# Patient Record
Sex: MALE | Race: WHITE | NOT HISPANIC OR LATINO | Employment: OTHER | URBAN - METROPOLITAN AREA
[De-identification: names, ages, dates, MRNs, and addresses within clinical notes are randomized per-mention and may not be internally consistent; named-entity substitution may affect disease eponyms.]

---

## 2017-01-03 DIAGNOSIS — R51.9 NONINTRACTABLE EPISODIC HEADACHE, UNSPECIFIED HEADACHE TYPE: ICD-10-CM

## 2017-01-03 DIAGNOSIS — E11.9 DIABETES MELLITUS WITHOUT COMPLICATION: ICD-10-CM

## 2017-01-03 RX ORDER — METFORMIN HYDROCHLORIDE 1000 MG/1
TABLET ORAL
Qty: 180 TABLET | Refills: 0 | Status: SHIPPED | OUTPATIENT
Start: 2017-01-03 | End: 2017-03-30 | Stop reason: SDUPTHER

## 2017-01-03 RX ORDER — BUTALBITAL, ACETAMINOPHEN AND CAFFEINE 50; 325; 40 MG/1; MG/1; MG/1
TABLET ORAL
Qty: 30 TABLET | Refills: 0 | Status: SHIPPED | OUTPATIENT
Start: 2017-01-03 | End: 2017-02-02 | Stop reason: SDUPTHER

## 2017-01-23 DIAGNOSIS — J20.9 ACUTE BRONCHITIS, UNSPECIFIED ORGANISM: ICD-10-CM

## 2017-01-23 DIAGNOSIS — J01.90 ACUTE RHINOSINUSITIS: ICD-10-CM

## 2017-01-23 RX ORDER — FLUTICASONE PROPIONATE 50 MCG
SPRAY, SUSPENSION (ML) NASAL
Qty: 16 G | Refills: 5 | Status: SHIPPED | OUTPATIENT
Start: 2017-01-23 | End: 2017-11-14 | Stop reason: SDUPTHER

## 2017-01-27 ENCOUNTER — HOSPITAL ENCOUNTER (EMERGENCY)
Facility: HOSPITAL | Age: 57
Discharge: HOME | End: 2017-01-27
Attending: EMERGENCY MEDICINE
Payer: MEDICARE

## 2017-01-27 ENCOUNTER — OFFICE VISIT (OUTPATIENT)
Dept: FAMILY MEDICINE | Facility: CLINIC | Age: 57
End: 2017-01-27
Payer: MEDICARE

## 2017-01-27 VITALS
HEIGHT: 69 IN | HEART RATE: 90 BPM | BODY MASS INDEX: 37.33 KG/M2 | DIASTOLIC BLOOD PRESSURE: 105 MMHG | WEIGHT: 252 LBS | TEMPERATURE: 98 F | SYSTOLIC BLOOD PRESSURE: 190 MMHG | OXYGEN SATURATION: 93 %

## 2017-01-27 VITALS
BODY MASS INDEX: 36.58 KG/M2 | HEIGHT: 69 IN | OXYGEN SATURATION: 96 % | SYSTOLIC BLOOD PRESSURE: 193 MMHG | HEART RATE: 80 BPM | DIASTOLIC BLOOD PRESSURE: 108 MMHG | RESPIRATION RATE: 17 BRPM | TEMPERATURE: 98 F | WEIGHT: 247 LBS

## 2017-01-27 DIAGNOSIS — G43.909 MIGRAINE WITHOUT STATUS MIGRAINOSUS, NOT INTRACTABLE, UNSPECIFIED MIGRAINE TYPE: Primary | ICD-10-CM

## 2017-01-27 DIAGNOSIS — I10 ESSENTIAL HYPERTENSION: Primary | ICD-10-CM

## 2017-01-27 DIAGNOSIS — I10 ESSENTIAL HYPERTENSION: ICD-10-CM

## 2017-01-27 LAB
ALBUMIN SERPL BCP-MCNC: 4.2 G/DL
ALP SERPL-CCNC: 70 U/L
ALT SERPL W/O P-5'-P-CCNC: 25 U/L
ANION GAP SERPL CALC-SCNC: 10 MMOL/L
AST SERPL-CCNC: 25 U/L
BASOPHILS # BLD AUTO: 0.02 K/UL
BASOPHILS NFR BLD: 0.4 %
BILIRUB SERPL-MCNC: 0.3 MG/DL
BILIRUB UR QL STRIP: NEGATIVE
BUN SERPL-MCNC: 16 MG/DL
CALCIUM SERPL-MCNC: 9.4 MG/DL
CHLORIDE SERPL-SCNC: 109 MMOL/L
CLARITY UR: CLEAR
CO2 SERPL-SCNC: 22 MMOL/L
COLOR UR: YELLOW
CREAT SERPL-MCNC: 0.9 MG/DL
DIFFERENTIAL METHOD: ABNORMAL
EOSINOPHIL # BLD AUTO: 0.2 K/UL
EOSINOPHIL NFR BLD: 4 %
ERYTHROCYTE [DISTWIDTH] IN BLOOD BY AUTOMATED COUNT: 12.2 %
EST. GFR  (AFRICAN AMERICAN): >60 ML/MIN/1.73 M^2
EST. GFR  (NON AFRICAN AMERICAN): >60 ML/MIN/1.73 M^2
GLUCOSE SERPL-MCNC: 99 MG/DL
GLUCOSE UR QL STRIP: NEGATIVE
HCT VFR BLD AUTO: 37 %
HGB BLD-MCNC: 12.7 G/DL
HGB UR QL STRIP: NEGATIVE
KETONES UR QL STRIP: NEGATIVE
LEUKOCYTE ESTERASE UR QL STRIP: NEGATIVE
LYMPHOCYTES # BLD AUTO: 2.4 K/UL
LYMPHOCYTES NFR BLD: 41.2 %
MCH RBC QN AUTO: 31.4 PG
MCHC RBC AUTO-ENTMCNC: 34.3 %
MCV RBC AUTO: 91 FL
MONOCYTES # BLD AUTO: 0.6 K/UL
MONOCYTES NFR BLD: 10 %
NEUTROPHILS # BLD AUTO: 2.5 K/UL
NEUTROPHILS NFR BLD: 44.4 %
NITRITE UR QL STRIP: NEGATIVE
PH UR STRIP: 5 [PH] (ref 5–8)
PLATELET # BLD AUTO: 205 K/UL
PMV BLD AUTO: 11 FL
POTASSIUM SERPL-SCNC: 4.5 MMOL/L
PROT SERPL-MCNC: 6.8 G/DL
PROT UR QL STRIP: NEGATIVE
RBC # BLD AUTO: 4.05 M/UL
SODIUM SERPL-SCNC: 141 MMOL/L
SP GR UR STRIP: 1.01 (ref 1–1.03)
URN SPEC COLLECT METH UR: NORMAL
UROBILINOGEN UR STRIP-ACNC: NEGATIVE EU/DL
WBC # BLD AUTO: 5.7 K/UL

## 2017-01-27 PROCEDURE — 99499 UNLISTED E&M SERVICE: CPT | Mod: S$GLB,,, | Performed by: NURSE PRACTITIONER

## 2017-01-27 PROCEDURE — 85025 COMPLETE CBC W/AUTO DIFF WBC: CPT

## 2017-01-27 PROCEDURE — 81003 URINALYSIS AUTO W/O SCOPE: CPT

## 2017-01-27 PROCEDURE — 96374 THER/PROPH/DIAG INJ IV PUSH: CPT

## 2017-01-27 PROCEDURE — 80053 COMPREHEN METABOLIC PANEL: CPT

## 2017-01-27 PROCEDURE — 96375 TX/PRO/DX INJ NEW DRUG ADDON: CPT

## 2017-01-27 PROCEDURE — 99284 EMERGENCY DEPT VISIT MOD MDM: CPT | Mod: 25

## 2017-01-27 PROCEDURE — 63600175 PHARM REV CODE 636 W HCPCS: Performed by: EMERGENCY MEDICINE

## 2017-01-27 PROCEDURE — 99999 PR PBB SHADOW E&M-EST. PATIENT-LVL IV: CPT | Mod: PBBFAC,,, | Performed by: NURSE PRACTITIONER

## 2017-01-27 PROCEDURE — 25000003 PHARM REV CODE 250: Performed by: EMERGENCY MEDICINE

## 2017-01-27 RX ORDER — MORPHINE SULFATE 10 MG/ML
4 INJECTION INTRAMUSCULAR; INTRAVENOUS; SUBCUTANEOUS
Status: COMPLETED | OUTPATIENT
Start: 2017-01-27 | End: 2017-01-27

## 2017-01-27 RX ORDER — PROCHLORPERAZINE EDISYLATE 5 MG/ML
10 INJECTION INTRAMUSCULAR; INTRAVENOUS ONCE
Status: COMPLETED | OUTPATIENT
Start: 2017-01-27 | End: 2017-01-27

## 2017-01-27 RX ORDER — PROCHLORPERAZINE MALEATE 10 MG
10 TABLET ORAL 4 TIMES DAILY PRN
Qty: 15 TABLET | Refills: 0 | Status: SHIPPED | OUTPATIENT
Start: 2017-01-27 | End: 2017-03-03

## 2017-01-27 RX ORDER — PROCHLORPERAZINE EDISYLATE 5 MG/ML
10 INJECTION INTRAMUSCULAR; INTRAVENOUS ONCE
Status: DISCONTINUED | OUTPATIENT
Start: 2017-01-27 | End: 2017-01-27

## 2017-01-27 RX ORDER — DIPHENHYDRAMINE HYDROCHLORIDE 50 MG/ML
12.5 INJECTION INTRAMUSCULAR; INTRAVENOUS
Status: COMPLETED | OUTPATIENT
Start: 2017-01-27 | End: 2017-01-27

## 2017-01-27 RX ORDER — ALPRAZOLAM 0.5 MG/1
0.5 TABLET ORAL
Status: COMPLETED | OUTPATIENT
Start: 2017-01-27 | End: 2017-01-27

## 2017-01-27 RX ORDER — PROMETHAZINE HYDROCHLORIDE 25 MG/1
25 TABLET ORAL EVERY 6 HOURS PRN
Qty: 15 TABLET | Refills: 0 | Status: SHIPPED | OUTPATIENT
Start: 2017-01-27 | End: 2017-03-15

## 2017-01-27 RX ADMIN — MORPHINE SULFATE 4 MG: 10 INJECTION INTRAVENOUS at 03:01

## 2017-01-27 RX ADMIN — PROCHLORPERAZINE EDISYLATE 10 MG: 5 INJECTION INTRAMUSCULAR; INTRAVENOUS at 02:01

## 2017-01-27 RX ADMIN — ALPRAZOLAM 0.5 MG: 0.5 TABLET ORAL at 01:01

## 2017-01-27 RX ADMIN — DIPHENHYDRAMINE HYDROCHLORIDE 12.5 MG: 50 INJECTION, SOLUTION INTRAMUSCULAR; INTRAVENOUS at 02:01

## 2017-01-27 NOTE — ED AVS SNAPSHOT
[facility]  [place] [address]               Scott Bansal   2017 12:04 PM   ED    Description:  Male : 1960   Department:  [facility]           Your Care was Coordinated By:     Provider Role From To    Lore Smith MD Attending Provider 17 1206 --      Reason for Visit     Hypertension           Diagnoses this Visit        Comments    Migraine without status migrainosus, not intractable, unspecified migraine type    -  Primary     Essential hypertension           ED Disposition     None           To Do List           Follow-up Information     Follow up with Kaylie Chau MD. Schedule an appointment as soon as possible for a visit in 2 days.    Specialty:  Family Medicine    Contact information:    [address]  [place] [address] [phone]         These Medications        Disp Refills Start End    prochlorperazine (COMPAZINE) 10 MG tablet 15 tablet 0 2017     Take 1 tablet (10 mg total) by mouth 4 (four) times daily as needed. - Oral    Pharmacy: [facility]/pharmacy #[id number] - [place] - [address] Ph #: [phone]       promethazine (PHENERGAN) 25 MG tablet 15 tablet 0 2017     Take 1 tablet (25 mg total) by mouth every 6 (six) hours as needed for Nausea. - Oral    Pharmacy: [facility]/pharmacy #[id number] - [place], [place] - [address]. Ph #: [phone]         [facility]     [facility] -  Assistance  Registered nurses in the [facility] provide clinical advisement, health education, appointment booking, and other advisory services.  Call for this free service at [phone].             Medications           Message regarding Medications     Verify the changes and/or additions to your medication regime listed below are the same as discussed with your clinician today.  If any of these changes or additions are incorrect, please notify your  healthcare provider.        START taking these NEW medications        Refills    prochlorperazine (COMPAZINE) 10 MG tablet 0    Sig: Take 1 tablet (10 mg total) by mouth 4 (four) times daily as needed.    Class: Print    Route: Oral    promethazine (PHENERGAN) 25 MG tablet 0    Sig: Take 1 tablet (25 mg total) by mouth every 6 (six) hours as needed for Nausea.    Class: Print    Route: Oral      These medications were administered today        Dose Freq    alprazolam tablet 0.5 mg 0.5 mg ED 1 Time    Sig: Take 1 tablet (0.5 mg total) by mouth ED 1 Time.    Class: Normal    Route: Oral    diphenhydrAMINE injection 12.5 mg 12.5 mg ED 1 Time    Sig: Inject 0.25 mLs (12.5 mg total) into the vein ED 1 Time.    Class: Normal    Route: Intravenous    prochlorperazine injection Soln 10 mg 10 mg Once    Sig: Inject 2 mLs (10 mg total) into the vein once.    Class: Normal    Route: Intravenous    morphine injection 4 mg 4 mg ED 1 Time    Sig: Inject 0.4 mLs (4 mg total) into the vein ED 1 Time.    Class: Normal    Route: Intravenous           Verify that the below list of medications is an accurate representation of the medications you are currently taking.  If none reported, the list may be blank. If incorrect, please contact your healthcare provider. Carry this list with you in case of emergency.           Current Medications     buPROPion (WELLBUTRIN XL) 300 MG 24 hr tablet Take 300 mg by mouth every morning.    busPIRone (BUSPAR) 10 MG tablet Take 10 mg by mouth 3 (three) times daily.    butalbital-acetaminophen-caffeine -40 mg (FIORICET, ESGIC) -40 mg per tablet TAKE 1 TABLET BY MOUTH EVERY 4 (FOUR) HOURS AS NEEDED.    cloNIDine (CATAPRES) 0.3 MG tablet Take 1 tablet (0.3 mg total) by mouth 2 (two) times daily.    cloNIDine 0.3 mg/24 hr td ptwk (CATAPRES) 0.3 mg/24 hr PLACE 1 PATCH ONTO THE SKIN EVERY 7 DAYS.    fluticasone (FLONASE) 50 mcg/actuation nasal spray TAKE 1 SPRAY BY EACH NARE ROUTE ONCE DAILY.     "furosemide (LASIX) 40 MG tablet Take 40 mg by mouth 2 (two) times daily.    gemfibrozil (LOPID) 600 MG tablet TAKE 1 TABLET (600 MG TOTAL) BY MOUTH 2 (TWO) TIMES DAILY BEFORE MEALS.    levocetirizine (XYZAL) 5 MG tablet Take 1 tablet (5 mg total) by mouth every evening.    metformin (GLUCOPHAGE) 1000 MG tablet TAKE 1 TABLET (1,000 MG TOTAL) BY MOUTH 2 (TWO) TIMES DAILY.    pantoprazole (PROTONIX) 40 MG tablet Take 40 mg by mouth once daily.    PROAIR HFA 90 mcg/actuation inhaler INHALE 2 PUFFS EBERY 4 HOURS AS NEEDED    quetiapine (SEROQUEL) 300 MG Tab Take by mouth.    simvastatin (ZOCOR) 80 MG tablet Take 80 mg by mouth once daily.    sumatriptan (IMITREX) 100 MG tablet Take 1 tablet (100 mg total) by mouth as needed for Migraine. Take at the onset of headache, may take 1 more in 1 hour if needed.    sumatriptan (IMITREX) 50 MG tablet TAKE 1 TABLET (50 MG TOTAL) BY MOUTH EVERY 6 (SIX) HOURS AS NEEDED FOR MIGRAINE.    tamsulosin (FLOMAX) 0.4 mg Cp24 Take 0.4 mg by mouth once daily.    alprazolam (XANAX) 1 MG tablet Take 1 tablet (1 mg total) by mouth once.    alprazolam tablet 0.5 mg Take 1 tablet (0.5 mg total) by mouth ED 1 Time.    diazepam (VALIUM) 10 MG Tab Take 1 tablet (10 mg total) by mouth once.    morphine injection 4 mg Inject 0.4 mLs (4 mg total) into the vein ED 1 Time.    prochlorperazine (COMPAZINE) 10 MG tablet Take 1 tablet (10 mg total) by mouth 4 (four) times daily as needed.    promethazine (PHENERGAN) 25 MG tablet Take 1 tablet (25 mg total) by mouth every 6 (six) hours as needed for Nausea.    propranolol (INDERAL) 40 MG tablet Take 1 tablet (40 mg total) by mouth 2 (two) times daily.           Clinical Reference Information           Your Vitals Were     BP Pulse Temp Resp Height Weight    175/106 94 97.5 °F (36.4 °C) (Oral) 17 5' 9" (1.753 m) 112 kg (247 lb)    SpO2 BMI             96% 36.48 kg/m2         Allergies as of 1/27/2017        Reactions    Sulfa (Sulfonamide Antibiotics) Rash    "   Immunizations Administered on Date of Encounter - 1/27/2017     None      ED Micro, Lab, POCT     Start Ordered       Status Ordering Provider    01/27/17 1120 01/27/17 1119  Comprehensive metabolic panel  STAT      Final result     01/27/17 1120 01/27/17 1119  Urinalysis  STAT      Final result     01/27/17 1119 01/27/17 1119  CBC auto differential  STAT      Final result       ED Imaging Orders     Start Ordered       Status Ordering Provider    01/27/17 1120 01/27/17 1119  CT Head Without Contrast  1 time imaging      Final result         Discharge Instructions         Take medications as instructed.  Follow-up with your primary care doctor.  Return to the emergency department for any new or worsening complaints.  Step-by-Step  Checking Your Blood Pressure    © 2551-8379 [employer]. [address] [place] [address], [place] [address]. All rights reserved. This information is not intended as a substitute for professional medical care. Always follow your healthcare professional's instructions.          Migraine Headache  This often severe type of headache is different from other types of headaches in that symptoms other than pain occur with the headache. Nausea and vomiting, lightheadedness, sensitivity to light (photophobia), and other visual disturbances are common migraine symptoms. The pain may last from a few hours to several days. It is not clear why migraines occur but transient factors called triggers can raise the risk of having a migraine attack. A migraine may be triggered by emotional stress or depression, or by hormone changes during the menstrual cycle. Other triggers include birth control pills, overuse of migraine medicines, alcohol or caffeine, foods with tyramine, eye strain, weather changes, missed meals, or too little or too much sleep.  Home care  Follow these tips when taking care of yourself at home:  · Dont drive yourself home if you were given pain medicine for your headache.  Instead, have someone else drive you home. Try to sleep when you get home. You should feel much better when you wake up.  · Cold can help ease migraine symptoms. Put an ice pack on your forehead or at the base of your skull. Put heat on the back of your neck to help ease any neck spasm.  · Drink only clear liquids or eat a light diet until your symptoms get better. This will help you avoid nausea and vomiting.  How to prevent migraines  Pay attention to what seems to trigger your headache. Try to avoid the triggers when you can. If you have frequent headaches, consider keeping a headache diary. In it, write down what you were doing, feeling, or eating in the hours before each headache. Show this to your health care provider to help find the cause of your headaches.  If stress seems to be a trigger for your headaches, figure out what is causing stress in your life. Learn new ways to handle your stress. Ideas include regular exercise, biofeedback, self-hypnosis, and meditation. Talk with your health care provider to find out more information about managing stress. Many books and digital media are also available on this subject.  Tyramine is a substance found in many foods. It can trigger a migraine in some people. These foods contain tyramine:  · Chocolate  · Yogurt  · All cheeses but cottage cheese and cream cheese  · Smoked or pickled fish and meat, including herring, caviar, bologna, pepperoni, and salami  · Liver  · Avocados  · Bananas  · Figs  · Raisins  · Red wine  Try staying away from these foods for 1 to 2 months to see if you have fewer headaches.  How to treat future headaches  · Take time out at the first sign of a headache, if possible. Find a quiet, dark, comfortable place to sit or lie down. Let yourself relax or sleep.  · Put an ice pack on your forehead or on the area of greatest pain. A heating pad and massage may help if you are having a muscle spasm and tightness in your neck.  · If you have been  prescribed a medicine to stop a migraine headache, use this at the first warning sign of the headache for best results. First signs may be an aura or pain.  · If you need to take medicine often for your migraine, talk with your healthcare provider about other ways to prevent your headaches.  Follow-up care  Follow up with your healthcare provider if your headache doesnt get better within the next 24 hours. Talk with your provider if you have frequent headaches. He or she can figure out a treatment plan. Ask if you can have medicine to take at home the next time you get a bad headache. This may keep you from having to visit the emergency department in the future. You may need to see a headache specialist (neurologist) if you continue to have headaches.  When to seek medical advice  Call your healthcare provider right away if any of these occur:  · Your head pain gets worse, or doesnt get better within 24 hours  · You cant keep liquids down (repeated vomiting)  · Pain in your sinuses, ears, or throat  · Fever of 100.4º F (38º C) or higher, or as directed by your healthcare provider  · Stiff neck  · Extreme drowsiness, confusion, or fainting  · Dizziness, or dizziness with spinning sensation (vertigo)  · Weakness in an arm or leg, or on one side of your face  · Difficulty talking or seeing  © 4001-6768 [employer]. [address] [place] [address], [place] [address]. All rights reserved. This information is not intended as a substitute for professional medical care. Always follow your healthcare professional's instructions.          Out of Control High Blood Pressure (Established)    Your blood pressure was unusually high today. This can occur if youve missed doses of your blood pressure medicine. Or it can happen if you are taking other medicines. These include some asthma inhalers, decongestants, diet pills, and street drugs like cocaine and amphetamine.  Other causes include:  · Weight gain  · More salt in  your diet  · Smoking  · Caffeine  Your blood pressure can also rise if you are emotionally upset or in intense pain. It may go back to normal after a period of rest.  A blood pressure reading is made up of 2 numbers. There is a top number over a bottom number. The top number is the systolic pressure. The bottom number is the diastolic pressure. A normal blood pressure is less than 120 over less than 80. High blood pressure (hypertension) is when the top number is 140 or higher. Or it is when the bottom number is 90 or higher. To be high blood pressure, the numbers must be higher when tested over a period of time. The blood pressures between normal and hypertension are called prehypertension.  Home care  Its important to take steps to lower your blood pressure. If you are taking blood pressure medicine, the guidelines below may help you need less or no medicines in the future.  · Begin a weight-loss program if you are overweight.  · Cut back on the amount of salt in your diet:  ¨ Avoid high-salt foods like olives, pickles, smoked meats, and salted potato chips.  ¨ Dont add salt to your food at the table.  ¨ Use only small amounts of salt when cooking.  · Begin an exercise program. Talk with your health care provider about what exercise program is best for you. It doesnt have to be difficult. Even brisk walking for 20 minutes 3 times a week is a good form of exercise.  · Avoid medicines that have heart stimulants in them. This includes many cold and sinus decongestant pills and sprays, as well as diet pills. Check the warnings about hypertension on the label. Stimulants such as amphetamine or cocaine could be lethal for someone with hypertension. Never take these.  · Limit how much caffeine you drink. Or switch to noncaffeinated beverages.  · Stop smoking. If you are a long-time smoker, this can be hard. Enroll in a stop-smoking program to make it more likely that you will succeed. Talk with your provider about  ways to quit.  · Learn how to handle stress better. This is an important part of any program to lower blood pressure. Learn ways to relax. These include meditation, yoga, and biofeedback.  · If medicines were prescribed, take them exactly as directed. Missing doses may cause your blood pressure to get out of control.  · Consider buying an automatic blood pressure machine. These are available at many drugstores. Use this to monitor your blood pressure. Report the results to your provider.  Follow-up care  Regular visits to your own health care provider for blood pressure and medicine checks are an important part of your care. Make a follow-up appointment as directed.  When to seek medical advice  Call your health care provider right away if any of these occur:  · Chest, arm, shoulder, neck, or upper back pain  · Shortness of breath  · Severe headache  · Throbbing or rushing sound in the ears  · Nosebleed  · Extreme drowsiness, confusion, or fainting  · Dizziness or dizziness with spinning sensation (vertigo)  · Weakness in an arm or leg or on one side of the face  · Difficulty speaking or seeing   © 2595-6105 [employer]. [address] [place] [address]. All rights reserved. This information is not intended as a substitute for professional medical care. Always follow your healthcare professional's instructions.          Smoking Cessation     If you would like to quit smoking:   You may be eligible for free services if you are a [place] resident and started smoking cigarettes before September 1, 1988.  Call the [employer]oking [employer]ess[employer] ([employer]) toll free at (414) 616-5413 or (119) 761-6094.   Call 0-800-QUIT-NOW if you do not meet the above criteria.             [facility] complies with applicable Federal civil rights laws and does not discriminate on the basis of race, color, national origin, age, disability, or sex.        Language Assistance Services     ATTENTION:  Language assistance services are available, free of charge. Please call [phone].      ATENCIÓN: Si habla español, tiene a yadav disposición servicios gratuitos de asistencia lingüística. Llame al [phone].     CHÚ Ý: N?u b?n nói Ti?ng Vi?t, có các d?ch v? h? tr? ngôn ng? mi?n phí dành cho b?n. G?i s? [phone].

## 2017-01-27 NOTE — ED TRIAGE NOTES
"Pt arrived via personal transportation from home. CC of hypertension. Pt stated "My girlfriend is up on the 4th floor, she just had a big surgery and i've just been too anxious about her. So my blood pressure has been aurelio high. The last time this happened to me, was when my dad ." Pt presents with headache 10/10, denies N/V/F/D/SOB. NAD at this time.   "

## 2017-01-27 NOTE — ED NOTES
MD made aware of pt request for fluids. MD stated pt shouldn't receive fluids with the his high blood pressure

## 2017-01-27 NOTE — DISCHARGE INSTRUCTIONS
Take medications as instructed.  Follow-up with your primary care doctor.  Return to the emergency department for any new or worsening complaints.  Step-by-Step  Checking Your Blood Pressure    © 7924-8987 [employer]. [address] [place] [address], [place] [address]. All rights reserved. This information is not intended as a substitute for professional medical care. Always follow your healthcare professional's instructions.          Migraine Headache  This often severe type of headache is different from other types of headaches in that symptoms other than pain occur with the headache. Nausea and vomiting, lightheadedness, sensitivity to light (photophobia), and other visual disturbances are common migraine symptoms. The pain may last from a few hours to several days. It is not clear why migraines occur but transient factors called triggers can raise the risk of having a migraine attack. A migraine may be triggered by emotional stress or depression, or by hormone changes during the menstrual cycle. Other triggers include birth control pills, overuse of migraine medicines, alcohol or caffeine, foods with tyramine, eye strain, weather changes, missed meals, or too little or too much sleep.  Home care  Follow these tips when taking care of yourself at home:  · Dont drive yourself home if you were given pain medicine for your headache. Instead, have someone else drive you home. Try to sleep when you get home. You should feel much better when you wake up.  · Cold can help ease migraine symptoms. Put an ice pack on your forehead or at the base of your skull. Put heat on the back of your neck to help ease any neck spasm.  · Drink only clear liquids or eat a light diet until your symptoms get better. This will help you avoid nausea and vomiting.  How to prevent migraines  Pay attention to what seems to trigger your headache. Try to avoid the triggers when you can. If you have frequent headaches, consider keeping  a headache diary. In it, write down what you were doing, feeling, or eating in the hours before each headache. Show this to your health care provider to help find the cause of your headaches.  If stress seems to be a trigger for your headaches, figure out what is causing stress in your life. Learn new ways to handle your stress. Ideas include regular exercise, biofeedback, self-hypnosis, and meditation. Talk with your health care provider to find out more information about managing stress. Many books and digital media are also available on this subject.  Tyramine is a substance found in many foods. It can trigger a migraine in some people. These foods contain tyramine:  · Chocolate  · Yogurt  · All cheeses but cottage cheese and cream cheese  · Smoked or pickled fish and meat, including herring, caviar, bologna, pepperoni, and salami  · Liver  · Avocados  · Bananas  · Figs  · Raisins  · Red wine  Try staying away from these foods for 1 to 2 months to see if you have fewer headaches.  How to treat future headaches  · Take time out at the first sign of a headache, if possible. Find a quiet, dark, comfortable place to sit or lie down. Let yourself relax or sleep.  · Put an ice pack on your forehead or on the area of greatest pain. A heating pad and massage may help if you are having a muscle spasm and tightness in your neck.  · If you have been prescribed a medicine to stop a migraine headache, use this at the first warning sign of the headache for best results. First signs may be an aura or pain.  · If you need to take medicine often for your migraine, talk with your healthcare provider about other ways to prevent your headaches.  Follow-up care  Follow up with your healthcare provider if your headache doesnt get better within the next 24 hours. Talk with your provider if you have frequent headaches. He or she can figure out a treatment plan. Ask if you can have medicine to take at home the next time you get a bad  headache. This may keep you from having to visit the emergency department in the future. You may need to see a headache specialist (neurologist) if you continue to have headaches.  When to seek medical advice  Call your healthcare provider right away if any of these occur:  · Your head pain gets worse, or doesnt get better within 24 hours  · You cant keep liquids down (repeated vomiting)  · Pain in your sinuses, ears, or throat  · Fever of 100.4º F (38º C) or higher, or as directed by your healthcare provider  · Stiff neck  · Extreme drowsiness, confusion, or fainting  · Dizziness, or dizziness with spinning sensation (vertigo)  · Weakness in an arm or leg, or on one side of your face  · Difficulty talking or seeing  © 6107-4493 [employer]. [address] [place] [address], [place] [address]. All rights reserved. This information is not intended as a substitute for professional medical care. Always follow your healthcare professional's instructions.          Out of Control High Blood Pressure (Established)    Your blood pressure was unusually high today. This can occur if youve missed doses of your blood pressure medicine. Or it can happen if you are taking other medicines. These include some asthma inhalers, decongestants, diet pills, and street drugs like cocaine and amphetamine.  Other causes include:  · Weight gain  · More salt in your diet  · Smoking  · Caffeine  Your blood pressure can also rise if you are emotionally upset or in intense pain. It may go back to normal after a period of rest.  A blood pressure reading is made up of 2 numbers. There is a top number over a bottom number. The top number is the systolic pressure. The bottom number is the diastolic pressure. A normal blood pressure is less than 120 over less than 80. High blood pressure (hypertension) is when the top number is 140 or higher. Or it is when the bottom number is 90 or higher. To be high blood pressure, the numbers must be  higher when tested over a period of time. The blood pressures between normal and hypertension are called prehypertension.  Home care  Its important to take steps to lower your blood pressure. If you are taking blood pressure medicine, the guidelines below may help you need less or no medicines in the future.  · Begin a weight-loss program if you are overweight.  · Cut back on the amount of salt in your diet:  ¨ Avoid high-salt foods like olives, pickles, smoked meats, and salted potato chips.  ¨ Dont add salt to your food at the table.  ¨ Use only small amounts of salt when cooking.  · Begin an exercise program. Talk with your health care provider about what exercise program is best for you. It doesnt have to be difficult. Even brisk walking for 20 minutes 3 times a week is a good form of exercise.  · Avoid medicines that have heart stimulants in them. This includes many cold and sinus decongestant pills and sprays, as well as diet pills. Check the warnings about hypertension on the label. Stimulants such as amphetamine or cocaine could be lethal for someone with hypertension. Never take these.  · Limit how much caffeine you drink. Or switch to noncaffeinated beverages.  · Stop smoking. If you are a long-time smoker, this can be hard. Enroll in a stop-smoking program to make it more likely that you will succeed. Talk with your provider about ways to quit.  · Learn how to handle stress better. This is an important part of any program to lower blood pressure. Learn ways to relax. These include meditation, yoga, and biofeedback.  · If medicines were prescribed, take them exactly as directed. Missing doses may cause your blood pressure to get out of control.  · Consider buying an automatic blood pressure machine. These are available at many [employer]. Use this to monitor your blood pressure. Report the results to your provider.  Follow-up care  Regular visits to your own health care provider for blood pressure and  medicine checks are an important part of your care. Make a follow-up appointment as directed.  When to seek medical advice  Call your health care provider right away if any of these occur:  · Chest, arm, shoulder, neck, or upper back pain  · Shortness of breath  · Severe headache  · Throbbing or rushing sound in the ears  · Nosebleed  · Extreme drowsiness, confusion, or fainting  · Dizziness or dizziness with spinning sensation (vertigo)  · Weakness in an arm or leg or on one side of the face  · Difficulty speaking or seeing   © 9524-0567 [employer]. [address] [place] [address]. All rights reserved. This information is not intended as a substitute for professional medical care. Always follow your healthcare professional's instructions.

## 2017-01-27 NOTE — ED PROVIDER NOTES
Encounter Date: 1/27/2017    SCRIBE #1 NOTE: I, Matiasrick Bustillos, am scribing for, and in the presence of, Lore Smith MD. Other sections scribed: HPI, ROS.       History     Chief Complaint   Patient presents with    Hypertension     Pt. presents with elevated blood pressure with c/o migraine headaches for weeks.      Review of patient's allergies indicates:   Allergen Reactions    Sulfa (sulfonamide antibiotics) Rash     HPI Comments: CC: Hypertension, Headaches  HPI: This 56 y.o. obese male with HTN, migraines, HLD, DM type II, Bipolar 1 disorder, GERD, BPH and and Hx of tobacco use presents to the ED c/o frequent occipital HAs for the past week. Pt states his pain is severe and acute. Pt reports his blood pressure has been elevated for the past few days (227/130 last night, 200s again this morning); pt believes that some of this could be due to recent stress stemming from recent emergency abdominal surgery his fiancee had done this past week. Pt states that he took an extra 0.2 mg Clonidine this morning in addition to his daily medications. He denies relief of HAs with Fioricet and Imitrex. He denies fever, chills, chest pain, SOB, abdominal pain. Denies sudden onset of HA. Denies f/c/neck pain/numbness/tingling/weakness. No swelling.         The history is provided by the patient.     Past Medical History   Diagnosis Date    Bipolar 1 disorder, mixed     BPH (benign prostatic hypertrophy)     Diabetes mellitus     GERD (gastroesophageal reflux disease)     Hyperlipidemia     Hypertension     Migraine headache      No past medical history pertinent negatives.  Past Surgical History   Procedure Laterality Date    Cervical spine surgery      Tonsillectomy      Shoulder surgery       History reviewed. No pertinent family history.  Social History   Substance Use Topics    Smoking status: Former Smoker    Smokeless tobacco: None    Alcohol use No     Review of Systems   Constitutional: Negative for  activity change, chills, fever and unexpected weight change.   HENT: Negative for congestion, drooling, rhinorrhea, sore throat, tinnitus, trouble swallowing and voice change.    Eyes: Negative for pain, redness and visual disturbance.   Respiratory: Negative for apnea, cough, choking, shortness of breath, wheezing and stridor.    Cardiovascular: Negative for chest pain.   Gastrointestinal: Negative for abdominal pain, nausea and vomiting.   Endocrine: Negative for polydipsia.   Genitourinary: Negative for decreased urine volume, difficulty urinating, dysuria, flank pain, frequency, hematuria and urgency.   Musculoskeletal: Negative for arthralgias, back pain, gait problem, myalgias, neck pain and neck stiffness.   Skin: Negative for color change, rash and wound.   Neurological: Positive for headaches (occipital). Negative for seizures, syncope, weakness and numbness.   Hematological: Negative for adenopathy.   Psychiatric/Behavioral: Negative for agitation and confusion.       Physical Exam   Initial Vitals   BP Pulse Resp Temp SpO2   01/27/17 1100 01/27/17 1100 01/27/17 1100 01/27/17 1100 01/27/17 1100   209/128 89 17 97.5 °F (36.4 °C) 98 %     Physical Exam    Nursing note and vitals reviewed.  Constitutional: He appears well-developed and well-nourished. He is not diaphoretic. No distress.   HENT:   Head: Normocephalic and atraumatic.   Right Ear: External ear normal.   Left Ear: External ear normal.   Nose: Nose normal.   Mouth/Throat: Oropharynx is clear and moist. No oropharyngeal exudate.   Eyes: Conjunctivae and EOM are normal. Pupils are equal, round, and reactive to light. Right eye exhibits no discharge. Left eye exhibits no discharge. No scleral icterus.   Neck: Normal range of motion. Neck supple. No thyromegaly present. No tracheal deviation present. No JVD present.   Cardiovascular: Normal rate, regular rhythm, normal heart sounds and intact distal pulses. Exam reveals no gallop and no friction rub.     No murmur heard.  Pulmonary/Chest: Breath sounds normal. No stridor. No respiratory distress. He has no wheezes. He has no rhonchi. He has no rales. He exhibits no tenderness.   Abdominal: Soft. Bowel sounds are normal. He exhibits no distension. There is no tenderness. There is no rebound and no guarding.   Morbidly obese   Musculoskeletal: Normal range of motion. He exhibits no edema.   Lymphadenopathy:     He has no cervical adenopathy.   Neurological: He is alert and oriented to person, place, and time. He has normal strength. He displays normal reflexes. No cranial nerve deficit or sensory deficit.   Smiling, nontoxic appearing.  There is no pronator drift.  Patient has normal rapid alternating movements to bilateral upper extremities.   Skin: Skin is warm and dry. No rash noted. No erythema. No pallor.   Psychiatric: He has a normal mood and affect. His behavior is normal. Thought content normal.         ED Course   Procedures  Labs Reviewed   CBC W/ AUTO DIFFERENTIAL - Abnormal; Notable for the following:        Result Value    RBC 4.05 (*)     Hemoglobin 12.7 (*)     Hematocrit 37.0 (*)     MCH 31.4 (*)     All other components within normal limits   COMPREHENSIVE METABOLIC PANEL - Abnormal; Notable for the following:     CO2 22 (*)     All other components within normal limits   URINALYSIS             Medical Decision Makin-year-old male with a history of hypertension, chronic migraine headaches, diabetes, bipolar disorder, BPH, GERD, COPD, chronic back pain presents to the emergency department with his daughter complaining of headache consistent with his past migraine headaches and is unrelieved with his home medications.  He also reports increased blood pressure readings at home.  Notes increased social stressors at home secondary to his fiancé's recent hospitalization.  On exam he is afebrile, hypertensive with otherwise stable vital signs.  He is nontoxic appearing.  He is neurovascularly  intact.  There are no meningeal signs on exam.  Differential diagnosis includes but is not limited to migraine headache, tension headache, hypertensive emergency, stress reaction.  I considered but do not suspect subarachnoid hemorrhage, CVA, sepsis, meningoencephalitis.  CT the head was obtained and is negative for acute pathology.  Labs were reviewed and are grossly unremarkable.  Patient's blood pressure is improved with Xanax for anxiety, Compazine, Benadryl as well as morphine.  He is requesting discharge home and states that he feels much better.  His blood pressure is also improved.  We'll discharge with Compazine, Phenergan and he agrees to keep a daily log of his blood pressures to follow-up with his primary care physician.  Return precautions were given.  He and his daughter at bedside agrees the plan.            Scribe Attestation:   Scribe #1: I performed the above scribed service and the documentation accurately describes the services I performed. I attest to the accuracy of the note.    Attending Attestation:           Physician Attestation for Scribe:  Physician Attestation Statement for Scribe #1: I, Lore Smith MD, reviewed documentation, as scribed by Matias Bustillos in my presence, and it is both accurate and complete.                 ED Course     Clinical Impression:   The primary encounter diagnosis was Migraine without status migrainosus, not intractable, unspecified migraine type. A diagnosis of Essential hypertension was also pertinent to this visit.          Lore Smith MD  01/27/17 0519

## 2017-01-27 NOTE — MR AVS SNAPSHOT
[facility]  [address]  [place] [address]  Phone: [phone]  Fax: [phone]                  Scott Bansal   2017 9:45 AM   Appointment    Description:  Male : 1960   Provider:  INESSA BROWN IN   Department:  [facility]                To Do List           Future Appointments        Provider Department Dept Phone    2017 9:45 AM INESSA BROWN IN [facility] [phone]      Goals (5 Years of Data)     None      [facility]     [facility] On Call Nurse Care Line -  Assistance  Registered nurses in the [facility] Call Center provide clinical advisement, health education, appointment booking, and other advisory services.  Call for this free service at [phone].             Medications           Message regarding Medications     Verify the changes and/or additions to your medication regime listed below are the same as discussed with your clinician today.  If any of these changes or additions are incorrect, please notify your healthcare provider.             Verify that the below list of medications is an accurate representation of the medications you are currently taking.  If none reported, the list may be blank. If incorrect, please contact your healthcare provider. Carry this list with you in case of emergency.           Current Medications     alprazolam (XANAX) 1 MG tablet Take 1 tablet (1 mg total) by mouth once.    buPROPion (WELLBUTRIN XL) 300 MG 24 hr tablet Take 300 mg by mouth every morning.    busPIRone (BUSPAR) 10 MG tablet Take 10 mg by mouth 3 (three) times daily.    butalbital-acetaminophen-caffeine -40 mg (FIORICET, ESGIC) -40 mg per tablet TAKE 1 TABLET BY MOUTH EVERY 4 (FOUR) HOURS AS NEEDED.    cloNIDine (CATAPRES) 0.3 MG tablet Take 1 tablet (0.3 mg total) by mouth 2 (two) times daily.    cloNIDine 0.3 mg/24 hr td ptwk (CATAPRES) 0.3 mg/24 hr PLACE 1 PATCH ONTO THE SKIN EVERY 7 DAYS.     diazepam (VALIUM) 10 MG Tab Take 1 tablet (10 mg total) by mouth once.    diclofenac sodium (VOLTAREN) 1 % Gel Apply 2 g topically once daily.    fluticasone (FLONASE) 50 mcg/actuation nasal spray TAKE 1 SPRAY BY EACH NARE ROUTE ONCE DAILY.    fosinopril (MONOPRIL) 40 MG tablet Take 40 mg by mouth once daily.    furosemide (LASIX) 40 MG tablet Take 40 mg by mouth 2 (two) times daily.    gemfibrozil (LOPID) 600 MG tablet TAKE 1 TABLET (600 MG TOTAL) BY MOUTH 2 (TWO) TIMES DAILY BEFORE MEALS.    hydrocodone-acetaminophen 7.5-325mg (NORCO) 7.5-325 mg per tablet Take 1 tablet by mouth every 6 (six) hours as needed.    levocetirizine (XYZAL) 5 MG tablet Take 1 tablet (5 mg total) by mouth every evening.    metformin (GLUCOPHAGE) 1000 MG tablet TAKE 1 TABLET (1,000 MG TOTAL) BY MOUTH 2 (TWO) TIMES DAILY.    pantoprazole (PROTONIX) 40 MG tablet Take 40 mg by mouth once daily.    PROAIR HFA 90 mcg/actuation inhaler INHALE 2 PUFFS EBERY 4 HOURS AS NEEDED    propranolol (INDERAL) 40 MG tablet Take 1 tablet (40 mg total) by mouth 2 (two) times daily.    quetiapine (SEROQUEL) 300 MG Tab Take by mouth.    simvastatin (ZOCOR) 80 MG tablet Take 80 mg by mouth once daily.    sumatriptan (IMITREX) 100 MG tablet Take 1 tablet (100 mg total) by mouth as needed for Migraine. Take at the onset of headache, may take 1 more in 1 hour if needed.    sumatriptan (IMITREX) 50 MG tablet TAKE 1 TABLET (50 MG TOTAL) BY MOUTH EVERY 6 (SIX) HOURS AS NEEDED FOR MIGRAINE.    tamsulosin (FLOMAX) 0.4 mg Cp24 Take 0.4 mg by mouth once daily.           Clinical Reference Information           Allergies as of 1/27/2017     Sulfa (Sulfonamide Antibiotics)      Immunizations Administered on Date of Encounter - 1/27/2017     None

## 2017-01-30 ENCOUNTER — OFFICE VISIT (OUTPATIENT)
Dept: FAMILY MEDICINE | Facility: CLINIC | Age: 57
End: 2017-01-30
Payer: MEDICARE

## 2017-01-30 ENCOUNTER — LAB VISIT (OUTPATIENT)
Dept: LAB | Facility: HOSPITAL | Age: 57
End: 2017-01-30
Attending: FAMILY MEDICINE
Payer: MEDICARE

## 2017-01-30 VITALS
DIASTOLIC BLOOD PRESSURE: 96 MMHG | OXYGEN SATURATION: 97 % | TEMPERATURE: 98 F | BODY MASS INDEX: 37.62 KG/M2 | HEART RATE: 96 BPM | SYSTOLIC BLOOD PRESSURE: 148 MMHG | HEIGHT: 69 IN | WEIGHT: 254 LBS

## 2017-01-30 DIAGNOSIS — D64.9 ANEMIA, UNSPECIFIED TYPE: ICD-10-CM

## 2017-01-30 DIAGNOSIS — M54.31 SCIATICA OF RIGHT SIDE: ICD-10-CM

## 2017-01-30 DIAGNOSIS — I10 ESSENTIAL HYPERTENSION: Primary | ICD-10-CM

## 2017-01-30 LAB
BASOPHILS # BLD AUTO: 0.03 K/UL
BASOPHILS NFR BLD: 0.5 %
DIFFERENTIAL METHOD: ABNORMAL
EOSINOPHIL # BLD AUTO: 0.3 K/UL
EOSINOPHIL NFR BLD: 4.6 %
ERYTHROCYTE [DISTWIDTH] IN BLOOD BY AUTOMATED COUNT: 12.2 %
HCT VFR BLD AUTO: 37.9 %
HGB BLD-MCNC: 12.3 G/DL
LYMPHOCYTES # BLD AUTO: 2.4 K/UL
LYMPHOCYTES NFR BLD: 36.9 %
MCH RBC QN AUTO: 30.1 PG
MCHC RBC AUTO-ENTMCNC: 32.5 %
MCV RBC AUTO: 93 FL
MONOCYTES # BLD AUTO: 0.9 K/UL
MONOCYTES NFR BLD: 13.8 %
NEUTROPHILS # BLD AUTO: 2.8 K/UL
NEUTROPHILS NFR BLD: 43.1 %
PLATELET # BLD AUTO: 244 K/UL
PMV BLD AUTO: 11 FL
RBC # BLD AUTO: 4.09 M/UL
WBC # BLD AUTO: 6.54 K/UL

## 2017-01-30 PROCEDURE — 36415 COLL VENOUS BLD VENIPUNCTURE: CPT

## 2017-01-30 PROCEDURE — 99999 PR PBB SHADOW E&M-EST. PATIENT-LVL III: CPT | Mod: PBBFAC,,, | Performed by: FAMILY MEDICINE

## 2017-01-30 PROCEDURE — 3077F SYST BP >= 140 MM HG: CPT | Mod: S$GLB,,, | Performed by: FAMILY MEDICINE

## 2017-01-30 PROCEDURE — 85025 COMPLETE CBC W/AUTO DIFF WBC: CPT

## 2017-01-30 PROCEDURE — 3080F DIAST BP >= 90 MM HG: CPT | Mod: S$GLB,,, | Performed by: FAMILY MEDICINE

## 2017-01-30 PROCEDURE — 1159F MED LIST DOCD IN RCRD: CPT | Mod: S$GLB,,, | Performed by: FAMILY MEDICINE

## 2017-01-30 PROCEDURE — 99499 UNLISTED E&M SERVICE: CPT | Mod: S$PBB,,, | Performed by: FAMILY MEDICINE

## 2017-01-30 PROCEDURE — 99214 OFFICE O/P EST MOD 30 MIN: CPT | Mod: S$GLB,,, | Performed by: FAMILY MEDICINE

## 2017-01-30 RX ORDER — FOSINOPRIL SODIUM 40 MG/1
40 TABLET ORAL DAILY
Qty: 90 TABLET | Refills: 1 | Status: SHIPPED | OUTPATIENT
Start: 2017-01-30 | End: 2017-02-07

## 2017-01-30 RX ORDER — PANTOPRAZOLE SODIUM 40 MG/1
TABLET, DELAYED RELEASE ORAL
Qty: 90 TABLET | Refills: 3 | Status: SHIPPED | OUTPATIENT
Start: 2017-01-30 | End: 2018-01-12 | Stop reason: SDUPTHER

## 2017-01-30 RX ORDER — CYCLOBENZAPRINE HCL 10 MG
TABLET ORAL
Refills: 3 | COMMUNITY
Start: 2017-01-07 | End: 2017-02-07

## 2017-01-30 RX ORDER — HYDROCODONE BITARTRATE AND ACETAMINOPHEN 5; 325 MG/1; MG/1
1 TABLET ORAL EVERY 8 HOURS PRN
Qty: 15 TABLET | Refills: 0 | Status: SHIPPED | OUTPATIENT
Start: 2017-01-30 | End: 2017-02-07

## 2017-01-30 RX ORDER — FOSINOPRIL SODIUM 40 MG/1
40 TABLET ORAL DAILY
COMMUNITY
End: 2017-01-30 | Stop reason: SDUPTHER

## 2017-01-30 NOTE — MR AVS SNAPSHOT
[facility]  [address]  [place] [address]  Phone: [phone]                  Scott Bansal   2017 8:00 AM   Office Visit    Description:  Male : 1960   Provider:  Robert Lanier MD   Department:  [facility]           Reason for Visit     Hypertension     Medication Refill           Diagnoses this Visit        Comments    Essential hypertension    -  Primary     Sciatica of right side         Anemia, unspecified type                To Do List           Future Appointments        Provider Department Dept Phone    2017 9:00 AM LAB, [facility] DRAW STATION [facility] [phone]    2017 11:20 AM Kaylie Chau MD [facility] [phone]      Goals (5 Years of Data)     None       These Medications        Disp Refills Start End    fosinopril (MONOPRIL) 40 MG tablet 90 tablet 1 2017     Take 1 tablet (40 mg total) by mouth once daily. - Oral    Pharmacy: [facility]/pharmacy #[id number] - [place], [place] - [address]. Ph #: [phone]       hydrocodone-acetaminophen 5-325mg (NORCO) 5-325 mg per tablet 15 tablet 0 2017     Take 1 tablet by mouth every 8 (eight) hours as needed for Pain. - Oral    Pharmacy: [facility]/pharmacy #[id number] - [place], [place] - [address]. Ph #: [phone]         [facility]     [facility] -  Assistance  Registered nurses in the [facility] provide clinical advisement, health education, appointment booking, and other advisory services.  Call for this free service at [phone].             Medications           Message regarding Medications     Verify the changes and/or additions to your medication regime listed below are the same as discussed with your clinician today.  If any of these changes or additions are incorrect, please notify your healthcare provider.        START taking these NEW medications        Refills     fosinopril (MONOPRIL) 40 MG tablet 1    Sig: Take 1 tablet (40 mg total) by mouth once daily.    Class: Normal    Route: Oral    hydrocodone-acetaminophen 5-325mg (NORCO) 5-325 mg per tablet 0    Sig: Take 1 tablet by mouth every 8 (eight) hours as needed for Pain.    Class: Print    Route: Oral      STOP taking these medications     alprazolam (XANAX) 1 MG tablet Take 1 tablet (1 mg total) by mouth once.    diazepam (VALIUM) 10 MG Tab Take 1 tablet (10 mg total) by mouth once.           Verify that the below list of medications is an accurate representation of the medications you are currently taking.  If none reported, the list may be blank. If incorrect, please contact your healthcare provider. Carry this list with you in case of emergency.           Current Medications     buPROPion (WELLBUTRIN XL) 300 MG 24 hr tablet Take 300 mg by mouth every morning.    busPIRone (BUSPAR) 10 MG tablet Take 10 mg by mouth 3 (three) times daily.    butalbital-acetaminophen-caffeine -40 mg (FIORICET, ESGIC) -40 mg per tablet TAKE 1 TABLET BY MOUTH EVERY 4 (FOUR) HOURS AS NEEDED.    cloNIDine (CATAPRES) 0.3 MG tablet Take 1 tablet (0.3 mg total) by mouth 2 (two) times daily.    cloNIDine 0.3 mg/24 hr td ptwk (CATAPRES) 0.3 mg/24 hr PLACE 1 PATCH ONTO THE SKIN EVERY 7 DAYS.    cyclobenzaprine (FLEXERIL) 10 MG tablet TAKE 1 TABLET (10 MG TOTAL) BY MOUTH 3 (THREE) TIMES DAILY AS NEEDED.    fluticasone (FLONASE) 50 mcg/actuation nasal spray TAKE 1 SPRAY BY EACH NARE ROUTE ONCE DAILY.    fosinopril (MONOPRIL) 40 MG tablet Take 1 tablet (40 mg total) by mouth once daily.    furosemide (LASIX) 40 MG tablet Take 40 mg by mouth 2 (two) times daily.    gemfibrozil (LOPID) 600 MG tablet TAKE 1 TABLET (600 MG TOTAL) BY MOUTH 2 (TWO) TIMES DAILY BEFORE MEALS.    levocetirizine (XYZAL) 5 MG tablet Take 1 tablet (5 mg total) by mouth every evening.    metformin (GLUCOPHAGE) 1000 MG tablet TAKE 1 TABLET (1,000 MG TOTAL) BY MOUTH 2  "(TWO) TIMES DAILY.    pantoprazole (PROTONIX) 40 MG tablet Take 40 mg by mouth once daily.    PROAIR HFA 90 mcg/actuation inhaler INHALE 2 PUFFS EBERY 4 HOURS AS NEEDED    prochlorperazine (COMPAZINE) 10 MG tablet Take 1 tablet (10 mg total) by mouth 4 (four) times daily as needed.    promethazine (PHENERGAN) 25 MG tablet Take 1 tablet (25 mg total) by mouth every 6 (six) hours as needed for Nausea.    quetiapine (SEROQUEL) 300 MG Tab Take by mouth.    simvastatin (ZOCOR) 80 MG tablet Take 80 mg by mouth once daily.    sumatriptan (IMITREX) 100 MG tablet Take 1 tablet (100 mg total) by mouth as needed for Migraine. Take at the onset of headache, may take 1 more in 1 hour if needed.    sumatriptan (IMITREX) 50 MG tablet TAKE 1 TABLET (50 MG TOTAL) BY MOUTH EVERY 6 (SIX) HOURS AS NEEDED FOR MIGRAINE.    tamsulosin (FLOMAX) 0.4 mg Cp24 Take 0.4 mg by mouth once daily.    hydrocodone-acetaminophen 5-325mg (NORCO) 5-325 mg per tablet Take 1 tablet by mouth every 8 (eight) hours as needed for Pain.    propranolol (INDERAL) 40 MG tablet Take 1 tablet (40 mg total) by mouth 2 (two) times daily.           Clinical Reference Information           Vital Signs - Last Recorded  Most recent update: 1/30/2017  8:12 AM by Malaika Corley MA    BP Pulse Temp Ht Wt SpO2    (!) 148/96 (BP Location: Right arm, Patient Position: Sitting, BP Method: Manual) 96 98.2 °F (36.8 °C) (Oral) 5' 9" (1.753 m) 115.2 kg (253 lb 15.5 oz) 97%    BMI                37.5 kg/m2          Blood Pressure          Most Recent Value    BP  (!)  148/96      Allergies as of 1/30/2017     Sulfa (Sulfonamide Antibiotics)      Immunizations Administered on Date of Encounter - 1/30/2017     None      Orders Placed During Today's Visit     Future Labs/Procedures Expected by Expires    CBC auto differential  1/30/2017 1/30/2018      "

## 2017-01-30 NOTE — PROGRESS NOTES
"Routine Office Visit    Patient Name: Scott Bansal    : 1960  MRN: [id number]    Subjective:  Scott is a 56 y.o. male who presents today for:    1. Blood pressure  Patient presenting today for refill of blood pressure mediation.  He reports that his PCP took him off of Fosinopril and put him on clonidine 0.3mg.  He states that he was in the ED for headaches where his blood pressure was found to be very high.  He has since done better and is now back on in the Fosinopril.  There has been no no chest pain, shortness of breath, or weakness. He states that the ED gave him Xanax for his nerves and that helped him out a lot.  He was unable to see his PCP today, so came here for his refill.     2.  Back pain  Patient states that he has had back pain chronically for several years.  He states that yesterday he was getting off the couch and felt something "pinch".  Since then his sciatica has been flaring up.  There has been no weakness, but there is significant pain with standing, sitting, and walking.  There has been no loss of sensation and he denies any bowel or bladder incontinence.      Past Medical History  Past Medical History   Diagnosis Date    Bipolar 1 disorder, mixed     BPH (benign prostatic hypertrophy)     Diabetes mellitus     GERD (gastroesophageal reflux disease)     Hyperlipidemia     Hypertension     Migraine headache        Past Surgical History  Past Surgical History   Procedure Laterality Date    Cervical spine surgery      Tonsillectomy      Shoulder surgery         Family History  History reviewed. No pertinent family history.    Social History  Social History     Social History    Marital status:      Spouse name: N/A    Number of children: N/A    Years of education: N/A     Occupational History    Not on file.     Social History Main Topics    Smoking status: Former Smoker    Smokeless tobacco: Not on file    Alcohol use No    Drug use: No    Sexual activity: " Yes     Partners: Female     Other Topics Concern    Not on file     Social History Narrative       Current Medications  Current Outpatient Prescriptions on File Prior to Visit   Medication Sig Dispense Refill    buPROPion (WELLBUTRIN XL) 300 MG 24 hr tablet Take 300 mg by mouth every morning.  2    busPIRone (BUSPAR) 10 MG tablet Take 10 mg by mouth 3 (three) times daily.      butalbital-acetaminophen-caffeine -40 mg (FIORICET, ESGIC) -40 mg per tablet TAKE 1 TABLET BY MOUTH EVERY 4 (FOUR) HOURS AS NEEDED. 30 tablet 0    cloNIDine (CATAPRES) 0.3 MG tablet Take 1 tablet (0.3 mg total) by mouth 2 (two) times daily. 60 tablet 11    cloNIDine 0.3 mg/24 hr td ptwk (CATAPRES) 0.3 mg/24 hr PLACE 1 PATCH ONTO THE SKIN EVERY 7 DAYS. 4 patch 6    fluticasone (FLONASE) 50 mcg/actuation nasal spray TAKE 1 SPRAY BY EACH NARE ROUTE ONCE DAILY. 16 g 5    furosemide (LASIX) 40 MG tablet Take 40 mg by mouth 2 (two) times daily.      gemfibrozil (LOPID) 600 MG tablet TAKE 1 TABLET (600 MG TOTAL) BY MOUTH 2 (TWO) TIMES DAILY BEFORE MEALS. 120 tablet 0    levocetirizine (XYZAL) 5 MG tablet Take 1 tablet (5 mg total) by mouth every evening. 30 tablet 3    metformin (GLUCOPHAGE) 1000 MG tablet TAKE 1 TABLET (1,000 MG TOTAL) BY MOUTH 2 (TWO) TIMES DAILY. 180 tablet 0    pantoprazole (PROTONIX) 40 MG tablet Take 40 mg by mouth once daily.      PROAIR HFA 90 mcg/actuation inhaler INHALE 2 PUFFS EBERY 4 HOURS AS NEEDED  12    prochlorperazine (COMPAZINE) 10 MG tablet Take 1 tablet (10 mg total) by mouth 4 (four) times daily as needed. 15 tablet 0    promethazine (PHENERGAN) 25 MG tablet Take 1 tablet (25 mg total) by mouth every 6 (six) hours as needed for Nausea. 15 tablet 0    quetiapine (SEROQUEL) 300 MG Tab Take by mouth.      simvastatin (ZOCOR) 80 MG tablet Take 80 mg by mouth once daily.  3    sumatriptan (IMITREX) 100 MG tablet Take 1 tablet (100 mg total) by mouth as needed for Migraine. Take at the  "onset of headache, may take 1 more in 1 hour if needed. 12 tablet 5    sumatriptan (IMITREX) 50 MG tablet TAKE 1 TABLET (50 MG TOTAL) BY MOUTH EVERY 6 (SIX) HOURS AS NEEDED FOR MIGRAINE.  0    tamsulosin (FLOMAX) 0.4 mg Cp24 Take 0.4 mg by mouth once daily.      propranolol (INDERAL) 40 MG tablet Take 1 tablet (40 mg total) by mouth 2 (two) times daily. 60 tablet 5    [DISCONTINUED] alprazolam (XANAX) 1 MG tablet Take 1 tablet (1 mg total) by mouth once. 2 tablet 0    [DISCONTINUED] diazepam (VALIUM) 10 MG Tab Take 1 tablet (10 mg total) by mouth once. 3 tablet 0     No current facility-administered medications on file prior to visit.        Allergies   Review of patient's allergies indicates:   Allergen Reactions    Sulfa (sulfonamide antibiotics) Rash       Review of Systems (Pertinent positives)  Review of Systems   Constitutional: Negative.    Eyes: Negative.    Cardiovascular: Negative.    Gastrointestinal: Negative.    Genitourinary: Negative.    Musculoskeletal: Positive for back pain.   Skin: Negative.    Neurological: Positive for headaches. Negative for dizziness, tingling, tremors, sensory change, speech change, focal weakness and seizures.         Visit Vitals    BP (!) 148/96 (BP Location: Right arm, Patient Position: Sitting, BP Method: Manual)    Pulse 96    Temp 98.2 °F (36.8 °C) (Oral)    Ht 5' 9" (1.753 m)    Wt 115.2 kg (253 lb 15.5 oz)    SpO2 97%    BMI 37.5 kg/m2       GENERAL APPEARANCE: in no apparent distress and well developed and well nourished  HEENT: PERRL, EOMI, Sclera clear, anicteric, Oropharynx clear, no lesions, Neck supple with midline trachea  NECK: normal, supple, no adenopathy, thyroid normal in size  RESPIRATORY: appears well, vitals normal, no respiratory distress, acyanotic, normal RR, chest clear, no wheezing, crepitations, rhonchi, normal symmetric air entry  HEART: regular rate and rhythm, S1, S2 normal, no murmur, click, rub or gallop.    ABDOMEN: abdomen is " soft without tenderness, no masses, no hernias, no organomegaly, no rebound, no guarding. Suprapubic tenderness absent. No CVA tenderness.  NEUROLOGIC: normal without focal findings, CN II-XII are intact.  Reflexes 2+ patellar  SKIN: no rashes, no wounds, no other lesions  PSYCH: Alert, oriented x 3, thought content appropriate, speech normal, pleasant and cooperative, good eye contact, well groomed    Assessment/Plan:  Scott Bansal is a 56 y.o. male who presents today for :    Scott was seen today for hypertension and medication refill.    Diagnoses and all orders for this visit:    Essential hypertension  -     fosinopril (MONOPRIL) 40 MG tablet; Take 1 tablet (40 mg total) by mouth once daily.    Sciatica of right side  -     hydrocodone-acetaminophen 5-325mg (NORCO) 5-325 mg per tablet; Take 1 tablet by mouth every 8 (eight) hours as needed for Pain.    Anemia, unspecified type  -     CBC auto differential; Future    1.  Patients labs on review showed a normacytic anemia.  Will check again today   - he is to avoid NSAIDS until otherwise told  2.  norco for sciatica pain  3.  He is to keep appointment with PCP as scheduled  4.  Patient to go to the ED should symptoms worsen or should he have sudden weakness/incontinence   - he is to go also if there is any chest pain, shortness of breath, left arm/jaw pain  5.  Refilled blood pressure medication    Robert Lanier MD

## 2017-01-31 NOTE — PROGRESS NOTES
The patient left the office before the visit was finished.  Patient went to ED for further evaluation

## 2017-01-31 NOTE — TELEPHONE ENCOUNTER
----- Message from Wendy Onofre sent at 1/30/2017  3:15 PM CST -----  Contact: self  Pt is requesting a refill for pantoprazole (PROTONIX) 40 MG tablet. [phone]

## 2017-01-31 NOTE — TELEPHONE ENCOUNTER
----- Message from Francia Pascal sent at 1/30/2017 11:33 AM CST -----  Contact: Self   Refill on:pantoprazole (PROTONIX) 40 MG tablet    Please thank you

## 2017-02-01 ENCOUNTER — TELEPHONE (OUTPATIENT)
Dept: FAMILY MEDICINE | Facility: CLINIC | Age: 57
End: 2017-02-01

## 2017-02-01 NOTE — TELEPHONE ENCOUNTER
----- Message from Robert Lanier MD sent at 2/1/2017  7:37 AM CST -----  Please let patient know that labs looked good.    Thanks,  Dr. Lanier

## 2017-02-02 DIAGNOSIS — R51.9 NONINTRACTABLE EPISODIC HEADACHE, UNSPECIFIED HEADACHE TYPE: ICD-10-CM

## 2017-02-02 RX ORDER — CYCLOBENZAPRINE HCL 10 MG
TABLET ORAL
Qty: 90 TABLET | Refills: 3 | Status: SHIPPED | OUTPATIENT
Start: 2017-02-02 | End: 2017-05-18 | Stop reason: SDUPTHER

## 2017-02-02 RX ORDER — BUTALBITAL, ACETAMINOPHEN AND CAFFEINE 50; 325; 40 MG/1; MG/1; MG/1
TABLET ORAL
Qty: 30 TABLET | Refills: 0 | Status: SHIPPED | OUTPATIENT
Start: 2017-02-02 | End: 2017-02-27 | Stop reason: SDUPTHER

## 2017-02-05 ENCOUNTER — HOSPITAL ENCOUNTER (EMERGENCY)
Facility: HOSPITAL | Age: 57
Discharge: HOME | End: 2017-02-05
Attending: EMERGENCY MEDICINE
Payer: MEDICARE

## 2017-02-05 VITALS
TEMPERATURE: 98 F | BODY MASS INDEX: 36.58 KG/M2 | WEIGHT: 247 LBS | RESPIRATION RATE: 20 BRPM | OXYGEN SATURATION: 98 % | SYSTOLIC BLOOD PRESSURE: 173 MMHG | DIASTOLIC BLOOD PRESSURE: 89 MMHG | HEART RATE: 104 BPM | HEIGHT: 69 IN

## 2017-02-05 DIAGNOSIS — R06.2 WHEEZING: ICD-10-CM

## 2017-02-05 DIAGNOSIS — M54.5 ACUTE LEFT-SIDED LOW BACK PAIN, WITH SCIATICA PRESENCE UNSPECIFIED: Primary | ICD-10-CM

## 2017-02-05 LAB
BILIRUB UR QL STRIP: NEGATIVE
CLARITY UR: CLEAR
COLOR UR: YELLOW
GLUCOSE UR QL STRIP: NEGATIVE
HGB UR QL STRIP: NEGATIVE
KETONES UR QL STRIP: NEGATIVE
LEUKOCYTE ESTERASE UR QL STRIP: NEGATIVE
NITRITE UR QL STRIP: NEGATIVE
PH UR STRIP: 5 [PH] (ref 5–8)
PROT UR QL STRIP: NEGATIVE
SP GR UR STRIP: 1.03 (ref 1–1.03)
URN SPEC COLLECT METH UR: NORMAL
UROBILINOGEN UR STRIP-ACNC: NEGATIVE EU/DL

## 2017-02-05 PROCEDURE — 81003 URINALYSIS AUTO W/O SCOPE: CPT

## 2017-02-05 PROCEDURE — 94640 AIRWAY INHALATION TREATMENT: CPT

## 2017-02-05 PROCEDURE — 99284 EMERGENCY DEPT VISIT MOD MDM: CPT

## 2017-02-05 PROCEDURE — 25000242 PHARM REV CODE 250 ALT 637 W/ HCPCS: Performed by: PHYSICIAN ASSISTANT

## 2017-02-05 RX ORDER — MORPHINE SULFATE 10 MG/ML
5 INJECTION INTRAMUSCULAR; INTRAVENOUS; SUBCUTANEOUS
Status: DISCONTINUED | OUTPATIENT
Start: 2017-02-05 | End: 2017-02-05

## 2017-02-05 RX ORDER — METHOCARBAMOL 500 MG/1
1000 TABLET, FILM COATED ORAL 3 TIMES DAILY
Qty: 12 TABLET | Refills: 0 | Status: SHIPPED | OUTPATIENT
Start: 2017-02-05 | End: 2017-02-07

## 2017-02-05 RX ORDER — IPRATROPIUM BROMIDE AND ALBUTEROL SULFATE 2.5; .5 MG/3ML; MG/3ML
3 SOLUTION RESPIRATORY (INHALATION)
Status: COMPLETED | OUTPATIENT
Start: 2017-02-05 | End: 2017-02-05

## 2017-02-05 RX ADMIN — IPRATROPIUM BROMIDE AND ALBUTEROL SULFATE 3 ML: .5; 3 SOLUTION RESPIRATORY (INHALATION) at 01:02

## 2017-02-05 NOTE — ED PROVIDER NOTES
Encounter Date: 2/5/2017    SCRIBE #1 NOTE: I, Ermelinda Ponce, am scribing for, and in the presence of,  Kelvin Zazueta PA-C. I have scribed the following portions of the note - Other sections scribed: ROS and HPI.       History     Chief Complaint   Patient presents with    Hip Pain     States he has left hip pain for 2 days.     Review of patient's allergies indicates:   Allergen Reactions    Sulfa (sulfonamide antibiotics) Rash     HPI Comments: CC: Hip Pain.    HPI: This 56 y.o. male with a past medical history of Bipolar 1 disorder, BPH; Diabetes mellitus; GERD; Hyperlipidemia; Hypertension; Migraine headache; kidney stones, presents to the ED complaining of an atraumatic L hip pain for last 2 days. Pain is non-radiating, severe, and sharp/burning. He states pain feels different than his past sciatica and kidney stone. He reports no relief with hot compresses, hot bath with epsom salt, and Tylenol. He denies fever, N/V, hematuria, dysuria, frequency or any other associated symptoms.    The history is provided by the patient. No  was used.     Past Medical History   Diagnosis Date    Bipolar 1 disorder, mixed     BPH (benign prostatic hypertrophy)     Diabetes mellitus     GERD (gastroesophageal reflux disease)     Hyperlipidemia     Hypertension     Migraine headache      No past medical history pertinent negatives.  Past Surgical History   Procedure Laterality Date    Cervical spine surgery      Tonsillectomy      Shoulder surgery       History reviewed. No pertinent family history.  Social History   Substance Use Topics    Smoking status: Former Smoker    Smokeless tobacco: None    Alcohol use Yes     Review of Systems   Constitutional: Negative for fever.   HENT: Negative for sore throat.    Eyes: Negative for visual disturbance.   Respiratory: Negative for cough and shortness of breath.    Cardiovascular: Negative for chest pain.   Gastrointestinal: Negative for abdominal  pain, nausea and vomiting.   Genitourinary: Negative for dysuria, flank pain, frequency and hematuria.   Musculoskeletal: Positive for myalgias (L hip). Negative for neck pain.   Skin: Negative for rash and wound.   Neurological: Negative for headaches.       Physical Exam   Initial Vitals   BP Pulse Resp Temp SpO2   02/05/17 1145 02/05/17 1145 02/05/17 1145 02/05/17 1145 02/05/17 1145   184/104 92 18 97.9 °F (36.6 °C) 94 %     Physical Exam    Nursing note and vitals reviewed.  Constitutional: He appears well-developed and well-nourished. He is not diaphoretic. No distress.   HENT:   Head: Normocephalic and atraumatic.   Nose: Nose normal.   Eyes: Conjunctivae and EOM are normal. Right eye exhibits no discharge. Left eye exhibits no discharge.   Neck: Normal range of motion. No tracheal deviation present. No JVD present.   Cardiovascular: Normal rate, regular rhythm and normal heart sounds. Exam reveals no friction rub.    No murmur heard.  Pulmonary/Chest: No accessory muscle usage or stridor. No tachypnea. No respiratory distress. He has no decreased breath sounds. He has wheezes (diffuse lower end expiratory ). He has no rhonchi. He has no rales. He exhibits no tenderness.   Abdominal: Soft. He exhibits no distension. There is no tenderness. There is no guarding.   Musculoskeletal: Normal range of motion. He exhibits tenderness (reproducible TTP of the L upper buttock and L lower back.).   No midline tenderness or bony deformities noted down the neck and spine. Ambulating well, without limp or pain. No bony hip TTP.    Neurological: He is alert and oriented to person, place, and time.   Skin: Skin is warm and dry. No rash and no abscess noted. No erythema. No pallor.         ED Course   Procedures  Labs Reviewed   URINALYSIS          X-Rays:   Independently Interpreted Readings:   Chest X-Ray: No PNA     Medical Decision Making:   History:   Old Medical Records: I decided to obtain old medical records.    This  is an emergent evaluation of a 56 y.o. male with a PMHx of HTN, bipolar, and DM presenting to the ED for L hip pain. Denies trauma, urinary symptoms, abdominal pain, and back pain. SpO2 94%, /104, vitals otherwise WNL, afebrile. Patient is non-toxic appearing and in no acute distress. Reproducible TTP of the L buttock and lower back most consistent with muscle strain. No midline TTP to suggest acute vertebral fracture. UA shows no evidence of infection to suggest pyelonephritis. Also no hematuria to suggest ureteral stone. No evidence of HZV. Presentation not consistent with sciatica. Incidental wheezing on exam with SpO2 of 94%. Patient likely has undiagnosed COPD. Wheezing resolved with Duonebs in ED. Will avoid steroids in this DM patient as symptoms are mild. CXR shows no PNA.     Will offer morphine in ED for acute pain control; no ride present to receive medication. Discharged home with robaxin. Instructed to follow up with PCP for reevaluation and management of symptoms.     I discussed with the patient the diagnosis, treatment plan, indications for return to the emergency department, and for expected follow-up. The patient verbalized an understanding. The patient is asked if there are any questions or concerns. We discuss the case, until all issues are addressed to the patients satisfaction. Patient understands and is agreeable to the plan.     I discussed this patient with Dr. Merida who also evaluated the patient and is in agreement with my assessment and plan.           Scribe Attestation:   Scribe #1: I performed the above scribed service and the documentation accurately describes the services I performed. I attest to the accuracy of the note.    Attending Attestation:     Physician Attestation Statement for NP/PA:   I have conducted a face to face encounter with this patient in addition to the NP/PA, due to NP/PA Request    Other NP/PA Attestation Additions:      Medical Decision Making:  56-year-old male presenting with 2 days of left hip pain.  No evidence of cauda equina syndrome.  Patient reports a history of COPD.  Mild wheezing on exam but no hypoxia.  Breathing treatment given with improvement in wheezing.  I agree with plan.       Physician Attestation for Scribe:  Physician Attestation Statement for Scribe #1: I, Kelvin Zazueta PA-C, reviewed documentation, as scribed by Ermelinda Ponce in my presence, and it is both accurate and complete.                 ED Course     Clinical Impression:   The primary encounter diagnosis was Acute left-sided low back pain, with sciatica presence unspecified. A diagnosis of Wheezing was also pertinent to this visit.    Disposition:   Disposition: Discharged  Condition: Stable       Kelvin Zazueta PA-C  02/05/17 0434       Genaro Merida MD  02/06/17 1085

## 2017-02-05 NOTE — DISCHARGE INSTRUCTIONS
Back Pain (Acute or Chronic)    Back pain is one of the most common problems. The good news is that most people feel better in 1 to 2 weeks, and most of the rest in 1 to 2 months. Most people can remain active.  People experience and describe pain differently; not everyone is the same.  · The pain can be sharp, stabbing, shooting, aching, cramping or burning.  · Movement, standing, bending, lifting, sitting, or walking may worsen pain.  · It can be localized to one spot or area, or it can be more generalized.  · It can spread or radiate upwards, to the front, or go down your arms or legs (sciatica).  · It can cause muscle spasm.  Most of the time, mechanical problems with the muscles or spine cause the pain. Mechanical problems are usually caused by an injury to the muscles or ligaments. While illness can cause back pain, it is usually not caused by a serious illness. Mechanical problems include:   · Physical activity such as sports, exercise, work, or normal activity  · Overexertion, lifting, pushing, pulling incorrectly or too aggressively  · Sudden twisting, bending, or stretching from an accident, or accidental movement  · Poor posture  · Stretching or moving wrong, without noticing pain at the time  · Poor coordination, lack of regular exercise (check with your doctor about this)  · Spinal disc disease or arthritis  · Stress  Pain can also be related to pregnancy, or illness like appendicitis, bladder or kidney infections, pelvic infections, and many other things.  Acute back pain usually gets better in 1 to 2 weeks. Back pain related to disk disease, arthritis in the spinal joints or spinal stenosis (narrowing of the spinal canal) can become chronic and last for months or years.  Unless you had a physical injury (for example, a car accident or fall) X-rays are usually not needed for the initial evaluation of back pain. If pain continues and does not respond to medical treatment, X-rays and other tests may be  needed.  Home care  Try these home care recommendations:  · When in bed, try to find a position of comfort. A firm mattress is best. Try lying flat on your back with pillows under your knees. You can also try lying on your side with your knees bent up towards your chest and a pillow between your knees.  · At first, do not try to stretch out the sore spots. If there is a strain, it is not like the good soreness you get after exercising without an injury. In this case, stretching may make it worse.  · Avoid prolong sitting, long car rides, or travel. This puts more stress on the lower back than standing or walking.  · During the first 24 to 72 hours after an acute injury or flare up of chronic back pain, apply an ice pack to the painful area for 20 minutes and then remove it for 20 minutes. Do this over a period of 60 to 90 minutes or several times a day. This will reduce swelling and pain. Wrap the ice pack in a thin towel or plastic to protect your skin.  · You can start with ice, then switch to heat. Heat (hot shower, hot bath, or heating pad) reduces pain and works well for muscle spasms. Heat can be applied to the painful area for 20 minutes then remove it for 20 minutes. Do this over a period of 60 to 90 minutes or several times a day. Do not sleep on a heating pad. It can lead to skin burns or tissue damage.  · You can alternate ice and heat therapy. Talk with your doctor about the best treatment for your back pain.  · Therapeutic massage can help relax the back muscles without stretching them.  · Be aware of safe lifting methods and do not lift anything without stretching first.  Medicines  Talk to your doctor before using medicine, especially if you have other medical problems or are taking other medicines.  · You may use over-the-counter medicine as directed on the bottle to control pain, unless another pain medicine was prescribed. If you have chronic conditions like diabetes, liver or kidney disease,  stomach ulcers, or gastrointestinal bleeding, or are taking blood thinners, talk to your doctor before taking any medicine.  · Be careful if you are given a prescription medicines, narcotics, or medicine for muscle spasms. They can cause drowsiness, affect your coordination, reflexes, and judgement. Do not drive or operate heavy machinery.  Follow-up care  Follow up with your healthcare provider, or as advised.   A radiologist will review any X-rays that were taken. Your provide will notify you of any new findings that may affect your care.  Call 911  Call emergency services if any of the following occur:  · Trouble breathing  · Confusion  · Very drowsy or trouble awakening  · Fainting or loss of consciousness  · Rapid or very slow heart rate  · Loss of bowel or bladder control  When to seek medical advice  Call your healthcare provider right away if any of these occur:   · Pain becomes worse or spreads to your legs  · Weakness or numbness in one or both legs  · Numbness in the groin or genital area  Date Last Reviewed: 7/1/2016  © 0733-9280 [employer]. [address] [place] [address], [place] [address]. All rights reserved. This information is not intended as a substitute for professional medical care. Always follow your healthcare professional's instructions.

## 2017-02-05 NOTE — ED AVS SNAPSHOT
[facility]  [place] [address]               Scott Robertsaniceto   2017 12:23 PM   ED    Description:  Male : 1960   Department:  [facility]           Your Care was Coordinated By:     Provider Role From To    Genaro Merida MD Attending Provider 17 1233 17 1414    MINE WilderC Physician Assistant 17 1233 --      Reason for Visit     Hip Pain           Diagnoses this Visit        Comments    Acute left-sided low back pain, with sciatica presence unspecified    -  Primary     Wheezing           ED Disposition     ED Disposition Condition Comment    Discharge             To Do List           Follow-up Information     Follow up with Kaylie Chau MD. Schedule an appointment as soon as possible for a visit in 1 day.    Specialty:  Family Medicine    Why:  For reevaluation    Contact information:    [address]  [place] [address]  [phone]          Go to [facility].    Specialty:  Emergency Medicine    Why:  If symptoms worsen    Contact information:    [place] [id number] [phone]       These Medications        Disp Refills Start End    methocarbamol (ROBAXIN) 500 MG Tab 12 tablet 0 2017    Take 2 tablets (1,000 mg total) by mouth 3 (three) times daily. - Oral    Pharmacy: [facility]/pharmacy #[id number] - [place] - [address].  #: [phone]         [facility]     [facility] -  Assistance  Registered nurses in the [facility] provide clinical advisement, health education, appointment booking, and other advisory services.  Call for this free service at [phone].             Medications           Message regarding Medications     Verify the changes and/or additions to your medication regime listed below are the same as discussed with your clinician today.  If any of these  changes or additions are incorrect, please notify your healthcare provider.        START taking these NEW medications        Refills    methocarbamol (ROBAXIN) 500 MG Tab 0    Sig: Take 2 tablets (1,000 mg total) by mouth 3 (three) times daily.    Class: Print    Route: Oral      These medications were administered today        Dose Freq    albuterol-ipratropium 2.5mg-0.5mg/3mL nebulizer solution 3 mL 3 mL Every 5 min    Sig: Take 3 mLs by nebulization every 5 (five) minutes.    Class: Normal    Route: Nebulization    morphine injection 5 mg (Discontinued) 5 mg ED 1 Time    Sig: Inject 0.5 mLs (5 mg total) into the muscle ED 1 Time.    Class: Normal    Route: Intramuscular           Verify that the below list of medications is an accurate representation of the medications you are currently taking.  If none reported, the list may be blank. If incorrect, please contact your healthcare provider. Carry this list with you in case of emergency.           Current Medications     buPROPion (WELLBUTRIN XL) 300 MG 24 hr tablet Take 300 mg by mouth every morning.    busPIRone (BUSPAR) 10 MG tablet Take 10 mg by mouth 3 (three) times daily.    butalbital-acetaminophen-caffeine -40 mg (FIORICET, ESGIC) -40 mg per tablet TAKE 1 TABLET BY MOUTH EVERY 4 (FOUR) HOURS AS NEEDED.    cloNIDine (CATAPRES) 0.3 MG tablet Take 1 tablet (0.3 mg total) by mouth 2 (two) times daily.    cyclobenzaprine (FLEXERIL) 10 MG tablet TAKE 1 TABLET (10 MG TOTAL) BY MOUTH 3 (THREE) TIMES DAILY AS NEEDED.    cyclobenzaprine (FLEXERIL) 10 MG tablet TAKE 1 TABLET (10 MG TOTAL) BY MOUTH 3 (THREE) TIMES DAILY AS NEEDED.    fluticasone (FLONASE) 50 mcg/actuation nasal spray TAKE 1 SPRAY BY EACH NARE ROUTE ONCE DAILY.    fosinopril (MONOPRIL) 40 MG tablet Take 1 tablet (40 mg total) by mouth once daily.    furosemide (LASIX) 40 MG tablet Take 40 mg by mouth 2 (two) times daily.    gemfibrozil (LOPID) 600 MG tablet TAKE 1 TABLET (600 MG TOTAL) BY  "MOUTH 2 (TWO) TIMES DAILY BEFORE MEALS.    hydrocodone-acetaminophen 5-325mg (NORCO) 5-325 mg per tablet Take 1 tablet by mouth every 8 (eight) hours as needed for Pain.    levocetirizine (XYZAL) 5 MG tablet Take 1 tablet (5 mg total) by mouth every evening.    metformin (GLUCOPHAGE) 1000 MG tablet TAKE 1 TABLET (1,000 MG TOTAL) BY MOUTH 2 (TWO) TIMES DAILY.    pantoprazole (PROTONIX) 40 MG tablet TAKE 1 TABLET BY MOUTH DAILY    prochlorperazine (COMPAZINE) 10 MG tablet Take 1 tablet (10 mg total) by mouth 4 (four) times daily as needed.    promethazine (PHENERGAN) 25 MG tablet Take 1 tablet (25 mg total) by mouth every 6 (six) hours as needed for Nausea.    quetiapine (SEROQUEL) 300 MG Tab Take by mouth.    simvastatin (ZOCOR) 80 MG tablet Take 80 mg by mouth once daily.    sumatriptan (IMITREX) 100 MG tablet Take 1 tablet (100 mg total) by mouth as needed for Migraine. Take at the onset of headache, may take 1 more in 1 hour if needed.    tamsulosin (FLOMAX) 0.4 mg Cp24 Take 0.4 mg by mouth once daily.    cloNIDine 0.3 mg/24 hr td ptwk (CATAPRES) 0.3 mg/24 hr PLACE 1 PATCH ONTO THE SKIN EVERY 7 DAYS.    methocarbamol (ROBAXIN) 500 MG Tab Take 2 tablets (1,000 mg total) by mouth 3 (three) times daily.    PROAIR HFA 90 mcg/actuation inhaler INHALE 2 PUFFS EBERY 4 HOURS AS NEEDED    propranolol (INDERAL) 40 MG tablet Take 1 tablet (40 mg total) by mouth 2 (two) times daily.    sumatriptan (IMITREX) 50 MG tablet TAKE 1 TABLET (50 MG TOTAL) BY MOUTH EVERY 6 (SIX) HOURS AS NEEDED FOR MIGRAINE.           Clinical Reference Information           Your Vitals Were     BP Pulse Temp Resp Height Weight    173/89 (BP Location: Left arm, Patient Position: Sitting, BP Method: Automatic) 104 98 °F (36.7 °C) (Oral) 20 5' 9" (1.753 m) 112 kg (247 lb)    SpO2 BMI             98% 36.48 kg/m2         Allergies as of 2/5/2017        Reactions    Sulfa (Sulfonamide Antibiotics) Rash      Immunizations Administered on Date of Encounter - " 2/5/2017     None      ED Micro, Lab, POCT     Start Ordered       Status Ordering Provider    02/05/17 1245 02/05/17 1245  Urinalysis  STAT      Final result       ED Imaging Orders     Start Ordered       Status Ordering Provider    02/05/17 1246 02/05/17 1246  X-Ray Chest PA And Lateral  1 time imaging      Final result         Discharge Instructions         Back Pain (Acute or Chronic)    Back pain is one of the most common problems. The good news is that most people feel better in 1 to 2 weeks, and most of the rest in 1 to 2 months. Most people can remain active.  People experience and describe pain differently; not everyone is the same.  · The pain can be sharp, stabbing, shooting, aching, cramping or burning.  · Movement, standing, bending, lifting, sitting, or walking may worsen pain.  · It can be localized to one spot or area, or it can be more generalized.  · It can spread or radiate upwards, to the front, or go down your arms or legs (sciatica).  · It can cause muscle spasm.  Most of the time, mechanical problems with the muscles or spine cause the pain. Mechanical problems are usually caused by an injury to the muscles or ligaments. While illness can cause back pain, it is usually not caused by a serious illness. Mechanical problems include:   · Physical activity such as sports, exercise, work, or normal activity  · Overexertion, lifting, pushing, pulling incorrectly or too aggressively  · Sudden twisting, bending, or stretching from an accident, or accidental movement  · Poor posture  · Stretching or moving wrong, without noticing pain at the time  · Poor coordination, lack of regular exercise (check with your doctor about this)  · Spinal disc disease or arthritis  · Stress  Pain can also be related to pregnancy, or illness like appendicitis, bladder or kidney infections, pelvic infections, and many other things.  Acute back pain usually gets better in 1 to 2 weeks. Back pain related to disk disease,  arthritis in the spinal joints or spinal stenosis (narrowing of the spinal canal) can become chronic and last for months or years.  Unless you had a physical injury (for example, a car accident or fall) X-rays are usually not needed for the initial evaluation of back pain. If pain continues and does not respond to medical treatment, X-rays and other tests may be needed.  Home care  Try these home care recommendations:  · When in bed, try to find a position of comfort. A firm mattress is best. Try lying flat on your back with pillows under your knees. You can also try lying on your side with your knees bent up towards your chest and a pillow between your knees.  · At first, do not try to stretch out the sore spots. If there is a strain, it is not like the good soreness you get after exercising without an injury. In this case, stretching may make it worse.  · Avoid prolong sitting, long car rides, or travel. This puts more stress on the lower back than standing or walking.  · During the first 24 to 72 hours after an acute injury or flare up of chronic back pain, apply an ice pack to the painful area for 20 minutes and then remove it for 20 minutes. Do this over a period of 60 to 90 minutes or several times a day. This will reduce swelling and pain. Wrap the ice pack in a thin towel or plastic to protect your skin.  · You can start with ice, then switch to heat. Heat (hot shower, hot bath, or heating pad) reduces pain and works well for muscle spasms. Heat can be applied to the painful area for 20 minutes then remove it for 20 minutes. Do this over a period of 60 to 90 minutes or several times a day. Do not sleep on a heating pad. It can lead to skin burns or tissue damage.  · You can alternate ice and heat therapy. Talk with your doctor about the best treatment for your back pain.  · Therapeutic massage can help relax the back muscles without stretching them.  · Be aware of safe lifting methods and do not lift  anything without stretching first.  Medicines  Talk to your doctor before using medicine, especially if you have other medical problems or are taking other medicines.  · You may use over-the-counter medicine as directed on the bottle to control pain, unless another pain medicine was prescribed. If you have chronic conditions like diabetes, liver or kidney disease, stomach ulcers, or gastrointestinal bleeding, or are taking blood thinners, talk to your doctor before taking any medicine.  · Be careful if you are given a prescription medicines, narcotics, or medicine for muscle spasms. They can cause drowsiness, affect your coordination, reflexes, and judgement. Do not drive or operate heavy machinery.  Follow-up care  Follow up with your healthcare provider, or as advised.   A radiologist will review any X-rays that were taken. Your provide will notify you of any new findings that may affect your care.  Call 911  Call emergency services if any of the following occur:  · Trouble breathing  · Confusion  · Very drowsy or trouble awakening  · Fainting or loss of consciousness  · Rapid or very slow heart rate  · Loss of bowel or bladder control  When to seek medical advice  Call your healthcare provider right away if any of these occur:   · Pain becomes worse or spreads to your legs  · Weakness or numbness in one or both legs  · Numbness in the groin or genital area  Date Last Reviewed: 7/1/2016 © 2000-2016 [employer]. [address] [place] [address]. All rights reserved. This information is not intended as a substitute for professional medical care. Always follow your healthcare professional's instructions.          Your Scheduled Appointments     Feb 07, 2017 11:20 AM CST   Established Patient Visit with Kaylie Chau MD   [facility]    [address] [place] [address]  [place] [address] [phone]              Smoking Cessation     If you would like to quit  smoking:   You may be eligible for free services if you are a [place] resident and started smoking cigarettes before September 1, 1988.  Call the Smoking Cessation Trust (SCT) toll free at (186) 525-8172 or (982) 332-7621.   Call 4-800-QUIT-NOW if you do not meet the above criteria.             [facility] complies with applicable Federal civil rights laws and does not discriminate on the basis of race, color, national origin, age, disability, or sex.        Language Assistance Services     ATTENTION: Language assistance services are available, free of charge. Please call [phone].      ATENCIÓN: Si habla español, tiene a yadav disposición servicios gratuitos de asistencia lingüística. Llame al [phone].     CHÚ Ý: N?u b?n nói Ti?ng Vi?t, có các d?ch v? h? tr? ngôn ng? mi?n phí dành cho b?n. G?i s? [phone].

## 2017-02-06 ENCOUNTER — TELEPHONE (OUTPATIENT)
Dept: FAMILY MEDICINE | Facility: CLINIC | Age: 57
End: 2017-02-06

## 2017-02-06 DIAGNOSIS — R51.9 HEADACHE, WORSENING: ICD-10-CM

## 2017-02-06 DIAGNOSIS — G43.009 NONINTRACTABLE MIGRAINE, UNSPECIFIED MIGRAINE TYPE: ICD-10-CM

## 2017-02-06 NOTE — TELEPHONE ENCOUNTER
----- Message from Francia Pascal sent at 2/6/2017  4:15 PM CST -----  Contact: [employer] [phone]  [employer] need a Stephanie code for a medication. Please call [phone] Thank you

## 2017-02-06 NOTE — TELEPHONE ENCOUNTER
----- Message from Laura Gonzales sent at 2/2/2017  9:40 AM CST -----  Contact: Yahaira/[employer]/[phone] ext [facility]/[phone]  PA:  prochlorperazine (COMPAZINE) 10 MG tablet    Yahaira is following up on PA paperwork that was faxed over.    Thank you.

## 2017-02-07 ENCOUNTER — OFFICE VISIT (OUTPATIENT)
Dept: FAMILY MEDICINE | Facility: CLINIC | Age: 57
End: 2017-02-07
Payer: MEDICARE

## 2017-02-07 ENCOUNTER — TELEPHONE (OUTPATIENT)
Dept: FAMILY MEDICINE | Facility: CLINIC | Age: 57
End: 2017-02-07

## 2017-02-07 VITALS
HEIGHT: 69 IN | DIASTOLIC BLOOD PRESSURE: 100 MMHG | SYSTOLIC BLOOD PRESSURE: 180 MMHG | WEIGHT: 250 LBS | TEMPERATURE: 98 F | OXYGEN SATURATION: 98 % | HEART RATE: 92 BPM | BODY MASS INDEX: 37.03 KG/M2

## 2017-02-07 DIAGNOSIS — M54.31 SCIATICA OF RIGHT SIDE: ICD-10-CM

## 2017-02-07 DIAGNOSIS — S32.000A LUMBAR COMPRESSION FRACTURE, CLOSED, INITIAL ENCOUNTER: Primary | ICD-10-CM

## 2017-02-07 DIAGNOSIS — I10 ESSENTIAL HYPERTENSION: ICD-10-CM

## 2017-02-07 DIAGNOSIS — G44.229 CHRONIC TENSION-TYPE HEADACHE, NOT INTRACTABLE: ICD-10-CM

## 2017-02-07 PROCEDURE — 99214 OFFICE O/P EST MOD 30 MIN: CPT | Mod: S$GLB,,, | Performed by: FAMILY MEDICINE

## 2017-02-07 PROCEDURE — 99499 UNLISTED E&M SERVICE: CPT | Mod: S$PBB,,, | Performed by: FAMILY MEDICINE

## 2017-02-07 PROCEDURE — 3077F SYST BP >= 140 MM HG: CPT | Mod: S$GLB,,, | Performed by: FAMILY MEDICINE

## 2017-02-07 PROCEDURE — 99999 PR PBB SHADOW E&M-EST. PATIENT-LVL III: CPT | Mod: PBBFAC,,, | Performed by: FAMILY MEDICINE

## 2017-02-07 PROCEDURE — 3080F DIAST BP >= 90 MM HG: CPT | Mod: S$GLB,,, | Performed by: FAMILY MEDICINE

## 2017-02-07 RX ORDER — HYDROCODONE BITARTRATE AND ACETAMINOPHEN 5; 325 MG/1; MG/1
1 TABLET ORAL EVERY 8 HOURS PRN
Qty: 15 TABLET | Refills: 0 | Status: SHIPPED | OUTPATIENT
Start: 2017-02-07 | End: 2017-03-15

## 2017-02-07 RX ORDER — CLONIDINE 0.3 MG/24H
PATCH, EXTENDED RELEASE TRANSDERMAL
Qty: 4 PATCH | Refills: 6 | Status: SHIPPED | OUTPATIENT
Start: 2017-02-07 | End: 2017-09-11

## 2017-02-07 RX ORDER — IRBESARTAN 300 MG/1
300 TABLET ORAL NIGHTLY
Qty: 90 TABLET | Refills: 3 | Status: SHIPPED | OUTPATIENT
Start: 2017-02-07 | End: 2018-04-20 | Stop reason: SDUPTHER

## 2017-02-07 RX ORDER — SUMATRIPTAN 50 MG/1
TABLET, FILM COATED ORAL
Qty: 60 TABLET | Refills: 0 | OUTPATIENT
Start: 2017-02-07

## 2017-02-07 RX ORDER — AMLODIPINE BESYLATE 5 MG/1
5 TABLET ORAL DAILY
Qty: 30 TABLET | Refills: 11 | Status: SHIPPED | OUTPATIENT
Start: 2017-02-07 | End: 2017-03-15 | Stop reason: SDUPTHER

## 2017-02-07 RX ORDER — HYDROCODONE BITARTRATE AND ACETAMINOPHEN 5; 325 MG/1; MG/1
1 TABLET ORAL EVERY 8 HOURS PRN
Qty: 15 TABLET | Refills: 0 | Status: SHIPPED | OUTPATIENT
Start: 2017-02-07 | End: 2017-02-07 | Stop reason: SDUPTHER

## 2017-02-07 NOTE — TELEPHONE ENCOUNTER
Hilary @ [facility] states that she received a PA on prochlorperazine that was prescribed for the patient in the ED and would like to know if Dr. Chau is approving of the medication for the patient.  She states that additional information is needed to complete the PA.  Please advise.

## 2017-02-07 NOTE — PROGRESS NOTES
Office Visit    Patient Name: Scott Bansal    : 1960  MRN: [id number]    Subjective:  Scott is a 56 y.o. male who presents today for:    Hypertension      This patient has multiple medical diagnoses as noted below.  This patient is known to me and to this clinic. He would like to address his blood pressure.  He has a history of headaches that is treated with fiorcet and imitrex.  He cannot tolerate propranolol because of multiple side effects.  Patient denies any new symptoms including chest pain, SOB, blurry vision, N/V, diarrhea.   His blood pressure is elevated in the office today.  He would like to adjust his medication.  He states that he has been taking both oral clonidine and a transdermal patch of clonidine.        Active Problem List  Patient Active Problem List   Diagnosis    History of fusion of cervical spine    Type 2 diabetes mellitus without complication    Hyperlipidemia    Essential hypertension    Chronic tension-type headache, not intractable    Bipolar 1 disorder    BPH (benign prostatic hyperplasia)    Gastroesophageal reflux disease without esophagitis    Sciatica of right side    History of pneumonia    COPD (chronic obstructive pulmonary disease)    Former smoker    Lumbar compression fracture       Past Surgical History  Past Surgical History   Procedure Laterality Date    Cervical spine surgery      Tonsillectomy      Shoulder surgery         Family History  History reviewed. No pertinent family history.    Social History  Social History     Social History    Marital status:      Spouse name: N/A    Number of children: N/A    Years of education: N/A     Occupational History    Not on file.     Social History Main Topics    Smoking status: Former Smoker    Smokeless tobacco: Not on file    Alcohol use Yes    Drug use: No    Sexual activity: Yes     Partners: Female     Other Topics Concern    Not on file     Social History Narrative       Current  "Medications  Medications reviewed and updated.     Allergies   Review of patient's allergies indicates:   Allergen Reactions    Sulfa (sulfonamide antibiotics) Rash       Review of Systems (Pertinent positives)  Review of Systems   Constitutional: Negative for activity change, appetite change, fatigue, fever and unexpected weight change.   HENT: Negative for facial swelling.    Eyes: Negative for visual disturbance.   Respiratory: Negative for chest tightness, shortness of breath, wheezing and stridor.    Cardiovascular: Negative for chest pain, palpitations and leg swelling.   Gastrointestinal: Negative for abdominal distention, abdominal pain, blood in stool, constipation, diarrhea, nausea and vomiting.   Endocrine: Negative for cold intolerance, heat intolerance, polydipsia and polyuria.   Genitourinary: Negative.  Negative for testicular pain and urgency.   Musculoskeletal: Positive for back pain.   Skin: Negative.    Allergic/Immunologic: Negative.    Neurological: Negative for dizziness, weakness, light-headedness, numbness and headaches.   Psychiatric/Behavioral: Negative for agitation and decreased concentration.       Visit Vitals    BP (!) 180/100 (BP Location: Left arm, Patient Position: Sitting, BP Method: Manual)    Pulse 92    Temp 98.3 °F (36.8 °C) (Oral)    Ht 5' 9" (1.753 m)    Wt 113.4 kg (250 lb)    SpO2 98%    BMI 36.92 kg/m2       Physical Exam   Constitutional: He is oriented to person, place, and time. He appears well-developed and well-nourished.   HENT:   Head: Normocephalic and atraumatic.   Right Ear: External ear normal.   Left Ear: External ear normal.   Nose: Nose normal.   Mouth/Throat: Oropharynx is clear and moist. No oropharyngeal exudate.   Eyes: Conjunctivae and EOM are normal. Pupils are equal, round, and reactive to light.   Neck: Normal range of motion. Neck supple.   Cardiovascular: Normal rate, regular rhythm, normal heart sounds and intact distal pulses.  Exam " reveals no gallop and no friction rub.    No murmur heard.  Pulmonary/Chest: Effort normal and breath sounds normal.   Neurological: He is alert and oriented to person, place, and time.   Skin: Skin is warm and dry.   Psychiatric: He has a normal mood and affect. His behavior is normal.   Vitals reviewed.      Health Maintenance  Health Maintenance Topics with due status: Not Due       Topic Last Completion Date    Foot Exam 07/20/2016    Eye Exam 07/23/2016    Lipid Panel 11/04/2016       Assessment/Plan:  Scott Bansal is a 56 y.o. male who presents today for :    Scott was seen today for hypertension.    Diagnoses and all orders for this visit:    Lumbar compression fracture, closed, initial encounter  -     hydrocodone-acetaminophen 5-325mg (NORCO) 5-325 mg per tablet; Take 1 tablet by mouth every 8 (eight) hours as needed for Pain.  -   Needs to focus on exercise     Essential hypertension  -     irbesartan (AVAPRO) 300 MG tablet; Take 1 tablet (300 mg total) by mouth every evening.  -     amlodipine (NORVASC) 5 MG tablet; Take 1 tablet (5 mg total) by mouth once daily.  -   I have discussed the common side effects of this medication with the patient and answered all of the questions they had at the time of this visit regarding this medication.  -  Stop oral clonidine   -  Begin new medication     Chronic tension-type headache, not intractable  -  Avoid nsaids as will increase blood pressure     Sciatica of right side  -     hydrocodone-acetaminophen 5-325mg (NORCO) 5-325 mg per tablet; Take 1 tablet by mouth every 8 (eight) hours as needed for Pain.        No Follow-up on file.

## 2017-02-07 NOTE — TELEPHONE ENCOUNTER
----- Message from Carimarek Crowell sent at 2/7/2017  8:27 AM CST -----  Contact: [facility] pharmacy  Request additional information on prochlorperazine (COMPAZINE) 10 MG tablet. Please contact Hilary [phone]. Thanks!

## 2017-02-07 NOTE — MR AVS SNAPSHOT
[facility]  [address]  [place] [address]  Phone: [phone]  Fax: [phone]                  Scott Bansal   2017 11:20 AM   Office Visit    Description:  Male : 1960   Provider:  Kaylie Chau MD   Department:  [facility]           Reason for Visit     Hypertension           Diagnoses this Visit        Comments    Lumbar compression fracture, closed, initial encounter    -  Primary     Essential hypertension         Chronic tension-type headache, not intractable         Sciatica of right side                To Do List           Goals (5 Years of Data)     None       These Medications        Disp Refills Start End    irbesartan (AVAPRO) 300 MG tablet 90 tablet 3 2017    Take 1 tablet (300 mg total) by mouth every evening. - Oral    Pharmacy: [facility]/pharmacy #[id number] - [place], [place] - [address]. Ph #: [phone]       amlodipine (NORVASC) 5 MG tablet 30 tablet 11 2017    Take 1 tablet (5 mg total) by mouth once daily. - Oral    Pharmacy: [facility]/pharmacy #[id number]  [place]  [address]. Ph #: [phone]       hydrocodone-acetaminophen 5-325mg (NORCO) 5-325 mg per tablet 15 tablet 0 2017     Take 1 tablet by mouth every 8 (eight) hours as needed for Pain. - Oral    Pharmacy: [facility]/pharmacy #[id number] - [place] - [address]. Ph #: [phone]         [facility]     [facility] On Call Nurse Care Line -  Assistance  Registered nurses in the [facility] provide clinical advisement, health education, appointment booking, and other advisory services.  Call for this free service at [phone].             Medications           Message regarding Medications     Verify the changes and/or additions to your medication regime listed below are the same as discussed with your clinician today.  If any of these changes or additions are incorrect, please notify your healthcare provider.         START taking these NEW medications        Refills    irbesartan (AVAPRO) 300 MG tablet 3    Sig: Take 1 tablet (300 mg total) by mouth every evening.    Class: Normal    Route: Oral    amlodipine (NORVASC) 5 MG tablet 11    Sig: Take 1 tablet (5 mg total) by mouth once daily.    Class: Normal    Route: Oral    hydrocodone-acetaminophen 5-325mg (NORCO) 5-325 mg per tablet 0    Sig: Take 1 tablet by mouth every 8 (eight) hours as needed for Pain.    Class: Print    Route: Oral      STOP taking these medications     methocarbamol (ROBAXIN) 500 MG Tab Take 2 tablets (1,000 mg total) by mouth 3 (three) times daily.    cloNIDine (CATAPRES) 0.3 MG tablet Take 1 tablet (0.3 mg total) by mouth 2 (two) times daily.    fosinopril (MONOPRIL) 40 MG tablet Take 1 tablet (40 mg total) by mouth once daily.    propranolol (INDERAL) 40 MG tablet Take 1 tablet (40 mg total) by mouth 2 (two) times daily.           Verify that the below list of medications is an accurate representation of the medications you are currently taking.  If none reported, the list may be blank. If incorrect, please contact your healthcare provider. Carry this list with you in case of emergency.           Current Medications     buPROPion (WELLBUTRIN XL) 300 MG 24 hr tablet Take 300 mg by mouth every morning.    busPIRone (BUSPAR) 10 MG tablet Take 10 mg by mouth 3 (three) times daily.    butalbital-acetaminophen-caffeine -40 mg (FIORICET, ESGIC) -40 mg per tablet TAKE 1 TABLET BY MOUTH EVERY 4 (FOUR) HOURS AS NEEDED.    cloNIDine 0.3 mg/24 hr td ptwk (CATAPRES) 0.3 mg/24 hr PLACE 1 PATCH ONTO THE SKIN EVERY 7 DAYS.    cyclobenzaprine (FLEXERIL) 10 MG tablet TAKE 1 TABLET (10 MG TOTAL) BY MOUTH 3 (THREE) TIMES DAILY AS NEEDED.    fluticasone (FLONASE) 50 mcg/actuation nasal spray TAKE 1 SPRAY BY EACH NARE ROUTE ONCE DAILY.    furosemide (LASIX) 40 MG tablet Take 40 mg by mouth once daily.    gemfibrozil (LOPID) 600 MG tablet TAKE 1 TABLET (600 MG  "TOTAL) BY MOUTH 2 (TWO) TIMES DAILY BEFORE MEALS.    levocetirizine (XYZAL) 5 MG tablet Take 1 tablet (5 mg total) by mouth every evening.    metformin (GLUCOPHAGE) 1000 MG tablet TAKE 1 TABLET (1,000 MG TOTAL) BY MOUTH 2 (TWO) TIMES DAILY.    pantoprazole (PROTONIX) 40 MG tablet TAKE 1 TABLET BY MOUTH DAILY    PROAIR HFA 90 mcg/actuation inhaler INHALE 2 PUFFS EBERY 4 HOURS AS NEEDED    prochlorperazine (COMPAZINE) 10 MG tablet Take 1 tablet (10 mg total) by mouth 4 (four) times daily as needed.    promethazine (PHENERGAN) 25 MG tablet Take 1 tablet (25 mg total) by mouth every 6 (six) hours as needed for Nausea.    quetiapine (SEROQUEL) 300 MG Tab Take by mouth.    simvastatin (ZOCOR) 80 MG tablet Take 80 mg by mouth once daily.    sumatriptan (IMITREX) 100 MG tablet Take 1 tablet (100 mg total) by mouth as needed for Migraine. Take at the onset of headache, may take 1 more in 1 hour if needed.    tamsulosin (FLOMAX) 0.4 mg Cp24 Take 0.4 mg by mouth once daily.    amlodipine (NORVASC) 5 MG tablet Take 1 tablet (5 mg total) by mouth once daily.    hydrocodone-acetaminophen 5-325mg (NORCO) 5-325 mg per tablet Take 1 tablet by mouth every 8 (eight) hours as needed for Pain.    irbesartan (AVAPRO) 300 MG tablet Take 1 tablet (300 mg total) by mouth every evening.           Clinical Reference Information           Your Vitals Were     BP Pulse Temp Height Weight SpO2    180/100 (BP Location: Left arm, Patient Position: Sitting, BP Method: Manual) 92 98.3 °F (36.8 °C) (Oral) 5' 9" (1.753 m) 113.4 kg (250 lb) 98%    BMI                36.92 kg/m2          Blood Pressure          Most Recent Value    BP  (!)  180/100      Allergies as of 2/7/2017     Sulfa (Sulfonamide Antibiotics)      Immunizations Administered on Date of Encounter - 2/7/2017     None      Language Assistance Services     ATTENTION: Language assistance services are available, free of charge. Please call [phone].      ATENCIÓN: Silavna freitas, " tiene a yadav disposición servicios gratuitos de asistencia lingüística. Lllindsay al [phone].     VIKTORIA Ý: N?u b?n nói Ti?ng Vi?t, có các d?ch v? h? tr? ngôn ng? mi?n phí dành cho b?n. G?i s? [phone].         [facility] complies with applicable Federal civil rights laws and does not discriminate on the basis of race, color, national origin, age, disability, or sex.

## 2017-02-10 DIAGNOSIS — E11.9 TYPE 2 DIABETES MELLITUS WITHOUT COMPLICATION: ICD-10-CM

## 2017-02-14 DIAGNOSIS — I10 ESSENTIAL HYPERTENSION: Primary | ICD-10-CM

## 2017-02-14 DIAGNOSIS — N40.0 BENIGN PROSTATIC HYPERPLASIA, PRESENCE OF LOWER URINARY TRACT SYMPTOMS UNSPECIFIED, UNSPECIFIED MORPHOLOGY: ICD-10-CM

## 2017-02-14 DIAGNOSIS — J44.9 CHRONIC OBSTRUCTIVE PULMONARY DISEASE, UNSPECIFIED COPD TYPE: ICD-10-CM

## 2017-02-14 RX ORDER — TAMSULOSIN HYDROCHLORIDE 0.4 MG/1
0.4 CAPSULE ORAL DAILY
Qty: 30 CAPSULE | Refills: 2 | Status: SHIPPED | OUTPATIENT
Start: 2017-02-14 | End: 2017-05-04 | Stop reason: SDUPTHER

## 2017-02-14 RX ORDER — FUROSEMIDE 40 MG/1
40 TABLET ORAL DAILY
Qty: 30 TABLET | Refills: 2 | Status: SHIPPED | OUTPATIENT
Start: 2017-02-14 | End: 2017-05-04 | Stop reason: SDUPTHER

## 2017-02-14 NOTE — TELEPHONE ENCOUNTER
----- Message from Mari Lazar sent at 2/14/2017  3:27 PM CST -----  Contact: self  Pt is requesting a refill for furosemide (LASIX) 40 MG tablet and tamsulosin (FLOMAX) 0.4 mg Cp24. Please call pt in regards to any additional info about meds. Pt states that meds were previously scribed by another provider. Please send to [facility] [address] [facility] [address]  Pt can be reached @ [phone]        Thanks

## 2017-02-14 NOTE — TELEPHONE ENCOUNTER
----- Message from Carimarek Crowell sent at 2/14/2017 11:38 AM CST -----  Contact: self  Medication Refill    tamsulosin (FLOMAX) 0.4 mg Cp24  furosemide (LASIX) 40 MG tablet    Thanks!

## 2017-02-15 RX ORDER — FLUTICASONE FUROATE AND VILANTEROL TRIFENATATE 100; 25 UG/1; UG/1
POWDER RESPIRATORY (INHALATION)
Qty: 60 EACH | Refills: 0 | Status: SHIPPED | OUTPATIENT
Start: 2017-02-15 | End: 2018-08-17

## 2017-02-27 DIAGNOSIS — E78.5 HYPERLIPIDEMIA, UNSPECIFIED HYPERLIPIDEMIA TYPE: ICD-10-CM

## 2017-02-27 DIAGNOSIS — R51.9 NONINTRACTABLE EPISODIC HEADACHE, UNSPECIFIED HEADACHE TYPE: ICD-10-CM

## 2017-02-27 NOTE — TELEPHONE ENCOUNTER
----- Message from Leesa Watson sent at 2/27/2017  9:02 AM CST -----  Refill: gemfibrozil (LOPID) 600 MG tablet    Please send to [facility]. Thank you!

## 2017-02-27 NOTE — TELEPHONE ENCOUNTER
----- Message from Monique Lombardi sent at 2/27/2017 12:52 PM CST -----  Contact: Self  REFILL: butalbital-acetaminophen-caffeine -40 mg (FIORICET, ESGIC) -40 mg per tablet

## 2017-03-01 RX ORDER — GEMFIBROZIL 600 MG/1
TABLET, FILM COATED ORAL
Qty: 120 TABLET | Refills: 0 | Status: SHIPPED | OUTPATIENT
Start: 2017-03-01 | End: 2017-04-26 | Stop reason: SDUPTHER

## 2017-03-01 RX ORDER — BUTALBITAL, ACETAMINOPHEN AND CAFFEINE 50; 325; 40 MG/1; MG/1; MG/1
TABLET ORAL
Qty: 30 TABLET | Refills: 0 | Status: SHIPPED | OUTPATIENT
Start: 2017-03-01 | End: 2017-03-29 | Stop reason: SDUPTHER

## 2017-03-01 RX ORDER — BUTALBITAL, ACETAMINOPHEN AND CAFFEINE 50; 325; 40 MG/1; MG/1; MG/1
TABLET ORAL
Qty: 30 TABLET | Refills: 0 | OUTPATIENT
Start: 2017-03-01

## 2017-03-03 ENCOUNTER — OFFICE VISIT (OUTPATIENT)
Dept: FAMILY MEDICINE | Facility: CLINIC | Age: 57
End: 2017-03-03
Payer: MEDICARE

## 2017-03-03 VITALS
BODY MASS INDEX: 36.44 KG/M2 | SYSTOLIC BLOOD PRESSURE: 146 MMHG | HEART RATE: 107 BPM | OXYGEN SATURATION: 98 % | DIASTOLIC BLOOD PRESSURE: 90 MMHG | WEIGHT: 246.06 LBS | HEIGHT: 69 IN | TEMPERATURE: 98 F

## 2017-03-03 DIAGNOSIS — R51 HEADACHE(784.0): ICD-10-CM

## 2017-03-03 DIAGNOSIS — G43.909 MIGRAINE WITHOUT STATUS MIGRAINOSUS, NOT INTRACTABLE, UNSPECIFIED MIGRAINE TYPE: Primary | ICD-10-CM

## 2017-03-03 PROCEDURE — 96372 THER/PROPH/DIAG INJ SC/IM: CPT | Mod: S$GLB,,, | Performed by: NURSE PRACTITIONER

## 2017-03-03 PROCEDURE — 99499 UNLISTED E&M SERVICE: CPT | Mod: S$PBB,,, | Performed by: NURSE PRACTITIONER

## 2017-03-03 PROCEDURE — 1160F RVW MEDS BY RX/DR IN RCRD: CPT | Mod: S$GLB,,, | Performed by: NURSE PRACTITIONER

## 2017-03-03 PROCEDURE — 3080F DIAST BP >= 90 MM HG: CPT | Mod: S$GLB,,, | Performed by: NURSE PRACTITIONER

## 2017-03-03 PROCEDURE — 3077F SYST BP >= 140 MM HG: CPT | Mod: S$GLB,,, | Performed by: NURSE PRACTITIONER

## 2017-03-03 PROCEDURE — 99214 OFFICE O/P EST MOD 30 MIN: CPT | Mod: 25,S$GLB,, | Performed by: NURSE PRACTITIONER

## 2017-03-03 PROCEDURE — 99999 PR PBB SHADOW E&M-EST. PATIENT-LVL V: CPT | Mod: PBBFAC,,, | Performed by: NURSE PRACTITIONER

## 2017-03-03 RX ORDER — KETOROLAC TROMETHAMINE 30 MG/ML
60 INJECTION, SOLUTION INTRAMUSCULAR; INTRAVENOUS
Status: COMPLETED | OUTPATIENT
Start: 2017-03-03 | End: 2017-03-03

## 2017-03-03 RX ADMIN — KETOROLAC TROMETHAMINE 60 MG: 30 INJECTION, SOLUTION INTRAMUSCULAR; INTRAVENOUS at 11:03

## 2017-03-03 NOTE — PROGRESS NOTES
Subjective:       Patient ID: Scott Bansal is a 56 y.o. male.    Chief Complaint: Headache (pt states migraines)    Headache    This is a chronic problem. Episode onset: 2 days. The problem occurs constantly. The problem has been gradually worsening. The pain is located in the frontal region. The pain does not radiate. The pain quality is similar to prior headaches. The quality of the pain is described as throbbing. The pain is at a severity of 10/10. The pain is severe. Associated symptoms include back pain, eye pain, hearing loss, insomnia, muscle aches, neck pain, phonophobia and photophobia. Pertinent negatives include no abdominal pain, abnormal behavior, anorexia, blurred vision, coughing, dizziness, drainage, ear pain, eye redness, eye watering, facial sweating, fever, loss of balance, nausea, numbness, rhinorrhea, scalp tenderness, seizures, sinus pressure, sore throat, swollen glands, tingling, tinnitus, visual change, vomiting, weakness or weight loss. The symptoms are aggravated by sneezing. He has tried oral narcotics (imitrex, ) for the symptoms. The treatment provided no relief. His past medical history is significant for hypertension and migraine headaches.     Review of Systems   Constitutional: Negative for fever and weight loss.   HENT: Positive for hearing loss. Negative for ear pain, rhinorrhea, sinus pressure, sore throat and tinnitus.    Eyes: Positive for photophobia and pain. Negative for blurred vision and redness.   Respiratory: Negative for cough.    Gastrointestinal: Negative for abdominal pain, anorexia, nausea and vomiting.   Musculoskeletal: Positive for back pain and neck pain.   Neurological: Positive for headaches. Negative for dizziness, tingling, seizures, weakness, numbness and loss of balance.   Psychiatric/Behavioral: The patient has insomnia.        Objective:      Physical Exam   Constitutional: He is oriented to person, place, and time. Vital signs are normal. He  appears well-developed and well-nourished.   HENT:   Head: Normocephalic and atraumatic.   Right Ear: External ear normal. No middle ear effusion. No decreased hearing is noted.   Left Ear: External ear normal.  No middle ear effusion. No decreased hearing is noted.   Nose: Nose normal.   Mouth/Throat: Oropharynx is clear and moist. Mucous membranes are dry.   Neck: Normal range of motion and full passive range of motion without pain. Neck supple. No rigidity.   Cardiovascular: Normal rate and regular rhythm.    Pulmonary/Chest: Effort normal.   Abdominal: Soft.   Musculoskeletal: Normal range of motion.   Neurological: He is alert and oriented to person, place, and time.   Skin: Skin is warm.   Nursing note and vitals reviewed.      Assessment:       1. Migraine without status migrainosus, not intractable, unspecified migraine type    2. Headache(784.0)      Plan:     Scott was seen today for headache.    Diagnoses and all orders for this visit:    Migraine without status migrainosus, not intractable, unspecified migraine type  -     ketorolac injection 60 mg; Inject 2 mLs (60 mg total) into the muscle one time.    Headache(784.0)  -     ketorolac injection 60 mg; Inject 2 mLs (60 mg total) into the muscle one time.      Return if symptoms worsen or fail to improve.    I certify that I was present in the room directing the student in service delivery and guiding them using my skilled judgment. As the co-signing provider I have reviewed the students documentation and am responsible for the treatment, assessment, and plan.  See Student's HPI.

## 2017-03-03 NOTE — MEDICAL/APP STUDENT
Subjective:       Patient ID: Scott Bansal is a 56 y.o. male.    Chief Complaint: Headache (pt states migraines)    Headache    This is a chronic problem. Episode onset: 2 days. The problem occurs constantly. The problem has been gradually worsening. The pain is located in the frontal region. The pain does not radiate. The pain quality is similar to prior headaches. The quality of the pain is described as throbbing. The pain is at a severity of 10/10. The pain is severe. Associated symptoms include back pain, eye pain, hearing loss, insomnia, muscle aches, neck pain, phonophobia and photophobia. Pertinent negatives include no abdominal pain, abnormal behavior, anorexia, blurred vision, coughing, dizziness, drainage, ear pain, eye redness, eye watering, facial sweating, fever, loss of balance, nausea, numbness, rhinorrhea, scalp tenderness, seizures, sinus pressure, sore throat, swollen glands, tingling, tinnitus, visual change, vomiting, weakness or weight loss. The symptoms are aggravated by sneezing. He has tried oral narcotics (imitrex, ) for the symptoms. The treatment provided no relief. His past medical history is significant for hypertension and migraine headaches.     Review of Systems   Constitutional: Negative for fever and weight loss.   HENT: Positive for hearing loss. Negative for ear pain, rhinorrhea, sinus pressure, sore throat and tinnitus.    Eyes: Positive for photophobia and pain. Negative for blurred vision and redness.   Respiratory: Negative for cough.    Gastrointestinal: Negative for abdominal pain, anorexia, nausea and vomiting.   Musculoskeletal: Positive for back pain and neck pain.   Neurological: Positive for headaches. Negative for dizziness, tingling, seizures, weakness, numbness and loss of balance.   Psychiatric/Behavioral: The patient has insomnia.        Objective:      Physical Exam   Constitutional: He is oriented to person, place, and time. Vital signs are normal. He  appears well-developed and well-nourished.   HENT:   Head: Normocephalic and atraumatic.   Right Ear: External ear normal. No middle ear effusion. No decreased hearing is noted.   Left Ear: External ear normal.  No middle ear effusion. No decreased hearing is noted.   Nose: Nose normal.   Mouth/Throat: Oropharynx is clear and moist. Mucous membranes are dry.   Neck: Normal range of motion and full passive range of motion without pain. Neck supple. No rigidity.   Cardiovascular: Normal rate and regular rhythm.    Pulmonary/Chest: Effort normal.   Abdominal: Soft.   Musculoskeletal: Normal range of motion.   Neurological: He is alert and oriented to person, place, and time.   Skin: Skin is warm.   Nursing note and vitals reviewed.      Assessment:       1. Migraine without status migrainosus, not intractable, unspecified migraine type    2. Headache(784.0)      Plan:     Scott was seen today for headache.    Diagnoses and all orders for this visit:    Migraine without status migrainosus, not intractable, unspecified migraine type  -     ketorolac injection 60 mg; Inject 2 mLs (60 mg total) into the muscle one time.    Headache(784.0)  -     ketorolac injection 60 mg; Inject 2 mLs (60 mg total) into the muscle one time.      Return if symptoms worsen or fail to improve.

## 2017-03-03 NOTE — PATIENT INSTRUCTIONS

## 2017-03-03 NOTE — MR AVS SNAPSHOT
[facility]  [address]  [place] [address]  Phone: [phone]  Fax: [phone]                  Scott Bansal   3/3/2017 11:30 AM   Office Visit    Description:  Male : 1960   Provider:  Octavio Ornelas NP   Department:  [facility] Medicine           Reason for Visit     Headache           Diagnoses this Visit        Comments    Migraine without status migrainosus, not intractable, unspecified migraine type    -  Primary     Headache(784.0)                To Do List           Goals (5 Years of Data)     None      Follow-Up and Disposition     Return if symptoms worsen or fail to improve.      [facility] On Call     [facility] On Call Nurse Care Line -  Assistance  Registered nurses in the [facility] On Call Center provide clinical advisement, health education, appointment booking, and other advisory services.  Call for this free service at [phone].             Medications           Message regarding Medications     Verify the changes and/or additions to your medication regime listed below are the same as discussed with your clinician today.  If any of these changes or additions are incorrect, please notify your healthcare provider.        These medications were administered today        Dose Freq    ketorolac injection 60 mg 60 mg Clinic/HOD 1 time    Sig: Inject 2 mLs (60 mg total) into the muscle one time.    Class: Normal    Route: Intramuscular      STOP taking these medications     prochlorperazine (COMPAZINE) 10 MG tablet Take 1 tablet (10 mg total) by mouth 4 (four) times daily as needed.           Verify that the below list of medications is an accurate representation of the medications you are currently taking.  If none reported, the list may be blank. If incorrect, please contact your healthcare provider. Carry this list with you in case of emergency.           Current Medications     amlodipine (NORVASC) 5 MG tablet Take 1 tablet (5 mg total) by  mouth once daily.    BREO ELLIPTA 100-25 mcg/dose diskus inhaler INHALE 1 PUFF INTO THE LUNGS ONCE DAILY.    buPROPion (WELLBUTRIN XL) 300 MG 24 hr tablet Take 300 mg by mouth every morning.    busPIRone (BUSPAR) 10 MG tablet Take 10 mg by mouth 3 (three) times daily.    butalbital-acetaminophen-caffeine -40 mg (FIORICET, ESGIC) -40 mg per tablet TAKE 1 TABLET BY MOUTH EVERY 4 (FOUR) HOURS AS NEEDED.    cloNIDine 0.3 mg/24 hr td ptwk (CATAPRES) 0.3 mg/24 hr PLACE 1 PATCH ONTO THE SKIN EVERY 7 DAYS.    cyclobenzaprine (FLEXERIL) 10 MG tablet TAKE 1 TABLET (10 MG TOTAL) BY MOUTH 3 (THREE) TIMES DAILY AS NEEDED.    fluticasone (FLONASE) 50 mcg/actuation nasal spray TAKE 1 SPRAY BY EACH NARE ROUTE ONCE DAILY.    furosemide (LASIX) 40 MG tablet Take 1 tablet (40 mg total) by mouth once daily.    gemfibrozil (LOPID) 600 MG tablet TAKE 1 TABLET (600 MG TOTAL) BY MOUTH 2 (TWO) TIMES DAILY BEFORE MEALS.    hydrocodone-acetaminophen 5-325mg (NORCO) 5-325 mg per tablet Take 1 tablet by mouth every 8 (eight) hours as needed for Pain.    irbesartan (AVAPRO) 300 MG tablet Take 1 tablet (300 mg total) by mouth every evening.    levocetirizine (XYZAL) 5 MG tablet Take 1 tablet (5 mg total) by mouth every evening.    metformin (GLUCOPHAGE) 1000 MG tablet TAKE 1 TABLET (1,000 MG TOTAL) BY MOUTH 2 (TWO) TIMES DAILY.    pantoprazole (PROTONIX) 40 MG tablet TAKE 1 TABLET BY MOUTH DAILY    PROAIR HFA 90 mcg/actuation inhaler INHALE 2 PUFFS EBERY 4 HOURS AS NEEDED    promethazine (PHENERGAN) 25 MG tablet Take 1 tablet (25 mg total) by mouth every 6 (six) hours as needed for Nausea.    quetiapine (SEROQUEL) 300 MG Tab Take by mouth.    simvastatin (ZOCOR) 80 MG tablet Take 80 mg by mouth once daily.    sumatriptan (IMITREX) 100 MG tablet Take 1 tablet (100 mg total) by mouth as needed for Migraine. Take at the onset of headache, may take 1 more in 1 hour if needed.    tamsulosin (FLOMAX) 0.4 mg Cp24 Take 1 capsule (0.4 mg  "total) by mouth once daily.           Clinical Reference Information           Your Vitals Were     BP Pulse Temp Height Weight SpO2    146/90 (BP Location: Right arm, Patient Position: Sitting, BP Method: Manual) 107 98.4 °F (36.9 °C) (Oral) 5' 9" (1.753 m) 111.6 kg (246 lb 0.5 oz) 98%    BMI                36.33 kg/m2          Blood Pressure          Most Recent Value    BP  (!)  146/90      Allergies as of 3/3/2017     Sulfa (Sulfonamide Antibiotics)      Immunizations Administered on Date of Encounter - 3/3/2017     None      Instructions      Migraine Headache  This often severe type of headache is different from other types of headaches in that symptoms other than pain occur with the headache. Nausea and vomiting, lightheadedness, sensitivity to light (photophobia), and other visual disturbances are common migraine symptoms. The pain may last from a few hours to several days. It is not clear why migraines occur but certain factors called triggers can raise the risk of having a migraine attack. A migraine may be triggered by emotional stress or depression, or by hormone changes during the menstrual cycle. Other triggers include birth control pills, overuse of migraine medicines, alcohol or caffeine, foods with tyramine (such as aged cheese and wine), eyestrain, weather changes, missed meals, or too little or too much sleep.  Home care  Follow these tips when taking care of yourself at home:  · Dont drive yourself home if you were given pain medicine for your headache or are having visual symptoms. Instead, have someone else drive you home. Try to sleep when you get home. You should feel much better when you wake up.  · Cold can help ease migraine symptoms. Put an ice pack on your forehead or at the base of your skull. Put heat on the back of your neck to help ease any neck spasm.  · Drink only clear liquids or eat a light diet until your symptoms get better. This will help you avoid nausea and vomiting.  How " to prevent migraines  Pay attention to what seems to trigger your headache. Try to avoid the triggers when you can. If you have frequent headaches, consider keeping a headache diary. In it, write down what you were doing, feeling, or eating in the hours before each headache. Show this to your healthcare provider to help find the cause of your headaches.  If stress seems to be a trigger for your headaches, figure out what is causing stress in your life. Learn new ways to handle your stress. Ideas include regular exercise, biofeedback, self-hypnosis, yoga, and meditation. Talk with your healthcare provider to find out more information about managing stress. Many books and digital media are also available on this subject.  Tyramine is a substance found in many foods. It can trigger a migraine in some people. These foods contain tyramine:  · Chocolate  · Yogurt  · All cheeses, but especially aged cheeses  · Smoked or pickled fish and meat, including herring, caviar, bologna, pepperoni, and salami  · Liver  · Avocados  · Bananas  · Figs  · Raisins  · Red wine  Try staying away from these foods for 1 to 2 months to see if you have fewer headaches.  How to treat future headaches  · Take time out at the first sign of a headache, if possible. Find a quiet, dark, comfortable place to sit or lie down. Let yourself relax or sleep.  · Put an ice pack on your forehead or on the area of greatest pain. A heating pad and massage may help if you are having a muscle spasm and tightness in your neck.  · If you have been prescribed a medicine to stop a migraine headache, use this at the first warning sign of the headache for best results. First signs may be an aura or pain.  · If you need to take medicine often for your migraine, talk with your healthcare provider about other ways to prevent your headaches.  Follow-up care  Follow up with your healthcare provider, or as advised. Talk with your provider if you have frequent headaches. He  or she can figure out a treatment plan. Ask if you can have medicine to take at home the next time you get a bad headache. This may keep you from having to visit the emergency department in the future. You may need to see a headache specialist (neurologist) if you continue to have headaches.  When to seek medical advice  Call your healthcare provider right away if any of these occur:  · Your head pain gets worse, or doesnt get better within 24 hours  · You cant keep liquids down (repeated vomiting)  · Pain in your sinuses, ears, or throat  · Fever of 100.4º F (38º C) or higher, or as directed by your healthcare provider  · Stiff neck  · Extreme drowsiness, confusion, or fainting  · Dizziness, or dizziness with spinning sensation (vertigo)  · Weakness in an arm or leg, or on one side of your face  · Difficulty talking or seeing  Date Last Reviewed: 8/1/2016  © 5545-5525 [employer]. [address] [place] [address]. All rights reserved. This information is not intended as a substitute for professional medical care. Always follow your healthcare professional's instructions.             Language Assistance Services     ATTENTION: Language assistance services are available, free of charge. Please call [phone].      ATENCIÓN: Si melyla fortino, tiene a yadav disposición servicios gratuitos de asistencia lingüística. Llame al [phone].     [facility] Ý: N?u b?n nói Ti?ng Vi?t, có các d?ch v? h? tr? ngôn ng? mi?n phí dành cho b?n. G?i s? [phone].         [facility] complies with applicable Federal civil rights laws and does not discriminate on the basis of race, color, national origin, age, disability, or sex.

## 2017-03-15 ENCOUNTER — OFFICE VISIT (OUTPATIENT)
Dept: FAMILY MEDICINE | Facility: CLINIC | Age: 57
End: 2017-03-15
Payer: MEDICARE

## 2017-03-15 VITALS
OXYGEN SATURATION: 98 % | DIASTOLIC BLOOD PRESSURE: 100 MMHG | WEIGHT: 250.69 LBS | HEIGHT: 69 IN | TEMPERATURE: 98 F | SYSTOLIC BLOOD PRESSURE: 140 MMHG | BODY MASS INDEX: 37.13 KG/M2 | HEART RATE: 100 BPM

## 2017-03-15 DIAGNOSIS — E11.9 TYPE 2 DIABETES MELLITUS WITHOUT COMPLICATION, WITHOUT LONG-TERM CURRENT USE OF INSULIN: ICD-10-CM

## 2017-03-15 DIAGNOSIS — H91.91 DEAFNESS, RIGHT: ICD-10-CM

## 2017-03-15 DIAGNOSIS — J44.9 CHRONIC OBSTRUCTIVE PULMONARY DISEASE, UNSPECIFIED COPD TYPE: ICD-10-CM

## 2017-03-15 DIAGNOSIS — I10 ESSENTIAL HYPERTENSION: ICD-10-CM

## 2017-03-15 DIAGNOSIS — G44.229 CHRONIC TENSION-TYPE HEADACHE, NOT INTRACTABLE: Primary | ICD-10-CM

## 2017-03-15 DIAGNOSIS — G44.221 CHRONIC TENSION-TYPE HEADACHE, INTRACTABLE: Primary | ICD-10-CM

## 2017-03-15 DIAGNOSIS — F31.9 BIPOLAR 1 DISORDER: ICD-10-CM

## 2017-03-15 PROCEDURE — 99214 OFFICE O/P EST MOD 30 MIN: CPT | Mod: 25,S$GLB,, | Performed by: FAMILY MEDICINE

## 2017-03-15 PROCEDURE — 4010F ACE/ARB THERAPY RXD/TAKEN: CPT | Mod: S$GLB,,, | Performed by: FAMILY MEDICINE

## 2017-03-15 PROCEDURE — 99499 UNLISTED E&M SERVICE: CPT | Mod: S$PBB,,, | Performed by: FAMILY MEDICINE

## 2017-03-15 PROCEDURE — 3080F DIAST BP >= 90 MM HG: CPT | Mod: S$GLB,,, | Performed by: FAMILY MEDICINE

## 2017-03-15 PROCEDURE — 99999 PR PBB SHADOW E&M-EST. PATIENT-LVL III: CPT | Mod: PBBFAC,,, | Performed by: FAMILY MEDICINE

## 2017-03-15 PROCEDURE — 3077F SYST BP >= 140 MM HG: CPT | Mod: S$GLB,,, | Performed by: FAMILY MEDICINE

## 2017-03-15 PROCEDURE — 1160F RVW MEDS BY RX/DR IN RCRD: CPT | Mod: S$GLB,,, | Performed by: FAMILY MEDICINE

## 2017-03-15 PROCEDURE — 3044F HG A1C LEVEL LT 7.0%: CPT | Mod: S$GLB,,, | Performed by: FAMILY MEDICINE

## 2017-03-15 PROCEDURE — 96372 THER/PROPH/DIAG INJ SC/IM: CPT | Mod: S$GLB,,, | Performed by: FAMILY MEDICINE

## 2017-03-15 RX ORDER — ALBUTEROL SULFATE 90 UG/1
AEROSOL, METERED RESPIRATORY (INHALATION)
Qty: 18 G | Refills: 12 | Status: SHIPPED | OUTPATIENT
Start: 2017-03-15 | End: 2017-12-22 | Stop reason: SDUPTHER

## 2017-03-15 RX ORDER — NARATRIPTAN 2.5 MG/1
TABLET ORAL
Qty: 12 TABLET | Refills: 2 | Status: SHIPPED | OUTPATIENT
Start: 2017-03-15 | End: 2017-07-19 | Stop reason: SDUPTHER

## 2017-03-15 RX ORDER — AMLODIPINE BESYLATE 10 MG/1
10 TABLET ORAL DAILY
Qty: 90 TABLET | Refills: 3 | Status: SHIPPED | OUTPATIENT
Start: 2017-03-15 | End: 2018-02-25 | Stop reason: SDUPTHER

## 2017-03-15 RX ORDER — KETOROLAC TROMETHAMINE 30 MG/ML
30 INJECTION, SOLUTION INTRAMUSCULAR; INTRAVENOUS ONCE
Status: COMPLETED | OUTPATIENT
Start: 2017-03-15 | End: 2017-03-15

## 2017-03-15 RX ADMIN — KETOROLAC TROMETHAMINE 30 MG: 30 INJECTION, SOLUTION INTRAMUSCULAR; INTRAVENOUS at 10:03

## 2017-03-15 NOTE — PROGRESS NOTES
Office Visit    Patient Name: Scott Bansal    : 1960  MRN: [id number]    Subjective:  Scott is a 56 y.o. male who presents today for:    Migraine      This patient has multiple medical diagnoses as noted below.  This patient is known to me and to this clinic. He reports increase migraine pain.  He has used medications to address this concern.  He has been on imitrex for his migraine pain.  He reports that he has decreased hearing.  He has not been able to see the TV.  He has been using mucinex for his symptoms.  He is concerned about his hearing. He is deaf in the right ear, but his hearing has worsened recently.     Active Problem List  Patient Active Problem List   Diagnosis    History of fusion of cervical spine    Type 2 diabetes mellitus without complication    Hyperlipidemia    Essential hypertension    Chronic tension-type headache, not intractable    Bipolar 1 disorder    BPH (benign prostatic hyperplasia)    Gastroesophageal reflux disease without esophagitis    Sciatica of right side    History of pneumonia    COPD (chronic obstructive pulmonary disease)    Former smoker    Lumbar compression fracture       Past Surgical History  Past Surgical History:   Procedure Laterality Date    CERVICAL SPINE SURGERY      SHOULDER SURGERY      TONSILLECTOMY         Family History  No family history on file.    Social History  Social History     Social History    Marital status:      Spouse name: N/A    Number of children: N/A    Years of education: N/A     Occupational History    Not on file.     Social History Main Topics    Smoking status: Former Smoker    Smokeless tobacco: Not on file    Alcohol use Yes    Drug use: No    Sexual activity: Yes     Partners: Female     Other Topics Concern    Not on file     Social History Narrative       Current Medications  Medications reviewed and updated.     Allergies   Review of patient's allergies indicates:   Allergen Reactions  "   Sulfa (sulfonamide antibiotics) Rash       Review of Systems (Pertinent positives)  Review of Systems   Constitutional: Negative for activity change, appetite change, fatigue, fever and unexpected weight change.   HENT: Negative for facial swelling.    Eyes: Negative for visual disturbance.   Respiratory: Negative for chest tightness, shortness of breath, wheezing and stridor.    Cardiovascular: Negative for chest pain, palpitations and leg swelling.   Gastrointestinal: Negative for abdominal distention, abdominal pain, blood in stool, constipation, diarrhea, nausea and vomiting.   Endocrine: Negative for cold intolerance, heat intolerance, polydipsia and polyuria.   Genitourinary: Negative.  Negative for testicular pain and urgency.   Skin: Negative.    Allergic/Immunologic: Negative.    Neurological: Positive for dizziness and headaches. Negative for weakness, light-headedness and numbness.   Psychiatric/Behavioral: Negative for agitation and decreased concentration.       BP (!) 140/100 (BP Location: Left arm, Patient Position: Sitting, BP Method: Manual)  Pulse 100  Temp 98.1 °F (36.7 °C) (Oral)   Ht 5' 9" (1.753 m)  Wt 113.7 kg (250 lb 10.6 oz)  SpO2 98%  BMI 37.02 kg/m2    Physical Exam   Constitutional: He is oriented to person, place, and time. He appears well-developed and well-nourished.   HENT:   Head: Normocephalic and atraumatic.   Right Ear: External ear normal.   Left Ear: External ear normal.   Nose: Nose normal.   Mouth/Throat: Oropharynx is clear and moist.   Eyes: Conjunctivae and EOM are normal. Pupils are equal, round, and reactive to light.   Neck: Normal range of motion.   Cardiovascular: Normal rate, regular rhythm and normal heart sounds.    Pulmonary/Chest: Effort normal and breath sounds normal.   Neurological: He is alert and oriented to person, place, and time.   Skin: Skin is warm and dry.   Psychiatric: He has a normal mood and affect. His behavior is normal.       Health " Maintenance  Health Maintenance Topics with due status: Not Due       Topic Last Completion Date    Foot Exam 07/20/2016    Eye Exam 07/23/2016    Lipid Panel 11/04/2016       Assessment/Plan:  Scott Bansal is a 56 y.o. male who presents today for :    1. Chronic tension-type headache, not intractable    2. Essential hypertension    3. Bipolar 1 disorder    4. Type 2 diabetes mellitus without complication, without long-term current use of insulin    5. Chronic obstructive pulmonary disease, unspecified COPD type    6. Deafness, right        Problem List Items Addressed This Visit        Unprioritized    Bipolar 1 disorder    Overview     Currently on buspar, bupropion, seroquel         Current Assessment & Plan     Pt is currently stable on medication regimen.  Continue current therapy as scheduled.  Contact office with any questions about adjustments on medications.            Chronic tension-type headache, not intractable - Primary    Overview     Cannot tolerate propranolol because side effects (dizziness, decreased alertness)  No improvement with imitrex         Current Assessment & Plan     Begin new medication.     I have discussed the common side effects of this medication with the patient and answered all of the questions they had at the time of this visit regarding this medication.  -  RTC if symptoms worsen or persist.           Relevant Medications    naratriptan (AMERGE) 2.5 MG tablet    COPD (chronic obstructive pulmonary disease)    Overview     Currently on breo and albuterol          Current Assessment & Plan     Pt is currently stable on medication regimen.  Continue current therapy as scheduled.  Contact office with any questions about adjustments on medications.            Relevant Medications    PROAIR HFA 90 mcg/actuation inhaler    Essential hypertension    Overview     Cannot tolerate sulfa  Currently on amlodipine, irbesartan, furosemide, clonidine          Current Assessment & Plan      Increase in amlodipine to 10 mg.           Relevant Medications    amlodipine (NORVASC) 10 MG tablet    Type 2 diabetes mellitus without complication    Current Assessment & Plan     Pt is currently stable on medication regimen.  Continue current therapy as scheduled.  Contact office with any questions about adjustments on medications.   -  No changes.    -  The patient is asked to make an attempt to improve diet and exercise patterns to aid in medical management of this problem.             Other Visit Diagnoses     Deafness, right        Relevant Orders    Ambulatory Referral to Audiology            Return in about 3 months (around 6/15/2017), or if symptoms worsen or fail to improve.

## 2017-03-15 NOTE — ASSESSMENT & PLAN NOTE
Pt is currently stable on medication regimen.  Continue current therapy as scheduled.  Contact office with any questions about adjustments on medications.   -  No changes.    -  The patient is asked to make an attempt to improve diet and exercise patterns to aid in medical management of this problem.

## 2017-03-15 NOTE — ASSESSMENT & PLAN NOTE
Pt is currently stable on medication regimen.  Continue current therapy as scheduled.  Contact office with any questions about adjustments on medications.

## 2017-03-15 NOTE — ASSESSMENT & PLAN NOTE
Begin new medication.     I have discussed the common side effects of this medication with the patient and answered all of the questions they had at the time of this visit regarding this medication.  -  RTC if symptoms worsen or persist.

## 2017-03-15 NOTE — MR AVS SNAPSHOT
[facility]  [address]  [place] [address]  Phone: [phone]  Fax: [phone]                  Scott Bansal   3/15/2017 9:40 AM   Office Visit    Description:  Male : 1960   Provider:  Kaylie Chau MD   Department:  [facility] Medicine           Reason for Visit     Migraine           Diagnoses this Visit        Comments    Chronic tension-type headache, not intractable    -  Primary     Essential hypertension         Bipolar 1 disorder         Type 2 diabetes mellitus without complication, without long-term current use of insulin                To Do List           Goals (5 Years of Data)     None      Follow-Up and Disposition     Return in about 3 months (around 6/15/2017), or if symptoms worsen or fail to improve.       These Medications        Disp Refills Start End    naratriptan (AMERGE) 2.5 MG tablet 12 tablet 2 3/15/2017 2017    2.5 mg at onset of headache, may repeat in 4 hours if needed    Pharmacy: [id number] - [place] - [address]. Ph #: [phone]       amlodipine (NORVASC) 10 MG tablet 90 tablet 3 3/15/2017 3/15/2018    Take 1 tablet (10 mg total) by mouth once daily. - Oral    Pharmacy: [id number] - [place] - [address]. Ph #: [phone]         [facility] On Call     [facility] On Call Nurse Care Line - 24/7 Assistance  Registered nurses in the [facility] On Call Center provide clinical advisement, health education, appointment booking, and other advisory services.  Call for this free service at [phone].             Medications           Message regarding Medications     Verify the changes and/or additions to your medication regime listed below are the same as discussed with your clinician today.  If any of these changes or additions are incorrect, please notify your healthcare provider.        START taking these NEW medications        Refills    naratriptan (AMERGE) 2.5 MG tablet 2     Si.5 mg at onset of headache, may repeat in 4 hours if needed    Class: Normal      CHANGE how you are taking these medications     Start Taking Instead of    amlodipine (NORVASC) 10 MG tablet amlodipine (NORVASC) 5 MG tablet    Dosage:  Take 1 tablet (10 mg total) by mouth once daily. Dosage:  Take 1 tablet (5 mg total) by mouth once daily.    Reason for Change:  Reorder       STOP taking these medications     hydrocodone-acetaminophen 5-325mg (NORCO) 5-325 mg per tablet Take 1 tablet by mouth every 8 (eight) hours as needed for Pain.    promethazine (PHENERGAN) 25 MG tablet Take 1 tablet (25 mg total) by mouth every 6 (six) hours as needed for Nausea.    sumatriptan (IMITREX) 100 MG tablet Take 1 tablet (100 mg total) by mouth as needed for Migraine. Take at the onset of headache, may take 1 more in 1 hour if needed.           Verify that the below list of medications is an accurate representation of the medications you are currently taking.  If none reported, the list may be blank. If incorrect, please contact your healthcare provider. Carry this list with you in case of emergency.           Current Medications     amlodipine (NORVASC) 10 MG tablet Take 1 tablet (10 mg total) by mouth once daily.    BREO ELLIPTA 100-25 mcg/dose diskus inhaler INHALE 1 PUFF INTO THE LUNGS ONCE DAILY.    buPROPion (WELLBUTRIN XL) 300 MG 24 hr tablet Take 300 mg by mouth every morning.    busPIRone (BUSPAR) 10 MG tablet Take 10 mg by mouth 3 (three) times daily.    butalbital-acetaminophen-caffeine -40 mg (FIORICET, ESGIC) -40 mg per tablet TAKE 1 TABLET BY MOUTH EVERY 4 (FOUR) HOURS AS NEEDED.    cloNIDine 0.3 mg/24 hr td ptwk (CATAPRES) 0.3 mg/24 hr PLACE 1 PATCH ONTO THE SKIN EVERY 7 DAYS.    cyclobenzaprine (FLEXERIL) 10 MG tablet TAKE 1 TABLET (10 MG TOTAL) BY MOUTH 3 (THREE) TIMES DAILY AS NEEDED.    fluticasone (FLONASE) 50 mcg/actuation nasal spray TAKE 1 SPRAY BY EACH NARE ROUTE ONCE DAILY.    furosemide  "(LASIX) 40 MG tablet Take 1 tablet (40 mg total) by mouth once daily.    gemfibrozil (LOPID) 600 MG tablet TAKE 1 TABLET (600 MG TOTAL) BY MOUTH 2 (TWO) TIMES DAILY BEFORE MEALS.    irbesartan (AVAPRO) 300 MG tablet Take 1 tablet (300 mg total) by mouth every evening.    levocetirizine (XYZAL) 5 MG tablet Take 1 tablet (5 mg total) by mouth every evening.    metformin (GLUCOPHAGE) 1000 MG tablet TAKE 1 TABLET (1,000 MG TOTAL) BY MOUTH 2 (TWO) TIMES DAILY.    pantoprazole (PROTONIX) 40 MG tablet TAKE 1 TABLET BY MOUTH DAILY    PROAIR HFA 90 mcg/actuation inhaler INHALE 2 PUFFS EBERY 4 HOURS AS NEEDED    quetiapine (SEROQUEL) 300 MG Tab Take by mouth.    simvastatin (ZOCOR) 80 MG tablet Take 80 mg by mouth once daily.    tamsulosin (FLOMAX) 0.4 mg Cp24 Take 1 capsule (0.4 mg total) by mouth once daily.    naratriptan (AMERGE) 2.5 MG tablet 2.5 mg at onset of headache, may repeat in 4 hours if needed           Clinical Reference Information           Your Vitals Were     BP Pulse Temp Height Weight SpO2    140/100 (BP Location: Left arm, Patient Position: Sitting, BP Method: Manual) 100 98.1 °F (36.7 °C) (Oral) 5' 9" (1.753 m) 113.7 kg (250 lb 10.6 oz) 98%    BMI                37.02 kg/m2          Blood Pressure          Most Recent Value    BP  (!)  140/100      Allergies as of 3/15/2017     Sulfa (Sulfonamide Antibiotics)      Immunizations Administered on Date of Encounter - 3/15/2017     None      Language Assistance Services     ATTENTION: Language assistance services are available, free of charge. Please call [phone].      ATENCIÓN: Si habla fortino, tiene a yadav disposición servicios gratuitos de asistencia lingüística. Llame al [phone].     CHÚ Ý: N?u b?n nói Ti?ng Vi?t, có các d?ch v? h? tr? ngôn ng? mi?n phí dành cho b?n. G?i s? [phone].         [facility] complies with applicable Federal civil rights laws and does not discriminate on the basis of race, color, national " origin, age, disability, or sex.

## 2017-03-27 ENCOUNTER — OFFICE VISIT (OUTPATIENT)
Dept: FAMILY MEDICINE | Facility: CLINIC | Age: 57
End: 2017-03-27
Payer: MEDICARE

## 2017-03-27 VITALS
WEIGHT: 250.69 LBS | HEIGHT: 69 IN | TEMPERATURE: 99 F | OXYGEN SATURATION: 99 % | BODY MASS INDEX: 37.13 KG/M2 | SYSTOLIC BLOOD PRESSURE: 132 MMHG | DIASTOLIC BLOOD PRESSURE: 77 MMHG | HEART RATE: 99 BPM

## 2017-03-27 DIAGNOSIS — R05.9 COUGH: ICD-10-CM

## 2017-03-27 DIAGNOSIS — R06.2 WHEEZES: ICD-10-CM

## 2017-03-27 DIAGNOSIS — J44.9 CHRONIC OBSTRUCTIVE PULMONARY DISEASE, UNSPECIFIED COPD TYPE: ICD-10-CM

## 2017-03-27 DIAGNOSIS — J01.90 ACUTE BACTERIAL RHINOSINUSITIS: Primary | ICD-10-CM

## 2017-03-27 DIAGNOSIS — B96.89 ACUTE BACTERIAL RHINOSINUSITIS: Primary | ICD-10-CM

## 2017-03-27 DIAGNOSIS — E11.9 TYPE 2 DIABETES MELLITUS WITHOUT COMPLICATION, WITHOUT LONG-TERM CURRENT USE OF INSULIN: ICD-10-CM

## 2017-03-27 DIAGNOSIS — I10 ESSENTIAL HYPERTENSION: ICD-10-CM

## 2017-03-27 PROCEDURE — 4010F ACE/ARB THERAPY RXD/TAKEN: CPT | Mod: S$GLB,,, | Performed by: NURSE PRACTITIONER

## 2017-03-27 PROCEDURE — 1160F RVW MEDS BY RX/DR IN RCRD: CPT | Mod: S$GLB,,, | Performed by: NURSE PRACTITIONER

## 2017-03-27 PROCEDURE — 3078F DIAST BP <80 MM HG: CPT | Mod: S$GLB,,, | Performed by: NURSE PRACTITIONER

## 2017-03-27 PROCEDURE — 99999 PR PBB SHADOW E&M-EST. PATIENT-LVL V: CPT | Mod: PBBFAC,,, | Performed by: NURSE PRACTITIONER

## 2017-03-27 PROCEDURE — 99499 UNLISTED E&M SERVICE: CPT | Mod: S$PBB,,, | Performed by: NURSE PRACTITIONER

## 2017-03-27 PROCEDURE — 99214 OFFICE O/P EST MOD 30 MIN: CPT | Mod: 25,S$GLB,, | Performed by: NURSE PRACTITIONER

## 2017-03-27 PROCEDURE — 3075F SYST BP GE 130 - 139MM HG: CPT | Mod: S$GLB,,, | Performed by: NURSE PRACTITIONER

## 2017-03-27 PROCEDURE — 3044F HG A1C LEVEL LT 7.0%: CPT | Mod: S$GLB,,, | Performed by: NURSE PRACTITIONER

## 2017-03-27 PROCEDURE — 96372 THER/PROPH/DIAG INJ SC/IM: CPT | Mod: S$GLB,,, | Performed by: NURSE PRACTITIONER

## 2017-03-27 RX ORDER — BENZONATATE 100 MG/1
100 CAPSULE ORAL 3 TIMES DAILY PRN
Qty: 90 CAPSULE | Refills: 0 | Status: SHIPPED | OUTPATIENT
Start: 2017-03-27 | End: 2017-04-26

## 2017-03-27 RX ORDER — DOXYCYCLINE 100 MG/1
100 CAPSULE ORAL 2 TIMES DAILY
Qty: 14 CAPSULE | Refills: 0 | Status: SHIPPED | OUTPATIENT
Start: 2017-03-27 | End: 2017-04-03

## 2017-03-27 NOTE — PROGRESS NOTES
History of Present Illness   Scott Bansal is a 56 y.o. man with medical history as listed below who presents today with two week history of congestion, PND, and productive cough. He also complains of sinus pressure and bilateral ear fullness with pressure and a sore throat. He has also noticed some nighttime wheezing. He has had some intermittent fevers and states he overall feels poorly. He has tried Mucinex as well has home inhaler and nebulizer with no relief. His symptoms have gradually worsened over two weeks. He has no additional complaints and is otherwise healthy on today's visit.    Past Medical History:   Diagnosis Date    Bipolar 1 disorder, mixed     BPH (benign prostatic hypertrophy)     Diabetes mellitus     GERD (gastroesophageal reflux disease)     Hyperlipidemia     Hypertension     Migraine headache        Past Surgical History:   Procedure Laterality Date    CERVICAL SPINE SURGERY      SHOULDER SURGERY      TONSILLECTOMY         Social History     Social History    Marital status:      Spouse name: N/A    Number of children: N/A    Years of education: N/A     Social History Main Topics    Smoking status: Former Smoker    Smokeless tobacco: None    Alcohol use Yes    Drug use: No    Sexual activity: Yes     Partners: Female     Other Topics Concern    None     Social History Narrative       History reviewed. No pertinent family history.    Review of Systems  Review of Systems   Constitutional: Positive for fever and malaise/fatigue. Negative for chills.   HENT: Positive for congestion, ear pain and sore throat. Negative for ear discharge.    Eyes: Negative for discharge and redness.   Respiratory: Positive for cough. Negative for sputum production, shortness of breath and wheezing.    Cardiovascular: Negative for chest pain.   Gastrointestinal: Negative for nausea and vomiting.   Neurological: Positive for headaches.     A complete review of systems was otherwise  "negative.    Physical Exam  /77 (BP Location: Left arm, Patient Position: Sitting, BP Method: Manual)  Pulse 99  Temp 98.8 °F (37.1 °C) (Oral)   Ht 5' 9" (1.753 m)  Wt 113.7 kg (250 lb 10.6 oz)  SpO2 99%  BMI 37.02 kg/m2  General appearance: alert, appears stated age, cooperative, no distress and ill appearing  Eyes: negative findings: lids and lashes normal and conjunctivae and sclerae normal  Ears: bilateral bulging TM with middle ear effusion  Nose: green discharge, moderate congestion, turbinates red, swollen, sinus tenderness bilateral  Throat: moderate oropharyngeal erythema with PND   Lungs: bilateral expiratory wheeze with normal effort and rate  Heart: regular rate and rhythm, S1, S2 normal, no murmur, click, rub or gallop  Lymph nodes: Cervical adenopathy: anterior  Neurologic: Grossly normal    Assessment/Plan  Acute bacterial rhinosinusitis  Given duration with initial improvement followed by worsening, I have treated patient with Doxycycline. One time IM dose of Solumedrol provided in clinic. Continue Xyzal and Flonase for symptomatic treatment with Tessalon for cough. Ibuprofen or Tylenol as needed for fever and body aches. Plenty of rest and plenty of fluids. He will contact me if symptoms worsen or fail to improve.  -     doxycycline (VIBRAMYCIN) 100 MG Cap; Take 1 capsule (100 mg total) by mouth 2 (two) times daily.  Dispense: 14 capsule; Refill: 0  -     benzonatate (TESSALON) 100 MG capsule; Take 1 capsule (100 mg total) by mouth 3 (three) times daily as needed.  Dispense: 90 capsule; Refill: 0  -     methylPREDNISolone sod suc(PF) 40 mg/mL injection 40 mg; Inject 1 mL (40 mg total) into the muscle one time.    Chronic obstructive pulmonary disease, unspecified COPD type  Continue home nebulizer and rescue inhaler as needed.  Treatment as listed below.  -     doxycycline (VIBRAMYCIN) 100 MG Cap; Take 1 capsule (100 mg total) by mouth 2 (two) times daily.  Dispense: 14 capsule; Refill: " 0  -     benzonatate (TESSALON) 100 MG capsule; Take 1 capsule (100 mg total) by mouth 3 (three) times daily as needed.  Dispense: 90 capsule; Refill: 0  -     methylPREDNISolone sod suc(PF) 40 mg/mL injection 40 mg; Inject 1 mL (40 mg total) into the muscle one time.    Type 2 diabetes mellitus without complication, without long-term current use of insulin  Discussed that steroid may temporarily elevate blood sugar, monitor closely.  The current medical regimen is effective;  continue present plan and medications.    Essential hypertension  The current medical regimen is effective;  continue present plan and medications.    Wheezes  As above.  -     doxycycline (VIBRAMYCIN) 100 MG Cap; Take 1 capsule (100 mg total) by mouth 2 (two) times daily.  Dispense: 14 capsule; Refill: 0  -     benzonatate (TESSALON) 100 MG capsule; Take 1 capsule (100 mg total) by mouth 3 (three) times daily as needed.  Dispense: 90 capsule; Refill: 0  -     methylPREDNISolone sod suc(PF) 40 mg/mL injection 40 mg; Inject 1 mL (40 mg total) into the muscle one time.    Cough  As above.  -     doxycycline (VIBRAMYCIN) 100 MG Cap; Take 1 capsule (100 mg total) by mouth 2 (two) times daily.  Dispense: 14 capsule; Refill: 0  -     benzonatate (TESSALON) 100 MG capsule; Take 1 capsule (100 mg total) by mouth 3 (three) times daily as needed.  Dispense: 90 capsule; Refill: 0  -     methylPREDNISolone sod suc(PF) 40 mg/mL injection 40 mg; Inject 1 mL (40 mg total) into the muscle one time.       He has verbalized understanding and is in agreement with plan of care.  Return if symptoms worsen or fail to improve.

## 2017-03-27 NOTE — MR AVS SNAPSHOT
[facility]  [address]  [place] [address]  Phone: [phone]  Fax: [phone]                  Scott Bansal   3/27/2017 10:00 AM   Office Visit    Description:  Male : 1960   Provider:  Nohemi Romo NP   Department:  [facility]           Reason for Visit     URI           Diagnoses this Visit        Comments    Acute bacterial rhinosinusitis    -  Primary     Chronic obstructive pulmonary disease, unspecified COPD type         Type 2 diabetes mellitus without complication, without long-term current use of insulin         Essential hypertension         Wheezes         Cough                To Do List           Goals (5 Years of Data)     None      Follow-Up and Disposition     Return if symptoms worsen or fail to improve.       These Medications        Disp Refills Start End    doxycycline (VIBRAMYCIN) 100 MG Cap 14 capsule 0 3/27/2017 4/3/2017    Take 1 capsule (100 mg total) by mouth 2 (two) times daily. - Oral    Pharmacy: [facility]/pharmacy #[id number] - [place] - [address]. Ph #: [phone]       benzonatate (TESSALON) 100 MG capsule 90 capsule 0 3/27/2017 2017    Take 1 capsule (100 mg total) by mouth 3 (three) times daily as needed. - Oral    Pharmacy: [facility]/pharmacy #[id number] - [place] - [address]. Ph #: [phone]         [facility] On Call     [facility] On Call Nurse Care Line - 24/7 Assistance  Registered nurses in the [facility] On Call Center provide clinical advisement, health education, appointment booking, and other advisory services.  Call for this free service at [phone].             Medications           Message regarding Medications     Verify the changes and/or additions to your medication regime listed below are the same as discussed with your clinician today.  If any of these changes or additions are incorrect, please notify your healthcare provider.        START taking these NEW medications        Refills     doxycycline (VIBRAMYCIN) 100 MG Cap 0    Sig: Take 1 capsule (100 mg total) by mouth 2 (two) times daily.    Class: Normal    Route: Oral    benzonatate (TESSALON) 100 MG capsule 0    Sig: Take 1 capsule (100 mg total) by mouth 3 (three) times daily as needed.    Class: Normal    Route: Oral      These medications were administered today        Dose Freq    methylPREDNISolone sod suc(PF) 40 mg/mL injection 40 mg 40 mg Clinic[facility] 1 time    Sig: Inject 1 mL (40 mg total) into the muscle one time.    Class: Normal    Route: Intramuscular           Verify that the below list of medications is an accurate representation of the medications you are currently taking.  If none reported, the list may be blank. If incorrect, please contact your healthcare provider. Carry this list with you in case of emergency.           Current Medications     amlodipine (NORVASC) 10 MG tablet Take 1 tablet (10 mg total) by mouth once daily.    BREO ELLIPTA 100-25 mcg/dose diskus inhaler INHALE 1 PUFF INTO THE LUNGS ONCE DAILY.    buPROPion (WELLBUTRIN XL) 300 MG 24 hr tablet Take 300 mg by mouth every morning.    busPIRone (BUSPAR) 10 MG tablet Take 10 mg by mouth 3 (three) times daily.    butalbital-acetaminophen-caffeine -40 mg (FIORICET, ESGIC) -40 mg per tablet TAKE 1 TABLET BY MOUTH EVERY 4 (FOUR) HOURS AS NEEDED.    cloNIDine 0.3 mg/24 hr td ptwk (CATAPRES) 0.3 mg/24 hr PLACE 1 PATCH ONTO THE SKIN EVERY 7 DAYS.    cyclobenzaprine (FLEXERIL) 10 MG tablet TAKE 1 TABLET (10 MG TOTAL) BY MOUTH 3 (THREE) TIMES DAILY AS NEEDED.    fluticasone (FLONASE) 50 mcg/actuation nasal spray TAKE 1 SPRAY BY EACH NARE ROUTE ONCE DAILY.    furosemide (LASIX) 40 MG tablet Take 1 tablet (40 mg total) by mouth once daily.    gemfibrozil (LOPID) 600 MG tablet TAKE 1 TABLET (600 MG TOTAL) BY MOUTH 2 (TWO) TIMES DAILY BEFORE MEALS.    irbesartan (AVAPRO) 300 MG tablet Take 1 tablet (300 mg total) by mouth every evening.    levocetirizine (XYZAL)  "5 MG tablet Take 1 tablet (5 mg total) by mouth every evening.    metformin (GLUCOPHAGE) 1000 MG tablet TAKE 1 TABLET (1,000 MG TOTAL) BY MOUTH 2 (TWO) TIMES DAILY.    naratriptan (AMERGE) 2.5 MG tablet 2.5 mg at onset of headache, may repeat in 4 hours if needed    pantoprazole (PROTONIX) 40 MG tablet TAKE 1 TABLET BY MOUTH DAILY    PROAIR HFA 90 mcg/actuation inhaler INHALE 2 PUFFS EBERY 4 HOURS AS NEEDED    quetiapine (SEROQUEL) 300 MG Tab Take by mouth.    simvastatin (ZOCOR) 80 MG tablet Take 80 mg by mouth once daily.    tamsulosin (FLOMAX) 0.4 mg Cp24 Take 1 capsule (0.4 mg total) by mouth once daily.    benzonatate (TESSALON) 100 MG capsule Take 1 capsule (100 mg total) by mouth 3 (three) times daily as needed.    doxycycline (VIBRAMYCIN) 100 MG Cap Take 1 capsule (100 mg total) by mouth 2 (two) times daily.           Clinical Reference Information           Your Vitals Were     BP Pulse Temp Height Weight SpO2    132/77 (BP Location: Left arm, Patient Position: Sitting, BP Method: Manual) 99 98.8 °F (37.1 °C) (Oral) 5' 9" (1.753 m) 113.7 kg (250 lb 10.6 oz) 99%    BMI                37.02 kg/m2          Blood Pressure          Most Recent Value    BP  132/77      Allergies as of 3/27/2017     Sulfa (Sulfonamide Antibiotics)      Immunizations Administered on Date of Encounter - 3/27/2017     None      Instructions      Acute Bacterial Rhinosinusitis (ABRS)  Acute bacterial rhinosinusitis (ABRS) is an infection of your nasal cavity and sinuses. Its caused by bacteria. Acute means that youve had symptoms for less than 12 weeks.  Understanding your sinuses  The nasal cavity is the large air-filled space behind your nose. The sinuses are a group of spaces formed by the bones of your face. They connect with your nasal cavity. ABRS causes the tissue lining these spaces to become inflamed. Mucus may not drain normally. This leads to facial pain and other symptoms.  What causes ABRS?  ABRS most often follows an " upper respiratory infection caused by a virus. Bacteria then infect the lining of your nasal cavity and sinuses. But you can also get ABRS if you have:  · Nasal allergies  · Long-term nasal swelling and congestion not caused by allergies  · Blockage in the nose  Symptoms of ABRS  The symptoms of ABRS may be different for each person, and can include:  · Nasal congestion  · Runny nose  · Fluid draining from the nose down the throat (postnasal drip)  · Headache  · Cough  · Pain in the sinuses  · Thick, colored fluid from the nose (mucus)  · Fever  Diagnosing ABRS  ABRS may be diagnosed if youve had an upper respiratory infection like a cold and cough for longer than 10 to 14 days. Your health care provider will ask about your symptoms and your medical history. The provider will check your vital signs, including your temperature. Youll have a physical exam. The health care provider will check your ears, nose, and throat. You likely wont need any tests. If ABRS comes back, you may have a culture or other tests.  Treatment for ABRS  Treatment may include:  · Antibiotic medicine. This is for symptoms that last for at least 10 to 14 days.  · Nasal corticosteroid medicine. Drops or spray used in the nose can lessen swelling and congestion.  · Over-the-counter pain medicine. This is to lessen sinus pain and pressure.  · Nasal decongestant medicine. Spray or drops may help to lessen congestion. Do not use them for more than a few days.  · Salt wash (saline irrigation). This can help to loosen mucus.  Possible complications of ABRS  ABRS may come back or become long-term (chronic).  In rare cases, ABRS may cause complications such as:   · Inflamed tissue around the brain and spinal cord (meningitis)  · Inflamed tissue around the eyes (orbital cellulitis)  · Inflamed bones around the sinuses (osteitis)  These problems may need to be treated in a hospital with intravenous (IV) antibiotic medicine or surgery.  When to call the  health care provider  Call your health care provider if you have any of the following:  · Symptoms that dont get better, or get worse  · Symptoms that dont get better after 3 to 5 days on antibiotics  · Trouble seeing  · Swelling around your eyes  · Confusion or trouble staying awake   Date Last Reviewed: 3/3/2015  © 9798-4437 [email or web address]. [address] [place] [address], [place] [address]. All rights reserved. This information is not intended as a substitute for professional medical care. Always follow your healthcare professional's instructions.             Language Assistance Services     ATTENTION: Language assistance services are available, free of charge. Please call [phone].      ATENCIÓN: Si habla fortino, tiene a yadav disposición servicios gratuitos de asistencia lingüística. Llame al [phone].     CHÚ Ý: N?u b?n nói Ti?ng Vi?t, có các d?ch v? h? tr? ngôn ng? mi?n phí dành cho b?n. G?i s? [phone].         [facility] complies with applicable Federal civil rights laws and does not discriminate on the basis of race, color, national origin, age, disability, or sex.

## 2017-03-27 NOTE — PATIENT INSTRUCTIONS

## 2017-03-29 DIAGNOSIS — R51.9 NONINTRACTABLE EPISODIC HEADACHE, UNSPECIFIED HEADACHE TYPE: ICD-10-CM

## 2017-03-29 RX ORDER — BUTALBITAL, ACETAMINOPHEN AND CAFFEINE 50; 325; 40 MG/1; MG/1; MG/1
TABLET ORAL
Qty: 30 TABLET | Refills: 0 | Status: SHIPPED | OUTPATIENT
Start: 2017-03-29 | End: 2017-05-03 | Stop reason: SDUPTHER

## 2017-03-30 DIAGNOSIS — E11.9 DIABETES MELLITUS WITHOUT COMPLICATION: ICD-10-CM

## 2017-03-30 RX ORDER — METFORMIN HYDROCHLORIDE 1000 MG/1
TABLET ORAL
Qty: 180 TABLET | Refills: 0 | Status: SHIPPED | OUTPATIENT
Start: 2017-03-30 | End: 2017-06-24 | Stop reason: SDUPTHER

## 2017-04-10 DIAGNOSIS — E78.5 HYPERLIPIDEMIA, UNSPECIFIED HYPERLIPIDEMIA TYPE: Primary | ICD-10-CM

## 2017-04-10 RX ORDER — SIMVASTATIN 80 MG/1
80 TABLET, FILM COATED ORAL DAILY
Qty: 90 TABLET | Refills: 3 | Status: SHIPPED | OUTPATIENT
Start: 2017-04-10 | End: 2018-03-24 | Stop reason: SDUPTHER

## 2017-04-10 NOTE — TELEPHONE ENCOUNTER
----- Message from Monique Lombardi sent at 4/10/2017  8:32 AM CDT -----  Contact: Self  REFILL: simvastatin (ZOCOR) 80 MG tablet

## 2017-04-26 DIAGNOSIS — E78.5 HYPERLIPIDEMIA, UNSPECIFIED HYPERLIPIDEMIA TYPE: ICD-10-CM

## 2017-04-26 RX ORDER — GEMFIBROZIL 600 MG/1
TABLET, FILM COATED ORAL
Qty: 120 TABLET | Refills: 0 | Status: SHIPPED | OUTPATIENT
Start: 2017-04-26 | End: 2017-06-22 | Stop reason: SDUPTHER

## 2017-05-03 ENCOUNTER — TELEPHONE (OUTPATIENT)
Dept: FAMILY MEDICINE | Facility: CLINIC | Age: 57
End: 2017-05-03

## 2017-05-03 DIAGNOSIS — R51.9 NONINTRACTABLE EPISODIC HEADACHE, UNSPECIFIED HEADACHE TYPE: ICD-10-CM

## 2017-05-03 RX ORDER — BUTALBITAL, ACETAMINOPHEN AND CAFFEINE 50; 325; 40 MG/1; MG/1; MG/1
TABLET ORAL
Qty: 30 TABLET | Refills: 0 | Status: SHIPPED | OUTPATIENT
Start: 2017-05-03 | End: 2017-06-02 | Stop reason: SDUPTHER

## 2017-05-03 NOTE — TELEPHONE ENCOUNTER
Patient said that he feels that Avapro is to strong because he said that his blood pressure is 140/70 than drops down to 100/50. He said that his blood pressure is up and than down . Since he is on the clonidine patch and amlodipine that it might be to much. Would like to see if maybe he can get a Rx for maybe 150 mg. Please advse

## 2017-05-03 NOTE — TELEPHONE ENCOUNTER
----- Message from Coleen Watson sent at 5/3/2017 11:56 AM CDT -----  Contact: Self   Patient says he thinks his medication is to strong. irbesartan (AVAPRO) 300 MG tablet. [phone]    Patient also need a refill on the following medication     butalbital-acetaminophen-caffeine -40 mg (FIORICET, ESGIC) -40 mg per tablet  tamsulosin (FLOMAX) 0.4 mg Cp24

## 2017-05-04 DIAGNOSIS — I10 ESSENTIAL HYPERTENSION: ICD-10-CM

## 2017-05-04 DIAGNOSIS — N40.0 BENIGN PROSTATIC HYPERPLASIA, PRESENCE OF LOWER URINARY TRACT SYMPTOMS UNSPECIFIED, UNSPECIFIED MORPHOLOGY: ICD-10-CM

## 2017-05-04 RX ORDER — TAMSULOSIN HYDROCHLORIDE 0.4 MG/1
CAPSULE ORAL
Qty: 30 CAPSULE | Refills: 2 | Status: SHIPPED | OUTPATIENT
Start: 2017-05-04 | End: 2017-07-19 | Stop reason: SDUPTHER

## 2017-05-04 RX ORDER — FUROSEMIDE 40 MG/1
TABLET ORAL
Qty: 30 TABLET | Refills: 2 | Status: SHIPPED | OUTPATIENT
Start: 2017-05-04 | End: 2017-07-26 | Stop reason: SDUPTHER

## 2017-05-18 RX ORDER — CYCLOBENZAPRINE HCL 10 MG
TABLET ORAL
Qty: 90 TABLET | Refills: 0 | Status: SHIPPED | OUTPATIENT
Start: 2017-05-18 | End: 2017-06-14 | Stop reason: SDUPTHER

## 2017-06-02 DIAGNOSIS — R51.9 NONINTRACTABLE EPISODIC HEADACHE, UNSPECIFIED HEADACHE TYPE: ICD-10-CM

## 2017-06-02 RX ORDER — BUTALBITAL, ACETAMINOPHEN AND CAFFEINE 50; 325; 40 MG/1; MG/1; MG/1
TABLET ORAL
Qty: 30 TABLET | Refills: 0 | Status: SHIPPED | OUTPATIENT
Start: 2017-06-02 | End: 2017-07-19 | Stop reason: SDUPTHER

## 2017-06-14 RX ORDER — CYCLOBENZAPRINE HCL 10 MG
TABLET ORAL
Qty: 90 TABLET | Refills: 0 | Status: SHIPPED | OUTPATIENT
Start: 2017-06-14 | End: 2017-07-11 | Stop reason: SDUPTHER

## 2017-06-22 DIAGNOSIS — E78.5 HYPERLIPIDEMIA, UNSPECIFIED HYPERLIPIDEMIA TYPE: ICD-10-CM

## 2017-06-22 RX ORDER — GEMFIBROZIL 600 MG/1
TABLET, FILM COATED ORAL
Qty: 120 TABLET | Refills: 0 | Status: SHIPPED | OUTPATIENT
Start: 2017-06-22 | End: 2017-11-16 | Stop reason: SDUPTHER

## 2017-06-22 RX ORDER — GEMFIBROZIL 600 MG/1
TABLET, FILM COATED ORAL
Qty: 120 TABLET | Refills: 0 | Status: SHIPPED | OUTPATIENT
Start: 2017-06-22 | End: 2017-06-22 | Stop reason: SDUPTHER

## 2017-06-22 NOTE — TELEPHONE ENCOUNTER
----- Message from Laura Gonzales sent at 6/22/2017  9:23 AM CDT -----  Contact: Self/[phone]  Refill:  gemfibrozil (LOPID) 600 MG tablet    Thank you.

## 2017-06-24 DIAGNOSIS — E11.9 DIABETES MELLITUS WITHOUT COMPLICATION: ICD-10-CM

## 2017-06-24 RX ORDER — METFORMIN HYDROCHLORIDE 1000 MG/1
TABLET ORAL
Qty: 180 TABLET | Refills: 0 | Status: SHIPPED | OUTPATIENT
Start: 2017-06-24 | End: 2017-10-06 | Stop reason: SDUPTHER

## 2017-07-07 ENCOUNTER — OFFICE VISIT (OUTPATIENT)
Dept: FAMILY MEDICINE | Facility: CLINIC | Age: 57
End: 2017-07-07
Payer: MEDICARE

## 2017-07-07 ENCOUNTER — LAB VISIT (OUTPATIENT)
Dept: LAB | Facility: HOSPITAL | Age: 57
End: 2017-07-07
Attending: FAMILY MEDICINE
Payer: MEDICARE

## 2017-07-07 VITALS
WEIGHT: 255 LBS | TEMPERATURE: 98 F | OXYGEN SATURATION: 98 % | BODY MASS INDEX: 37.77 KG/M2 | HEART RATE: 103 BPM | HEIGHT: 69 IN | SYSTOLIC BLOOD PRESSURE: 120 MMHG | DIASTOLIC BLOOD PRESSURE: 82 MMHG

## 2017-07-07 DIAGNOSIS — Z87.01 HISTORY OF PNEUMONIA: ICD-10-CM

## 2017-07-07 DIAGNOSIS — E11.9 TYPE 2 DIABETES MELLITUS WITHOUT COMPLICATION, WITHOUT LONG-TERM CURRENT USE OF INSULIN: ICD-10-CM

## 2017-07-07 DIAGNOSIS — Z12.11 COLON CANCER SCREENING: ICD-10-CM

## 2017-07-07 DIAGNOSIS — E11.42 DIABETIC POLYNEUROPATHY ASSOCIATED WITH TYPE 2 DIABETES MELLITUS: Primary | ICD-10-CM

## 2017-07-07 DIAGNOSIS — Z23 NEED FOR 23-POLYVALENT PNEUMOCOCCAL POLYSACCHARIDE VACCINE: ICD-10-CM

## 2017-07-07 DIAGNOSIS — E11.42 DIABETIC POLYNEUROPATHY ASSOCIATED WITH TYPE 2 DIABETES MELLITUS: ICD-10-CM

## 2017-07-07 DIAGNOSIS — G44.229 CHRONIC TENSION-TYPE HEADACHE, NOT INTRACTABLE: ICD-10-CM

## 2017-07-07 DIAGNOSIS — Z23 NEED FOR TDAP VACCINATION: ICD-10-CM

## 2017-07-07 LAB
ALBUMIN SERPL BCP-MCNC: 4.2 G/DL
ALP SERPL-CCNC: 76 U/L
ALT SERPL W/O P-5'-P-CCNC: 63 U/L
ANION GAP SERPL CALC-SCNC: 7 MMOL/L
AST SERPL-CCNC: 41 U/L
BASOPHILS # BLD AUTO: 0.03 K/UL
BASOPHILS NFR BLD: 0.5 %
BILIRUB SERPL-MCNC: 0.5 MG/DL
BUN SERPL-MCNC: 20 MG/DL
CALCIUM SERPL-MCNC: 9.4 MG/DL
CHLORIDE SERPL-SCNC: 101 MMOL/L
CO2 SERPL-SCNC: 26 MMOL/L
CREAT SERPL-MCNC: 1.1 MG/DL
DIFFERENTIAL METHOD: ABNORMAL
EOSINOPHIL # BLD AUTO: 0.3 K/UL
EOSINOPHIL NFR BLD: 4.4 %
ERYTHROCYTE [DISTWIDTH] IN BLOOD BY AUTOMATED COUNT: 12.2 %
EST. GFR  (AFRICAN AMERICAN): >60 ML/MIN/1.73 M^2
EST. GFR  (NON AFRICAN AMERICAN): >60 ML/MIN/1.73 M^2
ESTIMATED AVG GLUCOSE: 128 MG/DL
GLUCOSE SERPL-MCNC: 127 MG/DL
HBA1C MFR BLD HPLC: 6.1 %
HCT VFR BLD AUTO: 38 %
HGB BLD-MCNC: 12.7 G/DL
LYMPHOCYTES # BLD AUTO: 2 K/UL
LYMPHOCYTES NFR BLD: 34.2 %
MCH RBC QN AUTO: 30 PG
MCHC RBC AUTO-ENTMCNC: 33.4 %
MCV RBC AUTO: 90 FL
MONOCYTES # BLD AUTO: 0.7 K/UL
MONOCYTES NFR BLD: 11.4 %
NEUTROPHILS # BLD AUTO: 2.7 K/UL
NEUTROPHILS NFR BLD: 47.9 %
PLATELET # BLD AUTO: 243 K/UL
PMV BLD AUTO: 11 FL
POTASSIUM SERPL-SCNC: 4.3 MMOL/L
PROT SERPL-MCNC: 6.8 G/DL
RBC # BLD AUTO: 4.24 M/UL
SODIUM SERPL-SCNC: 134 MMOL/L
WBC # BLD AUTO: 5.7 K/UL

## 2017-07-07 PROCEDURE — 99499 UNLISTED E&M SERVICE: CPT | Mod: S$PBB,,, | Performed by: FAMILY MEDICINE

## 2017-07-07 PROCEDURE — 85025 COMPLETE CBC W/AUTO DIFF WBC: CPT

## 2017-07-07 PROCEDURE — 90732 PPSV23 VACC 2 YRS+ SUBQ/IM: CPT | Mod: S$GLB,,, | Performed by: FAMILY MEDICINE

## 2017-07-07 PROCEDURE — 90471 IMMUNIZATION ADMIN: CPT | Mod: S$GLB,,, | Performed by: FAMILY MEDICINE

## 2017-07-07 PROCEDURE — 3044F HG A1C LEVEL LT 7.0%: CPT | Mod: S$GLB,,, | Performed by: FAMILY MEDICINE

## 2017-07-07 PROCEDURE — 4010F ACE/ARB THERAPY RXD/TAKEN: CPT | Mod: S$GLB,,, | Performed by: FAMILY MEDICINE

## 2017-07-07 PROCEDURE — 36415 COLL VENOUS BLD VENIPUNCTURE: CPT | Mod: PO

## 2017-07-07 PROCEDURE — 83036 HEMOGLOBIN GLYCOSYLATED A1C: CPT

## 2017-07-07 PROCEDURE — 99999 PR PBB SHADOW E&M-EST. PATIENT-LVL III: CPT | Mod: PBBFAC,,, | Performed by: FAMILY MEDICINE

## 2017-07-07 PROCEDURE — G0009 ADMIN PNEUMOCOCCAL VACCINE: HCPCS | Mod: 59,S$GLB,, | Performed by: FAMILY MEDICINE

## 2017-07-07 PROCEDURE — 99214 OFFICE O/P EST MOD 30 MIN: CPT | Mod: 25,S$GLB,, | Performed by: FAMILY MEDICINE

## 2017-07-07 PROCEDURE — 96372 THER/PROPH/DIAG INJ SC/IM: CPT | Mod: S$GLB,,, | Performed by: FAMILY MEDICINE

## 2017-07-07 PROCEDURE — 90715 TDAP VACCINE 7 YRS/> IM: CPT | Mod: S$GLB,,, | Performed by: FAMILY MEDICINE

## 2017-07-07 PROCEDURE — 80053 COMPREHEN METABOLIC PANEL: CPT

## 2017-07-07 RX ORDER — GABAPENTIN 300 MG/1
300 CAPSULE ORAL 2 TIMES DAILY
Qty: 60 CAPSULE | Refills: 3 | Status: SHIPPED | OUTPATIENT
Start: 2017-07-07 | End: 2017-09-29 | Stop reason: SDUPTHER

## 2017-07-07 RX ORDER — NARATRIPTAN 2.5 MG/1
TABLET ORAL
Refills: 2 | COMMUNITY
Start: 2017-05-05 | End: 2017-07-07 | Stop reason: SDUPTHER

## 2017-07-07 RX ORDER — NARATRIPTAN 2.5 MG/1
TABLET ORAL
Refills: 2 | COMMUNITY
Start: 2017-06-15 | End: 2017-07-19

## 2017-07-07 RX ORDER — KETOROLAC TROMETHAMINE 30 MG/ML
30 INJECTION, SOLUTION INTRAMUSCULAR; INTRAVENOUS ONCE
Status: COMPLETED | OUTPATIENT
Start: 2017-07-07 | End: 2017-07-07

## 2017-07-07 RX ORDER — AMLODIPINE BESYLATE 5 MG/1
TABLET ORAL
Refills: 11 | COMMUNITY
Start: 2017-04-01 | End: 2017-07-07

## 2017-07-07 RX ADMIN — KETOROLAC TROMETHAMINE 30 MG: 30 INJECTION, SOLUTION INTRAMUSCULAR; INTRAVENOUS at 10:07

## 2017-07-07 NOTE — PROGRESS NOTES
Office Visit    Patient Name: Scott Bansal    : 1960  MRN: [id number]    Subjective:  Scott is a 56 y.o. male who presents today for:    Diabetes (nerve pain)      This patient has multiple medical diagnoses as noted below.  This patient is known to me and to this clinic. He reports that he gets sharp pain in his feet.  He is concerned about weight loss.  He wanted to get on contrave.  We discussed that this medication would interfere with his medication.  Patient denies any new symptoms including chest pain, SOB, blurry vision, N/V, diarrhea.  He has a history of double pneumonia.  He would like to get his vaccination today.  He does report increased headache pain.  He would like an injection today.        Active Problem List  Patient Active Problem List   Diagnosis    History of fusion of cervical spine    Type 2 diabetes mellitus without complication    Hyperlipidemia    Essential hypertension    Chronic tension-type headache, not intractable    Bipolar 1 disorder    BPH (benign prostatic hyperplasia)    Gastroesophageal reflux disease without esophagitis    Sciatica of right side    History of pneumonia    COPD (chronic obstructive pulmonary disease)    Former smoker    Lumbar compression fracture       Past Surgical History  Past Surgical History:   Procedure Laterality Date    CERVICAL SPINE SURGERY      SHOULDER SURGERY      TONSILLECTOMY         Family History  History reviewed. No pertinent family history.    Social History  Social History     Social History    Marital status:      Spouse name: N/A    Number of children: N/A    Years of education: N/A     Occupational History    Not on file.     Social History Main Topics    Smoking status: Former Smoker    Smokeless tobacco: Not on file    Alcohol use Yes    Drug use: No    Sexual activity: Yes     Partners: Female     Other Topics Concern    Not on file     Social History Narrative    No narrative on file  "      Current Medications  Medications reviewed and updated.     Allergies   Review of patient's allergies indicates:   Allergen Reactions    Sulfa (sulfonamide antibiotics) Rash       Review of Systems (Pertinent positives)  Review of Systems   Constitutional: Negative for activity change, appetite change, fatigue, fever and unexpected weight change.   HENT: Negative for facial swelling.    Eyes: Negative for visual disturbance.   Respiratory: Negative for chest tightness, shortness of breath, wheezing and stridor.    Cardiovascular: Negative for chest pain, palpitations and leg swelling.   Gastrointestinal: Negative for abdominal distention, abdominal pain, blood in stool, constipation, diarrhea, nausea and vomiting.   Endocrine: Negative for cold intolerance, heat intolerance, polydipsia and polyuria.   Genitourinary: Negative.  Negative for testicular pain and urgency.   Musculoskeletal:        Sharp pain in the feet    Skin: Negative.    Allergic/Immunologic: Negative.    Neurological: Negative for dizziness, weakness, light-headedness, numbness and headaches.   Psychiatric/Behavioral: Negative for agitation and decreased concentration.       /82 (BP Location: Left arm, Patient Position: Sitting, BP Method: Manual)   Pulse 103   Temp 97.5 °F (36.4 °C) (Oral)   Ht 5' 9" (1.753 m)   Wt 115.7 kg (255 lb)   SpO2 98%   BMI 37.66 kg/m²     Physical Exam   Constitutional: He is oriented to person, place, and time. He appears well-developed and well-nourished.   HENT:   Head: Normocephalic and atraumatic.   Right Ear: External ear normal.   Left Ear: External ear normal.   Nose: Nose normal.   Mouth/Throat: Oropharynx is clear and moist.   Eyes: Conjunctivae and EOM are normal. Pupils are equal, round, and reactive to light.   Neck: Normal range of motion.   Cardiovascular: Normal rate, regular rhythm and normal heart sounds.    Pulmonary/Chest: Effort normal and breath sounds normal.   Neurological: He is " alert and oriented to person, place, and time.   Skin: Skin is warm and dry.   Psychiatric: He has a normal mood and affect. His behavior is normal.       Protective Sensation (w/ 10 gram monofilament):  Right: Intact  Left: Intact    Visual Inspection:  Normal -  Bilateral    Pedal Pulses:   Right: Diminshed  Left: Diminshed    Posterior tibialis:   Right:Diminshed  Left: Diminshed      Health Maintenance  Health Maintenance Topics with due status: Not Due       Topic Last Completion Date    Lipid Panel 11/04/2016    Influenza Vaccine 12/05/2016       Assessment/Plan:  Scott Bansal is a 56 y.o. male who presents today for :    Scott was seen today for diabetes.    Diagnoses and all orders for this visit:    Diabetic polyneuropathy associated with type 2 diabetes mellitus  -     Hemoglobin A1c; Future  -     gabapentin (NEURONTIN) 300 MG capsule; Take 1 capsule (300 mg total) by mouth 2 (two) times daily.  -    I have discussed the common side effects of this medication with the patient and answered all of the questions they had at the time of this visit regarding this medication.  -  RTC if symptoms worsen or persist.    Chronic tension-type headache, not intractable  -     ketorolac injection 30 mg; Inject 30 mg into the muscle once.    Type 2 diabetes mellitus without complication, without long-term current use of insulin  -     Hemoglobin A1c; Future  -   Pt is currently stable on medication regimen.  Continue current therapy as scheduled.  Contact office with any questions about adjustments on medications.   -  CBC and CMP   Need for Tdap vaccination  -     (In Office Administered) Tdap Vaccine    Colon cancer screening  -     Case request GI: COLONOSCOPY    History of pneumonia  -  No recent infections.      Need for 23-polyvalent pneumococcal polysaccharide vaccine  -     (In Office Administered) Pneumococcal Polysaccharide Vaccine (23 Valent) (SQ/IM)        Return in about 6 months (around 1/7/2018).

## 2017-07-11 RX ORDER — CYCLOBENZAPRINE HCL 10 MG
TABLET ORAL
Qty: 90 TABLET | Refills: 0 | Status: SHIPPED | OUTPATIENT
Start: 2017-07-11 | End: 2017-08-07 | Stop reason: SDUPTHER

## 2017-07-19 DIAGNOSIS — N40.0 BENIGN PROSTATIC HYPERPLASIA: ICD-10-CM

## 2017-07-19 DIAGNOSIS — G44.229 CHRONIC TENSION-TYPE HEADACHE, NOT INTRACTABLE: ICD-10-CM

## 2017-07-19 DIAGNOSIS — R51.9 NONINTRACTABLE EPISODIC HEADACHE, UNSPECIFIED HEADACHE TYPE: ICD-10-CM

## 2017-07-19 RX ORDER — BUTALBITAL, ACETAMINOPHEN AND CAFFEINE 50; 325; 40 MG/1; MG/1; MG/1
TABLET ORAL
Qty: 30 TABLET | Refills: 0 | Status: SHIPPED | OUTPATIENT
Start: 2017-07-19 | End: 2017-08-21 | Stop reason: SDUPTHER

## 2017-07-19 RX ORDER — TAMSULOSIN HYDROCHLORIDE 0.4 MG/1
CAPSULE ORAL
Qty: 30 CAPSULE | Refills: 2 | Status: SHIPPED | OUTPATIENT
Start: 2017-07-19 | End: 2017-10-08 | Stop reason: SDUPTHER

## 2017-07-19 RX ORDER — NARATRIPTAN 2.5 MG/1
TABLET ORAL
Qty: 12 TABLET | Refills: 2 | Status: SHIPPED | OUTPATIENT
Start: 2017-07-19 | End: 2017-10-30 | Stop reason: SDUPTHER

## 2017-07-19 RX ORDER — SODIUM, POTASSIUM,MAG SULFATES 17.5-3.13G
1 SOLUTION, RECONSTITUTED, ORAL ORAL
Qty: 1 BOTTLE | Refills: 0 | Status: SHIPPED | OUTPATIENT
Start: 2017-07-19 | End: 2017-08-12

## 2017-07-19 NOTE — TELEPHONE ENCOUNTER
----- Message from Daya Traylor sent at 7/19/2017 11:52 AM CDT -----  Contact: Self  Pt is requesting refills on bother headache meds. Pls call pt [phone]. Thanks......JENNIFER

## 2017-07-26 ENCOUNTER — ANESTHESIA EVENT (OUTPATIENT)
Dept: ENDOSCOPY | Facility: HOSPITAL | Age: 57
End: 2017-07-26
Payer: MEDICARE

## 2017-07-26 DIAGNOSIS — I10 ESSENTIAL HYPERTENSION: ICD-10-CM

## 2017-07-26 RX ORDER — FUROSEMIDE 40 MG/1
TABLET ORAL
Qty: 30 TABLET | Refills: 2 | Status: SHIPPED | OUTPATIENT
Start: 2017-07-26 | End: 2017-11-04 | Stop reason: SDUPTHER

## 2017-07-27 ENCOUNTER — ANESTHESIA (OUTPATIENT)
Dept: ENDOSCOPY | Facility: HOSPITAL | Age: 57
End: 2017-07-27
Payer: MEDICARE

## 2017-07-27 ENCOUNTER — SURGERY (OUTPATIENT)
Age: 57
End: 2017-07-27

## 2017-07-27 ENCOUNTER — HOSPITAL ENCOUNTER (OUTPATIENT)
Facility: HOSPITAL | Age: 57
Discharge: HOME | End: 2017-07-27
Attending: INTERNAL MEDICINE | Admitting: INTERNAL MEDICINE
Payer: MEDICARE

## 2017-07-27 VITALS
SYSTOLIC BLOOD PRESSURE: 124 MMHG | DIASTOLIC BLOOD PRESSURE: 74 MMHG | TEMPERATURE: 98 F | RESPIRATION RATE: 18 BRPM | OXYGEN SATURATION: 96 % | HEART RATE: 83 BPM

## 2017-07-27 DIAGNOSIS — E78.5 HYPERLIPIDEMIA, UNSPECIFIED HYPERLIPIDEMIA TYPE: ICD-10-CM

## 2017-07-27 LAB — POCT GLUCOSE: 118 MG/DL (ref 70–110)

## 2017-07-27 PROCEDURE — 88305 TISSUE EXAM BY PATHOLOGIST: CPT | Performed by: PATHOLOGY

## 2017-07-27 PROCEDURE — 45380 COLONOSCOPY AND BIOPSY: CPT | Mod: 59

## 2017-07-27 PROCEDURE — 37000008 HC ANESTHESIA 1ST 15 MINUTES: Performed by: INTERNAL MEDICINE

## 2017-07-27 PROCEDURE — 25000003 PHARM REV CODE 250: Performed by: ANESTHESIOLOGY

## 2017-07-27 PROCEDURE — 88305 TISSUE EXAM BY PATHOLOGIST: CPT | Mod: 26,,, | Performed by: PATHOLOGY

## 2017-07-27 PROCEDURE — 37000009 HC ANESTHESIA EA ADD 15 MINS: Performed by: INTERNAL MEDICINE

## 2017-07-27 PROCEDURE — D9220A PRA ANESTHESIA: Mod: 33,CRNA,, | Performed by: NURSE ANESTHETIST, CERTIFIED REGISTERED

## 2017-07-27 PROCEDURE — D9220A PRA ANESTHESIA: Mod: 33,ANES,, | Performed by: ANESTHESIOLOGY

## 2017-07-27 PROCEDURE — 63600175 PHARM REV CODE 636 W HCPCS: Performed by: NURSE ANESTHETIST, CERTIFIED REGISTERED

## 2017-07-27 PROCEDURE — 82962 GLUCOSE BLOOD TEST: CPT | Performed by: INTERNAL MEDICINE

## 2017-07-27 PROCEDURE — 45385 COLONOSCOPY W/LESION REMOVAL: CPT | Performed by: INTERNAL MEDICINE

## 2017-07-27 PROCEDURE — 27201089 HC SNARE, DISP (ANY): Performed by: INTERNAL MEDICINE

## 2017-07-27 RX ORDER — PROPOFOL 10 MG/ML
VIAL (ML) INTRAVENOUS
Status: DISCONTINUED | OUTPATIENT
Start: 2017-07-27 | End: 2017-07-27

## 2017-07-27 RX ORDER — PROPOFOL 10 MG/ML
VIAL (ML) INTRAVENOUS
Status: DISCONTINUED
Start: 2017-07-27 | End: 2017-07-27 | Stop reason: HOSPADM

## 2017-07-27 RX ORDER — GEMFIBROZIL 600 MG/1
TABLET, FILM COATED ORAL
Qty: 120 TABLET | Refills: 0 | Status: SHIPPED | OUTPATIENT
Start: 2017-07-27 | End: 2017-08-12 | Stop reason: SDUPTHER

## 2017-07-27 RX ORDER — LIDOCAINE HYDROCHLORIDE 10 MG/ML
1 INJECTION, SOLUTION EPIDURAL; INFILTRATION; INTRACAUDAL; PERINEURAL ONCE AS NEEDED
Status: DISCONTINUED | OUTPATIENT
Start: 2017-07-27 | End: 2017-07-27 | Stop reason: HOSPADM

## 2017-07-27 RX ORDER — SODIUM CHLORIDE 9 MG/ML
INJECTION, SOLUTION INTRAVENOUS CONTINUOUS
Status: DISCONTINUED | OUTPATIENT
Start: 2017-07-27 | End: 2017-07-27 | Stop reason: HOSPADM

## 2017-07-27 RX ORDER — LIDOCAINE HYDROCHLORIDE 20 MG/ML
INJECTION, SOLUTION EPIDURAL; INFILTRATION; INTRACAUDAL; PERINEURAL
Status: DISCONTINUED
Start: 2017-07-27 | End: 2017-07-27 | Stop reason: HOSPADM

## 2017-07-27 RX ORDER — LIDOCAINE HCL/PF 100 MG/5ML
SYRINGE (ML) INTRAVENOUS
Status: DISCONTINUED | OUTPATIENT
Start: 2017-07-27 | End: 2017-07-27

## 2017-07-27 RX ADMIN — PROPOFOL 50 MG: 10 INJECTION, EMULSION INTRAVENOUS at 12:07

## 2017-07-27 RX ADMIN — PROPOFOL 100 MG: 10 INJECTION, EMULSION INTRAVENOUS at 12:07

## 2017-07-27 RX ADMIN — SODIUM CHLORIDE 10 ML/HR: 0.9 INJECTION, SOLUTION INTRAVENOUS at 12:07

## 2017-07-27 RX ADMIN — LIDOCAINE HYDROCHLORIDE 75 MG: 20 INJECTION, SOLUTION INTRAVENOUS at 12:07

## 2017-07-27 RX ADMIN — PROPOFOL 20 MG: 10 INJECTION, EMULSION INTRAVENOUS at 12:07

## 2017-07-27 NOTE — TRANSFER OF CARE
Anesthesia Transfer of Care Note    Patient: Scott Bansal    Procedure(s) Performed: Procedure(s) (LRB):  COLONOSCOPY (N/A)    Patient location: GI    Anesthesia Type: general    Transport from OR: Transported from OR on room air with adequate spontaneous ventilation    Post pain: adequate analgesia    Post assessment: no apparent anesthetic complications    Post vital signs: stable    Level of consciousness: awake, alert and oriented    Nausea/Vomiting: no nausea/vomiting    Complications: none    Transfer of care protocol was followed      Last vitals:   Visit Vitals  BP (!) 140/93 (BP Location: Left arm, Patient Position: Lying, BP Method: Automatic)   Pulse 94   Temp 36.3 °C (97.3 °F) (Oral)   Resp 15   SpO2 96%

## 2017-07-27 NOTE — ANESTHESIA POSTPROCEDURE EVALUATION
Anesthesia Post Evaluation    Patient: Scott Bansal    Procedure(s) Performed: Procedure(s) (LRB):  COLONOSCOPY (N/A)    Final Anesthesia Type: general  Patient location during evaluation: GI PACU  Patient participation: Yes- Able to Participate  Level of consciousness: awake and alert, awake and oriented  Post-procedure vital signs: reviewed and stable  Pain management: adequate  Airway patency: patent  PONV status at discharge: No PONV  Anesthetic complications: no      Cardiovascular status: blood pressure returned to baseline and hemodynamically stable  Respiratory status: unassisted, spontaneous ventilation and room air  Hydration status: euvolemic  Follow-up not needed.        Visit Vitals  /74 (BP Location: Left arm, Patient Position: Lying, BP Method: Automatic)   Pulse 83   Temp 36.8 °C (98.2 °F) (Oral)   Resp 18   SpO2 96%       Pain/Fina Score: Pain Assessment Performed: Yes (7/27/2017  1:28 PM)  Presence of Pain: denies (7/27/2017  1:28 PM)  Fina Score: 10 (7/27/2017  1:28 PM)  Modified Fina Score: 20 (7/27/2017 11:47 AM)

## 2017-07-27 NOTE — ANESTHESIA PREPROCEDURE EVALUATION
07/27/2017  Scott Bansal is a 56 y.o., male.    Anesthesia Evaluation    I have reviewed the Patient Summary Reports.     I have reviewed the Medications.     Review of Systems  Anesthesia Hx:  No problems with previous Anesthesia  History of prior surgery of interest to airway management or planning: cervical fusion.   Social:  Former Smoker, Alcohol Use    Cardiovascular:   Hypertension hyperlipidemia    Pulmonary:   COPD    Renal/:   BPH    Hepatic/GI:   GERD    Musculoskeletal:  Spine Disorders: cervical    Neurological:   Neuromuscular Disease, Headaches    Endocrine:   Diabetes, poorly controlled    Psych:   Psychiatric History          Physical Exam  General:  Well nourished    Airway/Jaw/Neck:  Airway Findings: Mouth Opening: Normal Tongue: Normal  General Airway Assessment: Adult  Mallampati: II  TM Distance: Normal, at least 6 cm  Jaw/Neck Findings:  Neck ROM: Normal ROM      Dental:  Dental Findings: In tact   Chest/Lungs:  Chest/Lungs Findings: Clear to auscultation, Normal Respiratory Rate     Heart/Vascular:  Heart Findings: Rate: Normal        Mental Status:  Mental Status Findings:  Cooperative, Alert and Oriented         Anesthesia Plan  Type of Anesthesia, risks & benefits discussed:  Anesthesia Type:  general  Patient's Preference:   Intra-op Monitoring Plan: standard ASA monitors  Intra-op Monitoring Plan Comments:   Post Op Pain Control Plan: multimodal analgesia, IV/PO Opioids PRN and per primary service following discharge from PACU  Post Op Pain Control Plan Comments:   Induction:   IV  Beta Blocker:  Patient is not currently on a Beta-Blocker (No further documentation required).       Informed Consent: Patient understands risks and agrees with Anesthesia plan.  Questions answered. Anesthesia consent signed with patient.  ASA Score: 3     Day of Surgery Review of History &  Physical:    H&P update referred to the surgeon.         Ready For Surgery From Anesthesia Perspective.

## 2017-07-27 NOTE — OR NURSING
Encouraged to continue passing flatus. Dr Nix discussed findings with patient and family member @ bedside.

## 2017-07-27 NOTE — H&P (VIEW-ONLY)
Office Visit    Patient Name: Scott Bansal    : 1960  MRN: [id number]    Subjective:  Scott is a 56 y.o. male who presents today for:    Diabetes (nerve pain)      This patient has multiple medical diagnoses as noted below.  This patient is known to me and to this clinic. He reports that he gets sharp pain in his feet.  He is concerned about weight loss.  He wanted to get on contrave.  We discussed that this medication would interfere with his medication.  Patient denies any new symptoms including chest pain, SOB, blurry vision, N/V, diarrhea.  He has a history of double pneumonia.  He would like to get his vaccination today.  He does report increased headache pain.  He would like an injection today.        Active Problem List  Patient Active Problem List   Diagnosis    History of fusion of cervical spine    Type 2 diabetes mellitus without complication    Hyperlipidemia    Essential hypertension    Chronic tension-type headache, not intractable    Bipolar 1 disorder    BPH (benign prostatic hyperplasia)    Gastroesophageal reflux disease without esophagitis    Sciatica of right side    History of pneumonia    COPD (chronic obstructive pulmonary disease)    Former smoker    Lumbar compression fracture       Past Surgical History  Past Surgical History:   Procedure Laterality Date    CERVICAL SPINE SURGERY      SHOULDER SURGERY      TONSILLECTOMY         Family History  History reviewed. No pertinent family history.    Social History  Social History     Social History    Marital status:      Spouse name: N/A    Number of children: N/A    Years of education: N/A     Occupational History    Not on file.     Social History Main Topics    Smoking status: Former Smoker    Smokeless tobacco: Not on file    Alcohol use Yes    Drug use: No    Sexual activity: Yes     Partners: Female     Other Topics Concern    Not on file     Social History Narrative    No narrative on file  "      Current Medications  Medications reviewed and updated.     Allergies   Review of patient's allergies indicates:   Allergen Reactions    Sulfa (sulfonamide antibiotics) Rash       Review of Systems (Pertinent positives)  Review of Systems   Constitutional: Negative for activity change, appetite change, fatigue, fever and unexpected weight change.   HENT: Negative for facial swelling.    Eyes: Negative for visual disturbance.   Respiratory: Negative for chest tightness, shortness of breath, wheezing and stridor.    Cardiovascular: Negative for chest pain, palpitations and leg swelling.   Gastrointestinal: Negative for abdominal distention, abdominal pain, blood in stool, constipation, diarrhea, nausea and vomiting.   Endocrine: Negative for cold intolerance, heat intolerance, polydipsia and polyuria.   Genitourinary: Negative.  Negative for testicular pain and urgency.   Musculoskeletal:        Sharp pain in the feet    Skin: Negative.    Allergic/Immunologic: Negative.    Neurological: Negative for dizziness, weakness, light-headedness, numbness and headaches.   Psychiatric/Behavioral: Negative for agitation and decreased concentration.       /82 (BP Location: Left arm, Patient Position: Sitting, BP Method: Manual)   Pulse 103   Temp 97.5 °F (36.4 °C) (Oral)   Ht 5' 9" (1.753 m)   Wt 115.7 kg (255 lb)   SpO2 98%   BMI 37.66 kg/m²     Physical Exam   Constitutional: He is oriented to person, place, and time. He appears well-developed and well-nourished.   HENT:   Head: Normocephalic and atraumatic.   Right Ear: External ear normal.   Left Ear: External ear normal.   Nose: Nose normal.   Mouth/Throat: Oropharynx is clear and moist.   Eyes: Conjunctivae and EOM are normal. Pupils are equal, round, and reactive to light.   Neck: Normal range of motion.   Cardiovascular: Normal rate, regular rhythm and normal heart sounds.    Pulmonary/Chest: Effort normal and breath sounds normal.   Neurological: He is " alert and oriented to person, place, and time.   Skin: Skin is warm and dry.   Psychiatric: He has a normal mood and affect. His behavior is normal.       Protective Sensation (w/ 10 gram monofilament):  Right: Intact  Left: Intact    Visual Inspection:  Normal -  Bilateral    Pedal Pulses:   Right: Diminshed  Left: Diminshed    Posterior tibialis:   Right:Diminshed  Left: Diminshed      Health Maintenance  Health Maintenance Topics with due status: Not Due       Topic Last Completion Date    Lipid Panel 11/04/2016    Influenza Vaccine 12/05/2016       Assessment/Plan:  Scott Bansal is a 56 y.o. male who presents today for :    Scott was seen today for diabetes.    Diagnoses and all orders for this visit:    Diabetic polyneuropathy associated with type 2 diabetes mellitus  -     Hemoglobin A1c; Future  -     gabapentin (NEURONTIN) 300 MG capsule; Take 1 capsule (300 mg total) by mouth 2 (two) times daily.  -    I have discussed the common side effects of this medication with the patient and answered all of the questions they had at the time of this visit regarding this medication.  -  RTC if symptoms worsen or persist.    Chronic tension-type headache, not intractable  -     ketorolac injection 30 mg; Inject 30 mg into the muscle once.    Type 2 diabetes mellitus without complication, without long-term current use of insulin  -     Hemoglobin A1c; Future  -   Pt is currently stable on medication regimen.  Continue current therapy as scheduled.  Contact office with any questions about adjustments on medications.   -  CBC and CMP   Need for Tdap vaccination  -     (In Office Administered) Tdap Vaccine    Colon cancer screening  -     Case request GI: COLONOSCOPY    History of pneumonia  -  No recent infections.      Need for 23-polyvalent pneumococcal polysaccharide vaccine  -     (In Office Administered) Pneumococcal Polysaccharide Vaccine (23 Valent) (SQ/IM)        Return in about 6 months (around 1/7/2018).

## 2017-08-07 ENCOUNTER — TELEPHONE (OUTPATIENT)
Dept: FAMILY MEDICINE | Facility: CLINIC | Age: 57
End: 2017-08-07

## 2017-08-07 RX ORDER — CYCLOBENZAPRINE HCL 10 MG
TABLET ORAL
Qty: 90 TABLET | Refills: 0 | Status: SHIPPED | OUTPATIENT
Start: 2017-08-07 | End: 2017-09-01 | Stop reason: SDUPTHER

## 2017-08-07 NOTE — TELEPHONE ENCOUNTER
----- Message from Monique Lombardi sent at 8/7/2017 11:05 AM CDT -----  Contact: Self  Pt called regarding colonoscopy results. Pt can be reached @ [phone]

## 2017-08-12 ENCOUNTER — OFFICE VISIT (OUTPATIENT)
Dept: FAMILY MEDICINE | Facility: CLINIC | Age: 57
End: 2017-08-12
Payer: MEDICARE

## 2017-08-12 VITALS
TEMPERATURE: 98 F | HEART RATE: 110 BPM | RESPIRATION RATE: 17 BRPM | SYSTOLIC BLOOD PRESSURE: 136 MMHG | DIASTOLIC BLOOD PRESSURE: 78 MMHG | BODY MASS INDEX: 38.03 KG/M2 | OXYGEN SATURATION: 97 % | HEIGHT: 69 IN | WEIGHT: 256.75 LBS

## 2017-08-12 DIAGNOSIS — L08.9 BACTERIAL SKIN INFECTION: Primary | ICD-10-CM

## 2017-08-12 DIAGNOSIS — B96.89 BACTERIAL SKIN INFECTION: Primary | ICD-10-CM

## 2017-08-12 PROCEDURE — 99999 PR PBB SHADOW E&M-EST. PATIENT-LVL V: CPT | Mod: PBBFAC,,,

## 2017-08-12 PROCEDURE — 99213 OFFICE O/P EST LOW 20 MIN: CPT | Mod: 25,S$GLB,, | Performed by: NURSE PRACTITIONER

## 2017-08-12 PROCEDURE — 3075F SYST BP GE 130 - 139MM HG: CPT | Mod: S$GLB,,, | Performed by: NURSE PRACTITIONER

## 2017-08-12 PROCEDURE — 3008F BODY MASS INDEX DOCD: CPT | Mod: S$GLB,,, | Performed by: NURSE PRACTITIONER

## 2017-08-12 PROCEDURE — 3078F DIAST BP <80 MM HG: CPT | Mod: S$GLB,,, | Performed by: NURSE PRACTITIONER

## 2017-08-12 PROCEDURE — 96372 THER/PROPH/DIAG INJ SC/IM: CPT | Mod: S$GLB,,, | Performed by: NURSE PRACTITIONER

## 2017-08-12 RX ORDER — KETOROLAC TROMETHAMINE 30 MG/ML
30 INJECTION, SOLUTION INTRAMUSCULAR; INTRAVENOUS
Status: DISCONTINUED | OUTPATIENT
Start: 2017-08-12 | End: 2017-08-12

## 2017-08-12 RX ORDER — MUPIROCIN 20 MG/G
OINTMENT TOPICAL 3 TIMES DAILY
Qty: 30 G | Refills: 0 | Status: SHIPPED | OUTPATIENT
Start: 2017-08-12 | End: 2019-05-14

## 2017-08-12 RX ORDER — CLINDAMYCIN HYDROCHLORIDE 300 MG/1
300 CAPSULE ORAL EVERY 6 HOURS
Qty: 40 CAPSULE | Refills: 0 | Status: SHIPPED | OUTPATIENT
Start: 2017-08-12 | End: 2017-08-22

## 2017-08-12 RX ORDER — HYDROCODONE BITARTRATE AND ACETAMINOPHEN 5; 325 MG/1; MG/1
1 TABLET ORAL
Refills: 0 | COMMUNITY
Start: 2017-07-30 | End: 2017-08-12

## 2017-08-12 RX ORDER — KETOROLAC TROMETHAMINE 30 MG/ML
30 INJECTION, SOLUTION INTRAMUSCULAR; INTRAVENOUS
Status: COMPLETED | OUTPATIENT
Start: 2017-08-12 | End: 2017-08-12

## 2017-08-12 RX ORDER — PREDNISONE 10 MG/1
TABLET ORAL
Refills: 0 | COMMUNITY
Start: 2017-07-29 | End: 2017-08-12

## 2017-08-12 RX ADMIN — KETOROLAC TROMETHAMINE 30 MG: 30 INJECTION, SOLUTION INTRAMUSCULAR; INTRAVENOUS at 02:08

## 2017-08-12 NOTE — PROGRESS NOTES
Subjective:       Patient ID: Scott Bansal is a 56 y.o. male who presents to urgent care with complaints of an infection on his chest that began to get red 2-3 days ago, he shaves his chest and he developed one area a few days ago that looked like a pimple, he picked at it and now has 2 spots that are red and hard, also tells  Me he has a hx of several boils on his body.  Also complaining of migrane headache that he mentioned at the end of the visit, requesting toradol injection       Chief Complaint: Recurrent Skin Infections (chest)    HPI  Review of Systems   Constitutional: Negative for activity change, appetite change, chills, diaphoresis, fatigue and fever.   Respiratory: Negative for apnea, cough, choking, chest tightness and shortness of breath.    Genitourinary: Negative for difficulty urinating, dysuria and enuresis.   Skin: Positive for wound. Negative for color change, pallor and rash.   Neurological: Positive for headaches. Negative for dizziness, seizures, facial asymmetry, light-headedness and numbness.   Psychiatric/Behavioral: Negative for agitation, behavioral problems, confusion, decreased concentration, dysphoric mood and hallucinations.       Objective:      Physical Exam   Constitutional: He is oriented to person, place, and time. He appears well-developed and well-nourished. No distress.   Cardiovascular: Normal rate, regular rhythm and normal heart sounds.    Pulmonary/Chest: Effort normal and breath sounds normal. No respiratory distress. He has no wheezes.   Musculoskeletal: Normal range of motion. He exhibits no edema or deformity.   Neurological: He is alert and oriented to person, place, and time. He has normal reflexes. He displays normal reflexes. No cranial nerve deficit. He exhibits normal muscle tone. Coordination normal.   Skin: Skin is warm and dry. No rash noted.   Middle of chest 2 area of injected ingrown hair follicles with surrounding erythema and induration, do es not  appear to be drainable   Psychiatric: He has a normal mood and affect. His behavior is normal. Judgment and thought content normal.   Nursing note and vitals reviewed.      Assessment:         Plan:       Skin infection, poss MRSA    Education  Pt has been given instructions populated from [facility] database and those entered into patient instructions field and has verbalized understanding of the after visit summary and information contained wherein.    Follow Up  If no better 2-3 days fu with pcp.    In Case of Emergency   May go to ER for acute shortness of breath, lightheadedness, fever, or any other emergent complaints or changes in condition.

## 2017-08-12 NOTE — PATIENT INSTRUCTIONS
Staph Infection (non-MRSA)  Staphylococcus aureus bacteria are often called staph. They are common germs that can cause a variety of problems. These range from mild skin infections to severe infections of your skin, deep tissues, lungs, bones, and blood. Staph are normally carried in your nose and on your skin by many healthy adults. Typically, they do not cause disease. But if your skin is broken or opened, staph can enter your body and cause infection. Staph infections are often easily treated with antibiotics. However, it is becoming more common to see bacteria that are resistant to antibiotics, or hard to kill with them. This sheet tells you more about staph infections and what you can do to avoid them.  How Does Staph Spread?     Because staph is carried in the nose, skin infections often occur near the nose or mouth or both.   Staph spreads through direct contact with an infected person through skin-to-skin contact. It also spreads through contact with contaminated objects, such as shared towels or athletic equipment.  What Are the Risk Factors for a Staph Infection?  Anyone can get a staph infection. Certain risk factors make it more likely, including:  · Living or having close contact with someone who has staph  · Having an open wound or sore  · Playing contact sports or sharing towels or athletic equipment  · A current or recent stay in a hospital or long-term care facility  · A recent operation or wound treatment  · Having a feeding tube or catheter (a tube placed in your body)  · Receiving kidney dialysis  · Having a weakened immune system or serious illness  · Injecting illegal drugs  What Conditions Can Be Caused by a Staph Infection?  Staph infections usually start in your skin. They sometimes appear as small red bumps that look like pimples or spider bites. These sores can turn into abscesses (pus-filled areas of infection). Staph infections can also spread deeper into your body, causing one or  more of the following:  · Infections in bones (osteomyelitis), muscles, and other tissues  · Pneumonia (a serious lung infection)  · Infection in a wound from an operation  · Bacteremia (infection in the bloodstream)  · Endocarditis (infection of the lining of your heart and your heart valves)  · Infection of your urinary tract (bladder and kidneys)  · Toxic shock syndrome (an illness caused by staph and the toxins it produces)  · Scalded skin syndrome (a staph skin infection causing blisters and raw skin)  How Is a Staph Infection Diagnosed?  Minor skin infections are often diagnosed based on their appearance. With a more serious infection, testing may be done. Often, a sample of blood or urine is taken. A sample of drainage from a wound, sputum (mucus from the respiratory system), or infected tissue can also be used. The sample is sent to a lab and tested for staph.  How Is a Staph Infection Treated?  A minor skin infection is typically treated with an antibiotic that a health care provider prescribes. It may be an oral medication or an ointment placed on your skin. For a more severe infection, a powerful antibiotic may be given through your vein (intravenously). If an abscess is present, it may be drained.  Preventing Staph Infections: What You Can Do  To reduce the spread of staph infections, keep cuts and scrapes clean and covered until they heal. Avoid contact with the wounds or bandages of others. Avoid sharing personal items such as towels, razors, clothing, and athletic equipment. And be sure to keep your hands clean. Your best option is washing your hands with warm water and soap. If thats not possible, or if your hands arent visibly dirty, use a hand gel that contains at least 60 percent alcohol.   · Tips for good handwashing:  ¨ Use warm water and plenty of soap. Work up a good lather.  ¨ Clean your whole hand, under your nails, between your fingers, and up your wrists.  ¨ Wash for at least 15 to  30 seconds. Dont just wipe. Scrub well.  ¨ Rinse, letting the water run down your fingers, not up your wrists.  ¨ Dry your hands well. Use a paper towel to turn off the faucet and open the door.  · Using alcohol-based hand gels:  ¨ Use enough gel to get your hands completely wet.  ¨ Rub your hands together briskly. Be sure to clean the backs of your hands, the palms, between your fingers, and up your wrists.  ¨ Rub until the gel is gone and your hands are completely dry.  Taking Antibiotics Correctly  You may have heard of MRSA (methicillin-resistant Staphylococcus aureus). This is a type of staph bacteria that are resistant, or hard-to-kill. This means the bacteria cannot be treated with many antibiotics (such as methicillin) that work on other types of staph. Resistant bacteria develop when antibiotics are not taken properly. This includes when they are taken longer than necessary, not long enough, or when theyre not needed. This is why your health care provider may not want to prescribe antibiotics unless he or she is certain they are needed. Its also why any time you are prescribed antibiotics, you must take them exactly as your health care provider tells you. This means not skipping doses, and taking the medication until its finished, even if youre feeling better.   Date Last Reviewed: 6/19/2014  © 9671-0968 [employer]. [address] [place] [address], [place] [address]. All rights reserved. This information is not intended as a substitute for professional medical care. Always follow your healthcare professional's instructions.

## 2017-08-21 DIAGNOSIS — R51.9 NONINTRACTABLE EPISODIC HEADACHE, UNSPECIFIED HEADACHE TYPE: ICD-10-CM

## 2017-08-21 RX ORDER — BUTALBITAL, ACETAMINOPHEN AND CAFFEINE 50; 325; 40 MG/1; MG/1; MG/1
TABLET ORAL
Qty: 30 TABLET | Refills: 0 | Status: SHIPPED | OUTPATIENT
Start: 2017-08-21 | End: 2017-09-20 | Stop reason: SDUPTHER

## 2017-08-21 NOTE — TELEPHONE ENCOUNTER
----- Message from Haylee Vázquez sent at 8/21/2017  8:58 AM CDT -----  Contact: self  Requesting an approval Fioricet.   [facility] [place] on [address].  [phone].

## 2017-08-24 DIAGNOSIS — E78.5 HYPERLIPIDEMIA, UNSPECIFIED HYPERLIPIDEMIA TYPE: ICD-10-CM

## 2017-08-24 RX ORDER — GEMFIBROZIL 600 MG/1
TABLET, FILM COATED ORAL
Qty: 120 TABLET | Refills: 0 | Status: SHIPPED | OUTPATIENT
Start: 2017-08-24 | End: 2017-09-11 | Stop reason: SDUPTHER

## 2017-09-01 RX ORDER — CYCLOBENZAPRINE HCL 10 MG
TABLET ORAL
Qty: 90 TABLET | Refills: 0 | Status: SHIPPED | OUTPATIENT
Start: 2017-09-01 | End: 2017-09-27 | Stop reason: SDUPTHER

## 2017-09-01 NOTE — TELEPHONE ENCOUNTER
----- Message from Wendy Onofre sent at 9/1/2017  9:35 AM CDT -----  Contact: self  Pt is requesting a refill for cyclobenzaprine (FLEXERIL) 10 MG tablet. [phone]

## 2017-09-11 ENCOUNTER — OFFICE VISIT (OUTPATIENT)
Dept: FAMILY MEDICINE | Facility: CLINIC | Age: 57
End: 2017-09-11
Payer: MEDICARE

## 2017-09-11 VITALS
WEIGHT: 257.94 LBS | OXYGEN SATURATION: 96 % | TEMPERATURE: 98 F | BODY MASS INDEX: 38.2 KG/M2 | HEART RATE: 108 BPM | HEIGHT: 69 IN | SYSTOLIC BLOOD PRESSURE: 128 MMHG | DIASTOLIC BLOOD PRESSURE: 80 MMHG

## 2017-09-11 DIAGNOSIS — M54.6 ACUTE BILATERAL THORACIC BACK PAIN: ICD-10-CM

## 2017-09-11 DIAGNOSIS — K12.0 APHTHOUS ULCER OF MOUTH: ICD-10-CM

## 2017-09-11 DIAGNOSIS — M54.50 ACUTE BILATERAL LOW BACK PAIN WITHOUT SCIATICA: Primary | ICD-10-CM

## 2017-09-11 PROCEDURE — 3074F SYST BP LT 130 MM HG: CPT | Mod: S$GLB,,, | Performed by: NURSE PRACTITIONER

## 2017-09-11 PROCEDURE — 99999 PR PBB SHADOW E&M-EST. PATIENT-LVL V: CPT | Mod: PBBFAC,,, | Performed by: NURSE PRACTITIONER

## 2017-09-11 PROCEDURE — 3008F BODY MASS INDEX DOCD: CPT | Mod: S$GLB,,, | Performed by: NURSE PRACTITIONER

## 2017-09-11 PROCEDURE — 3079F DIAST BP 80-89 MM HG: CPT | Mod: S$GLB,,, | Performed by: NURSE PRACTITIONER

## 2017-09-11 PROCEDURE — 96372 THER/PROPH/DIAG INJ SC/IM: CPT | Mod: S$GLB,,, | Performed by: NURSE PRACTITIONER

## 2017-09-11 PROCEDURE — 99214 OFFICE O/P EST MOD 30 MIN: CPT | Mod: 25,S$GLB,, | Performed by: NURSE PRACTITIONER

## 2017-09-11 RX ORDER — NEOMYCIN SULFATE, POLYMYXIN B SULFATE, AND DEXAMETHASONE 3.5; 10000; 1 MG/G; [USP'U]/G; MG/G
OINTMENT OPHTHALMIC
Refills: 0 | COMMUNITY
Start: 2017-08-31 | End: 2017-10-09

## 2017-09-11 RX ORDER — KETOROLAC TROMETHAMINE 30 MG/ML
60 INJECTION, SOLUTION INTRAMUSCULAR; INTRAVENOUS
Status: COMPLETED | OUTPATIENT
Start: 2017-09-11 | End: 2017-09-11

## 2017-09-11 RX ORDER — SILVER NITRATE 38.21; 12.74 MG/1; MG/1
1 STICK TOPICAL
Status: DISCONTINUED | OUTPATIENT
Start: 2017-09-11 | End: 2020-02-10 | Stop reason: HOSPADM

## 2017-09-11 RX ORDER — DEXAMETHASONE SODIUM PHOSPHATE 4 MG/ML
8 INJECTION, SOLUTION INTRA-ARTICULAR; INTRALESIONAL; INTRAMUSCULAR; INTRAVENOUS; SOFT TISSUE
Status: COMPLETED | OUTPATIENT
Start: 2017-09-11 | End: 2017-09-11

## 2017-09-11 RX ADMIN — KETOROLAC TROMETHAMINE 60 MG: 30 INJECTION, SOLUTION INTRAMUSCULAR; INTRAVENOUS at 01:09

## 2017-09-11 RX ADMIN — DEXAMETHASONE SODIUM PHOSPHATE 8 MG: 4 INJECTION, SOLUTION INTRA-ARTICULAR; INTRALESIONAL; INTRAMUSCULAR; INTRAVENOUS; SOFT TISSUE at 01:09

## 2017-09-11 NOTE — PATIENT INSTRUCTIONS
Back Pain (Acute or Chronic)    Back pain is one of the most common problems. The good news is that most people feel better in 1 to 2 weeks, and most of the rest in 1 to 2 months. Most people can remain active.  People experience and describe pain differently; not everyone is the same.  · The pain can be sharp, stabbing, shooting, aching, cramping or burning.  · Movement, standing, bending, lifting, sitting, or walking may worsen pain.  · It can be localized to one spot or area, or it can be more generalized.  · It can spread or radiate upwards, to the front, or go down your arms or legs (sciatica).  · It can cause muscle spasm.  Most of the time, mechanical problems with the muscles or spine cause the pain. Mechanical problems are usually caused by an injury to the muscles or ligaments. While illness can cause back pain, it is usually not caused by a serious illness. Mechanical problems include:   · Physical activity such as sports, exercise, work, or normal activity  · Overexertion, lifting, pushing, pulling incorrectly or too aggressively  · Sudden twisting, bending, or stretching from an accident, or accidental movement  · Poor posture  · Stretching or moving wrong, without noticing pain at the time  · Poor coordination, lack of regular exercise (check with your doctor about this)  · Spinal disc disease or arthritis  · Stress  Pain can also be related to pregnancy, or illness like appendicitis, bladder or kidney infections, pelvic infections, and many other things.  Acute back pain usually gets better in 1 to 2 weeks. Back pain related to disk disease, arthritis in the spinal joints or spinal stenosis (narrowing of the spinal canal) can become chronic and last for months or years.  Unless you had a physical injury (for example, a car accident or fall) X-rays are usually not needed for the initial evaluation of back pain. If pain continues and does not respond to medical treatment, X-rays and other tests may be  needed.  Home care  Try these home care recommendations:  · When in bed, try to find a position of comfort. A firm mattress is best. Try lying flat on your back with pillows under your knees. You can also try lying on your side with your knees bent up towards your chest and a pillow between your knees.  · At first, do not try to stretch out the sore spots. If there is a strain, it is not like the good soreness you get after exercising without an injury. In this case, stretching may make it worse.  · Avoid prolong sitting, long car rides, or travel. This puts more stress on the lower back than standing or walking.  · During the first 24 to 72 hours after an acute injury or flare up of chronic back pain, apply an ice pack to the painful area for 20 minutes and then remove it for 20 minutes. Do this over a period of 60 to 90 minutes or several times a day. This will reduce swelling and pain. Wrap the ice pack in a thin towel or plastic to protect your skin.  · You can start with ice, then switch to heat. Heat (hot shower, hot bath, or heating pad) reduces pain and works well for muscle spasms. Heat can be applied to the painful area for 20 minutes then remove it for 20 minutes. Do this over a period of 60 to 90 minutes or several times a day. Do not sleep on a heating pad. It can lead to skin burns or tissue damage.  · You can alternate ice and heat therapy. Talk with your doctor about the best treatment for your back pain.  · Therapeutic massage can help relax the back muscles without stretching them.  · Be aware of safe lifting methods and do not lift anything without stretching first.  Medicines  Talk to your doctor before using medicine, especially if you have other medical problems or are taking other medicines.  · You may use over-the-counter medicine as directed on the bottle to control pain, unless another pain medicine was prescribed. If you have chronic conditions like diabetes, liver or kidney disease,  stomach ulcers, or gastrointestinal bleeding, or are taking blood thinners, talk to your doctor before taking any medicine.  · Be careful if you are given a prescription medicines, narcotics, or medicine for muscle spasms. They can cause drowsiness, affect your coordination, reflexes, and judgement. Do not drive or operate heavy machinery.  Follow-up care  Follow up with your healthcare provider, or as advised.   A radiologist will review any X-rays that were taken. Your provide will notify you of any new findings that may affect your care.  Call 911  Call emergency services if any of the following occur:  · Trouble breathing  · Confusion  · Very drowsy or trouble awakening  · Fainting or loss of consciousness  · Rapid or very slow heart rate  · Loss of bowel or bladder control  When to seek medical advice  Call your healthcare provider right away if any of these occur:   · Pain becomes worse or spreads to your legs  · Weakness or numbness in one or both legs  · Numbness in the groin or genital area  Date Last Reviewed: 7/1/2016 © 2000-2017 [employer]. [address] [place] [address]. All rights reserved. This information is not intended as a substitute for professional medical care. Always follow your healthcare professional's instructions.        Self-Care for Low Back Pain    Most people have low back pain now and then. In many cases, it isnt serious and self-care can help. Sometimes low back pain can be a sign of a bigger problem. Call your healthcare provider if your pain returns often or gets worse over time. For the long-term care of your back, get regular exercise, lose any excess weight and learn good posture.  Take a short rest  Lying down during the day may be beneficial for short periods of time if severe pain increases with sitting or standing. Long-term bed rest could be detrimental.  Reduce pain and swelling  Cold reduces swelling. Both cold and heat can reduce pain. Protect  your skin by placing a towel between your body and the ice or heat source.  · For the first few days, apply an ice pack for 15 to 20 minutes .  · After the first few days, try heat for 15 minutes at a time to ease pain. Never sleep on a heating pad.  · Over-the-counter medicine can help control pain and swelling. Try aspirin or ibuprofen.  Exercise  Exercise can help your back heal. It also helps your back get stronger and more flexible, preventing any reinjury. Ask your healthcare provider about specific exercises for your back.  Use good posture to avoid reinjury  · When moving, bend at the hips and knees. Dont bend at the waist or twist around.  · When lifting, keep the object close to your body. Dont try to lift more than you can handle.  · When sitting, keep your lower back supported. Use a rolled-up towel as needed.  Seek immediate medical care if:  · Youre unable to stand or walk.  · You have a temperature over 100.4°F (38.0°C)  · You have frequent, painful, or bloody urination.  · You have severe abdominal pain.  · You have a sharp, stabbing pain.  · Your pain is constant.  · You have pain or numbness in your leg.  · You feel pain in a new area of your back.  · You notice that the pain isnt decreasing after more than a week.   Date Last Reviewed: 9/29/2015 © 2000-2017 [employer]. [address] [place] [address]. All rights reserved. This information is not intended as a substitute for professional medical care. Always follow your healthcare professional's instructions.        Back Exercises: Lower Back Rotation    To start, lie on your back with your knees bent and feet flat on the floor. Dont press your neck or lower back to the floor. Breathe deeply. You should feel comfortable and relaxed in this position.  · Drop both knees to one side. Turn your head to the other side. Keep your shoulders flat on the floor.  · Do not push through pain.  · Hold for 20 seconds.  · Slowly  switch sides.  · Repeat 2 to 5 times.  Date Last Reviewed: 10/11/2015  © 8821-4836 [employer]. [address] [place] [address]. All rights reserved. This information is not intended as a substitute for professional medical care. Always follow your healthcare professional's instructions.        Back Exercises: Lower Back Stretch    To start, sit in a chair with your feet flat on the floor. Shift your weight slightly forward. Relax, and keep your ears, shoulders, and hips aligned.  · Sit with your feet well apart.  · Bend forward and touch the floor with the backs of your hands. Relax and let your body drop.  · Hold for 20 seconds. Return to starting position.  · Repeat 2 times.   Date Last Reviewed: 8/16/2015  © 4669-9316 [employer]. [address] [place] [address]. All rights reserved. This information is not intended as a substitute for professional medical care. Always follow your healthcare professional's instructions.

## 2017-09-11 NOTE — PROGRESS NOTES
"Subjective:       Patient ID: Scott Bansal is a 56 y.o. male.    Chief Complaint: Back Pain    Back Pain   This is a new problem. The current episode started in the past 7 days (four days ago). The problem occurs constantly. The problem has been gradually worsening since onset. The pain is present in the thoracic spine and lumbar spine. The quality of the pain is described as aching. The pain does not radiate. The pain is at a severity of 9/10. The pain is moderate. The symptoms are aggravated by bending and twisting. He has tried heat and NSAIDs for the symptoms.       Past Medical History:   Diagnosis Date    Bipolar 1 disorder, mixed     BPH (benign prostatic hypertrophy)     Diabetes mellitus     GERD (gastroesophageal reflux disease)     Hyperlipidemia     Hypertension     Migraine headache        Social History     Social History    Marital status:      Spouse name: N/A    Number of children: N/A    Years of education: N/A     Occupational History    Not on file.     Social History Main Topics    Smoking status: Former Smoker    Smokeless tobacco: Never Used    Alcohol use Yes    Drug use: No    Sexual activity: Yes     Partners: Female     Other Topics Concern    Not on file     Social History Narrative    No narrative on file       Past Surgical History:   Procedure Laterality Date    CERVICAL SPINE SURGERY      COLONOSCOPY N/A 7/27/2017    Procedure: COLONOSCOPY;  Surgeon: Genaro Nix MD;  Location: [facility];  Service: Endoscopy;  Laterality: N/A;    SHOULDER SURGERY      TONSILLECTOMY         Review of Systems   Musculoskeletal: Positive for back pain.   All other systems reviewed and are negative.      Objective:   /80 (BP Location: Left arm, Patient Position: Sitting, BP Method: Large (Manual))   Pulse 108   Temp 97.8 °F (36.6 °C) (Oral)   Ht 5' 9" (1.753 m)   Wt 117 kg (257 lb 15 oz)   SpO2 96%   BMI 38.09 kg/m²      Physical Exam   Constitutional: " He is oriented to person, place, and time. He appears well-developed and well-nourished.   HENT:   Head: Normocephalic and atraumatic.   Mouth/Throat: Oral lesions present.   Cardiovascular: Normal rate, regular rhythm and normal heart sounds.    Pulmonary/Chest: Effort normal and breath sounds normal. No respiratory distress. He has no decreased breath sounds.   Musculoskeletal:        Thoracic back: He exhibits pain and spasm.        Lumbar back: He exhibits pain and spasm.   Neurological: He is alert and oriented to person, place, and time.   Skin: He is not diaphoretic. No pallor.   Psychiatric: He has a normal mood and affect. His speech is normal and behavior is normal.       Assessment:       1. Acute bilateral low back pain without sciatica    2. Acute bilateral thoracic back pain    3. Aphthous ulcer of mouth        Plan:       Scott was seen today for back pain.    Diagnoses and all orders for this visit:    Acute bilateral low back pain without sciatica  -     dexamethasone injection 8 mg; Inject 2 mLs (8 mg total) into the muscle one time.  -     ketorolac injection 60 mg; Inject 2 mLs (60 mg total) into the muscle one time.    Acute bilateral thoracic back pain  -     dexamethasone injection 8 mg; Inject 2 mLs (8 mg total) into the muscle one time.  -     ketorolac injection 60 mg; Inject 2 mLs (60 mg total) into the muscle one time.    Aphthous ulcer of mouth  -     silver nitrate applicators applicator 1 applicator; Apply 1 applicator topically one time.  -     Encouraged to get Kanka OTC      He will continue flexeril and a antiinflammatory. Provided him with home care instructions on back pain and back stretches. He verbalized understanding. He is also encouraged to apply heat and ice to the area.   Return if symptoms worsen or fail to improve.

## 2017-09-20 DIAGNOSIS — R51.9 NONINTRACTABLE EPISODIC HEADACHE, UNSPECIFIED HEADACHE TYPE: ICD-10-CM

## 2017-09-20 RX ORDER — BUTALBITAL, ACETAMINOPHEN AND CAFFEINE 50; 325; 40 MG/1; MG/1; MG/1
TABLET ORAL
Qty: 30 TABLET | Refills: 0 | Status: SHIPPED | OUTPATIENT
Start: 2017-09-20 | End: 2017-10-30 | Stop reason: SDUPTHER

## 2017-09-27 RX ORDER — CYCLOBENZAPRINE HCL 10 MG
TABLET ORAL
Qty: 90 TABLET | Refills: 0 | Status: SHIPPED | OUTPATIENT
Start: 2017-09-27 | End: 2017-10-09

## 2017-09-29 DIAGNOSIS — E11.42 DIABETIC POLYNEUROPATHY ASSOCIATED WITH TYPE 2 DIABETES MELLITUS: ICD-10-CM

## 2017-10-02 RX ORDER — GABAPENTIN 300 MG/1
300 CAPSULE ORAL 2 TIMES DAILY
Qty: 180 CAPSULE | Refills: 3 | Status: SHIPPED | OUTPATIENT
Start: 2017-10-02 | End: 2017-10-09 | Stop reason: SDUPTHER

## 2017-10-06 DIAGNOSIS — E11.9 DIABETES MELLITUS WITHOUT COMPLICATION: ICD-10-CM

## 2017-10-06 NOTE — TELEPHONE ENCOUNTER
----- Message from Ally Gunter sent at 10/6/2017  2:54 PM CDT -----  Contact: SELF  REFILL: metformin (GLUCOPHAGE) 1000 MG tablet    PLEASE SEND TO [facility]

## 2017-10-08 DIAGNOSIS — N40.0 BENIGN PROSTATIC HYPERPLASIA: ICD-10-CM

## 2017-10-08 RX ORDER — METFORMIN HYDROCHLORIDE 1000 MG/1
TABLET ORAL
Qty: 180 TABLET | Refills: 0 | Status: SHIPPED | OUTPATIENT
Start: 2017-10-08 | End: 2018-01-04 | Stop reason: SDUPTHER

## 2017-10-09 ENCOUNTER — OFFICE VISIT (OUTPATIENT)
Dept: FAMILY MEDICINE | Facility: CLINIC | Age: 57
End: 2017-10-09
Payer: MEDICARE

## 2017-10-09 VITALS
WEIGHT: 253.75 LBS | BODY MASS INDEX: 37.58 KG/M2 | OXYGEN SATURATION: 96 % | HEIGHT: 69 IN | TEMPERATURE: 98 F | HEART RATE: 104 BPM | SYSTOLIC BLOOD PRESSURE: 110 MMHG | DIASTOLIC BLOOD PRESSURE: 80 MMHG

## 2017-10-09 DIAGNOSIS — E11.9 TYPE 2 DIABETES MELLITUS WITHOUT COMPLICATION, WITHOUT LONG-TERM CURRENT USE OF INSULIN: Primary | ICD-10-CM

## 2017-10-09 DIAGNOSIS — E11.42 DIABETIC POLYNEUROPATHY ASSOCIATED WITH TYPE 2 DIABETES MELLITUS: ICD-10-CM

## 2017-10-09 DIAGNOSIS — I10 ESSENTIAL HYPERTENSION: ICD-10-CM

## 2017-10-09 DIAGNOSIS — Z98.1 HISTORY OF FUSION OF CERVICAL SPINE: ICD-10-CM

## 2017-10-09 DIAGNOSIS — G44.229 CHRONIC TENSION-TYPE HEADACHE, NOT INTRACTABLE: ICD-10-CM

## 2017-10-09 PROCEDURE — 99999 PR PBB SHADOW E&M-EST. PATIENT-LVL III: CPT | Mod: PBBFAC,,, | Performed by: FAMILY MEDICINE

## 2017-10-09 PROCEDURE — 99499 UNLISTED E&M SERVICE: CPT | Mod: S$PBB,,, | Performed by: FAMILY MEDICINE

## 2017-10-09 PROCEDURE — 99214 OFFICE O/P EST MOD 30 MIN: CPT | Mod: S$GLB,,, | Performed by: FAMILY MEDICINE

## 2017-10-09 RX ORDER — KETOROLAC TROMETHAMINE 30 MG/ML
60 INJECTION, SOLUTION INTRAMUSCULAR; INTRAVENOUS ONCE
Status: DISCONTINUED | OUTPATIENT
Start: 2017-10-09 | End: 2017-10-09

## 2017-10-09 RX ORDER — SUMATRIPTAN 6 MG/.5ML
6 INJECTION, SOLUTION SUBCUTANEOUS
Status: COMPLETED | OUTPATIENT
Start: 2017-10-09 | End: 2017-10-09

## 2017-10-09 RX ORDER — GABAPENTIN 300 MG/1
300 CAPSULE ORAL 2 TIMES DAILY
Qty: 180 CAPSULE | Refills: 3 | Status: SHIPPED | OUTPATIENT
Start: 2017-10-09 | End: 2017-10-09 | Stop reason: SDUPTHER

## 2017-10-09 RX ORDER — TIZANIDINE 4 MG/1
4 TABLET ORAL EVERY 8 HOURS
Qty: 90 TABLET | Refills: 0 | Status: SHIPPED | OUTPATIENT
Start: 2017-10-09 | End: 2017-11-01

## 2017-10-09 RX ORDER — TAMSULOSIN HYDROCHLORIDE 0.4 MG/1
CAPSULE ORAL
Qty: 30 CAPSULE | Refills: 2 | Status: SHIPPED | OUTPATIENT
Start: 2017-10-09 | End: 2017-12-18 | Stop reason: SDUPTHER

## 2017-10-09 RX ORDER — GABAPENTIN 300 MG/1
300 CAPSULE ORAL 2 TIMES DAILY
Qty: 180 CAPSULE | Refills: 3 | Status: SHIPPED | OUTPATIENT
Start: 2017-10-09 | End: 2018-09-06 | Stop reason: SDUPTHER

## 2017-10-09 RX ADMIN — SUMATRIPTAN 6 MG: 6 INJECTION, SOLUTION SUBCUTANEOUS at 03:10

## 2017-10-09 NOTE — PROGRESS NOTES
Office Visit    Patient Name: Scott Bansal    : 1960  MRN: [id number]    Subjective:  Scott is a 56 y.o. male who presents today for:    burning in lungs; Back Pain; Shoulder Pain; and Headache      This patient has multiple medical diagnoses as noted below.  This patient is known to me and to this clinic.   He reports that he has increased back and shoulder pain.  He has noted that his headaches have increased in frequency.  He does have increased stress as the pharmacy will not give the patient a 90 day supply.  He also has a daughter that will be  soon.  He does have increased stress.  He reports that his head and neck are the worst type of pain.  He states that he would like an injection to help with the pain.        Patient Active Problem List   Diagnosis    History of fusion of cervical spine    Type 2 diabetes mellitus without complication    Hyperlipidemia    Essential hypertension    Chronic tension-type headache, not intractable    Bipolar 1 disorder    BPH (benign prostatic hyperplasia)    Gastroesophageal reflux disease without esophagitis    Sciatica of right side    History of pneumonia    COPD (chronic obstructive pulmonary disease)    Former smoker    Lumbar compression fracture       Past Surgical History:   Procedure Laterality Date    CERVICAL SPINE SURGERY      COLONOSCOPY N/A 2017    Procedure: COLONOSCOPY;  Surgeon: Genaro Nix MD;  [facility];  Service: Endoscopy;  Laterality: N/A;    SHOULDER SURGERY      TONSILLECTOMY         History reviewed. No pertinent family history.    Social History     Social History    Marital status:      Spouse name: N/A    Number of children: N/A    Years of education: N/A     Occupational History    Not on file.     Social History Main Topics    Smoking status: Former Smoker    Smokeless tobacco: Never Used    Alcohol use Yes    Drug use: No    Sexual activity: Yes     Partners: Female      Other Topics Concern    Not on file     Social History Narrative    No narrative on file       Current Medications  Medications reviewed and updated.     Allergies   Review of patient's allergies indicates:   Allergen Reactions    Sulfa (sulfonamide antibiotics) Rash         Labs  Lab Results   Component Value Date    HGBA1C 6.1 (H) 07/07/2017     Lab Results   Component Value Date     (L) 07/07/2017    K 4.3 07/07/2017     07/07/2017    CO2 26 07/07/2017    BUN 20 07/07/2017    CREATININE 1.1 07/07/2017    CALCIUM 9.4 07/07/2017    ANIONGAP 7 (L) 07/07/2017    ESTGFRAFRICA >60.0 07/07/2017    EGFRNONAA >60.0 07/07/2017     Lab Results   Component Value Date    CHOL 129 11/04/2016     Lab Results   Component Value Date    HDL 29 (L) 11/04/2016     Lab Results   Component Value Date    LDLCALC 71.0 11/04/2016     Lab Results   Component Value Date    TRIG 145 11/04/2016     Lab Results   Component Value Date    CHOLHDL 22.5 11/04/2016     Last set of blood work has been reviewed as noted above.    Review of Systems   Constitutional: Negative for activity change, appetite change, fatigue, fever and unexpected weight change.   HENT: Negative for facial swelling.    Eyes: Negative for visual disturbance.   Respiratory: Negative for chest tightness, shortness of breath, wheezing and stridor.    Cardiovascular: Negative for chest pain, palpitations and leg swelling.   Gastrointestinal: Negative for abdominal distention, abdominal pain, blood in stool, constipation, diarrhea, nausea and vomiting.   Endocrine: Negative for cold intolerance, heat intolerance, polydipsia and polyuria.   Genitourinary: Negative.  Negative for testicular pain and urgency.   Musculoskeletal: Positive for arthralgias, back pain and myalgias.        Sharp pain in the feet    Skin: Negative.    Allergic/Immunologic: Negative.    Neurological: Negative for dizziness, weakness, light-headedness, numbness and headaches.  "  Psychiatric/Behavioral: Negative for agitation and decreased concentration.       /80 (BP Location: Left arm, Patient Position: Sitting, BP Method: Large (Manual))   Pulse 104   Temp 98.1 °F (36.7 °C) (Oral)   Ht 5' 9" (1.753 m)   Wt 115.1 kg (253 lb 12 oz)   SpO2 96%   BMI 37.47 kg/m²      Physical Exam   Constitutional: He is oriented to person, place, and time. He appears well-developed and well-nourished.   HENT:   Head: Normocephalic and atraumatic.   Neck: Muscular tenderness present. Decreased range of motion present.   Cardiovascular: Normal rate, regular rhythm and normal heart sounds.    Pulmonary/Chest: Effort normal and breath sounds normal. No respiratory distress. He has no decreased breath sounds.   Musculoskeletal:        Thoracic back: He exhibits pain and spasm.        Lumbar back: He exhibits pain and spasm.   Neurological: He is alert and oriented to person, place, and time.   Skin: He is not diaphoretic. No pallor.   Psychiatric: He has a normal mood and affect. His speech is normal and behavior is normal.       Health Maintenance  Health Maintenance       Date Due Completion Date    Eye Exam 07/23/2017 7/23/2016    Influenza Vaccine 08/01/2017 12/5/2016 (Declined)    Override on 12/5/2016: Declined    Lipid Panel 11/04/2017 11/4/2016    Hemoglobin A1c 01/07/2018 7/7/2017    Foot Exam 07/07/2018 7/7/2017    Pneumococcal PPSV23 (Medium Risk) (2) 11/26/2024 7/7/2017    TETANUS VACCINE 07/07/2027 7/7/2017    Colonoscopy 07/27/2027 7/27/2017          Assessment/Plan:  Scott Bansal is a 56 y.o. male who presents today for :    1. Type 2 diabetes mellitus without complication, without long-term current use of insulin    2. Diabetic polyneuropathy associated with type 2 diabetes mellitus    3. Essential hypertension    4. History of fusion of cervical spine    5. Chronic tension-type headache, not intractable        Problem List Items Addressed This Visit        Unprioritized    " Chronic tension-type headache, not intractable    Overview     Cannot tolerate propranolol because side effects (dizziness, decreased alertness)  No improvement with imitrex         Relevant Medications    sumatriptan succinate injection 6 mg (Completed)    Essential hypertension    Overview     Cannot tolerate sulfa  Currently on amlodipine, irbesartan, furosemide, clonidine          History of fusion of cervical spine    Overview     1998 titanium screws placed.  Xray of fusion patient has a copy         Relevant Medications    tizanidine (ZANAFLEX) 4 MG tablet    Type 2 diabetes mellitus without complication - Primary  -   Patient is stable.  Assess and addressed all modifiable risk factors.  Continue with appropriate management to prevent complications.  -  The patient is asked to make an attempt to improve diet and exercise patterns to aid in medical management of this problem.        Other Visit Diagnoses     Diabetic polyneuropathy associated with type 2 diabetes mellitus        Relevant Medications    gabapentin (NEURONTIN) 300 MG capsule          Return in about 4 weeks (around 11/6/2017), or if symptoms worsen or fail to improve.     This note was created by combination of typed  and Dragon dictation.  Transcription errors may be present.  If there are any questions, please contact me.

## 2017-10-30 DIAGNOSIS — R51.9 NONINTRACTABLE EPISODIC HEADACHE, UNSPECIFIED HEADACHE TYPE: ICD-10-CM

## 2017-10-30 DIAGNOSIS — G44.229 CHRONIC TENSION-TYPE HEADACHE, NOT INTRACTABLE: ICD-10-CM

## 2017-10-30 RX ORDER — NARATRIPTAN 2.5 MG/1
TABLET ORAL
Qty: 12 TABLET | Refills: 2 | Status: SHIPPED | OUTPATIENT
Start: 2017-10-30 | End: 2018-02-26 | Stop reason: SDUPTHER

## 2017-10-30 RX ORDER — BUTALBITAL, ACETAMINOPHEN AND CAFFEINE 50; 325; 40 MG/1; MG/1; MG/1
TABLET ORAL
Qty: 30 TABLET | Refills: 0 | Status: SHIPPED | OUTPATIENT
Start: 2017-10-30 | End: 2017-11-21 | Stop reason: SDUPTHER

## 2017-10-30 RX ORDER — CYCLOBENZAPRINE HCL 10 MG
TABLET ORAL
Qty: 90 TABLET | Refills: 0 | Status: SHIPPED | OUTPATIENT
Start: 2017-10-30 | End: 2017-11-25 | Stop reason: SDUPTHER

## 2017-11-01 ENCOUNTER — OFFICE VISIT (OUTPATIENT)
Dept: FAMILY MEDICINE | Facility: CLINIC | Age: 57
End: 2017-11-01
Payer: MEDICARE

## 2017-11-01 VITALS
HEART RATE: 97 BPM | WEIGHT: 259.06 LBS | OXYGEN SATURATION: 97 % | DIASTOLIC BLOOD PRESSURE: 90 MMHG | SYSTOLIC BLOOD PRESSURE: 154 MMHG | HEIGHT: 69 IN | TEMPERATURE: 98 F | BODY MASS INDEX: 38.37 KG/M2

## 2017-11-01 DIAGNOSIS — L30.1 DYSHIDROTIC ECZEMA: Primary | ICD-10-CM

## 2017-11-01 DIAGNOSIS — S16.1XXA NECK STRAIN, INITIAL ENCOUNTER: ICD-10-CM

## 2017-11-01 DIAGNOSIS — E11.9 TYPE 2 DIABETES MELLITUS WITHOUT COMPLICATION, WITHOUT LONG-TERM CURRENT USE OF INSULIN: ICD-10-CM

## 2017-11-01 DIAGNOSIS — R42 VERTIGO: ICD-10-CM

## 2017-11-01 DIAGNOSIS — S46.911A RIGHT SHOULDER STRAIN, INITIAL ENCOUNTER: ICD-10-CM

## 2017-11-01 PROCEDURE — 20610 DRAIN/INJ JOINT/BURSA W/O US: CPT | Mod: RT,S$GLB,, | Performed by: FAMILY MEDICINE

## 2017-11-01 PROCEDURE — 99499 UNLISTED E&M SERVICE: CPT | Mod: S$PBB,,, | Performed by: FAMILY MEDICINE

## 2017-11-01 PROCEDURE — 99214 OFFICE O/P EST MOD 30 MIN: CPT | Mod: 25,S$GLB,, | Performed by: FAMILY MEDICINE

## 2017-11-01 PROCEDURE — 99999 PR PBB SHADOW E&M-EST. PATIENT-LVL III: CPT | Mod: PBBFAC,,, | Performed by: FAMILY MEDICINE

## 2017-11-01 RX ORDER — TRAMADOL HYDROCHLORIDE 50 MG/1
50 TABLET ORAL EVERY 6 HOURS PRN
Qty: 60 TABLET | Refills: 0 | Status: SHIPPED | OUTPATIENT
Start: 2017-11-01 | End: 2017-11-11

## 2017-11-01 RX ORDER — MECLIZINE HYDROCHLORIDE 25 MG/1
25 TABLET ORAL 3 TIMES DAILY PRN
Qty: 30 TABLET | Refills: 0 | Status: SHIPPED | OUTPATIENT
Start: 2017-11-01 | End: 2019-05-14

## 2017-11-01 RX ORDER — BETAMETHASONE DIPROPIONATE 0.5 MG/G
CREAM TOPICAL DAILY
Qty: 45 G | Refills: 0 | Status: SHIPPED | OUTPATIENT
Start: 2017-11-01 | End: 2018-09-06 | Stop reason: SDUPTHER

## 2017-11-01 RX ADMIN — TRIAMCINOLONE ACETONIDE 40 MG: 40 INJECTION, SUSPENSION INTRA-ARTICULAR; INTRAMUSCULAR at 07:11

## 2017-11-01 NOTE — PROGRESS NOTES
Office Visit    Patient Name: Scott Bansal    : 1960  MRN: [id number]    Subjective:  Scott is a 56 y.o. male who presents today for:    Dizziness (X 2 weeks ); Back Pain (right shoulder and neck pain , sore inside right nostril); and Migraine      This patient has multiple medical diagnoses as noted below.  This patient is known to me and to this clinic. He reports increased dizziness for two weeks.  He gets this sensation when he lays on his left side.  He states that he has been on medication in the past, but is unsure of the medication.  He states it is impacting his well being.      He reports that his blood pressure was elevated when he was using the muscle relaxant.  He has also noted that he has a decrease in his blood pressure.      Skin lesion:  He reports that he lost a layer of skin on his right hand and left finger.  He washes his hand frequently during the day.      Right shoulder pain:  He reports that he has increased right shoulder pain.  History of bilateral shoulder dislocation in .  He has had cortisone shots in the shoulder in .      Neck pain:  He has increased pain in the neck with soreness.  He has used tizanidine, but it does not improve his symptoms and impacts his blood pressure.  No recent injury.      Patient Active Problem List   Diagnosis    History of fusion of cervical spine    Type 2 diabetes mellitus without complication    Hyperlipidemia    Essential hypertension    Chronic tension-type headache, not intractable    Bipolar 1 disorder    BPH (benign prostatic hyperplasia)    Gastroesophageal reflux disease without esophagitis    Sciatica of right side    History of pneumonia    COPD (chronic obstructive pulmonary disease)    Former smoker    Lumbar compression fracture       Past Surgical History:   Procedure Laterality Date    CERVICAL SPINE SURGERY      COLONOSCOPY N/A 2017    Procedure: COLONOSCOPY;  Surgeon: Genaro Nix MD;   Location: [facility];  Service: Endoscopy;  Laterality: N/A;    SHOULDER SURGERY      TONSILLECTOMY         History reviewed. No pertinent family history.    Social History     Social History    Marital status:      Spouse name: N/A    Number of children: N/A    Years of education: N/A     Occupational History    Not on file.     Social History Main Topics    Smoking status: Former Smoker    Smokeless tobacco: Never Used    Alcohol use Yes    Drug use: No    Sexual activity: Yes     Partners: Female     Other Topics Concern    Not on file     Social History Narrative    No narrative on file       Current Medications  Medications reviewed and updated.     Allergies   Review of patient's allergies indicates:   Allergen Reactions    Sulfa (sulfonamide antibiotics) Rash         Labs  Lab Results   Component Value Date    HGBA1C 6.1 (H) 07/07/2017     Lab Results   Component Value Date     (L) 07/07/2017    K 4.3 07/07/2017     07/07/2017    CO2 26 07/07/2017    BUN 20 07/07/2017    CREATININE 1.1 07/07/2017    CALCIUM 9.4 07/07/2017    ANIONGAP 7 (L) 07/07/2017    ESTGFRAFRICA >60.0 07/07/2017    EGFRNONAA >60.0 07/07/2017     Lab Results   Component Value Date    CHOL 129 11/04/2016     Lab Results   Component Value Date    HDL 29 (L) 11/04/2016     Lab Results   Component Value Date    LDLCALC 71.0 11/04/2016     Lab Results   Component Value Date    TRIG 145 11/04/2016     Lab Results   Component Value Date    CHOLHDL 22.5 11/04/2016     Last set of blood work has been reviewed as noted above.    Review of Systems   Constitutional: Negative for activity change, appetite change, fatigue, fever and unexpected weight change.   HENT: Negative for facial swelling.    Eyes: Negative for visual disturbance.   Respiratory: Negative for chest tightness, shortness of breath, wheezing and stridor.    Cardiovascular: Negative for chest pain, palpitations and leg swelling.   Gastrointestinal:  "Negative for abdominal distention, abdominal pain, blood in stool, constipation, diarrhea, nausea and vomiting.   Endocrine: Negative for cold intolerance, heat intolerance, polydipsia and polyuria.   Genitourinary: Negative.  Negative for testicular pain and urgency.   Musculoskeletal: Positive for arthralgias, back pain, gait problem, joint swelling, myalgias, neck pain and neck stiffness.   Skin: Negative.    Allergic/Immunologic: Negative.    Neurological: Negative for dizziness, weakness, light-headedness, numbness and headaches.   Psychiatric/Behavioral: Negative for agitation and decreased concentration.       BP (!) 154/90 (BP Location: Left arm, Patient Position: Sitting, BP Method: Large (Manual))   Pulse 97   Temp 98 °F (36.7 °C) (Oral)   Ht 5' 9" (1.753 m)   Wt 117.5 kg (259 lb 0.7 oz)   SpO2 97%   BMI 38.25 kg/m²      Physical Exam   Constitutional: He is oriented to person, place, and time. He appears well-developed and well-nourished.   HENT:   Head: Normocephalic and atraumatic.   Eyes: Conjunctivae and EOM are normal. Pupils are equal, round, and reactive to light.   Musculoskeletal:        Right shoulder: He exhibits decreased range of motion, tenderness and swelling. He exhibits no bony tenderness, no effusion and no crepitus.   Neurological: He is alert and oriented to person, place, and time.   Vitals reviewed.      Large Joint Aspiration/Injection  Date/Time: 11/1/2017 7:41 AM  Performed by: GABRIELA ROA  Authorized by: GABRIELA ROA.     Consent Done?:  Yes (Verbal)  Indications:  Pain  Procedure site marked: Yes    Timeout: Prior to procedure the correct patient, procedure, and site was verified      Location:  Shoulder  Site:  R glenohumeral  Prep: Patient was prepped and draped in usual sterile fashion    Needle size:  22 G  Medications:  40 mg triamcinolone acetonide 40 mg/mL; 40 mg triamcinolone acetonide 40 mg/mL  Patient tolerance:  Patient tolerated the procedure " well with no immediate complications      Health Maintenance  Health Maintenance       Date Due Completion Date    Eye Exam 07/23/2017 7/23/2016    Lipid Panel 11/04/2017 11/4/2016    Hemoglobin A1c 01/07/2018 7/7/2017    Foot Exam 07/07/2018 7/7/2017    Pneumococcal PPSV23 (Medium Risk) (2) 11/26/2024 7/7/2017    TETANUS VACCINE 07/07/2027 7/7/2017    Colonoscopy 07/27/2027 7/27/2017          Assessment/Plan:  Scott Bansal is a 56 y.o. male who presents today for :    1. Dyshidrotic eczema    2. Type 2 diabetes mellitus without complication, without long-term current use of insulin    3. Right shoulder strain, initial encounter    4. Neck strain, initial encounter    5. Vertigo        Problem List Items Addressed This Visit        Unprioritized    Type 2 diabetes mellitus without complication    Relevant Orders    Diabetic Eye Screening Photo      Other Visit Diagnoses     Dyshidrotic eczema    -  Primary    Relevant Medications    betamethasone dipropionate (DIPROLENE) 0.05 % cream    Right shoulder strain, initial encounter        Relevant Medications    traMADol (ULTRAM) 50 mg tablet    Other Relevant Orders    Large Joint Aspiration/Injection    Neck strain, initial encounter        Relevant Medications    traMADol (ULTRAM) 50 mg tablet    Vertigo        Relevant Medications    meclizine (ANTIVERT) 25 mg tablet          Return in about 3 months (around 2/1/2018), or if symptoms worsen or fail to improve.     This note was created by combination of typed  and Dragon dictation.  Transcription errors may be present.  If there are any questions, please contact me.

## 2017-11-04 DIAGNOSIS — I10 ESSENTIAL HYPERTENSION: ICD-10-CM

## 2017-11-05 DIAGNOSIS — Z98.1 HISTORY OF FUSION OF CERVICAL SPINE: ICD-10-CM

## 2017-11-05 RX ORDER — TIZANIDINE 4 MG/1
4 TABLET ORAL EVERY 8 HOURS
Qty: 90 TABLET | Refills: 0 | Status: SHIPPED | OUTPATIENT
Start: 2017-11-05 | End: 2017-11-25

## 2017-11-05 RX ORDER — FUROSEMIDE 40 MG/1
TABLET ORAL
Qty: 30 TABLET | Refills: 2 | Status: SHIPPED | OUTPATIENT
Start: 2017-11-05 | End: 2018-02-23 | Stop reason: SDUPTHER

## 2017-11-09 RX ORDER — TRIAMCINOLONE ACETONIDE 40 MG/ML
40 INJECTION, SUSPENSION INTRA-ARTICULAR; INTRAMUSCULAR
Status: DISCONTINUED | OUTPATIENT
Start: 2017-11-01 | End: 2017-11-09 | Stop reason: HOSPADM

## 2017-11-09 NOTE — PROGRESS NOTES
Patient, Scott Bansal (MRN #[id number]), presented with a recorded BMI of 38.25 kg/m^2 and a documented comorbidity(s):  - Diabetes Mellitus Type 2  to which the severe obesity is a contributing factor. This is consistent with the definition of severe obesity (BMI 35.0-35.9) with comorbidity (ICD-10 E66.01, Z68.35). The patient's severe obesity was monitored, evaluated, addressed and/or treated. This addendum to the medical record is made on 11/09/2017.

## 2017-11-10 DIAGNOSIS — E11.9 TYPE 2 DIABETES MELLITUS WITHOUT COMPLICATION: ICD-10-CM

## 2017-11-14 DIAGNOSIS — J01.90 ACUTE RHINOSINUSITIS: ICD-10-CM

## 2017-11-14 DIAGNOSIS — J20.9 ACUTE BRONCHITIS, UNSPECIFIED ORGANISM: ICD-10-CM

## 2017-11-15 RX ORDER — FLUTICASONE PROPIONATE 50 MCG
SPRAY, SUSPENSION (ML) NASAL
Qty: 16 G | Refills: 5 | Status: SHIPPED | OUTPATIENT
Start: 2017-11-15 | End: 2018-02-27 | Stop reason: SDUPTHER

## 2017-11-16 DIAGNOSIS — E78.5 HYPERLIPIDEMIA, UNSPECIFIED HYPERLIPIDEMIA TYPE: ICD-10-CM

## 2017-11-16 RX ORDER — GEMFIBROZIL 600 MG/1
TABLET, FILM COATED ORAL
Qty: 120 TABLET | Refills: 0 | Status: SHIPPED | OUTPATIENT
Start: 2017-11-16 | End: 2018-02-21 | Stop reason: SDUPTHER

## 2017-11-21 DIAGNOSIS — R51.9 NONINTRACTABLE EPISODIC HEADACHE, UNSPECIFIED HEADACHE TYPE: ICD-10-CM

## 2017-11-22 RX ORDER — CYCLOBENZAPRINE HCL 10 MG
TABLET ORAL
Qty: 90 TABLET | Refills: 0 | OUTPATIENT
Start: 2017-11-22

## 2017-11-22 RX ORDER — BUTALBITAL, ACETAMINOPHEN AND CAFFEINE 50; 325; 40 MG/1; MG/1; MG/1
TABLET ORAL
Qty: 30 TABLET | Refills: 0 | Status: SHIPPED | OUTPATIENT
Start: 2017-11-22 | End: 2017-12-26 | Stop reason: SDUPTHER

## 2017-11-25 RX ORDER — CYCLOBENZAPRINE HCL 10 MG
TABLET ORAL
Qty: 90 TABLET | Refills: 0 | Status: SHIPPED | OUTPATIENT
Start: 2017-11-25 | End: 2017-12-24 | Stop reason: SDUPTHER

## 2017-12-18 RX ORDER — TAMSULOSIN HYDROCHLORIDE 0.4 MG/1
CAPSULE ORAL
Qty: 30 CAPSULE | Refills: 2 | Status: SHIPPED | OUTPATIENT
Start: 2017-12-18 | End: 2018-01-03 | Stop reason: SDUPTHER

## 2017-12-22 ENCOUNTER — TELEPHONE (OUTPATIENT)
Dept: FAMILY MEDICINE | Facility: CLINIC | Age: 57
End: 2017-12-22

## 2017-12-22 DIAGNOSIS — J44.9 CHRONIC OBSTRUCTIVE PULMONARY DISEASE, UNSPECIFIED COPD TYPE: ICD-10-CM

## 2017-12-22 RX ORDER — ALBUTEROL SULFATE 90 UG/1
AEROSOL, METERED RESPIRATORY (INHALATION)
Qty: 18 G | Refills: 6 | Status: SHIPPED | OUTPATIENT
Start: 2017-12-22 | End: 2018-02-15 | Stop reason: SDUPTHER

## 2017-12-22 NOTE — TELEPHONE ENCOUNTER
----- Message from Christina Lucas sent at 12/22/2017  9:49 AM CST -----  Contact: self  Pt calling to request a refill of Ventolin Inhaler to be sent to [facility] in [place]. Please call [phone].

## 2017-12-24 RX ORDER — CYCLOBENZAPRINE HCL 10 MG
TABLET ORAL
Qty: 90 TABLET | Refills: 0 | Status: SHIPPED | OUTPATIENT
Start: 2017-12-24 | End: 2018-01-22 | Stop reason: SDUPTHER

## 2017-12-26 DIAGNOSIS — R51.9 NONINTRACTABLE EPISODIC HEADACHE, UNSPECIFIED HEADACHE TYPE: ICD-10-CM

## 2017-12-26 RX ORDER — BUTALBITAL, ACETAMINOPHEN AND CAFFEINE 50; 325; 40 MG/1; MG/1; MG/1
1 TABLET ORAL EVERY 4 HOURS PRN
Qty: 30 TABLET | Refills: 0 | Status: SHIPPED | OUTPATIENT
Start: 2017-12-26 | End: 2018-01-25 | Stop reason: SDUPTHER

## 2017-12-26 NOTE — TELEPHONE ENCOUNTER
----- Message from Yanet Becerra sent at 12/26/2017 12:53 PM CST -----  Contact: SELF  Refill : butalbital-acetaminophen-caffeine -40 mg (FIORICET, ESGIC) -40 mg per tablet        Pharmacy     [facility]ACY #[id number] - [place], [place] - [address].

## 2018-01-03 RX ORDER — TAMSULOSIN HYDROCHLORIDE 0.4 MG/1
CAPSULE ORAL
Qty: 30 CAPSULE | Refills: 2 | Status: SHIPPED | OUTPATIENT
Start: 2018-01-03 | End: 2018-02-28 | Stop reason: SDUPTHER

## 2018-01-03 NOTE — TELEPHONE ENCOUNTER
----- Message from Bambi Reyes sent at 1/3/2018  3:31 PM CST -----  Contact: self  Refill: tamsulosin (FLOMAX) 0.4 mg Cp24    [id number] - [place] - [facility].    Patient can be reached at [phone]. Thank you!

## 2018-01-04 DIAGNOSIS — E11.9 DIABETES MELLITUS WITHOUT COMPLICATION: ICD-10-CM

## 2018-01-04 RX ORDER — METFORMIN HYDROCHLORIDE 1000 MG/1
TABLET ORAL
Qty: 180 TABLET | Refills: 0 | Status: SHIPPED | OUTPATIENT
Start: 2018-01-04 | End: 2018-04-01 | Stop reason: SDUPTHER

## 2018-01-08 DIAGNOSIS — S16.1XXA NECK STRAIN, INITIAL ENCOUNTER: ICD-10-CM

## 2018-01-08 DIAGNOSIS — Z98.1 HISTORY OF FUSION OF CERVICAL SPINE: ICD-10-CM

## 2018-01-08 RX ORDER — DICLOFENAC SODIUM 10 MG/G
2 GEL TOPICAL DAILY
Qty: 100 G | Refills: 0 | Status: SHIPPED | OUTPATIENT
Start: 2018-01-08 | End: 2018-02-23 | Stop reason: SDUPTHER

## 2018-01-08 NOTE — TELEPHONE ENCOUNTER
----- Message from Dominique Do sent at 1/8/2018  1:06 PM CST -----  Contact: Scott [phone]  Refill: [email or web address]  Pharmacy: [facility]/PHARMACY #[id number] - [place] - [address].

## 2018-01-12 RX ORDER — PANTOPRAZOLE SODIUM 40 MG/1
TABLET, DELAYED RELEASE ORAL
Qty: 90 TABLET | Refills: 3 | Status: SHIPPED | OUTPATIENT
Start: 2018-01-12 | End: 2018-12-21 | Stop reason: SDUPTHER

## 2018-01-22 RX ORDER — CYCLOBENZAPRINE HCL 10 MG
TABLET ORAL
Qty: 90 TABLET | Refills: 0 | Status: SHIPPED | OUTPATIENT
Start: 2018-01-22 | End: 2018-02-17 | Stop reason: SDUPTHER

## 2018-01-22 NOTE — TELEPHONE ENCOUNTER
LOV 11/01/2017.    Component      Latest Ref Rng & Units 7/7/2017   Sodium      136 - 145 mmol/L 134 (L)   Potassium      3.5 - 5.1 mmol/L 4.3   Chloride      95 - 110 mmol/L 101   CO2      23 - 29 mmol/L 26   Glucose      70 - 110 mg/dL 127 (H)   BUN, Bld      6 - 20 mg/dL 20   Creatinine      0.5 - 1.4 mg/dL 1.1   Calcium      8.7 - 10.5 mg/dL 9.4   Total Protein      6.0 - 8.4 g/dL 6.8   Albumin      3.5 - 5.2 g/dL 4.2   Total Bilirubin      0.1 - 1.0 mg/dL 0.5   Alkaline Phosphatase      55 - 135 U/L 76   AST      10 - 40 U/L 41 (H)   ALT      10 - 44 U/L 63 (H)   Anion Gap      8 - 16 mmol/L 7 (L)   eGFR if African American      >60 mL/min/1.73 m:2 >60.0   eGFR if non African American      >60 mL/min/1.73 m:2 >60.0

## 2018-01-22 NOTE — TELEPHONE ENCOUNTER
----- Message from Haylee Vázquez sent at 1/22/2018  9:12 AM CST -----  Contact: self  Refill request for-- cyclobenzaprine (FLEXERIL) 10 MG tablet-- To [facility] on [address] in [place]. Pt states he only has 1 left. Pt call back  [phone].

## 2018-01-23 ENCOUNTER — TELEPHONE (OUTPATIENT)
Dept: FAMILY MEDICINE | Facility: CLINIC | Age: 58
End: 2018-01-23

## 2018-01-23 DIAGNOSIS — S16.1XXA NECK STRAIN, INITIAL ENCOUNTER: Primary | ICD-10-CM

## 2018-01-23 DIAGNOSIS — Z98.1 HISTORY OF FUSION OF CERVICAL SPINE: ICD-10-CM

## 2018-01-23 NOTE — TELEPHONE ENCOUNTER
Rosenda w/[facility] notified the patient has to be referred to Physical medicine to be evaluated. Rosenda verbalized her understanding.

## 2018-01-23 NOTE — TELEPHONE ENCOUNTER
----- Message from Ailyn Castillo sent at 1/23/2018  9:55 AM CST -----  Contact: Rosenda @[facility] [phone] ext [phone]  [facility] is requesting a order for a back brace and clinical notes faxed to [phone] Thanks !

## 2018-01-23 NOTE — TELEPHONE ENCOUNTER
----- Message from Ally Gunter sent at 1/23/2018 10:03 AM CST -----  Contact: self  Patient called to inform office that he contacted [facility] to order him a back brace. Please contact him at [phone].    thanks!

## 2018-01-23 NOTE — TELEPHONE ENCOUNTER
Patient requesting an order for referral to physical medicine for a back brace. He states he had a back brace previously, but it's worn ,and torn he needs a new one.

## 2018-01-25 DIAGNOSIS — R51.9 NONINTRACTABLE EPISODIC HEADACHE, UNSPECIFIED HEADACHE TYPE: ICD-10-CM

## 2018-01-25 RX ORDER — BUTALBITAL, ACETAMINOPHEN AND CAFFEINE 50; 325; 40 MG/1; MG/1; MG/1
1 TABLET ORAL EVERY 4 HOURS PRN
Qty: 30 TABLET | Refills: 0 | Status: SHIPPED | OUTPATIENT
Start: 2018-01-25 | End: 2018-02-26 | Stop reason: SDUPTHER

## 2018-01-25 NOTE — TELEPHONE ENCOUNTER
----- Message from Ally Gunter sent at 1/25/2018  2:24 PM CST -----  Contact: self  REFILL: butalbital-acetaminophen-caffeine -40 mg (FIORICET, ESGIC) -40 mg per tablet    [facility]

## 2018-01-31 ENCOUNTER — TELEPHONE (OUTPATIENT)
Dept: FAMILY MEDICINE | Facility: CLINIC | Age: 58
End: 2018-01-31

## 2018-01-31 DIAGNOSIS — M54.31 SCIATICA OF RIGHT SIDE: Primary | ICD-10-CM

## 2018-01-31 NOTE — TELEPHONE ENCOUNTER
----- Message from Bambi Reyes sent at 1/31/2018 10:11 AM CST -----  Contact: [facility]  Shanti from [facility] she has received the clinical notes in regards to requests for back brace prescription, however she has not received prescription for back brace. Fax: [phone]. She can be reached at [phone]. Thank you!

## 2018-02-01 ENCOUNTER — HOSPITAL ENCOUNTER (OUTPATIENT)
Dept: RADIOLOGY | Facility: HOSPITAL | Age: 58
Discharge: HOME | End: 2018-02-01
Attending: FAMILY MEDICINE
Payer: MEDICARE

## 2018-02-01 ENCOUNTER — OFFICE VISIT (OUTPATIENT)
Dept: FAMILY MEDICINE | Facility: CLINIC | Age: 58
End: 2018-02-01
Payer: MEDICARE

## 2018-02-01 VITALS
WEIGHT: 256.63 LBS | BODY MASS INDEX: 38.01 KG/M2 | HEART RATE: 105 BPM | HEIGHT: 69 IN | SYSTOLIC BLOOD PRESSURE: 120 MMHG | OXYGEN SATURATION: 98 % | DIASTOLIC BLOOD PRESSURE: 80 MMHG | TEMPERATURE: 99 F

## 2018-02-01 DIAGNOSIS — J44.9 CHRONIC OBSTRUCTIVE PULMONARY DISEASE, UNSPECIFIED COPD TYPE: ICD-10-CM

## 2018-02-01 DIAGNOSIS — I10 ESSENTIAL HYPERTENSION: ICD-10-CM

## 2018-02-01 DIAGNOSIS — E11.9 TYPE 2 DIABETES MELLITUS WITHOUT COMPLICATION, WITHOUT LONG-TERM CURRENT USE OF INSULIN: ICD-10-CM

## 2018-02-01 DIAGNOSIS — E78.5 HYPERLIPIDEMIA, UNSPECIFIED HYPERLIPIDEMIA TYPE: ICD-10-CM

## 2018-02-01 DIAGNOSIS — F31.9 BIPOLAR 1 DISORDER: ICD-10-CM

## 2018-02-01 DIAGNOSIS — Z87.891 FORMER SMOKER: ICD-10-CM

## 2018-02-01 DIAGNOSIS — M54.31 SCIATICA OF RIGHT SIDE: ICD-10-CM

## 2018-02-01 DIAGNOSIS — L02.214 ABSCESS, GROIN: Primary | ICD-10-CM

## 2018-02-01 PROCEDURE — 71046 X-RAY EXAM CHEST 2 VIEWS: CPT | Mod: 26,,, | Performed by: RADIOLOGY

## 2018-02-01 PROCEDURE — 99999 PR PBB SHADOW E&M-EST. PATIENT-LVL III: CPT | Mod: PBBFAC,,, | Performed by: FAMILY MEDICINE

## 2018-02-01 PROCEDURE — 3008F BODY MASS INDEX DOCD: CPT | Mod: S$GLB,,, | Performed by: FAMILY MEDICINE

## 2018-02-01 PROCEDURE — 99499 UNLISTED E&M SERVICE: CPT | Mod: S$GLB,,, | Performed by: FAMILY MEDICINE

## 2018-02-01 PROCEDURE — 71046 X-RAY EXAM CHEST 2 VIEWS: CPT | Mod: TC,PO

## 2018-02-01 PROCEDURE — 99214 OFFICE O/P EST MOD 30 MIN: CPT | Mod: 25,S$GLB,, | Performed by: FAMILY MEDICINE

## 2018-02-01 PROCEDURE — 96372 THER/PROPH/DIAG INJ SC/IM: CPT | Mod: S$GLB,,, | Performed by: FAMILY MEDICINE

## 2018-02-01 RX ORDER — KETOROLAC TROMETHAMINE 30 MG/ML
30 INJECTION, SOLUTION INTRAMUSCULAR; INTRAVENOUS ONCE
Status: COMPLETED | OUTPATIENT
Start: 2018-02-01 | End: 2018-02-01

## 2018-02-01 RX ORDER — AMOXICILLIN AND CLAVULANATE POTASSIUM 875; 125 MG/1; MG/1
1 TABLET, FILM COATED ORAL EVERY 12 HOURS
Qty: 20 TABLET | Refills: 0 | Status: SHIPPED | OUTPATIENT
Start: 2018-02-01 | End: 2018-02-11

## 2018-02-01 RX ORDER — CEFTRIAXONE 1 G/1
1 INJECTION, POWDER, FOR SOLUTION INTRAMUSCULAR; INTRAVENOUS
Status: COMPLETED | OUTPATIENT
Start: 2018-02-01 | End: 2018-02-01

## 2018-02-01 RX ORDER — CODEINE PHOSPHATE AND GUAIFENESIN 10; 100 MG/5ML; MG/5ML
5 SOLUTION ORAL EVERY 12 HOURS PRN
Qty: 118 ML | Refills: 0 | Status: SHIPPED | OUTPATIENT
Start: 2018-02-01 | End: 2018-02-11

## 2018-02-01 RX ADMIN — KETOROLAC TROMETHAMINE 30 MG: 30 INJECTION, SOLUTION INTRAMUSCULAR; INTRAVENOUS at 10:02

## 2018-02-01 RX ADMIN — CEFTRIAXONE 1 G: 1 INJECTION, POWDER, FOR SOLUTION INTRAMUSCULAR; INTRAVENOUS at 09:02

## 2018-02-01 NOTE — PROGRESS NOTES
" Office Visit    Patient Name: Scott Bansal    : 1960  MRN: [id number]    Subjective:  Scott is a 57 y.o. male who presents today for:    Asthma; Bronchitis; and check lump neck      This patient has multiple medical diagnoses as noted below.  This patient is known to me and to this clinic. He reports thick "pinkish red" sputum.  He reports that he has a fullness in his chest.  He feels like his lungs are full.  He has developed a boil in his groin after shaving.  His pet recently had a cyst removed and took some of his pet's medrol dose pack.  Instructed not to take this medication       Patient Active Problem List   Diagnosis    History of fusion of cervical spine    Type 2 diabetes mellitus without complication    Hyperlipidemia    Essential hypertension    Chronic tension-type headache, not intractable    Bipolar 1 disorder    BPH (benign prostatic hyperplasia)    Gastroesophageal reflux disease without esophagitis    Sciatica of right side    History of pneumonia    COPD (chronic obstructive pulmonary disease)    Former smoker    Lumbar compression fracture       Past Surgical History:   Procedure Laterality Date    CERVICAL SPINE SURGERY      COLONOSCOPY N/A 2017    Procedure: COLONOSCOPY;  Surgeon: Genaro Nix MD;  Location: [facility];  Service: Endoscopy;  Laterality: N/A;    SHOULDER SURGERY      TONSILLECTOMY         No family history on file.    Social History     Social History    Marital status:      Spouse name: N/A    Number of children: N/A    Years of education: N/A     Occupational History    Not on file.     Social History Main Topics    Smoking status: Former Smoker    Smokeless tobacco: Never Used    Alcohol use Yes    Drug use: No    Sexual activity: Yes     Partners: Female     Other Topics Concern    Not on file     Social History Narrative    No narrative on file       Current Medications  Medications reviewed and updated. "     Allergies   Review of patient's allergies indicates:   Allergen Reactions    Sulfa (sulfonamide antibiotics) Rash         Labs  Lab Results   Component Value Date    HGBA1C 6.0 (H) 02/01/2018     Lab Results   Component Value Date     02/01/2018    K 4.2 02/01/2018     02/01/2018    CO2 27 02/01/2018    BUN 17 02/01/2018    CREATININE 1.2 02/01/2018    CALCIUM 9.8 02/01/2018    ANIONGAP 13 02/01/2018    ESTGFRAFRICA >60.0 02/01/2018    EGFRNONAA >60.0 02/01/2018     Lab Results   Component Value Date    CHOL 127 02/01/2018    CHOL 129 11/04/2016     Lab Results   Component Value Date    HDL 29 (L) 02/01/2018    HDL 29 (L) 11/04/2016     Lab Results   Component Value Date    LDLCALC 69.2 02/01/2018    LDLCALC 71.0 11/04/2016     Lab Results   Component Value Date    TRIG 144 02/01/2018    TRIG 145 11/04/2016     Lab Results   Component Value Date    CHOLHDL 22.8 02/01/2018    CHOLHDL 22.5 11/04/2016     Last set of blood work has been reviewed as noted above.    Review of Systems   Constitutional: Negative for activity change, appetite change, fatigue, fever and unexpected weight change.   HENT: Positive for congestion. Negative for facial swelling.    Eyes: Negative for visual disturbance.   Respiratory: Positive for cough, choking, chest tightness and shortness of breath. Negative for wheezing and stridor.    Cardiovascular: Negative for chest pain, palpitations and leg swelling.   Gastrointestinal: Negative for abdominal distention, abdominal pain, blood in stool, constipation, diarrhea, nausea and vomiting.   Endocrine: Negative for cold intolerance, heat intolerance, polydipsia and polyuria.   Genitourinary: Negative.  Negative for testicular pain and urgency.   Skin: Negative.    Allergic/Immunologic: Negative.    Neurological: Negative for dizziness, weakness, light-headedness, numbness and headaches.   Psychiatric/Behavioral: Negative for agitation and decreased concentration.       /80  "(BP Location: Left arm, Patient Position: Sitting, BP Method: Large (Manual))   Pulse 105   Temp 98.8 °F (37.1 °C) (Oral)   Ht 5' 9" (1.753 m)   Wt 116.4 kg (256 lb 9.9 oz)   SpO2 98%   BMI 37.90 kg/m²      Physical Exam   Constitutional: He is oriented to person, place, and time. He appears well-developed and well-nourished.   HENT:   Head: Normocephalic and atraumatic.   Right Ear: External ear normal.   Left Ear: External ear normal.   Nose: Nose normal.   Mouth/Throat: Oropharynx is clear and moist. No oropharyngeal exudate.   Eyes: Conjunctivae and EOM are normal. Pupils are equal, round, and reactive to light.   Neck: Normal range of motion. Neck supple.   Cardiovascular: Normal rate, regular rhythm, normal heart sounds and intact distal pulses.  Exam reveals no gallop and no friction rub.    No murmur heard.  Pulmonary/Chest: Effort normal. He has decreased breath sounds in the right lower field.   Abdominal: Soft. Bowel sounds are normal.   Genitourinary:         Musculoskeletal: Normal range of motion.   Neurological: He is alert and oriented to person, place, and time. He has normal reflexes.   Skin: Skin is warm and dry.   Psychiatric: He has a normal mood and affect. His behavior is normal. Thought content normal.   Vitals reviewed.      Health Maintenance  Health Maintenance       Date Due Completion Date    Eye Exam 07/23/2017 7/23/2016    Lipid Panel 11/04/2017 11/4/2016    Hemoglobin A1c 01/07/2018 7/7/2017    Foot Exam 07/07/2018 7/7/2017    Low Dose Statin 11/09/2018 11/9/2017    Pneumococcal PPSV23 (Medium Risk) (2) 11/26/2024 7/7/2017    TETANUS VACCINE 07/07/2027 7/7/2017    Colonoscopy 07/27/2027 7/27/2017          Assessment/Plan:  Scott Bansal is a 57 y.o. male who presents today for :    1. Abscess, groin    2. Type 2 diabetes mellitus without complication, without long-term current use of insulin    3. Essential hypertension    4. Hyperlipidemia, unspecified hyperlipidemia type  "   5. Bipolar 1 disorder    6. Sciatica of right side    7. Former smoker    8. Chronic obstructive pulmonary disease, unspecified COPD type        Problem List Items Addressed This Visit        Unprioritized    Bipolar 1 disorder    Overview     Currently on buspar, bupropion, seroquel         COPD (chronic obstructive pulmonary disease)    Overview     Currently on breo and albuterol          Relevant Orders    X-Ray Chest PA And Lateral (Completed)    Essential hypertension    Overview     Cannot tolerate sulfa  Currently on amlodipine, irbesartan, furosemide, clonidine          Current Assessment & Plan     Pt is currently stable on medication regimen.  Continue current therapy as scheduled.  Contact office with any questions about adjustments on medications.            Relevant Orders    CBC auto differential (Completed)    Lipid panel (Completed)    Comprehensive metabolic panel (Completed)    Hemoglobin A1c (Completed)    TSH (Completed)    Former smoker    Current Assessment & Plan     Patient is stable.  Assess and addressed all modifiable risk factors.  Continue with appropriate management to prevent complications.           Hyperlipidemia    Current Assessment & Plan     No changes.   Needs to lose weight          Relevant Orders    CBC auto differential (Completed)    Lipid panel (Completed)    Comprehensive metabolic panel (Completed)    Hemoglobin A1c (Completed)    TSH (Completed)    Sciatica of right side    Overview     Uses baclofen          Relevant Medications    ketorolac injection 30 mg (Completed)    Type 2 diabetes mellitus without complication    Current Assessment & Plan     Pt is currently stable on medication regimen.  Continue current therapy as scheduled.  Contact office with any questions about adjustments on medications.   -  Increased risk for infection   -  Take medication as instructed           Relevant Orders    CBC auto differential (Completed)    Lipid panel (Completed)     Comprehensive metabolic panel (Completed)    Hemoglobin A1c (Completed)    TSH (Completed)      Other Visit Diagnoses     Abscess, groin    -  Primary    Relevant Medications    cefTRIAXone injection 1 g (Completed)          Follow-up in about 3 months (around 5/1/2018), or if symptoms worsen or fail to improve.     This note was created by combination of typed  and Dragon dictation.  Transcription errors may be present.  If there are any questions, please contact me.

## 2018-02-01 NOTE — PROGRESS NOTES
Office Visit    Patient Name: Scott Bansal    : 1960  MRN: [id number]    Subjective:  Scott is a 57 y.o. male who presents today for:    Asthma; Bronchitis; and check lump neck      This patient has multiple medical diagnoses as noted below.  This patient is known to me and to this clinic.       Patient Active Problem List   Diagnosis    History of fusion of cervical spine    Type 2 diabetes mellitus without complication    Hyperlipidemia    Essential hypertension    Chronic tension-type headache, not intractable    Bipolar 1 disorder    BPH (benign prostatic hyperplasia)    Gastroesophageal reflux disease without esophagitis    Sciatica of right side    History of pneumonia    COPD (chronic obstructive pulmonary disease)    Former smoker    Lumbar compression fracture       Past Surgical History:   Procedure Laterality Date    CERVICAL SPINE SURGERY      COLONOSCOPY N/A 2017    Procedure: COLONOSCOPY;  Surgeon: Genaro Nix MD;  Location: [facility];  Service: Endoscopy;  Laterality: N/A;    SHOULDER SURGERY      TONSILLECTOMY         No family history on file.    Social History     Social History    Marital status:      Spouse name: N/A    Number of children: N/A    Years of education: N/A     Occupational History    Not on file.     Social History Main Topics    Smoking status: Former Smoker    Smokeless tobacco: Never Used    Alcohol use Yes    Drug use: No    Sexual activity: Yes     Partners: Female     Other Topics Concern    Not on file     Social History Narrative    No narrative on file       Current Medications  Medications reviewed and updated.     Allergies   Review of patient's allergies indicates:   Allergen Reactions    Sulfa (sulfonamide antibiotics) Rash         Labs  Lab Results   Component Value Date    HGBA1C 6.1 (H) 2017     Lab Results   Component Value Date     (L) 2017    K 4.3 2017     2017    CO2  "26 07/07/2017    BUN 20 07/07/2017    CREATININE 1.1 07/07/2017    CALCIUM 9.4 07/07/2017    ANIONGAP 7 (L) 07/07/2017    ESTGFRAFRICA >60.0 07/07/2017    EGFRNONAA >60.0 07/07/2017     Lab Results   Component Value Date    CHOL 129 11/04/2016     Lab Results   Component Value Date    HDL 29 (L) 11/04/2016     Lab Results   Component Value Date    LDLCALC 71.0 11/04/2016     Lab Results   Component Value Date    TRIG 145 11/04/2016     Lab Results   Component Value Date    CHOLHDL 22.5 11/04/2016     Last set of blood work has been reviewed as noted above.    Review of Systems   Respiratory: Positive for shortness of breath.        /80 (BP Location: Left arm, Patient Position: Sitting, BP Method: Large (Manual))   Pulse 105   Temp 98.8 °F (37.1 °C) (Oral)   Ht 5' 9" (1.753 m)   Wt 116.4 kg (256 lb 9.9 oz)   SpO2 98%   BMI 37.90 kg/m²      Physical Exam    Health Maintenance  Health Maintenance       Date Due Completion Date    Eye Exam 07/23/2017 7/23/2016    Lipid Panel 11/04/2017 11/4/2016    Hemoglobin A1c 01/07/2018 7/7/2017    Foot Exam 07/07/2018 7/7/2017    Low Dose Statin 11/09/2018 11/9/2017    Pneumococcal PPSV23 (Medium Risk) (2) 11/26/2024 7/7/2017    TETANUS VACCINE 07/07/2027 7/7/2017    Colonoscopy 07/27/2027 7/27/2017          Assessment/Plan:  Scott Bansal is a 57 y.o. male who presents today for :    1. Abscess, groin    2. Type 2 diabetes mellitus without complication, without long-term current use of insulin    3. Essential hypertension    4. Hyperlipidemia, unspecified hyperlipidemia type    5. Bipolar 1 disorder    6. Sciatica of right side    7. Former smoker    8. Chronic obstructive pulmonary disease, unspecified COPD type        Problem List Items Addressed This Visit        Unprioritized    Bipolar 1 disorder    Overview     Currently on buspar, bupropion, seroquel         COPD (chronic obstructive pulmonary disease)    Overview     Currently on breo and albuterol          " Relevant Orders    X-Ray Chest PA And Lateral    Essential hypertension    Overview     Cannot tolerate sulfa  Currently on amlodipine, irbesartan, furosemide, clonidine          Current Assessment & Plan     Pt is currently stable on medication regimen.  Continue current therapy as scheduled.  Contact office with any questions about adjustments on medications.            Relevant Orders    CBC auto differential    Lipid panel    Comprehensive metabolic panel    Hemoglobin A1c    TSH    Former smoker    Current Assessment & Plan     Patient is stable.  Assess and addressed all modifiable risk factors.  Continue with appropriate management to prevent complications.           Hyperlipidemia    Current Assessment & Plan     No changes.   Needs to lose weight          Relevant Orders    CBC auto differential    Lipid panel    Comprehensive metabolic panel    Hemoglobin A1c    TSH    Sciatica of right side    Overview     Uses baclofen          Relevant Medications    ketorolac injection 30 mg (Start on 2/1/2018 10:30 AM)    Type 2 diabetes mellitus without complication    Current Assessment & Plan     Pt is currently stable on medication regimen.  Continue current therapy as scheduled.  Contact office with any questions about adjustments on medications.   -  Increased risk for infection   -  Take medication as instructed           Relevant Orders    CBC auto differential    Lipid panel    Comprehensive metabolic panel    Hemoglobin A1c    TSH      Other Visit Diagnoses     Abscess, groin    -  Primary    Relevant Medications    amoxicillin-clavulanate 875-125mg (AUGMENTIN) 875-125 mg per tablet    cefTRIAXone injection 1 g          Follow-up if symptoms worsen or fail to improve.     This note was created by combination of typed  and Dragon dictation.  Transcription errors may be present.  If there are any questions, please contact me.

## 2018-02-01 NOTE — ASSESSMENT & PLAN NOTE
Pt is currently stable on medication regimen.  Continue current therapy as scheduled.  Contact office with any questions about adjustments on medications.   -  Increased risk for infection   -  Take medication as instructed

## 2018-02-05 ENCOUNTER — TELEPHONE (OUTPATIENT)
Dept: FAMILY MEDICINE | Facility: CLINIC | Age: 58
End: 2018-02-05

## 2018-02-05 NOTE — TELEPHONE ENCOUNTER
Patient advised xray results are normal, and was mailed on 02/02/18. Patient verbalized his understanding.

## 2018-02-05 NOTE — TELEPHONE ENCOUNTER
----- Message from Ally Gunter sent at 2/5/2018 11:26 AM CST -----  Contact: SELF  Patient requesting Xray results. Advised him that lab results were mailed out. Please contact him at [phone].    Thanks!

## 2018-02-12 ENCOUNTER — TELEPHONE (OUTPATIENT)
Dept: FAMILY MEDICINE | Facility: CLINIC | Age: 58
End: 2018-02-12

## 2018-02-12 DIAGNOSIS — J44.9 CHRONIC OBSTRUCTIVE PULMONARY DISEASE, UNSPECIFIED COPD TYPE: Primary | ICD-10-CM

## 2018-02-12 RX ORDER — ALBUTEROL SULFATE 0.83 MG/ML
2.5 SOLUTION RESPIRATORY (INHALATION) EVERY 6 HOURS PRN
Qty: 1 BOX | Refills: 1 | Status: SHIPPED | OUTPATIENT
Start: 2018-02-12 | End: 2018-02-15 | Stop reason: SDUPTHER

## 2018-02-12 NOTE — TELEPHONE ENCOUNTER
----- Message from Ally Gunter sent at 2/10/2018 11:45 AM CST -----  Contact: SELF  REFILL: ALBUTEROL SOLUTION FOR NEBULIZER    PLEASE SEND ORDER TO [facility]

## 2018-02-12 NOTE — TELEPHONE ENCOUNTER
----- Message from Coleen Watson sent at 2/12/2018  8:20 AM CST -----  Contact: Self   Patient need a refill on his medication. Please call at [phone].      amoxicillin-clavulanate 875-125mg (AUGMENTIN) 875-125 mg per tablet    guaifenesin-codeine 100-10 mg/5 ml (GUAIFENESIN AC)  mg/5 mL syrup    [facility]/PHARMACY #[id number] - [place], [place] - [address]

## 2018-02-14 ENCOUNTER — TELEPHONE (OUTPATIENT)
Dept: FAMILY MEDICINE | Facility: CLINIC | Age: 58
End: 2018-02-14

## 2018-02-14 NOTE — TELEPHONE ENCOUNTER
----- Message from Felisa Chi sent at 2/14/2018 11:44 AM CST -----  Contact: Self   Patient would like to request a back brace, he says he received his paperwork to call and coordinate his order with you. Please call at [phone].

## 2018-02-14 NOTE — PROGRESS NOTES
Patient, Scott Bansal (MRN #[id number]), presented with a recorded BMI of 37.9 kg/m^2 and a documented comorbidity(s):  - Diabetes Mellitus Type 2  - Hypertension  - Hyperlipidemia  to which the severe obesity is a contributing factor. This is consistent with the definition of severe obesity (BMI 35.0-35.9) with comorbidity (ICD-10 E66.01, Z68.35). The patient's severe obesity was monitored, evaluated, addressed and/or treated. This addendum to the medical record is made on 02/14/2018.

## 2018-02-14 NOTE — TELEPHONE ENCOUNTER
Patient said that he was given Augmentin for an URI but feels he needs something else because he still has a wheezing a little and coughing up yellow phlegm. He said that he has had pneumonia several times and double pneumonia twice. Wanted to see if he could get Amoxil 500 mg because he said that this works better for him than the Augmentin. Please advise

## 2018-02-14 NOTE — TELEPHONE ENCOUNTER
Patient advised of message, states he finish the Augmentin on Sunday still having a little chest congestion/cough/SOB no wheezing patient wanted appt with Dr Chau but next available not March 2 offer patient appt with ROHINI Brown on tomorrow.

## 2018-02-15 ENCOUNTER — OFFICE VISIT (OUTPATIENT)
Dept: FAMILY MEDICINE | Facility: CLINIC | Age: 58
End: 2018-02-15
Payer: MEDICARE

## 2018-02-15 ENCOUNTER — TELEPHONE (OUTPATIENT)
Dept: FAMILY MEDICINE | Facility: CLINIC | Age: 58
End: 2018-02-15

## 2018-02-15 VITALS
OXYGEN SATURATION: 97 % | HEIGHT: 69 IN | WEIGHT: 261.13 LBS | HEART RATE: 102 BPM | BODY MASS INDEX: 38.68 KG/M2 | DIASTOLIC BLOOD PRESSURE: 80 MMHG | SYSTOLIC BLOOD PRESSURE: 120 MMHG | TEMPERATURE: 98 F

## 2018-02-15 DIAGNOSIS — J44.9 CHRONIC OBSTRUCTIVE PULMONARY DISEASE, UNSPECIFIED COPD TYPE: ICD-10-CM

## 2018-02-15 DIAGNOSIS — R05.9 COUGH: Primary | ICD-10-CM

## 2018-02-15 PROCEDURE — 99999 PR PBB SHADOW E&M-EST. PATIENT-LVL IV: CPT | Mod: PBBFAC,,, | Performed by: NURSE PRACTITIONER

## 2018-02-15 PROCEDURE — 3008F BODY MASS INDEX DOCD: CPT | Mod: S$GLB,,, | Performed by: NURSE PRACTITIONER

## 2018-02-15 PROCEDURE — 99213 OFFICE O/P EST LOW 20 MIN: CPT | Mod: S$GLB,,, | Performed by: NURSE PRACTITIONER

## 2018-02-15 RX ORDER — CODEINE PHOSPHATE AND GUAIFENESIN 10; 100 MG/5ML; MG/5ML
5 SOLUTION ORAL 3 TIMES DAILY PRN
Qty: 180 ML | Refills: 0 | Status: SHIPPED | OUTPATIENT
Start: 2018-02-15 | End: 2018-02-25

## 2018-02-15 RX ORDER — ALBUTEROL SULFATE 0.83 MG/ML
2.5 SOLUTION RESPIRATORY (INHALATION) EVERY 6 HOURS PRN
Qty: 1 BOX | Refills: 1 | Status: SHIPPED | OUTPATIENT
Start: 2018-02-15 | End: 2023-07-06 | Stop reason: SDUPTHER

## 2018-02-15 RX ORDER — ALBUTEROL SULFATE 90 UG/1
AEROSOL, METERED RESPIRATORY (INHALATION)
Qty: 18 G | Refills: 6 | Status: SHIPPED | OUTPATIENT
Start: 2018-02-15 | End: 2018-02-16 | Stop reason: SDUPTHER

## 2018-02-15 NOTE — TELEPHONE ENCOUNTER
----- Message from Bambi Reyes sent at 2/15/2018  4:13 PM CST -----  Contact: self  PROAIR HFA 90 mcg/actuation inhaler    Patient states medication listed above is not covered by his insurance and requests to be prescribed something else. He can be reached at [phone]. Thank you!

## 2018-02-15 NOTE — PROGRESS NOTES
Subjective:       Patient ID: Scott Bansal is a 57 y.o. male.    Chief Complaint: Wheezing and Cough (congestion)    Cough   This is a recurrent problem. The current episode started 1 to 4 weeks ago. The problem has been rapidly improving. The cough is productive of sputum. Associated symptoms include wheezing. Pertinent negatives include no chest pain, chills, ear pain, fever, postnasal drip, rhinorrhea, sore throat or shortness of breath. Treatments tried: antibiotics, nebulizer, rocephin, cough syrup and toradol. The treatment provided moderate relief.       Past Medical History:   Diagnosis Date    Bipolar 1 disorder, mixed     BPH (benign prostatic hypertrophy)     Diabetes mellitus     GERD (gastroesophageal reflux disease)     Hyperlipidemia     Hypertension     Migraine headache        Social History     Social History    Marital status:      Spouse name: N/A    Number of children: N/A    Years of education: N/A     Occupational History    Not on file.     Social History Main Topics    Smoking status: Former Smoker    Smokeless tobacco: Never Used    Alcohol use Yes    Drug use: No    Sexual activity: Yes     Partners: Female     Other Topics Concern    Not on file     Social History Narrative    No narrative on file       Past Surgical History:   Procedure Laterality Date    CERVICAL SPINE SURGERY      COLONOSCOPY N/A 7/27/2017    Procedure: COLONOSCOPY;  Surgeon: Genaro Nix MD;  Location: [facility];  Service: Endoscopy;  Laterality: N/A;    SHOULDER SURGERY      TONSILLECTOMY         Review of Systems   Constitutional: Negative for chills and fever.   HENT: Negative for congestion, ear pain, postnasal drip, rhinorrhea, sinus pain, sinus pressure, sneezing and sore throat.    Respiratory: Positive for cough and wheezing. Negative for shortness of breath.    Cardiovascular: Negative for chest pain.   All other systems reviewed and are negative.      Objective:   BP  "120/80 (BP Location: Left arm, Patient Position: Sitting, BP Method: Medium (Manual))   Pulse 102   Temp 97.7 °F (36.5 °C) (Oral)   Ht 5' 9" (1.753 m)   Wt 118.5 kg (261 lb 2.2 oz)   SpO2 97%   BMI 38.56 kg/m²      Physical Exam   Constitutional: He is oriented to person, place, and time. He appears well-developed and well-nourished. He is cooperative.   HENT:   Head: Normocephalic and atraumatic.   Nose: No mucosal edema or rhinorrhea.   Mouth/Throat: No oropharyngeal exudate, posterior oropharyngeal edema or posterior oropharyngeal erythema.   Cardiovascular: Normal rate, regular rhythm and normal heart sounds.    Pulmonary/Chest: Effort normal. No respiratory distress. He has no wheezes. He has no rhonchi. He has no rales.   Neurological: He is alert and oriented to person, place, and time.   Skin: Skin is warm, dry and intact.   Psychiatric: He has a normal mood and affect. His speech is normal and behavior is normal.   Vitals reviewed.      Assessment:       1. Cough    2. Chronic obstructive pulmonary disease, unspecified COPD type        Plan:       Scott was seen today for wheezing and cough.    Diagnoses and all orders for this visit:    Cough        -     guaifenesin-codeine 100-10 mg/5 ml (TUSSI-ORGANIDIN NR)  mg/5 mL syrup; Take 5 mLs by mouth 3 (three) times daily as needed.    Chronic obstructive pulmonary disease, unspecified COPD type  -     PROAIR HFA 90 mcg/actuation inhaler; INHALE 2 PUFFS EBERY 4 HOURS AS NEEDED      Follow-up if symptoms worsen or fail to improve.    "

## 2018-02-15 NOTE — TELEPHONE ENCOUNTER
----- Message from Coleen Watson sent at 2/15/2018 11:11 AM CST -----  Contact: JANICE Low  Patient need a medical necessity form, clinical notes and order faxed over to N. Fax: [phone].    albuterol (PROVENTIL) 2.5 mg /3 mL (0.083 %) nebulizer solution

## 2018-02-16 ENCOUNTER — TELEPHONE (OUTPATIENT)
Dept: FAMILY MEDICINE | Facility: CLINIC | Age: 58
End: 2018-02-16

## 2018-02-16 DIAGNOSIS — E11.9 TYPE 2 DIABETES MELLITUS WITHOUT COMPLICATION: ICD-10-CM

## 2018-02-16 RX ORDER — ALBUTEROL SULFATE 90 UG/1
AEROSOL, METERED RESPIRATORY (INHALATION)
Qty: 18 G | Refills: 6 | Status: SHIPPED | OUTPATIENT
Start: 2018-02-16 | End: 2019-09-25 | Stop reason: SDUPTHER

## 2018-02-16 NOTE — TELEPHONE ENCOUNTER
----- Message from Vargas Lopez sent at 2/16/2018 11:52 AM CST -----  Calling To speak back with nurse to get script ASAP for ventolin fa ,due to Ins not covering

## 2018-02-19 RX ORDER — CYCLOBENZAPRINE HCL 10 MG
TABLET ORAL
Qty: 90 TABLET | Refills: 0 | Status: SHIPPED | OUTPATIENT
Start: 2018-02-19 | End: 2018-03-19 | Stop reason: SDUPTHER

## 2018-02-20 ENCOUNTER — TELEPHONE (OUTPATIENT)
Dept: FAMILY MEDICINE | Facility: CLINIC | Age: 58
End: 2018-02-20

## 2018-02-20 NOTE — TELEPHONE ENCOUNTER
----- Message from Haylee Vázquez sent at 2/15/2018 11:27 AM CST -----  Contact: Becca with [facility] [employer]  Rep states they received orders for equipment for pt.Pt states they will not wear anything if it goes close to their neck.  They would like to know what doctor would like them to do. Call back # [phone].

## 2018-02-21 DIAGNOSIS — E78.5 HYPERLIPIDEMIA, UNSPECIFIED HYPERLIPIDEMIA TYPE: ICD-10-CM

## 2018-02-22 RX ORDER — GEMFIBROZIL 600 MG/1
TABLET, FILM COATED ORAL
Qty: 120 TABLET | Refills: 0 | Status: SHIPPED | OUTPATIENT
Start: 2018-02-22 | End: 2018-02-23 | Stop reason: SDUPTHER

## 2018-02-23 DIAGNOSIS — S16.1XXA NECK STRAIN, INITIAL ENCOUNTER: ICD-10-CM

## 2018-02-23 DIAGNOSIS — I10 ESSENTIAL HYPERTENSION: ICD-10-CM

## 2018-02-23 DIAGNOSIS — E78.5 HYPERLIPIDEMIA, UNSPECIFIED HYPERLIPIDEMIA TYPE: ICD-10-CM

## 2018-02-23 DIAGNOSIS — Z98.1 HISTORY OF FUSION OF CERVICAL SPINE: ICD-10-CM

## 2018-02-23 NOTE — TELEPHONE ENCOUNTER
----- Message from Yanet Becerra sent at 2/23/2018 11:08 AM CST -----  Contact: Self  Refill : furosemide (LASIX) 40 MG tablet             gemfibrozil (LOPID) 600 MG tablet             diclofenac sodium (VOLTAREN) 1 % Gel    Pharmacy     [facility]/PHARMACY #[id number] - [place], [place] - [address].

## 2018-02-25 DIAGNOSIS — I10 ESSENTIAL HYPERTENSION: ICD-10-CM

## 2018-02-26 DIAGNOSIS — G44.229 CHRONIC TENSION-TYPE HEADACHE, NOT INTRACTABLE: ICD-10-CM

## 2018-02-26 DIAGNOSIS — I10 ESSENTIAL HYPERTENSION: ICD-10-CM

## 2018-02-26 DIAGNOSIS — R51.9 NONINTRACTABLE EPISODIC HEADACHE, UNSPECIFIED HEADACHE TYPE: ICD-10-CM

## 2018-02-26 RX ORDER — FUROSEMIDE 40 MG/1
40 TABLET ORAL DAILY
Qty: 30 TABLET | Refills: 2 | Status: SHIPPED | OUTPATIENT
Start: 2018-02-26 | End: 2018-05-21 | Stop reason: SDUPTHER

## 2018-02-26 RX ORDER — NARATRIPTAN 2.5 MG/1
TABLET ORAL
Qty: 12 TABLET | Refills: 2 | OUTPATIENT
Start: 2018-02-26

## 2018-02-26 RX ORDER — BUTALBITAL, ACETAMINOPHEN AND CAFFEINE 50; 325; 40 MG/1; MG/1; MG/1
1 TABLET ORAL EVERY 4 HOURS PRN
Qty: 30 TABLET | Refills: 0 | Status: SHIPPED | OUTPATIENT
Start: 2018-02-26 | End: 2018-03-26 | Stop reason: SDUPTHER

## 2018-02-26 RX ORDER — AMLODIPINE BESYLATE 10 MG/1
10 TABLET ORAL DAILY
Qty: 90 TABLET | Refills: 3 | Status: SHIPPED | OUTPATIENT
Start: 2018-02-26 | End: 2019-02-06 | Stop reason: SDUPTHER

## 2018-02-26 RX ORDER — FUROSEMIDE 40 MG/1
40 TABLET ORAL DAILY
Qty: 30 TABLET | Refills: 2 | Status: SHIPPED | OUTPATIENT
Start: 2018-02-26 | End: 2018-02-26 | Stop reason: SDUPTHER

## 2018-02-26 RX ORDER — GEMFIBROZIL 600 MG/1
TABLET, FILM COATED ORAL
Qty: 120 TABLET | Refills: 0 | Status: SHIPPED | OUTPATIENT
Start: 2018-02-26 | End: 2018-05-22 | Stop reason: SDUPTHER

## 2018-02-26 RX ORDER — DICLOFENAC SODIUM 10 MG/G
2 GEL TOPICAL DAILY
Qty: 100 G | Refills: 0 | Status: SHIPPED | OUTPATIENT
Start: 2018-02-26 | End: 2019-12-04 | Stop reason: SDUPTHER

## 2018-02-26 NOTE — TELEPHONE ENCOUNTER
----- Message from Laura Gonzales sent at 2/26/2018 12:27 PM CST -----  Contact: Self/[phone]  Refill:  naratriptan (AMERGE) 2.5 MG tablet    Pharmacy:  [facility]/PHARMACY #[id number] - [place], [place] - [address]. Thank you.

## 2018-02-26 NOTE — TELEPHONE ENCOUNTER
----- Message from Bambi Reyes sent at 2/26/2018 12:10 PM CST -----  Contact: self  Refill: butalbital-acetaminophen-caffeine -40 mg (FIORICET, ESGIC) -40 mg per tablet    [id number] - [place], [place] - [address]    Patient can be reached at [phone]. Thank you!

## 2018-02-27 DIAGNOSIS — J01.90 ACUTE RHINOSINUSITIS: ICD-10-CM

## 2018-02-27 DIAGNOSIS — J20.9 ACUTE BRONCHITIS, UNSPECIFIED ORGANISM: ICD-10-CM

## 2018-02-27 RX ORDER — NARATRIPTAN 2.5 MG/1
TABLET ORAL
Qty: 12 TABLET | Refills: 2 | Status: SHIPPED | OUTPATIENT
Start: 2018-02-27 | End: 2018-05-21 | Stop reason: SDUPTHER

## 2018-02-27 RX ORDER — FLUTICASONE PROPIONATE 50 MCG
SPRAY, SUSPENSION (ML) NASAL
Qty: 16 G | Refills: 5 | Status: SHIPPED | OUTPATIENT
Start: 2018-02-27 | End: 2019-02-04 | Stop reason: SDUPTHER

## 2018-02-27 NOTE — TELEPHONE ENCOUNTER
----- Message from Haylee Vázquez sent at 2/27/2018 11:22 AM CST -----  Contact: self  Refill request for --- fluticasone (FLONASE) 50 mcg/actuation nasal spray--- to [facility] [address] [facility] [place]. Pt call back  [phone].

## 2018-02-28 RX ORDER — TAMSULOSIN HYDROCHLORIDE 0.4 MG/1
CAPSULE ORAL
Qty: 30 CAPSULE | Refills: 2 | Status: SHIPPED | OUTPATIENT
Start: 2018-02-28 | End: 2018-06-16 | Stop reason: SDUPTHER

## 2018-03-01 ENCOUNTER — OFFICE VISIT (OUTPATIENT)
Dept: FAMILY MEDICINE | Facility: CLINIC | Age: 58
End: 2018-03-01
Payer: MEDICARE

## 2018-03-01 VITALS
SYSTOLIC BLOOD PRESSURE: 112 MMHG | WEIGHT: 258.06 LBS | HEART RATE: 105 BPM | BODY MASS INDEX: 38.22 KG/M2 | TEMPERATURE: 99 F | OXYGEN SATURATION: 96 % | HEIGHT: 69 IN | DIASTOLIC BLOOD PRESSURE: 80 MMHG

## 2018-03-01 DIAGNOSIS — R06.2 WHEEZING ON LEFT SIDE OF CHEST ON EXHALATION: ICD-10-CM

## 2018-03-01 DIAGNOSIS — R05.9 COUGH: Primary | ICD-10-CM

## 2018-03-01 PROCEDURE — 3079F DIAST BP 80-89 MM HG: CPT | Mod: S$GLB,,, | Performed by: NURSE PRACTITIONER

## 2018-03-01 PROCEDURE — 99999 PR PBB SHADOW E&M-EST. PATIENT-LVL IV: CPT | Mod: PBBFAC,,, | Performed by: NURSE PRACTITIONER

## 2018-03-01 PROCEDURE — 3074F SYST BP LT 130 MM HG: CPT | Mod: S$GLB,,, | Performed by: NURSE PRACTITIONER

## 2018-03-01 PROCEDURE — 94640 AIRWAY INHALATION TREATMENT: CPT | Mod: S$GLB,,, | Performed by: FAMILY MEDICINE

## 2018-03-01 PROCEDURE — 99214 OFFICE O/P EST MOD 30 MIN: CPT | Mod: 25,S$GLB,, | Performed by: NURSE PRACTITIONER

## 2018-03-01 PROCEDURE — 96372 THER/PROPH/DIAG INJ SC/IM: CPT | Mod: 59,S$GLB,, | Performed by: FAMILY MEDICINE

## 2018-03-01 RX ORDER — IPRATROPIUM BROMIDE AND ALBUTEROL SULFATE 2.5; .5 MG/3ML; MG/3ML
3 SOLUTION RESPIRATORY (INHALATION)
Status: COMPLETED | OUTPATIENT
Start: 2018-03-01 | End: 2018-03-01

## 2018-03-01 RX ORDER — BENZONATATE 200 MG/1
200 CAPSULE ORAL 3 TIMES DAILY PRN
Qty: 30 CAPSULE | Refills: 0 | Status: SHIPPED | OUTPATIENT
Start: 2018-03-01 | End: 2018-03-11

## 2018-03-01 RX ORDER — DEXAMETHASONE SODIUM PHOSPHATE 4 MG/ML
8 INJECTION, SOLUTION INTRA-ARTICULAR; INTRALESIONAL; INTRAMUSCULAR; INTRAVENOUS; SOFT TISSUE
Status: COMPLETED | OUTPATIENT
Start: 2018-03-01 | End: 2018-03-01

## 2018-03-01 RX ADMIN — IPRATROPIUM BROMIDE AND ALBUTEROL SULFATE 3 ML: 2.5; .5 SOLUTION RESPIRATORY (INHALATION) at 01:03

## 2018-03-01 RX ADMIN — DEXAMETHASONE SODIUM PHOSPHATE 8 MG: 4 INJECTION, SOLUTION INTRA-ARTICULAR; INTRALESIONAL; INTRAMUSCULAR; INTRAVENOUS; SOFT TISSUE at 01:03

## 2018-03-01 NOTE — PROGRESS NOTES
Subjective:       Patient ID: Scott Bansal is a 57 y.o. male.    Chief Complaint: URI (congestion)    URI    This is a new problem. The current episode started in the past 7 days (On 02/23/2018). The problem has been gradually worsening. There has been no fever. Associated symptoms include coughing, headaches and wheezing. Pertinent negatives include no ear pain, plugged ear sensation, rhinorrhea, sinus pain, sneezing or sore throat. Treatments tried: nebulizer, inhaler, mucinex DM. The treatment provided mild relief.       Past Medical History:   Diagnosis Date    Bipolar 1 disorder, mixed     BPH (benign prostatic hypertrophy)     Diabetes mellitus     GERD (gastroesophageal reflux disease)     Hyperlipidemia     Hypertension     Migraine headache        Social History     Social History    Marital status:      Spouse name: N/A    Number of children: N/A    Years of education: N/A     Occupational History    Not on file.     Social History Main Topics    Smoking status: Former Smoker    Smokeless tobacco: Never Used    Alcohol use Yes    Drug use: No    Sexual activity: Yes     Partners: Female     Other Topics Concern    Not on file     Social History Narrative    No narrative on file       Past Surgical History:   Procedure Laterality Date    CERVICAL SPINE SURGERY      COLONOSCOPY N/A 7/27/2017    Procedure: COLONOSCOPY;  Surgeon: Genaro Nix MD;  Location: [facility];  Service: Endoscopy;  Laterality: N/A;    SHOULDER SURGERY      TONSILLECTOMY         Review of Systems   Constitutional: Negative for chills and fever.   HENT: Negative for ear pain, postnasal drip, rhinorrhea, sinus pain, sneezing and sore throat.    Respiratory: Positive for cough and wheezing. Negative for shortness of breath.    Neurological: Positive for headaches.   All other systems reviewed and are negative.      Objective:   /80 (BP Location: Right arm, Patient Position: Sitting, BP Method:  "Large (Manual))   Pulse 105   Temp 98.6 °F (37 °C) (Oral)   Ht 5' 9" (1.753 m)   Wt 117 kg (258 lb 0.8 oz)   SpO2 96%   BMI 38.11 kg/m²      Physical Exam   Constitutional: He is oriented to person, place, and time. He appears well-developed and well-nourished. He is cooperative.   HENT:   Head: Normocephalic and atraumatic.   Nose: No mucosal edema or rhinorrhea.   Mouth/Throat: No oropharyngeal exudate, posterior oropharyngeal edema or posterior oropharyngeal erythema.   Cardiovascular: Normal rate, regular rhythm and normal heart sounds.    Pulmonary/Chest: Effort normal. No respiratory distress. He has no decreased breath sounds. He has wheezes in the left lower field. He has no rhonchi. He has no rales.   Lungs clear after breathing treatment   Neurological: He is alert and oriented to person, place, and time.   Skin: Skin is warm, dry and intact.   Psychiatric: He has a normal mood and affect. His speech is normal and behavior is normal.   Vitals reviewed.      Assessment:       1. Cough    2. Wheezing on left side of chest on exhalation        Plan:       Scott was seen today for uri.    Diagnoses and all orders for this visit:    Cough  -     benzonatate (TESSALON) 200 MG capsule; Take 1 capsule (200 mg total) by mouth 3 (three) times daily as needed.  -     dexamethasone injection 8 mg; Inject 2 mLs (8 mg total) into the muscle one time.     Wheezing on left side of chest on exhalation      Never got the Breo because he could not afford it. Will give steroid shot today and breathing treatment. Reassessed lungs clear.  Follow-up if symptoms worsen or fail to improve.    "

## 2018-03-02 ENCOUNTER — TELEPHONE (OUTPATIENT)
Dept: FAMILY MEDICINE | Facility: CLINIC | Age: 58
End: 2018-03-02

## 2018-03-02 NOTE — TELEPHONE ENCOUNTER
----- Message from Remberto Lunsford sent at 3/2/2018 10:27 AM CST -----  Contact: Hilary/CHRISTINE AMBROSIO pharmacist  Hilary called requesting diagnosis code for diclofenac sodium (VOLTAREN) 1 % Gel. Contact her at [phone].    Thanks-

## 2018-03-19 NOTE — TELEPHONE ENCOUNTER
----- Message from Ailyn Castillo sent at 3/19/2018 11:26 AM CDT -----  Contact: [phone]  Pt is requesting a refill on the cyclobenzaprine (FLEXERIL) 10 MG tablet please send to [facility]/PHARMACY #[id number] - [place], [place] - [address] [facility].

## 2018-03-20 RX ORDER — CYCLOBENZAPRINE HCL 10 MG
TABLET ORAL
Qty: 90 TABLET | Refills: 0 | Status: SHIPPED | OUTPATIENT
Start: 2018-03-20 | End: 2018-04-16 | Stop reason: SDUPTHER

## 2018-03-24 DIAGNOSIS — E78.5 HYPERLIPIDEMIA, UNSPECIFIED HYPERLIPIDEMIA TYPE: ICD-10-CM

## 2018-03-26 DIAGNOSIS — R51.9 NONINTRACTABLE EPISODIC HEADACHE, UNSPECIFIED HEADACHE TYPE: ICD-10-CM

## 2018-03-26 RX ORDER — BUTALBITAL, ACETAMINOPHEN AND CAFFEINE 50; 325; 40 MG/1; MG/1; MG/1
1 TABLET ORAL EVERY 4 HOURS PRN
Qty: 30 TABLET | Refills: 0 | Status: SHIPPED | OUTPATIENT
Start: 2018-03-26 | End: 2018-04-24 | Stop reason: SDUPTHER

## 2018-03-26 RX ORDER — SIMVASTATIN 80 MG/1
80 TABLET, FILM COATED ORAL DAILY
Qty: 90 TABLET | Refills: 3 | Status: SHIPPED | OUTPATIENT
Start: 2018-03-26 | End: 2019-03-10 | Stop reason: SDUPTHER

## 2018-03-26 NOTE — TELEPHONE ENCOUNTER
----- Message from Remberto Lunsford sent at 3/26/2018 10:21 AM CDT -----  Contact: self  Pt is requesting refill for butalbital-acetaminophen-caffeine -40 mg (FIORICET, ESGIC) -40 mg per tablet. Send to [facility].  [address] [id number]    Contact pt at [phone].    Thanks-

## 2018-03-27 ENCOUNTER — CLINICAL SUPPORT (OUTPATIENT)
Dept: OPHTHALMOLOGY | Facility: CLINIC | Age: 58
End: 2018-03-27
Attending: FAMILY MEDICINE
Payer: MEDICARE

## 2018-03-27 ENCOUNTER — TELEPHONE (OUTPATIENT)
Dept: OPTOMETRY | Facility: CLINIC | Age: 58
End: 2018-03-27

## 2018-03-27 ENCOUNTER — HOSPITAL ENCOUNTER (OUTPATIENT)
Dept: RADIOLOGY | Facility: HOSPITAL | Age: 58
Discharge: HOME | End: 2018-03-27
Attending: NURSE PRACTITIONER
Payer: MEDICARE

## 2018-03-27 ENCOUNTER — TELEPHONE (OUTPATIENT)
Dept: FAMILY MEDICINE | Facility: CLINIC | Age: 58
End: 2018-03-27

## 2018-03-27 ENCOUNTER — OFFICE VISIT (OUTPATIENT)
Dept: FAMILY MEDICINE | Facility: CLINIC | Age: 58
End: 2018-03-27
Payer: MEDICARE

## 2018-03-27 VITALS
HEART RATE: 101 BPM | SYSTOLIC BLOOD PRESSURE: 144 MMHG | DIASTOLIC BLOOD PRESSURE: 98 MMHG | WEIGHT: 257.63 LBS | BODY MASS INDEX: 38.16 KG/M2 | OXYGEN SATURATION: 98 % | HEIGHT: 69 IN | TEMPERATURE: 98 F

## 2018-03-27 DIAGNOSIS — M54.50 CHRONIC BILATERAL LOW BACK PAIN WITHOUT SCIATICA: ICD-10-CM

## 2018-03-27 DIAGNOSIS — G89.29 CHRONIC BILATERAL LOW BACK PAIN WITHOUT SCIATICA: Primary | ICD-10-CM

## 2018-03-27 DIAGNOSIS — M54.50 CHRONIC BILATERAL LOW BACK PAIN WITHOUT SCIATICA: Primary | ICD-10-CM

## 2018-03-27 DIAGNOSIS — M54.2 CERVICAL PAIN (NECK): ICD-10-CM

## 2018-03-27 DIAGNOSIS — E11.9 TYPE 2 DIABETES MELLITUS WITHOUT COMPLICATION, WITHOUT LONG-TERM CURRENT USE OF INSULIN: ICD-10-CM

## 2018-03-27 DIAGNOSIS — G89.29 CHRONIC BILATERAL LOW BACK PAIN WITHOUT SCIATICA: ICD-10-CM

## 2018-03-27 DIAGNOSIS — Z98.1 HISTORY OF FUSION OF CERVICAL SPINE: ICD-10-CM

## 2018-03-27 PROCEDURE — 99999 PR PBB SHADOW E&M-EST. PATIENT-LVL III: CPT | Mod: PBBFAC,,,

## 2018-03-27 PROCEDURE — 3080F DIAST BP >= 90 MM HG: CPT | Mod: CPTII,S$GLB,, | Performed by: NURSE PRACTITIONER

## 2018-03-27 PROCEDURE — 99214 OFFICE O/P EST MOD 30 MIN: CPT | Mod: S$GLB,,, | Performed by: NURSE PRACTITIONER

## 2018-03-27 PROCEDURE — 99999 PR PBB SHADOW E&M-EST. PATIENT-LVL V: CPT | Mod: PBBFAC,,, | Performed by: NURSE PRACTITIONER

## 2018-03-27 PROCEDURE — 72100 X-RAY EXAM L-S SPINE 2/3 VWS: CPT | Mod: 26,,, | Performed by: RADIOLOGY

## 2018-03-27 PROCEDURE — 72040 X-RAY EXAM NECK SPINE 2-3 VW: CPT | Mod: TC,FY,PO

## 2018-03-27 PROCEDURE — 72040 X-RAY EXAM NECK SPINE 2-3 VW: CPT | Mod: 26,,, | Performed by: RADIOLOGY

## 2018-03-27 PROCEDURE — 92250 FUNDUS PHOTOGRAPHY W/I&R: CPT | Mod: S$GLB,,, | Performed by: OPHTHALMOLOGY

## 2018-03-27 PROCEDURE — 72100 X-RAY EXAM L-S SPINE 2/3 VWS: CPT | Mod: TC,FY,PO

## 2018-03-27 PROCEDURE — 3077F SYST BP >= 140 MM HG: CPT | Mod: CPTII,S$GLB,, | Performed by: NURSE PRACTITIONER

## 2018-03-27 NOTE — PATIENT INSTRUCTIONS
Self-Care for Low Back Pain    Most people have low back pain now and then. In many cases, it isnt serious and self-care can help. Sometimes low back pain can be a sign of a bigger problem. Call your healthcare provider if your pain returns often or gets worse over time. For the long-term care of your back, get regular exercise, lose any excess weight and learn good posture.  Take a short rest  Lying down during the day may be beneficial for short periods of time if severe pain increases with sitting or standing. Long-term bed rest could be detrimental.  Reduce pain and swelling  Cold reduces swelling. Both cold and heat can reduce pain. Protect your skin by placing a towel between your body and the ice or heat source.  · For the first few days, apply an ice pack for 15 to 20 minutes .  · After the first few days, try heat for 15 minutes at a time to ease pain. Never sleep on a heating pad.  · Over-the-counter medicine can help control pain and swelling. Try aspirin or ibuprofen.  Exercise  Exercise can help your back heal. It also helps your back get stronger and more flexible, preventing any reinjury. Ask your healthcare provider about specific exercises for your back.  Use good posture to avoid reinjury  · When moving, bend at the hips and knees. Dont bend at the waist or twist around.  · When lifting, keep the object close to your body. Dont try to lift more than you can handle.  · When sitting, keep your lower back supported. Use a rolled-up towel as needed.  Seek immediate medical care if:  · Youre unable to stand or walk.  · You have a temperature over 100.4°F (38.0°C)  · You have frequent, painful, or bloody urination.  · You have severe abdominal pain.  · You have a sharp, stabbing pain.  · Your pain is constant.  · You have pain or numbness in your leg.  · You feel pain in a new area of your back.  · You notice that the pain isnt decreasing after more than a week.   Date Last Reviewed: 9/29/2015  ©  8150-7623 [employer]. [address] [place] [address], [place] [address]. All rights reserved. This information is not intended as a substitute for professional medical care. Always follow your healthcare professional's instructions.        Back Pain (Acute or Chronic)    Back pain is one of the most common problems. The good news is that most people feel better in 1 to 2 weeks, and most of the rest in 1 to 2 months. Most people can remain active.  People experience and describe pain differently; not everyone is the same.  · The pain can be sharp, stabbing, shooting, aching, cramping or burning.  · Movement, standing, bending, lifting, sitting, or walking may worsen pain.  · It can be localized to one spot or area, or it can be more generalized.  · It can spread or radiate upwards, to the front, or go down your arms or legs (sciatica).  · It can cause muscle spasm.  Most of the time, mechanical problems with the muscles or spine cause the pain. Mechanical problems are usually caused by an injury to the muscles or ligaments. While illness can cause back pain, it is usually not caused by a serious illness. Mechanical problems include:   · Physical activity such as sports, exercise, work, or normal activity  · Overexertion, lifting, pushing, pulling incorrectly or too aggressively  · Sudden twisting, bending, or stretching from an accident, or accidental movement  · Poor posture  · Stretching or moving wrong, without noticing pain at the time  · Poor coordination, lack of regular exercise (check with your doctor about this)  · Spinal disc disease or arthritis  · Stress  Pain can also be related to pregnancy, or illness like appendicitis, bladder or kidney infections, pelvic infections, and many other things.  Acute back pain usually gets better in 1 to 2 weeks. Back pain related to disk disease, arthritis in the spinal joints or spinal stenosis (narrowing of the spinal canal) can become chronic and last for  months or years.  Unless you had a physical injury (for example, a car accident or fall) X-rays are usually not needed for the initial evaluation of back pain. If pain continues and does not respond to medical treatment, X-rays and other tests may be needed.  Home care  Try these home care recommendations:  · When in bed, try to find a position of comfort. A firm mattress is best. Try lying flat on your back with pillows under your knees. You can also try lying on your side with your knees bent up towards your chest and a pillow between your knees.  · At first, do not try to stretch out the sore spots. If there is a strain, it is not like the good soreness you get after exercising without an injury. In this case, stretching may make it worse.  · Avoid prolong sitting, long car rides, or travel. This puts more stress on the lower back than standing or walking.  · During the first 24 to 72 hours after an acute injury or flare up of chronic back pain, apply an ice pack to the painful area for 20 minutes and then remove it for 20 minutes. Do this over a period of 60 to 90 minutes or several times a day. This will reduce swelling and pain. Wrap the ice pack in a thin towel or plastic to protect your skin.  · You can start with ice, then switch to heat. Heat (hot shower, hot bath, or heating pad) reduces pain and works well for muscle spasms. Heat can be applied to the painful area for 20 minutes then remove it for 20 minutes. Do this over a period of 60 to 90 minutes or several times a day. Do not sleep on a heating pad. It can lead to skin burns or tissue damage.  · You can alternate ice and heat therapy. Talk with your doctor about the best treatment for your back pain.  · Therapeutic massage can help relax the back muscles without stretching them.  · Be aware of safe lifting methods and do not lift anything without stretching first.  Medicines  Talk to your doctor before using medicine, especially if you have other  medical problems or are taking other medicines.  · You may use over-the-counter medicine as directed on the bottle to control pain, unless another pain medicine was prescribed. If you have chronic conditions like diabetes, liver or kidney disease, stomach ulcers, or gastrointestinal bleeding, or are taking blood thinners, talk to your doctor before taking any medicine.  · Be careful if you are given a prescription medicines, narcotics, or medicine for muscle spasms. They can cause drowsiness, affect your coordination, reflexes, and judgement. Do not drive or operate heavy machinery.  Follow-up care  Follow up with your healthcare provider, or as advised.   A radiologist will review any X-rays that were taken. Your provide will notify you of any new findings that may affect your care.  Call 911  Call emergency services if any of the following occur:  · Trouble breathing  · Confusion  · Very drowsy or trouble awakening  · Fainting or loss of consciousness  · Rapid or very slow heart rate  · Loss of bowel or bladder control  When to seek medical advice  Call your healthcare provider right away if any of these occur:   · Pain becomes worse or spreads to your legs  · Weakness or numbness in one or both legs  · Numbness in the groin or genital area  Date Last Reviewed: 7/1/2016  © 5684-7819 [employer]. [address] [place] [address], [place] [address]. All rights reserved. This information is not intended as a substitute for professional medical care. Always follow your healthcare professional's instructions.        Neck Pain    There are several possible causes of neck pain when there is no injury:  · You can get a minor ligament sprain or muscle strain from a sudden minor neck movement. Sleeping with your neck in an awkward position can also cause this.  · Some people respond to emotional stress by tensing the muscles of their neck, shoulders, and upper back. Chronic spasm in these muscles can cause neck pain and  sometimes headaches.  · Gradual wear and tear of the joints in the spine can cause degenerative arthritis. This can be a source of occasional or chronic neck pain.  · The spinal disks may bulge and put pressure on a nearby spinal nerve. This can happen as a natural result of aging or repeated small injuries to the neck. The spinal disks are the cushions between each spinal bone. This causes tingling, pain, or numbness that spreads from the neck to the shoulder, arm, or hand on one side.  Acute neck pain usually gets better in 1 to 2 weeks. Neck pain related to disk disease, arthritis in the spinal joints, or spinal stenosis can become chronic and last for months or years. Spinal stenosis is narrowing of the spinal canal.  X-rays are usually not ordered for the initial evaluation of neck pain. However, X-rays may be done if you had a forceful physical injury, such as a car accident or fall. If pain continues and doesnt respond to medical treatment, X-rays and other tests may be done at a later time.  Home care  · Rest and relax the muscles. Use a comfortable pillow that supports the head. It should also help keep the spine in a neutral position. The position of the head should not be tilted forward or backward. A rolled up towel may help for a custom fit.  · Some people find relief with heat. Heat can be applied with either a warm shower or bath or a moist towel heated in the microwave and massage. Others prefer cold packs. You can make an ice pack by filling a plastic bag that seals at the top with ice cubes or crushed ice and then wrapping it with a thin towel. Try both and use the method that feels best for 15 to 20 minutes, several times a day.  · Whether using ice or heat, be careful that you do not injure your skin. Never put ice directly on the skin. Always wrap the ice in a towel or other type of cloth.This is very important, especially in people with poor skin sensations.   · Try to reduce your stress  level. Emotional stress can lead to neck muscle tension and get in the way of or delay the healing process.  · You may use over-the-counter pain medicine to control pain, unless another medicine was prescribed. If you have chronic liver or kidney disease or ever had a stomach ulcer or GI bleeding, talk with your healthcare provider before using these medicines.  Follow-up care  Follow up with your healthcare provider if your symptoms do not show signs of improvement after one week. Physical therapy or further tests may be needed.  If X-rays, CT scans, or MRI scans were taken, you will be told of any new findings that may affect your care.  Call 911  Call 911 if you have:  · Sudden weakness or numbness in one or both arms  · Neck swelling, difficulty or painful swallowing  · Difficulty breathing  · Chest pain  When to seek medical advice  Call your healthcare provider right away if any of these occur:  · Pain becomes worse or spreads into one or both arm  · Increasing headache  · Fever of 100.4°F (38°C) or above lasting for 24 to 48 hours  Date Last Reviewed: 7/1/2016  © 2499-3277 [employer]. [address] [place] [address]. All rights reserved. This information is not intended as a substitute for professional medical care. Always follow your healthcare professional's instructions.        Know Your Neck: The Cervical Spine  By learning about the parts of the neck, you can better understand your neck problem. The bones of the neck are called cervical vertebrae, commonly identified as C1 through C7. Together, they form a bony column called the spine. Vertebrae also protect the spinal cord, a pathway for messages to reach the brain. Surrounding the spine are soft tissues such as muscles, tendons, and nerves.          Flexibility Is Key  For the neck to function normally, it has to be flexible enough to move without discomfort. A healthy neck can move easily in six different directions.      Flexion and extension move the head forward and backward.      Rotation turns the head from left to right, with the chin over the shoulders.      Lateral bending moves the head from side to side.     Date Last Reviewed: 10/1/2015  © 5700-7025 [employer]. [address] [place] [address]. All rights reserved. This information is not intended as a substitute for professional medical care. Always follow your healthcare professional's instructions.

## 2018-03-27 NOTE — PROGRESS NOTES
HPI     Scott Bansal here for diabetic eye exam with non-dilated fundus   photos.    Last eye exam: ~1 wk ago, Dr. Broussard.  Small pupils: yes  Pt cooperative: yes      Last edited by Isidro Becerra on 3/27/2018 11:43 AM. (History)            Assessment /Plan     For exam results, see Encounter Report.    Type 2 diabetes mellitus without complication, without long-term current use of insulin  -     Diabetic Eye Screening Photo      Please see Dr. Barron's progress notes for interpretation.

## 2018-03-27 NOTE — Clinical Note
HPI    Scott Bansal here for diabetic eye exam with non-dilated fundus  photos.  Last eye exam: ~1 wk ago, Dr. Broussard. Small pupils: yes Pt cooperative: yes    Last edited by Isidro Becerra on 3/27/2018 11:43 AM. (History)

## 2018-03-27 NOTE — TELEPHONE ENCOUNTER
----- Message from Ally Gunter sent at 3/27/2018  2:04 PM CDT -----  Contact: self  Patient requesting test results. Please contact him at [phone].    Thanks!

## 2018-03-27 NOTE — TELEPHONE ENCOUNTER
----- Message from Suellen Rodriguez sent at 3/27/2018  2:24 PM CDT -----      ----- Message -----  From: Tee Barron MD  Sent: 3/27/2018  12:14 PM  To: Ian Domingo

## 2018-03-27 NOTE — TELEPHONE ENCOUNTER
----- Message from CATHY Calvillo sent at 3/27/2018  4:47 PM CDT -----  Please inform patient his xray shows he has arthritis. Please print him a copy and let him know he can pick it up.

## 2018-03-27 NOTE — PROGRESS NOTES
Subjective:       Patient ID: Scott Bansal is a 57 y.o. male.    Chief Complaint: Neck Pain (pt states neck and back pain )    Back Pain   This is a chronic problem. The current episode started more than 1 year ago. The problem occurs constantly. The problem has been gradually worsening since onset. The pain is present in the lumbar spine. The quality of the pain is described as burning. The pain radiates to the right thigh. The pain is at a severity of 9/10. The pain is the same all the time. The symptoms are aggravated by twisting and bending. Associated symptoms include leg pain. Pertinent negatives include no bladder incontinence, bowel incontinence, headaches or weakness. He has tried analgesics and NSAIDs (steroid injection) for the symptoms. The treatment provided mild relief.       Past Medical History:   Diagnosis Date    Bipolar 1 disorder, mixed     BPH (benign prostatic hypertrophy)     Diabetes mellitus     GERD (gastroesophageal reflux disease)     Hyperlipidemia     Hypertension     Migraine headache        Social History     Social History    Marital status:      Spouse name: N/A    Number of children: N/A    Years of education: N/A     Occupational History    Not on file.     Social History Main Topics    Smoking status: Former Smoker    Smokeless tobacco: Never Used    Alcohol use Yes    Drug use: No    Sexual activity: Yes     Partners: Female     Other Topics Concern    Not on file     Social History Narrative    No narrative on file       Past Surgical History:   Procedure Laterality Date    CERVICAL SPINE SURGERY      COLONOSCOPY N/A 7/27/2017    Procedure: COLONOSCOPY;  Surgeon: Genaro Nix MD;  Location: [facility];  Service: Endoscopy;  Laterality: N/A;    SHOULDER SURGERY      TONSILLECTOMY         Review of Systems   Gastrointestinal: Negative for bowel incontinence.   Genitourinary: Negative for bladder incontinence.   Musculoskeletal: Positive for  "arthralgias, back pain, neck pain and neck stiffness.   Neurological: Negative for dizziness, weakness, light-headedness and headaches.   All other systems reviewed and are negative.      Objective:   BP (!) 144/98 (BP Location: Left arm, Patient Position: Sitting, BP Method: Large (Manual))   Pulse 101   Temp 97.8 °F (36.6 °C) (Oral)   Ht 5' 9" (1.753 m)   Wt 116.8 kg (257 lb 9.7 oz)   SpO2 98%   BMI 38.04 kg/m²      Physical Exam   Constitutional: He is oriented to person, place, and time. He appears well-developed and well-nourished.   HENT:   Head: Normocephalic and atraumatic.   Neck: Neck supple. Muscular tenderness present. Decreased range of motion present.   Cardiovascular: Normal rate, regular rhythm and normal heart sounds.    Pulmonary/Chest: Effort normal and breath sounds normal. No respiratory distress. He has no decreased breath sounds. He has no wheezes. He has no rhonchi. He has no rales.   Musculoskeletal:        Cervical back: He exhibits decreased range of motion and pain.   Neurological: He is alert and oriented to person, place, and time.   Skin: Skin is warm, dry and intact. He is not diaphoretic. No pallor.   Psychiatric: He has a normal mood and affect. His speech is normal and behavior is normal.   Vitals reviewed.      Assessment:       1. Chronic bilateral low back pain without sciatica    2. Cervical pain (neck)    3. History of fusion of cervical spine        Plan:       Scott was seen today for neck pain.    Diagnoses and all orders for this visit:    Chronic bilateral low back pain without sciatica  -     X-Ray Lumbar Spine AP And Lateral; Future    Cervical pain (neck)  -     X-Ray Cervical Spine Complete 5 view; Future    History of fusion of cervical spine      Will follow up after results available.  Follow-up if symptoms worsen or fail to improve.    "

## 2018-03-27 NOTE — TELEPHONE ENCOUNTER
Spoke with patient and advised of xray results    He will  a copy of report and disc from medical records

## 2018-03-27 NOTE — TELEPHONE ENCOUNTER
----- Message from Dominique Do sent at 3/27/2018  4:53 PM CDT -----  Contact: Scott [phone]  Pt is calling to get the results for his xray and urine sample. Please call at your earliest convenience.

## 2018-03-28 ENCOUNTER — TELEPHONE (OUTPATIENT)
Dept: FAMILY MEDICINE | Facility: CLINIC | Age: 58
End: 2018-03-28

## 2018-03-28 NOTE — TELEPHONE ENCOUNTER
Please inform patient his urine test was mildly elevated. This could be if his blood pressure has been running a little high or his blood glucose has been running a little high.

## 2018-04-01 DIAGNOSIS — E11.9 DIABETES MELLITUS WITHOUT COMPLICATION: ICD-10-CM

## 2018-04-02 RX ORDER — METFORMIN HYDROCHLORIDE 1000 MG/1
TABLET ORAL
Qty: 180 TABLET | Refills: 0 | Status: SHIPPED | OUTPATIENT
Start: 2018-04-02 | End: 2018-06-29 | Stop reason: SDUPTHER

## 2018-04-16 RX ORDER — CYCLOBENZAPRINE HCL 10 MG
TABLET ORAL
Qty: 90 TABLET | Refills: 0 | Status: SHIPPED | OUTPATIENT
Start: 2018-04-16 | End: 2018-09-06

## 2018-04-16 RX ORDER — CYCLOBENZAPRINE HCL 10 MG
TABLET ORAL
Qty: 90 TABLET | Refills: 0 | Status: SHIPPED | OUTPATIENT
Start: 2018-04-16 | End: 2018-05-12 | Stop reason: SDUPTHER

## 2018-04-16 NOTE — TELEPHONE ENCOUNTER
----- Message from Bambi Reyes sent at 4/16/2018 10:04 AM CDT -----  Contact: self  Refill: cyclobenzaprine (FLEXERIL) 10 MG tablet    [id number] - [place] [address].    Pt can be reached at [phone]. Thank you!

## 2018-04-20 DIAGNOSIS — I10 ESSENTIAL HYPERTENSION: ICD-10-CM

## 2018-04-20 RX ORDER — IRBESARTAN 300 MG/1
300 TABLET ORAL NIGHTLY
Qty: 90 TABLET | Refills: 3 | Status: SHIPPED | OUTPATIENT
Start: 2018-04-20 | End: 2019-07-10 | Stop reason: SDUPTHER

## 2018-04-24 DIAGNOSIS — R51.9 NONINTRACTABLE EPISODIC HEADACHE, UNSPECIFIED HEADACHE TYPE: ICD-10-CM

## 2018-04-24 RX ORDER — BUTALBITAL, ACETAMINOPHEN AND CAFFEINE 50; 325; 40 MG/1; MG/1; MG/1
1 TABLET ORAL EVERY 4 HOURS PRN
Qty: 30 TABLET | Refills: 0 | Status: SHIPPED | OUTPATIENT
Start: 2018-04-24 | End: 2018-05-21 | Stop reason: SDUPTHER

## 2018-04-27 ENCOUNTER — OFFICE VISIT (OUTPATIENT)
Dept: FAMILY MEDICINE | Facility: CLINIC | Age: 58
End: 2018-04-27
Payer: MEDICARE

## 2018-04-27 VITALS
TEMPERATURE: 98 F | WEIGHT: 261.44 LBS | BODY MASS INDEX: 38.72 KG/M2 | HEART RATE: 101 BPM | DIASTOLIC BLOOD PRESSURE: 96 MMHG | HEIGHT: 69 IN | OXYGEN SATURATION: 95 % | SYSTOLIC BLOOD PRESSURE: 158 MMHG

## 2018-04-27 DIAGNOSIS — M54.31 SCIATICA OF RIGHT SIDE: ICD-10-CM

## 2018-04-27 DIAGNOSIS — B96.89 ACUTE BACTERIAL BRONCHITIS: Primary | ICD-10-CM

## 2018-04-27 DIAGNOSIS — J20.8 ACUTE BACTERIAL BRONCHITIS: Primary | ICD-10-CM

## 2018-04-27 PROCEDURE — 3077F SYST BP >= 140 MM HG: CPT | Mod: CPTII,S$GLB,, | Performed by: NURSE PRACTITIONER

## 2018-04-27 PROCEDURE — 99999 PR PBB SHADOW E&M-EST. PATIENT-LVL V: CPT | Mod: PBBFAC,,, | Performed by: NURSE PRACTITIONER

## 2018-04-27 PROCEDURE — 99214 OFFICE O/P EST MOD 30 MIN: CPT | Mod: 25,S$GLB,, | Performed by: NURSE PRACTITIONER

## 2018-04-27 PROCEDURE — 3080F DIAST BP >= 90 MM HG: CPT | Mod: CPTII,S$GLB,, | Performed by: NURSE PRACTITIONER

## 2018-04-27 PROCEDURE — 96372 THER/PROPH/DIAG INJ SC/IM: CPT | Mod: S$GLB,,, | Performed by: FAMILY MEDICINE

## 2018-04-27 RX ORDER — KETOROLAC TROMETHAMINE 30 MG/ML
30 INJECTION, SOLUTION INTRAMUSCULAR; INTRAVENOUS
Status: COMPLETED | OUTPATIENT
Start: 2018-04-27 | End: 2018-04-27

## 2018-04-27 RX ORDER — METHYLPREDNISOLONE 4 MG/1
TABLET ORAL
Qty: 1 PACKAGE | Refills: 0 | Status: SHIPPED | OUTPATIENT
Start: 2018-04-27 | End: 2018-08-17

## 2018-04-27 RX ORDER — DOXYCYCLINE 100 MG/1
100 CAPSULE ORAL EVERY 12 HOURS
Qty: 20 CAPSULE | Refills: 0 | Status: SHIPPED | OUTPATIENT
Start: 2018-04-27 | End: 2018-05-07

## 2018-04-27 RX ORDER — BENZONATATE 200 MG/1
200 CAPSULE ORAL 3 TIMES DAILY PRN
Qty: 30 CAPSULE | Refills: 0 | Status: SHIPPED | OUTPATIENT
Start: 2018-04-27 | End: 2018-05-07

## 2018-04-27 RX ADMIN — KETOROLAC TROMETHAMINE 30 MG: 30 INJECTION, SOLUTION INTRAMUSCULAR; INTRAVENOUS at 11:04

## 2018-04-27 NOTE — PATIENT INSTRUCTIONS
Acute Bronchitis  Your healthcare provider has told you that you have acute bronchitis. Bronchitis is infection or inflammation of the bronchial tubes (airways in the lungs). Normally, air moves easily in and out of the airways. Bronchitis narrows the airways, making it harder for air to flow in and out of the lungs. This causes symptoms such as shortness of breath, coughing up yellow or green mucus, and wheezing. Bronchitis can be acute or chronic. Acute means the condition comes on quickly and goes away in a short time, usually within 3 to 10 days. Chronic means a condition lasts a long time and often comes back.    What causes acute bronchitis?  Acute bronchitis almost always starts as a viral respiratory infection, such as a cold or the flu. Certain factors make it more likely for a cold or flu to turn into bronchitis. These include being very young, being elderly, having a heart or lung problem, or having a weak immune system. Cigarette smoking also makes bronchitis more likely.  When bronchitis develops, the airways become swollen. The airways may also become infected with bacteria. This is known as a secondary infection.  Diagnosing acute bronchitis  Your healthcare provider will examine you and ask about your symptoms and health history. You may also have a sputum culture to test the fluid in your lungs. Chest X-rays may be done to look for infection in the lungs.  Treating acute bronchitis  Bronchitis usually clears up as the cold or flu goes away. You can help feel better faster by doing the following:  · Take medicine as directed. You may be told to take ibuprofen or other over-the-counter medicines. These help relieve inflammation in your bronchial tubes. Your healthcare provider may prescribe an inhaler to help open up the bronchial tubes. Most of the time, acute bronchitis is caused by a viral infection. Antibiotics are usually not prescribed for viral infections.  · Drink plenty of fluids, such as  water, juice, or warm soup. Fluids loosen mucus so that you can cough it up. This helps you breathe more easily. Fluids also prevent dehydration.  · Make sure you get plenty of rest.  · Do not smoke. Do not allow anyone else to smoke in your home.  Recovery and follow-up  Follow up with your doctor as you are told. You will likely feel better in a week or two. But a dry cough can linger beyond that time. Let your doctor know if you still have symptoms (other than a dry cough) after 2 weeks, or if youre prone to getting bronchial infections. Take steps to protect yourself from future infections. These steps include stopping smoking and avoiding tobacco smoke, washing your hands often, and getting a yearly flu shot.  When to call your healthcare provider  Call the healthcare provider if you have any of the following:  · Fever of 100.4°F (38.0°C) or higher, or as advised  · Symptoms that get worse, or new symptoms  · Trouble breathing  · Symptoms that dont start to improve within a week, or within 3 days of taking antibiotics   Date Last Reviewed: 12/1/2016  © 9137-1569 [employer]. [address] [place] [address], [place] [address]. All rights reserved. This information is not intended as a substitute for professional medical care. Always follow your healthcare professional's instructions.

## 2018-04-27 NOTE — PROGRESS NOTES
Subjective:       Patient ID: Scott Bansal is a 57 y.o. male.    Chief Complaint: URI (wheezing,congestion,headache,backache,cough )    URI    This is a recurrent problem. The current episode started 1 to 4 weeks ago. The problem has been gradually worsening. Associated symptoms include coughing and wheezing. Pertinent negatives include no chest pain, congestion, ear pain, rhinorrhea, sneezing or sore throat. Headaches: pt states hurts from coughing so much. Treatments tried: breathing treatment, inhaler, mucinex. The treatment provided mild relief.       Past Medical History:   Diagnosis Date    Bipolar 1 disorder, mixed     BPH (benign prostatic hypertrophy)     Cyst, eyelid     Dr. Broussard    Diabetes mellitus     GERD (gastroesophageal reflux disease)     Hyperlipidemia     Hypertension     Migraine headache        Social History     Social History    Marital status:      Spouse name: N/A    Number of children: N/A    Years of education: N/A     Occupational History    Not on file.     Social History Main Topics    Smoking status: Former Smoker     Packs/day: 2.00     Years: 15.00     Types: Cigarettes    Smokeless tobacco: Never Used      Comment: Patient quit smoking at the age of 24 yo.    Alcohol use Yes    Drug use: No    Sexual activity: Yes     Partners: Female     Other Topics Concern    Not on file     Social History Narrative    No narrative on file       Past Surgical History:   Procedure Laterality Date    CERVICAL SPINE SURGERY      COLONOSCOPY N/A 7/27/2017    Procedure: COLONOSCOPY;  Surgeon: Genaro Nix MD;  Location: [facility];  Service: Endoscopy;  Laterality: N/A;    EYE SURGERY Left 03/2017    cyst removal on eyelid; Dr. Broussard    SHOULDER SURGERY      TONSILLECTOMY         Review of Systems   Constitutional: Negative for chills and fever.   HENT: Positive for postnasal drip. Negative for congestion, ear pain, rhinorrhea, sinus pressure,  "sneezing and sore throat.    Respiratory: Positive for cough and wheezing.    Cardiovascular: Negative for chest pain.   Neurological: Headaches: pt states hurts from coughing so much.   All other systems reviewed and are negative.      Objective:   BP (!) 158/96 (BP Location: Right arm, Patient Position: Sitting, BP Method: Large (Manual))   Pulse 101   Temp 98.2 °F (36.8 °C) (Oral)   Ht 5' 9" (1.753 m)   Wt 118.6 kg (261 lb 7.5 oz)   SpO2 95%   BMI 38.61 kg/m²      Physical Exam   Constitutional: He is oriented to person, place, and time. He appears well-developed and well-nourished. He is cooperative.   HENT:   Head: Normocephalic and atraumatic.   Right Ear: Hearing, tympanic membrane, external ear and ear canal normal.   Left Ear: Hearing, tympanic membrane, external ear and ear canal normal.   Nose: No mucosal edema or rhinorrhea.   Mouth/Throat: No oropharyngeal exudate, posterior oropharyngeal edema or posterior oropharyngeal erythema.   Cardiovascular: Normal rate, regular rhythm and normal heart sounds.    Pulmonary/Chest: Effort normal. No respiratory distress. He has decreased breath sounds in the right lower field and the left lower field. He has wheezes in the right lower field and the left lower field. He has no rhonchi. He has no rales.   Neurological: He is alert and oriented to person, place, and time.   Skin: Skin is warm, dry and intact.   Psychiatric: He has a normal mood and affect. His speech is normal and behavior is normal.   Vitals reviewed.      Assessment:       1. Acute bacterial bronchitis        Plan:       Scott was seen today for uri.    Diagnoses and all orders for this visit:    Acute bacterial bronchitis, unspecified organism  -     methylPREDNISolone (MEDROL DOSEPACK) 4 mg tablet; use as directed  -     benzonatate (TESSALON) 200 MG capsule; Take 1 capsule (200 mg total) by mouth 3 (three) times daily as needed.  -     Increase your water intake to 64-80 oz daily to help " thin mucus  -     Nasal Saline spray (Over the counter [place] spray or [place])  2 sprays each nostril 2-3 times a day for nasal congestion  -     Tylenol 500 mg 2 tablets or Ibuprofen 200 mg 2 tablets every 4-6 hours as needed for fever, headaches, sore throat, ear pain, bodyaches, and/or nasal/sinus inflammation  -     Warm salt water gargles with 1/2 cup water and 1 tablespoon salt every 4 hours  -     Warm tea with honey and lemon    Follow-up if symptoms worsen or fail to improve.

## 2018-04-30 ENCOUNTER — OFFICE VISIT (OUTPATIENT)
Dept: FAMILY MEDICINE | Facility: CLINIC | Age: 58
End: 2018-04-30
Payer: MEDICARE

## 2018-04-30 VITALS
OXYGEN SATURATION: 97 % | BODY MASS INDEX: 37.67 KG/M2 | HEART RATE: 92 BPM | TEMPERATURE: 98 F | DIASTOLIC BLOOD PRESSURE: 110 MMHG | HEIGHT: 69 IN | SYSTOLIC BLOOD PRESSURE: 170 MMHG | WEIGHT: 254.31 LBS

## 2018-04-30 DIAGNOSIS — J41.1 MUCOPURULENT CHRONIC BRONCHITIS: Primary | ICD-10-CM

## 2018-04-30 PROCEDURE — 3077F SYST BP >= 140 MM HG: CPT | Mod: CPTII,S$GLB,, | Performed by: NURSE PRACTITIONER

## 2018-04-30 PROCEDURE — 99214 OFFICE O/P EST MOD 30 MIN: CPT | Mod: 25,S$GLB,, | Performed by: NURSE PRACTITIONER

## 2018-04-30 PROCEDURE — 3080F DIAST BP >= 90 MM HG: CPT | Mod: CPTII,S$GLB,, | Performed by: NURSE PRACTITIONER

## 2018-04-30 PROCEDURE — 96372 THER/PROPH/DIAG INJ SC/IM: CPT | Mod: S$GLB,,, | Performed by: FAMILY MEDICINE

## 2018-04-30 PROCEDURE — 99999 PR PBB SHADOW E&M-EST. PATIENT-LVL III: CPT | Mod: PBBFAC,,, | Performed by: NURSE PRACTITIONER

## 2018-04-30 RX ORDER — CEFTRIAXONE 1 G/1
1 INJECTION, POWDER, FOR SOLUTION INTRAMUSCULAR; INTRAVENOUS
Status: COMPLETED | OUTPATIENT
Start: 2018-04-30 | End: 2018-04-30

## 2018-04-30 RX ADMIN — CEFTRIAXONE 1 G: 1 INJECTION, POWDER, FOR SOLUTION INTRAMUSCULAR; INTRAVENOUS at 11:04

## 2018-04-30 NOTE — PROGRESS NOTES
Subjective:       Patient ID: Scott Bansal is a 57 y.o. male.    Chief Complaint: URI    URI    This is a chronic problem. The current episode started more than 1 month ago. The problem has been gradually worsening. There has been no fever. Associated symptoms include congestion, coughing (green mucus) and wheezing. Pertinent negatives include no ear pain, headaches, plugged ear sensation, rhinorrhea, sinus pain, sneezing or sore throat. Treatments tried: on Doxycycline and Tessalon. The treatment provided mild relief.       Past Medical History:   Diagnosis Date    Bipolar 1 disorder, mixed     BPH (benign prostatic hypertrophy)     Cyst, eyelid     Dr. Broussard    Diabetes mellitus     GERD (gastroesophageal reflux disease)     Hyperlipidemia     Hypertension     Migraine headache        Social History     Social History    Marital status:      Spouse name: N/A    Number of children: N/A    Years of education: N/A     Occupational History    Not on file.     Social History Main Topics    Smoking status: Former Smoker     Packs/day: 2.00     Years: 15.00     Types: Cigarettes    Smokeless tobacco: Never Used      Comment: Patient quit smoking at the age of 26 yo.    Alcohol use Yes    Drug use: No    Sexual activity: Yes     Partners: Female     Other Topics Concern    Not on file     Social History Narrative    No narrative on file       Past Surgical History:   Procedure Laterality Date    CERVICAL SPINE SURGERY      COLONOSCOPY N/A 7/27/2017    Procedure: COLONOSCOPY;  Surgeon: Genaro Nix MD;  Location: [facility];  Service: Endoscopy;  Laterality: N/A;    EYE SURGERY Left 03/2017    cyst removal on eyelid; Dr. Broussard    SHOULDER SURGERY      TONSILLECTOMY         Review of Systems   Constitutional: Negative for chills and fever.   HENT: Positive for congestion and postnasal drip. Negative for ear pain, rhinorrhea, sinus pain, sneezing and sore throat.   "  Respiratory: Positive for cough (green mucus) and wheezing.    Neurological: Negative for headaches.   All other systems reviewed and are negative.      Objective:   BP (!) 170/110 (BP Location: Left arm, Patient Position: Sitting, BP Method: Large (Manual))   Pulse 92   Temp 97.6 °F (36.4 °C) (Oral)   Ht 5' 9" (1.753 m)   Wt 115.4 kg (254 lb 4.8 oz)   SpO2 97%   BMI 37.55 kg/m²      Physical Exam   Constitutional: He is oriented to person, place, and time. He appears well-developed and well-nourished. He is cooperative.   HENT:   Head: Normocephalic and atraumatic.   Nose: No mucosal edema or rhinorrhea.   Mouth/Throat: No oropharyngeal exudate, posterior oropharyngeal edema or posterior oropharyngeal erythema.   Cardiovascular: Normal rate, regular rhythm and normal heart sounds.    Pulmonary/Chest: Effort normal. No respiratory distress. He has decreased breath sounds in the right lower field and the left lower field. He has no wheezes. He has no rhonchi. He has no rales.   Neurological: He is alert and oriented to person, place, and time.   Skin: Skin is warm, dry and intact.   Psychiatric: He has a normal mood and affect. His speech is normal and behavior is normal.   Vitals reviewed.      Assessment:       1. Mucopurulent chronic bronchitis        Plan:       Scott was seen today for uri.    Diagnoses and all orders for this visit:    Mucopurulent chronic bronchitis  -     Ambulatory referral to Pulmonology  -     cefTRIAXone injection 1 g; Inject 1 g into the muscle one time.    patient requesting antibiotic injection. He is still on doxycycline.  Follow-up if symptoms worsen or fail to improve.    "

## 2018-04-30 NOTE — PATIENT INSTRUCTIONS
Medicines for Chronic Obstructive Pulmonary Disease  Some medicines for COPD help control or prevent symptoms. They are called maintenance medicines. Take these medicines every day or as instructed by your healthcare provider. Some are rescue medicines. Take these only when you have symptoms like increased shortness of breath or chest tightness. Take this sheet with you to your next office visit and ask your healthcare provider to help you complete it.  Bronchodilators  What they do: Relax the muscles around airways. This allows you to breathe more easily.     Short-acting beta-2 agonists. These start working shortly after you use them. They are rescue medicines.  My medicines: __________________________________________  When to take: __________________________________________     Long-acting beta-2 agonists. These work more slowly than the fast-acting type. But the effects last longer. They are maintenance medicines.  My medicines: __________________________________________  When to take: __________________________________________     Anticholinergics. Rescue: These may be used along with a short-acting beta-2 agonist to help keep airways open.  My medicines: __________________________________________  When to take: __________________________________________     Anticholinergics. Maintenance: There are long-acting anticholinergenics.  My medicines: __________________________________________  When to take: __________________________________________     Methylxanthines. These are maintenance medicines and have long-lasting effects. They may be useful if symptoms happen during sleep.  My medicines: __________________________________________  When to take: __________________________________________     Corticosteroids  What they do: Reduce inflammation, swelling, and mucus production. This allows you to breathe more easily.     Inhaled corticosteroids. These medicines are taken with an inhaler or nebulizer. They are  maintenance medicines.  My medicines: __________________________________________  When to take: __________________________________________     Oral corticosteroids. These medicines are taken by mouth. They may be used when symptoms worsen.  My medicines: __________________________________________  When to take: __________________________________________     Phosphodiesterase type 4 (PDE4) inhibitors  What they do: Reduce the risk of flare-ups in patients with severe COPD.  My medicines: __________________________________________  When to take: __________________________________________     Combination medicines  What they do: Combine the effects of different types of medicines. For example, a combination medicine may relax the muscles around the airways and lessen the swelling or inflammation in the airway.  My medicines: __________________________________________  When to take: __________________________________________     Other medicines  Other medicines for COPD.  My medicine: __________________________________________  What it does: __________________________________________  When to take: __________________________________________  Herbal products and supplements  There are products for COPD that are available without a prescription. For example, herbs, extracts, or supplements. Be sure to talk with your healthcare provider before taking any of these products. They can interact with medicines youre taking.   Date Last Reviewed: 5/1/2016  © 6774-8628 [employer], [employer]. [address] [place] [address], [place] [address]. All rights reserved. This information is not intended as a substitute for professional medical care. Always follow your healthcare professional's instructions.        Chronic Lung Disease: Preventing Lung Infections  Chronic lung diseases include chronic obstructive pulmonary disease (COPD), which includes chronic bronchitis and emphysema. Other chronic lung diseases include pulmonary fibrosis,  sarcoidosis, and other conditions. When you have chronic lung diseases, it's very important to protect yourself from respiratory infections, like colds, the flu, and lung infections. Infections may cause your lung condition to worsen. Although you can't completely avoid them, there are things you can do to lessen the chance of infections.    Take precautions  Taking the following precautions can help you avoid illness:  · Remember to keep your hands away from your nose and mouth. Germs on your hands get into your respiratory system this way.  · Wash your hands often. When you wash them:  ¨ Use soap and warm water.  ¨ Rub your hands together well for at least 20 seconds.  ¨ Make sure to rinse them well.  ¨ Dry your hands on clean towels or air-dry them.  · Use hand  containing alcohol, if you are unable to wash your hands. Use the  after touching doorknobs, handles, and supermarket carts, for example, since lots of people touch them. Then wash your hands as soon as you can.  · To help prevent the flu, get a flu vaccination every year. This may be given at your healthcare provider's office, a drugstore, or pharmacy, or at work. Get your flu shot as soon as the vaccines are available in your area. This is usually around September each year.  · To help prevent pneumococcal pneumonia, get pneumonia vaccinations. Talk with your healthcare provider about which pneumococcal vaccinations you need.  · Try to stay away from people with respiratory infections, such as colds or the flu. Stay away from crowded places, like shopping centers or movie theatres during cold and flu season.  · If you smoke, think about quitting. In addition to causing or worsening many lung conditions, the lung damage from smoking increases your risk of infections. Stay away from others who smoke, too. This is also harmful and increases your chance of infections.  Date Last Reviewed: 4/14/2016  © 1424-3544 [employer]  [address] [place] [address]. All rights reserved. This information is not intended as a substitute for professional medical care. Always follow your healthcare professional's instructions.        COPD Flare    You have had a flare-up of your COPD.  COPD, or chronic obstructive pulmonary disease, is a common lung disease. It causes your airways to become irritated and narrower. This makes it harder for you to breathe. Emphysema and chronic bronchitis are both types of COPD. This is a chronic condition, which means you always have it. Sometimes it gets worse. When this happens, it is called a flare-up.  Symptoms of COPD  People with COPD may have symptoms most of the time. In a flare-up, your symptoms get worse. These symptoms may mean you are having a flare-up:  · Shortness of breath, shallow or rapid breathing, or wheezing that gets worse  · Lung infection  · Cough that gets worse  · More mucus, thicker mucus or mucus of a different color  · Tiredness, decreased energy, or trouble doing your usual activities  · Fever  · Chest tightness  · Your symptoms dont get better even when you use your usual medicines, inhalers, and nebulizer  · Trouble talking  · You feel confused  Causes of flare-ups  Unfortunately, a flare-up can happen even though you did everything right, and you followed your doctors instructions. Some causes of flare-ups are:  · Smoking or secondhand smoke  · Colds, the flu, or respiratory infections  · Air pollution  · Sudden change in the weather  · Dust, irritating chemicals, or strong fumes  · Not taking your medicines as prescribed  Home care  Here are some things you can do at home to treat a flare-up:  · Try not to panic. This makes it harder to breathe, and keeps you from doing the right things.  · Dont smoke or be around others who are smoking.  · Try to drink more fluids than usual during a flare-up, unless your doctor has told you not to because of heart and kidney problems.  More fluids can help loosen the mucus.  · Use your inhalers and nebulizer, if you have one, as you have been told to.  · If you were given antibiotics, take them until they are used up or your doctor tells you to stop. Its important to finish the antibiotics, even though you feel better. This will make sure the infection has cleared.  · If you were given prednisone or another steroid, finish it even if you feel better.  Preventing a flare-up  Even though flare-ups happen, the best way to treat one is to prevent it before it starts. Here are some pointers:  · Dont smoke or be around others who are smoking.  · Take your medicines as you have been told.  · Talk with your doctor about getting a flu shot every year. Also find out if you need a pneumonia shot.  · If there is a weather advisory warning to stay indoors, try to stay inside when possible.  · Try to eat healthy and get plenty of sleep.  · Try to avoid things that usually set you off, like dust, chemical fumes, hairsprays, or strong perfumes.  Follow-up care  Follow up with your healthcare provider, or as advised.  If a culture was done, you will be told if your treatment needs to be changed. You can call as directed for the results.  If X-rays were done, you will be notified of any new findings that may affect your care.  Call 911  Call 911 if any of these occur:  · You have trouble breathing  · You feel confused or its difficult to wake you up  · You faint or lose consciousness  · You have a rapid heart rate  · You have new pain in your chest, arm, shoulder, neck or upper back  When to seek medical advice  Call your healthcare provider right away if any of these occur:  · Wheezing or shortness of breath gets worse  · You need to use your inhalers more often than usual without relief  · Fever of 100.4°F (38ºC) or higher, or as directed by your healthcare provider  · Coughing up lots of dark-colored or bloody mucus (sputum)  · Chest pain with each  breath  · You do not start to get better within 24 hours  · Swelling of your ankles gets worse  · Dizziness or weakness  Date Last Reviewed: 9/1/2016 © 2000-2017 [employer]. [address] [place] [address], [place] [address]. All rights reserved. This information is not intended as a substitute for professional medical care. Always follow your healthcare professional's instructions.        Treatment for COPD    Your healthcare provider will prescribe the best treatments for your COPD.  Treatment  Recommendations include the following:  · Medicines. Some medicines help relieve symptoms when you have them. Others are taken daily to control inflammation in the lungs. Always take your medicines as prescribed. Learn the names of your medicines, as well as how and when to use them.  · Oxygen therapy. Oxygen may be prescribed if tests show that your blood contains too little oxygen.  · Smoking. If you smoke, quit. Smoking is the main cause of COPD. Quitting will help you be able to better manage your COPD. Ask your healthcare provider about ways to help you quit smoking.  · Avoiding infections. Infections, like a cold or the flu, can cause your symptoms to worsen. Try to stay away from people who are sick. Wash your hands often. And, ask your healthcare provider about vaccines for the flu and pneumonia.  Coping with shortness of breath  Coping tips include the following:  · Exercise. Try to be as active as possible. This will improve energy levels and strengthen your muscles, so you can do more.  · Breathing techniques. Ask your healthcare provider or nurse to show you how to do pursed-lip breathing.  · Balance rest and activity. Each day, try to balance rest periods with activity. For example, you might start the day with getting dressed and eating breakfast, then relax and read the paper. After that, take a brief walk. And then sit with your feet up for a while.  · Pulmonary rehabilitation. Ask your provider, or  call your local hospital to find out about pulmonary rehab programs. The programs help with managing your disease, breathing techniques, exercise, support and counseling.  · Healthy eating. Eating a healthy, balanced diet and making an effort to maintain your ideal weight are important to staying as healthy as possible. Make sure you have a lot of fruit and vegetables every day, as well as balanced portions of whole grains, lean meats and fish, and low-fat dairy products.  Date Last Reviewed: 5/1/2016  © 0866-6471 [employer]. [address] [place] [address], [place] [address]. All rights reserved. This information is not intended as a substitute for professional medical care. Always follow your healthcare professional's instructions.

## 2018-05-02 ENCOUNTER — TELEPHONE (OUTPATIENT)
Dept: FAMILY MEDICINE | Facility: CLINIC | Age: 58
End: 2018-05-02

## 2018-05-11 ENCOUNTER — OFFICE VISIT (OUTPATIENT)
Dept: SLEEP MEDICINE | Facility: CLINIC | Age: 58
End: 2018-05-11
Payer: MEDICARE

## 2018-05-11 VITALS
BODY MASS INDEX: 36.6 KG/M2 | SYSTOLIC BLOOD PRESSURE: 110 MMHG | DIASTOLIC BLOOD PRESSURE: 84 MMHG | HEIGHT: 69 IN | WEIGHT: 247.13 LBS

## 2018-05-11 DIAGNOSIS — G47.33 OSA (OBSTRUCTIVE SLEEP APNEA): ICD-10-CM

## 2018-05-11 DIAGNOSIS — J34.89 NASAL OBSTRUCTION: ICD-10-CM

## 2018-05-11 DIAGNOSIS — R06.00 DYSPNEA, UNSPECIFIED TYPE: Primary | ICD-10-CM

## 2018-05-11 PROCEDURE — 99999 PR PBB SHADOW E&M-EST. PATIENT-LVL III: CPT | Mod: PBBFAC,,, | Performed by: INTERNAL MEDICINE

## 2018-05-11 PROCEDURE — 3079F DIAST BP 80-89 MM HG: CPT | Mod: CPTII,S$GLB,, | Performed by: INTERNAL MEDICINE

## 2018-05-11 PROCEDURE — 3008F BODY MASS INDEX DOCD: CPT | Mod: CPTII,S$GLB,, | Performed by: INTERNAL MEDICINE

## 2018-05-11 PROCEDURE — 3074F SYST BP LT 130 MM HG: CPT | Mod: CPTII,S$GLB,, | Performed by: INTERNAL MEDICINE

## 2018-05-11 PROCEDURE — 99204 OFFICE O/P NEW MOD 45 MIN: CPT | Mod: S$GLB,,, | Performed by: INTERNAL MEDICINE

## 2018-05-11 NOTE — PROGRESS NOTES
"Scott Bansal  was seen as a new patient at the request  Paulo Brown, DAVIAN* for the evaluation of copd.    CHIEF COMPLAINT:  Bronchitis      HISTORY OF PRESENT ILLNESS: Scott Bansal is a 57 y.o. male  has a past medical history of Bipolar 1 disorder, mixed; BPH (benign prostatic hypertrophy); Cyst, eyelid; Diabetes mellitus; GERD (gastroesophageal reflux disease); Hyperlipidemia; Hypertension; Migraine headache; and Obesity.  Patient smoked 2 ppd since age of 6.  Patient quit at age of 24.  Patient with recurrent uri.  "10-15 pneumonia" since 20s.  Typical infection began with increased sputum production a/w wheezing and dyspnea.  No h/o hospitalization.  No h/o intubation.      Recently, patient presented to pcp with cough, congestion.  Patient was treated with rocephin with improvement in symptoms.  Currently, patient at baseline.   No chest pain.  No coughing.  No orthopnea.  No pnd.  Patient with limited exertion due to lbp.  Currently on proair.  Patient was prescribed breo but could not afford co-pay.      PAST MEDICAL HISTORY:    Active Ambulatory Problems     Diagnosis Date Noted    History of fusion of cervical spine 01/20/2016    Type 2 diabetes mellitus without complication 01/20/2016    Hyperlipidemia 01/20/2016    Essential hypertension 01/20/2016    Chronic tension-type headache, not intractable 01/20/2016    Bipolar 1 disorder 01/20/2016    BPH (benign prostatic hyperplasia) 01/20/2016    Gastroesophageal reflux disease without esophagitis 01/20/2016    Sciatica of right side 01/20/2016    History of pneumonia 01/20/2016    COPD (chronic obstructive pulmonary disease) 01/20/2016    Former smoker 01/20/2016    Lumbar compression fracture 01/20/2016     Resolved Ambulatory Problems     Diagnosis Date Noted    No Resolved Ambulatory Problems     Past Medical History:   Diagnosis Date    Bipolar 1 disorder, mixed     BPH (benign prostatic hypertrophy)     Cyst, eyelid     " Diabetes mellitus     GERD (gastroesophageal reflux disease)     Hyperlipidemia     Hypertension     Migraine headache     Obesity                 PAST SURGICAL HISTORY:    Past Surgical History:   Procedure Laterality Date    CERVICAL SPINE SURGERY      COLONOSCOPY N/A 7/27/2017    Procedure: COLONOSCOPY;  Surgeon: Genaro Nix MD;  Location: [facility];  Service: Endoscopy;  Laterality: N/A;    EYE SURGERY Left 03/2017    cyst removal on eyelid; Dr. Broussard    SHOULDER SURGERY      TONSILLECTOMY           FAMILY HISTORY:                Family History   Problem Relation Age of Onset    Cataracts Mother     Cancer Mother         throat    Hypertension Mother     Hypertension Father     Stroke Father         2 strokes    Hypertension Sister     Cancer Brother         spinal, kidney, spinal fluid    Hypertension Brother     Cancer Cousin         breast?       SOCIAL HISTORY:          Tobacco:   History   Smoking Status    Former Smoker    Packs/day: 2.00    Years: 15.00    Types: Cigarettes    Quit date: 5/11/1984   Smokeless Tobacco    Never Used     Comment: Patient quit smoking at the age of 24 yo.     alcohol use:    History   Alcohol Use No               Occupation:  Former     ALLERGIES:    Review of patient's allergies indicates:   Allergen Reactions    Sulfa (sulfonamide antibiotics) Rash       CURRENT MEDICATIONS:    Current Outpatient Prescriptions   Medication Sig Dispense Refill    albuterol (PROVENTIL) 2.5 mg /3 mL (0.083 %) nebulizer solution Take 3 mLs (2.5 mg total) by nebulization every 6 (six) hours as needed for Wheezing or Shortness of Breath. Rescue 1 Box 1    amLODIPine (NORVASC) 10 MG tablet TAKE 1 TABLET (10 MG TOTAL) BY MOUTH ONCE DAILY. 90 tablet 3    BREO ELLIPTA 100-25 mcg/dose diskus inhaler INHALE 1 PUFF INTO THE LUNGS ONCE DAILY. 60 each 0    buPROPion (WELLBUTRIN XL) 300 MG 24 hr tablet Take 300 mg by mouth every morning.  2    busPIRone  (BUSPAR) 10 MG tablet Take 10 mg by mouth 3 (three) times daily.      butalbital-acetaminophen-caffeine -40 mg (FIORICET, ESGIC) -40 mg per tablet TAKE 1 TABLET BY MOUTH EVERY 4 (FOUR) HOURS AS NEEDED. 30 tablet 0    cyclobenzaprine (FLEXERIL) 10 MG tablet TAKE 1 TABLET (10 MG TOTAL) BY MOUTH 3 (THREE) TIMES DAILY AS NEEDED. 90 tablet 0    cyclobenzaprine (FLEXERIL) 10 MG tablet TAKE 1 TABLET (10 MG TOTAL) BY MOUTH 3 (THREE) TIMES DAILY AS NEEDED. 90 tablet 0    diclofenac sodium (VOLTAREN) 1 % Gel Apply 2 g topically once daily. 100 g 0    fluticasone (FLONASE) 50 mcg/actuation nasal spray TAKE 1 SPRAY BY EACH NARE ROUTE ONCE DAILY. 16 g 5    furosemide (LASIX) 40 MG tablet Take 1 tablet (40 mg total) by mouth once daily. 30 tablet 2    gabapentin (NEURONTIN) 300 MG capsule Take 1 capsule (300 mg total) by mouth 2 (two) times daily. 180 capsule 3    gemfibrozil (LOPID) 600 MG tablet TAKE 1 TABLET (600 MG TOTAL) BY MOUTH 2 (TWO) TIMES DAILY BEFORE MEALS. 120 tablet 0    irbesartan (AVAPRO) 300 MG tablet TAKE 1 TABLET (300 MG TOTAL) BY MOUTH EVERY EVENING. 90 tablet 3    meclizine (ANTIVERT) 25 mg tablet Take 1 tablet (25 mg total) by mouth 3 (three) times daily as needed. 30 tablet 0    metFORMIN (GLUCOPHAGE) 1000 MG tablet TAKE 1 TABLET (1,000 MG TOTAL) BY MOUTH 2 (TWO) TIMES DAILY. 180 tablet 0    methylPREDNISolone (MEDROL DOSEPACK) 4 mg tablet use as directed 1 Package 0    mupirocin (BACTROBAN) 2 % ointment Apply topically 3 (three) times daily. 30 g 0    naratriptan (AMERGE) 2.5 MG tablet TAKE 1 TABLET BY MOUTH AT ONSET OF HEADACHE, MAY REPEAT IN 4 HOURS IF NEEDED 12 tablet 2    pantoprazole (PROTONIX) 40 MG tablet TAKE 1 TABLET BY MOUTH DAILY 90 tablet 3    PROAIR HFA 90 mcg/actuation inhaler INHALE 2 PUFFS EBERY 4 HOURS AS NEEDED 18 g 6    quetiapine (SEROQUEL) 300 MG Tab Take by mouth.      simvastatin (ZOCOR) 80 MG tablet TAKE 1 TABLET (80 MG TOTAL) BY MOUTH ONCE DAILY. 90  "tablet 3    tamsulosin (FLOMAX) 0.4 mg Cp24 TAKE 1 CAPSULE (0.4 MG TOTAL) BY MOUTH ONCE DAILY. 30 capsule 2    betamethasone dipropionate (DIPROLENE) 0.05 % cream Apply topically once daily. 45 g 0     Current Facility-Administered Medications   Medication Dose Route Frequency Provider Last Rate Last Dose    silver nitrate applicators applicator 1 applicator  1 applicator Topical (Top) 1 time in Clinic/HOD Paulo Brown, FNP-C                      REVIEW OF SYSTEMS:     Pulmonary related symptoms as per HPI.  Gen:  no weight loss, no fever, no night sweat  HEENT:  no visual changes, no sore throat, + hearing loss on right ear.  CV:  Per hpi  GI:  no melena, no hematochezia, no diarhea, no constipation.  :  no dysuria, no hematuria, no hesistancy, no dribbling  Neuro:  no syncope, no vertigo, no tinitus  Psych:  No homocide or suicide ideation; no depression.  Endocrine:  No heat or cold intolerance.  Sleep:  + snoring; no witnessed apnea.  Feeling tire upon awake.    Otherwise, a balance of systems reviewed is negative.          PHYSICAL EXAM:  Vitals:    05/11/18 1458   BP: 110/84   Weight: 112.1 kg (247 lb 2.2 oz)   Height: 5' 9" (1.753 m)   PainSc: 0-No pain     Body mass index is 36.5 kg/m².     GENERAL:  well develop; no apparent distress  HEENT:  + nasal congestion; no discharge noted; class 2 modified mallampatti.   NECK:  supple; no palpable masses.  CARDIO: regular rate and rhythm  PULM:  clear to auscultation bilaterally; no intercostals retractions; no accessory muscle usage   ABDOMEN:  soft nontender/nondistended.  +bowel sound  EXTREMITIES no cce  NEURO:  CN II-XII intact.  5/5 motor in all extremities.  sensation grossly intact   to light touch.  PSYCH:  normal affect.  Alert and oriented x 4    LABS  Pulmonary Functions Testing Results: none  ABG none  CXR:  2/1/18 no effusion or consolidation  CT CHEST:  none    ASSESSMENT    ICD-10-CM ICD-9-CM    1. Dyspnea, unspecified type R06.00 786.09 " DLCO-Carbon Monoxide Diffusing Capacity      LUNG VOLUMES      Spirometry without Bronchodilator      Stress test, pulmonary   2. KRISS (obstructive sleep apnea) G47.33 327.23    3. Nasal obstruction J34.89 478.19 Ambulatory consult to ENT       PLAN:    Recurring bronchitis - possibly from upper airway.  Await pft and ent evaluation.      Nasal obstruction - h/o nasal trauma.  S/p ent surgery in the past.  Will refer to ent.      Obesity - aware of need for drastic weight loss.     Dyspnea - h/o smoking but quit since 20s.  Will send for baseline pft.    kriss - snoring, restless sleep, htn, elevated bmi warrants sleep study.  Per patient, he underwent sleep study at [facility].  However, patient declined cpap.  Will try to get record from [facility].  Suspect nasal obstruction may be a barrier to cpap tolerance.        Patient will No Follow-up on file. with md.    CC: Send copy of this note to Paulo Brown, DAVIAN*

## 2018-05-11 NOTE — LETTER
May 12, 2018      Paulo Brown, FNP-C  [address] [facility]  [place] [address]           [facility]  [address] [facility]  [place] [address]  Phone: [phone]  Fax: [phone]          Patient: Scott Bansal   MR Number: [id number]   YOB: 1960   Date of Visit: 5/11/2018       Dear Paulo Brown:    Thank you for referring Scott Bansal to me for evaluation. Attached you will find relevant portions of my assessment and plan of care.    If you have questions, please do not hesitate to call me. I look forward to following Scott Bansal along with you.    Sincerely,    Mickey Hanson MD    Enclosure  CC:  No Recipients    If you would like to receive this communication electronically, please contact [email or web address] or [phone] to request more information on [employer] access.    For providers and/or their staff who would like to refer a patient to [facility], please contact us through our one-stop-shop provider referral line, [facility], at [phone].    If you feel you have received this communication in error or would no longer like to receive these types of communications, please e-mail [email or web address]

## 2018-05-14 RX ORDER — CYCLOBENZAPRINE HCL 10 MG
TABLET ORAL
Qty: 90 TABLET | Refills: 0 | Status: SHIPPED | OUTPATIENT
Start: 2018-05-14 | End: 2018-06-18 | Stop reason: SDUPTHER

## 2018-05-21 DIAGNOSIS — R51.9 NONINTRACTABLE EPISODIC HEADACHE, UNSPECIFIED HEADACHE TYPE: ICD-10-CM

## 2018-05-21 DIAGNOSIS — G44.229 CHRONIC TENSION-TYPE HEADACHE, NOT INTRACTABLE: ICD-10-CM

## 2018-05-21 DIAGNOSIS — I10 ESSENTIAL HYPERTENSION: ICD-10-CM

## 2018-05-21 RX ORDER — FUROSEMIDE 40 MG/1
40 TABLET ORAL DAILY
Qty: 30 TABLET | Refills: 2 | Status: SHIPPED | OUTPATIENT
Start: 2018-05-21 | End: 2018-09-13 | Stop reason: SDUPTHER

## 2018-05-21 RX ORDER — NARATRIPTAN 2.5 MG/1
TABLET ORAL
Qty: 12 TABLET | Refills: 2 | Status: SHIPPED | OUTPATIENT
Start: 2018-05-21 | End: 2018-08-14 | Stop reason: SDUPTHER

## 2018-05-21 RX ORDER — BUTALBITAL, ACETAMINOPHEN AND CAFFEINE 50; 325; 40 MG/1; MG/1; MG/1
1 TABLET ORAL EVERY 4 HOURS PRN
Qty: 30 TABLET | Refills: 0 | Status: SHIPPED | OUTPATIENT
Start: 2018-05-21 | End: 2018-06-18 | Stop reason: SDUPTHER

## 2018-05-21 NOTE — TELEPHONE ENCOUNTER
----- Message from Christina Lucas sent at 5/21/2018 12:30 PM CDT -----  Contact: self  Pt calling to request a refill of naratriptan (AMERGE) 2.5 MG tablet to be sent to [facility]. Pt can be reached at [phone].

## 2018-05-21 NOTE — TELEPHONE ENCOUNTER
----- Message from Laura Gonzales sent at 5/21/2018  9:02 AM CDT -----  Contact: Self/[phone]  Refill:  furosemide (LASIX) 40 MG tablet  Refill:  butalbital-acetaminophen-caffeine -40 mg (FIORICET, ESGIC) -40 mg per tablet    .  [facility]pharmacy #[id number] - [place] - [address].  [address].  [place] [address]  Phone: [phone] Fax: [phone]    Thank you.

## 2018-05-22 DIAGNOSIS — E78.5 HYPERLIPIDEMIA, UNSPECIFIED HYPERLIPIDEMIA TYPE: ICD-10-CM

## 2018-05-23 RX ORDER — GEMFIBROZIL 600 MG/1
TABLET, FILM COATED ORAL
Qty: 120 TABLET | Refills: 0 | Status: SHIPPED | OUTPATIENT
Start: 2018-05-23 | End: 2018-07-15 | Stop reason: SDUPTHER

## 2018-06-18 DIAGNOSIS — R51.9 NONINTRACTABLE EPISODIC HEADACHE, UNSPECIFIED HEADACHE TYPE: ICD-10-CM

## 2018-06-18 RX ORDER — TAMSULOSIN HYDROCHLORIDE 0.4 MG/1
CAPSULE ORAL
Qty: 30 CAPSULE | Refills: 2 | Status: SHIPPED | OUTPATIENT
Start: 2018-06-18 | End: 2018-09-02 | Stop reason: SDUPTHER

## 2018-06-18 NOTE — TELEPHONE ENCOUNTER
----- Message from Monique Lombardi sent at 6/18/2018  3:04 PM CDT -----  Contact: Self  REFILL: cyclobenzaprine (FLEXERIL) 10 MG tablet  naratriptan (AMERGE) 2.5 MG tablet  butalbital-acetaminophen-caffeine -40 mg (FIORICET, ESGIC) -40 mg per tablet    PHARMACY: [facility]

## 2018-06-19 RX ORDER — CYCLOBENZAPRINE HCL 10 MG
10 TABLET ORAL 3 TIMES DAILY PRN
Qty: 90 TABLET | Refills: 0 | Status: SHIPPED | OUTPATIENT
Start: 2018-06-19 | End: 2018-10-02

## 2018-06-19 RX ORDER — CYCLOBENZAPRINE HCL 10 MG
TABLET ORAL
Qty: 90 TABLET | Refills: 0 | Status: SHIPPED | OUTPATIENT
Start: 2018-06-19 | End: 2018-07-19 | Stop reason: SDUPTHER

## 2018-06-19 RX ORDER — BUTALBITAL, ACETAMINOPHEN AND CAFFEINE 50; 325; 40 MG/1; MG/1; MG/1
1 TABLET ORAL EVERY 4 HOURS PRN
Qty: 30 TABLET | Refills: 0 | Status: SHIPPED | OUTPATIENT
Start: 2018-06-19 | End: 2018-08-14 | Stop reason: SDUPTHER

## 2018-06-19 RX ORDER — BUTALBITAL, ACETAMINOPHEN AND CAFFEINE 50; 325; 40 MG/1; MG/1; MG/1
1 TABLET ORAL EVERY 4 HOURS PRN
Qty: 30 TABLET | Refills: 0 | Status: SHIPPED | OUTPATIENT
Start: 2018-06-19 | End: 2018-11-12 | Stop reason: SDUPTHER

## 2018-06-22 ENCOUNTER — TELEPHONE (OUTPATIENT)
Dept: SLEEP MEDICINE | Facility: OTHER | Age: 58
End: 2018-06-22

## 2018-06-22 DIAGNOSIS — G47.33 OSA (OBSTRUCTIVE SLEEP APNEA): Primary | ICD-10-CM

## 2018-06-22 NOTE — TELEPHONE ENCOUNTER
Record from [facility] sleep study arrived.    4/20/18 rdi of 12.4  4/29/15 optimal cpap of 12 cm H20    Please advice patient from [facility] confirmed his diagnosis of bradford.  I am recommending treatment with cpap.  An order for cpap was written, patient should expect a call from DME.  Please schedule clinic follow up with me in 6-8 weeks.      Please scan study to [facility].

## 2018-06-22 NOTE — TELEPHONE ENCOUNTER
Patient decline appointment and says he does not want the CPAP machine . He said he tried it years ago and he said its noisy and he couldn't sleep.

## 2018-06-29 DIAGNOSIS — E11.9 DIABETES MELLITUS WITHOUT COMPLICATION: ICD-10-CM

## 2018-06-29 RX ORDER — METFORMIN HYDROCHLORIDE 1000 MG/1
TABLET ORAL
Qty: 180 TABLET | Refills: 0 | Status: SHIPPED | OUTPATIENT
Start: 2018-06-29 | End: 2018-09-27 | Stop reason: SDUPTHER

## 2018-07-15 DIAGNOSIS — E78.5 HYPERLIPIDEMIA, UNSPECIFIED HYPERLIPIDEMIA TYPE: ICD-10-CM

## 2018-07-16 RX ORDER — GEMFIBROZIL 600 MG/1
TABLET, FILM COATED ORAL
Qty: 120 TABLET | Refills: 0 | Status: SHIPPED | OUTPATIENT
Start: 2018-07-16 | End: 2018-09-12 | Stop reason: SDUPTHER

## 2018-07-19 RX ORDER — CYCLOBENZAPRINE HCL 10 MG
10 TABLET ORAL 3 TIMES DAILY PRN
Qty: 90 TABLET | Refills: 0 | Status: SHIPPED | OUTPATIENT
Start: 2018-07-19 | End: 2018-08-14 | Stop reason: SDUPTHER

## 2018-07-19 NOTE — TELEPHONE ENCOUNTER
----- Message from Felisa Chi sent at 7/19/2018  2:56 PM CDT -----  Contact: Self   Med refill : [phone]  cyclobenzaprine (FLEXERIL) 10 MG tablet    [facility] [place]   [id number] [place] [place] - [address].

## 2018-08-10 DIAGNOSIS — E11.42 DIABETIC POLYNEUROPATHY ASSOCIATED WITH TYPE 2 DIABETES MELLITUS: ICD-10-CM

## 2018-08-10 RX ORDER — GABAPENTIN 300 MG/1
300 CAPSULE ORAL 2 TIMES DAILY
Qty: 180 CAPSULE | Refills: 3 | Status: SHIPPED | OUTPATIENT
Start: 2018-08-10 | End: 2018-11-16 | Stop reason: SDUPTHER

## 2018-08-14 DIAGNOSIS — R51.9 NONINTRACTABLE EPISODIC HEADACHE, UNSPECIFIED HEADACHE TYPE: ICD-10-CM

## 2018-08-14 DIAGNOSIS — G44.229 CHRONIC TENSION-TYPE HEADACHE, NOT INTRACTABLE: ICD-10-CM

## 2018-08-14 RX ORDER — NARATRIPTAN 2.5 MG/1
TABLET ORAL
Qty: 12 TABLET | Refills: 2 | Status: SHIPPED | OUTPATIENT
Start: 2018-08-14 | End: 2018-11-12 | Stop reason: SDUPTHER

## 2018-08-14 RX ORDER — BUTALBITAL, ACETAMINOPHEN AND CAFFEINE 50; 325; 40 MG/1; MG/1; MG/1
1 TABLET ORAL EVERY 4 HOURS PRN
Qty: 30 TABLET | Refills: 0 | Status: SHIPPED | OUTPATIENT
Start: 2018-08-14 | End: 2018-09-06 | Stop reason: SDUPTHER

## 2018-08-14 RX ORDER — CYCLOBENZAPRINE HCL 10 MG
TABLET ORAL
Qty: 90 TABLET | Refills: 0 | Status: SHIPPED | OUTPATIENT
Start: 2018-08-14 | End: 2018-09-06

## 2018-08-14 NOTE — TELEPHONE ENCOUNTER
----- Message from Daya Traylor sent at 8/14/2018 10:53 AM CDT -----  Contact: self [phone]  Pt is requesting a refill on Fioricet and Naratriptan. Pls call [facility] [phone]. Thanks.....Teena

## 2018-08-17 ENCOUNTER — OFFICE VISIT (OUTPATIENT)
Dept: FAMILY MEDICINE | Facility: CLINIC | Age: 58
End: 2018-08-17
Payer: MEDICARE

## 2018-08-17 VITALS
OXYGEN SATURATION: 95 % | SYSTOLIC BLOOD PRESSURE: 136 MMHG | TEMPERATURE: 98 F | HEART RATE: 87 BPM | WEIGHT: 257.25 LBS | HEIGHT: 69 IN | BODY MASS INDEX: 38.1 KG/M2 | DIASTOLIC BLOOD PRESSURE: 90 MMHG

## 2018-08-17 DIAGNOSIS — J41.1 MUCOPURULENT CHRONIC BRONCHITIS: Primary | ICD-10-CM

## 2018-08-17 DIAGNOSIS — M54.50 CHRONIC BILATERAL LOW BACK PAIN WITHOUT SCIATICA: ICD-10-CM

## 2018-08-17 DIAGNOSIS — G89.29 CHRONIC BILATERAL LOW BACK PAIN WITHOUT SCIATICA: ICD-10-CM

## 2018-08-17 PROCEDURE — 99999 PR PBB SHADOW E&M-EST. PATIENT-LVL V: CPT | Mod: PBBFAC,,, | Performed by: NURSE PRACTITIONER

## 2018-08-17 PROCEDURE — 3075F SYST BP GE 130 - 139MM HG: CPT | Mod: CPTII,,, | Performed by: NURSE PRACTITIONER

## 2018-08-17 PROCEDURE — 96372 THER/PROPH/DIAG INJ SC/IM: CPT | Mod: S$GLB,,, | Performed by: NURSE PRACTITIONER

## 2018-08-17 PROCEDURE — 3080F DIAST BP >= 90 MM HG: CPT | Mod: CPTII,,, | Performed by: NURSE PRACTITIONER

## 2018-08-17 PROCEDURE — 99214 OFFICE O/P EST MOD 30 MIN: CPT | Mod: 25,S$PBB,, | Performed by: NURSE PRACTITIONER

## 2018-08-17 PROCEDURE — 3008F BODY MASS INDEX DOCD: CPT | Mod: CPTII,,, | Performed by: NURSE PRACTITIONER

## 2018-08-17 RX ORDER — DOXYCYCLINE 100 MG/1
100 CAPSULE ORAL EVERY 12 HOURS
Qty: 20 CAPSULE | Refills: 0 | Status: SHIPPED | OUTPATIENT
Start: 2018-08-17 | End: 2018-08-27

## 2018-08-17 RX ORDER — DEXAMETHASONE SODIUM PHOSPHATE 4 MG/ML
8 INJECTION, SOLUTION INTRA-ARTICULAR; INTRALESIONAL; INTRAMUSCULAR; INTRAVENOUS; SOFT TISSUE
Status: COMPLETED | OUTPATIENT
Start: 2018-08-17 | End: 2018-08-17

## 2018-08-17 RX ORDER — KETOROLAC TROMETHAMINE 30 MG/ML
30 INJECTION, SOLUTION INTRAMUSCULAR; INTRAVENOUS
Status: COMPLETED | OUTPATIENT
Start: 2018-08-17 | End: 2018-08-17

## 2018-08-17 RX ORDER — BENZONATATE 200 MG/1
200 CAPSULE ORAL 3 TIMES DAILY PRN
Qty: 30 CAPSULE | Refills: 0 | Status: SHIPPED | OUTPATIENT
Start: 2018-08-17 | End: 2018-08-27

## 2018-08-17 RX ADMIN — KETOROLAC TROMETHAMINE 30 MG: 30 INJECTION, SOLUTION INTRAMUSCULAR; INTRAVENOUS at 10:08

## 2018-08-17 RX ADMIN — DEXAMETHASONE SODIUM PHOSPHATE 8 MG: 4 INJECTION, SOLUTION INTRA-ARTICULAR; INTRALESIONAL; INTRAMUSCULAR; INTRAVENOUS; SOFT TISSUE at 10:08

## 2018-08-17 NOTE — PATIENT INSTRUCTIONS
Self-Care for Low Back Pain    Most people have low back pain now and then. In many cases, it isnt serious and self-care can help. Sometimes low back pain can be a sign of a bigger problem. Call your healthcare provider if your pain returns often or gets worse over time. For the long-term care of your back, get regular exercise, lose any excess weight and learn good posture.  Take a short rest  Lying down during the day may be beneficial for short periods of time if severe pain increases with sitting or standing. Long-term bed rest could be detrimental.  Reduce pain and swelling  Cold reduces swelling. Both cold and heat can reduce pain. Protect your skin by placing a towel between your body and the ice or heat source.  · For the first few days, apply an ice pack for 15 to 20 minutes .  · After the first few days, try heat for 15 minutes at a time to ease pain. Never sleep on a heating pad.  · Over-the-counter medicine can help control pain and swelling. Try aspirin or ibuprofen.  Exercise  Exercise can help your back heal. It also helps your back get stronger and more flexible, preventing any reinjury. Ask your healthcare provider about specific exercises for your back.  Use good posture to avoid reinjury  · When moving, bend at the hips and knees. Dont bend at the waist or twist around.  · When lifting, keep the object close to your body. Dont try to lift more than you can handle.  · When sitting, keep your lower back supported. Use a rolled-up towel as needed.  Seek immediate medical care if:  · Youre unable to stand or walk.  · You have a temperature over 100.4°F (38.0°C)  · You have frequent, painful, or bloody urination.  · You have severe abdominal pain.  · You have a sharp, stabbing pain.  · Your pain is constant.  · You have pain or numbness in your leg.  · You feel pain in a new area of your back.  · You notice that the pain isnt decreasing after more than a week.   Date Last Reviewed: 9/29/2015  ©  4672-7941 [employer]. [address] [place] [address]. All rights reserved. This information is not intended as a substitute for professional medical care. Always follow your healthcare professional's instructions.        What Is Chronic Bronchitis?  Chronic bronchitis is when damaged lungs make more mucus than they should. If you cough up mucus and feel short of breath for at least three months each year, two or more years in a row, without another diagnosis to explain the cough, you may have chronic bronchitis.  Healthy lungs  This is what happens when your lungs are healthy:  · Inside the lungs are branching airways of stretchy tissue. Each airway is wrapped with bands of muscle that help keep it open. Air travels in and out of the lungs through these airways.  · The cells in the lining of the airways produce a sticky fluid called mucus. This traps dust, smoke, and other particles in the air you breathe and helps protect the lungs.  · Tiny hairs, called cilia, then sweep the mucus up the airways to the throat, where it is swallowed or coughed up, again to protect the lungs.         When you have chronic bronchitis  This is what happens when you have chronic bronchitis:  · Cells in the airways make more mucus than normal. The mucus builds up, narrowing the airways. This means less air travels into and out of the lungs.  · The lining of the airways may also become inflamed (swollen). And, the muscle surrounding the airways may constrict (tighten). These problems cause the airways to narrow even more.  · The cilia may also be damaged. This means they cant sweep mucus and particles away. This damage makes the problems described above even worse.         Date Last Reviewed: 5/1/2016  © 7809-6046 [employer]. [address] [place] [address]. All rights reserved. This information is not intended as a substitute for professional medical care. Always follow your healthcare  professional's instructions.

## 2018-08-17 NOTE — PROGRESS NOTES
Subjective:       Patient ID: Scott Bansal is a 57 y.o. male.    Chief Complaint: URI (congestion)    URI    This is a new problem. The current episode started 1 to 4 weeks ago. The problem has been gradually worsening. There has been no fever. Associated symptoms include congestion, coughing, a sore throat and wheezing. Pertinent negatives include no ear pain, headaches, rhinorrhea or sneezing. Treatments tried: mucinex. The treatment provided mild relief.       Past Medical History:   Diagnosis Date    Bipolar 1 disorder, mixed     BPH (benign prostatic hypertrophy)     Cyst, eyelid     Dr. Broussard    Diabetes mellitus     GERD (gastroesophageal reflux disease)     Hyperlipidemia     Hypertension     Migraine headache     Obesity        Social History     Socioeconomic History    Marital status:      Spouse name: Not on file    Number of children: Not on file    Years of education: Not on file    Highest education level: Not on file   Social Needs    Financial resource strain: Not on file    Food insecurity - worry: Not on file    Food insecurity - inability: Not on file    Transportation needs - medical: Not on file    Transportation needs - non-medical: Not on file   Occupational History    Not on file   Tobacco Use    Smoking status: Former Smoker     Packs/day: 2.00     Years: 15.00     Pack years: 30.00     Types: Cigarettes     Last attempt to quit: 1984     Years since quittin.2    Smokeless tobacco: Never Used    Tobacco comment: Patient quit smoking at the age of 24 yo.   Substance and Sexual Activity    Alcohol use: No    Drug use: No    Sexual activity: Yes     Partners: Female   Other Topics Concern    Not on file   Social History Narrative    Not on file       Past Surgical History:   Procedure Laterality Date    CERVICAL SPINE SURGERY      EYE SURGERY Left 2017    cyst removal on eyelid; Dr. Broussard    SHOULDER SURGERY      TONSILLECTOMY    "      Review of Systems   Constitutional: Negative for chills and fever.   HENT: Positive for congestion, postnasal drip and sore throat. Negative for ear pain, rhinorrhea, sinus pressure, sneezing and trouble swallowing.    Respiratory: Positive for cough and wheezing.    Musculoskeletal: Positive for back pain.   Neurological: Negative for headaches.   All other systems reviewed and are negative.      Objective:   BP (!) 136/90 (BP Location: Right arm, Patient Position: Sitting, BP Method: Large (Manual))   Pulse 87   Temp 98 °F (36.7 °C) (Oral)   Ht 5' 9" (1.753 m)   Wt 116.7 kg (257 lb 4.4 oz)   SpO2 95%   BMI 37.99 kg/m²      Physical Exam   Constitutional: He is oriented to person, place, and time. He appears well-developed and well-nourished. He is cooperative.   HENT:   Head: Normocephalic and atraumatic.   Right Ear: Hearing, tympanic membrane, external ear and ear canal normal.   Left Ear: Hearing, tympanic membrane, external ear and ear canal normal.   Nose: No rhinorrhea.   Mouth/Throat: Posterior oropharyngeal erythema present. No oropharyngeal exudate or posterior oropharyngeal edema.   Cardiovascular: Normal rate and regular rhythm.   Pulmonary/Chest: Effort normal. No respiratory distress. He has decreased breath sounds in the right lower field and the left lower field. He has no wheezes. He has rhonchi in the right lower field and the left lower field. He has no rales.   Musculoskeletal:        Lumbar back: He exhibits pain and spasm.   Lymphadenopathy:     He has cervical adenopathy.   Neurological: He is alert and oriented to person, place, and time.   Skin: Skin is warm, dry and intact. He is not diaphoretic. No pallor.   Psychiatric: He has a normal mood and affect. His speech is normal and behavior is normal.   Vitals reviewed.      Assessment:       1. Mucopurulent chronic bronchitis    2. Chronic bilateral low back pain without sciatica        Plan:       Scott was seen today for " uri.    Diagnoses and all orders for this visit:    Mucopurulent chronic bronchitis  -     benzonatate (TESSALON) 200 MG capsule; Take 1 capsule (200 mg total) by mouth 3 (three) times daily as needed for Cough.  -     dexamethasone injection 8 mg; Inject 2 mLs (8 mg total) into the muscle one time.  -     doxycycline (VIBRAMYCIN) 100 MG Cap; Take 1 capsule (100 mg total) by mouth every 12 (twelve) hours. for 10 days    Chronic bilateral low back pain without sciatica  -     dexamethasone injection 8 mg; Inject 2 mLs (8 mg total) into the muscle one time.  -     ketorolac injection 30 mg; Inject 1 mL (30 mg total) into the muscle one time.        Follow-up if symptoms worsen or fail to improve.

## 2018-08-23 LAB
LEFT EYE DM RETINOPATHY: NEGATIVE
RIGHT EYE DM RETINOPATHY: NEGATIVE

## 2018-09-03 RX ORDER — TAMSULOSIN HYDROCHLORIDE 0.4 MG/1
CAPSULE ORAL
Qty: 30 CAPSULE | Refills: 2 | Status: SHIPPED | OUTPATIENT
Start: 2018-09-03 | End: 2018-12-27

## 2018-09-06 ENCOUNTER — OFFICE VISIT (OUTPATIENT)
Dept: FAMILY MEDICINE | Facility: CLINIC | Age: 58
End: 2018-09-06
Payer: MEDICARE

## 2018-09-06 VITALS
BODY MASS INDEX: 37.18 KG/M2 | OXYGEN SATURATION: 98 % | HEIGHT: 69 IN | SYSTOLIC BLOOD PRESSURE: 110 MMHG | DIASTOLIC BLOOD PRESSURE: 80 MMHG | WEIGHT: 251 LBS | HEART RATE: 97 BPM | TEMPERATURE: 99 F

## 2018-09-06 DIAGNOSIS — S32.050A CLOSED COMPRESSION FRACTURE OF FIFTH LUMBAR VERTEBRA, INITIAL ENCOUNTER: ICD-10-CM

## 2018-09-06 DIAGNOSIS — E11.9 TYPE 2 DIABETES MELLITUS WITHOUT COMPLICATION, WITHOUT LONG-TERM CURRENT USE OF INSULIN: Primary | ICD-10-CM

## 2018-09-06 DIAGNOSIS — M54.41 CHRONIC MIDLINE LOW BACK PAIN WITH RIGHT-SIDED SCIATICA: ICD-10-CM

## 2018-09-06 DIAGNOSIS — G89.29 CHRONIC RIGHT SHOULDER PAIN: ICD-10-CM

## 2018-09-06 DIAGNOSIS — L30.1 DYSHIDROTIC ECZEMA: ICD-10-CM

## 2018-09-06 DIAGNOSIS — Z98.1 HISTORY OF FUSION OF CERVICAL SPINE: ICD-10-CM

## 2018-09-06 DIAGNOSIS — M25.511 CHRONIC RIGHT SHOULDER PAIN: ICD-10-CM

## 2018-09-06 DIAGNOSIS — G89.29 CHRONIC MIDLINE LOW BACK PAIN WITH RIGHT-SIDED SCIATICA: ICD-10-CM

## 2018-09-06 DIAGNOSIS — L40.9 PSORIASIS: ICD-10-CM

## 2018-09-06 PROCEDURE — 99499 UNLISTED E&M SERVICE: CPT | Mod: S$GLB,,, | Performed by: FAMILY MEDICINE

## 2018-09-06 PROCEDURE — 99213 OFFICE O/P EST LOW 20 MIN: CPT | Mod: PBBFAC,PO,25 | Performed by: FAMILY MEDICINE

## 2018-09-06 PROCEDURE — 3074F SYST BP LT 130 MM HG: CPT | Mod: CPTII,,, | Performed by: FAMILY MEDICINE

## 2018-09-06 PROCEDURE — 99999 PR PBB SHADOW E&M-EST. PATIENT-LVL III: CPT | Mod: PBBFAC,,, | Performed by: FAMILY MEDICINE

## 2018-09-06 PROCEDURE — 99215 OFFICE O/P EST HI 40 MIN: CPT | Mod: S$PBB,25,, | Performed by: FAMILY MEDICINE

## 2018-09-06 PROCEDURE — 3008F BODY MASS INDEX DOCD: CPT | Mod: CPTII,,, | Performed by: FAMILY MEDICINE

## 2018-09-06 PROCEDURE — 3079F DIAST BP 80-89 MM HG: CPT | Mod: CPTII,,, | Performed by: FAMILY MEDICINE

## 2018-09-06 PROCEDURE — 3044F HG A1C LEVEL LT 7.0%: CPT | Mod: CPTII,,, | Performed by: FAMILY MEDICINE

## 2018-09-06 PROCEDURE — 20610 DRAIN/INJ JOINT/BURSA W/O US: CPT | Mod: PBBFAC,PO | Performed by: FAMILY MEDICINE

## 2018-09-06 RX ORDER — TRIAMCINOLONE ACETONIDE 1 MG/G
OINTMENT TOPICAL 2 TIMES DAILY
COMMUNITY
End: 2019-03-07

## 2018-09-06 RX ORDER — BETAMETHASONE DIPROPIONATE 0.5 MG/G
CREAM TOPICAL DAILY
Qty: 45 G | Refills: 0 | Status: SHIPPED | OUTPATIENT
Start: 2018-09-06 | End: 2019-02-11 | Stop reason: SDUPTHER

## 2018-09-06 RX ADMIN — TRIAMCINOLONE ACETONIDE 40 MG: 40 INJECTION, SUSPENSION INTRA-ARTICULAR; INTRAMUSCULAR at 02:09

## 2018-09-06 NOTE — PROGRESS NOTES
Subjective:       Patient ID: Scott Bansal is a 57 y.o. male.    Chief Complaint: Back Pain and Neck Pain    He also reports a rash on the hand.  He has used topical medication to address the rash.  It is located on his left hand only.        Back Pain   This is a chronic problem. The current episode started more than 1 year ago. The problem occurs constantly. The problem is unchanged. The pain is present in the lumbar spine. The quality of the pain is described as shooting. The pain is at a severity of 5/10. The pain is moderate. The pain is worse during the day. The symptoms are aggravated by lying down and twisting. Associated symptoms include tingling. Pertinent negatives include no abdominal pain, bladder incontinence, bowel incontinence, chest pain, headaches, leg pain, pelvic pain, perianal numbness, weakness or weight loss. Risk factors include lack of exercise, poor posture and sedentary lifestyle. He has tried analgesics, ice, muscle relaxant, NSAIDs, heat and home exercises for the symptoms. The treatment provided moderate relief.   Neck Pain    This is a chronic problem. The current episode started more than 1 year ago. The problem occurs constantly. The problem has been unchanged. The pain is associated with lifting a heavy object and a remote injury. The quality of the pain is described as shooting and cramping. The pain is at a severity of 5/10. The pain is moderate. Nothing aggravates the symptoms. The pain is same all the time. Stiffness is present in the morning. Associated symptoms include tingling. Pertinent negatives include no chest pain, headaches, leg pain, weakness or weight loss. He has tried heat, acetaminophen, ice, NSAIDs, neck support and home exercises for the symptoms. The treatment provided moderate relief.     Review of Systems   Constitutional: Negative for weight loss.   Cardiovascular: Negative for chest pain.   Gastrointestinal: Negative for abdominal pain and bowel  incontinence.   Genitourinary: Negative for bladder incontinence and pelvic pain.   Musculoskeletal: Positive for back pain and neck pain.   Neurological: Positive for tingling. Negative for weakness and headaches.       Objective:      Physical Exam   Constitutional: He is oriented to person, place, and time. He appears well-developed and well-nourished.   HENT:   Head: Normocephalic and atraumatic.   Right Ear: External ear normal.   Left Ear: External ear normal.   Nose: Nose normal.   Mouth/Throat: Oropharynx is clear and moist.   Eyes: Conjunctivae and EOM are normal. Pupils are equal, round, and reactive to light.   Neck: Normal range of motion. Neck supple. Muscular tenderness present.   Cardiovascular: Normal rate, regular rhythm and normal heart sounds.   Pulmonary/Chest: Effort normal and breath sounds normal. No respiratory distress. He has no decreased breath sounds. He has no wheezes. He has no rhonchi. He has no rales.   Musculoskeletal:        Cervical back: He exhibits decreased range of motion and pain.   Neurological: He is alert and oriented to person, place, and time.   Skin: Skin is warm, dry and intact. He is not diaphoretic. No pallor.   Psychiatric: He has a normal mood and affect. His speech is normal and behavior is normal.   Vitals reviewed.      Large Joint Aspiration/Injection  Date/Time: 9/6/2018 2:28 PM  Performed by: Kaylie Chau MD  Authorized by: Kaylie Chau MD     Consent Done?:  Yes (Verbal)  Indications:  Pain  Procedure site marked: Yes    Timeout: Prior to procedure the correct patient, procedure, and site was verified    Prep: Patient was prepped and draped in usual sterile fashion    Needle size:  22 G  Medications:  40 mg triamcinolone acetonide 40 mg/mL; 40 mg triamcinolone acetonide 40 mg/mL  Patient tolerance:  Patient tolerated the procedure well with no immediate complications      Assessment:       1. Type 2 diabetes mellitus without complication,  without long-term current use of insulin    2. Closed compression fracture of fifth lumbar vertebra, initial encounter    3. History of fusion of cervical spine    4. Chronic midline low back pain with right-sided sciatica    5. Psoriasis    6. Dyshidrotic eczema    7. Chronic right shoulder pain        Plan:       Problem List Items Addressed This Visit        Unprioritized    History of fusion of cervical spine    Overview     1998 titanium screws placed.  Xray of fusion patient has a copy         Lumbar compression fracture  -  Continue with conservative measures     Type 2 diabetes mellitus without complication - Primary    Relevant Orders    CBC auto differential (Completed)    Comprehensive metabolic panel (Completed)    TSH (Completed)    Hemoglobin A1c (Completed)  -   Pt is currently stable on medication regimen.  Continue current therapy as scheduled.  Contact office with any questions about adjustments on medications.         Other Visit Diagnoses     Chronic midline low back pain with right-sided sciatica        Psoriasis      -   rx sent to pharmacy   -  Continue with topical steroid       Dyshidrotic eczema      -  Discussed use of cream on hands at night   -    Chronic right shoulder pain        Relevant Orders    Large Joint Aspiration/Injection

## 2018-09-12 DIAGNOSIS — E78.5 HYPERLIPIDEMIA, UNSPECIFIED HYPERLIPIDEMIA TYPE: ICD-10-CM

## 2018-09-12 RX ORDER — GEMFIBROZIL 600 MG/1
TABLET, FILM COATED ORAL
Qty: 120 TABLET | Refills: 0 | Status: SHIPPED | OUTPATIENT
Start: 2018-09-12 | End: 2018-11-08 | Stop reason: SDUPTHER

## 2018-09-13 ENCOUNTER — LAB VISIT (OUTPATIENT)
Dept: LAB | Facility: HOSPITAL | Age: 58
End: 2018-09-13
Attending: FAMILY MEDICINE
Payer: MEDICARE

## 2018-09-13 DIAGNOSIS — E11.9 TYPE 2 DIABETES MELLITUS WITHOUT COMPLICATION, WITHOUT LONG-TERM CURRENT USE OF INSULIN: ICD-10-CM

## 2018-09-13 DIAGNOSIS — I10 ESSENTIAL HYPERTENSION: ICD-10-CM

## 2018-09-13 LAB
ALBUMIN SERPL BCP-MCNC: 4.5 G/DL
ALP SERPL-CCNC: 82 U/L
ALT SERPL W/O P-5'-P-CCNC: 22 U/L
ANION GAP SERPL CALC-SCNC: 9 MMOL/L
AST SERPL-CCNC: 15 U/L
BASOPHILS # BLD AUTO: 0.03 K/UL
BASOPHILS NFR BLD: 0.3 %
BILIRUB SERPL-MCNC: 0.6 MG/DL
BUN SERPL-MCNC: 24 MG/DL
CALCIUM SERPL-MCNC: 10.2 MG/DL
CHLORIDE SERPL-SCNC: 103 MMOL/L
CO2 SERPL-SCNC: 25 MMOL/L
CREAT SERPL-MCNC: 1 MG/DL
DIFFERENTIAL METHOD: ABNORMAL
EOSINOPHIL # BLD AUTO: 0.1 K/UL
EOSINOPHIL NFR BLD: 1 %
ERYTHROCYTE [DISTWIDTH] IN BLOOD BY AUTOMATED COUNT: 12.4 %
EST. GFR  (AFRICAN AMERICAN): >60 ML/MIN/1.73 M^2
EST. GFR  (NON AFRICAN AMERICAN): >60 ML/MIN/1.73 M^2
ESTIMATED AVG GLUCOSE: 126 MG/DL
GLUCOSE SERPL-MCNC: 113 MG/DL
HBA1C MFR BLD HPLC: 6 %
HCT VFR BLD AUTO: 40 %
HGB BLD-MCNC: 13.2 G/DL
IMM GRANULOCYTES # BLD AUTO: 0.1 K/UL
IMM GRANULOCYTES NFR BLD AUTO: 1.1 %
LYMPHOCYTES # BLD AUTO: 3 K/UL
LYMPHOCYTES NFR BLD: 33.3 %
MCH RBC QN AUTO: 30.1 PG
MCHC RBC AUTO-ENTMCNC: 33 G/DL
MCV RBC AUTO: 91 FL
MONOCYTES # BLD AUTO: 0.9 K/UL
MONOCYTES NFR BLD: 10.3 %
NEUTROPHILS # BLD AUTO: 4.8 K/UL
NEUTROPHILS NFR BLD: 54 %
NRBC BLD-RTO: 0 /100 WBC
PLATELET # BLD AUTO: 328 K/UL
PMV BLD AUTO: 10.9 FL
POTASSIUM SERPL-SCNC: 4.3 MMOL/L
PROT SERPL-MCNC: 7.1 G/DL
RBC # BLD AUTO: 4.38 M/UL
SODIUM SERPL-SCNC: 137 MMOL/L
TSH SERPL DL<=0.005 MIU/L-ACNC: 2.51 UIU/ML
WBC # BLD AUTO: 8.89 K/UL

## 2018-09-13 PROCEDURE — 85025 COMPLETE CBC W/AUTO DIFF WBC: CPT

## 2018-09-13 PROCEDURE — 80053 COMPREHEN METABOLIC PANEL: CPT

## 2018-09-13 PROCEDURE — 83036 HEMOGLOBIN GLYCOSYLATED A1C: CPT

## 2018-09-13 PROCEDURE — 84443 ASSAY THYROID STIM HORMONE: CPT

## 2018-09-13 PROCEDURE — 36415 COLL VENOUS BLD VENIPUNCTURE: CPT | Mod: PO

## 2018-09-14 RX ORDER — FUROSEMIDE 40 MG/1
40 TABLET ORAL DAILY
Qty: 30 TABLET | Refills: 2 | Status: SHIPPED | OUTPATIENT
Start: 2018-09-14 | End: 2018-12-02 | Stop reason: SDUPTHER

## 2018-09-15 DIAGNOSIS — R51.9 NONINTRACTABLE EPISODIC HEADACHE, UNSPECIFIED HEADACHE TYPE: ICD-10-CM

## 2018-09-17 RX ORDER — BUTALBITAL, ACETAMINOPHEN AND CAFFEINE 50; 325; 40 MG/1; MG/1; MG/1
TABLET ORAL
Qty: 30 TABLET | Refills: 0 | Status: SHIPPED | OUTPATIENT
Start: 2018-09-17 | End: 2018-10-13 | Stop reason: SDUPTHER

## 2018-09-21 RX ORDER — TRIAMCINOLONE ACETONIDE 40 MG/ML
40 INJECTION, SUSPENSION INTRA-ARTICULAR; INTRAMUSCULAR
Status: DISCONTINUED | OUTPATIENT
Start: 2018-09-06 | End: 2018-09-21 | Stop reason: HOSPADM

## 2018-09-27 DIAGNOSIS — E11.9 DIABETES MELLITUS WITHOUT COMPLICATION: ICD-10-CM

## 2018-09-27 RX ORDER — METFORMIN HYDROCHLORIDE 1000 MG/1
TABLET ORAL
Qty: 180 TABLET | Refills: 0 | Status: SHIPPED | OUTPATIENT
Start: 2018-09-27 | End: 2018-12-25 | Stop reason: SDUPTHER

## 2018-10-02 RX ORDER — CYCLOBENZAPRINE HCL 10 MG
TABLET ORAL
Qty: 90 TABLET | Refills: 0 | Status: SHIPPED | OUTPATIENT
Start: 2018-10-02 | End: 2019-02-27

## 2018-10-03 ENCOUNTER — TELEPHONE (OUTPATIENT)
Dept: FAMILY MEDICINE | Facility: CLINIC | Age: 58
End: 2018-10-03

## 2018-10-03 NOTE — TELEPHONE ENCOUNTER
----- Message from Haylee Vázquez sent at 10/3/2018  1:47 PM CDT -----  Contact: self - [phone]  Pt is asking for test results from 09/13 to be sent to his home.

## 2018-10-03 NOTE — TELEPHONE ENCOUNTER
Spoke with patient  to inform labs will be reviewed by provider and results will be sent in lab letter. Verbalized understanding

## 2018-10-08 ENCOUNTER — OFFICE VISIT (OUTPATIENT)
Dept: FAMILY MEDICINE | Facility: CLINIC | Age: 58
End: 2018-10-08
Payer: MEDICARE

## 2018-10-08 VITALS
HEIGHT: 69 IN | TEMPERATURE: 99 F | WEIGHT: 244.06 LBS | HEART RATE: 95 BPM | BODY MASS INDEX: 36.15 KG/M2 | DIASTOLIC BLOOD PRESSURE: 90 MMHG | SYSTOLIC BLOOD PRESSURE: 136 MMHG | OXYGEN SATURATION: 95 %

## 2018-10-08 DIAGNOSIS — J41.1 MUCOPURULENT CHRONIC BRONCHITIS: Primary | ICD-10-CM

## 2018-10-08 PROCEDURE — 99214 OFFICE O/P EST MOD 30 MIN: CPT | Mod: S$PBB,,, | Performed by: NURSE PRACTITIONER

## 2018-10-08 PROCEDURE — 3008F BODY MASS INDEX DOCD: CPT | Mod: CPTII,,, | Performed by: NURSE PRACTITIONER

## 2018-10-08 PROCEDURE — 3075F SYST BP GE 130 - 139MM HG: CPT | Mod: CPTII,,, | Performed by: NURSE PRACTITIONER

## 2018-10-08 PROCEDURE — 99999 PR PBB SHADOW E&M-EST. PATIENT-LVL V: CPT | Mod: PBBFAC,,, | Performed by: NURSE PRACTITIONER

## 2018-10-08 PROCEDURE — 99215 OFFICE O/P EST HI 40 MIN: CPT | Mod: PBBFAC,PO,25 | Performed by: NURSE PRACTITIONER

## 2018-10-08 PROCEDURE — 3080F DIAST BP >= 90 MM HG: CPT | Mod: CPTII,,, | Performed by: NURSE PRACTITIONER

## 2018-10-08 PROCEDURE — 96372 THER/PROPH/DIAG INJ SC/IM: CPT | Mod: PBBFAC,PO

## 2018-10-08 RX ORDER — LEVOCETIRIZINE DIHYDROCHLORIDE 5 MG/1
5 TABLET, FILM COATED ORAL NIGHTLY
Qty: 30 TABLET | Refills: 0 | Status: SHIPPED | OUTPATIENT
Start: 2018-10-08 | End: 2018-11-09 | Stop reason: SDUPTHER

## 2018-10-08 RX ORDER — TRIAMCINOLONE ACETONIDE 40 MG/ML
40 INJECTION, SUSPENSION INTRA-ARTICULAR; INTRAMUSCULAR
Status: COMPLETED | OUTPATIENT
Start: 2018-10-08 | End: 2018-10-08

## 2018-10-08 RX ORDER — BENZONATATE 200 MG/1
200 CAPSULE ORAL 3 TIMES DAILY PRN
Qty: 30 CAPSULE | Refills: 0 | Status: SHIPPED | OUTPATIENT
Start: 2018-10-08 | End: 2018-10-18

## 2018-10-08 RX ADMIN — TRIAMCINOLONE ACETONIDE 40 MG: 40 INJECTION, SUSPENSION INTRA-ARTICULAR; INTRAMUSCULAR at 10:10

## 2018-10-08 NOTE — PATIENT INSTRUCTIONS
Bronchitis, Viral (Adult)    You have a viral bronchitis. Bronchitis is inflammation and swelling of the lining of the lungs. This is often caused by an infection. Symptoms include a dry, hacking cough that is worse at night. The cough may bring up yellow-green mucus. You may also feel short of breath or wheeze. Other symptoms may include tiredness, chest discomfort, and chills.  Bronchitis that is caused by a virus is not treated with antibiotics. Instead, medicines may be given to help relieve symptoms. Symptoms can last up to 2 weeks, although the cough may last much longer.  This illness is contagious during the first few days and is spread through the air by coughing and sneezing, or by direct contact (touching the sick person and then touching your own eyes, nose, or mouth).  Most viral illnesses resolve within 10 to 14 days with rest and simple home remedies, although they may sometimes last for several weeks.  Home care  · If symptoms are severe, rest at home for the first 2 to 3 days. When you go back to your usual activities, don't let yourself get too tired.  · Do not smoke. Also avoid being exposed to secondhand smoke.  · You may use over-the-counter medicine to control fever or pain, unless another pain medicine was prescribed. (Note: If you have chronic liver or kidney disease or have ever had a stomach ulcer or gastrointestinal bleeding, talk with your healthcare provider before using these medicines. Also talk to your provider if you are taking medicine to prevent blood clots.) Aspirin should never be given to anyone younger than 18 years of age who is ill with a viral infection or fever. It may cause severe liver or brain damage.  · Your appetite may be poor, so a light diet is fine. Avoid dehydration by drinking 6 to 8 glasses of fluids per day (such as water, soft drinks, sports drinks, juices, tea, or soup). Extra fluids will help loosen secretions in the nose and lungs.  · Over-the-counter  cough, cold, and sore-throat medicines will not shorten the length of the illness, but they may help to reduce symptoms. (Note: Do not use decongestants if you have high blood pressure.)  Follow-up care  Follow up with your healthcare provider, or as advised. If you had an X-ray or ECG (electrocardiogram), a specialist will review it. You will be notified of any new findings that may affect your care.  Note: If you are age 65 or older, or if you have a chronic lung disease or condition that affects your immune system, or you smoke, talk to your healthcare provider about having pneumococcal vaccinations and a yearly influenza vaccination (flu shot).  When to seek medical advice  Call your healthcare provider right away if any of these occur:  · Fever of 100.4°F (38°C) or higher  · Coughing up increased amounts of colored sputum  · Weakness, drowsiness, headache, facial pain, ear pain, or a stiff neck  Call 911, or get immediate medical care  Contact emergency services right away if any of these occur:  · Coughing up blood  · Worsening weakness, drowsiness, headache, or stiff neck  · Trouble breathing, wheezing, or pain with breathing  Date Last Reviewed: 9/13/2015 © 2000-2017 [employer]. [address] [place] [address], [place] [address]. All rights reserved. This information is not intended as a substitute for professional medical care. Always follow your healthcare professional's instructions.        Bronchospasm (Adult)    Bronchospasm occurs when the airways (bronchial tubes) go into spasm and contract. This makes it hard to breathe and causes wheezing (a high-pitched whistling sound). Bronchospasm can also cause frequent coughing without wheezing.  Bronchospasm is due to irritation, inflammation, or allergic reaction of the airways. People with asthma get bronchospasm. However, not everyone with bronchospasm has asthma.  Being exposed to harmful fumes, a recent case of bronchitis, exercise, or a flare-up  of chronic obstructive pulmonary disease (COPD) may cause the airways to spasm. An episode of bronchospasm may last 7 to 14 days. Medicine may be prescribed to relax the airways and prevent wheezing. Antibiotics will be prescribed only if your healthcare provider thinks there is a bacterial infection. Antibiotics do not help a viral infection.  Home care  · Drink lots of water or other fluids (at least 10 glasses a day) during an attack. This will loosen lung secretions and make it easier to breathe. If you have heart or kidney disease, check with your doctor before you drink extra fluids.  · Take prescribed medicine exactly at the times advised. If you take an inhaled medicine to help with breathing, do not use it more than once every 4 hours, unless told to do so. If prescribed an antibiotic or prednisone, take all of the medicine, even if you are feeling better after a few days.  · Do not smoke. Also avoid being exposed to secondhand smoke.  · If you were given an inhaler, use it exactly as directed. If you need to use it more often than prescribed, your condition may be getting worse. Contact your healthcare provider.  Follow-up care  Follow up with your healthcare provider, or as advised.  Note: If you are age 65 or older, have a chronic lung disease or condition that affects your immune system, or you smoke, we recommend getting pneumococcal vaccinations, as well as an influenza vaccination (flu shot) every autumn. Ask your healthcare provider about this.  When to seek medical advice  Call your healthcare provider right away if any of these occur:  · You need to use your inhalers more often than usual.  · You develop a fever of 100.4°F (38°C) or higher.  · You are coughing up lots of dark-colored sputum (mucus).  · You do not start to improve within 24 hours.  Call 911, or get immediate medical care  Contact emergency services if any of these occur:  · Coughing up bloody sputum (mucus)  · Chest pain with each  breath  · Increased wheezing or shortness of breath  Date Last Reviewed: 9/13/2015  © 2664-4183 [employer]. [address] [place] [address], [place] [address]. All rights reserved. This information is not intended as a substitute for professional medical care. Always follow your healthcare professional's instructions.

## 2018-10-08 NOTE — PROGRESS NOTES
Subjective:       Patient ID: Scott Bansal is a 57 y.o. male.    Chief Complaint: URI (congestion )    URI    This is a chronic problem. Episode onset: On Friday. The problem has been gradually worsening. There has been no fever. Associated symptoms include congestion, coughing, headaches and a sore throat. Pertinent negatives include no ear pain or sneezing. Treatments tried: mucinex. The treatment provided mild relief.       Past Medical History:   Diagnosis Date    Bipolar 1 disorder, mixed     BPH (benign prostatic hypertrophy)     Cyst, eyelid     Dr. Broussard    Diabetes mellitus     GERD (gastroesophageal reflux disease)     Hyperlipidemia     Hypertension     Migraine headache     Obesity        Social History     Socioeconomic History    Marital status:      Spouse name: Not on file    Number of children: Not on file    Years of education: Not on file    Highest education level: Not on file   Social Needs    Financial resource strain: Not on file    Food insecurity - worry: Not on file    Food insecurity - inability: Not on file    Transportation needs - medical: Not on file    Transportation needs - non-medical: Not on file   Occupational History    Not on file   Tobacco Use    Smoking status: Former Smoker     Packs/day: 2.00     Years: 15.00     Pack years: 30.00     Types: Cigarettes     Last attempt to quit: 1984     Years since quittin.4    Smokeless tobacco: Never Used    Tobacco comment: Patient quit smoking at the age of 26 yo.   Substance and Sexual Activity    Alcohol use: No    Drug use: No    Sexual activity: Yes     Partners: Female   Other Topics Concern    Not on file   Social History Narrative    Not on file       Past Surgical History:   Procedure Laterality Date    CERVICAL SPINE SURGERY      COLONOSCOPY N/A 2017    Procedure: COLONOSCOPY;  Surgeon: Genaro Nix MD;  Location: [facility];  Service: Endoscopy;  Laterality: N/A;  "   COLONOSCOPY N/A 7/27/2017    [facility]O    EYE SURGERY Left 03/2017    cyst removal on eyelid; Dr. Broussard    SHOULDER SURGERY      TONSILLECTOMY         Review of Systems   Constitutional: Negative for chills and fever.   HENT: Positive for congestion, postnasal drip, sinus pressure and sore throat. Negative for ear pain and sneezing.    Respiratory: Positive for cough.    Neurological: Positive for headaches.   All other systems reviewed and are negative.      Objective:   BP (!) 136/90 (BP Location: Right arm, Patient Position: Sitting, BP Method: Large (Manual))   Pulse 95   Temp 98.5 °F (36.9 °C) (Oral)   Ht 5' 9" (1.753 m)   Wt 110.7 kg (244 lb 0.8 oz)   SpO2 95%   BMI 36.04 kg/m²      Physical Exam   Constitutional: He is oriented to person, place, and time. He appears well-developed and well-nourished. He is cooperative.   HENT:   Head: Normocephalic and atraumatic.   Right Ear: Hearing, tympanic membrane, external ear and ear canal normal.   Left Ear: Hearing, tympanic membrane, external ear and ear canal normal.   Nose: No rhinorrhea.   Mouth/Throat: No oropharyngeal exudate, posterior oropharyngeal edema or posterior oropharyngeal erythema.   Cardiovascular: Normal rate and regular rhythm.   Pulmonary/Chest: Effort normal. No respiratory distress. He has no decreased breath sounds. He has wheezes in the right lower field and the left lower field. He has no rhonchi. He has no rales.   Feet:   Right Foot:   Protective Sensation: 10 sites tested. 10 sites sensed.   Skin Integrity: Negative for erythema, warmth, callus or dry skin.   Left Foot:   Protective Sensation: 10 sites tested. 10 sites sensed.   Skin Integrity: Negative for erythema, warmth, callus or dry skin.   Lymphadenopathy:     He has no cervical adenopathy.   Neurological: He is alert and oriented to person, place, and time.   Skin: Skin is warm, dry and intact. He is not diaphoretic. No " pallor.   Psychiatric: He has a normal mood and affect. His speech is normal and behavior is normal.   Vitals reviewed.      Assessment:       1. Mucopurulent chronic bronchitis        Plan:       Scott was seen today for uri.    Diagnoses and all orders for this visit:    Mucopurulent chronic bronchitis  -     levocetirizine (XYZAL) 5 MG tablet; Take 1 tablet (5 mg total) by mouth every evening.  -     benzonatate (TESSALON) 200 MG capsule; Take 1 capsule (200 mg total) by mouth 3 (three) times daily as needed.  -     triamcinolone acetonide injection 40 mg; Inject 1 mL (40 mg total) into the muscle one time.  -     Increase your water intake to 64-80 oz daily to help thin mucus  -     Nasal Saline spray (Over the counter [place] spray or [place])  2 sprays each nostril 2-3 times a day for nasal congestion  -     Tylenol 500 mg 2 tablets or Ibuprofen 200 mg 2 tablets every 4-6 hours as needed for fever, headaches, sore throat, ear pain, bodyaches, and/or nasal/sinus inflammation      Follow-up if symptoms worsen or fail to improve.

## 2018-10-10 ENCOUNTER — TELEPHONE (OUTPATIENT)
Dept: FAMILY MEDICINE | Facility: CLINIC | Age: 58
End: 2018-10-10

## 2018-10-10 RX ORDER — DOXYCYCLINE 100 MG/1
100 CAPSULE ORAL EVERY 12 HOURS
Qty: 10 CAPSULE | Refills: 0 | Status: SHIPPED | OUTPATIENT
Start: 2018-10-10 | End: 2018-10-20

## 2018-10-13 DIAGNOSIS — R51.9 NONINTRACTABLE EPISODIC HEADACHE, UNSPECIFIED HEADACHE TYPE: ICD-10-CM

## 2018-10-14 RX ORDER — BUTALBITAL, ACETAMINOPHEN AND CAFFEINE 50; 325; 40 MG/1; MG/1; MG/1
TABLET ORAL
Qty: 30 TABLET | Refills: 0 | Status: SHIPPED | OUTPATIENT
Start: 2018-10-14 | End: 2018-11-12 | Stop reason: SDUPTHER

## 2018-11-08 DIAGNOSIS — E78.5 HYPERLIPIDEMIA, UNSPECIFIED HYPERLIPIDEMIA TYPE: ICD-10-CM

## 2018-11-08 RX ORDER — GEMFIBROZIL 600 MG/1
TABLET, FILM COATED ORAL
Qty: 120 TABLET | Refills: 0 | Status: SHIPPED | OUTPATIENT
Start: 2018-11-08 | End: 2019-01-07 | Stop reason: SDUPTHER

## 2018-11-09 RX ORDER — LEVOCETIRIZINE DIHYDROCHLORIDE 5 MG/1
5 TABLET, FILM COATED ORAL NIGHTLY
Qty: 30 TABLET | Refills: 0 | Status: SHIPPED | OUTPATIENT
Start: 2018-11-09 | End: 2019-01-04

## 2018-11-12 DIAGNOSIS — G44.229 CHRONIC TENSION-TYPE HEADACHE, NOT INTRACTABLE: ICD-10-CM

## 2018-11-12 DIAGNOSIS — R51.9 NONINTRACTABLE EPISODIC HEADACHE, UNSPECIFIED HEADACHE TYPE: ICD-10-CM

## 2018-11-12 RX ORDER — NARATRIPTAN 2.5 MG/1
TABLET ORAL
Qty: 12 TABLET | Refills: 2 | Status: SHIPPED | OUTPATIENT
Start: 2018-11-12 | End: 2019-02-04 | Stop reason: SDUPTHER

## 2018-11-12 RX ORDER — BUTALBITAL, ACETAMINOPHEN AND CAFFEINE 50; 325; 40 MG/1; MG/1; MG/1
1 TABLET ORAL EVERY 4 HOURS PRN
Qty: 30 TABLET | Refills: 0 | Status: SHIPPED | OUTPATIENT
Start: 2018-11-12 | End: 2018-12-11 | Stop reason: SDUPTHER

## 2018-11-12 RX ORDER — BUTALBITAL, ACETAMINOPHEN AND CAFFEINE 50; 325; 40 MG/1; MG/1; MG/1
TABLET ORAL
Qty: 30 TABLET | Refills: 0 | Status: SHIPPED | OUTPATIENT
Start: 2018-11-12 | End: 2018-11-15 | Stop reason: SDUPTHER

## 2018-11-15 ENCOUNTER — TELEPHONE (OUTPATIENT)
Dept: FAMILY MEDICINE | Facility: CLINIC | Age: 58
End: 2018-11-15

## 2018-11-15 DIAGNOSIS — R51.9 NONINTRACTABLE EPISODIC HEADACHE, UNSPECIFIED HEADACHE TYPE: ICD-10-CM

## 2018-11-15 DIAGNOSIS — E11.42 DIABETIC POLYNEUROPATHY ASSOCIATED WITH TYPE 2 DIABETES MELLITUS: ICD-10-CM

## 2018-11-15 RX ORDER — BUTALBITAL, ACETAMINOPHEN AND CAFFEINE 50; 325; 40 MG/1; MG/1; MG/1
TABLET ORAL
Qty: 30 TABLET | Refills: 0 | OUTPATIENT
Start: 2018-11-15

## 2018-11-15 RX ORDER — BUTALBITAL, ACETAMINOPHEN AND CAFFEINE 50; 325; 40 MG/1; MG/1; MG/1
1 TABLET ORAL EVERY 4 HOURS PRN
Qty: 30 TABLET | Refills: 0 | Status: SHIPPED | OUTPATIENT
Start: 2018-11-15 | End: 2019-02-20 | Stop reason: SDUPTHER

## 2018-11-15 NOTE — TELEPHONE ENCOUNTER
----- Message from Shell Devries sent at 11/15/2018 11:26 AM CST -----  Contact: Self  Patient called to request a refill for listed medication. Please call to advise at [phone]          butalbital-acetaminophen-caffeine -40 mg (FIORICET, ESGIC) -40 mg per tablet          [facility]/PHARMACY #[id number] - [place], [place] - [address].

## 2018-11-16 DIAGNOSIS — R51.9 NONINTRACTABLE EPISODIC HEADACHE, UNSPECIFIED HEADACHE TYPE: ICD-10-CM

## 2018-11-16 RX ORDER — GABAPENTIN 300 MG/1
300 CAPSULE ORAL 2 TIMES DAILY
Qty: 180 CAPSULE | Refills: 3 | Status: SHIPPED | OUTPATIENT
Start: 2018-11-16 | End: 2020-01-10

## 2018-11-16 NOTE — TELEPHONE ENCOUNTER
----- Message from Niya Bella sent at 11/16/2018 11:37 AM CST -----  Contact: self [phone]  Pt is requesting a refill on gabapentin (NEURONTIN) 300 MG capsule  .  [facility]/pharmacy #[id number] - [place] - [address]  [address] [place] [address]  Phone: [phone] Fax: [phone]

## 2018-11-19 RX ORDER — BUTALBITAL, ACETAMINOPHEN AND CAFFEINE 50; 325; 40 MG/1; MG/1; MG/1
TABLET ORAL
Qty: 30 TABLET | Refills: 0 | Status: SHIPPED | OUTPATIENT
Start: 2018-11-19 | End: 2019-04-03

## 2018-12-02 DIAGNOSIS — I10 ESSENTIAL HYPERTENSION: ICD-10-CM

## 2018-12-03 RX ORDER — FUROSEMIDE 40 MG/1
TABLET ORAL
Qty: 30 TABLET | Refills: 2 | Status: SHIPPED | OUTPATIENT
Start: 2018-12-03 | End: 2019-02-25 | Stop reason: SDUPTHER

## 2018-12-11 DIAGNOSIS — R51.9 NONINTRACTABLE EPISODIC HEADACHE, UNSPECIFIED HEADACHE TYPE: ICD-10-CM

## 2018-12-11 RX ORDER — BUTALBITAL, ACETAMINOPHEN AND CAFFEINE 50; 325; 40 MG/1; MG/1; MG/1
1 TABLET ORAL EVERY 4 HOURS PRN
Qty: 30 TABLET | Refills: 0 | Status: SHIPPED | OUTPATIENT
Start: 2018-12-11 | End: 2019-01-07 | Stop reason: SDUPTHER

## 2018-12-11 NOTE — TELEPHONE ENCOUNTER
----- Message from Daya Traylor sent at 12/11/2018  1:38 PM CST -----  Contact: [phone]  Pt is requesting refill on     butalbital-acetaminophen-caffeine -40 mg (FIORICET, ESGIC) -40 mg per tablet      Pls call..    [facility]/pharmacy #[id number] - [place] - [address]  [address] [facility] [place] [address]  Phone: [phone] Fax: [phone]    Thanks.......Teena

## 2018-12-21 RX ORDER — PANTOPRAZOLE SODIUM 40 MG/1
TABLET, DELAYED RELEASE ORAL
Qty: 90 TABLET | Refills: 3 | Status: SHIPPED | OUTPATIENT
Start: 2018-12-21 | End: 2019-12-05 | Stop reason: SDUPTHER

## 2018-12-25 DIAGNOSIS — E11.9 DIABETES MELLITUS WITHOUT COMPLICATION: ICD-10-CM

## 2018-12-25 RX ORDER — METFORMIN HYDROCHLORIDE 1000 MG/1
TABLET ORAL
Qty: 180 TABLET | Refills: 0 | Status: SHIPPED | OUTPATIENT
Start: 2018-12-25 | End: 2019-03-25 | Stop reason: SDUPTHER

## 2018-12-27 RX ORDER — TAMSULOSIN HYDROCHLORIDE 0.4 MG/1
CAPSULE ORAL
Qty: 30 CAPSULE | Refills: 0 | Status: SHIPPED | OUTPATIENT
Start: 2018-12-27 | End: 2019-02-08 | Stop reason: SDUPTHER

## 2019-01-04 ENCOUNTER — OFFICE VISIT (OUTPATIENT)
Dept: FAMILY MEDICINE | Facility: CLINIC | Age: 59
End: 2019-01-04
Payer: MEDICARE

## 2019-01-04 VITALS
WEIGHT: 244 LBS | TEMPERATURE: 98 F | BODY MASS INDEX: 36.14 KG/M2 | OXYGEN SATURATION: 97 % | SYSTOLIC BLOOD PRESSURE: 126 MMHG | HEIGHT: 69 IN | HEART RATE: 74 BPM | DIASTOLIC BLOOD PRESSURE: 76 MMHG

## 2019-01-04 DIAGNOSIS — I10 ESSENTIAL HYPERTENSION: ICD-10-CM

## 2019-01-04 DIAGNOSIS — J41.1 MUCOPURULENT CHRONIC BRONCHITIS: ICD-10-CM

## 2019-01-04 DIAGNOSIS — E78.5 HYPERLIPIDEMIA, UNSPECIFIED HYPERLIPIDEMIA TYPE: ICD-10-CM

## 2019-01-04 DIAGNOSIS — E11.9 TYPE 2 DIABETES MELLITUS WITHOUT COMPLICATION, WITHOUT LONG-TERM CURRENT USE OF INSULIN: ICD-10-CM

## 2019-01-04 DIAGNOSIS — G89.29 ACUTE EXACERBATION OF CHRONIC LOW BACK PAIN: ICD-10-CM

## 2019-01-04 DIAGNOSIS — M54.50 ACUTE EXACERBATION OF CHRONIC LOW BACK PAIN: ICD-10-CM

## 2019-01-04 DIAGNOSIS — J20.9 ACUTE BRONCHITIS, UNSPECIFIED ORGANISM: Primary | ICD-10-CM

## 2019-01-04 PROCEDURE — 99499 RISK ADDL DX/OHS AUDIT: ICD-10-PCS | Mod: S$GLB,,, | Performed by: NURSE PRACTITIONER

## 2019-01-04 PROCEDURE — 99499 UNLISTED E&M SERVICE: CPT | Mod: S$GLB,,, | Performed by: NURSE PRACTITIONER

## 2019-01-04 PROCEDURE — 3078F DIAST BP <80 MM HG: CPT | Mod: CPTII,S$GLB,, | Performed by: NURSE PRACTITIONER

## 2019-01-04 PROCEDURE — 3074F SYST BP LT 130 MM HG: CPT | Mod: CPTII,S$GLB,, | Performed by: NURSE PRACTITIONER

## 2019-01-04 PROCEDURE — 3044F HG A1C LEVEL LT 7.0%: CPT | Mod: CPTII,S$GLB,, | Performed by: NURSE PRACTITIONER

## 2019-01-04 PROCEDURE — 3078F PR MOST RECENT DIASTOLIC BLOOD PRESSURE < 80 MM HG: ICD-10-PCS | Mod: CPTII,S$GLB,, | Performed by: NURSE PRACTITIONER

## 2019-01-04 PROCEDURE — 99214 PR OFFICE/OUTPT VISIT, EST, LEVL IV, 30-39 MIN: ICD-10-PCS | Mod: 25,S$GLB,, | Performed by: NURSE PRACTITIONER

## 2019-01-04 PROCEDURE — 3044F PR MOST RECENT HEMOGLOBIN A1C LEVEL <7.0%: ICD-10-PCS | Mod: CPTII,S$GLB,, | Performed by: NURSE PRACTITIONER

## 2019-01-04 PROCEDURE — 99999 PR PBB SHADOW E&M-EST. PATIENT-LVL V: ICD-10-PCS | Mod: PBBFAC,,, | Performed by: NURSE PRACTITIONER

## 2019-01-04 PROCEDURE — 96372 PR INJECTION,THERAP/PROPH/DIAG2ST, IM OR SUBCUT: ICD-10-PCS | Mod: S$GLB,,, | Performed by: NURSE PRACTITIONER

## 2019-01-04 PROCEDURE — 3008F BODY MASS INDEX DOCD: CPT | Mod: CPTII,S$GLB,, | Performed by: NURSE PRACTITIONER

## 2019-01-04 PROCEDURE — 3008F PR BODY MASS INDEX (BMI) DOCUMENTED: ICD-10-PCS | Mod: CPTII,S$GLB,, | Performed by: NURSE PRACTITIONER

## 2019-01-04 PROCEDURE — 96372 THER/PROPH/DIAG INJ SC/IM: CPT | Mod: S$GLB,,, | Performed by: NURSE PRACTITIONER

## 2019-01-04 PROCEDURE — 99214 OFFICE O/P EST MOD 30 MIN: CPT | Mod: 25,S$GLB,, | Performed by: NURSE PRACTITIONER

## 2019-01-04 PROCEDURE — 3074F PR MOST RECENT SYSTOLIC BLOOD PRESSURE < 130 MM HG: ICD-10-PCS | Mod: CPTII,S$GLB,, | Performed by: NURSE PRACTITIONER

## 2019-01-04 PROCEDURE — 99999 PR PBB SHADOW E&M-EST. PATIENT-LVL V: CPT | Mod: PBBFAC,,, | Performed by: NURSE PRACTITIONER

## 2019-01-04 RX ORDER — PROMETHAZINE HYDROCHLORIDE AND DEXTROMETHORPHAN HYDROBROMIDE 6.25; 15 MG/5ML; MG/5ML
5 SYRUP ORAL NIGHTLY
Qty: 180 ML | Refills: 0 | Status: SHIPPED | OUTPATIENT
Start: 2019-01-04 | End: 2019-01-14

## 2019-01-04 RX ORDER — METHYLPREDNISOLONE 4 MG/1
TABLET ORAL
Qty: 1 PACKAGE | Refills: 0 | Status: SHIPPED | OUTPATIENT
Start: 2019-01-04 | End: 2019-01-25

## 2019-01-04 RX ORDER — KETOROLAC TROMETHAMINE 30 MG/ML
60 INJECTION, SOLUTION INTRAMUSCULAR; INTRAVENOUS
Status: COMPLETED | OUTPATIENT
Start: 2019-01-04 | End: 2019-01-04

## 2019-01-04 RX ADMIN — KETOROLAC TROMETHAMINE 60 MG: 30 INJECTION, SOLUTION INTRAMUSCULAR; INTRAVENOUS at 11:01

## 2019-01-04 NOTE — PATIENT INSTRUCTIONS
What Is Acute Bronchitis?  Acute bronchitis is when the airways in your lungs (bronchial tubes) become red and swollen (inflamed). It is usually caused by a viral infection. But it can also occur because of a bacteria or allergen. Symptoms include a cough that produces yellow or greenish mucus and can last for days or sometimes weeks.  Inside healthy lungs    Air travels in and out of the lungs through the airways. The linings of these airways produce sticky mucus. This mucus traps particles that enter the lungs. Tiny structures called cilia then sweep the particles out of the airways.     Healthy airway: Airways are normally open. Air moves in and out easily.      Healthy cilia: Tiny, hairlike cilia sweep mucus and particles up and out of the airways.   Lungs with bronchitis  Bronchitis often occurs with a cold or the flu virus. The airways become inflamed (red and swollen). There is a deep hacking cough from the extra mucus. Other symptoms may include:  · Wheezing  · Chest discomfort  · Shortness of breath  · Mild fever  A second infection, this time due to bacteria, may then occur. And airways irritated by allergens or smoke are more likely to get infected.        Inflamed airway: Inflammation and extra mucus narrow the airway, causing shortness of breath.      Impaired cilia: Extra mucus impairs cilia, causing congestion and wheezing. Smoking makes the problem worse.   Making a diagnosis  A physical exam, health history, and certain tests help your healthcare provider make the diagnosis.  Health history  Your healthcare provider will ask you about your symptoms.  The exam  Your provider listens to your chest for signs of congestion. He or she may also check your ears, nose, and throat.  Possible tests  · A sputum test for bacteria. This requires a sample of mucus from your lungs.  · A nasal or throat swab. This tests to see if you have a bacterial infection.  · A chest X-ray. This is done if your healthcare  provider thinks you have pneumonia.  · Tests to check for an underlying condition. Other tests may be done to check for things such as allergies, asthma, or COPD (chronic obstructive pulmonary disease). You may need to see a specialist for more lung function testing.  Treating a cough  The main treatment for bronchitis is easing symptoms. Avoiding smoke, allergens, and other things that trigger coughing can often help. If the infection is bacterial, you may be given antibiotics. During the illness, it's important to get plenty of sleep. To ease symptoms:  · Dont smoke. Also avoid secondhand smoke.  · Use a humidifier. Or try breathing in steam from a hot shower. This may help loosen mucus.  · Drink a lot of water and juice. They can soothe the throat and may help thin mucus.  · Sit up or use extra pillows when in bed. This helps to lessen coughing and congestion.  · Ask your provider about using medicine. Ask about using cough medicine, pain and fever medicine, or a decongestant.  Antibiotics  Most cases of bronchitis are caused by cold or flu viruses. They dont need antibiotics to treat them, even if your mucus is thick and green or yellow. Antibiotics dont treat viral illness and antibiotics have not been shown to have any benefit in cases of acute bronchitis. Taking antibiotics when they are not needed increases your risk of getting an infection later that is antibiotic-resistant. Antibiotics can also cause severe cases of diarrhea that require other antibiotics to treat.  It is important that you accept your healthcare provider's opinion to not use antibiotics. Your provider will prescribe antibiotics if the infection is caused by bacteria. If they are prescribed:  · Take all of the medicine. Take the medicine until it is used up, even if symptoms have improved. If you dont, the bronchitis may come back.  · Take the medicines as directed. For instance, some medicines should be taken with food.  · Ask about  side effects. Ask your provider or pharmacist what side effects are common, and what to do about them.  Follow-up care  You should see your provider again in 2 to 3 weeks. By this time, symptoms should have improved. An infection that lasts longer may mean you have a more serious problem.  Prevention  · Avoid tobacco smoke. If you smoke, quit. Stay away from smoky places. Ask friends and family not to smoke around you, or in your home or car.  · Get checked for allergies.  · Ask your provider about getting a yearly flu shot. Also ask about pneumococcal or pneumonia shots.  · Wash your hands often. This helps reduce the chance of picking up viruses that cause colds and flu.  Call your healthcare provider if:  · Symptoms worsen, or you have new symptoms  · Breathing problems worsen or  become severe  · Symptoms dont get better within a week, or within 3 days of taking antibiotics   Date Last Reviewed: 2/1/2017  © 2002-5898 [employer]. [address] [place] [address], [place] [address]. All rights reserved. This information is not intended as a substitute for professional medical care. Always follow your healthcare professional's instructions.

## 2019-01-04 NOTE — PROGRESS NOTES
Patient, Scott Bansal (MRN #[id number]), presented with a recorded BMI of 36.03 kg/m^2 and a documented comorbidity(s):  - Diabetes Mellitus Type 2  - Hypertension  - Hyperlipidemia  to which the severe obesity is a contributing factor. This is consistent with the definition of severe obesity (BMI 35.0-39.9) with comorbidity (ICD-10 E66.01, Z68.35). The patient's severe obesity was monitored, evaluated, addressed and/or treated. This addendum to the medical record is made on 01/04/2019.

## 2019-01-04 NOTE — PROGRESS NOTES
History of Present Illness   Scott Bansal is a 58 y.o. man with medical history as listed below who presents today for evaluation of URI symptoms x2 weeks. He reports chest congestion, wheezing, and productive cough. He has no nasal congestion, post nasal drip, sore throat, ear pain, or fevers. He is taking Mucinex-DM and nebulizer treatments with no relief. He has had known sick contacts. He has no additional complaints and is otherwise healthy on today's visit.      Past Medical History:   Diagnosis Date    Bipolar 1 disorder, mixed     BPH (benign prostatic hypertrophy)     Cyst, eyelid     Dr. Broussard    Diabetes mellitus     GERD (gastroesophageal reflux disease)     Hyperlipidemia     Hypertension     Migraine headache     Obesity        Past Surgical History:   Procedure Laterality Date    CERVICAL SPINE SURGERY      COLONOSCOPY N/A 2017    [facility]    EYE SURGERY Left 2017    cyst removal on eyelid; Dr. Broussard    SHOULDER SURGERY      TONSILLECTOMY         Social History     Socioeconomic History    Marital status:      Spouse name: None    Number of children: None    Years of education: None    Highest education level: None   Social Needs    Financial resource strain: None    Food insecurity - worry: None    Food insecurity - inability: None    Transportation needs - medical: None    Transportation needs - non-medical: None   Occupational History    None   Tobacco Use    Smoking status: Former Smoker     Packs/day: 2.00     Years: 15.00     Pack years: 30.00     Types: Cigarettes     Last attempt to quit: 1984     Years since quittin.6    Smokeless tobacco: Never Used    Tobacco comment: Patient quit smoking at the age of 24 yo.   Substance and Sexual Activity    Alcohol use: No    Drug use: No    Sexual activity: Yes     Partners: Female   Other Topics Concern    None   Social History Narrative    None  "      Family History   Problem Relation Age of Onset    Cataracts Mother     Cancer Mother         throat    Hypertension Mother     Hypertension Father     Stroke Father         2 strokes    Hypertension Sister     Cancer Brother         spinal, kidney, spinal fluid    Hypertension Brother     Cancer Cousin         breast?       Review of Systems  Review of Systems   Constitutional: Negative for chills, fever and malaise/fatigue.   HENT: Negative for congestion, ear pain, sinus pain and sore throat.    Respiratory: Positive for cough, sputum production, shortness of breath and wheezing.    Cardiovascular: Negative for chest pain and palpitations.   Musculoskeletal: Positive for back pain.     A complete review of systems was otherwise negative.    Physical Exam  /76 (BP Location: Right arm, Patient Position: Sitting, BP Method: Medium (Manual))   Pulse 74   Temp 98.4 °F (36.9 °C) (Oral)   Ht 5' 9" (1.753 m)   Wt 110.7 kg (244 lb)   SpO2 97%   BMI 36.03 kg/m²   General appearance: alert, appears stated age, cooperative and no distress  Eyes: negative findings: lids and lashes normal and conjunctivae and sclerae normal  Ears: normal TM's and external ear canals both ears  Nose: Nares normal. Septum midline. Mucosa normal. No drainage or sinus tenderness.  Throat: lips, mucosa, and tongue normal; teeth and gums normal and moderate oropharyngeal erythema  Lungs: bilateral expiratory wheezes throughout with normal effort and rate and no accessory muscle use  Heart: regular rate and rhythm, S1, S2 normal, no murmur, click, rub or gallop  Extremities: extremities normal, atraumatic, no cyanosis or edema  Pulses: 2+ and symmetric  Skin: Skin color, texture, turgor normal. No rashes or lesions  Lymph nodes: Cervical, supraclavicular, and axillary nodes normal.  Neurologic: Grossly normal    Assessment/Plan  Acute bronchitis, unspecified organism  Viral, antibiotics not indicated.  Offered Duo Neb in " clinic, he will administer at home every 4 hours.  Continue Mucinex daily.  Medrol dose pack, take as directed.  Nighttime cough medication PRN.  -     methylPREDNISolone (MEDROL DOSEPACK) 4 mg tablet; use as directed  Dispense: 1 Package; Refill: 0  -     promethazine-dextromethorphan (PROMETHAZINE-DM) 6.25-15 mg/5 mL Syrp; Take 5 mLs by mouth every evening. for 10 days  Dispense: 180 mL; Refill: 0    Acute exacerbation of chronic low back pain  Acute flare of chronic low back pain, requesting Toradol today. One time IM dose provided.  Discussed he needs to establish with pain management for chronic care and follow-up.  -     ketorolac injection 60 mg    Mucopurulent chronic bronchitis  As above.    Type 2 diabetes mellitus without complication, without long-term current use of insulin  The current medical regimen is effective;  continue present plan and medications.    Essential hypertension  The current medical regimen is effective;  continue present plan and medications.    Hyperlipidemia, unspecified hyperlipidemia type  The current medical regimen is effective;  continue present plan and medications.    Patient has verbalized understanding and is in agreement with plan of care.    Follow-up if symptoms worsen or fail to improve.

## 2019-01-07 DIAGNOSIS — R51.9 NONINTRACTABLE EPISODIC HEADACHE, UNSPECIFIED HEADACHE TYPE: ICD-10-CM

## 2019-01-07 DIAGNOSIS — E78.5 HYPERLIPIDEMIA, UNSPECIFIED HYPERLIPIDEMIA TYPE: ICD-10-CM

## 2019-01-08 RX ORDER — GEMFIBROZIL 600 MG/1
TABLET, FILM COATED ORAL
Qty: 120 TABLET | Refills: 0 | Status: SHIPPED | OUTPATIENT
Start: 2019-01-08 | End: 2019-03-13 | Stop reason: SDUPTHER

## 2019-01-08 RX ORDER — BUTALBITAL, ACETAMINOPHEN AND CAFFEINE 50; 325; 40 MG/1; MG/1; MG/1
1 TABLET ORAL EVERY 4 HOURS PRN
Qty: 30 TABLET | Refills: 0 | Status: SHIPPED | OUTPATIENT
Start: 2019-01-08 | End: 2019-02-20 | Stop reason: SDUPTHER

## 2019-01-08 RX ORDER — BUTALBITAL, ACETAMINOPHEN AND CAFFEINE 50; 325; 40 MG/1; MG/1; MG/1
TABLET ORAL
Qty: 30 TABLET | Refills: 0 | OUTPATIENT
Start: 2019-01-08

## 2019-01-09 RX ORDER — BUTALBITAL, ACETAMINOPHEN AND CAFFEINE 50; 325; 40 MG/1; MG/1; MG/1
TABLET ORAL
Qty: 30 TABLET | Refills: 0 | OUTPATIENT
Start: 2019-01-09

## 2019-02-04 DIAGNOSIS — J20.9 ACUTE BRONCHITIS, UNSPECIFIED ORGANISM: ICD-10-CM

## 2019-02-04 DIAGNOSIS — G44.229 CHRONIC TENSION-TYPE HEADACHE, NOT INTRACTABLE: ICD-10-CM

## 2019-02-04 DIAGNOSIS — J01.90 ACUTE RHINOSINUSITIS: ICD-10-CM

## 2019-02-04 RX ORDER — NARATRIPTAN 2.5 MG/1
TABLET ORAL
Qty: 12 TABLET | Refills: 2 | Status: CANCELLED | OUTPATIENT
Start: 2019-02-04

## 2019-02-04 RX ORDER — FLUTICASONE PROPIONATE 50 MCG
SPRAY, SUSPENSION (ML) NASAL
Qty: 16 G | Refills: 5 | Status: SHIPPED | OUTPATIENT
Start: 2019-02-04 | End: 2020-07-01

## 2019-02-04 RX ORDER — NARATRIPTAN 2.5 MG/1
TABLET ORAL
Qty: 12 TABLET | Refills: 2 | Status: SHIPPED | OUTPATIENT
Start: 2019-02-04 | End: 2019-02-12 | Stop reason: SDUPTHER

## 2019-02-04 NOTE — TELEPHONE ENCOUNTER
----- Message from Niya Bella sent at 2/4/2019 12:28 PM CST -----  Contact: self [phone]  Requesting a refill furosemide (LASIX) 40 MG tablet & naratriptan (AMERGE) 2.5 MG tablet  .  [facility]/pharmacy #[id number] - [place] - [address]  [address]  [place] [address]  Phone: [phone] Fax: [phone]

## 2019-02-05 RX ORDER — BUTALBITAL, ACETAMINOPHEN AND CAFFEINE 50; 325; 40 MG/1; MG/1; MG/1
TABLET ORAL
Qty: 30 TABLET | Refills: 0 | Status: SHIPPED | OUTPATIENT
Start: 2019-02-05 | End: 2019-03-08 | Stop reason: SDUPTHER

## 2019-02-06 DIAGNOSIS — I10 ESSENTIAL HYPERTENSION: ICD-10-CM

## 2019-02-06 RX ORDER — AMLODIPINE BESYLATE 10 MG/1
10 TABLET ORAL DAILY
Qty: 90 TABLET | Refills: 0 | Status: SHIPPED | OUTPATIENT
Start: 2019-02-06 | End: 2019-04-29 | Stop reason: SDUPTHER

## 2019-02-06 RX ORDER — BUTALBITAL, ACETAMINOPHEN AND CAFFEINE 50; 325; 40 MG/1; MG/1; MG/1
TABLET ORAL
Qty: 30 TABLET | Refills: 0 | OUTPATIENT
Start: 2019-02-06

## 2019-02-08 DIAGNOSIS — E11.9 TYPE 2 DIABETES MELLITUS WITHOUT COMPLICATION: ICD-10-CM

## 2019-02-08 RX ORDER — TAMSULOSIN HYDROCHLORIDE 0.4 MG/1
0.4 CAPSULE ORAL DAILY
Qty: 30 CAPSULE | Refills: 0 | Status: SHIPPED | OUTPATIENT
Start: 2019-02-08 | End: 2019-03-08 | Stop reason: SDUPTHER

## 2019-02-08 NOTE — TELEPHONE ENCOUNTER
----- Message from Haylee bryantalejandra sent at 2/8/2019 10:27 AM CST -----  Contact: self - [phone]  Refill request for --- tamsulosin (FLOMAX) 0.4 mg Cap      ...  [facility]/pharmacy #[id number] - [place] - [address]  [address].  [place] [address]  Phone: [phone] Fax: [phone]

## 2019-02-11 DIAGNOSIS — L30.1 DYSHIDROTIC ECZEMA: ICD-10-CM

## 2019-02-11 DIAGNOSIS — L40.9 PSORIASIS: ICD-10-CM

## 2019-02-11 RX ORDER — BETAMETHASONE DIPROPIONATE 0.5 MG/G
CREAM TOPICAL DAILY
Qty: 45 G | Refills: 0 | Status: SHIPPED | OUTPATIENT
Start: 2019-02-11 | End: 2019-10-13 | Stop reason: SDUPTHER

## 2019-02-11 NOTE — TELEPHONE ENCOUNTER
----- Message from Coleen Watson sent at 2/11/2019  9:53 AM CST -----  Contact: Self   Patient says he need a refill on his medication. Please call patient at [phone]      betamethasone dipropionate (DIPROLENE) 0.05 % cream        [facility]/pharmacy #[id number] - [place] [address]

## 2019-02-12 DIAGNOSIS — G44.229 CHRONIC TENSION-TYPE HEADACHE, NOT INTRACTABLE: ICD-10-CM

## 2019-02-13 RX ORDER — NARATRIPTAN 2.5 MG/1
TABLET ORAL
Qty: 12 TABLET | Refills: 2 | Status: SHIPPED | OUTPATIENT
Start: 2019-02-13 | End: 2019-03-07 | Stop reason: ALTCHOICE

## 2019-02-20 ENCOUNTER — TELEPHONE (OUTPATIENT)
Dept: FAMILY MEDICINE | Facility: CLINIC | Age: 59
End: 2019-02-20

## 2019-02-20 ENCOUNTER — NURSE TRIAGE (OUTPATIENT)
Dept: ADMINISTRATIVE | Facility: CLINIC | Age: 59
End: 2019-02-20

## 2019-02-20 ENCOUNTER — OFFICE VISIT (OUTPATIENT)
Dept: FAMILY MEDICINE | Facility: CLINIC | Age: 59
End: 2019-02-20
Payer: MEDICARE

## 2019-02-20 ENCOUNTER — HOSPITAL ENCOUNTER (OUTPATIENT)
Dept: RADIOLOGY | Facility: HOSPITAL | Age: 59
Discharge: HOME | End: 2019-02-20
Attending: FAMILY MEDICINE
Payer: MEDICARE

## 2019-02-20 VITALS
HEIGHT: 69 IN | SYSTOLIC BLOOD PRESSURE: 128 MMHG | WEIGHT: 245.38 LBS | HEART RATE: 94 BPM | RESPIRATION RATE: 18 BRPM | OXYGEN SATURATION: 95 % | BODY MASS INDEX: 36.34 KG/M2 | TEMPERATURE: 98 F | DIASTOLIC BLOOD PRESSURE: 80 MMHG

## 2019-02-20 DIAGNOSIS — J45.20 MILD INTERMITTENT ASTHMATIC BRONCHITIS WITHOUT COMPLICATION: Primary | ICD-10-CM

## 2019-02-20 DIAGNOSIS — J45.21 MILD INTERMITTENT ASTHMATIC BRONCHITIS WITH ACUTE EXACERBATION: ICD-10-CM

## 2019-02-20 DIAGNOSIS — F31.9 BIPOLAR 1 DISORDER: ICD-10-CM

## 2019-02-20 DIAGNOSIS — R51.9 FREQUENT HEADACHES: ICD-10-CM

## 2019-02-20 DIAGNOSIS — R07.81 PLEURITIC CHEST PAIN: ICD-10-CM

## 2019-02-20 DIAGNOSIS — J45.21 MILD INTERMITTENT ASTHMATIC BRONCHITIS WITH ACUTE EXACERBATION: Primary | ICD-10-CM

## 2019-02-20 DIAGNOSIS — E11.42 DIABETIC POLYNEUROPATHY ASSOCIATED WITH TYPE 2 DIABETES MELLITUS: ICD-10-CM

## 2019-02-20 PROCEDURE — 99499 RISK ADDL DX/OHS AUDIT: ICD-10-PCS | Mod: S$GLB,,, | Performed by: FAMILY MEDICINE

## 2019-02-20 PROCEDURE — 3079F DIAST BP 80-89 MM HG: CPT | Mod: CPTII,S$GLB,, | Performed by: FAMILY MEDICINE

## 2019-02-20 PROCEDURE — 71046 XR CHEST PA AND LATERAL: ICD-10-PCS | Mod: 26,,, | Performed by: RADIOLOGY

## 2019-02-20 PROCEDURE — 99499 UNLISTED E&M SERVICE: CPT | Mod: S$GLB,,, | Performed by: FAMILY MEDICINE

## 2019-02-20 PROCEDURE — 3079F PR MOST RECENT DIASTOLIC BLOOD PRESSURE 80-89 MM HG: ICD-10-PCS | Mod: CPTII,S$GLB,, | Performed by: FAMILY MEDICINE

## 2019-02-20 PROCEDURE — 3008F PR BODY MASS INDEX (BMI) DOCUMENTED: ICD-10-PCS | Mod: CPTII,S$GLB,, | Performed by: FAMILY MEDICINE

## 2019-02-20 PROCEDURE — 3074F PR MOST RECENT SYSTOLIC BLOOD PRESSURE < 130 MM HG: ICD-10-PCS | Mod: CPTII,S$GLB,, | Performed by: FAMILY MEDICINE

## 2019-02-20 PROCEDURE — 3008F BODY MASS INDEX DOCD: CPT | Mod: CPTII,S$GLB,, | Performed by: FAMILY MEDICINE

## 2019-02-20 PROCEDURE — 71046 X-RAY EXAM CHEST 2 VIEWS: CPT | Mod: 26,,, | Performed by: RADIOLOGY

## 2019-02-20 PROCEDURE — 99214 OFFICE O/P EST MOD 30 MIN: CPT | Mod: S$GLB,,, | Performed by: FAMILY MEDICINE

## 2019-02-20 PROCEDURE — 3044F HG A1C LEVEL LT 7.0%: CPT | Mod: CPTII,S$GLB,, | Performed by: FAMILY MEDICINE

## 2019-02-20 PROCEDURE — 99214 PR OFFICE/OUTPT VISIT, EST, LEVL IV, 30-39 MIN: ICD-10-PCS | Mod: S$GLB,,, | Performed by: FAMILY MEDICINE

## 2019-02-20 PROCEDURE — 3074F SYST BP LT 130 MM HG: CPT | Mod: CPTII,S$GLB,, | Performed by: FAMILY MEDICINE

## 2019-02-20 PROCEDURE — 99999 PR PBB SHADOW E&M-EST. PATIENT-LVL III: ICD-10-PCS | Mod: PBBFAC,,, | Performed by: FAMILY MEDICINE

## 2019-02-20 PROCEDURE — 99999 PR PBB SHADOW E&M-EST. PATIENT-LVL III: CPT | Mod: PBBFAC,,, | Performed by: FAMILY MEDICINE

## 2019-02-20 PROCEDURE — 3044F PR MOST RECENT HEMOGLOBIN A1C LEVEL <7.0%: ICD-10-PCS | Mod: CPTII,S$GLB,, | Performed by: FAMILY MEDICINE

## 2019-02-20 PROCEDURE — 71046 X-RAY EXAM CHEST 2 VIEWS: CPT | Mod: TC,FY,PO

## 2019-02-20 RX ORDER — FOLIC ACID 0.8 MG
800 TABLET ORAL DAILY
COMMUNITY
End: 2021-01-31

## 2019-02-20 RX ORDER — FLUTICASONE FUROATE AND VILANTEROL 100; 25 UG/1; UG/1
1 POWDER RESPIRATORY (INHALATION) DAILY
Qty: 60 EACH | Refills: 3 | Status: SHIPPED | OUTPATIENT
Start: 2019-02-20 | End: 2019-05-20

## 2019-02-20 RX ORDER — MELATONIN 5 MG
CAPSULE ORAL
COMMUNITY
End: 2019-07-09

## 2019-02-20 NOTE — TELEPHONE ENCOUNTER
Reason for Disposition   All other patients with chest pain    Protocols used: ST CHEST PAIN-A-OH    Scott woke up with cough that causes pain in his right lower lung today.  All triage questions negative.  Message to Kaylie Chau MD, pcp, and appt made with Dr Betsy Zamora, in [place] [facility]. Please contact caller directly with any additional care advice.   Home care advice given, to include:  CALL BACK IF:  * Severe chest pain  * Constant chest pain lasting longer than 5 minutes  * Difficulty breathing  * Fever  * You become worse

## 2019-02-20 NOTE — TELEPHONE ENCOUNTER
Please let him know his x ray is normal and does not show infection. I recommend he begin using a steroid inhaler. I am sending this to his pharmacy.

## 2019-02-25 DIAGNOSIS — I10 ESSENTIAL HYPERTENSION: ICD-10-CM

## 2019-02-25 RX ORDER — FUROSEMIDE 40 MG/1
TABLET ORAL
Qty: 30 TABLET | Refills: 2 | Status: SHIPPED | OUTPATIENT
Start: 2019-02-25 | End: 2019-04-17 | Stop reason: SDUPTHER

## 2019-02-27 RX ORDER — CYCLOBENZAPRINE HCL 10 MG
10 TABLET ORAL 3 TIMES DAILY PRN
Qty: 90 TABLET | Refills: 0 | Status: SHIPPED | OUTPATIENT
Start: 2019-02-27 | End: 2019-03-31 | Stop reason: SDUPTHER

## 2019-03-07 ENCOUNTER — TELEPHONE (OUTPATIENT)
Dept: FAMILY MEDICINE | Facility: CLINIC | Age: 59
End: 2019-03-07

## 2019-03-07 ENCOUNTER — OFFICE VISIT (OUTPATIENT)
Dept: FAMILY MEDICINE | Facility: CLINIC | Age: 59
End: 2019-03-07
Payer: MEDICARE

## 2019-03-07 VITALS
SYSTOLIC BLOOD PRESSURE: 140 MMHG | TEMPERATURE: 98 F | DIASTOLIC BLOOD PRESSURE: 88 MMHG | WEIGHT: 251 LBS | HEART RATE: 110 BPM | OXYGEN SATURATION: 97 % | BODY MASS INDEX: 37.18 KG/M2 | HEIGHT: 69 IN

## 2019-03-07 DIAGNOSIS — I10 ESSENTIAL HYPERTENSION: ICD-10-CM

## 2019-03-07 DIAGNOSIS — E78.5 HYPERLIPIDEMIA, UNSPECIFIED HYPERLIPIDEMIA TYPE: ICD-10-CM

## 2019-03-07 DIAGNOSIS — J41.1 MUCOPURULENT CHRONIC BRONCHITIS: ICD-10-CM

## 2019-03-07 DIAGNOSIS — G44.229 CHRONIC TENSION-TYPE HEADACHE, NOT INTRACTABLE: Primary | ICD-10-CM

## 2019-03-07 DIAGNOSIS — M51.36 LUMBAR DEGENERATIVE DISC DISEASE: ICD-10-CM

## 2019-03-07 DIAGNOSIS — E11.9 TYPE 2 DIABETES MELLITUS WITHOUT COMPLICATION, WITHOUT LONG-TERM CURRENT USE OF INSULIN: ICD-10-CM

## 2019-03-07 DIAGNOSIS — F31.9 BIPOLAR 1 DISORDER: ICD-10-CM

## 2019-03-07 PROBLEM — M51.369 LUMBAR DEGENERATIVE DISC DISEASE: Status: ACTIVE | Noted: 2019-03-07

## 2019-03-07 PROCEDURE — 3008F BODY MASS INDEX DOCD: CPT | Mod: CPTII,S$GLB,, | Performed by: NURSE PRACTITIONER

## 2019-03-07 PROCEDURE — 99214 OFFICE O/P EST MOD 30 MIN: CPT | Mod: 25,S$GLB,, | Performed by: NURSE PRACTITIONER

## 2019-03-07 PROCEDURE — 99214 PR OFFICE/OUTPT VISIT, EST, LEVL IV, 30-39 MIN: ICD-10-PCS | Mod: 25,S$GLB,, | Performed by: NURSE PRACTITIONER

## 2019-03-07 PROCEDURE — 3044F HG A1C LEVEL LT 7.0%: CPT | Mod: CPTII,S$GLB,, | Performed by: NURSE PRACTITIONER

## 2019-03-07 PROCEDURE — 3079F PR MOST RECENT DIASTOLIC BLOOD PRESSURE 80-89 MM HG: ICD-10-PCS | Mod: CPTII,S$GLB,, | Performed by: NURSE PRACTITIONER

## 2019-03-07 PROCEDURE — 3077F SYST BP >= 140 MM HG: CPT | Mod: CPTII,S$GLB,, | Performed by: NURSE PRACTITIONER

## 2019-03-07 PROCEDURE — 3077F PR MOST RECENT SYSTOLIC BLOOD PRESSURE >= 140 MM HG: ICD-10-PCS | Mod: CPTII,S$GLB,, | Performed by: NURSE PRACTITIONER

## 2019-03-07 PROCEDURE — 99999 PR PBB SHADOW E&M-EST. PATIENT-LVL V: ICD-10-PCS | Mod: PBBFAC,,, | Performed by: NURSE PRACTITIONER

## 2019-03-07 PROCEDURE — 96372 PR INJECTION,THERAP/PROPH/DIAG2ST, IM OR SUBCUT: ICD-10-PCS | Mod: S$GLB,,, | Performed by: NURSE PRACTITIONER

## 2019-03-07 PROCEDURE — 3079F DIAST BP 80-89 MM HG: CPT | Mod: CPTII,S$GLB,, | Performed by: NURSE PRACTITIONER

## 2019-03-07 PROCEDURE — 96372 THER/PROPH/DIAG INJ SC/IM: CPT | Mod: S$GLB,,, | Performed by: NURSE PRACTITIONER

## 2019-03-07 PROCEDURE — 99999 PR PBB SHADOW E&M-EST. PATIENT-LVL V: CPT | Mod: PBBFAC,,, | Performed by: NURSE PRACTITIONER

## 2019-03-07 PROCEDURE — 3044F PR MOST RECENT HEMOGLOBIN A1C LEVEL <7.0%: ICD-10-PCS | Mod: CPTII,S$GLB,, | Performed by: NURSE PRACTITIONER

## 2019-03-07 PROCEDURE — 3008F PR BODY MASS INDEX (BMI) DOCUMENTED: ICD-10-PCS | Mod: CPTII,S$GLB,, | Performed by: NURSE PRACTITIONER

## 2019-03-07 RX ORDER — RIZATRIPTAN BENZOATE 10 MG/1
10 TABLET ORAL
Status: CANCELLED | OUTPATIENT
Start: 2019-03-07 | End: 2019-04-06

## 2019-03-07 RX ORDER — KETOROLAC TROMETHAMINE 30 MG/ML
30 INJECTION, SOLUTION INTRAMUSCULAR; INTRAVENOUS
Status: COMPLETED | OUTPATIENT
Start: 2019-03-07 | End: 2019-03-07

## 2019-03-07 RX ORDER — SUMATRIPTAN SUCCINATE 100 MG/1
TABLET ORAL
Qty: 15 TABLET | Refills: 2 | Status: SHIPPED | OUTPATIENT
Start: 2019-03-07 | End: 2019-05-02

## 2019-03-07 RX ORDER — BUTALBITAL, ACETAMINOPHEN AND CAFFEINE 50; 325; 40 MG/1; MG/1; MG/1
1 TABLET ORAL EVERY 4 HOURS PRN
Qty: 30 TABLET | Refills: 0 | OUTPATIENT
Start: 2019-03-07

## 2019-03-07 RX ADMIN — KETOROLAC TROMETHAMINE 30 MG: 30 INJECTION, SOLUTION INTRAMUSCULAR; INTRAVENOUS at 09:03

## 2019-03-07 NOTE — PROGRESS NOTES
History of Present Illness   Scott Bansal is a 58 y.o. man with medical history as listed below who presents today for follow-up, chronic headache syndrome. He reports that he has been taking Fioricet and Naratriptan for his headaches, but he received a notice from his insurance company that the Naratriptan would no longer be covered. He is requesting alternate medication which is covered under his plan. He reports that he is having headaches almost daily. He has been seen by neurology in the past with reassuring imaging. He is also requesting a Toradol injection today for his chronic back pain. Of note, his blood pressure is elevated today. He reports that he had two cups of coffee prior to visit this morning which he believes has elevated his blood pressure. He is asymptomatic with elevation. He has not missed doses of medication. He does check his blood pressure at home which he states is WNL. He has no additional complaints and is otherwise healthy on today's visit.    Past Medical History:   Diagnosis Date    Bipolar 1 disorder, mixed     BPH (benign prostatic hypertrophy)     Cyst, eyelid     Dr. Broussard    Diabetes mellitus     GERD (gastroesophageal reflux disease)     Hyperlipidemia     Hypertension     Lumbar degenerative disc disease 3/7/2019    Migraine headache     Obesity        Past Surgical History:   Procedure Laterality Date    CERVICAL SPINE SURGERY      COLONOSCOPY N/A 7/27/2017    Performed by [facility] at [facility] ENDO    EYE SURGERY Left 03/2017    cyst removal on eyelid; Dr. Broussard    SHOULDER SURGERY      TONSILLECTOMY         Social History     Socioeconomic History    Marital status:      Spouse name: None    Number of children: None    Years of education: None    Highest education level: None   Social Needs    Financial resource strain: None    Food insecurity - worry: None    Food insecurity - inability: None    Transportation needs -  "medical: None    Transportation needs - non-medical: None   Occupational History    None   Tobacco Use    Smoking status: Former Smoker     Packs/day: 2.00     Years: 15.00     Pack years: 30.00     Types: Cigarettes     Last attempt to quit: 1984     Years since quittin.8    Smokeless tobacco: Never Used    Tobacco comment: Patient quit smoking at the age of 26 yo.   Substance and Sexual Activity    Alcohol use: No    Drug use: No    Sexual activity: Yes     Partners: Female   Other Topics Concern    None   Social History Narrative    None       Family History   Problem Relation Age of Onset    Cataracts Mother     Cancer Mother         throat    Hypertension Mother     Hypertension Father     Stroke Father         2 strokes    Hypertension Sister     Cancer Brother         spinal, kidney, spinal fluid    Hypertension Brother     Cancer Cousin         breast?       Review of Systems  Review of Systems   Eyes: Negative for blurred vision and double vision.   Respiratory: Negative for shortness of breath and wheezing.    Cardiovascular: Negative for chest pain and palpitations.   Musculoskeletal: Positive for back pain and neck pain. Negative for joint pain.   Neurological: Positive for headaches. Negative for dizziness, sensory change, speech change, focal weakness and loss of consciousness.     A complete review of systems was otherwise negative.    Physical Exam  BP (!) 140/88 (BP Location: Right arm, Patient Position: Sitting, BP Method: X-Large (Manual))   Pulse 110   Temp 98.3 °F (36.8 °C) (Oral)   Ht 5' 9" (1.753 m)   Wt 113.9 kg (251 lb)   SpO2 97%   BMI 37.07 kg/m²   General appearance: alert, appears stated age, cooperative and no distress  Eyes: negative findings: lids and lashes normal, conjunctivae and sclerae normal and pupils equal, round, reactive to light and accomodation  Neck: no JVD and supple, symmetrical, trachea midline  Back: symmetric, no curvature. ROM " normal. No CVA tenderness., no bony TTP, negative for saddle anesthesia  Lungs: clear to auscultation bilaterally  Heart: regular rate and rhythm, S1, S2 normal, no murmur, click, rub or gallop  Extremities: extremities normal, atraumatic, no cyanosis or edema  Pulses: 2+ and symmetric  Skin: Skin color, texture, turgor normal. No rashes or lesions  Neurologic: Grossly normal, no focal neurological deficits    Assessment/Plan  Chronic tension-type headache, not intractable  Requesting change from the Naratriptan secondary to insurance coverage.  We will start Imitrex, take as directed.  Recommend follow-up with neurology.  ER precautions discussed, no red flags on exam.  One time IM dose of Toradol provided in clinic.  -     sumatriptan (IMITREX) 100 MG tablet; Take half tablet at onset of headache with another half in one hour as needed not to exceed one tablet in 24 hours.  Dispense: 15 tablet; Refill: 2  -     ketorolac injection 30 mg    Lumbar degenerative disc disease  One time IM dose of Toradol provided in clinic.  Follow-up with orthopedics as scheduled for chronic back pain.  -     ketorolac injection 30 mg    Essential hypertension  BP elevated today.  Reduce the caffeine and sodium intake.  Take all medications as prescribed.  Return in two weeks for nurse BP visit.  ER precautions discussed, no red flags on exam.    Type 2 diabetes mellitus without complication, without long-term current use of insulin  The current medical regimen is effective;  continue present plan and medications.    Bipolar 1 disorder  The current medical regimen is effective;  continue present plan and medications.    Mucopurulent chronic bronchitis  The current medical regimen is effective;  continue present plan and medications.    Hyperlipidemia, unspecified hyperlipidemia type  The current medical regimen is effective;  continue present plan and medications.    Patient has verbalized understanding and is in agreement with plan  of care.    Follow-up if symptoms worsen or fail to improve.

## 2019-03-07 NOTE — TELEPHONE ENCOUNTER
----- Message from Debra Barnes sent at 3/7/2019 12:38 PM CST -----  Contact: pt  Name of Who is Calling: pt      What is the request in detail: following up on prescription that was supposed to be called in today. butalbital-acetaminophen-caffeine -40 mg (FIORICET, ESGIC) -40 mg per tablet     Call pt [phone]       Can the clinic reply by [facility]: no    What Number to Call Back if not in [facility]: [facility] [address] and [place]

## 2019-03-07 NOTE — TELEPHONE ENCOUNTER
Advised patient that Sumatriptan was sent to his pharmacy. Patient stated that he knows that Imitrex was sent. He states that he takes both Imitrex and Fioricet for headaches.     Patient requesting Fioricet.

## 2019-03-08 RX ORDER — BUTALBITAL, ACETAMINOPHEN AND CAFFEINE 50; 325; 40 MG/1; MG/1; MG/1
TABLET ORAL
Qty: 30 TABLET | Refills: 0 | OUTPATIENT
Start: 2019-03-08

## 2019-03-08 RX ORDER — BUTALBITAL, ACETAMINOPHEN AND CAFFEINE 50; 325; 40 MG/1; MG/1; MG/1
1 TABLET ORAL EVERY 4 HOURS PRN
Qty: 30 TABLET | Refills: 0 | Status: SHIPPED | OUTPATIENT
Start: 2019-03-08 | End: 2019-04-03

## 2019-03-08 RX ORDER — BUTALBITAL, ACETAMINOPHEN AND CAFFEINE 50; 325; 40 MG/1; MG/1; MG/1
1 TABLET ORAL EVERY 4 HOURS PRN
Qty: 30 TABLET | Refills: 0 | Status: CANCELLED | OUTPATIENT
Start: 2019-03-08

## 2019-03-08 RX ORDER — TAMSULOSIN HYDROCHLORIDE 0.4 MG/1
CAPSULE ORAL
Qty: 30 CAPSULE | Refills: 0 | Status: SHIPPED | OUTPATIENT
Start: 2019-03-08 | End: 2019-03-30 | Stop reason: SDUPTHER

## 2019-03-08 NOTE — TELEPHONE ENCOUNTER
Fioricet is a controlled substance, this will need to be prescribed by Dr. Chau, it looks like she last sent over a prescription about one month ago.    Thanks!  VERONICA JeanC

## 2019-03-08 NOTE — TELEPHONE ENCOUNTER
----- Message from Darrius Prieto sent at 3/8/2019 11:57 AM CST -----  Contact: Self/[phone]  Refill: butalbital-acetaminophen-caffeine -40 mg (FIORICET, ESGIC) -40 mg per tablet         [id number] - [place] [place] - [address]. [phone] (Phone)  [phone] (Fax)      Thank you

## 2019-03-10 DIAGNOSIS — E78.5 HYPERLIPIDEMIA, UNSPECIFIED HYPERLIPIDEMIA TYPE: ICD-10-CM

## 2019-03-11 RX ORDER — SIMVASTATIN 80 MG/1
TABLET, FILM COATED ORAL
Qty: 90 TABLET | Refills: 0 | Status: SHIPPED | OUTPATIENT
Start: 2019-03-11 | End: 2019-06-01 | Stop reason: SDUPTHER

## 2019-03-12 ENCOUNTER — LAB VISIT (OUTPATIENT)
Dept: LAB | Facility: HOSPITAL | Age: 59
End: 2019-03-12
Attending: FAMILY MEDICINE
Payer: MEDICARE

## 2019-03-12 DIAGNOSIS — E11.9 TYPE 2 DIABETES MELLITUS WITHOUT COMPLICATION: ICD-10-CM

## 2019-03-12 LAB
CHOLEST SERPL-MCNC: 114 MG/DL
CHOLEST/HDLC SERPL: 3.5 {RATIO}
HDLC SERPL-MCNC: 33 MG/DL
HDLC SERPL: 28.9 %
LDLC SERPL CALC-MCNC: 57 MG/DL
NONHDLC SERPL-MCNC: 81 MG/DL
TRIGL SERPL-MCNC: 120 MG/DL

## 2019-03-12 PROCEDURE — 80061 LIPID PANEL: CPT

## 2019-03-12 PROCEDURE — 36415 COLL VENOUS BLD VENIPUNCTURE: CPT | Mod: PO

## 2019-03-13 DIAGNOSIS — E78.5 HYPERLIPIDEMIA, UNSPECIFIED HYPERLIPIDEMIA TYPE: ICD-10-CM

## 2019-03-13 RX ORDER — GEMFIBROZIL 600 MG/1
TABLET, FILM COATED ORAL
Qty: 120 TABLET | Refills: 0 | Status: SHIPPED | OUTPATIENT
Start: 2019-03-13 | End: 2019-05-15 | Stop reason: SDUPTHER

## 2019-03-14 ENCOUNTER — TELEPHONE (OUTPATIENT)
Dept: FAMILY MEDICINE | Facility: CLINIC | Age: 59
End: 2019-03-14

## 2019-03-14 NOTE — TELEPHONE ENCOUNTER
----- Message from Kaylie Chau MD sent at 3/14/2019  1:15 PM CDT -----  Please provide patient with a copy of his lab results from this date.      Dr. Chau   ----- Message -----  From: Sveta Yousif LPN  Sent: 3/14/2019  11:28 AM  To: Kaylie Chau MD    Please advise.   ----- Message -----  From: Anuradha Lombardi  Sent: 3/14/2019   9:36 AM  To: [facility]andi [facility] Staff    Patient is requesting lab results done on 03/12/2019 to be mailed to his home because he needs them for other purposes. Contact information is [phone].

## 2019-03-15 ENCOUNTER — TELEPHONE (OUTPATIENT)
Dept: FAMILY MEDICINE | Facility: CLINIC | Age: 59
End: 2019-03-15

## 2019-03-15 NOTE — TELEPHONE ENCOUNTER
----- Message from Frances Sorto sent at 3/15/2019 12:29 PM CDT -----  Contact: self [phone]  Type:  Test Results    Who Called: Scott Bansal  Name of Test (Lab/Mammo/Etc): lab  Date of Test: 03/12/19  Ordering Provider: Kandi  Where the test was performed: [place]  Would the patient rather a call back or a response via [facility]? Call back  Best Call Back Number: [phone]  Additional Information:  States that he would like a copy of his labs mailed to him at the address on file.

## 2019-03-20 ENCOUNTER — OFFICE VISIT (OUTPATIENT)
Dept: FAMILY MEDICINE | Facility: CLINIC | Age: 59
End: 2019-03-20
Payer: MEDICARE

## 2019-03-20 ENCOUNTER — TELEPHONE (OUTPATIENT)
Dept: FAMILY MEDICINE | Facility: CLINIC | Age: 59
End: 2019-03-20

## 2019-03-20 ENCOUNTER — PATIENT OUTREACH (OUTPATIENT)
Dept: ADMINISTRATIVE | Facility: HOSPITAL | Age: 59
End: 2019-03-20

## 2019-03-20 VITALS
TEMPERATURE: 98 F | OXYGEN SATURATION: 98 % | BODY MASS INDEX: 37.18 KG/M2 | HEART RATE: 97 BPM | WEIGHT: 251 LBS | HEIGHT: 69 IN | DIASTOLIC BLOOD PRESSURE: 68 MMHG | SYSTOLIC BLOOD PRESSURE: 116 MMHG

## 2019-03-20 DIAGNOSIS — I10 ESSENTIAL HYPERTENSION: ICD-10-CM

## 2019-03-20 DIAGNOSIS — E78.5 HYPERLIPIDEMIA, UNSPECIFIED HYPERLIPIDEMIA TYPE: ICD-10-CM

## 2019-03-20 DIAGNOSIS — K21.9 GASTROESOPHAGEAL REFLUX DISEASE WITHOUT ESOPHAGITIS: ICD-10-CM

## 2019-03-20 DIAGNOSIS — J45.31 MILD PERSISTENT REACTIVE AIRWAY DISEASE WITH ACUTE EXACERBATION: Primary | ICD-10-CM

## 2019-03-20 DIAGNOSIS — E11.42 DIABETIC POLYNEUROPATHY ASSOCIATED WITH TYPE 2 DIABETES MELLITUS: ICD-10-CM

## 2019-03-20 DIAGNOSIS — E11.9 TYPE 2 DIABETES MELLITUS WITHOUT COMPLICATION, WITHOUT LONG-TERM CURRENT USE OF INSULIN: ICD-10-CM

## 2019-03-20 PROCEDURE — 3078F DIAST BP <80 MM HG: CPT | Mod: CPTII,S$GLB,, | Performed by: NURSE PRACTITIONER

## 2019-03-20 PROCEDURE — 3008F PR BODY MASS INDEX (BMI) DOCUMENTED: ICD-10-PCS | Mod: CPTII,S$GLB,, | Performed by: NURSE PRACTITIONER

## 2019-03-20 PROCEDURE — 3078F PR MOST RECENT DIASTOLIC BLOOD PRESSURE < 80 MM HG: ICD-10-PCS | Mod: CPTII,S$GLB,, | Performed by: NURSE PRACTITIONER

## 2019-03-20 PROCEDURE — 99214 OFFICE O/P EST MOD 30 MIN: CPT | Mod: S$GLB,,, | Performed by: NURSE PRACTITIONER

## 2019-03-20 PROCEDURE — 3074F PR MOST RECENT SYSTOLIC BLOOD PRESSURE < 130 MM HG: ICD-10-PCS | Mod: CPTII,S$GLB,, | Performed by: NURSE PRACTITIONER

## 2019-03-20 PROCEDURE — 3074F SYST BP LT 130 MM HG: CPT | Mod: CPTII,S$GLB,, | Performed by: NURSE PRACTITIONER

## 2019-03-20 PROCEDURE — 3044F PR MOST RECENT HEMOGLOBIN A1C LEVEL <7.0%: ICD-10-PCS | Mod: CPTII,S$GLB,, | Performed by: NURSE PRACTITIONER

## 2019-03-20 PROCEDURE — 99214 PR OFFICE/OUTPT VISIT, EST, LEVL IV, 30-39 MIN: ICD-10-PCS | Mod: S$GLB,,, | Performed by: NURSE PRACTITIONER

## 2019-03-20 PROCEDURE — 99999 PR PBB SHADOW E&M-EST. PATIENT-LVL V: CPT | Mod: PBBFAC,,, | Performed by: NURSE PRACTITIONER

## 2019-03-20 PROCEDURE — 2024F 7 FLD RTA PHOTO EVC RTNOPTHY: CPT | Mod: S$GLB,,, | Performed by: NURSE PRACTITIONER

## 2019-03-20 PROCEDURE — 99999 PR PBB SHADOW E&M-EST. PATIENT-LVL V: ICD-10-PCS | Mod: PBBFAC,,, | Performed by: NURSE PRACTITIONER

## 2019-03-20 PROCEDURE — 2024F PR 7 FIELD PHOTOS WITH INTERP/ REVIEW: ICD-10-PCS | Mod: S$GLB,,, | Performed by: NURSE PRACTITIONER

## 2019-03-20 PROCEDURE — 3044F HG A1C LEVEL LT 7.0%: CPT | Mod: CPTII,S$GLB,, | Performed by: NURSE PRACTITIONER

## 2019-03-20 PROCEDURE — 3008F BODY MASS INDEX DOCD: CPT | Mod: CPTII,S$GLB,, | Performed by: NURSE PRACTITIONER

## 2019-03-20 RX ORDER — LEVOCETIRIZINE DIHYDROCHLORIDE 5 MG/1
5 TABLET, FILM COATED ORAL NIGHTLY
Qty: 30 TABLET | Refills: 0 | Status: SHIPPED | OUTPATIENT
Start: 2019-03-20 | End: 2019-04-11 | Stop reason: SDUPTHER

## 2019-03-20 RX ORDER — PROMETHAZINE HYDROCHLORIDE AND DEXTROMETHORPHAN HYDROBROMIDE 6.25; 15 MG/5ML; MG/5ML
5 SYRUP ORAL NIGHTLY
Qty: 180 ML | Refills: 0 | Status: SHIPPED | OUTPATIENT
Start: 2019-03-20 | End: 2019-03-30

## 2019-03-20 RX ORDER — METHYLPREDNISOLONE 4 MG/1
TABLET ORAL
Qty: 1 PACKAGE | Refills: 0 | Status: SHIPPED | OUTPATIENT
Start: 2019-03-20 | End: 2019-04-03

## 2019-03-20 NOTE — LETTER
April 8, 2019    Dr. Warren Rondon             [facility]  [address]  [facility] [address]  Phone: [phone] April 8, 2019     Patient: Scott Bansal    YOB: 1960   Date of Visit: 3/20/2019       To Whom It May Concern:      [facility]    We are seeing Scott Bansal in the clinic today at [facility].  Kaylie Chau MD is their PCP.  She/He has an outstanding lab/procedure at this time when reviewing their chart.  To help with our Health Maintenance records will you please supply the following:      []  Mammogram                                                []  Colonoscopy   []  Pap Smear                                                    []  Outside Lab Results   []  Dexa scans                                                   [x]  Eye Exam   []  Foot Exam                                                     [] Other___________   []  Outside Immunizations                                               Please Fax to [facility] [phone].    Thank you for your help, Jessica Black LPN-CCC.  If I can be of any assistance you can call at [phone].              .    If you have any questions or concerns, please don't hesitate to call.    Sincerely,        Jessica Black LPN

## 2019-03-20 NOTE — PROGRESS NOTES
History of Present Illness   Scott Bansal is a 58 y.o. man with medical history as listed below who presents today for evaluation of wheezing, chest congestion, and cough. [place] and [place] the smoke in the area triggered his symptoms. He denies nasal congestion, post nasal drip, sinus pressure, ear fullness, shortness of breath, fevers, chills, or body aches. He is using his Breo, rescue inhaler, nebulizer solution, Mucinex, and Flonase with no relief. He has no additional complaints and is otherwise healthy on today's visit.    Past Medical History:   Diagnosis Date    Bipolar 1 disorder, mixed     BPH (benign prostatic hypertrophy)     Cyst, eyelid     Dr. Broussard    Diabetes mellitus     GERD (gastroesophageal reflux disease)     Hyperlipidemia     Hypertension     Lumbar degenerative disc disease 3/7/2019    Migraine headache     Obesity        Past Surgical History:   Procedure Laterality Date    CERVICAL SPINE SURGERY      COLONOSCOPY N/A 2017    [facility] ENDO    EYE SURGERY Left 2017    cyst removal on eyelid; Dr. Broussard    SHOULDER SURGERY      TONSILLECTOMY         Social History     Socioeconomic History    Marital status:      Spouse name: None    Number of children: None    Years of education: None    Highest education level: None   Social Needs    Financial resource strain: None    Food insecurity - worry: None    Food insecurity - inability: None    Transportation needs - medical: None    Transportation needs - non-medical: None   Occupational History    None   Tobacco Use    Smoking status: Former Smoker     Packs/day: 2.00     Years: 15.00     Pack years: 30.00     Types: Cigarettes     Last attempt to quit: 1984     Years since quittin.8    Smokeless tobacco: Never Used    Tobacco comment: Patient quit smoking at the age of 24 yo.   Substance and Sexual Activity     "Alcohol use: No    Drug use: No    Sexual activity: Yes     Partners: Female   Other Topics Concern    None   Social History Narrative    None       Family History   Problem Relation Age of Onset    Cataracts Mother     Cancer Mother         throat    Hypertension Mother     Hypertension Father     Stroke Father         2 strokes    Hypertension Sister     Cancer Brother         spinal, kidney, spinal fluid    Hypertension Brother     Cancer Cousin         breast?       Review of Systems  Review of Systems   Constitutional: Negative for chills, fever and malaise/fatigue.   HENT: Positive for congestion. Negative for ear discharge, ear pain, sinus pain and sore throat.    Eyes: Negative for discharge and redness.   Respiratory: Positive for cough, sputum production and wheezing. Negative for shortness of breath.    Cardiovascular: Negative for chest pain.   Gastrointestinal: Negative for nausea and vomiting.     A complete review of systems was otherwise negative.    Physical Exam  /68 (BP Location: Right arm, Patient Position: Sitting, BP Method: X-Large (Manual))   Pulse 97   Temp 98.3 °F (36.8 °C) (Oral)   Ht 5' 9" (1.753 m)   Wt 113.9 kg (251 lb)   SpO2 98%   BMI 37.07 kg/m²   General appearance: alert, appears stated age, cooperative and no distress  Eyes: negative findings: lids and lashes normal and conjunctivae and sclerae normal  Ears: normal TM's and external ear canals both ears  Nose: clear discharge, mild congestion, turbinates red, no sinus tenderness  Throat: lips, mucosa, and tongue normal; teeth and gums normal and minimal clear post nasal drainage  Lungs: bilateral expiratory wheezes throughout with normal effort and rate and no accessory muscle use  Heart: regular rate and rhythm, S1, S2 normal, no murmur, click, rub or gallop  Extremities: extremities normal, atraumatic, no cyanosis or edema  Pulses: 2+ and symmetric  Skin: Skin color, texture, turgor normal. No rashes or " lesions  Lymph nodes: Cervical, supraclavicular, and axillary nodes normal.  Neurologic: Grossly normal    Assessment/Plan  Mild persistent reactive airway disease with acute exacerbation  Reactive airway, likely related to smoke exposure.  Antibiotics not indicated.  Medrol dose pack, take as directed.  Add anti-histamine daily to the Flonase.  Continue Mucinex as needed.  Nighttime cough syrup for as needed use.  Avoid triggers and stay well hydrated.  RTC PRN.  -     methylPREDNISolone (MEDROL DOSEPACK) 4 mg tablet; use as directed  Dispense: 1 Package; Refill: 0  -     promethazine-dextromethorphan (PROMETHAZINE-DM) 6.25-15 mg/5 mL Syrp; Take 5 mLs by mouth every evening. for 10 days  Dispense: 180 mL; Refill: 0  -     levocetirizine (XYZAL) 5 MG tablet; Take 1 tablet (5 mg total) by mouth every evening.  Dispense: 30 tablet; Refill: 0    Diabetic polyneuropathy associated with type 2 diabetes mellitus  The current medical regimen is effective;  continue present plan and medications.    Essential hypertension  The current medical regimen is effective;  continue present plan and medications.    Hyperlipidemia, unspecified hyperlipidemia type  The current medical regimen is effective;  continue present plan and medications.    Type 2 diabetes mellitus without complication, without long-term current use of insulin  The current medical regimen is effective;  continue present plan and medications.  Monitor CBG closely while taking steroids.    Gastroesophageal reflux disease without esophagitis  The current medical regimen is effective;  continue present plan and medications.    Patient has verbalized understanding and is in agreement with plan of care.    Follow-up as scheduled with Dr. Chau for routine follow-up.    Follow-up if symptoms worsen or fail to improve.

## 2019-03-21 ENCOUNTER — TELEPHONE (OUTPATIENT)
Dept: FAMILY MEDICINE | Facility: CLINIC | Age: 59
End: 2019-03-21

## 2019-03-21 NOTE — TELEPHONE ENCOUNTER
Patient states cough med not available at any local pharmacy, requesting alternative. Please advise

## 2019-03-21 NOTE — TELEPHONE ENCOUNTER
----- Message from Coleen Watson sent at 3/21/2019  1:24 PM CDT -----  Contact: Self   Patient says the pharmacy does not have the cough syrup that was sent in for him. Please call patient at [phone].          [facility]/pharmacy #[id number] [place].   [phone] (Phone)  [phone] (Fax)

## 2019-03-21 NOTE — TELEPHONE ENCOUNTER
He will have to use Robitussin over the counter to help with the cough until his medication is available.

## 2019-03-21 NOTE — TELEPHONE ENCOUNTER
----- Message from Christina Watson sent at 3/20/2019  6:26 PM CDT -----  Contact: Pt   Pt missed a phone call from the nurse and would like a call back at her earliest convenience.       Pt can be contacted at [phone]

## 2019-03-25 DIAGNOSIS — E11.9 DIABETES MELLITUS WITHOUT COMPLICATION: ICD-10-CM

## 2019-03-25 RX ORDER — METFORMIN HYDROCHLORIDE 1000 MG/1
TABLET ORAL
Qty: 180 TABLET | Refills: 0 | Status: SHIPPED | OUTPATIENT
Start: 2019-03-25 | End: 2019-06-16 | Stop reason: SDUPTHER

## 2019-03-28 RX ORDER — CYCLOBENZAPRINE HCL 10 MG
TABLET ORAL
Qty: 90 TABLET | Refills: 0 | OUTPATIENT
Start: 2019-03-28

## 2019-03-31 RX ORDER — CYCLOBENZAPRINE HCL 10 MG
TABLET ORAL
Qty: 90 TABLET | Refills: 0 | Status: SHIPPED | OUTPATIENT
Start: 2019-03-31 | End: 2019-04-29 | Stop reason: SDUPTHER

## 2019-04-01 RX ORDER — TAMSULOSIN HYDROCHLORIDE 0.4 MG/1
CAPSULE ORAL
Qty: 30 CAPSULE | Refills: 0 | Status: SHIPPED | OUTPATIENT
Start: 2019-04-01 | End: 2019-04-24 | Stop reason: SDUPTHER

## 2019-04-02 NOTE — TELEPHONE ENCOUNTER
----- Message from Johnathan Lee sent at 4/2/2019  1:39 PM CDT -----  Contact: GARO JUAREZ [[id number]]  Name of Who is Calling: GARO JUAREZ [[id number]]      What is the request in detail: Patient states [facility] needs the prescription call in or a written faxed prescription can be received for butalbital-acetaminophen-caffeine -40 mg (FIORICET, ESGIC) -40 mg per tablet. States cannot just send in a refill request. Please advise      Can the clinic reply by [facility]: no      What Number to Call Back if not in [facility]: [phone]

## 2019-04-03 ENCOUNTER — OFFICE VISIT (OUTPATIENT)
Dept: FAMILY MEDICINE | Facility: CLINIC | Age: 59
End: 2019-04-03
Payer: MEDICARE

## 2019-04-03 VITALS
DIASTOLIC BLOOD PRESSURE: 80 MMHG | OXYGEN SATURATION: 97 % | HEART RATE: 112 BPM | TEMPERATURE: 99 F | SYSTOLIC BLOOD PRESSURE: 120 MMHG | BODY MASS INDEX: 37.11 KG/M2 | WEIGHT: 251.31 LBS

## 2019-04-03 DIAGNOSIS — G44.229 CHRONIC TENSION-TYPE HEADACHE, NOT INTRACTABLE: ICD-10-CM

## 2019-04-03 DIAGNOSIS — F31.9 BIPOLAR 1 DISORDER: ICD-10-CM

## 2019-04-03 DIAGNOSIS — M54.31 SCIATICA OF RIGHT SIDE: ICD-10-CM

## 2019-04-03 DIAGNOSIS — S32.050A CLOSED COMPRESSION FRACTURE OF FIFTH LUMBAR VERTEBRA, INITIAL ENCOUNTER: ICD-10-CM

## 2019-04-03 DIAGNOSIS — I10 ESSENTIAL HYPERTENSION: Primary | ICD-10-CM

## 2019-04-03 DIAGNOSIS — E11.9 TYPE 2 DIABETES MELLITUS WITHOUT COMPLICATION, WITHOUT LONG-TERM CURRENT USE OF INSULIN: ICD-10-CM

## 2019-04-03 PROCEDURE — 99999 PR PBB SHADOW E&M-EST. PATIENT-LVL III: ICD-10-PCS | Mod: PBBFAC,,, | Performed by: FAMILY MEDICINE

## 2019-04-03 PROCEDURE — 99999 PR PBB SHADOW E&M-EST. PATIENT-LVL III: CPT | Mod: PBBFAC,,, | Performed by: FAMILY MEDICINE

## 2019-04-03 PROCEDURE — 99214 OFFICE O/P EST MOD 30 MIN: CPT | Mod: 25,S$GLB,, | Performed by: FAMILY MEDICINE

## 2019-04-03 PROCEDURE — 96372 THER/PROPH/DIAG INJ SC/IM: CPT | Mod: S$GLB,,, | Performed by: FAMILY MEDICINE

## 2019-04-03 PROCEDURE — 3008F BODY MASS INDEX DOCD: CPT | Mod: CPTII,S$GLB,, | Performed by: FAMILY MEDICINE

## 2019-04-03 PROCEDURE — 3044F PR MOST RECENT HEMOGLOBIN A1C LEVEL <7.0%: ICD-10-PCS | Mod: CPTII,S$GLB,, | Performed by: FAMILY MEDICINE

## 2019-04-03 PROCEDURE — 99499 UNLISTED E&M SERVICE: CPT | Mod: S$GLB,,, | Performed by: FAMILY MEDICINE

## 2019-04-03 PROCEDURE — 3079F PR MOST RECENT DIASTOLIC BLOOD PRESSURE 80-89 MM HG: ICD-10-PCS | Mod: CPTII,S$GLB,, | Performed by: FAMILY MEDICINE

## 2019-04-03 PROCEDURE — 3008F PR BODY MASS INDEX (BMI) DOCUMENTED: ICD-10-PCS | Mod: CPTII,S$GLB,, | Performed by: FAMILY MEDICINE

## 2019-04-03 PROCEDURE — 3074F PR MOST RECENT SYSTOLIC BLOOD PRESSURE < 130 MM HG: ICD-10-PCS | Mod: CPTII,S$GLB,, | Performed by: FAMILY MEDICINE

## 2019-04-03 PROCEDURE — 96372 PR INJECTION,THERAP/PROPH/DIAG2ST, IM OR SUBCUT: ICD-10-PCS | Mod: S$GLB,,, | Performed by: FAMILY MEDICINE

## 2019-04-03 PROCEDURE — 99499 RISK ADDL DX/OHS AUDIT: ICD-10-PCS | Mod: S$GLB,,, | Performed by: FAMILY MEDICINE

## 2019-04-03 PROCEDURE — 3044F HG A1C LEVEL LT 7.0%: CPT | Mod: CPTII,S$GLB,, | Performed by: FAMILY MEDICINE

## 2019-04-03 PROCEDURE — 3079F DIAST BP 80-89 MM HG: CPT | Mod: CPTII,S$GLB,, | Performed by: FAMILY MEDICINE

## 2019-04-03 PROCEDURE — 3074F SYST BP LT 130 MM HG: CPT | Mod: CPTII,S$GLB,, | Performed by: FAMILY MEDICINE

## 2019-04-03 PROCEDURE — 99214 PR OFFICE/OUTPT VISIT, EST, LEVL IV, 30-39 MIN: ICD-10-PCS | Mod: 25,S$GLB,, | Performed by: FAMILY MEDICINE

## 2019-04-03 RX ORDER — BUTALBITAL, ACETAMINOPHEN AND CAFFEINE 50; 325; 40 MG/1; MG/1; MG/1
TABLET ORAL
Qty: 30 TABLET | Refills: 0 | Status: SHIPPED | OUTPATIENT
Start: 2019-04-03 | End: 2019-04-03

## 2019-04-03 RX ORDER — BUTALBITAL, ACETAMINOPHEN AND CAFFEINE 50; 325; 40 MG/1; MG/1; MG/1
1 TABLET ORAL EVERY 4 HOURS PRN
Qty: 30 TABLET | Refills: 0 | OUTPATIENT
Start: 2019-04-03

## 2019-04-03 RX ORDER — TOPIRAMATE 25 MG/1
25 CAPSULE, EXTENDED RELEASE ORAL DAILY
Qty: 90 CAPSULE | Refills: 0 | Status: SHIPPED | OUTPATIENT
Start: 2019-04-03 | End: 2019-04-10

## 2019-04-03 RX ORDER — KETOROLAC TROMETHAMINE 30 MG/ML
30 INJECTION, SOLUTION INTRAMUSCULAR; INTRAVENOUS ONCE
Status: COMPLETED | OUTPATIENT
Start: 2019-04-03 | End: 2019-04-03

## 2019-04-03 RX ADMIN — KETOROLAC TROMETHAMINE 30 MG: 30 INJECTION, SOLUTION INTRAMUSCULAR; INTRAVENOUS at 02:04

## 2019-04-03 NOTE — PROGRESS NOTES
Assessment & Plan  Problem List Items Addressed This Visit        Neuro    Chronic tension-type headache, not intractable    Overview     Cannot tolerate propranolol because side effects (dizziness, decreased alertness)  No improvement with imitrex  Minimal improvement with fiorcet          Current Assessment & Plan     Medically necessary to use Trokendi to address uncontrolled headaches.  Failed triptans and all oTC NSAIDs.  Failed relafen, ibuprofen, topiramate          Relevant Medications    ketorolac injection 30 mg (Completed)    Lumbar compression fracture       Psychiatric    Bipolar 1 disorder    Overview     Currently on buspar, bupropion, seroquel  Dr. Cordova at [facility]             Cardiac/Vascular    Essential hypertension - Primary    Overview     Cannot tolerate sulfa  Currently on amlodipine, irbesartan, furosemide, clonidine          Current Assessment & Plan     Pt is currently stable on medication regimen.  Continue current therapy as scheduled.  Contact office with any questions about adjustments on medications.            Relevant Orders    CBC auto differential (Completed)    Comprehensive metabolic panel (Completed)    Hemoglobin A1c (Completed)    TSH (Completed)       Endocrine    Type 2 diabetes mellitus without complication    Current Assessment & Plan     Well controlled.          Relevant Orders    CBC auto differential (Completed)    Comprehensive metabolic panel (Completed)    Hemoglobin A1c (Completed)    TSH (Completed)       Orthopedic    Sciatica of right side    Overview     Uses baclofen                  Health Maintenance reviewed.    Follow-up: No follow-ups on file.    ______________________________________________________________________    Chief Complaint  Chief Complaint   Patient presents with    Diabetes    Follow-up       HPI  Scott Bansal is a 58 y.o. male with multiple medical diagnoses as listed in the medical history and problem list that  presents for follow up.  Pt is known to me with last appointment 2018.    Patient denies any new symptoms including chest pain, SOB, blurry vision, N/V, diarrhea.  Patient reports he continues to have severe headache pain.  He has used several medications in the past in order to keep his headaches under control.  At this time patient is only control with Fioricet.  Did discuss other medical interventions.  New prescription will be sent into the pharmacy.      PAST MEDICAL HISTORY:  Past Medical History:   Diagnosis Date    Bipolar 1 disorder, mixed     BPH (benign prostatic hypertrophy)     Cyst, eyelid     Dr. Broussard    Diabetes mellitus     GERD (gastroesophageal reflux disease)     Hyperlipidemia     Hypertension     Lumbar degenerative disc disease 3/7/2019    Migraine headache     Obesity        PAST SURGICAL HISTORY:  Past Surgical History:   Procedure Laterality Date    CERVICAL SPINE SURGERY      COLONOSCOPY N/A 2017    [facility]    EYE SURGERY Left 2017    cyst removal on eyelid; Dr. Broussard    SHOULDER SURGERY      TONSILLECTOMY         SOCIAL HISTORY:  Social History     Socioeconomic History    Marital status:      Spouse name: Not on file    Number of children: Not on file    Years of education: Not on file    Highest education level: Not on file   Occupational History    Not on file   Social Needs    Financial resource strain: Not on file    Food insecurity:     Worry: Not on file     Inability: Not on file    Transportation needs:     Medical: Not on file     Non-medical: Not on file   Tobacco Use    Smoking status: Former Smoker     Packs/day: 2.00     Years: 15.00     Pack years: 30.00     Types: Cigarettes     Last attempt to quit: 1984     Years since quittin.9    Smokeless tobacco: Never Used    Tobacco comment: Patient quit smoking at the age of 24 yo.   Substance and Sexual Activity    Alcohol  use: No    Drug use: No    Sexual activity: Yes     Partners: Female   Lifestyle    Physical activity:     Days per week: Not on file     Minutes per session: Not on file    Stress: Not on file   Relationships    Social connections:     Talks on phone: Not on file     Gets together: Not on file     Attends Druze service: Not on file     Active member of club or organization: Not on file     Attends meetings of clubs or organizations: Not on file     Relationship status: Not on file   Other Topics Concern    Not on file   Social History Narrative    Not on file       FAMILY HISTORY:  Family History   Problem Relation Age of Onset    Cataracts Mother     Cancer Mother         throat    Hypertension Mother     Hypertension Father     Stroke Father         2 strokes    Hypertension Sister     Cancer Brother         spinal, kidney, spinal fluid    Hypertension Brother     Cancer Cousin         breast?       ALLERGIES AND MEDICATIONS: updated and reviewed.  Review of patient's allergies indicates:   Allergen Reactions    Sulfa (sulfonamide antibiotics) Rash     Current Outpatient Medications   Medication Sig Dispense Refill    amLODIPine (NORVASC) 10 MG tablet TAKE 1 TABLET (10 MG TOTAL) BY MOUTH ONCE DAILY. 90 tablet 0    buPROPion (WELLBUTRIN XL) 300 MG 24 hr tablet Take 300 mg by mouth every morning.  2    busPIRone (BUSPAR) 10 MG tablet Take 10 mg by mouth 3 (three) times daily.      cyclobenzaprine (FLEXERIL) 10 MG tablet TAKE 1 TABLET BY MOUTH THREE TIMES A DAY AS NEEDED 90 tablet 0    diclofenac sodium (VOLTAREN) 1 % Gel Apply 2 g topically once daily. 100 g 0    fluticasone (FLONASE) 50 mcg/actuation nasal spray TAKE 1 SPRAY BY EACH NARE ROUTE ONCE DAILY. 16 g 5    fluticasone-vilanterol (BREO) 100-25 mcg/dose diskus inhaler Inhale 1 puff into the lungs once daily. Controller 60 each 3    folic acid (FOLVITE) 800 MCG Tab Take 800 mcg by mouth once daily.      furosemide (LASIX) 40 MG  tablet TAKE 1 TABLET BY MOUTH EVERY DAY 30 tablet 2    gabapentin (NEURONTIN) 300 MG capsule Take 1 capsule (300 mg total) by mouth 2 (two) times daily. 180 capsule 3    gemfibrozil (LOPID) 600 MG tablet TAKE 1 TABLET (600 MG TOTAL) BY MOUTH 2 (TWO) TIMES DAILY BEFORE MEALS. 120 tablet 0    irbesartan (AVAPRO) 300 MG tablet TAKE 1 TABLET (300 MG TOTAL) BY MOUTH EVERY EVENING. 90 tablet 3    levocetirizine (XYZAL) 5 MG tablet Take 1 tablet (5 mg total) by mouth every evening. 30 tablet 0    meclizine (ANTIVERT) 25 mg tablet Take 1 tablet (25 mg total) by mouth 3 (three) times daily as needed. 30 tablet 0    melatonin 5 mg Cap Take by mouth.      metFORMIN (GLUCOPHAGE) 1000 MG tablet TAKE 1 TABLET BY MOUTH TWICE A  tablet 0    mupirocin (BACTROBAN) 2 % ointment Apply topically 3 (three) times daily. 30 g 0    pantoprazole (PROTONIX) 40 MG tablet TAKE 1 TABLET BY MOUTH DAILY 90 tablet 3    PROAIR HFA 90 mcg/actuation inhaler INHALE 2 PUFFS EBERY 4 HOURS AS NEEDED 18 g 6    quetiapine (SEROQUEL) 300 MG Tab Take by mouth.      simvastatin (ZOCOR) 80 MG tablet TAKE 1 TABLET BY MOUTH EVERY DAY 90 tablet 0    sumatriptan (IMITREX) 100 MG tablet Take half tablet at onset of headache with another half in one hour as needed not to exceed one tablet in 24 hours. 15 tablet 2    tamsulosin (FLOMAX) 0.4 mg Cap TAKE 1 CAPSULE BY MOUTH EVERY DAY 30 capsule 0    betamethasone dipropionate (DIPROLENE) 0.05 % cream Apply topically once daily. for 10 days 45 g 0    topiramate (TOPAMAX) 25 MG tablet Take 1 tablet (25 mg total) by mouth 2 (two) times daily. 60 tablet 11     Current Facility-Administered Medications   Medication Dose Route Frequency Provider Last Rate Last Dose    silver nitrate applicators applicator 1 applicator  1 applicator Topical (Top) 1 time in Clinic/HOD CATHY Calvillo  Review of Systems   Constitutional: Negative for activity change, appetite change, fatigue, fever  and unexpected weight change.   HENT: Negative for congestion and facial swelling.    Eyes: Negative for visual disturbance.   Respiratory: Negative for chest tightness, shortness of breath, wheezing and stridor.    Cardiovascular: Negative for chest pain, palpitations and leg swelling.   Gastrointestinal: Negative for abdominal distention, abdominal pain, constipation, diarrhea, nausea and vomiting.   Endocrine: Negative for cold intolerance, heat intolerance, polydipsia and polyuria.   Skin: Negative.    Allergic/Immunologic: Negative.    Neurological: Positive for light-headedness and headaches. Negative for dizziness and numbness.   Psychiatric/Behavioral: Negative for agitation and decreased concentration.           Physical Exam  Vitals:    04/03/19 1416   BP: 120/80   BP Location: Left arm   Patient Position: Sitting   BP Method: Large (Manual)   Pulse: (!) 112   Temp: 98.7 °F (37.1 °C)   TempSrc: Oral   SpO2: 97%   Weight: 114 kg (251 lb 5.2 oz)    Body mass index is 37.11 kg/m².  Weight: 114 kg (251 lb 5.2 oz)       Physical Exam   Constitutional: He is oriented to person, place, and time. He appears well-developed and well-nourished.   HENT:   Head: Normocephalic and atraumatic.   Right Ear: External ear normal.   Left Ear: External ear normal.   Nose: Nose normal.   Mouth/Throat: Oropharynx is clear and moist.   Eyes: Pupils are equal, round, and reactive to light. Conjunctivae and EOM are normal.   Neck: Normal range of motion.   Cardiovascular: Normal rate, regular rhythm and normal heart sounds.   Pulmonary/Chest: Effort normal and breath sounds normal.   Neurological: He is alert and oriented to person, place, and time.   Skin: Skin is warm and dry.   Psychiatric: He has a normal mood and affect. His behavior is normal.   Vitals reviewed.        Health Maintenance       Date Due Completion Date    Influenza Vaccine 04/25/2019 (Originally 8/1/2018) 11/1/2017 (Declined)    Override on 11/1/2017:  Declined    Override on 12/5/2016: Declined    Hemoglobin A1c 07/04/2019 1/4/2019    Eye Exam 08/23/2019 8/23/2018    Override on 3/27/2018: Done    Foot Exam 10/08/2019 10/8/2018    Override on 10/8/2018: Done    Lipid Panel 03/12/2020 3/12/2019    Low Dose Statin 04/03/2020 4/3/2019    Colonoscopy 07/27/2020 7/27/2017    TETANUS VACCINE 07/07/2027 7/7/2017              Patient note was created using [employer].  Any errors in syntax or even information may not have been identified and edited on initial review prior to signing this note.

## 2019-04-03 NOTE — ASSESSMENT & PLAN NOTE
Medically necessary to use Trokendi to address uncontrolled headaches.  Failed triptans and all oTC NSAIDs.  Failed relafen, ibuprofen, topiramate

## 2019-04-04 ENCOUNTER — LAB VISIT (OUTPATIENT)
Dept: LAB | Facility: HOSPITAL | Age: 59
End: 2019-04-04
Attending: FAMILY MEDICINE
Payer: MEDICARE

## 2019-04-04 DIAGNOSIS — E11.9 TYPE 2 DIABETES MELLITUS WITHOUT COMPLICATION, WITHOUT LONG-TERM CURRENT USE OF INSULIN: ICD-10-CM

## 2019-04-04 DIAGNOSIS — I10 ESSENTIAL HYPERTENSION: ICD-10-CM

## 2019-04-04 DIAGNOSIS — E78.5 HYPERLIPIDEMIA, UNSPECIFIED HYPERLIPIDEMIA TYPE: ICD-10-CM

## 2019-04-04 LAB
ALBUMIN SERPL BCP-MCNC: 4.2 G/DL (ref 3.5–5.2)
ALP SERPL-CCNC: 78 U/L (ref 55–135)
ALT SERPL W/O P-5'-P-CCNC: 33 U/L (ref 10–44)
ANION GAP SERPL CALC-SCNC: 8 MMOL/L (ref 8–16)
AST SERPL-CCNC: 18 U/L (ref 10–40)
BASOPHILS # BLD AUTO: 0.06 K/UL (ref 0–0.2)
BASOPHILS NFR BLD: 0.7 % (ref 0–1.9)
BILIRUB SERPL-MCNC: 0.5 MG/DL (ref 0.1–1)
BUN SERPL-MCNC: 24 MG/DL (ref 6–20)
CALCIUM SERPL-MCNC: 10 MG/DL (ref 8.7–10.5)
CHLORIDE SERPL-SCNC: 105 MMOL/L (ref 95–110)
CO2 SERPL-SCNC: 27 MMOL/L (ref 23–29)
CREAT SERPL-MCNC: 1.1 MG/DL (ref 0.5–1.4)
DIFFERENTIAL METHOD: ABNORMAL
EOSINOPHIL # BLD AUTO: 0.3 K/UL (ref 0–0.5)
EOSINOPHIL NFR BLD: 3.4 % (ref 0–8)
ERYTHROCYTE [DISTWIDTH] IN BLOOD BY AUTOMATED COUNT: 12.4 % (ref 11.5–14.5)
EST. GFR  (AFRICAN AMERICAN): >60 ML/MIN/1.73 M^2
EST. GFR  (NON AFRICAN AMERICAN): >60 ML/MIN/1.73 M^2
ESTIMATED AVG GLUCOSE: 120 MG/DL (ref 68–131)
GLUCOSE SERPL-MCNC: 111 MG/DL (ref 70–110)
HBA1C MFR BLD HPLC: 5.8 % (ref 4–5.6)
HCT VFR BLD AUTO: 40.6 % (ref 40–54)
HGB BLD-MCNC: 13.2 G/DL (ref 14–18)
IMM GRANULOCYTES # BLD AUTO: 0.1 K/UL (ref 0–0.04)
IMM GRANULOCYTES NFR BLD AUTO: 1.2 % (ref 0–0.5)
LYMPHOCYTES # BLD AUTO: 2.4 K/UL (ref 1–4.8)
LYMPHOCYTES NFR BLD: 27.4 % (ref 18–48)
MCH RBC QN AUTO: 30.3 PG (ref 27–31)
MCHC RBC AUTO-ENTMCNC: 32.5 G/DL (ref 32–36)
MCV RBC AUTO: 93 FL (ref 82–98)
MONOCYTES # BLD AUTO: 0.8 K/UL (ref 0.3–1)
MONOCYTES NFR BLD: 9.7 % (ref 4–15)
NEUTROPHILS # BLD AUTO: 5 K/UL (ref 1.8–7.7)
NEUTROPHILS NFR BLD: 57.6 % (ref 38–73)
NRBC BLD-RTO: 0 /100 WBC
PLATELET # BLD AUTO: 241 K/UL (ref 150–350)
PMV BLD AUTO: 11 FL (ref 9.2–12.9)
POTASSIUM SERPL-SCNC: 4.1 MMOL/L (ref 3.5–5.1)
PROT SERPL-MCNC: 6.7 G/DL (ref 6–8.4)
RBC # BLD AUTO: 4.36 M/UL (ref 4.6–6.2)
SODIUM SERPL-SCNC: 140 MMOL/L (ref 136–145)
TSH SERPL DL<=0.005 MIU/L-ACNC: 1.9 UIU/ML (ref 0.4–4)
WBC # BLD AUTO: 8.58 K/UL (ref 3.9–12.7)

## 2019-04-04 PROCEDURE — 84443 ASSAY THYROID STIM HORMONE: CPT

## 2019-04-04 PROCEDURE — 80053 COMPREHEN METABOLIC PANEL: CPT

## 2019-04-04 PROCEDURE — 36415 COLL VENOUS BLD VENIPUNCTURE: CPT | Mod: PO

## 2019-04-04 PROCEDURE — 83036 HEMOGLOBIN GLYCOSYLATED A1C: CPT

## 2019-04-04 PROCEDURE — 85025 COMPLETE CBC W/AUTO DIFF WBC: CPT

## 2019-04-04 RX ORDER — GEMFIBROZIL 600 MG/1
TABLET, FILM COATED ORAL
Qty: 120 TABLET | Refills: 0 | OUTPATIENT
Start: 2019-04-04

## 2019-04-10 ENCOUNTER — TELEPHONE (OUTPATIENT)
Dept: FAMILY MEDICINE | Facility: CLINIC | Age: 59
End: 2019-04-10

## 2019-04-10 DIAGNOSIS — G43.711 INTRACTABLE CHRONIC MIGRAINE WITHOUT AURA AND WITH STATUS MIGRAINOSUS: Primary | ICD-10-CM

## 2019-04-10 RX ORDER — TOPIRAMATE 25 MG/1
25 TABLET ORAL 2 TIMES DAILY
Qty: 60 TABLET | Refills: 11 | Status: SHIPPED | OUTPATIENT
Start: 2019-04-10 | End: 2019-05-14

## 2019-04-10 NOTE — TELEPHONE ENCOUNTER
Trokendi xr 25 mg capsule is not covered by insurance. Over $1000 please send in alternative medication.

## 2019-04-11 DIAGNOSIS — J45.31 MILD PERSISTENT REACTIVE AIRWAY DISEASE WITH ACUTE EXACERBATION: ICD-10-CM

## 2019-04-11 RX ORDER — LEVOCETIRIZINE DIHYDROCHLORIDE 5 MG/1
TABLET, FILM COATED ORAL
Qty: 30 TABLET | Refills: 0 | Status: SHIPPED | OUTPATIENT
Start: 2019-04-11 | End: 2019-05-07 | Stop reason: SDUPTHER

## 2019-04-17 DIAGNOSIS — I10 ESSENTIAL HYPERTENSION: ICD-10-CM

## 2019-04-17 RX ORDER — FUROSEMIDE 40 MG/1
TABLET ORAL
Qty: 30 TABLET | Refills: 1 | Status: SHIPPED | OUTPATIENT
Start: 2019-04-17 | End: 2019-06-07 | Stop reason: SDUPTHER

## 2019-04-24 RX ORDER — TAMSULOSIN HYDROCHLORIDE 0.4 MG/1
CAPSULE ORAL
Qty: 30 CAPSULE | Refills: 0 | Status: SHIPPED | OUTPATIENT
Start: 2019-04-24 | End: 2019-05-18 | Stop reason: SDUPTHER

## 2019-04-29 DIAGNOSIS — I10 ESSENTIAL HYPERTENSION: ICD-10-CM

## 2019-04-29 RX ORDER — AMLODIPINE BESYLATE 10 MG/1
10 TABLET ORAL DAILY
Qty: 90 TABLET | Refills: 0 | Status: SHIPPED | OUTPATIENT
Start: 2019-04-29 | End: 2019-07-24 | Stop reason: SDUPTHER

## 2019-04-29 RX ORDER — CYCLOBENZAPRINE HCL 10 MG
TABLET ORAL
Qty: 90 TABLET | Refills: 0 | Status: SHIPPED | OUTPATIENT
Start: 2019-04-29 | End: 2019-06-28 | Stop reason: SDUPTHER

## 2019-05-01 ENCOUNTER — TELEPHONE (OUTPATIENT)
Dept: ADMINISTRATIVE | Facility: HOSPITAL | Age: 59
End: 2019-05-01

## 2019-05-01 RX ORDER — BUTALBITAL, ACETAMINOPHEN AND CAFFEINE 50; 325; 40 MG/1; MG/1; MG/1
1 TABLET ORAL EVERY 4 HOURS PRN
Refills: 0 | COMMUNITY
Start: 2019-04-03 | End: 2019-05-14

## 2019-05-02 ENCOUNTER — OFFICE VISIT (OUTPATIENT)
Dept: FAMILY MEDICINE | Facility: CLINIC | Age: 59
End: 2019-05-02
Payer: MEDICARE

## 2019-05-02 DIAGNOSIS — G44.229 CHRONIC TENSION-TYPE HEADACHE, NOT INTRACTABLE: ICD-10-CM

## 2019-05-02 PROCEDURE — 99999 PR PBB SHADOW E&M-EST. PATIENT-LVL II: ICD-10-PCS | Mod: PBBFAC,,, | Performed by: FAMILY MEDICINE

## 2019-05-02 PROCEDURE — 99214 PR OFFICE/OUTPT VISIT, EST, LEVL IV, 30-39 MIN: ICD-10-PCS | Mod: S$GLB,,, | Performed by: FAMILY MEDICINE

## 2019-05-02 PROCEDURE — 99214 OFFICE O/P EST MOD 30 MIN: CPT | Mod: S$GLB,,, | Performed by: FAMILY MEDICINE

## 2019-05-02 PROCEDURE — 99999 PR PBB SHADOW E&M-EST. PATIENT-LVL II: CPT | Mod: PBBFAC,,, | Performed by: FAMILY MEDICINE

## 2019-05-02 RX ORDER — RIZATRIPTAN BENZOATE 10 MG/1
10 TABLET ORAL
Qty: 9 TABLET | Refills: 3 | Status: SHIPPED | OUTPATIENT
Start: 2019-05-02 | End: 2019-07-09

## 2019-05-02 RX ORDER — ZONISAMIDE 50 MG/1
50 CAPSULE ORAL 2 TIMES DAILY
Qty: 60 CAPSULE | Refills: 0 | Status: SHIPPED | OUTPATIENT
Start: 2019-05-02 | End: 2019-05-24 | Stop reason: SDUPTHER

## 2019-05-02 NOTE — PROGRESS NOTES
Assessment & Plan  Problem List Items Addressed This Visit        Neuro    Chronic tension-type headache, not intractable    Overview     Cannot tolerate propranolol because side effects (dizziness, decreased alertness)  No improvement with imitrex  Minimal improvement with fiorcet          Current Assessment & Plan     Patient will require rizatriptan at this time.  It is medically necessary to obtain this medication.           Relevant Medications    rizatriptan (MAXALT) 10 MG tablet    zonisamide (ZONEGRAN) 50 MG Cap        Greater than 25 minutes was spent with this patient with greater than 50% spent with face-to-face counseling on above listed conditions.        Health Maintenance reviewed.    Follow-up: No follow-ups on file.    ______________________________________________________________________    Chief Complaint  No chief complaint on file.      HPI  Scott Bansal is a 58 y.o. male with multiple medical diagnoses as listed in the medical history and problem list that presents for migraine  Pt is known to me with last appointment 4/3/2019.    Patient denies any new symptoms including chest pain, SOB, blurry vision, N/V, diarrhea.  He reports that the Fioricet is not working for his headaches at this time. He states that he would like better control with his headaches.  He has tried propranolol.  No improvement and intolerable side effects.   He had minimal improvement with imitrex.         PAST MEDICAL HISTORY:  Past Medical History:   Diagnosis Date    Bipolar 1 disorder, mixed     BPH (benign prostatic hypertrophy)     Cyst, eyelid     Dr. Broussard    Diabetes mellitus     GERD (gastroesophageal reflux disease)     Hyperlipidemia     Hypertension     Lumbar degenerative disc disease 3/7/2019    Migraine headache     Obesity        PAST SURGICAL HISTORY:  Past Surgical History:   Procedure Laterality Date    CERVICAL SPINE SURGERY      COLONOSCOPY N/A 7/27/2017    Performed by Genaro SALAZAR  MD Dinah [facility] ENDO    EYE SURGERY Left 2017    cyst removal on eyelid; Dr. Broussard    SHOULDER SURGERY      TONSILLECTOMY         SOCIAL HISTORY:  Social History     Socioeconomic History    Marital status:      Spouse name: Not on file    Number of children: Not on file    Years of education: Not on file    Highest education level: Not on file   Occupational History    Not on file   Social Needs    Financial resource strain: Not on file    Food insecurity:     Worry: Not on file     Inability: Not on file    Transportation needs:     Medical: Not on file     Non-medical: Not on file   Tobacco Use    Smoking status: Former Smoker     Packs/day: 2.00     Years: 15.00     Pack years: 30.00     Types: Cigarettes     Last attempt to quit: 1984     Years since quittin.9    Smokeless tobacco: Never Used    Tobacco comment: Patient quit smoking at the age of 26 yo.   Substance and Sexual Activity    Alcohol use: No    Drug use: No    Sexual activity: Yes     Partners: Female   Lifestyle    Physical activity:     Days per week: Not on file     Minutes per session: Not on file    Stress: Not on file   Relationships    Social connections:     Talks on phone: Not on file     Gets together: Not on file     Attends Uatsdin service: Not on file     Active member of club or organization: Not on file     Attends meetings of clubs or organizations: Not on file     Relationship status: Not on file   Other Topics Concern    Not on file   Social History Narrative    Not on file       FAMILY HISTORY:  Family History   Problem Relation Age of Onset    Cataracts Mother     Cancer Mother         throat    Hypertension Mother     Hypertension Father     Stroke Father         2 strokes    Hypertension Sister     Cancer Brother         spinal, kidney, spinal fluid    Hypertension Brother     Cancer Cousin         breast?       ALLERGIES AND MEDICATIONS: updated and  reviewed.  Review of patient's allergies indicates:   Allergen Reactions    Sulfa (sulfonamide antibiotics) Rash     Current Outpatient Medications   Medication Sig Dispense Refill    amLODIPine (NORVASC) 10 MG tablet TAKE 1 TABLET (10 MG TOTAL) BY MOUTH ONCE DAILY. 90 tablet 0    betamethasone dipropionate (DIPROLENE) 0.05 % cream Apply topically once daily. for 10 days 45 g 0    buPROPion (WELLBUTRIN XL) 300 MG 24 hr tablet Take 300 mg by mouth every morning.  2    busPIRone (BUSPAR) 10 MG tablet Take 10 mg by mouth 3 (three) times daily.      butalbital-acetaminophen-caffeine -40 mg (FIORICET, ESGIC) -40 mg per tablet Take 1 tablet by mouth every 4 (four) hours as needed.  0    cyclobenzaprine (FLEXERIL) 10 MG tablet TAKE 1 TABLET BY MOUTH THREE TIMES A DAY AS NEEDED 90 tablet 0    diclofenac sodium (VOLTAREN) 1 % Gel Apply 2 g topically once daily. 100 g 0    fluticasone (FLONASE) 50 mcg/actuation nasal spray TAKE 1 SPRAY BY EACH NARE ROUTE ONCE DAILY. 16 g 5    fluticasone-vilanterol (BREO) 100-25 mcg/dose diskus inhaler Inhale 1 puff into the lungs once daily. Controller 60 each 3    folic acid (FOLVITE) 800 MCG Tab Take 800 mcg by mouth once daily.      furosemide (LASIX) 40 MG tablet TAKE 1 TABLET BY MOUTH EVERY DAY 30 tablet 1    gabapentin (NEURONTIN) 300 MG capsule Take 1 capsule (300 mg total) by mouth 2 (two) times daily. 180 capsule 3    gemfibrozil (LOPID) 600 MG tablet TAKE 1 TABLET (600 MG TOTAL) BY MOUTH 2 (TWO) TIMES DAILY BEFORE MEALS. 120 tablet 0    irbesartan (AVAPRO) 300 MG tablet TAKE 1 TABLET (300 MG TOTAL) BY MOUTH EVERY EVENING. 90 tablet 3    levocetirizine (XYZAL) 5 MG tablet TAKE 1 TABLET BY MOUTH EVERY DAY IN THE EVENING 30 tablet 0    meclizine (ANTIVERT) 25 mg tablet Take 1 tablet (25 mg total) by mouth 3 (three) times daily as needed. 30 tablet 0    melatonin 5 mg Cap Take by mouth.      metFORMIN (GLUCOPHAGE) 1000 MG tablet TAKE 1 TABLET BY MOUTH  TWICE A  tablet 0    mupirocin (BACTROBAN) 2 % ointment Apply topically 3 (three) times daily. 30 g 0    pantoprazole (PROTONIX) 40 MG tablet TAKE 1 TABLET BY MOUTH DAILY 90 tablet 3    PROAIR HFA 90 mcg/actuation inhaler INHALE 2 PUFFS EBERY 4 HOURS AS NEEDED 18 g 6    quetiapine (SEROQUEL) 300 MG Tab Take by mouth.      rizatriptan (MAXALT) 10 MG tablet Take 1 tablet (10 mg total) by mouth as needed for Migraine. 9 tablet 3    simvastatin (ZOCOR) 80 MG tablet TAKE 1 TABLET BY MOUTH EVERY DAY 90 tablet 0    tamsulosin (FLOMAX) 0.4 mg Cap TAKE 1 CAPSULE BY MOUTH EVERY DAY 30 capsule 0    topiramate (TOPAMAX) 25 MG tablet Take 1 tablet (25 mg total) by mouth 2 (two) times daily. 60 tablet 11    zonisamide (ZONEGRAN) 50 MG Cap Take 1 capsule (50 mg total) by mouth 2 (two) times daily. 60 capsule 0     Current Facility-Administered Medications   Medication Dose Route Frequency Provider Last Rate Last Dose    silver nitrate applicators applicator 1 applicator  1 applicator Topical (Top) 1 time in Clinic/HOD Paulo Brown, DAVIAN-C             ROS  Review of Systems   Constitutional: Negative for activity change, appetite change, fatigue, fever and unexpected weight change.   HENT: Negative for congestion and facial swelling.    Eyes: Negative for visual disturbance.   Respiratory: Negative for chest tightness, shortness of breath, wheezing and stridor.    Cardiovascular: Negative for chest pain, palpitations and leg swelling.   Gastrointestinal: Negative for abdominal distention, abdominal pain, constipation, diarrhea, nausea and vomiting.   Endocrine: Negative for cold intolerance, heat intolerance, polydipsia and polyuria.   Skin: Negative.    Allergic/Immunologic: Negative.    Neurological: Positive for light-headedness and headaches. Negative for dizziness and numbness.   Psychiatric/Behavioral: Negative for agitation and decreased concentration.           Physical Exam  There were no vitals filed  for this visit. There is no height or weight on file to calculate BMI.          Physical Exam   Constitutional: He is oriented to person, place, and time. He appears well-developed and well-nourished.   HENT:   Head: Normocephalic and atraumatic.   Eyes: Pupils are equal, round, and reactive to light. Conjunctivae and EOM are normal.   Neck: Normal range of motion.   Neurological: He is alert and oriented to person, place, and time.   Skin: Skin is warm and dry.   Psychiatric: He has a normal mood and affect. His behavior is normal.   Vitals reviewed.        Health Maintenance       Date Due Completion Date    Urine Microalbumin 03/27/2019 3/27/2018    Influenza Vaccine 08/01/2019 11/1/2017 (Declined)    Override on 11/1/2017: Declined    Override on 12/5/2016: Declined    Eye Exam 08/23/2019 8/23/2018    Hemoglobin A1c 10/04/2019 4/4/2019    Foot Exam 10/08/2019 10/8/2018    Lipid Panel 03/12/2020 3/12/2019    Low Dose Statin 04/10/2020 4/10/2019    Colonoscopy 07/27/2020 7/27/2017    TETANUS VACCINE 07/07/2027 7/7/2017              Patient note was created using [employer].  Any errors in syntax or even information may not have been identified and edited on initial review prior to signing this note.

## 2019-05-02 NOTE — ASSESSMENT & PLAN NOTE
Patient will require rizatriptan at this time.  It is medically necessary to obtain this medication.

## 2019-05-07 DIAGNOSIS — J45.31 MILD PERSISTENT REACTIVE AIRWAY DISEASE WITH ACUTE EXACERBATION: ICD-10-CM

## 2019-05-07 RX ORDER — LEVOCETIRIZINE DIHYDROCHLORIDE 5 MG/1
TABLET, FILM COATED ORAL
Qty: 30 TABLET | Refills: 0 | Status: SHIPPED | OUTPATIENT
Start: 2019-05-07 | End: 2019-07-09

## 2019-05-14 ENCOUNTER — OFFICE VISIT (OUTPATIENT)
Dept: FAMILY MEDICINE | Facility: CLINIC | Age: 59
End: 2019-05-14
Payer: MEDICARE

## 2019-05-14 ENCOUNTER — TELEPHONE (OUTPATIENT)
Dept: FAMILY MEDICINE | Facility: CLINIC | Age: 59
End: 2019-05-14

## 2019-05-14 VITALS
SYSTOLIC BLOOD PRESSURE: 132 MMHG | TEMPERATURE: 98 F | BODY MASS INDEX: 36.73 KG/M2 | HEIGHT: 69 IN | OXYGEN SATURATION: 95 % | WEIGHT: 248 LBS | RESPIRATION RATE: 16 BRPM | HEART RATE: 90 BPM | DIASTOLIC BLOOD PRESSURE: 88 MMHG

## 2019-05-14 DIAGNOSIS — I10 ESSENTIAL HYPERTENSION: ICD-10-CM

## 2019-05-14 DIAGNOSIS — J06.9 URI WITH COUGH AND CONGESTION: Primary | ICD-10-CM

## 2019-05-14 DIAGNOSIS — M54.9 MID BACK PAIN: ICD-10-CM

## 2019-05-14 PROCEDURE — 3079F DIAST BP 80-89 MM HG: CPT | Mod: CPTII,S$GLB,, | Performed by: NURSE PRACTITIONER

## 2019-05-14 PROCEDURE — 99214 PR OFFICE/OUTPT VISIT, EST, LEVL IV, 30-39 MIN: ICD-10-PCS | Mod: S$GLB,,, | Performed by: NURSE PRACTITIONER

## 2019-05-14 PROCEDURE — 3075F SYST BP GE 130 - 139MM HG: CPT | Mod: CPTII,S$GLB,, | Performed by: NURSE PRACTITIONER

## 2019-05-14 PROCEDURE — 99214 OFFICE O/P EST MOD 30 MIN: CPT | Mod: S$GLB,,, | Performed by: NURSE PRACTITIONER

## 2019-05-14 PROCEDURE — 3008F BODY MASS INDEX DOCD: CPT | Mod: CPTII,S$GLB,, | Performed by: NURSE PRACTITIONER

## 2019-05-14 PROCEDURE — 99999 PR PBB SHADOW E&M-EST. PATIENT-LVL IV: ICD-10-PCS | Mod: PBBFAC,,, | Performed by: NURSE PRACTITIONER

## 2019-05-14 PROCEDURE — 3008F PR BODY MASS INDEX (BMI) DOCUMENTED: ICD-10-PCS | Mod: CPTII,S$GLB,, | Performed by: NURSE PRACTITIONER

## 2019-05-14 PROCEDURE — 99999 PR PBB SHADOW E&M-EST. PATIENT-LVL IV: CPT | Mod: PBBFAC,,, | Performed by: NURSE PRACTITIONER

## 2019-05-14 PROCEDURE — 3075F PR MOST RECENT SYSTOLIC BLOOD PRESS GE 130-139MM HG: ICD-10-PCS | Mod: CPTII,S$GLB,, | Performed by: NURSE PRACTITIONER

## 2019-05-14 PROCEDURE — 3079F PR MOST RECENT DIASTOLIC BLOOD PRESSURE 80-89 MM HG: ICD-10-PCS | Mod: CPTII,S$GLB,, | Performed by: NURSE PRACTITIONER

## 2019-05-14 RX ORDER — BENZONATATE 200 MG/1
200 CAPSULE ORAL 3 TIMES DAILY PRN
Qty: 30 CAPSULE | Refills: 0 | Status: CANCELLED | OUTPATIENT
Start: 2019-05-14 | End: 2019-05-24

## 2019-05-14 RX ORDER — GUAIFENESIN 600 MG/1
1200 TABLET, EXTENDED RELEASE ORAL 2 TIMES DAILY
COMMUNITY
End: 2019-07-09

## 2019-05-14 RX ORDER — ACETAMINOPHEN 500 MG
500 TABLET ORAL EVERY 6 HOURS PRN
COMMUNITY
End: 2019-07-09

## 2019-05-14 NOTE — PROGRESS NOTES
Subjective:       Patient ID: Scott Bansal is a 58 y.o. male.    Chief Complaint: Cough (Last 4 Days. Coughing up mucus ) and Back Pain    Chief Complaint   Patient presents with    Cough     Last 4 Days. Coughing up mucus     Back Pain       HPI   Scott Bansal is a 58 y.o. male with multiple medical diagnoses as listed in the medical history and problem list that presents for cough for 4 days.  This patient is new to me. Taking mucinex, breo, albuterol neb/inhaler with relief. Productive cough with relief. Complains of intermittent wheezing that is easily relieved with albuterol. Denies increased SOB, fever, chills. Having some mid-back pain that he attributes to a cough. Has been using heat, anti-inflammatories and muscle relaxers with some relief. Requesting toradol injection today - discussed with the patient why this wouldn't be appropriate. He is otherwise feeling healthy and well today.       PAST MEDICAL HISTORY:  Past Medical History:   Diagnosis Date    Bipolar 1 disorder, mixed     BPH (benign prostatic hypertrophy)     Cyst, eyelid     Dr. Broussard    Diabetes mellitus     GERD (gastroesophageal reflux disease)     Hyperlipidemia     Hypertension     Lumbar degenerative disc disease 3/7/2019    Migraine headache     Obesity        PAST SURGICAL HISTORY:  Past Surgical History:   Procedure Laterality Date    CERVICAL SPINE SURGERY      COLONOSCOPY N/A 7/27/2017    Performed by [facility]    EYE SURGERY Left 03/2017    cyst removal on eyelid; Dr. Broussard    SHOULDER SURGERY      TONSILLECTOMY         SOCIAL HISTORY:  Social History     Socioeconomic History    Marital status:      Spouse name: Not on file    Number of children: Not on file    Years of education: Not on file    Highest education level: Not on file   Occupational History    Not on file   Social Needs    Financial resource strain: Not on file    Food insecurity:     Worry:  Not on file     Inability: Not on file    Transportation needs:     Medical: Not on file     Non-medical: Not on file   Tobacco Use    Smoking status: Former Smoker     Packs/day: 2.00     Years: 15.00     Pack years: 30.00     Types: Cigarettes     Last attempt to quit: 1984     Years since quittin.0    Smokeless tobacco: Never Used    Tobacco comment: Patient quit smoking at the age of 24 yo.   Substance and Sexual Activity    Alcohol use: No    Drug use: No    Sexual activity: Yes     Partners: Female   Lifestyle    Physical activity:     Days per week: Not on file     Minutes per session: Not on file    Stress: Not on file   Relationships    Social connections:     Talks on phone: Not on file     Gets together: Not on file     Attends Adventism service: Not on file     Active member of club or organization: Not on file     Attends meetings of clubs or organizations: Not on file     Relationship status: Not on file   Other Topics Concern    Not on file   Social History Narrative    Not on file       FAMILY HISTORY:  Family History   Problem Relation Age of Onset    Cataracts Mother     Cancer Mother         throat    Hypertension Mother     Hypertension Father     Stroke Father         2 strokes    Hypertension Sister     Cancer Brother         spinal, kidney, spinal fluid    Hypertension Brother     Cancer Cousin         breast?       ALLERGIES AND MEDICATIONS: updated and reviewed.  Review of patient's allergies indicates:   Allergen Reactions    Sulfa (sulfonamide antibiotics) Rash     Current Outpatient Medications   Medication Sig Dispense Refill    acetaminophen (TYLENOL) 500 MG tablet Take 500 mg by mouth every 6 (six) hours as needed for Pain.      amLODIPine (NORVASC) 10 MG tablet TAKE 1 TABLET (10 MG TOTAL) BY MOUTH ONCE DAILY. 90 tablet 0    betamethasone dipropionate (DIPROLENE) 0.05 % cream Apply topically once daily. for 10 days 45 g 0    buPROPion (WELLBUTRIN XL)  300 MG 24 hr tablet Take 300 mg by mouth every morning.  2    busPIRone (BUSPAR) 10 MG tablet Take 10 mg by mouth 3 (three) times daily.      cyclobenzaprine (FLEXERIL) 10 MG tablet TAKE 1 TABLET BY MOUTH THREE TIMES A DAY AS NEEDED 90 tablet 0    diclofenac sodium (VOLTAREN) 1 % Gel Apply 2 g topically once daily. 100 g 0    fluticasone (FLONASE) 50 mcg/actuation nasal spray TAKE 1 SPRAY BY EACH NARE ROUTE ONCE DAILY. 16 g 5    fluticasone-vilanterol (BREO) 100-25 mcg/dose diskus inhaler Inhale 1 puff into the lungs once daily. Controller 60 each 3    folic acid (FOLVITE) 800 MCG Tab Take 800 mcg by mouth once daily.      furosemide (LASIX) 40 MG tablet TAKE 1 TABLET BY MOUTH EVERY DAY 30 tablet 1    gabapentin (NEURONTIN) 300 MG capsule Take 1 capsule (300 mg total) by mouth 2 (two) times daily. 180 capsule 3    gemfibrozil (LOPID) 600 MG tablet TAKE 1 TABLET (600 MG TOTAL) BY MOUTH 2 (TWO) TIMES DAILY BEFORE MEALS. 120 tablet 0    guaiFENesin (MUCINEX) 600 mg 12 hr tablet Take 1,200 mg by mouth 2 (two) times daily.      irbesartan (AVAPRO) 300 MG tablet TAKE 1 TABLET (300 MG TOTAL) BY MOUTH EVERY EVENING. 90 tablet 3    levocetirizine (XYZAL) 5 MG tablet TAKE 1 TABLET BY MOUTH EVERY DAY IN THE EVENING 30 tablet 0    melatonin 5 mg Cap Take by mouth.      metFORMIN (GLUCOPHAGE) 1000 MG tablet TAKE 1 TABLET BY MOUTH TWICE A  tablet 0    pantoprazole (PROTONIX) 40 MG tablet TAKE 1 TABLET BY MOUTH DAILY 90 tablet 3    PROAIR HFA 90 mcg/actuation inhaler INHALE 2 PUFFS EBERY 4 HOURS AS NEEDED 18 g 6    quetiapine (SEROQUEL) 300 MG Tab Take by mouth.      rizatriptan (MAXALT) 10 MG tablet Take 1 tablet (10 mg total) by mouth as needed for Migraine. 9 tablet 3    simvastatin (ZOCOR) 80 MG tablet TAKE 1 TABLET BY MOUTH EVERY DAY 90 tablet 0    tamsulosin (FLOMAX) 0.4 mg Cap TAKE 1 CAPSULE BY MOUTH EVERY DAY 30 capsule 0    zonisamide (ZONEGRAN) 50 MG Cap Take 1 capsule (50 mg total) by mouth 2  "(two) times daily. 60 capsule 0     Current Facility-Administered Medications   Medication Dose Route Frequency Provider Last Rate Last Dose    silver nitrate applicators applicator 1 applicator  1 applicator Topical (Top) 1 time in Clinic/HOD CATHY Calvillo  Review of Systems   Constitutional: Positive for activity change. Negative for appetite change, chills and fever.   HENT: Negative for congestion, ear discharge, ear pain, postnasal drip, rhinorrhea, sinus pressure, sinus pain, sneezing, sore throat and trouble swallowing.    Respiratory: Positive for cough and wheezing. Negative for shortness of breath.    Cardiovascular: Negative for chest pain.   Gastrointestinal: Negative for constipation, diarrhea, nausea and vomiting.   Neurological: Negative for headaches.   Psychiatric/Behavioral: Negative for behavioral problems and confusion.     Objective:     Physical Exam  Vitals:    05/14/19 1150   BP: 132/88   BP Location: Right arm   Patient Position: Sitting   BP Method: Small (Manual)   Pulse: 90   Resp: 16   Temp: 97.8 °F (36.6 °C)   TempSrc: Oral   SpO2: 95%   Weight: 112.5 kg (248 lb 0.3 oz)   Height: 5' 9" (1.753 m)    Body mass index is 36.63 kg/m².  Weight: 112.5 kg (248 lb 0.3 oz)   Height: 5' 9" (175.3 cm)   Physical Exam   Constitutional: He is oriented to person, place, and time. He appears well-developed and well-nourished.   HENT:   Head: Normocephalic and atraumatic.   Right Ear: External ear normal.   Left Ear: External ear normal.   Nose: Nose normal.   Mouth/Throat: Oropharynx is clear and moist.   Eyes: EOM are normal.   Neck: Neck supple.   Cardiovascular: Normal rate and regular rhythm.   Pulmonary/Chest: Effort normal and breath sounds normal. No respiratory distress. He has no decreased breath sounds. He has no wheezes.   Musculoskeletal: Normal range of motion.   Neurological: He is alert and oriented to person, place, and time.   Skin: Skin is warm and dry. "   Vitals reviewed.    Assessment:     1. URI with cough and congestion    2. Essential hypertension    3. Mid back pain      Plan:     Scott was seen today for cough and back pain.    Diagnoses and all orders for this visit:    URI with cough and congestion       - Encouraged patient to continue mucinex, albuterol, breo regimen       - Discussed supplementing with OTC cough syrup       - RTC precautions discussed - fever, chills, increased SOB/wheezing    Essential hypertension       - BP controlled presently - reviewed anti-hypertensive regimen - continue current therapy    Mid back pain       - Discussed using ice, topical pain relief gels, anti-inflammatories and muscle relaxers PRN      Health Maintenance       Date Due Completion Date    Urine Microalbumin 03/27/2019 3/27/2018    Influenza Vaccine 08/01/2019 11/1/2017 (Declined)    Override on 11/1/2017: Declined    Override on 12/5/2016: Declined    Eye Exam 08/23/2019 8/23/2018    Hemoglobin A1c 10/04/2019 4/4/2019    Foot Exam 10/08/2019 10/8/2018    Lipid Panel 03/12/2020 3/12/2019    Low Dose Statin 04/10/2020 4/10/2019    Colonoscopy 07/27/2020 7/27/2017    TETANUS VACCINE 07/07/2027 7/7/2017          Follow-up: Follow up if symptoms worsen or fail to improve.    The patient expressed understanding and no barriers to adherence were identified.     1. The patient indicates understanding of these issues and agrees with the plan. Brief care plan is updated and reviewed with the patient as applicable.     2. The patient is given an After Visit Summary that lists all medications with directions, allergies, orders placed during this encounter and follow-up instructions.     3. I have reviewed the patient's medical information including past medical, family, and social history sections including the medications and allergies.     4. We discussed the patient's current medications. I reconciled the patient's medication list and prepared and supplied needed  refills.     Shahrzad Hussein, DNP, APRN, FNP-c  Family Medicine    My collaborating physicians are Dr. Elieser Benjamin and Dr. Alexander Gruber

## 2019-05-14 NOTE — PATIENT INSTRUCTIONS
1. Continue mucinex and albuterol inhaler/nebulizer, and breo  Here are some useful anti-cough medications that are available over-the-counter:   Chlor-Trimeton 1 or 2 pills before bedtime   Delsym 12 hr cough medicine twice a day as needed    Use coricidn HBP instead of SUDAFED or other over-the-counter decongestants - safe for patients with high blood pressure

## 2019-05-15 DIAGNOSIS — E78.5 HYPERLIPIDEMIA, UNSPECIFIED HYPERLIPIDEMIA TYPE: ICD-10-CM

## 2019-05-15 RX ORDER — GEMFIBROZIL 600 MG/1
TABLET, FILM COATED ORAL
Qty: 120 TABLET | Refills: 0 | Status: SHIPPED | OUTPATIENT
Start: 2019-05-15 | End: 2019-07-11 | Stop reason: SDUPTHER

## 2019-05-19 RX ORDER — TAMSULOSIN HYDROCHLORIDE 0.4 MG/1
CAPSULE ORAL
Qty: 30 CAPSULE | Refills: 0 | Status: SHIPPED | OUTPATIENT
Start: 2019-05-19 | End: 2019-06-11 | Stop reason: SDUPTHER

## 2019-05-20 ENCOUNTER — OFFICE VISIT (OUTPATIENT)
Dept: FAMILY MEDICINE | Facility: CLINIC | Age: 59
End: 2019-05-20
Payer: MEDICARE

## 2019-05-20 VITALS
TEMPERATURE: 98 F | HEIGHT: 69 IN | OXYGEN SATURATION: 95 % | HEART RATE: 98 BPM | DIASTOLIC BLOOD PRESSURE: 86 MMHG | WEIGHT: 247 LBS | BODY MASS INDEX: 36.58 KG/M2 | SYSTOLIC BLOOD PRESSURE: 120 MMHG

## 2019-05-20 DIAGNOSIS — I70.0 ATHEROSCLEROSIS OF AORTA: ICD-10-CM

## 2019-05-20 DIAGNOSIS — E11.9 TYPE 2 DIABETES MELLITUS WITHOUT COMPLICATION, WITHOUT LONG-TERM CURRENT USE OF INSULIN: ICD-10-CM

## 2019-05-20 DIAGNOSIS — M51.36 LUMBAR DEGENERATIVE DISC DISEASE: ICD-10-CM

## 2019-05-20 DIAGNOSIS — Z87.891 HISTORY OF TOBACCO USE: ICD-10-CM

## 2019-05-20 DIAGNOSIS — J44.1 CHRONIC OBSTRUCTIVE PULMONARY DISEASE WITH ACUTE EXACERBATION: Primary | ICD-10-CM

## 2019-05-20 DIAGNOSIS — M25.551 RIGHT HIP PAIN: ICD-10-CM

## 2019-05-20 DIAGNOSIS — E66.01 SEVERE OBESITY (BMI 35.0-39.9) WITH COMORBIDITY: ICD-10-CM

## 2019-05-20 PROCEDURE — 3079F DIAST BP 80-89 MM HG: CPT | Mod: CPTII,S$GLB,, | Performed by: INTERNAL MEDICINE

## 2019-05-20 PROCEDURE — 99214 PR OFFICE/OUTPT VISIT, EST, LEVL IV, 30-39 MIN: ICD-10-PCS | Mod: S$GLB,,, | Performed by: INTERNAL MEDICINE

## 2019-05-20 PROCEDURE — 3044F PR MOST RECENT HEMOGLOBIN A1C LEVEL <7.0%: ICD-10-PCS | Mod: CPTII,S$GLB,, | Performed by: INTERNAL MEDICINE

## 2019-05-20 PROCEDURE — 99499 RISK ADDL DX/OHS AUDIT: ICD-10-PCS | Mod: S$GLB,,, | Performed by: INTERNAL MEDICINE

## 2019-05-20 PROCEDURE — 3074F PR MOST RECENT SYSTOLIC BLOOD PRESSURE < 130 MM HG: ICD-10-PCS | Mod: CPTII,S$GLB,, | Performed by: INTERNAL MEDICINE

## 2019-05-20 PROCEDURE — 99214 OFFICE O/P EST MOD 30 MIN: CPT | Mod: S$GLB,,, | Performed by: INTERNAL MEDICINE

## 2019-05-20 PROCEDURE — 99499 UNLISTED E&M SERVICE: CPT | Mod: S$GLB,,, | Performed by: INTERNAL MEDICINE

## 2019-05-20 PROCEDURE — 3074F SYST BP LT 130 MM HG: CPT | Mod: CPTII,S$GLB,, | Performed by: INTERNAL MEDICINE

## 2019-05-20 PROCEDURE — 3008F PR BODY MASS INDEX (BMI) DOCUMENTED: ICD-10-PCS | Mod: CPTII,S$GLB,, | Performed by: INTERNAL MEDICINE

## 2019-05-20 PROCEDURE — 99999 PR PBB SHADOW E&M-EST. PATIENT-LVL III: CPT | Mod: PBBFAC,,, | Performed by: INTERNAL MEDICINE

## 2019-05-20 PROCEDURE — 3008F BODY MASS INDEX DOCD: CPT | Mod: CPTII,S$GLB,, | Performed by: INTERNAL MEDICINE

## 2019-05-20 PROCEDURE — 3079F PR MOST RECENT DIASTOLIC BLOOD PRESSURE 80-89 MM HG: ICD-10-PCS | Mod: CPTII,S$GLB,, | Performed by: INTERNAL MEDICINE

## 2019-05-20 PROCEDURE — 3044F HG A1C LEVEL LT 7.0%: CPT | Mod: CPTII,S$GLB,, | Performed by: INTERNAL MEDICINE

## 2019-05-20 PROCEDURE — 99999 PR PBB SHADOW E&M-EST. PATIENT-LVL III: ICD-10-PCS | Mod: PBBFAC,,, | Performed by: INTERNAL MEDICINE

## 2019-05-20 RX ORDER — FLUTICASONE FUROATE AND VILANTEROL 100; 25 UG/1; UG/1
1 POWDER RESPIRATORY (INHALATION) DAILY
Qty: 30 EACH | Refills: 0
Start: 2019-05-20 | End: 2020-02-12 | Stop reason: SDUPTHER

## 2019-05-20 RX ORDER — METHYLPREDNISOLONE 4 MG/1
TABLET ORAL
Qty: 1 PACKAGE | Refills: 0 | Status: SHIPPED | OUTPATIENT
Start: 2019-05-20 | End: 2019-07-09

## 2019-05-20 NOTE — PROGRESS NOTES
HISTORY OF PRESENT ILLNESS:  Scott Bansal is a 58 y.o. male who presents to the clinic today for Chest Congestion; Hip Pain (hurts when he cough ); and Cough  .   The patient presents to clinic today with complaint of chest congestion and cough.  He is a former smoker.  He is known to have COPD.  He states he was seen in the office recently and was not given anything for his symptoms.  He takes Mucinex DM on a regular basis.  He added an additional cough medicine recently.  He has also been trying antihistamine medication.  He denies nasal congestion. He does reports some shortness of breath and wheezing.  Mucus is thick and occasionally green.  No fever.  He reports compliance with his preventative inhaler.  He has been using his albuterol inhaler or nebulizer machine twice a day.  He reports numerous diagnoses of pneumonia in the past for which she has taken antibiotics.  The patient reports he had a fall about a month ago onto his right hip.  He has known degenerative disc disease in his back.  He has no difficulty with ambulation.  He is concerned because his coughing is making his right hip pain worse.  He also has type 2 diabetes mellitus.  He states his blood sugars in general well controlled.  He admits that he went to [employer] shortly before he got sick and was exposed to secondhand cigarette smoke at that time.      PAST MEDICAL HISTORY:  Past Medical History:   Diagnosis Date    Bipolar 1 disorder, mixed     BPH (benign prostatic hypertrophy)     Cyst, eyelid     Dr. Broussard    Diabetes mellitus     GERD (gastroesophageal reflux disease)     Hyperlipidemia     Hypertension     Lumbar degenerative disc disease 3/7/2019    Migraine headache     Obesity        PAST SURGICAL HISTORY:  Past Surgical History:   Procedure Laterality Date    CERVICAL SPINE SURGERY      COLONOSCOPY N/A 7/27/2017    [facility]    EYE SURGERY Left 03/2017    cyst removal on  eyelid; Dr. Broussard    SHOULDER SURGERY      TONSILLECTOMY         SOCIAL HISTORY:  Social History     Socioeconomic History    Marital status:      Spouse name: Not on file    Number of children: Not on file    Years of education: Not on file    Highest education level: Not on file   Occupational History    Not on file   Social Needs    Financial resource strain: Not on file    Food insecurity:     Worry: Not on file     Inability: Not on file    Transportation needs:     Medical: Not on file     Non-medical: Not on file   Tobacco Use    Smoking status: Former Smoker     Packs/day: 2.00     Years: 15.00     Pack years: 30.00     Types: Cigarettes     Last attempt to quit: 1984     Years since quittin.0    Smokeless tobacco: Never Used    Tobacco comment: Patient quit smoking at the age of 24 yo.   Substance and Sexual Activity    Alcohol use: No    Drug use: No    Sexual activity: Yes     Partners: Female   Lifestyle    Physical activity:     Days per week: Not on file     Minutes per session: Not on file    Stress: Not on file   Relationships    Social connections:     Talks on phone: Not on file     Gets together: Not on file     Attends Rastafari service: Not on file     Active member of club or organization: Not on file     Attends meetings of clubs or organizations: Not on file     Relationship status: Not on file   Other Topics Concern    Not on file   Social History Narrative    Not on file       FAMILY HISTORY:  Family History   Problem Relation Age of Onset    Cataracts Mother     Cancer Mother         throat    Hypertension Mother     Hypertension Father     Stroke Father         2 strokes    Hypertension Sister     Cancer Brother         spinal, kidney, spinal fluid    Hypertension Brother     Cancer Cousin         breast?       ALLERGIES AND MEDICATIONS: updated and reviewed.  Review of patient's allergies indicates:   Allergen Reactions    Sulfa  (sulfonamide antibiotics) Rash     Medication List with Changes/Refills   New Medications    METHYLPREDNISOLONE (MEDROL DOSEPACK) 4 MG TABLET    use as directed   Current Medications    ACETAMINOPHEN (TYLENOL) 500 MG TABLET    Take 500 mg by mouth every 6 (six) hours as needed for Pain.    AMLODIPINE (NORVASC) 10 MG TABLET    TAKE 1 TABLET (10 MG TOTAL) BY MOUTH ONCE DAILY.    BETAMETHASONE DIPROPIONATE (DIPROLENE) 0.05 % CREAM    Apply topically once daily. for 10 days    BUPROPION (WELLBUTRIN XL) 300 MG 24 HR TABLET    Take 300 mg by mouth every morning.    BUSPIRONE (BUSPAR) 10 MG TABLET    Take 10 mg by mouth 3 (three) times daily.    CYCLOBENZAPRINE (FLEXERIL) 10 MG TABLET    TAKE 1 TABLET BY MOUTH THREE TIMES A DAY AS NEEDED    DICLOFENAC SODIUM (VOLTAREN) 1 % GEL    Apply 2 g topically once daily.    FLUTICASONE (FLONASE) 50 MCG/ACTUATION NASAL SPRAY    TAKE 1 SPRAY BY EACH NARE ROUTE ONCE DAILY.    FOLIC ACID (FOLVITE) 800 MCG TAB    Take 800 mcg by mouth once daily.    FUROSEMIDE (LASIX) 40 MG TABLET    TAKE 1 TABLET BY MOUTH EVERY DAY    GABAPENTIN (NEURONTIN) 300 MG CAPSULE    Take 1 capsule (300 mg total) by mouth 2 (two) times daily.    GEMFIBROZIL (LOPID) 600 MG TABLET    TAKE 1 TABLET (600 MG TOTAL) BY MOUTH 2 (TWO) TIMES DAILY BEFORE MEALS.    GUAIFENESIN (MUCINEX) 600 MG 12 HR TABLET    Take 1,200 mg by mouth 2 (two) times daily.    IRBESARTAN (AVAPRO) 300 MG TABLET    TAKE 1 TABLET (300 MG TOTAL) BY MOUTH EVERY EVENING.    LEVOCETIRIZINE (XYZAL) 5 MG TABLET    TAKE 1 TABLET BY MOUTH EVERY DAY IN THE EVENING    MELATONIN 5 MG CAP    Take by mouth.    METFORMIN (GLUCOPHAGE) 1000 MG TABLET    TAKE 1 TABLET BY MOUTH TWICE A DAY    PANTOPRAZOLE (PROTONIX) 40 MG TABLET    TAKE 1 TABLET BY MOUTH DAILY    PROAIR HFA 90 MCG/ACTUATION INHALER    INHALE 2 PUFFS EBERY 4 HOURS AS NEEDED    QUETIAPINE (SEROQUEL) 300 MG TAB    Take by mouth.    RIZATRIPTAN (MAXALT) 10 MG TABLET    Take 1 tablet (10 mg total) by  "mouth as needed for Migraine.    SIMVASTATIN (ZOCOR) 80 MG TABLET    TAKE 1 TABLET BY MOUTH EVERY DAY    TAMSULOSIN (FLOMAX) 0.4 MG CAP    TAKE 1 CAPSULE BY MOUTH EVERY DAY    ZONISAMIDE (ZONEGRAN) 50 MG CAP    Take 1 capsule (50 mg total) by mouth 2 (two) times daily.   Changed and/or Refilled Medications    Modified Medication Previous Medication    FLUTICASONE FUROATE-VILANTEROL (BREO) 100-25 MCG/DOSE DISKUS INHALER fluticasone-vilanterol (BREO) 100-25 mcg/dose diskus inhaler       Inhale 1 puff into the lungs once daily. Controller    Inhale 1 puff into the lungs once daily. Controller          CARE TEAM:  Patient Care Team:  Kaylie Chau MD as PCP - General (Family Medicine)  Warren Rondon OD as Consulting Physician (Optometry)  Jessica Black LPN as Licensed Practical Nurse         REVIEW OF SYSTEMS:  Review of Systems   Constitutional: Negative for chills, fatigue, fever and unexpected weight change.   HENT: Negative for congestion, ear pain, sore throat and voice change.    Eyes: Negative for photophobia, pain, discharge and visual disturbance.   Respiratory: Positive for cough, shortness of breath and wheezing.    Cardiovascular: Negative for chest pain, palpitations and leg swelling.   Gastrointestinal: Negative for abdominal pain, blood in stool, constipation, diarrhea, nausea and vomiting.   Genitourinary: Negative for dysuria and frequency.   Musculoskeletal: Positive for arthralgias and back pain.   Skin: Negative for color change and rash.   Neurological: Negative for seizures, weakness and headaches.   Hematological: Negative for adenopathy. Does not bruise/bleed easily.         PHYSICAL EXAM:  Vitals:    05/20/19 1304   BP: 120/86   Pulse: 98   Temp: 97.8 °F (36.6 °C)     Weight: 112.1 kg (247 lb 0.4 oz)   Height: 5' 9" (175.3 cm)   Body mass index is 36.48 kg/m².    Physical Examination: General appearance - alert, well appearing, and in no distress and obese  Mental status - " alert, oriented to person, place, and time  Eyes - pupils equal and reactive, extraocular eye movements intact, sclera anicteric  Ears - bilateral TM's and external ear canals normal  Nose - normal and patent, no erythema, discharge or polyps and normal nontender sinuses  Mouth - mucous membranes moist, pharynx normal without lesions  Neck - supple, no significant adenopathy, carotids upstroke normal bilaterally, no bruits  Lymphatics - no palpable lymphadenopathy  Chest - clear to auscultation, no wheezes, rales or rhonchi, symmetric air entry, no tachypnea, retractions or cyanosis  Heart - normal rate and regular rhythm  Back exam - not examined  Neurological - alert, oriented, normal speech, no focal findings or movement disorder noted, cranial nerves II through XII intact  Musculoskeletal - no difficulty with ambulation; exam of right hip not performed  Extremities - no pedal edema noted  Skin - normal coloration and turgor, no rashes, no suspicious skin lesions noted       ASSESSMENT AND PLAN:  1. Chronic obstructive pulmonary disease with acute exacerbation/2. History of tobacco use  I suspect that he has bronchitis/acute COPD exacerbation.  He has no fever.  Lung exam normal.  No shortness of breath on exam.  No wheezing.  I will treat him with a Medrol Dosepak.  He will continue on his preventative inhaler, but decreased to use it once daily as directed.  He can increase his albuterol use to every 4-6 hours as needed.  I will get a low-dose CT lung screening for history of tobacco use which can also let us know if he has a pneumonia that requires antibiotics or not.  - fluticasone furoate-vilanterol (BREO) 100-25 mcg/dose diskus inhaler; Inhale 1 puff into the lungs once daily. Controller  Dispense: 30 each; Refill: 0  - methylPREDNISolone (MEDROL DOSEPACK) 4 mg tablet; use as directed  Dispense: 1 Package; Refill: 0  - CT Chest Lung Screening Low Dose; Future    3. Type 2 diabetes mellitus without  complication, without long-term current use of insulin  Diabetes currently is controlled for age and comorbid conditions. We discussed diabetic diet and regular exercise. We discussed home blood sugar monitoring, if appropriate. Continue current medication regimen.  Diabetic complications addressed: Not applicable.  Patient was counseled on the need for yearly diabetic retinopathy exam and yearly diabetic foot exam.   - Microalbumin/creatinine urine ratio; Future    4. Lumbar degenerative disc disease/5. Right hip pain  I suspect his right hip pain may be due to referred pain from his degenerative disc disease. He could have some bone bruising.  I do not think he has a fracture concern the fact that he can't ambulate without pain or difficulty.  I discussed with the patient that I would recommend against x-ray as this is exposing him to unnecessary radiation.  He has already had numerous x-rays in his life.  He voiced understanding.  Consider consultation with Orthopedics if symptoms worsen or persist.    6. Severe obesity (BMI 35.0-39.9) with comorbidity  The patient is asked to make an attempt to improve diet and exercise patterns to aid in medical management of this problem.     7. Atherosclerosis of aorta  Patient with new diagnosis of Atherosclerosis of the Aorta.  Asymptomatic. Currently stable on lipid lowering medication and b/p monitoring.            Follow up if symptoms worsen or fail to improve. or sooner as needed.

## 2019-05-21 ENCOUNTER — HOSPITAL ENCOUNTER (OUTPATIENT)
Dept: RADIOLOGY | Facility: HOSPITAL | Age: 59
Discharge: HOME | End: 2019-05-21
Attending: INTERNAL MEDICINE
Payer: MEDICARE

## 2019-05-21 DIAGNOSIS — Z87.891 HISTORY OF TOBACCO USE: ICD-10-CM

## 2019-05-21 PROCEDURE — G0297 LDCT FOR LUNG CA SCREEN: HCPCS | Mod: TC

## 2019-05-21 PROCEDURE — G0297 LDCT FOR LUNG CA SCREEN: HCPCS | Mod: 26,,, | Performed by: RADIOLOGY

## 2019-05-21 PROCEDURE — G0297 CT CHEST LUNG SCREENING LOW DOSE: ICD-10-PCS | Mod: 26,,, | Performed by: RADIOLOGY

## 2019-05-22 ENCOUNTER — TELEPHONE (OUTPATIENT)
Dept: FAMILY MEDICINE | Facility: CLINIC | Age: 59
End: 2019-05-22

## 2019-05-22 NOTE — TELEPHONE ENCOUNTER
Please call patient: CT of chest was normal. No pneumonia or other abnormalities noted. No need for antibiotics at this time.

## 2019-05-24 DIAGNOSIS — G44.229 CHRONIC TENSION-TYPE HEADACHE, NOT INTRACTABLE: ICD-10-CM

## 2019-05-24 RX ORDER — ZONISAMIDE 50 MG/1
CAPSULE ORAL
Qty: 60 CAPSULE | Refills: 0 | Status: SHIPPED | OUTPATIENT
Start: 2019-05-24 | End: 2019-06-19 | Stop reason: SDUPTHER

## 2019-06-01 DIAGNOSIS — E78.5 HYPERLIPIDEMIA, UNSPECIFIED HYPERLIPIDEMIA TYPE: ICD-10-CM

## 2019-06-03 RX ORDER — SIMVASTATIN 80 MG/1
TABLET, FILM COATED ORAL
Qty: 90 TABLET | Refills: 0 | Status: SHIPPED | OUTPATIENT
Start: 2019-06-03 | End: 2019-08-27 | Stop reason: SDUPTHER

## 2019-06-07 DIAGNOSIS — I10 ESSENTIAL HYPERTENSION: ICD-10-CM

## 2019-06-07 RX ORDER — FUROSEMIDE 40 MG/1
TABLET ORAL
Qty: 30 TABLET | Refills: 0 | Status: SHIPPED | OUTPATIENT
Start: 2019-06-07 | End: 2019-07-07 | Stop reason: SDUPTHER

## 2019-06-11 RX ORDER — TAMSULOSIN HYDROCHLORIDE 0.4 MG/1
CAPSULE ORAL
Qty: 30 CAPSULE | Refills: 0 | Status: SHIPPED | OUTPATIENT
Start: 2019-06-11 | End: 2019-07-05 | Stop reason: SDUPTHER

## 2019-06-14 DIAGNOSIS — J45.20 MILD INTERMITTENT ASTHMATIC BRONCHITIS WITHOUT COMPLICATION: ICD-10-CM

## 2019-06-14 RX ORDER — FLUTICASONE FUROATE AND VILANTEROL TRIFENATATE 100; 25 UG/1; UG/1
POWDER RESPIRATORY (INHALATION)
Qty: 60 EACH | Refills: 3 | Status: SHIPPED | OUTPATIENT
Start: 2019-06-14 | End: 2020-02-12

## 2019-06-16 DIAGNOSIS — E11.9 DIABETES MELLITUS WITHOUT COMPLICATION: ICD-10-CM

## 2019-06-17 RX ORDER — METFORMIN HYDROCHLORIDE 1000 MG/1
TABLET ORAL
Qty: 180 TABLET | Refills: 0 | Status: SHIPPED | OUTPATIENT
Start: 2019-06-17 | End: 2019-09-10 | Stop reason: SDUPTHER

## 2019-06-19 DIAGNOSIS — G44.229 CHRONIC TENSION-TYPE HEADACHE, NOT INTRACTABLE: ICD-10-CM

## 2019-06-19 RX ORDER — ZONISAMIDE 50 MG/1
CAPSULE ORAL
Qty: 60 CAPSULE | Refills: 0 | Status: SHIPPED | OUTPATIENT
Start: 2019-06-19 | End: 2019-07-11 | Stop reason: SDUPTHER

## 2019-06-28 RX ORDER — CYCLOBENZAPRINE HCL 10 MG
TABLET ORAL
Qty: 90 TABLET | Refills: 0 | Status: SHIPPED | OUTPATIENT
Start: 2019-06-28 | End: 2019-07-27 | Stop reason: SDUPTHER

## 2019-06-28 NOTE — TELEPHONE ENCOUNTER
----- Message from Greg Pacheco sent at 6/28/2019  1:54 PM CDT -----  Contact: pt   .Type: RX Refill Request    Who Called: pt     Refill or New Rx:refill    RX Name and Strength:cyclobenzaprine (FLEXERIL) 10 MG tablet    Preferred Pharmacy with phone number ..  [facility]/pharmacy #[id number] - [place] - [address]  [address] [address]  Phone: [phone] Fax: [phone]    Local or Mail Order: local     Would the patient rather a call back or a response via [facility]? Call back     Best Call Back Number:[phone]    Additional Information:

## 2019-07-05 DIAGNOSIS — M54.10 RADICULAR LOW BACK PAIN: Primary | ICD-10-CM

## 2019-07-05 RX ORDER — TAMSULOSIN HYDROCHLORIDE 0.4 MG/1
CAPSULE ORAL
Qty: 30 CAPSULE | Refills: 0 | Status: SHIPPED | OUTPATIENT
Start: 2019-07-05 | End: 2019-07-29 | Stop reason: SDUPTHER

## 2019-07-07 DIAGNOSIS — I10 ESSENTIAL HYPERTENSION: ICD-10-CM

## 2019-07-08 RX ORDER — FUROSEMIDE 40 MG/1
TABLET ORAL
Qty: 30 TABLET | Refills: 0 | Status: SHIPPED | OUTPATIENT
Start: 2019-07-08 | End: 2019-08-02 | Stop reason: SDUPTHER

## 2019-07-09 ENCOUNTER — OFFICE VISIT (OUTPATIENT)
Dept: FAMILY MEDICINE | Facility: CLINIC | Age: 59
End: 2019-07-09
Payer: MEDICARE

## 2019-07-09 VITALS
DIASTOLIC BLOOD PRESSURE: 70 MMHG | SYSTOLIC BLOOD PRESSURE: 119 MMHG | BODY MASS INDEX: 33.97 KG/M2 | HEART RATE: 94 BPM | HEIGHT: 69 IN | OXYGEN SATURATION: 98 % | WEIGHT: 229.38 LBS | TEMPERATURE: 98 F

## 2019-07-09 DIAGNOSIS — I10 ESSENTIAL HYPERTENSION: ICD-10-CM

## 2019-07-09 DIAGNOSIS — G43.711 INTRACTABLE CHRONIC MIGRAINE WITHOUT AURA AND WITH STATUS MIGRAINOSUS: ICD-10-CM

## 2019-07-09 DIAGNOSIS — G43.711 INTRACTABLE CHRONIC MIGRAINE WITHOUT AURA AND WITH STATUS MIGRAINOSUS: Primary | ICD-10-CM

## 2019-07-09 DIAGNOSIS — G44.229 CHRONIC TENSION-TYPE HEADACHE, NOT INTRACTABLE: ICD-10-CM

## 2019-07-09 PROCEDURE — 3008F PR BODY MASS INDEX (BMI) DOCUMENTED: ICD-10-PCS | Mod: CPTII,S$GLB,, | Performed by: NURSE PRACTITIONER

## 2019-07-09 PROCEDURE — 99999 PR PBB SHADOW E&M-EST. PATIENT-LVL IV: ICD-10-PCS | Mod: PBBFAC,,, | Performed by: NURSE PRACTITIONER

## 2019-07-09 PROCEDURE — 99214 OFFICE O/P EST MOD 30 MIN: CPT | Mod: S$GLB,,, | Performed by: NURSE PRACTITIONER

## 2019-07-09 PROCEDURE — 99999 PR PBB SHADOW E&M-EST. PATIENT-LVL IV: CPT | Mod: PBBFAC,,, | Performed by: NURSE PRACTITIONER

## 2019-07-09 PROCEDURE — 3008F BODY MASS INDEX DOCD: CPT | Mod: CPTII,S$GLB,, | Performed by: NURSE PRACTITIONER

## 2019-07-09 PROCEDURE — 3078F PR MOST RECENT DIASTOLIC BLOOD PRESSURE < 80 MM HG: ICD-10-PCS | Mod: CPTII,S$GLB,, | Performed by: NURSE PRACTITIONER

## 2019-07-09 PROCEDURE — 3078F DIAST BP <80 MM HG: CPT | Mod: CPTII,S$GLB,, | Performed by: NURSE PRACTITIONER

## 2019-07-09 PROCEDURE — 99214 PR OFFICE/OUTPT VISIT, EST, LEVL IV, 30-39 MIN: ICD-10-PCS | Mod: S$GLB,,, | Performed by: NURSE PRACTITIONER

## 2019-07-09 PROCEDURE — 3074F SYST BP LT 130 MM HG: CPT | Mod: CPTII,S$GLB,, | Performed by: NURSE PRACTITIONER

## 2019-07-09 PROCEDURE — 3074F PR MOST RECENT SYSTOLIC BLOOD PRESSURE < 130 MM HG: ICD-10-PCS | Mod: CPTII,S$GLB,, | Performed by: NURSE PRACTITIONER

## 2019-07-09 RX ORDER — SUMATRIPTAN SUCCINATE 100 MG/1
100 TABLET ORAL DAILY PRN
Qty: 9 TABLET | Refills: 0 | Status: SHIPPED | OUTPATIENT
Start: 2019-07-09 | End: 2019-08-06 | Stop reason: SDUPTHER

## 2019-07-09 RX ORDER — SUMATRIPTAN SUCCINATE 100 MG/1
TABLET ORAL
Refills: 2 | COMMUNITY
Start: 2019-06-03 | End: 2019-07-09 | Stop reason: SDUPTHER

## 2019-07-09 RX ORDER — SUMATRIPTAN SUCCINATE 100 MG/1
TABLET ORAL
Qty: 9 TABLET | Refills: 0 | Status: SHIPPED | OUTPATIENT
Start: 2019-07-09 | End: 2019-07-09 | Stop reason: SDUPTHER

## 2019-07-09 RX ORDER — SUMATRIPTAN SUCCINATE 100 MG/1
TABLET ORAL
Qty: 15 TABLET | Refills: 2 | OUTPATIENT
Start: 2019-07-09

## 2019-07-09 NOTE — PROGRESS NOTES
Subjective:       Chief Complaint  Chief Complaint   Patient presents with    Medication Refill       HPI  Scott Bansal is a 58 y.o. male with multiple medical diagnoses as listed in the medical history and problem list that presents for sumatriptan refill. Would like to continue sumatriptan and discontinue rizatriptan, finds that it works better. He describes his headaches as a pounding sensation, he has them frequently, every 4 days. It is not the worst headache of his life. He has previously been evaluated by Neurology but not in recent history (last visit 10/2016).       Patient Care Team:  Kaylie Chau MD as PCP - General (Family Medicine)  Warren Rondon OD as Consulting Physician (Optometry)  Jessica Black LPN as Licensed Practical Nurse      PAST MEDICAL HISTORY:  Past Medical History:   Diagnosis Date    Bipolar 1 disorder, mixed     BPH (benign prostatic hypertrophy)     Cyst, eyelid     Dr. Broussard    Diabetes mellitus     GERD (gastroesophageal reflux disease)     Hyperlipidemia     Hypertension     Lumbar degenerative disc disease 3/7/2019    Migraine headache     Obesity        PAST SURGICAL HISTORY:  Past Surgical History:   Procedure Laterality Date    CERVICAL SPINE SURGERY      COLONOSCOPY N/A 7/27/2017    [facility]    EYE SURGERY Left 03/2017    cyst removal on eyelid; Dr. Broussard    SHOULDER SURGERY      TONSILLECTOMY         SOCIAL HISTORY:  Social History     Socioeconomic History    Marital status:      Spouse name: Not on file    Number of children: Not on file    Years of education: Not on file    Highest education level: Not on file   Occupational History    Not on file   Social Needs    Financial resource strain: Not on file    Food insecurity:     Worry: Not on file     Inability: Not on file    Transportation needs:     Medical: Not on file     Non-medical: Not on file   Tobacco Use     Smoking status: Former Smoker     Packs/day: 2.00     Years: 15.00     Pack years: 30.00     Types: Cigarettes     Last attempt to quit: 1984     Years since quittin.1    Smokeless tobacco: Never Used    Tobacco comment: Patient quit smoking at the age of 24 yo.   Substance and Sexual Activity    Alcohol use: No    Drug use: No    Sexual activity: Yes     Partners: Female   Lifestyle    Physical activity:     Days per week: Not on file     Minutes per session: Not on file    Stress: Not on file   Relationships    Social connections:     Talks on phone: Not on file     Gets together: Not on file     Attends [employer] service: Not on file     Active member of club or organization: Not on file     Attends meetings of clubs or organizations: Not on file     Relationship status: Not on file   Other Topics Concern    Not on file   Social History Narrative    Not on file       FAMILY HISTORY:  Family History   Problem Relation Age of Onset    Cataracts Mother     Cancer Mother         throat    Hypertension Mother     Hypertension Father     Stroke Father         2 strokes    Hypertension Sister     Cancer Brother         spinal, kidney, spinal fluid    Hypertension Brother     Cancer Cousin         breast?       ALLERGIES AND MEDICATIONS: updated and reviewed.  Review of patient's allergies indicates:   Allergen Reactions    Sulfa (sulfonamide antibiotics) Rash     Current Outpatient Medications   Medication Sig Dispense Refill    amLODIPine (NORVASC) 10 MG tablet TAKE 1 TABLET (10 MG TOTAL) BY MOUTH ONCE DAILY. 90 tablet 0    BREO ELLIPTA 100-25 mcg/dose diskus inhaler INHALE 1 PUFF INTO THE LUNGS ONCE DAILY. CONTROLLER 60 each 3    buPROPion (WELLBUTRIN XL) 300 MG 24 hr tablet Take 300 mg by mouth every morning.  2    busPIRone (BUSPAR) 10 MG tablet Take 10 mg by mouth 3 (three) times daily.      cyclobenzaprine (FLEXERIL) 10 MG tablet TAKE 1 TABLET BY MOUTH THREE TIMES A DAY AS NEEDED  90 tablet 0    diclofenac sodium (VOLTAREN) 1 % Gel Apply 2 g topically once daily. 100 g 0    fluticasone (FLONASE) 50 mcg/actuation nasal spray TAKE 1 SPRAY BY EACH NARE ROUTE ONCE DAILY. 16 g 5    fluticasone furoate-vilanterol (BREO) 100-25 mcg/dose diskus inhaler Inhale 1 puff into the lungs once daily. Controller 30 each 0    folic acid (FOLVITE) 800 MCG Tab Take 800 mcg by mouth once daily.      furosemide (LASIX) 40 MG tablet TAKE 1 TABLET BY MOUTH EVERY DAY 30 tablet 0    gabapentin (NEURONTIN) 300 MG capsule Take 1 capsule (300 mg total) by mouth 2 (two) times daily. 180 capsule 3    gemfibrozil (LOPID) 600 MG tablet TAKE 1 TABLET (600 MG TOTAL) BY MOUTH 2 (TWO) TIMES DAILY BEFORE MEALS. 120 tablet 0    irbesartan (AVAPRO) 300 MG tablet TAKE 1 TABLET (300 MG TOTAL) BY MOUTH EVERY EVENING. 90 tablet 3    metFORMIN (GLUCOPHAGE) 1000 MG tablet TAKE 1 TABLET BY MOUTH TWICE A  tablet 0    pantoprazole (PROTONIX) 40 MG tablet TAKE 1 TABLET BY MOUTH DAILY 90 tablet 3    PROAIR HFA 90 mcg/actuation inhaler INHALE 2 PUFFS EBERY 4 HOURS AS NEEDED 18 g 6    quetiapine (SEROQUEL) 300 MG Tab Take by mouth.      simvastatin (ZOCOR) 80 MG tablet TAKE 1 TABLET BY MOUTH EVERY DAY 90 tablet 0    sumatriptan (IMITREX) 100 MG tablet PLEASE SEE ATTACHED FOR DETAILED DIRECTIONS 9 tablet 0    tamsulosin (FLOMAX) 0.4 mg Cap TAKE 1 CAPSULE BY MOUTH EVERY DAY 30 capsule 0    zonisamide (ZONEGRAN) 50 MG Cap TAKE 1 CAPSULE BY MOUTH TWICE A DAY 60 capsule 0     Current Facility-Administered Medications   Medication Dose Route Frequency Provider Last Rate Last Dose    silver nitrate applicators applicator 1 applicator  1 applicator Topical (Top) 1 time in Clinic/[facility]OD [facility]  Review of Systems   Constitutional: Negative for chills, diaphoresis and fever.   HENT: Negative for congestion and rhinorrhea.    Respiratory: Negative for cough and shortness of breath.    Cardiovascular:  "Negative for chest pain.   Gastrointestinal: Negative for abdominal pain, constipation, diarrhea and nausea.   Genitourinary: Negative for difficulty urinating, dysuria and enuresis.   Musculoskeletal: Negative for arthralgias and joint swelling.   Skin: Negative for rash and wound.   Neurological: Positive for headaches. Negative for dizziness.   Psychiatric/Behavioral: Negative for dysphoric mood. The patient is not nervous/anxious.          Objective:       Physical Exam  Vitals:    07/09/19 1501   BP: 119/70   BP Location: Right arm   Patient Position: Sitting   BP Method: Medium (Manual)   Pulse: 94   Temp: 97.9 °F (36.6 °C)   TempSrc: Oral   SpO2: 98%   Weight: 104.1 kg (229 lb 6.2 oz)   Height: 5' 9" (1.753 m)    Body mass index is 33.87 kg/m².  Weight: 104.1 kg (229 lb 6.2 oz)   Height: 5' 9" (175.3 cm)   Physical Exam   Constitutional: He is oriented to person, place, and time. He appears well-developed and well-nourished.   HENT:   Head: Normocephalic and atraumatic.   Eyes: Conjunctivae and EOM are normal.   Neck: Normal range of motion. Neck supple.   Cardiovascular: Normal rate.   Pulmonary/Chest: Effort normal.   Musculoskeletal: He exhibits no edema.   Lymphadenopathy:     He has no cervical adenopathy.   Neurological: He is alert and oriented to person, place, and time. No cranial nerve deficit.   Skin: Skin is warm and dry. No rash noted.   Psychiatric: His speech is rapid and/or pressured. He is aggressive.   Vitals reviewed.    Assessment:       1. Intractable chronic migraine without aura and with status migrainosus    2. Essential hypertension      Plan:     Scott was seen today for medication refill.    Diagnoses and all orders for this visit:    Intractable chronic migraine without aura and with status migrainosus  -     sumatriptan (IMITREX) 100 MG tablet; PLEASE SEE ATTACHED FOR DETAILED DIRECTIONS  -     Ambulatory referral to Neurology - Dr. Delf[facility]    Essential " hypertension        -    BP controlled presently - reviewed anti-hypertensive regimen - continue current therapy      Health Maintenance       Date Due Completion Date    Shingles Vaccine (1 of 2) 11/26/2009 ---    Influenza Vaccine 08/01/2019 11/1/2017 (Declined)    Override on 11/1/2017: Declined    Override on 12/5/2016: Declined    Eye Exam 08/23/2019 8/23/2018    Hemoglobin A1c 10/04/2019 4/4/2019    Foot Exam 10/08/2019 10/8/2018    Lipid Panel 03/12/2020 3/12/2019    Urine Microalbumin 05/20/2020 5/20/2019    High Dose Statin 06/03/2020 6/3/2019    Colonoscopy 07/27/2020 7/27/2017    TETANUS VACCINE 07/07/2027 7/7/2017            Health Maintenance reviewed, addressed as per orders    Follow up in about 1 month (around 8/9/2019), or if symptoms worsen or fail to improve.    The patient expressed understanding and no barriers to adherence were identified.     1. The patient indicates understanding of these issues and agrees with the plan. Brief care plan is updated and reviewed with the patient as applicable.     2. The patient is given an After Visit Summary that lists all medications with directions, allergies, orders placed during this encounter and follow-up instructions.     3. I have reviewed the patient's medical information including past medical, family, and social history sections including the medications and allergies.     4. We discussed the patient's current medications. I reconciled the patient's medication list and prepared and supplied needed refills.       Shahrzad Hussein, DNP, APRN, FNP-c  Family Medicine    My collaborating physicians are Dr. Elieser Benjamin and Dr. Alexander Gruber

## 2019-07-09 NOTE — TELEPHONE ENCOUNTER
----- Message from Chani Birmingham sent at 7/9/2019  3:50 PM CDT -----  Contact: Patient Ph [phone]  Type: Patient Call Back    Who called: Patient    What is the request in detail: Was in earlier and given a Rx for sumatriptan (IMITREX) 100 MG tablet. States the Rx didn't have the directions on it and he couldn't get it filled. Please call  .  [facility]/pharm[facility] #[id number] - [place] [address].  [address]  [place] [address]  Phone: [phone] Fax: [phone]    Would the patient rather a call back or a response via My [facility]? Call back    Best call back number: [phone]

## 2019-07-09 NOTE — PATIENT INSTRUCTIONS
Consider an evaluation by the Headache Clinic for persistent headaches         Preventing Migraine Headaches: Medicines and Lifestyle Changes     Going to bed and getting up at the same time each day, including weekends, may help prevent migraines.     A migraine is a type of severe headache. Having a migraine can be very painful. But there are steps you can take to help prevent migraines.  Medicines to help prevent migraines  · Your healthcare provider may prescribe certain medicines to help prevent migraines. These medicines may need to be taken daily. Or they may only need to be taken at times when youre likely to have a migraine.  · Common medicines used to help prevent migraines include:  ¨ Triptans (serotonin receptor agonists)  ¨ Nonsteroidal anti-inflammatory drugs (available over-the-counter)  ¨ Beta-blockers  ¨ Anticonvulsants  ¨ Tricyclic antidepressants  ¨ Calcium channel blockers  ¨ Certain vitamins, minerals, and plant extracts  ¨ Botulinum toxin injection (Botox) for certain chronic migraines   ¨ CGRP (calcitonin gene-related peptide) agnonists are being reviewed by the Food and Drug Administration (FDA)  Lifestyle changes for long-term prevention  Here are some suggestions:  · Exercise. Regular exercise can help prevent migraines and improve your health. (If exercise triggers your migraines, talk to your healthcare provider.)  · Keep regular habits. Dont skip or delay meals. Drink plenty of water. And go to bed and get up at about the same time each day. This includes weekends.  · Try alternative treatments. These are treatments that do not involve the use of medicines or surgery. They may help relieve symptoms and prevent migraines. Some treatment options include biofeedback and acupuncture. Ask your healthcare provider to tell you more about these treatments if you have questions.  · Limit caffeine. You may find that caffeine helps relieve pain during an attack. But too much caffeine can also  trigger migraines. So, limit the amount of caffeine you consume.  Date Last Reviewed: 10/11/2015  © 1158-9420 [employer]. [address] [place] [address], [place] [address]. All rights reserved. This information is not intended as a substitute for professional medical care. Always follow your healthcare professional's instructions.

## 2019-07-09 NOTE — TELEPHONE ENCOUNTER
Pt also says rizatriptan (MAXALT) 10 MG tablet does not work for him it's not getting rid of the headaches not strong enough and would like to go back to the immitrex.    Pt says he feels like his migraines are getting a lot worse he is not sure if its because he is getting older or not but know the Rx is not helping.     Please advise    Thanks,  Heidi

## 2019-07-09 NOTE — TELEPHONE ENCOUNTER
----- Message from Rajinder Chang sent at 7/9/2019 10:10 AM CDT -----  Contact: Self  Type: RX Refill Request    Who Called: self    Refill or New Rx: refill    RX Name and Strength: Sumatriptian 100 mg       Preferred Pharmacy with phone number: [facility]/pharmacy #[id number] - [place] [facility]. [phone] (Phone)  [phone] (Fax)        Local or Mail Order: Local    Ordering Provider: Dr. Romo    Would the patient rather a call back or a response via My [facility]? call    Best Call Back Number: [phone]    Additional Information: rizatriptan (MAXALT) 10 MG tablet  Does not work for the patient please go back to the immitrex

## 2019-07-10 DIAGNOSIS — I10 ESSENTIAL HYPERTENSION: ICD-10-CM

## 2019-07-10 RX ORDER — IRBESARTAN 300 MG/1
300 TABLET ORAL NIGHTLY
Qty: 90 TABLET | Refills: 1 | Status: SHIPPED | OUTPATIENT
Start: 2019-07-10 | End: 2019-08-01 | Stop reason: SDUPTHER

## 2019-07-11 DIAGNOSIS — G44.229 CHRONIC TENSION-TYPE HEADACHE, NOT INTRACTABLE: ICD-10-CM

## 2019-07-11 DIAGNOSIS — E78.5 HYPERLIPIDEMIA, UNSPECIFIED HYPERLIPIDEMIA TYPE: ICD-10-CM

## 2019-07-11 RX ORDER — ZONISAMIDE 50 MG/1
CAPSULE ORAL
Qty: 60 CAPSULE | Refills: 0 | Status: SHIPPED | OUTPATIENT
Start: 2019-07-11 | End: 2019-08-04 | Stop reason: SDUPTHER

## 2019-07-11 RX ORDER — GEMFIBROZIL 600 MG/1
TABLET, FILM COATED ORAL
Qty: 120 TABLET | Refills: 0 | Status: SHIPPED | OUTPATIENT
Start: 2019-07-11 | End: 2019-09-06 | Stop reason: SDUPTHER

## 2019-07-18 ENCOUNTER — TELEPHONE (OUTPATIENT)
Dept: FAMILY MEDICINE | Facility: CLINIC | Age: 59
End: 2019-07-18

## 2019-07-18 NOTE — TELEPHONE ENCOUNTER
Left message for patient to call Dr. Stevenson's office to schedule his Neurology appointment. I was unable to pull up any available appointments with Dr. Stevenson, so I sent a message to his staff. Thanks.

## 2019-07-19 ENCOUNTER — OFFICE VISIT (OUTPATIENT)
Dept: FAMILY MEDICINE | Facility: CLINIC | Age: 59
End: 2019-07-19
Payer: MEDICARE

## 2019-07-19 VITALS
HEART RATE: 94 BPM | TEMPERATURE: 98 F | OXYGEN SATURATION: 98 % | HEIGHT: 69 IN | DIASTOLIC BLOOD PRESSURE: 80 MMHG | BODY MASS INDEX: 36.44 KG/M2 | SYSTOLIC BLOOD PRESSURE: 118 MMHG | WEIGHT: 246.06 LBS

## 2019-07-19 DIAGNOSIS — L60.0 INGROWN RIGHT BIG TOENAIL: ICD-10-CM

## 2019-07-19 DIAGNOSIS — L02.01 ACUTE ABSCESS OF FACE: Primary | ICD-10-CM

## 2019-07-19 PROCEDURE — 3079F PR MOST RECENT DIASTOLIC BLOOD PRESSURE 80-89 MM HG: ICD-10-PCS | Mod: CPTII,S$GLB,, | Performed by: INTERNAL MEDICINE

## 2019-07-19 PROCEDURE — 3008F PR BODY MASS INDEX (BMI) DOCUMENTED: ICD-10-PCS | Mod: CPTII,S$GLB,, | Performed by: INTERNAL MEDICINE

## 2019-07-19 PROCEDURE — 3074F PR MOST RECENT SYSTOLIC BLOOD PRESSURE < 130 MM HG: ICD-10-PCS | Mod: CPTII,S$GLB,, | Performed by: INTERNAL MEDICINE

## 2019-07-19 PROCEDURE — 99214 PR OFFICE/OUTPT VISIT, EST, LEVL IV, 30-39 MIN: ICD-10-PCS | Mod: S$GLB,,, | Performed by: INTERNAL MEDICINE

## 2019-07-19 PROCEDURE — 3079F DIAST BP 80-89 MM HG: CPT | Mod: CPTII,S$GLB,, | Performed by: INTERNAL MEDICINE

## 2019-07-19 PROCEDURE — 99999 PR PBB SHADOW E&M-EST. PATIENT-LVL III: ICD-10-PCS | Mod: PBBFAC,,, | Performed by: INTERNAL MEDICINE

## 2019-07-19 PROCEDURE — 3074F SYST BP LT 130 MM HG: CPT | Mod: CPTII,S$GLB,, | Performed by: INTERNAL MEDICINE

## 2019-07-19 PROCEDURE — 99999 PR PBB SHADOW E&M-EST. PATIENT-LVL III: CPT | Mod: PBBFAC,,, | Performed by: INTERNAL MEDICINE

## 2019-07-19 PROCEDURE — 3008F BODY MASS INDEX DOCD: CPT | Mod: CPTII,S$GLB,, | Performed by: INTERNAL MEDICINE

## 2019-07-19 PROCEDURE — 99214 OFFICE O/P EST MOD 30 MIN: CPT | Mod: S$GLB,,, | Performed by: INTERNAL MEDICINE

## 2019-07-19 RX ORDER — DOXYCYCLINE 100 MG/1
100 CAPSULE ORAL EVERY 12 HOURS
Qty: 20 CAPSULE | Refills: 0 | Status: SHIPPED | OUTPATIENT
Start: 2019-07-19 | End: 2019-07-29

## 2019-07-19 NOTE — PROGRESS NOTES
Assessment & Plan (all problems are new to me)  Problem List Items Addressed This Visit     None      Visit Diagnoses     Acute abscess of face    -  Primary  -    Start doxycycline. I have discussed the common side effects of this medication and black box warnings (if applicable to this medication) with the patient and answered all of the questions they had at the time of this visit regarding this medication. e    Relevant Medications    doxycycline (VIBRAMYCIN) 100 MG Cap    Ingrown right big toenail    -  Counseled on local wound care.  Discussed that doxy will likely help this as well.             Health Maintenance reviewed, deferred shingles vaccine today.    Follow-up: Follow up if symptoms worsen or fail to improve.    _____________________________________________________________________    Chief Complaint  Chief Complaint   Patient presents with    Facial Swelling    Ingrown Toenail       HPI  Scott Bansal is a 58 y.o. male with multiple medical diagnoses as listed in the medical history and problem list that presents for facial swelling for 4 days and ingrown toenail for a week.  Pt is new to me but is known to this clinic with his last appointment being 7/9/2019.      He reports that he squeezed a pimple on his face 3-4 days ago.  Nothing expressed but face became swollen red and painful.  No F/C/NS.      Has chronic issues with ingrown toenail on the right big toe.  Some pus draining for about a week.  He is cleaning it with hydrogen peroxide.        PAST MEDICAL HISTORY:  Past Medical History:   Diagnosis Date    Bipolar 1 disorder, mixed     BPH (benign prostatic hypertrophy)     Cyst, eyelid     Dr. Broussard    Diabetes mellitus     GERD (gastroesophageal reflux disease)     Hyperlipidemia     Hypertension     Lumbar degenerative disc disease 3/7/2019    Migraine headache     Obesity        PAST SURGICAL HISTORY:  Past Surgical History:   Procedure Laterality Date    CERVICAL SPINE  SURGERY      COLONOSCOPY N/A 2017    [facility]    EYE SURGERY Left 2017    cyst removal on eyelid; Dr. Broussard    SHOULDER SURGERY      TONSILLECTOMY         SOCIAL HISTORY:  Social History     Socioeconomic History    Marital status:      Spouse name: Not on file    Number of children: Not on file    Years of education: Not on file    Highest education level: Not on file   Occupational History    Not on file   Social Needs    Financial resource strain: Not on file    Food insecurity:     Worry: Not on file     Inability: Not on file    Transportation needs:     Medical: Not on file     Non-medical: Not on file   Tobacco Use    Smoking status: Former Smoker     Packs/day: 2.00     Years: 15.00     Pack years: 30.00     Types: Cigarettes     Last attempt to quit: 1984     Years since quittin.2    Smokeless tobacco: Never Used    Tobacco comment: Patient quit smoking at the age of 24 yo.   Substance and Sexual Activity    Alcohol use: No    Drug use: No    Sexual activity: Yes     Partners: Female   Lifestyle    Physical activity:     Days per week: Not on file     Minutes per session: Not on file    Stress: Not on file   Relationships    Social connections:     Talks on phone: Not on file     Gets together: Not on file     Attends [employer] service: Not on file     Active member of club or organization: Not on file     Attends meetings of clubs or organizations: Not on file     Relationship status: Not on file   Other Topics Concern    Not on file   Social History Narrative    Not on file       FAMILY HISTORY:  Family History   Problem Relation Age of Onset    Cataracts Mother     Cancer Mother         throat    Hypertension Mother     Hypertension Father     Stroke Father         2 strokes    Hypertension Sister     Cancer Brother         spinal, kidney, spinal fluid    Hypertension Brother     Cancer Cousin          breast?       ALLERGIES AND MEDICATIONS: updated and reviewed.  Review of patient's allergies indicates:   Allergen Reactions    Sulfa (sulfonamide antibiotics) Rash     Current Outpatient Medications   Medication Sig Dispense Refill    amLODIPine (NORVASC) 10 MG tablet TAKE 1 TABLET (10 MG TOTAL) BY MOUTH ONCE DAILY. 90 tablet 0    BREO ELLIPTA 100-25 mcg/dose diskus inhaler INHALE 1 PUFF INTO THE LUNGS ONCE DAILY. CONTROLLER 60 each 3    buPROPion (WELLBUTRIN XL) 300 MG 24 hr tablet Take 300 mg by mouth every morning.  2    busPIRone (BUSPAR) 10 MG tablet Take 10 mg by mouth 3 (three) times daily.      cyclobenzaprine (FLEXERIL) 10 MG tablet TAKE 1 TABLET BY MOUTH THREE TIMES A DAY AS NEEDED 90 tablet 0    diclofenac sodium (VOLTAREN) 1 % Gel Apply 2 g topically once daily. 100 g 0    fluticasone (FLONASE) 50 mcg/actuation nasal spray TAKE 1 SPRAY BY EACH NARE ROUTE ONCE DAILY. 16 g 5    fluticasone furoate-vilanterol (BREO) 100-25 mcg/dose diskus inhaler Inhale 1 puff into the lungs once daily. Controller 30 each 0    folic acid (FOLVITE) 800 MCG Tab Take 800 mcg by mouth once daily.      furosemide (LASIX) 40 MG tablet TAKE 1 TABLET BY MOUTH EVERY DAY 30 tablet 0    gabapentin (NEURONTIN) 300 MG capsule Take 1 capsule (300 mg total) by mouth 2 (two) times daily. 180 capsule 3    gemfibrozil (LOPID) 600 MG tablet TAKE 1 TABLET (600 MG TOTAL) BY MOUTH 2 (TWO) TIMES DAILY BEFORE MEALS. 120 tablet 0    irbesartan (AVAPRO) 300 MG tablet TAKE 1 TABLET (300 MG TOTAL) BY MOUTH EVERY EVENING. 90 tablet 1    metFORMIN (GLUCOPHAGE) 1000 MG tablet TAKE 1 TABLET BY MOUTH TWICE A  tablet 0    pantoprazole (PROTONIX) 40 MG tablet TAKE 1 TABLET BY MOUTH DAILY 90 tablet 3    PROAIR HFA 90 mcg/actuation inhaler INHALE 2 PUFFS EBERY 4 HOURS AS NEEDED 18 g 6    quetiapine (SEROQUEL) 300 MG Tab Take by mouth.      simvastatin (ZOCOR) 80 MG tablet TAKE 1 TABLET BY MOUTH EVERY DAY 90 tablet 0    sumatriptan  "(IMITREX) 100 MG tablet Take 1 tablet (100 mg total) by mouth daily as needed for Migraine. Repeat in 1-2 hours as needed. Do not exceed 200mg in 24 hours. 9 tablet 0    tamsulosin (FLOMAX) 0.4 mg Cap TAKE 1 CAPSULE BY MOUTH EVERY DAY 30 capsule 0    zonisamide (ZONEGRAN) 50 MG Cap TAKE 1 CAPSULE BY MOUTH TWICE A DAY 60 capsule 0    doxycycline (VIBRAMYCIN) 100 MG Cap Take 1 capsule (100 mg total) by mouth every 12 (twelve) hours. for 10 days 20 capsule 0     Current Facility-Administered Medications   Medication Dose Route Frequency Provider Last Rate Last Dose    silver nitrate applicators applicator 1 applicator  1 applicator Topical (Top) 1 time in Clinic/HOD [facility]  Review of Systems   Constitutional: Negative for chills, fever and unexpected weight change.   HENT: Negative for congestion, dental problem, ear pain, hearing loss, rhinorrhea, sore throat and trouble swallowing.    Respiratory: Negative for cough, chest tightness, shortness of breath and wheezing.    Cardiovascular: Negative for chest pain, palpitations and leg swelling.   Gastrointestinal: Negative for abdominal pain, constipation, diarrhea, nausea and vomiting.   Genitourinary: Negative for decreased urine volume, dysuria and hematuria.   Musculoskeletal: Negative for arthralgias and myalgias.   Skin: Positive for color change and wound. Negative for rash.   Neurological: Negative for dizziness, weakness, light-headedness and headaches.         Physical Exam  Vitals:    07/19/19 1004   BP: 118/80   Pulse: 94   Temp: 98.4 °F (36.9 °C)   SpO2: 98%   Weight: 111.6 kg (246 lb 0.5 oz)   Height: 5' 9" (1.753 m)    Body mass index is 36.33 kg/m².  Weight: 111.6 kg (246 lb 0.5 oz)   Height: 5' 9" (175.3 cm)   Physical Exam   Constitutional: He is oriented to person, place, and time. He appears well-developed and well-nourished. No distress.   Cardiovascular: Normal rate, regular rhythm and intact distal pulses. "   Pulmonary/Chest: Effort normal. No respiratory distress.   Neurological: He is alert and oriented to person, place, and time.   Symmetric facial movements palate elevated symmetrically tongue midline    Skin: Skin is warm, dry and intact. There is erythema.   ~2cm indurated area on the left cheek with TTP and warmth    Right medial great toe with erythema and discharge of the medial border for the toenail           Health Maintenance       Date Due Completion Date    Shingles Vaccine (1 of 2) 11/26/2009 ---    Influenza Vaccine 08/01/2019 11/1/2017 (Declined)    Override on 11/1/2017: Declined    Override on 12/5/2016: Declined    Eye Exam 08/23/2019 8/23/2018    Hemoglobin A1c 10/04/2019 4/4/2019    Foot Exam 10/08/2019 10/8/2018    Lipid Panel 03/12/2020 3/12/2019    High Dose Statin 07/09/2020 7/9/2019    Colonoscopy 07/27/2020 7/27/2017    TETANUS VACCINE 07/07/2027 7/7/2017

## 2019-07-24 DIAGNOSIS — I10 ESSENTIAL HYPERTENSION: ICD-10-CM

## 2019-07-24 RX ORDER — AMLODIPINE BESYLATE 10 MG/1
10 TABLET ORAL DAILY
Qty: 90 TABLET | Refills: 0 | Status: SHIPPED | OUTPATIENT
Start: 2019-07-24 | End: 2019-10-23 | Stop reason: SDUPTHER

## 2019-07-29 RX ORDER — TAMSULOSIN HYDROCHLORIDE 0.4 MG/1
CAPSULE ORAL
Qty: 30 CAPSULE | Refills: 0 | Status: SHIPPED | OUTPATIENT
Start: 2019-07-29 | End: 2019-08-24 | Stop reason: SDUPTHER

## 2019-07-29 RX ORDER — CYCLOBENZAPRINE HCL 10 MG
TABLET ORAL
Qty: 90 TABLET | Refills: 0 | Status: SHIPPED | OUTPATIENT
Start: 2019-07-29 | End: 2019-09-10 | Stop reason: SDUPTHER

## 2019-08-01 DIAGNOSIS — I10 ESSENTIAL HYPERTENSION: ICD-10-CM

## 2019-08-01 RX ORDER — IRBESARTAN 300 MG/1
300 TABLET ORAL NIGHTLY
Qty: 90 TABLET | Refills: 1 | Status: SHIPPED | OUTPATIENT
Start: 2019-08-01 | End: 2020-02-12

## 2019-08-01 NOTE — TELEPHONE ENCOUNTER
----- Message from Debra Barnes sent at 8/1/2019  3:29 PM CDT -----  Contact: pt  Type: RX Refill Request    Who Called: pt    Refill or New Rx:irbesartan (AVAPRO) 300 MG tablet     RX Name and Strength:    How is the patient currently taking it? (ex. 1XDay):    Is this a 30 day or 90 day RX:    Preferred Pharmacy with phone number:[facility] [address]    Local or Mail Order:    Ordering Provider:    Would the patient rather a call back or a response via My [facility]? Call back    Best Call Back Number:[phone]    Additional Information:

## 2019-08-01 NOTE — TELEPHONE ENCOUNTER
Spoke with patient and relayed the message that his medication was refilled he stated he would like to start taking the 150 mg if possible that he usus ally cuts the medication in half.

## 2019-08-02 DIAGNOSIS — I10 ESSENTIAL HYPERTENSION: ICD-10-CM

## 2019-08-02 RX ORDER — FUROSEMIDE 40 MG/1
TABLET ORAL
Qty: 30 TABLET | Refills: 0 | Status: SHIPPED | OUTPATIENT
Start: 2019-08-02 | End: 2019-08-28 | Stop reason: SDUPTHER

## 2019-08-02 NOTE — TELEPHONE ENCOUNTER
----- Message from Dominique Do sent at 8/2/2019  2:54 PM CDT -----  Contact: Scott [phone]  Type: RX Refill Request    Who Called: Scott    Refill or New Rx:refill     RX Name and Strengthdoxycycline (VIBRAMYCIN) 100 MG Cap:    Is this a 30 day or 90 day RX:30 day    Preferred Pharmacy with phone number:[facility]/PH[facility]Y #[id number] - [place], [place] - [address] [place] [address].    Would the patient rather a call back or a response via [facility]? Call back    Best Call Back Number:[phone]

## 2019-08-02 NOTE — TELEPHONE ENCOUNTER
----- Message from Dominique Do sent at 8/2/2019  2:54 PM CDT -----  Contact: Scott [phone]  Type: RX Refill Request    Who Called: Scott    Refill or New Rx:refill     RX Name and Strengthdoxycycline (VIBRAMYCIN) 100 MG Cap:    Is this a 30 day or 90 day RX:30 day    Preferred Pharmacy with phone number:[facility]/PHARMACY #[id number] - [place], [place] - [address] [place] [address].    Would the patient rather a call back or a response via [facility]? Call back    Best Call Back Number:[phone]

## 2019-08-04 DIAGNOSIS — G44.229 CHRONIC TENSION-TYPE HEADACHE, NOT INTRACTABLE: ICD-10-CM

## 2019-08-05 RX ORDER — ZONISAMIDE 50 MG/1
CAPSULE ORAL
Qty: 60 CAPSULE | Refills: 0 | Status: SHIPPED | OUTPATIENT
Start: 2019-08-05 | End: 2019-08-28 | Stop reason: SDUPTHER

## 2019-08-06 ENCOUNTER — HOSPITAL ENCOUNTER (EMERGENCY)
Facility: HOSPITAL | Age: 59
Discharge: HOME | End: 2019-08-06
Attending: EMERGENCY MEDICINE
Payer: MEDICARE

## 2019-08-06 VITALS
RESPIRATION RATE: 18 BRPM | DIASTOLIC BLOOD PRESSURE: 87 MMHG | TEMPERATURE: 98 F | HEIGHT: 69 IN | OXYGEN SATURATION: 96 % | SYSTOLIC BLOOD PRESSURE: 128 MMHG | WEIGHT: 244 LBS | BODY MASS INDEX: 36.14 KG/M2 | HEART RATE: 93 BPM

## 2019-08-06 DIAGNOSIS — G43.711 INTRACTABLE CHRONIC MIGRAINE WITHOUT AURA AND WITH STATUS MIGRAINOSUS: ICD-10-CM

## 2019-08-06 DIAGNOSIS — L60.0 INGROWN NAIL OF GREAT TOE OF RIGHT FOOT: Primary | ICD-10-CM

## 2019-08-06 DIAGNOSIS — L02.01 CUTANEOUS ABSCESS OF FACE: ICD-10-CM

## 2019-08-06 PROCEDURE — 11765 WEDGE EXCISION SKN NAIL FOLD: CPT | Mod: T5

## 2019-08-06 PROCEDURE — 99283 EMERGENCY DEPT VISIT LOW MDM: CPT | Mod: 25

## 2019-08-06 PROCEDURE — 25000003 PHARM REV CODE 250: Performed by: PHYSICIAN ASSISTANT

## 2019-08-06 RX ORDER — LIDOCAINE HYDROCHLORIDE 10 MG/ML
10 INJECTION INFILTRATION; PERINEURAL
Status: COMPLETED | OUTPATIENT
Start: 2019-08-06 | End: 2019-08-06

## 2019-08-06 RX ORDER — CLINDAMYCIN HYDROCHLORIDE 150 MG/1
450 CAPSULE ORAL EVERY 8 HOURS
Qty: 63 CAPSULE | Refills: 0 | Status: SHIPPED | OUTPATIENT
Start: 2019-08-06 | End: 2019-08-13

## 2019-08-06 RX ADMIN — BACITRACIN ZINC, NEOMYCIN SULFATE, POLYMYXIN B SULFATE 1 EACH: 3.5; 5000; 4 OINTMENT TOPICAL at 01:08

## 2019-08-06 RX ADMIN — LIDOCAINE HYDROCHLORIDE 10 ML: 10 INJECTION, SOLUTION INFILTRATION; PERINEURAL at 12:08

## 2019-08-06 NOTE — MEDICAL/APP STUDENT
"  History     Chief Complaint   Patient presents with    Abscess     Abscess to the left side of the face. Pt states he was given antibiotics for the abscess. He denies any drainage from the site     58-year-old male with past medical history of bipolar, HTN, GERD, DM type 2, HLD, and BPH presents to the ED today complaining of an abscess to the left cheek for 3 months and an ingrown toenail to the right great toe for 3 months. 3 months ago the sore on his face appeared as a pimple and while attempting to pop the head of the pimple the fluid appeared to come out then get sucked right back in. 3 weeks ago he went to his PCP where he was informed of the possibility of staph infection and was prescribed doxycycline for 10 days, which he reports completing. A few days after beginning the antibiotics he was able to squeeze out a yellow fluid. Now he says the area feels like there is a marble under the skin and he reports no discomfort or tenderness to touch. His right great toe began bothering him 3 months ago with redness and swelling for which he has attempted to cut the toenail back on his own. He has a history of ingrown toenails and last saw a podiatrist "years ago." The pain is described as sharm in nature and rated as a 9/10 with touch to the medial aspect of the nail. He denies any injury to the toe. He has been putting neosporin and peroxide on the area and notices warm soaks help ease the pain, whereas walking makes it worse. He denies fever, chills, N/V/D or chest pain.    The history is provided by the patient.       Past Medical History:   Diagnosis Date    Bipolar 1 disorder, mixed     BPH (benign prostatic hypertrophy)     Cyst, eyelid     Dr. Broussard    Diabetes mellitus     GERD (gastroesophageal reflux disease)     Hyperlipidemia     Hypertension     Lumbar degenerative disc disease 3/7/2019    Migraine headache     Obesity        Past Surgical History:   Procedure Laterality Date    " "CERVICAL SPINE SURGERY      COLONOSCOPY N/A 2017    [facility]    EYE SURGERY Left 2017    cyst removal on eyelid; Dr. Broussard    SHOULDER SURGERY      TONSILLECTOMY         Family History   Problem Relation Age of Onset    Cataracts Mother     Cancer Mother         throat    Hypertension Mother     Hypertension Father     Stroke Father         2 strokes    Hypertension Sister     Cancer Brother         spinal, kidney, spinal fluid    Hypertension Brother     Cancer Cousin         breast?       Social History     Tobacco Use    Smoking status: Former Smoker     Packs/day: 2.00     Years: 15.00     Pack years: 30.00     Types: Cigarettes     Last attempt to quit: 1984     Years since quittin.2    Smokeless tobacco: Never Used    Tobacco comment: Patient quit smoking at the age of 24 yo.   Substance Use Topics    Alcohol use: No    Drug use: No       Review of Systems   Constitutional: Negative for chills and fever.   HENT: Negative for rhinorrhea and sore throat.    Eyes: Negative for visual disturbance.   Respiratory: Negative for cough and shortness of breath.    Cardiovascular: Negative for chest pain and palpitations.   Gastrointestinal: Negative for abdominal pain, diarrhea, nausea and vomiting.   Genitourinary: Negative for dysuria.   Skin:        Positive for abscess.  Positive for ingrown toenail and redness.   Neurological: Positive for headaches.       Physical Exam   /77 (BP Location: Right arm, Patient Position: Sitting)   Pulse 102   Temp 98.3 °F (36.8 °C) (Oral)   Resp 15   Ht 5' 9" (1.753 m)   Wt 110.7 kg (244 lb)   SpO2 96%   BMI 36.03 kg/m²     Physical Exam    Constitutional: He appears well-developed and well-nourished. No distress.   HENT:   Head: Atraumatic.       Right Ear: Tympanic membrane normal.   Left Ear: Tympanic membrane normal.   Mouth/Throat: Oropharynx is clear and moist.   Eyes: EOM are normal. " Pupils are equal, round, and reactive to light.   Cardiovascular: Exam reveals no gallop and no friction rub.    No murmur heard.  Pulmonary/Chest: He has no wheezes. He has no rhonchi. He has no rales.   Abdominal: Soft. He exhibits no distension. There is no tenderness.   Neurological: He is alert.   Skin: Skin is warm. There is erythema.        Psychiatric: He has a normal mood and affect.         ED Course

## 2019-08-06 NOTE — ED PROVIDER NOTES
"Encounter Date: 8/6/2019       History     Chief Complaint   Patient presents with    Abscess     Abscess to the left side of the face. Pt states he was given antibiotics for the abscess. He denies any drainage from the site     58-year-old male with past medical history of bipolar, HTN, GERD, DM type 2, HLD, and BPH presents to the ED today complaining of an abscess to the left cheek for 3 months and an ingrown toenail to the right great toe for 3 months. 3 months ago the sore on his face appeared as a pimple and while attempting to pop the head of the pimple the fluid appeared to come out then get sucked right back in. 3 weeks ago he went to his PCP where he was informed of the possibility of staph infection and was prescribed doxycycline for 10 days, which he reports completing. A few days after beginning the antibiotics he was able to squeeze out a yellow fluid. Now he says the area feels like there is a marble under the skin and he reports no discomfort or tenderness to touch. His right great toe began bothering him 3 months ago with redness and swelling for which he has attempted to cut the toenail back on his own. He has a history of ingrown toenails and last saw a podiatrist "years ago." The pain is described as sharm in nature and rated as a 9/10 with touch to the medial aspect of the nail. He denies any injury to the toe. He has been putting neosporin and peroxide on the area and notices warm soaks help ease the pain, whereas walking makes it worse. He denies fever, chills, N/V/D or chest pain.        Review of patient's allergies indicates:   Allergen Reactions    Sulfa (sulfonamide antibiotics) Rash     Past Medical History:   Diagnosis Date    Bipolar 1 disorder, mixed     BPH (benign prostatic hypertrophy)     Cyst, eyelid     Dr. Broussard    Diabetes mellitus     GERD (gastroesophageal reflux disease)     Hyperlipidemia     Hypertension     Lumbar degenerative disc disease 3/7/2019    " Migraine headache     Obesity      Past Surgical History:   Procedure Laterality Date    CERVICAL SPINE SURGERY      COLONOSCOPY N/A 2017    [facility]    EYE SURGERY Left 2017    cyst removal on eyelid; Dr. Broussard    SHOULDER SURGERY      TONSILLECTOMY       Family History   Problem Relation Age of Onset    Cataracts Mother     Cancer Mother         throat    Hypertension Mother     Hypertension Father     Stroke Father         2 strokes    Hypertension Sister     Cancer Brother         spinal, kidney, spinal fluid    Hypertension Brother     Cancer Cousin         breast?     Social History     Tobacco Use    Smoking status: Former Smoker     Packs/day: 2.00     Years: 15.00     Pack years: 30.00     Types: Cigarettes     Last attempt to quit: 1984     Years since quittin.2    Smokeless tobacco: Never Used    Tobacco comment: Patient quit smoking at the age of 26 yo.   Substance Use Topics    Alcohol use: No    Drug use: No     Review of Systems   Constitutional: Negative for chills and fever.   HENT: Negative for congestion, rhinorrhea and sore throat.    Eyes: Negative for visual disturbance.   Respiratory: Negative for cough and shortness of breath.    Cardiovascular: Negative for chest pain.   Gastrointestinal: Negative for abdominal pain, diarrhea, nausea and vomiting.   Genitourinary: Negative for dysuria, frequency and hematuria.   Musculoskeletal: Negative for back pain.        (+) right great toe pain   Skin: Negative for rash.        (+) left facial abscess   Neurological: Negative for dizziness, weakness and headaches.       Physical Exam     Initial Vitals [19 1150]   BP Pulse Resp Temp SpO2   122/77 102 15 98.3 °F (36.8 °C) 96 %      MAP       --         Physical Exam    Nursing note and vitals reviewed.  Constitutional: He appears well-developed and well-nourished. No distress.   HENT:   Head: Normocephalic and  atraumatic. Head is without right periorbital erythema and without left periorbital erythema.   Nose: Nose normal.   Mouth/Throat: Oropharynx is clear and moist.   No facial swelling or erythema.   Eyes: EOM are normal. Pupils are equal, round, and reactive to light.   Neck: Normal range of motion. Neck supple.   Cardiovascular: Normal rate, regular rhythm and normal heart sounds. Exam reveals no gallop and no friction rub.    No murmur heard.  Pulmonary/Chest: Breath sounds normal. No respiratory distress. He has no wheezes. He has no rhonchi. He has no rales.   Abdominal: Soft. Bowel sounds are normal. He exhibits no mass. There is no tenderness. There is no rebound and no guarding.   Musculoskeletal: Normal range of motion.   Neurological: He is alert and oriented to person, place, and time. He has normal strength. No cranial nerve deficit or sensory deficit.   Skin: Skin is warm and dry. Capillary refill takes less than 2 seconds.   The medial aspect the right great toe is edematous erythematous and tender to palpation. There appears to be an ingrown toenail at the medial aspect of the right great toenail.  No active drainage. No open wounds.    There is a 1 cm x 1 cm area of induration to the right cheek without erythema or fluctuance.  No open wounds.   Psychiatric: He has a normal mood and affect.         ED Course   Procedures  Labs Reviewed - No data to display       Imaging Results    None          Medical Decision Making:   ED Management:  This is an evaluation of a 58 y.o. male who presents to the ED for abscess ingrown toenail.  Vital signs are stable.   Afebrile.  Patient is nontoxic appearing and in no acute distress. Patient is a 1 cm x 1 cm area of induration the left cheek without erythema or fluctuance.  No appreciable dental abscess.  No facial swelling. Uvula is midline. Airway is patent. There is swelling, erythema and tenderness to the medial aspect the right great toe.  There appears to be an  ingrown toenail at the medial aspect of the right great toe.  No active drainage. Patient is neurovascularly intact. Wedge excision was performed to the medial aspect of the right great toenail per the procedure note.  I do not believe incision drainage is required at this time to for facial abscess.  Patient instructed on warm compresses 10-15 minutes, 4-5 times a day to help increase wound drainage and decrease swelling. Will discharge patient home on clindamycin and encourage follow-up with Podiatry.  I considered, but at this time, do not suspect an extensive cellulitis, sepsis, or bacteremia to warrant admission.    Patient given return precautions and instructed to return to the emergency department for any new or worsening symptoms. Patient verbalized understanding and agreed with plan.                           Clinical Impression:       ICD-10-CM ICD-9-CM   1. Ingrown nail of great toe of right foot L60.0 703.0   2. Cutaneous abscess of face L02.01 682.0                                Walter Kenney PA-C  08/06/19 [id number]

## 2019-08-06 NOTE — DISCHARGE INSTRUCTIONS
Apply warm compresses to the abscess 4-5 times per day for 10-15 minutes.  Follow-up with the podiatrist provided as soon as possible.  Take antibiotics as prescribed.  Return to the ER for new or worsening symptoms.

## 2019-08-06 NOTE — ED TRIAGE NOTES
Pt. Reports he has been having an abscess to the left side of his face for 1 month. Pt. Reports he open area and it closed. Pt. Noted with redness to site. Pt. Reports area is hard to touch. Pt. Report he had an abscess to his right  Toe 7/19/19.

## 2019-08-07 RX ORDER — SUMATRIPTAN SUCCINATE 100 MG/1
TABLET ORAL
Qty: 9 TABLET | Refills: 0 | Status: SHIPPED | OUTPATIENT
Start: 2019-08-07 | End: 2019-08-13 | Stop reason: SDUPTHER

## 2019-08-13 DIAGNOSIS — G43.711 INTRACTABLE CHRONIC MIGRAINE WITHOUT AURA AND WITH STATUS MIGRAINOSUS: ICD-10-CM

## 2019-08-13 RX ORDER — SUMATRIPTAN SUCCINATE 100 MG/1
TABLET ORAL
Qty: 9 TABLET | Refills: 0 | OUTPATIENT
Start: 2019-08-13

## 2019-08-13 RX ORDER — SUMATRIPTAN SUCCINATE 100 MG/1
TABLET ORAL
Qty: 9 TABLET | Refills: 0 | Status: SHIPPED | OUTPATIENT
Start: 2019-08-13 | End: 2019-09-10 | Stop reason: SDUPTHER

## 2019-08-13 NOTE — TELEPHONE ENCOUNTER
----- Message from Rajinder Chang sent at 8/13/2019 11:36 AM CDT -----  Contact: Self  Type: RX Refill Request    Who Called: self    Refill or New Rx: refill  RX Name and Strength: sumatriptan (IMITREX) 100 MG tablet      Preferred Pharmacy with phone number: [facility]/pharmacy #[id number] - [place], [place] - [address] [facility] [address]. [phone] (Phone)  [phone] (Fax)      Local or Mail Order: local  Ordering Provider: HAZEL Haro    Would the patient rather a call back or a response via My [facility]? call  Best Call Back Number: [phone]

## 2019-08-24 RX ORDER — TAMSULOSIN HYDROCHLORIDE 0.4 MG/1
CAPSULE ORAL
Qty: 30 CAPSULE | Refills: 0 | Status: SHIPPED | OUTPATIENT
Start: 2019-08-24 | End: 2019-10-29 | Stop reason: SDUPTHER

## 2019-08-27 DIAGNOSIS — E78.5 HYPERLIPIDEMIA, UNSPECIFIED HYPERLIPIDEMIA TYPE: ICD-10-CM

## 2019-08-27 RX ORDER — SIMVASTATIN 80 MG/1
TABLET, FILM COATED ORAL
Qty: 90 TABLET | Refills: 0 | Status: SHIPPED | OUTPATIENT
Start: 2019-08-27 | End: 2019-11-19 | Stop reason: SDUPTHER

## 2019-08-28 DIAGNOSIS — I10 ESSENTIAL HYPERTENSION: ICD-10-CM

## 2019-08-28 DIAGNOSIS — G44.229 CHRONIC TENSION-TYPE HEADACHE, NOT INTRACTABLE: ICD-10-CM

## 2019-08-28 RX ORDER — FUROSEMIDE 40 MG/1
TABLET ORAL
Qty: 30 TABLET | Refills: 0 | Status: SHIPPED | OUTPATIENT
Start: 2019-08-28 | End: 2019-09-06 | Stop reason: SDUPTHER

## 2019-08-28 RX ORDER — ZONISAMIDE 50 MG/1
CAPSULE ORAL
Qty: 60 CAPSULE | Refills: 0 | Status: SHIPPED | OUTPATIENT
Start: 2019-08-28 | End: 2019-09-23 | Stop reason: SDUPTHER

## 2019-09-06 ENCOUNTER — OFFICE VISIT (OUTPATIENT)
Dept: FAMILY MEDICINE | Facility: CLINIC | Age: 59
End: 2019-09-06
Payer: MEDICARE

## 2019-09-06 VITALS
BODY MASS INDEX: 36.03 KG/M2 | DIASTOLIC BLOOD PRESSURE: 87 MMHG | WEIGHT: 243.25 LBS | HEART RATE: 89 BPM | HEIGHT: 69 IN | SYSTOLIC BLOOD PRESSURE: 129 MMHG | TEMPERATURE: 98 F | OXYGEN SATURATION: 96 %

## 2019-09-06 DIAGNOSIS — E11.9 TYPE 2 DIABETES MELLITUS WITHOUT COMPLICATION, WITHOUT LONG-TERM CURRENT USE OF INSULIN: ICD-10-CM

## 2019-09-06 DIAGNOSIS — I70.0 ATHEROSCLEROSIS OF AORTA: ICD-10-CM

## 2019-09-06 DIAGNOSIS — E11.42 DIABETIC POLYNEUROPATHY ASSOCIATED WITH TYPE 2 DIABETES MELLITUS: ICD-10-CM

## 2019-09-06 DIAGNOSIS — I10 ESSENTIAL HYPERTENSION: ICD-10-CM

## 2019-09-06 DIAGNOSIS — E66.01 SEVERE OBESITY (BMI 35.0-39.9) WITH COMORBIDITY: ICD-10-CM

## 2019-09-06 DIAGNOSIS — G44.229 CHRONIC TENSION-TYPE HEADACHE, NOT INTRACTABLE: ICD-10-CM

## 2019-09-06 DIAGNOSIS — L42 PITYRIASIS ROSEA: Primary | ICD-10-CM

## 2019-09-06 DIAGNOSIS — J44.1 CHRONIC OBSTRUCTIVE PULMONARY DISEASE WITH ACUTE EXACERBATION: ICD-10-CM

## 2019-09-06 DIAGNOSIS — L08.9 RECURRENT INFECTION OF SKIN: ICD-10-CM

## 2019-09-06 DIAGNOSIS — E78.5 HYPERLIPIDEMIA, UNSPECIFIED HYPERLIPIDEMIA TYPE: ICD-10-CM

## 2019-09-06 DIAGNOSIS — M51.36 LUMBAR DEGENERATIVE DISC DISEASE: ICD-10-CM

## 2019-09-06 PROCEDURE — 99499 UNLISTED E&M SERVICE: CPT | Mod: S$GLB,,, | Performed by: NURSE PRACTITIONER

## 2019-09-06 PROCEDURE — 99499 RISK ADDL DX/OHS AUDIT: ICD-10-PCS | Mod: S$GLB,,, | Performed by: NURSE PRACTITIONER

## 2019-09-06 PROCEDURE — 99214 PR OFFICE/OUTPT VISIT, EST, LEVL IV, 30-39 MIN: ICD-10-PCS | Mod: S$GLB,,, | Performed by: NURSE PRACTITIONER

## 2019-09-06 PROCEDURE — 3044F PR MOST RECENT HEMOGLOBIN A1C LEVEL <7.0%: ICD-10-PCS | Mod: CPTII,S$GLB,, | Performed by: NURSE PRACTITIONER

## 2019-09-06 PROCEDURE — 3074F PR MOST RECENT SYSTOLIC BLOOD PRESSURE < 130 MM HG: ICD-10-PCS | Mod: CPTII,S$GLB,, | Performed by: NURSE PRACTITIONER

## 2019-09-06 PROCEDURE — 3008F BODY MASS INDEX DOCD: CPT | Mod: CPTII,S$GLB,, | Performed by: NURSE PRACTITIONER

## 2019-09-06 PROCEDURE — 99999 PR PBB SHADOW E&M-EST. PATIENT-LVL V: CPT | Mod: PBBFAC,,, | Performed by: NURSE PRACTITIONER

## 2019-09-06 PROCEDURE — 99214 OFFICE O/P EST MOD 30 MIN: CPT | Mod: S$GLB,,, | Performed by: NURSE PRACTITIONER

## 2019-09-06 PROCEDURE — 99999 PR PBB SHADOW E&M-EST. PATIENT-LVL V: ICD-10-PCS | Mod: PBBFAC,,, | Performed by: NURSE PRACTITIONER

## 2019-09-06 PROCEDURE — 3074F SYST BP LT 130 MM HG: CPT | Mod: CPTII,S$GLB,, | Performed by: NURSE PRACTITIONER

## 2019-09-06 PROCEDURE — 3008F PR BODY MASS INDEX (BMI) DOCUMENTED: ICD-10-PCS | Mod: CPTII,S$GLB,, | Performed by: NURSE PRACTITIONER

## 2019-09-06 PROCEDURE — 3044F HG A1C LEVEL LT 7.0%: CPT | Mod: CPTII,S$GLB,, | Performed by: NURSE PRACTITIONER

## 2019-09-06 PROCEDURE — 3079F PR MOST RECENT DIASTOLIC BLOOD PRESSURE 80-89 MM HG: ICD-10-PCS | Mod: CPTII,S$GLB,, | Performed by: NURSE PRACTITIONER

## 2019-09-06 PROCEDURE — 3079F DIAST BP 80-89 MM HG: CPT | Mod: CPTII,S$GLB,, | Performed by: NURSE PRACTITIONER

## 2019-09-06 RX ORDER — FUROSEMIDE 40 MG/1
40 TABLET ORAL DAILY
Qty: 30 TABLET | Refills: 0 | Status: SHIPPED | OUTPATIENT
Start: 2019-09-06 | End: 2019-10-30 | Stop reason: SDUPTHER

## 2019-09-06 RX ORDER — TRIAMCINOLONE ACETONIDE 1 MG/G
CREAM TOPICAL 2 TIMES DAILY
Qty: 15 G | Refills: 3 | Status: SHIPPED | OUTPATIENT
Start: 2019-09-06 | End: 2019-09-26

## 2019-09-06 RX ORDER — GEMFIBROZIL 600 MG/1
TABLET, FILM COATED ORAL
Qty: 120 TABLET | Refills: 0 | Status: SHIPPED | OUTPATIENT
Start: 2019-09-06 | End: 2019-11-04 | Stop reason: SDUPTHER

## 2019-09-06 NOTE — TELEPHONE ENCOUNTER
----- Message from Debra Barnes sent at 9/6/2019  2:33 PM CDT -----  Contact: pt  Type: RX Refill Request    Who Called: pt    Refill or New Rx:furosemide (LASIX) 40 MG tablet     RX Name and Strength:    How is the patient currently taking it? (ex. 1XDay):    Is this a 30 day or 90 day RX:    Preferred Pharmacy with phone number:[facility]    Local or Mail Order:    Ordering Provider:    Would the patient rather a call back or a response via [facility]? Call back    Best Call Back Number:[phone]    Additional Information:

## 2019-09-06 NOTE — PATIENT INSTRUCTIONS
Use the topical steroid on the chest to help with the itching, the rash should resolve in about 3-4 months.  I would recommend bathing with an anti-bacterial soap such as Dial twice weekly given the recurrent skin infections.  You can use the Mupirocin on the boil on the right hip.    Pityriasis Rosea  This is a harmless non-contagious rash. The exact cause is unknown. It is not an allergic reaction, and does not seem to be caused by a viral or fungal infection. Although anyone can get it, it is most often seen in children and young adults ages 10 to 35.  Pityriasis usually resolves on its own without treatment in 2 weeks.  In some people it may take 6 to 8 weeks to clear up.   Home care  · For dry skin, use a moisturizing cream. For itchiness, use 1% hydrocortisone cream (no prescription needed) or calamine lotion 2 to 3 times a day.  · Exposure to natural sunlight helps some people. It may help fade the rash, but doesn't seem to help the itching. Don't overdo it in the sun, as your skin may be more sensitive than usual. You dont want to burn yourself. Artificial sun lamps, sun beds, and tanning salons are not recommended.  · This condition is not contagious. Your child may attend  or school while the rash is present.  Medicines  Talk with your healthcare provider before using any medicines. All medicines have side effects.  · Medicines will not get rid of the rash.   · Moisturizing skin creams may help.  · Antihistamines may help with itching, but they can make you sleepy.  · Topical steroids are sometimes used.  Follow-up care  Follow up with your healthcare provider, or as advised. Call your provider if the itching is not controlled by the above suggestions, or if the rash lasts longer than 3 months.  When to seek medical advice  Call your healthcare provider right away if any of these occur:  · You develop a rash and are pregnant  · Severe itching  · Signs of infection in the skin (increasing redness,  drainage of pus, yellow-brown scabs)  · Fever or other symptoms of a new illness  Date Last Reviewed: 8/1/2016  © 4866-5015 [employer]. [address] [place] [address], [place] [address]. All rights reserved. This information is not intended as a substitute for professional medical care. Always follow your healthcare professional's instructions.

## 2019-09-10 DIAGNOSIS — G43.711 INTRACTABLE CHRONIC MIGRAINE WITHOUT AURA AND WITH STATUS MIGRAINOSUS: ICD-10-CM

## 2019-09-10 DIAGNOSIS — E11.9 DIABETES MELLITUS WITHOUT COMPLICATION: ICD-10-CM

## 2019-09-10 RX ORDER — METFORMIN HYDROCHLORIDE 1000 MG/1
TABLET ORAL
Qty: 180 TABLET | Refills: 0 | Status: SHIPPED | OUTPATIENT
Start: 2019-09-10 | End: 2019-12-02 | Stop reason: SDUPTHER

## 2019-09-11 RX ORDER — CYCLOBENZAPRINE HCL 10 MG
TABLET ORAL
Qty: 90 TABLET | Refills: 0 | Status: SHIPPED | OUTPATIENT
Start: 2019-09-11 | End: 2019-10-08 | Stop reason: SDUPTHER

## 2019-09-11 RX ORDER — SUMATRIPTAN SUCCINATE 100 MG/1
TABLET ORAL
Qty: 12 TABLET | Refills: 1 | Status: SHIPPED | OUTPATIENT
Start: 2019-09-11 | End: 2019-10-08

## 2019-09-23 DIAGNOSIS — G44.229 CHRONIC TENSION-TYPE HEADACHE, NOT INTRACTABLE: ICD-10-CM

## 2019-09-23 RX ORDER — ZONISAMIDE 50 MG/1
CAPSULE ORAL
Qty: 60 CAPSULE | Refills: 0 | Status: SHIPPED | OUTPATIENT
Start: 2019-09-23 | End: 2019-09-30 | Stop reason: SDUPTHER

## 2019-09-25 DIAGNOSIS — J44.9 CHRONIC OBSTRUCTIVE PULMONARY DISEASE, UNSPECIFIED COPD TYPE: ICD-10-CM

## 2019-09-25 RX ORDER — ALBUTEROL SULFATE 90 UG/1
AEROSOL, METERED RESPIRATORY (INHALATION)
Qty: 18 INHALER | Refills: 6 | Status: SHIPPED | OUTPATIENT
Start: 2019-09-25 | End: 2020-08-25 | Stop reason: SDUPTHER

## 2019-09-26 ENCOUNTER — OFFICE VISIT (OUTPATIENT)
Dept: DERMATOLOGY | Facility: CLINIC | Age: 59
End: 2019-09-26
Payer: MEDICARE

## 2019-09-26 DIAGNOSIS — L30.9 HAND ECZEMA: ICD-10-CM

## 2019-09-26 DIAGNOSIS — L20.9 ATOPIC DERMATITIS, UNSPECIFIED TYPE: ICD-10-CM

## 2019-09-26 DIAGNOSIS — L98.9 DERMATOSIS: ICD-10-CM

## 2019-09-26 DIAGNOSIS — L08.9 SKIN INFECTION: Primary | ICD-10-CM

## 2019-09-26 PROCEDURE — 99202 OFFICE O/P NEW SF 15 MIN: CPT | Mod: S$GLB,,, | Performed by: DERMATOLOGY

## 2019-09-26 PROCEDURE — 99202 PR OFFICE/OUTPT VISIT, NEW, LEVL II, 15-29 MIN: ICD-10-PCS | Mod: S$GLB,,, | Performed by: DERMATOLOGY

## 2019-09-26 PROCEDURE — 99999 PR PBB SHADOW E&M-EST. PATIENT-LVL II: ICD-10-PCS | Mod: PBBFAC,,, | Performed by: DERMATOLOGY

## 2019-09-26 PROCEDURE — 99499 UNLISTED E&M SERVICE: CPT | Mod: S$GLB,,, | Performed by: DERMATOLOGY

## 2019-09-26 PROCEDURE — 99999 PR PBB SHADOW E&M-EST. PATIENT-LVL II: CPT | Mod: PBBFAC,,, | Performed by: DERMATOLOGY

## 2019-09-26 PROCEDURE — 99499 RISK ADDL DX/OHS AUDIT: ICD-10-PCS | Mod: S$GLB,,, | Performed by: DERMATOLOGY

## 2019-09-26 RX ORDER — LEVOCETIRIZINE DIHYDROCHLORIDE 5 MG/1
TABLET, FILM COATED ORAL
Qty: 30 TABLET | Refills: 11 | Status: SHIPPED | OUTPATIENT
Start: 2019-09-26 | End: 2020-09-01

## 2019-09-26 RX ORDER — TRIAMCINOLONE ACETONIDE 1 MG/G
CREAM TOPICAL
Qty: 454 G | Refills: 1 | Status: SHIPPED | OUTPATIENT
Start: 2019-09-26 | End: 2021-01-31

## 2019-09-26 NOTE — PROGRESS NOTES
History of Present Illness: Yes. The patient presents with chief complaint of  Infection (staph) Right Hip ( was on the left cheek)   Location: Right Hip   Duration: 1 month   Signs/Symptoms: discoloration, tender painful     Prior treatments: Tried squeezing, prescribed meds.     Referred by Primary Care Physician

## 2019-09-26 NOTE — PROGRESS NOTES
Subjective:       Patient ID:  Scott Bansal is a 58 y.o. male who presents for   Chief Complaint   Patient presents with    Spot     Right hip(possible staph) tender, discolored, 1 month duration        History of Present Illness: The patient presents with chief complaint of infection (staph)  Location: Right Hip and left cheek  Duration: 1 month   Signs/Symptoms: discoloration, tender painful     Prior treatments: doxy, PO clinda    He was seen in primary care and dx with pityriasis rosea x 4 months, prescribed TAC cream    For hand eczema, he has used diclofenac and betamethasone cream. He washes hands 8 times/day due to ill dog.         Review of Systems   Constitutional: Negative for fever and chills.   Gastrointestinal: Negative for nausea and vomiting.   Skin: Positive for itching and rash. Negative for daily sunscreen use, activity-related sunscreen use and recent sunburn.   Hematologic/Lymphatic: Does not bruise/bleed easily.        Objective:    Physical Exam   Constitutional: He appears well-developed and well-nourished. No distress.   Neurological: He is alert and oriented to person, place, and time. He is not disoriented.   Psychiatric: He has a normal mood and affect.   Skin:   Areas Examined (abnormalities noted in diagram):   Head / Face Inspection Performed  Neck Inspection Performed  Chest / Axilla Inspection Performed  Abdomen Inspection Performed  Back Inspection Performed  RUE Inspected  LUE Inspection Performed  Nails and Digits Inspection Performed                       Diagram Legend     Erythematous scaling macule/papule c/w actinic keratosis       Vascular papule c/w angioma      Pigmented verrucoid papule/plaque c/w seborrheic keratosis      Yellow umbilicated papule c/w sebaceous hyperplasia      Irregularly shaped tan macule c/w lentigo     1-2 mm smooth white papules consistent with Milia      Movable subcutaneous cyst with punctum c/w epidermal inclusion cyst      Subcutaneous  movable cyst c/w pilar cyst      Firm pink to brown papule c/w dermatofibroma      Pedunculated fleshy papule(s) c/w skin tag(s)      Evenly pigmented macule c/w junctional nevus     Mildly variegated pigmented, slightly irregular-bordered macule c/w mildly atypical nevus      Flesh colored to evenly pigmented papule c/w intradermal nevus       Pink pearly papule/plaque c/w basal cell carcinoma      Erythematous hyperkeratotic cursted plaque c/w SCC      Surgical scar with no sign of skin cancer recurrence      Open and closed comedones      Inflammatory papules and pustules      Verrucoid papule consistent consistent with wart     Erythematous eczematous patches and plaques     Dystrophic onycholytic nail with subungual debris c/w onychomycosis     Umbilicated papule    Erythematous-base heme-crusted tan verrucoid plaque consistent with inflamed seborrheic keratosis     Erythematous Silvery Scaling Plaque c/w Psoriasis     See annotation      Assessment / Plan:        Skin infection  Currently resolved, discussed hibiclens showers to prevent future infections. AVS given.    Hand eczema  Atopic dermatitis, unspecified type  -     crisaborole (EUCRISA) 2 % Oint; Use for hand eczema bid.  Dispense: 60 g; Refill: 5  -     Discussed diagnosis and relation to sensitive skin.  Will start eucrisa and continue betamethasone 0.05% ointment twice a day.  Recommend OTC Curel or vanicream cream after each hand washing throughout the day.  AVS given.     Dermatosis  -     triamcinolone acetonide 0.1% (KENALOG) 0.1 % cream; AAA bid prn for rash on chest and arms. Do not use on face, underarms or groin  Dispense: 454 g; Refill: 1  -     levocetirizine (XYZAL) 5 MG tablet; Take 1 tablet each morning.  Dispense: 30 tablet; Refill: 11  -     Rash of chest and arms, will start above meds.  If persist, consider biopsy.            Follow up in about 3 months (around 12/26/2019).

## 2019-09-26 NOTE — PATIENT INSTRUCTIONS
Start hibiclens (chlorahexidine) showers 2 times per week  Recommend dilute bleach baths 2 times per week and discussed protocol -- add 1/4 cup of bleach to lukewarm water and soak for 10 - 15 minutes.    To prevent further spread to other skin sites or to other people in your household, I recommend you: launder your bath towels in hot water after each use, you should not re-use bath towels, and that you wash your sheets once/week and be careful of sharing objects with others which could spread infection.     HAND ECZEMA INSTRUCTIONS    Use triamcinolone 0.01% ointment twice a day. Use with white cotton gloves at bedtime.   Use cetaphil intensive repair cream or vanicream after each hand washing  Avoid use of hand sanitizers   Use dove sensitive skin bar for hand washing

## 2019-09-30 DIAGNOSIS — G44.229 CHRONIC TENSION-TYPE HEADACHE, NOT INTRACTABLE: ICD-10-CM

## 2019-09-30 RX ORDER — ZONISAMIDE 50 MG/1
50 CAPSULE ORAL 2 TIMES DAILY
Qty: 60 CAPSULE | Refills: 0 | Status: SHIPPED | OUTPATIENT
Start: 2019-09-30 | End: 2019-11-20 | Stop reason: SDUPTHER

## 2019-09-30 NOTE — TELEPHONE ENCOUNTER
----- Message from Chani Birmingham sent at 9/30/2019 11:48 AM CDT -----  Contact: Patient ph [phone]  Type: RX Refill Request    Who Called: Patient    Have you contacted your pharmacy: think he tried this morning.    Refill or New Rx: Refill    RX Name and Strength: zonisamide (ZONEGRAN) 50 MG Cap    Is this a 30 day or 90 day RX: 90 day    Preferred Pharmacy with phone number: .  [facility]/pharmacy #[id number] - [place] - [address] [facility].  [address]  [place] [address]  Phone: [phone] Fax: [phone]    Local or Mail Order: Local    Would the patient rather a call back or a response via [facility]? Call back    Best Call Back Number: [phone]

## 2019-10-06 DIAGNOSIS — G43.711 INTRACTABLE CHRONIC MIGRAINE WITHOUT AURA AND WITH STATUS MIGRAINOSUS: ICD-10-CM

## 2019-10-07 RX ORDER — SUMATRIPTAN SUCCINATE 100 MG/1
TABLET ORAL
Qty: 9 TABLET | Refills: 0 | Status: SHIPPED | OUTPATIENT
Start: 2019-10-07 | End: 2019-10-08

## 2019-10-07 NOTE — TELEPHONE ENCOUNTER
----- Message from Shell Devries sent at 10/7/2019  4:10 PM CDT -----  Contact: [facility]  Type:  Pharmacy Calling to Clarify an RX    Name of Caller: Justin    [facility]    Prescription Name: Imitrex    What do they need to clarify? Clarify directions    Can you be contacted via [facility]?NO    Best Call Back Number: [phone]

## 2019-10-08 ENCOUNTER — TELEPHONE (OUTPATIENT)
Dept: FAMILY MEDICINE | Facility: CLINIC | Age: 59
End: 2019-10-08

## 2019-10-08 DIAGNOSIS — G43.711 INTRACTABLE CHRONIC MIGRAINE WITHOUT AURA AND WITH STATUS MIGRAINOSUS: ICD-10-CM

## 2019-10-08 RX ORDER — SUMATRIPTAN SUCCINATE 100 MG/1
TABLET ORAL
Qty: 12 TABLET | Refills: 1 | Status: SHIPPED | OUTPATIENT
Start: 2019-10-08 | End: 2019-11-27 | Stop reason: SDUPTHER

## 2019-10-08 RX ORDER — CYCLOBENZAPRINE HCL 10 MG
TABLET ORAL
Qty: 90 TABLET | Refills: 0 | Status: SHIPPED | OUTPATIENT
Start: 2019-10-08 | End: 2019-11-29 | Stop reason: SDUPTHER

## 2019-10-08 NOTE — TELEPHONE ENCOUNTER
----- Message from Felisa Chi sent at 10/8/2019 10:04 AM CDT -----  Contact: Self   Type: Patient Call Back    Who called:Self   What is the request in detail: [facility] says [facility] is saying directions on script are unclear. Asking for clarification. Please advise     Can the clinic reply by [facility]? Call     Would the patient rather a call back or a response via [facility]?  Call   Best call back number: [phone] (home)     Additional Information: sumatriptan (IMITREX) 100 MG tablet      [facility]/pharmacy #[id number] - [place] - [address] [facility].  [address] [facility] [address] [facility].  [place] [address]  Phone: [phone] Fax: [phone]

## 2019-10-13 DIAGNOSIS — L40.9 PSORIASIS: ICD-10-CM

## 2019-10-13 DIAGNOSIS — L30.1 DYSHIDROTIC ECZEMA: ICD-10-CM

## 2019-10-13 RX ORDER — BETAMETHASONE DIPROPIONATE 0.5 MG/G
CREAM TOPICAL DAILY
Qty: 45 G | Refills: 0 | Status: SHIPPED | OUTPATIENT
Start: 2019-10-13 | End: 2020-02-12

## 2019-10-23 DIAGNOSIS — I10 ESSENTIAL HYPERTENSION: ICD-10-CM

## 2019-10-23 DIAGNOSIS — G44.229 CHRONIC TENSION-TYPE HEADACHE, NOT INTRACTABLE: ICD-10-CM

## 2019-10-23 RX ORDER — ZONISAMIDE 50 MG/1
CAPSULE ORAL
Qty: 60 CAPSULE | Refills: 0 | OUTPATIENT
Start: 2019-10-23

## 2019-10-23 RX ORDER — AMLODIPINE BESYLATE 10 MG/1
10 TABLET ORAL DAILY
Qty: 90 TABLET | Refills: 0 | Status: SHIPPED | OUTPATIENT
Start: 2019-10-23 | End: 2019-12-30 | Stop reason: SDUPTHER

## 2019-10-25 DIAGNOSIS — E11.9 TYPE 2 DIABETES MELLITUS WITHOUT COMPLICATION: ICD-10-CM

## 2019-10-29 RX ORDER — TAMSULOSIN HYDROCHLORIDE 0.4 MG/1
CAPSULE ORAL
Qty: 30 CAPSULE | Refills: 0 | Status: SHIPPED | OUTPATIENT
Start: 2019-10-29 | End: 2019-11-23 | Stop reason: SDUPTHER

## 2019-10-30 DIAGNOSIS — I10 ESSENTIAL HYPERTENSION: ICD-10-CM

## 2019-10-30 RX ORDER — FUROSEMIDE 40 MG/1
TABLET ORAL
Qty: 30 TABLET | Refills: 0 | Status: SHIPPED | OUTPATIENT
Start: 2019-10-30 | End: 2019-11-22 | Stop reason: SDUPTHER

## 2019-11-01 ENCOUNTER — OFFICE VISIT (OUTPATIENT)
Dept: FAMILY MEDICINE | Facility: CLINIC | Age: 59
End: 2019-11-01
Payer: MEDICARE

## 2019-11-01 VITALS
BODY MASS INDEX: 36.05 KG/M2 | SYSTOLIC BLOOD PRESSURE: 130 MMHG | DIASTOLIC BLOOD PRESSURE: 82 MMHG | HEIGHT: 69 IN | OXYGEN SATURATION: 97 % | HEART RATE: 72 BPM | WEIGHT: 243.38 LBS | TEMPERATURE: 99 F

## 2019-11-01 DIAGNOSIS — E11.9 TYPE 2 DIABETES MELLITUS WITHOUT COMPLICATION, WITHOUT LONG-TERM CURRENT USE OF INSULIN: ICD-10-CM

## 2019-11-01 DIAGNOSIS — G44.229 CHRONIC TENSION-TYPE HEADACHE, NOT INTRACTABLE: ICD-10-CM

## 2019-11-01 DIAGNOSIS — E11.42 DIABETIC POLYNEUROPATHY ASSOCIATED WITH TYPE 2 DIABETES MELLITUS: ICD-10-CM

## 2019-11-01 DIAGNOSIS — K12.2 ABSCESS OF ORAL TISSUE: Primary | ICD-10-CM

## 2019-11-01 DIAGNOSIS — I10 ESSENTIAL HYPERTENSION: ICD-10-CM

## 2019-11-01 PROCEDURE — 3008F PR BODY MASS INDEX (BMI) DOCUMENTED: ICD-10-PCS | Mod: CPTII,S$GLB,, | Performed by: NURSE PRACTITIONER

## 2019-11-01 PROCEDURE — 99214 PR OFFICE/OUTPT VISIT, EST, LEVL IV, 30-39 MIN: ICD-10-PCS | Mod: S$GLB,,, | Performed by: NURSE PRACTITIONER

## 2019-11-01 PROCEDURE — 99499 RISK ADDL DX/OHS AUDIT: ICD-10-PCS | Mod: S$GLB,,, | Performed by: NURSE PRACTITIONER

## 2019-11-01 PROCEDURE — 3075F PR MOST RECENT SYSTOLIC BLOOD PRESS GE 130-139MM HG: ICD-10-PCS | Mod: CPTII,S$GLB,, | Performed by: NURSE PRACTITIONER

## 2019-11-01 PROCEDURE — 3075F SYST BP GE 130 - 139MM HG: CPT | Mod: CPTII,S$GLB,, | Performed by: NURSE PRACTITIONER

## 2019-11-01 PROCEDURE — 3079F PR MOST RECENT DIASTOLIC BLOOD PRESSURE 80-89 MM HG: ICD-10-PCS | Mod: CPTII,S$GLB,, | Performed by: NURSE PRACTITIONER

## 2019-11-01 PROCEDURE — 3079F DIAST BP 80-89 MM HG: CPT | Mod: CPTII,S$GLB,, | Performed by: NURSE PRACTITIONER

## 2019-11-01 PROCEDURE — 3008F BODY MASS INDEX DOCD: CPT | Mod: CPTII,S$GLB,, | Performed by: NURSE PRACTITIONER

## 2019-11-01 PROCEDURE — 3044F HG A1C LEVEL LT 7.0%: CPT | Mod: CPTII,S$GLB,, | Performed by: NURSE PRACTITIONER

## 2019-11-01 PROCEDURE — 99999 PR PBB SHADOW E&M-EST. PATIENT-LVL III: CPT | Mod: PBBFAC,,, | Performed by: NURSE PRACTITIONER

## 2019-11-01 PROCEDURE — 99214 OFFICE O/P EST MOD 30 MIN: CPT | Mod: S$GLB,,, | Performed by: NURSE PRACTITIONER

## 2019-11-01 PROCEDURE — 99499 UNLISTED E&M SERVICE: CPT | Mod: S$GLB,,, | Performed by: NURSE PRACTITIONER

## 2019-11-01 PROCEDURE — 3044F PR MOST RECENT HEMOGLOBIN A1C LEVEL <7.0%: ICD-10-PCS | Mod: CPTII,S$GLB,, | Performed by: NURSE PRACTITIONER

## 2019-11-01 PROCEDURE — 99999 PR PBB SHADOW E&M-EST. PATIENT-LVL III: ICD-10-PCS | Mod: PBBFAC,,, | Performed by: NURSE PRACTITIONER

## 2019-11-01 RX ORDER — IRBESARTAN 300 MG/1
300 TABLET ORAL NIGHTLY
Qty: 90 TABLET | Refills: 1 | Status: CANCELLED | OUTPATIENT
Start: 2019-11-01 | End: 2020-10-31

## 2019-11-01 RX ORDER — TRIAMCINOLONE ACETONIDE 1 MG/G
CREAM TOPICAL
Qty: 454 G | Refills: 1 | Status: CANCELLED | OUTPATIENT
Start: 2019-11-01

## 2019-11-01 RX ORDER — TOPIRAMATE 25 MG/1
25 TABLET ORAL 2 TIMES DAILY
COMMUNITY
End: 2019-11-01

## 2019-11-01 RX ORDER — GEMFIBROZIL 600 MG/1
TABLET, FILM COATED ORAL
Qty: 120 TABLET | Refills: 0 | Status: CANCELLED | OUTPATIENT
Start: 2019-11-01

## 2019-11-01 RX ORDER — TOPIRAMATE SPINKLE 25 MG/1
25 CAPSULE ORAL 2 TIMES DAILY
Qty: 60 CAPSULE | Refills: 0 | Status: SHIPPED | OUTPATIENT
Start: 2019-11-01 | End: 2020-06-04

## 2019-11-01 RX ORDER — DOXYCYCLINE 100 MG/1
100 CAPSULE ORAL EVERY 12 HOURS
Qty: 20 CAPSULE | Refills: 0 | Status: SHIPPED | OUTPATIENT
Start: 2019-11-01 | End: 2019-11-11

## 2019-11-01 RX ORDER — ZONISAMIDE 50 MG/1
50 CAPSULE ORAL 2 TIMES DAILY
Qty: 60 CAPSULE | Refills: 0 | Status: CANCELLED | OUTPATIENT
Start: 2019-11-01

## 2019-11-01 NOTE — PATIENT INSTRUCTIONS
"  Dental Abscess    An abscess is a pocket of pus at the tip of a tooth root in your jaw bone. It is caused by an infection at the root of the tooth. It can cause pain and swelling of the gum, cheek, or jaw. Pain may spread from the tooth to your ear or the area of your jaw on the same side. If the abscess isnt treated, it appears as a bubble or swelling on the gum near the tooth. The pressure that builds in this swelling is the source of the pain. More serious infections cause your face to swell.  An abscess can be caused by a crack in the tooth, a cavity, a gum infection, or a combination of these. Once the pulp of the tooth is exposed, bacteria can spread down the roots to the tip. If the bacteria are not stopped, they can damage the bone and soft tissue, and an abscess can form.  Home care  Follow these guidelines when caring for yourself at home:  · Avoid hot and cold foods and drinks. Your tooth may be sensitive to changes in temperature. Dont chew on the side of the infected tooth.  · If your tooth is chipped or cracked, or if there is a large open cavity, put oil of cloves directly on the tooth to relieve pain. You can buy oil of cloves at drugstores. Some pharmacies carry an over-the-counter "toothache kit." This contains a paste that you can put on the exposed tooth to make it less sensitive.  · Put a cold pack on your jaw over the sore area to help reduce pain.  · You may use over-the-counter medicine to ease pain, unless another medicine was prescribed. If you have chronic liver or kidney disease, talk with your healthcare provider before using acetaminophen or ibuprofen. Also talk with your provider if youve had a stomach ulcer or GI bleeding.  · An antibiotic will be prescribed. Take it until finished, even if you are feeling better after a few days.  Follow-up care  Follow up with your dentist or an oral surgeon, or as advised. Once an infection occurs in a tooth, it will continue to be a problem " until the infection is drained. This is done through surgery or a root canal. Or you may need to have your tooth pulled.  Call 911  Call 911 if any of these occur:  · Unusual drowsiness  · Headache or stiff neck  · Weakness or fainting  · Difficulty swallowing, breathing, or opening your mouth  · Swollen eyelids  When to seek medical advice  Call your healthcare provider right away if any of these occur:  · Your face becomes more swollen or red  · Pain gets worse or spreads to your neck  · Fever of 100.4º F (38.0º C) or higher, or as directed by your healthcare provider  · Pus drains from the tooth  Date Last Reviewed: 10/1/2016  © 6704-3867 [employer]. [address] [place] [address], [place] [address]. All rights reserved. This information is not intended as a substitute for professional medical care. Always follow your healthcare professional's instructions.

## 2019-11-01 NOTE — PROGRESS NOTES
Subjective:       Patient ID: Scott Bansal is a 58 y.o. male.    Chief Complaint: Abscess (right side of jaw)    Abscess   Chronicity:  New  Onset:  2 days ago  Progression Since Onset: unchanged  Location:  Mouth  Associated Symptoms: no fever, no chills, no sweats  Characteristics: painful and swelling    Characteristics: not draining, no itching, no redness, no dryness, no scaling, no peeling, no bruising and no blistering    Pain Scale:  8/10  Treatments Tried:  Nothing  Relieved by:  Nothing  Worsened by:  Nothing    Review of Systems   Constitutional: Negative for chills and fever.   HENT: Positive for facial swelling and mouth sores. Negative for congestion, postnasal drip, rhinorrhea, sinus pain, sneezing and sore throat.    Respiratory: Negative for cough, chest tightness, shortness of breath and wheezing.    Neurological: Positive for headaches (chronic). Negative for dizziness and weakness.   Hematological: Negative for adenopathy. Does not bruise/bleed easily.       Objective:      Physical Exam   Constitutional: He is oriented to person, place, and time. Vital signs are normal. He appears well-developed and well-nourished.   HENT:   Head: Normocephalic and atraumatic.   Right Ear: External ear normal.   Left Ear: External ear normal.   Nose: Nose normal.   Mouth/Throat: Uvula is midline, oropharynx is clear and moist and mucous membranes are normal. Oral lesions present. Dental abscesses present. No oropharyngeal exudate.       Cardiovascular: Normal rate, regular rhythm and normal heart sounds.   Pulmonary/Chest: Effort normal and breath sounds normal.   Abdominal: Soft. Bowel sounds are normal.   Neurological: He is alert and oriented to person, place, and time.   Skin: Skin is warm, dry and intact.   Psychiatric: He has a normal mood and affect.       Assessment:       1. Abscess of oral tissue    2. Essential hypertension    3. Chronic tension-type headache, not intractable    4. Type 2  diabetes mellitus without complication, without long-term current use of insulin    5. Diabetic polyneuropathy associated with type 2 diabetes mellitus        Plan:       Scott was seen today for abscess.    Diagnoses and all orders for this visit:    Abscess of oral tissue  -     doxycycline (VIBRAMYCIN) 100 MG Cap; Take 1 capsule (100 mg total) by mouth every 12 (twelve) hours. for 10 days  -     diphenhydrAMINE-aluminum-magnesium hydroxide-simethicone-lidocaine HCl 2%; Swish and spit 15 mLs every 4 (four) hours as needed.    Essential hypertension  The current medical regimen is effective;  continue present plan and medications.      Chronic tension-type headache, not intractable  -     topiramate 25 mg capsule; Take 1 capsule (25 mg total) by mouth 2 (two) times daily.    Type 2 diabetes mellitus without complication, without long-term current use of insulin  The current medical regimen is effective;  continue present plan and medications.    Diabetic polyneuropathy associated with type 2 diabetes mellitus  The current medical regimen is effective;  continue present plan and medications.

## 2019-11-04 DIAGNOSIS — E78.5 HYPERLIPIDEMIA, UNSPECIFIED HYPERLIPIDEMIA TYPE: ICD-10-CM

## 2019-11-05 RX ORDER — GEMFIBROZIL 600 MG/1
TABLET, FILM COATED ORAL
Qty: 120 TABLET | Refills: 0 | Status: SHIPPED | OUTPATIENT
Start: 2019-11-05 | End: 2019-12-30

## 2019-11-19 DIAGNOSIS — E78.5 HYPERLIPIDEMIA, UNSPECIFIED HYPERLIPIDEMIA TYPE: ICD-10-CM

## 2019-11-19 RX ORDER — SIMVASTATIN 80 MG/1
TABLET, FILM COATED ORAL
Qty: 90 TABLET | Refills: 0 | Status: SHIPPED | OUTPATIENT
Start: 2019-11-19 | End: 2020-02-12 | Stop reason: SDUPTHER

## 2019-11-20 DIAGNOSIS — G44.229 CHRONIC TENSION-TYPE HEADACHE, NOT INTRACTABLE: ICD-10-CM

## 2019-11-20 RX ORDER — ZONISAMIDE 50 MG/1
50 CAPSULE ORAL 2 TIMES DAILY
Qty: 60 CAPSULE | Refills: 0 | Status: SHIPPED | OUTPATIENT
Start: 2019-11-20 | End: 2019-12-13 | Stop reason: SDUPTHER

## 2019-11-22 DIAGNOSIS — I10 ESSENTIAL HYPERTENSION: ICD-10-CM

## 2019-11-22 RX ORDER — FUROSEMIDE 40 MG/1
TABLET ORAL
Qty: 90 TABLET | Refills: 0 | Status: ON HOLD | OUTPATIENT
Start: 2019-11-22 | End: 2020-02-10 | Stop reason: HOSPADM

## 2019-11-24 RX ORDER — TAMSULOSIN HYDROCHLORIDE 0.4 MG/1
CAPSULE ORAL
Qty: 30 CAPSULE | Refills: 0 | Status: SHIPPED | OUTPATIENT
Start: 2019-11-24 | End: 2019-12-18 | Stop reason: SDUPTHER

## 2019-11-27 DIAGNOSIS — G43.711 INTRACTABLE CHRONIC MIGRAINE WITHOUT AURA AND WITH STATUS MIGRAINOSUS: ICD-10-CM

## 2019-11-27 RX ORDER — SUMATRIPTAN SUCCINATE 100 MG/1
TABLET ORAL
Qty: 12 TABLET | Refills: 1 | Status: SHIPPED | OUTPATIENT
Start: 2019-11-27 | End: 2020-01-24

## 2019-11-29 RX ORDER — CYCLOBENZAPRINE HCL 10 MG
TABLET ORAL
Qty: 90 TABLET | Refills: 0 | Status: SHIPPED | OUTPATIENT
Start: 2019-11-29 | End: 2019-12-27

## 2019-12-02 DIAGNOSIS — E11.9 DIABETES MELLITUS WITHOUT COMPLICATION: ICD-10-CM

## 2019-12-02 RX ORDER — METFORMIN HYDROCHLORIDE 1000 MG/1
TABLET ORAL
Qty: 180 TABLET | Refills: 0 | Status: SHIPPED | OUTPATIENT
Start: 2019-12-02 | End: 2020-04-06 | Stop reason: SDUPTHER

## 2019-12-04 ENCOUNTER — OFFICE VISIT (OUTPATIENT)
Dept: FAMILY MEDICINE | Facility: CLINIC | Age: 59
End: 2019-12-04
Payer: MEDICARE

## 2019-12-04 ENCOUNTER — LAB VISIT (OUTPATIENT)
Dept: LAB | Facility: HOSPITAL | Age: 59
End: 2019-12-04
Attending: FAMILY MEDICINE
Payer: MEDICARE

## 2019-12-04 VITALS
SYSTOLIC BLOOD PRESSURE: 130 MMHG | HEART RATE: 98 BPM | DIASTOLIC BLOOD PRESSURE: 90 MMHG | BODY MASS INDEX: 35.53 KG/M2 | HEIGHT: 69 IN | OXYGEN SATURATION: 97 % | WEIGHT: 239.88 LBS | TEMPERATURE: 98 F

## 2019-12-04 DIAGNOSIS — J40 BRONCHITIS: ICD-10-CM

## 2019-12-04 DIAGNOSIS — R09.82 PND (POST-NASAL DRIP): ICD-10-CM

## 2019-12-04 DIAGNOSIS — M54.50 CHRONIC BILATERAL LOW BACK PAIN WITHOUT SCIATICA: ICD-10-CM

## 2019-12-04 DIAGNOSIS — E11.9 TYPE 2 DIABETES MELLITUS WITHOUT COMPLICATION: ICD-10-CM

## 2019-12-04 DIAGNOSIS — G89.29 CHRONIC BILATERAL LOW BACK PAIN WITHOUT SCIATICA: ICD-10-CM

## 2019-12-04 DIAGNOSIS — M54.50 ACUTE BILATERAL LOW BACK PAIN WITHOUT SCIATICA: Primary | ICD-10-CM

## 2019-12-04 DIAGNOSIS — M62.838 MUSCLE SPASM: ICD-10-CM

## 2019-12-04 DIAGNOSIS — Z98.1 HISTORY OF FUSION OF CERVICAL SPINE: ICD-10-CM

## 2019-12-04 LAB
BILIRUB SERPL-MCNC: NEGATIVE MG/DL
BLOOD URINE, POC: NEGATIVE
COLOR, POC UA: NORMAL
GLUCOSE UR QL STRIP: NORMAL
KETONES UR QL STRIP: NEGATIVE
LEUKOCYTE ESTERASE URINE, POC: NEGATIVE
NITRITE, POC UA: NEGATIVE
PH, POC UA: 5
PROTEIN, POC: NEGATIVE
SPECIFIC GRAVITY, POC UA: 1.01
UROBILINOGEN, POC UA: NORMAL

## 2019-12-04 PROCEDURE — 3080F DIAST BP >= 90 MM HG: CPT | Mod: CPTII,S$GLB,, | Performed by: FAMILY MEDICINE

## 2019-12-04 PROCEDURE — 81001 POCT URINALYSIS, DIPSTICK OR TABLET REAGENT, AUTOMATED, WITH MICROSCOP: ICD-10-PCS | Mod: S$GLB,,, | Performed by: FAMILY MEDICINE

## 2019-12-04 PROCEDURE — 3075F PR MOST RECENT SYSTOLIC BLOOD PRESS GE 130-139MM HG: ICD-10-PCS | Mod: CPTII,S$GLB,, | Performed by: FAMILY MEDICINE

## 2019-12-04 PROCEDURE — 3008F BODY MASS INDEX DOCD: CPT | Mod: CPTII,S$GLB,, | Performed by: FAMILY MEDICINE

## 2019-12-04 PROCEDURE — 99999 PR PBB SHADOW E&M-EST. PATIENT-LVL III: CPT | Mod: PBBFAC,,, | Performed by: FAMILY MEDICINE

## 2019-12-04 PROCEDURE — 3008F PR BODY MASS INDEX (BMI) DOCUMENTED: ICD-10-PCS | Mod: CPTII,S$GLB,, | Performed by: FAMILY MEDICINE

## 2019-12-04 PROCEDURE — 3080F PR MOST RECENT DIASTOLIC BLOOD PRESSURE >= 90 MM HG: ICD-10-PCS | Mod: CPTII,S$GLB,, | Performed by: FAMILY MEDICINE

## 2019-12-04 PROCEDURE — 3075F SYST BP GE 130 - 139MM HG: CPT | Mod: CPTII,S$GLB,, | Performed by: FAMILY MEDICINE

## 2019-12-04 PROCEDURE — 99214 OFFICE O/P EST MOD 30 MIN: CPT | Mod: 25,S$GLB,, | Performed by: FAMILY MEDICINE

## 2019-12-04 PROCEDURE — 83036 HEMOGLOBIN GLYCOSYLATED A1C: CPT

## 2019-12-04 PROCEDURE — 81001 URINALYSIS AUTO W/SCOPE: CPT | Mod: S$GLB,,, | Performed by: FAMILY MEDICINE

## 2019-12-04 PROCEDURE — 99999 PR PBB SHADOW E&M-EST. PATIENT-LVL III: ICD-10-PCS | Mod: PBBFAC,,, | Performed by: FAMILY MEDICINE

## 2019-12-04 PROCEDURE — 36415 COLL VENOUS BLD VENIPUNCTURE: CPT | Mod: PO

## 2019-12-04 PROCEDURE — 99214 PR OFFICE/OUTPT VISIT, EST, LEVL IV, 30-39 MIN: ICD-10-PCS | Mod: 25,S$GLB,, | Performed by: FAMILY MEDICINE

## 2019-12-04 RX ORDER — DICLOFENAC SODIUM 10 MG/G
2 GEL TOPICAL DAILY
Qty: 100 G | Refills: 0 | Status: SHIPPED | OUTPATIENT
Start: 2019-12-04 | End: 2020-05-07

## 2019-12-04 RX ORDER — BENZONATATE 200 MG/1
200 CAPSULE ORAL 3 TIMES DAILY PRN
Qty: 90 CAPSULE | Refills: 1 | Status: SHIPPED | OUTPATIENT
Start: 2019-12-04 | End: 2020-05-07

## 2019-12-04 NOTE — PROGRESS NOTES
Office Visit    Patient Name: Scott Bansal    : 1960  MRN: [id number]      Assessment/Plan:  Scott Bansal is a 59 y.o. male who presents today for :    Acute bilateral low back pain without sciatica  -  NEG   POCT urinalysis, dipstick or tablet reag  Muscle spasm  History of fusion of cervical spine  -     diclofenac sodium (VOLTAREN) 1 % Gel; Apply 2 g topically once daily.  Dispense: 100 g; Refill: 0  Chronic bilateral low back pain without sciatica  -     diclofenac sodium (VOLTAREN) 1 % Gel; Apply 2 g topically once daily.  Dispense: 100 g; Refill: 0  -exam c/w mm spasm with no red flags.  -reassurance provided - advised pt that pain may last up to 4-6 weeks, but in the meantime, advised to add heat/massage and avoid provocative movements that could worsen pain, maintain good posture, as well as using proper lifting techniques.  -continue Tylenol as needed and muscle relaxer  -counseled patient extensively on stretching/strengthening exercises, maintaining good posture as demonstrated in clinic (handout also given) to help with decreasing risk for future injury.  -may use back brace if needed, especially with strenuous activities or any lifting  -RTC in 4-6 weeks, or sooner if Sx worsens or call clinic back if pt has any concerns.    Bronchitis  -     benzonatate (TESSALON) 200 MG capsule; Take 1 capsule (200 mg total) by mouth 3 (three) times daily as needed for Cough.  Dispense: 90 capsule; Refill: 1  PND (post-nasal drip)  -Mild symptoms, most likely viral etiology - appears to be improving.  -counseled pt on natural progression of viral URI with reassurance provided. Avised Mucinex.  Also advised frequent hand washing, rest, and plenty of fluids.   -     Increase water intake to 64-80 oz daily to help thin mucus  -     Nasal Saline spray (Over the counter Thomas spray or [place])  2 sprays each nostril 2-3 times a day for nasal congestion. Claritin PRN for drainage  -     Tylenol every 4-6 hours  as needed for fever, headaches, sore throat, ear pain, bodyaches, and/or nasal/sinus inflammation.             Follow up for worsening Sx. Urgent care/ED precautions provided.     This note was created by combination of typed  and Dragon dictation.  Transcription errors may be present.  If there are any questions, please contact me.        ----------------------------------------------------------------------------------------------------------------------      HPI:  Patient Care Team:  Kaylie Chau MD as PCP - General (Family Medicine)  Warren Rondon OD as Consulting Physician (Optometry)  Jessica Black LPN as Licensed Practical Nurse    Scott is a 59 y.o. male with      Patient Active Problem List   Diagnosis    History of fusion of cervical spine    Type 2 diabetes mellitus without complication    Hyperlipidemia    Essential hypertension    Chronic tension-type headache, not intractable    Bipolar 1 disorder    BPH (benign prostatic hyperplasia)    Gastroesophageal reflux disease without esophagitis    Sciatica of right side    History of pneumonia    COPD (chronic obstructive pulmonary disease)    Former smoker    Lumbar compression fracture    Diabetic polyneuropathy associated with type 2 diabetes mellitus    Lumbar degenerative disc disease    Severe obesity (BMI 35.0-39.9) with comorbidity    Atherosclerosis of aorta    Chronic bilateral low back pain without sciatica    Muscle spasm     This patient is new to me       Patient presents today for :  Chest Congestion and Back Pain    that started about a week ago. He has a h/o chronic LBP s/p cervical spine fusion, which had been controlled with Voltaren gel as well as muscle relaxers and ibuprofen as needed. He thinks that he may have pulled a muscle last week while trying to lift some heavy items at home. Patient denies any other recent injury/trauma to area. No slips/falls.  Pain has a sharp quality that has  been on and off.    At worst, the pain is 5/10.  Bending over makes the pain worse  Resting makes the pain better- he is still able to perform daily routine  No recent wt loss/fatigue/malaise/BMs changes or urinary changes/tingling/numbness/weakness/pain radiation to LEs    Chest congestion - started 3 days ago. He states Sx are mild, with trying to cough up thick phlegm in throat, he has been taking OTC meds as needed with good relief. No sick contact with similar Sx.  No sore throat.  No body aches.  No ear pain.  No F/C    HTN/DM - Pt is compliant with current daily DM/BP medication regimen at home with good control, despite Bp borderline in office today - no side effects.  No CP/SOB/leg swelling.  No polyuria/polydipsia/hypoglycemia/tingling/numbness/weakness        Additional ROS    No CP/SOB/palpitations/swelling  No cough/wheezing/SOB  No nausea/vomiting/abd pain/no diarrhea, no constipation, blood in stool    No rash, no history of allergies to any specific substances            Patient Active Problem List   Diagnosis    History of fusion of cervical spine    Type 2 diabetes mellitus without complication    Hyperlipidemia    Essential hypertension    Chronic tension-type headache, not intractable    Bipolar 1 disorder    BPH (benign prostatic hyperplasia)    Gastroesophageal reflux disease without esophagitis    Sciatica of right side    History of pneumonia    COPD (chronic obstructive pulmonary disease)    Former smoker    Lumbar compression fracture    Diabetic polyneuropathy associated with type 2 diabetes mellitus    Lumbar degenerative disc disease    Severe obesity (BMI 35.0-39.9) with comorbidity    Atherosclerosis of aorta    Chronic bilateral low back pain without sciatica    Muscle spasm       Current Medications  Medications reviewed/updated.     Current Outpatient Medications on File Prior to Visit   Medication Sig Dispense Refill    albuterol (VENTOLIN HFA) 90 mcg/actuation  inhaler INHALE 2 PUFFS EVERY 4 HOURS AS NEEDED 18 Inhaler 6    amLODIPine (NORVASC) 10 MG tablet TAKE 1 TABLET (10 MG TOTAL) BY MOUTH ONCE DAILY. 90 tablet 0    BREO ELLIPTA 100-25 mcg/dose diskus inhaler INHALE 1 PUFF INTO THE LUNGS ONCE DAILY. CONTROLLER 60 each 3    buPROPion (WELLBUTRIN XL) 300 MG 24 hr tablet Take 300 mg by mouth every morning.  2    busPIRone (BUSPAR) 10 MG tablet Take 10 mg by mouth 3 (three) times daily.      crisaborole (EUCRISA) 2 % Oint Use for hand eczema bid. 60 g 5    cyclobenzaprine (FLEXERIL) 10 MG tablet TAKE 1 TABLET BY MOUTH THREE TIMES A DAY AS NEEDED 90 tablet 0    fluticasone (FLONASE) 50 mcg/actuation nasal spray TAKE 1 SPRAY BY EACH NARE ROUTE ONCE DAILY. 16 g 5    fluticasone furoate-vilanterol (BREO) 100-25 mcg/dose diskus inhaler Inhale 1 puff into the lungs once daily. Controller 30 each 0    folic acid (FOLVITE) 800 MCG Tab Take 800 mcg by mouth once daily.      furosemide (LASIX) 40 MG tablet TAKE 1 TABLET BY MOUTH EVERY DAY 90 tablet 0    gemfibrozil (LOPID) 600 MG tablet TAKE 1 TABLET (600 MG TOTAL) BY MOUTH 2 (TWO) TIMES DAILY BEFORE MEALS. 120 tablet 0    irbesartan (AVAPRO) 300 MG tablet Take 1 tablet (300 mg total) by mouth every evening. 90 tablet 1    levocetirizine (XYZAL) 5 MG tablet Take 1 tablet each morning. 30 tablet 11    metFORMIN (GLUCOPHAGE) 1000 MG tablet TAKE 1 TABLET BY MOUTH TWICE A  tablet 0    pantoprazole (PROTONIX) 40 MG tablet TAKE 1 TABLET BY MOUTH DAILY 90 tablet 3    quetiapine (SEROQUEL) 300 MG Tab Take by mouth.      simvastatin (ZOCOR) 80 MG tablet TAKE 1 TABLET BY MOUTH EVERY DAY 90 tablet 0    sumatriptan (IMITREX) 100 MG tablet TAKE 1/2 TAB AT ONSET OF HEADACHE,THEN 1/2 TAB IN 1 HOUR IF NEEDED.MAX 1 TABLET/IN 24 HOURS. 12 tablet 1    tamsulosin (FLOMAX) 0.4 mg Cap TAKE 1 CAPSULE BY MOUTH EVERY DAY 30 capsule 0    topiramate 25 mg capsule Take 1 capsule (25 mg total) by mouth 2 (two) times daily. 60 capsule 0     triamcinolone acetonide 0.1% (KENALOG) 0.1 % cream AAA bid prn for rash on chest and arms. Do not use on face, underarms or groin 454 g 1    zonisamide (ZONEGRAN) 50 MG Cap TAKE 1 CAPSULE (50 MG TOTAL) BY MOUTH 2 (TWO) TIMES DAILY. 60 capsule 0    [DISCONTINUED] diclofenac sodium (VOLTAREN) 1 % Gel Apply 2 g topically once daily. 100 g 0    betamethasone dipropionate (DIPROLENE) 0.05 % cream APPLY TOPICALLY ONCE DAILY. FOR 10 DAYS 45 g 0    gabapentin (NEURONTIN) 300 MG capsule Take 1 capsule (300 mg total) by mouth 2 (two) times daily. 180 capsule 3    water Liqd 150 mL with Milk of Magnesia 400 mg/5 mL Susp 400 mg, diphenhydrAMINE 12.5 mg/5 mL Elix 60 mg, nystatin 100,000 unit/mL Susp 500,000 Units SWISH AND SPIT 15 MLS EVERY 4 HOURS  0     Current Facility-Administered Medications on File Prior to Visit   Medication Dose Route Frequency Provider Last Rate Last Dose    silver nitrate applicators applicator 1 applicator  1 applicator Topical (Top) 1 time in Clinic/[facility]OD [facility]           Past Surgical History:   Procedure Laterality Date    CERVICAL SPINE SURGERY      COLONOSCOPY N/A 7/27/2017    Procedure: COLONOSCOPY;  Surgeon: Genaro Nix MD;  [facility];  Service: Endoscopy;  Laterality: N/A;    EYE SURGERY Left 03/2017    cyst removal on eyelid; Dr. Broussard    SHOULDER SURGERY      TONSILLECTOMY         Family History   Problem Relation Age of Onset    Cataracts Mother     Cancer Mother         throat    Hypertension Mother     Hypertension Father     Stroke Father         2 strokes    Hypertension Sister     Cancer Brother         spinal, kidney, spinal fluid    Hypertension Brother     Cancer Cousin         breast?       Social History     Socioeconomic History    Marital status:      Spouse name: Not on file    Number of children: Not on file    Years of education: Not on file    Highest education level: Not on file   Occupational History  "   Not on file   Social Needs    Financial resource strain: Not on file    Food insecurity:     Worry: Not on file     Inability: Not on file    Transportation needs:     Medical: Not on file     Non-medical: Not on file   Tobacco Use    Smoking status: Former Smoker     Packs/day: 2.00     Years: 15.00     Pack years: 30.00     Types: Cigarettes     Last attempt to quit: 1984     Years since quittin.5    Smokeless tobacco: Never Used    Tobacco comment: Patient quit smoking at the age of 26 yo.   Substance and Sexual Activity    Alcohol use: No    Drug use: No    Sexual activity: Yes     Partners: Female   Lifestyle    Physical activity:     Days per week: Not on file     Minutes per session: Not on file    Stress: Not on file   Relationships    Social connections:     Talks on phone: Not on file     Gets together: Not on file     Attends [employer] service: Not on file     Active member of club or organization: Not on file     Attends meetings of clubs or organizations: Not on file     Relationship status: Not on file   Other Topics Concern    Not on file   Social History Narrative    Not on file           Allergies   Review of patient's allergies indicates:   Allergen Reactions    Sulfa (sulfonamide antibiotics) Rash             Review of Systems  See HPI      Physical Exam  BP (!) 130/90   Pulse 98   Temp 97.7 °F (36.5 °C)   Ht 5' 9" (1.753 m)   Wt 108.8 kg (239 lb 13.8 oz)   SpO2 97%   BMI 35.42 kg/m²     GEN: NAD, well developed, pleasant, well nourished  HEENT: NCAT, PERRLA, EOMI, sclera clear, anicteric, O/P clear, MMM with no lesions  NECK: normal, supple with midline trachea, no LAD, no thyromegaly  LUNGS: CTAB, no w/r/r, no increased work of breathing   HEART: RRR, normal S1 and S2, no m/r/g, no edema  ABD: s/nt/nd, NABS, no suprapubic tenderness. no CVA/flank TTP  SKIN: normal turgor, no rashes  PSYCH: AOx3, appropriate mood and affect  MSK: warm/well perfused, normal ROM " in all extremities, no c/c/e.  BACK: normal alignment of spine. FROM of back. Normal gait.  +TTP over the L4 area over b/l paraspinal mm. Spine is atraumatic and nontender without step-off. +pain with forward flexion and backward extension. No pain with lateral bending. NEG straight leg raise.

## 2019-12-05 LAB
ESTIMATED AVG GLUCOSE: 111 MG/DL (ref 68–131)
HBA1C MFR BLD HPLC: 5.5 % (ref 4–5.6)

## 2019-12-05 RX ORDER — PANTOPRAZOLE SODIUM 40 MG/1
TABLET, DELAYED RELEASE ORAL
Qty: 90 TABLET | Refills: 3 | Status: ON HOLD | OUTPATIENT
Start: 2019-12-05 | End: 2020-11-02

## 2019-12-13 DIAGNOSIS — G44.229 CHRONIC TENSION-TYPE HEADACHE, NOT INTRACTABLE: ICD-10-CM

## 2019-12-13 RX ORDER — ZONISAMIDE 50 MG/1
50 CAPSULE ORAL 2 TIMES DAILY
Qty: 60 CAPSULE | Refills: 0 | Status: SHIPPED | OUTPATIENT
Start: 2019-12-13 | End: 2020-01-13

## 2019-12-18 RX ORDER — TAMSULOSIN HYDROCHLORIDE 0.4 MG/1
CAPSULE ORAL
Qty: 30 CAPSULE | Refills: 0 | Status: SHIPPED | OUTPATIENT
Start: 2019-12-18 | End: 2020-01-10

## 2019-12-27 DIAGNOSIS — I10 ESSENTIAL HYPERTENSION: ICD-10-CM

## 2019-12-27 RX ORDER — IRBESARTAN 300 MG/1
300 TABLET ORAL NIGHTLY
Qty: 90 TABLET | Refills: 0 | OUTPATIENT
Start: 2019-12-27 | End: 2020-03-26

## 2019-12-27 RX ORDER — CANDESARTAN 4 MG/1
4 TABLET ORAL DAILY
Qty: 90 TABLET | Refills: 3 | Status: SHIPPED | OUTPATIENT
Start: 2019-12-27 | End: 2020-12-18

## 2019-12-27 RX ORDER — CYCLOBENZAPRINE HCL 10 MG
TABLET ORAL
Qty: 90 TABLET | Refills: 0 | Status: SHIPPED | OUTPATIENT
Start: 2019-12-27 | End: 2020-01-23

## 2019-12-30 DIAGNOSIS — I10 ESSENTIAL HYPERTENSION: ICD-10-CM

## 2019-12-30 DIAGNOSIS — E78.5 HYPERLIPIDEMIA, UNSPECIFIED HYPERLIPIDEMIA TYPE: ICD-10-CM

## 2019-12-30 RX ORDER — AMLODIPINE BESYLATE 10 MG/1
10 TABLET ORAL DAILY
Qty: 90 TABLET | Refills: 0 | Status: SHIPPED | OUTPATIENT
Start: 2019-12-30 | End: 2020-03-23

## 2019-12-30 RX ORDER — GEMFIBROZIL 600 MG/1
TABLET, FILM COATED ORAL
Qty: 120 TABLET | Refills: 0 | Status: SHIPPED | OUTPATIENT
Start: 2019-12-30 | End: 2020-03-03

## 2019-12-30 NOTE — TELEPHONE ENCOUNTER
----- Message from Digna Oh sent at 12/30/2019  9:50 AM CST -----  Contact: self  Type: Patient Call Back    Who called:self    What is the request in detail:pt is calling to get clarification on how to take a medication he was prescribed     Can the clinic reply by [facility]?no    Would the patient rather a call back or a response via [facility]? call    Best call back number:

## 2020-01-07 ENCOUNTER — OFFICE VISIT (OUTPATIENT)
Dept: DERMATOLOGY | Facility: CLINIC | Age: 60
End: 2020-01-07
Payer: MEDICARE

## 2020-01-07 DIAGNOSIS — L30.9 HAND ECZEMA: Primary | ICD-10-CM

## 2020-01-07 PROCEDURE — 99999 PR PBB SHADOW E&M-EST. PATIENT-LVL II: CPT | Mod: PBBFAC,,, | Performed by: DERMATOLOGY

## 2020-01-07 PROCEDURE — 99213 OFFICE O/P EST LOW 20 MIN: CPT | Mod: S$GLB,,, | Performed by: DERMATOLOGY

## 2020-01-07 PROCEDURE — 99999 PR PBB SHADOW E&M-EST. PATIENT-LVL II: ICD-10-PCS | Mod: PBBFAC,,, | Performed by: DERMATOLOGY

## 2020-01-07 PROCEDURE — 99213 PR OFFICE/OUTPT VISIT, EST, LEVL III, 20-29 MIN: ICD-10-PCS | Mod: S$GLB,,, | Performed by: DERMATOLOGY

## 2020-01-07 RX ORDER — MOMETASONE FUROATE 1 MG/G
CREAM TOPICAL DAILY
Qty: 45 G | Refills: 1 | Status: SHIPPED | OUTPATIENT
Start: 2020-01-07 | End: 2021-04-12 | Stop reason: SDUPTHER

## 2020-01-07 NOTE — PROGRESS NOTES
Subjective:       Patient ID:  Scott Bansal is a 59 y.o. male who presents for   Chief Complaint   Patient presents with    Rash     R 1st digit and chest      Hx of hand eczema c/ staph infection, last seen on 9/26/19.  He has been using TAC 0.1% cream.  Not certain if received eucrisa. Dog has passed and not longer washes hands frequently.    For hx of staph, he uses hibiclens showers qOD.    Prior hand eczema treatment: diclofenac, betamethasone cream      Review of Systems   Constitutional: Negative for fever and chills.   Gastrointestinal: Negative for nausea and vomiting.   Skin: Negative for daily sunscreen use, activity-related sunscreen use and recent sunburn.   Hematologic/Lymphatic: Does not bruise/bleed easily.        Objective:    Physical Exam   Constitutional: He appears well-developed and well-nourished. No distress.   Neurological: He is alert and oriented to person, place, and time. He is not disoriented.   Psychiatric: He has a normal mood and affect.   Skin:   Areas Examined (abnormalities noted in diagram):   Scalp / Hair Palpated and Inspected  Head / Face Inspection Performed  Neck Inspection Performed  Chest / Axilla Inspection Performed  Abdomen Inspection Performed  Back Inspection Performed  RUE Inspected  LUE Inspection Performed  Nails and Digits Inspection Performed                   Assessment / Plan:        Hand eczema  -     mometasone 0.1% (ELOCON) 0.1 % cream; Apply topically once daily. AAA bid prn for hand eczema  Dispense: 45 g; Refill: 1    Improved from prior but flared.  Will start above med.  Pt uncertain if received eucrisa. Recommend continue use of moisturizer         Follow up in about 6 months (around 7/7/2020).

## 2020-01-07 NOTE — PATIENT INSTRUCTIONS
HAND ECZEMA INSTRUCTIONS    Use mometasone cream twice a day. Use with white cotton gloves at bedtime.   Use cetaphil intensive repair cream or vanicream after each hand washing  Avoid use of hand sanitizers   Use dove sensitive skin bar for hand washing

## 2020-01-09 DIAGNOSIS — E11.42 DIABETIC POLYNEUROPATHY ASSOCIATED WITH TYPE 2 DIABETES MELLITUS: ICD-10-CM

## 2020-01-10 RX ORDER — TAMSULOSIN HYDROCHLORIDE 0.4 MG/1
CAPSULE ORAL
Qty: 30 CAPSULE | Refills: 0 | Status: SHIPPED | OUTPATIENT
Start: 2020-01-10 | End: 2020-02-02

## 2020-01-10 RX ORDER — GABAPENTIN 300 MG/1
CAPSULE ORAL
Qty: 180 CAPSULE | Refills: 3 | Status: SHIPPED | OUTPATIENT
Start: 2020-01-10 | End: 2020-07-02

## 2020-01-12 DIAGNOSIS — G44.229 CHRONIC TENSION-TYPE HEADACHE, NOT INTRACTABLE: ICD-10-CM

## 2020-01-13 RX ORDER — ZONISAMIDE 50 MG/1
CAPSULE ORAL
Qty: 60 CAPSULE | Refills: 0 | Status: SHIPPED | OUTPATIENT
Start: 2020-01-13 | End: 2020-02-06

## 2020-01-23 ENCOUNTER — OFFICE VISIT (OUTPATIENT)
Dept: FAMILY MEDICINE | Facility: CLINIC | Age: 60
End: 2020-01-23
Payer: MEDICARE

## 2020-01-23 VITALS
DIASTOLIC BLOOD PRESSURE: 74 MMHG | HEIGHT: 69 IN | TEMPERATURE: 98 F | OXYGEN SATURATION: 97 % | BODY MASS INDEX: 37.11 KG/M2 | SYSTOLIC BLOOD PRESSURE: 136 MMHG | WEIGHT: 250.56 LBS | HEART RATE: 107 BPM

## 2020-01-23 DIAGNOSIS — E78.5 HYPERLIPIDEMIA, UNSPECIFIED HYPERLIPIDEMIA TYPE: ICD-10-CM

## 2020-01-23 DIAGNOSIS — L30.9 HAND ECZEMA: Primary | ICD-10-CM

## 2020-01-23 DIAGNOSIS — T14.8XXA EXCORIATION: ICD-10-CM

## 2020-01-23 DIAGNOSIS — I70.0 ATHEROSCLEROSIS OF AORTA: ICD-10-CM

## 2020-01-23 DIAGNOSIS — K21.9 GASTROESOPHAGEAL REFLUX DISEASE WITHOUT ESOPHAGITIS: ICD-10-CM

## 2020-01-23 DIAGNOSIS — J44.9 CHRONIC OBSTRUCTIVE PULMONARY DISEASE, UNSPECIFIED COPD TYPE: ICD-10-CM

## 2020-01-23 DIAGNOSIS — E11.9 TYPE 2 DIABETES MELLITUS WITHOUT COMPLICATION, WITHOUT LONG-TERM CURRENT USE OF INSULIN: ICD-10-CM

## 2020-01-23 DIAGNOSIS — F31.9 BIPOLAR 1 DISORDER: ICD-10-CM

## 2020-01-23 DIAGNOSIS — I10 ESSENTIAL HYPERTENSION: ICD-10-CM

## 2020-01-23 DIAGNOSIS — S32.050A COMPRESSION FRACTURE OF L5 VERTEBRA, INITIAL ENCOUNTER: ICD-10-CM

## 2020-01-23 DIAGNOSIS — E11.42 DIABETIC POLYNEUROPATHY ASSOCIATED WITH TYPE 2 DIABETES MELLITUS: ICD-10-CM

## 2020-01-23 DIAGNOSIS — E66.01 SEVERE OBESITY (BMI 35.0-39.9) WITH COMORBIDITY: ICD-10-CM

## 2020-01-23 PROCEDURE — 99214 PR OFFICE/OUTPT VISIT, EST, LEVL IV, 30-39 MIN: ICD-10-PCS | Mod: S$GLB,,, | Performed by: NURSE PRACTITIONER

## 2020-01-23 PROCEDURE — 3008F PR BODY MASS INDEX (BMI) DOCUMENTED: ICD-10-PCS | Mod: CPTII,S$GLB,, | Performed by: NURSE PRACTITIONER

## 2020-01-23 PROCEDURE — 3075F PR MOST RECENT SYSTOLIC BLOOD PRESS GE 130-139MM HG: ICD-10-PCS | Mod: CPTII,S$GLB,, | Performed by: NURSE PRACTITIONER

## 2020-01-23 PROCEDURE — 3078F DIAST BP <80 MM HG: CPT | Mod: CPTII,S$GLB,, | Performed by: NURSE PRACTITIONER

## 2020-01-23 PROCEDURE — 99999 PR PBB SHADOW E&M-EST. PATIENT-LVL V: ICD-10-PCS | Mod: PBBFAC,,, | Performed by: NURSE PRACTITIONER

## 2020-01-23 PROCEDURE — 3044F HG A1C LEVEL LT 7.0%: CPT | Mod: CPTII,S$GLB,, | Performed by: NURSE PRACTITIONER

## 2020-01-23 PROCEDURE — 99214 OFFICE O/P EST MOD 30 MIN: CPT | Mod: S$GLB,,, | Performed by: NURSE PRACTITIONER

## 2020-01-23 PROCEDURE — 99499 UNLISTED E&M SERVICE: CPT | Mod: S$GLB,,, | Performed by: NURSE PRACTITIONER

## 2020-01-23 PROCEDURE — 99999 PR PBB SHADOW E&M-EST. PATIENT-LVL V: CPT | Mod: PBBFAC,,, | Performed by: NURSE PRACTITIONER

## 2020-01-23 PROCEDURE — 3078F PR MOST RECENT DIASTOLIC BLOOD PRESSURE < 80 MM HG: ICD-10-PCS | Mod: CPTII,S$GLB,, | Performed by: NURSE PRACTITIONER

## 2020-01-23 PROCEDURE — 3044F PR MOST RECENT HEMOGLOBIN A1C LEVEL <7.0%: ICD-10-PCS | Mod: CPTII,S$GLB,, | Performed by: NURSE PRACTITIONER

## 2020-01-23 PROCEDURE — 99499 RISK ADDL DX/OHS AUDIT: ICD-10-PCS | Mod: S$GLB,,, | Performed by: NURSE PRACTITIONER

## 2020-01-23 PROCEDURE — 3075F SYST BP GE 130 - 139MM HG: CPT | Mod: CPTII,S$GLB,, | Performed by: NURSE PRACTITIONER

## 2020-01-23 PROCEDURE — 3008F BODY MASS INDEX DOCD: CPT | Mod: CPTII,S$GLB,, | Performed by: NURSE PRACTITIONER

## 2020-01-23 RX ORDER — LIDOCAINE HYDROCHLORIDE 20 MG/ML
SOLUTION ORAL; TOPICAL
COMMUNITY
Start: 2019-11-01 | End: 2021-03-02

## 2020-01-23 RX ORDER — MUPIROCIN 20 MG/G
OINTMENT TOPICAL 2 TIMES DAILY
Qty: 15 G | Refills: 0 | Status: SHIPPED | OUTPATIENT
Start: 2020-01-23 | End: 2020-02-04

## 2020-01-23 RX ORDER — TIZANIDINE 4 MG/1
4 TABLET ORAL EVERY 8 HOURS
Qty: 30 TABLET | Refills: 3 | Status: SHIPPED | OUTPATIENT
Start: 2020-01-23 | End: 2020-02-02

## 2020-01-23 NOTE — PROGRESS NOTES
History of Present Illness   Scott Bansal is a 59 y.o. man with medical history as listed below who presents today for evaluation of hand eczema. He was seen by dermatology 1/7/2020 and was given mometasone to use twice a day with gloves at bedtime and cetaphil moisturizer throughout the day. He reports eczema is overall improving, but he was concerned with cracks in his fingertips. He thinks they may be infected. He has been using rubbing alcohol, peroxide, epsom salt, and Neosporin. He denies drainage or warmth to the area and denies fevers. He has no additional complaints and is otherwise healthy on today's visit.    Past Medical History:   Diagnosis Date    Bipolar 1 disorder, mixed     BPH (benign prostatic hypertrophy)     Cyst, eyelid     Dr. Broussard    Diabetes mellitus     GERD (gastroesophageal reflux disease)     Hyperlipidemia     Hypertension     Lumbar degenerative disc disease 3/7/2019    Migraine headache     Obesity        Past Surgical History:   Procedure Laterality Date    CERVICAL SPINE SURGERY      COLONOSCOPY N/A 7/27/2017    Procedure: COLONOSCOPY;  Surgeon: Genaro Nix MD;  Location: [facility];  Service: Endoscopy;  Laterality: N/A;    EYE SURGERY Left 03/2017    cyst removal on eyelid; Dr. Broussard    SHOULDER SURGERY      TONSILLECTOMY         Social History     Socioeconomic History    Marital status:      Spouse name: Not on file    Number of children: Not on file    Years of education: Not on file    Highest education level: Not on file   Occupational History    Not on file   Social Needs    Financial resource strain: Not on file    Food insecurity:     Worry: Not on file     Inability: Not on file    Transportation needs:     Medical: Not on file     Non-medical: Not on file   Tobacco Use    Smoking status: Former Smoker     Packs/day: 2.00     Years: 15.00     Pack years: 30.00     Types: Cigarettes     Last attempt to quit: 5/11/1984      "Years since quittin.7    Smokeless tobacco: Never Used    Tobacco comment: Patient quit smoking at the age of 26 yo.   Substance and Sexual Activity    Alcohol use: No    Drug use: No    Sexual activity: Yes     Partners: Female   Lifestyle    Physical activity:     Days per week: Not on file     Minutes per session: Not on file    Stress: Not on file   Relationships    Social connections:     Talks on phone: Not on file     Gets together: Not on file     Attends Buddhist service: Not on file     Active member of club or organization: Not on file     Attends meetings of clubs or organizations: Not on file     Relationship status: Not on file   Other Topics Concern    Not on file   Social History Narrative    Not on file       Family History   Problem Relation Age of Onset    Cataracts Mother     Cancer Mother         throat    Hypertension Mother     Hypertension Father     Stroke Father         2 strokes    Hypertension Sister     Cancer Brother         spinal, kidney, spinal fluid    Hypertension Brother     Cancer Cousin         breast?       Review of Systems  Review of Systems   Constitutional: Negative for chills, fever and malaise/fatigue.   Skin: Positive for itching and rash.     A complete review of systems was otherwise negative.    Physical Exam  /74   Pulse 107   Temp 98 °F (36.7 °C) (Oral)   Ht 5' 9" (1.753 m)   Wt 113.6 kg (250 lb 8.8 oz)   SpO2 97%   BMI 37.00 kg/m²   General appearance: alert, appears stated age, cooperative and no distress  Skin: Skin color, texture, turgor normal. No rashes or lesions or atopic dermatitis with excoriation to bilateral hands  Lymph nodes: Cervical, supraclavicular, and axillary nodes normal.  Neurologic: Grossly normal    Assessment/Plan  Hand eczema  Continue treatment as discussed with dermatology.  Stop using all alcohol based products and peroxide.  Use the Bactroban only on the open areas.  RTC PRN.    Excoriation  As " above.  -     mupirocin (BACTROBAN) 2 % ointment; Apply topically 2 (two) times daily.  Dispense: 15 g; Refill: 0    Diabetic polyneuropathy associated with type 2 diabetes mellitus  The current medical regimen is effective;  continue present plan and medications.    Bipolar 1 disorder  The current medical regimen is effective;  continue present plan and medications.    Chronic obstructive pulmonary disease, unspecified COPD type  The current medical regimen is effective;  continue present plan and medications.    Essential hypertension  The current medical regimen is effective;  continue present plan and medications.    Hyperlipidemia, unspecified hyperlipidemia type  The current medical regimen is effective;  continue present plan and medications.    Atherosclerosis of aorta  The current medical regimen is effective;  continue present plan and medications.    Type 2 diabetes mellitus without complication, without long-term current use of insulin  The current medical regimen is effective;  continue present plan and medications.    Severe obesity (BMI 35.0-39.9) with comorbidity  The patient is asked to make an attempt to improve diet and exercise patterns to aid in medical management of this problem.    Gastroesophageal reflux disease without esophagitis  The current medical regimen is effective;  continue present plan and medications.    Compression fracture of L5 vertebra, initial encounter  The current medical regimen is effective;  continue present plan and medications.    Other orders  Does not tolerate Flexeril, will switch to Tizanidine.  -     tiZANidine (ZANAFLEX) 4 MG tablet; Take 1 tablet (4 mg total) by mouth every 8 (eight) hours. for 10 days  Dispense: 30 tablet; Refill: 3    Patient has verbalized understanding and is in agreement with plan of care.    Follow up if symptoms worsen or fail to improve.

## 2020-01-24 DIAGNOSIS — G43.711 INTRACTABLE CHRONIC MIGRAINE WITHOUT AURA AND WITH STATUS MIGRAINOSUS: ICD-10-CM

## 2020-01-24 RX ORDER — SUMATRIPTAN SUCCINATE 100 MG/1
TABLET ORAL
Qty: 12 TABLET | Refills: 1 | Status: SHIPPED | OUTPATIENT
Start: 2020-01-24 | End: 2020-03-12

## 2020-01-27 ENCOUNTER — TELEPHONE (OUTPATIENT)
Dept: FAMILY MEDICINE | Facility: CLINIC | Age: 60
End: 2020-01-27

## 2020-01-29 ENCOUNTER — TELEPHONE (OUTPATIENT)
Dept: DERMATOLOGY | Facility: CLINIC | Age: 60
End: 2020-01-29

## 2020-01-29 NOTE — TELEPHONE ENCOUNTER
----- Message from Radha Grimes sent at 1/29/2020  3:19 PM CST -----  Contact: Pt   Pt called in regards to scheduling a appointment for a boil. Pt stated that the boil is located on his right inner calf size of a silver dollar raised about a inch. Pt can be reached at [phone] (home).

## 2020-01-31 ENCOUNTER — HOSPITAL ENCOUNTER (EMERGENCY)
Facility: HOSPITAL | Age: 60
Discharge: HOME | End: 2020-01-31
Attending: EMERGENCY MEDICINE
Payer: MEDICARE

## 2020-01-31 VITALS
TEMPERATURE: 98 F | DIASTOLIC BLOOD PRESSURE: 84 MMHG | HEART RATE: 101 BPM | SYSTOLIC BLOOD PRESSURE: 154 MMHG | OXYGEN SATURATION: 95 % | BODY MASS INDEX: 35.41 KG/M2 | WEIGHT: 239.06 LBS | HEIGHT: 69 IN | RESPIRATION RATE: 18 BRPM

## 2020-01-31 DIAGNOSIS — L03.115 CELLULITIS OF RIGHT LOWER EXTREMITY: Primary | ICD-10-CM

## 2020-01-31 PROCEDURE — 25000003 PHARM REV CODE 250: Performed by: PHYSICIAN ASSISTANT

## 2020-01-31 PROCEDURE — 99283 EMERGENCY DEPT VISIT LOW MDM: CPT

## 2020-01-31 PROCEDURE — 87070 CULTURE OTHR SPECIMN AEROBIC: CPT

## 2020-01-31 PROCEDURE — 87077 CULTURE AEROBIC IDENTIFY: CPT

## 2020-01-31 PROCEDURE — 87186 SC STD MICRODIL/AGAR DIL: CPT

## 2020-01-31 RX ORDER — CLINDAMYCIN HYDROCHLORIDE 150 MG/1
450 CAPSULE ORAL EVERY 8 HOURS
Qty: 90 CAPSULE | Refills: 0 | Status: SHIPPED | OUTPATIENT
Start: 2020-01-31 | End: 2020-02-10

## 2020-01-31 RX ORDER — CLINDAMYCIN HYDROCHLORIDE 150 MG/1
450 CAPSULE ORAL
Status: COMPLETED | OUTPATIENT
Start: 2020-01-31 | End: 2020-01-31

## 2020-01-31 RX ORDER — OXYCODONE AND ACETAMINOPHEN 5; 325 MG/1; MG/1
1 TABLET ORAL
Status: COMPLETED | OUTPATIENT
Start: 2020-01-31 | End: 2020-01-31

## 2020-01-31 RX ADMIN — CLINDAMYCIN HYDROCHLORIDE 450 MG: 150 CAPSULE ORAL at 07:01

## 2020-01-31 RX ADMIN — OXYCODONE HYDROCHLORIDE AND ACETAMINOPHEN 1 TABLET: 5; 325 TABLET ORAL at 07:01

## 2020-02-01 NOTE — ED TRIAGE NOTES
Patient reports abscess to right calf x 1 month. Area reddened, warm to touch. No drainage noted.

## 2020-02-01 NOTE — ED PROVIDER NOTES
Encounter Date: 1/31/2020    SCRIBE #1 NOTE: I, Isidra Wisdom, am scribing for, and in the presence of,  HAZEL Pedraza. I have scribed the following portions of the note - Other sections scribed: HPI,GIO.       History     Chief Complaint   Patient presents with    Abscess     abscess right upper calf area.     Chief Complaint: Abscess   History of Present Illness: History was obtained by the patient. This 59 year old male with PMHx of DM, HTN, HLD, and GERD presents to the Emergency Department complaining of right medial calf abscess that has been there for about one month. He states it started small so he squeezed it which only made it worse. He notes he was seen by PCP on 01/23 and dermatology 1/07 but was not given antibiotics. He is compliant with Metformin. He reports he smoked for 15 years but quit 34 years ago. He denies illicit drug us or alcohol abuse. He has sulfa allergy.     The history is provided by the patient. No  was used.     Review of patient's allergies indicates:   Allergen Reactions    Sulfa (sulfonamide antibiotics) Rash     Past Medical History:   Diagnosis Date    Bipolar 1 disorder, mixed     BPH (benign prostatic hypertrophy)     Cyst, eyelid     Dr. Broussard    Diabetes mellitus     GERD (gastroesophageal reflux disease)     Hyperlipidemia     Hypertension     Lumbar degenerative disc disease 3/7/2019    Migraine headache     Obesity      Past Surgical History:   Procedure Laterality Date    CERVICAL SPINE SURGERY      COLONOSCOPY N/A 7/27/2017    Procedure: COLONOSCOPY;  Surgeon: Genaro Nix MD;  Location: [facility];  Service: Endoscopy;  Laterality: N/A;    EYE SURGERY Left 03/2017    cyst removal on eyelid; Dr. Broussard    SHOULDER SURGERY      TONSILLECTOMY       Family History   Problem Relation Age of Onset    Cataracts Mother     Cancer Mother         throat    Hypertension Mother     Hypertension Father     Stroke Father          2 strokes    Hypertension Sister     Cancer Brother         spinal, kidney, spinal fluid    Hypertension Brother     Cancer Cousin         breast?     Social History     Tobacco Use    Smoking status: Former Smoker     Packs/day: 2.00     Years: 15.00     Pack years: 30.00     Types: Cigarettes     Last attempt to quit: 1984     Years since quittin.7    Smokeless tobacco: Never Used    Tobacco comment: Patient quit smoking at the age of 24 yo.   Substance Use Topics    Alcohol use: No    Drug use: No     Review of Systems   Constitutional: Negative for chills and fever.   HENT: Negative for congestion, rhinorrhea and sore throat.    Eyes: Negative for visual disturbance.   Respiratory: Negative for cough and shortness of breath.    Cardiovascular: Negative for chest pain.   Gastrointestinal: Negative for abdominal pain, diarrhea, nausea and vomiting.   Genitourinary: Negative for dysuria, frequency and hematuria.   Musculoskeletal: Negative for back pain.   Skin: Positive for wound. Negative for rash.   Neurological: Negative for dizziness, weakness and headaches.       Physical Exam     Initial Vitals [20 1852]   BP Pulse Resp Temp SpO2   124/75 98 18 98.5 °F (36.9 °C) 96 %      MAP       --         Physical Exam    Nursing note and vitals reviewed.  Constitutional: He appears well-developed and well-nourished. No distress.   HENT:   Head: Normocephalic and atraumatic.   Right Ear: Tympanic membrane normal.   Left Ear: Tympanic membrane normal.   Nose: Nose normal.   Mouth/Throat: Uvula is midline, oropharynx is clear and moist and mucous membranes are normal.   Eyes: EOM are normal. Pupils are equal, round, and reactive to light.   Neck: Normal range of motion. Neck supple.   Cardiovascular: Normal rate, regular rhythm and normal heart sounds. Exam reveals no gallop and no friction rub.    No murmur heard.  Pulses:       Dorsalis pedis pulses are 2+ on the right side, and 2+ on the left  side.        Posterior tibial pulses are 2+ on the right side, and 2+ on the left side.   Pulmonary/Chest: Effort normal and breath sounds normal. No respiratory distress. He has no wheezes. He has no rhonchi. He has no rales.   Abdominal: Soft. Bowel sounds are normal. He exhibits no mass. There is no tenderness. There is no rebound and no guarding.   Musculoskeletal: Normal range of motion.   Patient has full range of motion of the right lower extremity.  No crepitus.   Neurological: He is alert and oriented to person, place, and time. He has normal strength. No cranial nerve deficit or sensory deficit.   Skin: Skin is warm and dry. Capillary refill takes less than 2 seconds.   There is a 10 cm x 6 cm area of erythema and induration with a central wound with serosanguineous drainage.   Psychiatric: He has a normal mood and affect.                 ED Course   Procedures  Labs Reviewed   CULTURE, AEROBIC  (SPECIFY SOURCE)          Imaging Results    None          Medical Decision Making:   ED Management:  This is an evaluation of a 59 y.o. male with history of diabetes who presents to the ED for wound to the right lower leg.  Vital signs are stable.   Afebrile.  Patient is nontoxic appearing and in no acute distress. There is 10 cm x 6 cm area of erythema and induration with central wound to the right calf.  Patient has full range of motion the right lower extremity.  No crepitus.  Patient is neurovascularly intact. There is no streaking erythema.  Bedside ultrasound performed shows cobblestoning with no drainable abscess.  Findings consistent with cellulitis.  Area of erythema was marked.  Will attempt treatment with outpatient clindamycin.  Wound culture was ordered.  Patient instructed to return to the emergency department if erythema extended beyond demarcated border.  Given stable vital signs and overall well appearance, I do not believe the patient requires hospitalization for IV antibiotics at this time.   Encouraged patient to follow up with PCP.    Patient given return precautions and instructed to return to the emergency department for any new or worsening symptoms. Patient verbalized understanding and agreed with plan.     I discussed the case with Dr. Venegas who is in agreement with my assessment and plan.              Scribe Attestation:   Scribe #1: I performed the above scribed service and the documentation accurately describes the services I performed. I attest to the accuracy of the note.                          Clinical Impression:       ICD-10-CM ICD-9-CM   1. Cellulitis of right lower extremity L03.115 682.6                             Walter Kenney PA-C  01/31/20 1956

## 2020-02-01 NOTE — DISCHARGE INSTRUCTIONS
Take antibiotics as prescribed.  Return to the ER if the redness spreads beyond the marked area.  Follow-up with your primary care doctor in 2 days.  Change dressing daily.

## 2020-02-02 LAB — BACTERIA SPEC AEROBE CULT: ABNORMAL

## 2020-02-02 RX ORDER — TAMSULOSIN HYDROCHLORIDE 0.4 MG/1
CAPSULE ORAL
Qty: 30 CAPSULE | Refills: 0 | Status: SHIPPED | OUTPATIENT
Start: 2020-02-02 | End: 2020-02-12 | Stop reason: SDUPTHER

## 2020-02-04 ENCOUNTER — OFFICE VISIT (OUTPATIENT)
Dept: DERMATOLOGY | Facility: CLINIC | Age: 60
End: 2020-02-04
Payer: MEDICARE

## 2020-02-04 DIAGNOSIS — L02.415 ABSCESS OF RIGHT LEG: Primary | ICD-10-CM

## 2020-02-04 PROCEDURE — 99999 PR PBB SHADOW E&M-EST. PATIENT-LVL II: CPT | Mod: PBBFAC,,, | Performed by: DERMATOLOGY

## 2020-02-04 PROCEDURE — 99999 PR PBB SHADOW E&M-EST. PATIENT-LVL II: ICD-10-PCS | Mod: PBBFAC,,, | Performed by: DERMATOLOGY

## 2020-02-04 PROCEDURE — 87186 SC STD MICRODIL/AGAR DIL: CPT

## 2020-02-04 PROCEDURE — 87077 CULTURE AEROBIC IDENTIFY: CPT

## 2020-02-04 PROCEDURE — 87070 CULTURE OTHR SPECIMN AEROBIC: CPT

## 2020-02-04 PROCEDURE — 99213 PR OFFICE/OUTPT VISIT, EST, LEVL III, 20-29 MIN: ICD-10-PCS | Mod: S$GLB,,, | Performed by: DERMATOLOGY

## 2020-02-04 PROCEDURE — 99213 OFFICE O/P EST LOW 20 MIN: CPT | Mod: S$GLB,,, | Performed by: DERMATOLOGY

## 2020-02-04 RX ORDER — MUPIROCIN 20 MG/G
OINTMENT TOPICAL 2 TIMES DAILY
Qty: 30 G | Refills: 3 | Status: SHIPPED | OUTPATIENT
Start: 2020-02-04 | End: 2020-02-18 | Stop reason: SDUPTHER

## 2020-02-04 RX ORDER — DOXYCYCLINE 100 MG/1
CAPSULE ORAL
Qty: 20 CAPSULE | Refills: 0 | Status: ON HOLD | OUTPATIENT
Start: 2020-02-04 | End: 2020-02-10 | Stop reason: HOSPADM

## 2020-02-04 NOTE — PATIENT INSTRUCTIONS
Start hibiclens (chlorahexidine) showers daily    Abscess (Antibiotic Treatment Only)  An abscess (sometimes called a boil) happens when bacteria get trapped under the skin and start to grow. Pus forms inside the abscess as the body responds to the bacteria. An abscess can happen with an insect bite, ingrown hair, blocked oil gland, pimple, cyst, or puncture wound.  In the early stages, your wound may be red and tender. For this stage, you may get antibiotics. If the abscess does not get better with antibiotics, it will need to be drained with a small cut.  Home care  These tips will help you care for your abscess at home:  · Soak the wound in hot water or apply hot packs (small towel soaked in hot water) to the area for 20 minutes at a time. Do this 3 to 4 times a day.  · Do not cut, squeeze, or pop the boil yourself.  · Apply antibiotic cream or ointment to the skin 3 to 4 times a day, unless something else was prescribed. Some ointments include an antibiotic plus a pain reliever.  · If your doctor prescribed antibiotics, do not stop taking them until you have finished the medicine or the doctor tells you to stop.  · You may use an over-the-counter pain medicine to control pain, unless another pain medicine was prescribed. If you have chronic liver or kidney disease or ever had a stomach ulcer or gastrointestinal bleeding, talk with your doctor before using these any of these.  Follow-up care  Follow up with your healthcare provider, or as advised. Check your wound each day for the signs of worsening infection listed below.  When to seek medical advice  Get prompt medical attention if any of these occur:  · An increase in redness or swelling  · Red streaks in the skin leading away from the abscess  · An increase in local pain or swelling  · Fever of 100.4ºF (38ºC) or higher, or as directed by your healthcare provider  · Pus or fluid coming from the abscess  · Boil returns after getting better  Date Last  Reviewed: 9/1/2016  © 4060-5859 [employer]. [address] [place] [address], [place] [address]. All rights reserved. This information is not intended as a substitute for professional medical care. Always follow your healthcare professional's instructions.

## 2020-02-04 NOTE — PROGRESS NOTES
Subjective:       Patient ID:  Scott Bansal is a 59 y.o. male who presents for   Chief Complaint   Patient presents with    Abscess     lower right leg      Hx of hand eczema c/ staph infection, last seen on 1/7/20.  Here today for new issue:     History of Present Illness: The patient presents with chief complaint of MRSA abscess. Previously seen in ER on 1/31/20 and culture showing MRSA.  Location: right lower leg  Duration: one month  Signs/Symptoms: tenderness, drainage    Prior treatments: clinda 450 mg TID, neosporin        Review of Systems   Constitutional: Negative for fever and chills.   Gastrointestinal: Negative for nausea and vomiting.   Skin: Positive for activity-related sunscreen use. Negative for daily sunscreen use and recent sunburn.   Hematologic/Lymphatic: Does not bruise/bleed easily.        Objective:    Physical Exam   Constitutional: He appears well-developed and well-nourished. No distress.   Neurological: He is alert and oriented to person, place, and time. He is not disoriented.   Psychiatric: He has a normal mood and affect.   Skin:   Areas Examined (abnormalities noted in diagram):   Head / Face Inspection Performed  Neck Inspection Performed  RLE Inspected  LLE Inspection Performed  Nails and Digits Inspection Performed             Assessment / Plan:        Abscess of right leg  -     doxycycline (MONODOX) 100 MG capsule; Take twice a day with food. May cause upset stomach  Dispense: 20 capsule; Refill: 0  -     mupirocin (BACTROBAN) 2 % ointment; Apply topically 2 (two) times daily. Apply with Q-tip  Dispense: 30 g; Refill: 3  -     Aerobic culture  -     No fluctuance noted today.  Improvement with continued drainage, will add doxy and mupirocin, continue clinda. Culture repeated today.  Recommend rest and elevation of the right leg.          Follow up in about 2 weeks (around 2/18/2020) for nurse's visit.

## 2020-02-06 DIAGNOSIS — G44.229 CHRONIC TENSION-TYPE HEADACHE, NOT INTRACTABLE: ICD-10-CM

## 2020-02-06 LAB — BACTERIA SPEC AEROBE CULT: ABNORMAL

## 2020-02-06 RX ORDER — ZONISAMIDE 50 MG/1
CAPSULE ORAL
Qty: 60 CAPSULE | Refills: 0 | Status: SHIPPED | OUTPATIENT
Start: 2020-02-06 | End: 2020-03-02

## 2020-02-08 ENCOUNTER — HOSPITAL ENCOUNTER (OUTPATIENT)
Facility: HOSPITAL | Age: 60
Discharge: HOME | End: 2020-02-10
Attending: EMERGENCY MEDICINE | Admitting: EMERGENCY MEDICINE
Payer: MEDICARE

## 2020-02-08 DIAGNOSIS — J44.1 COPD EXACERBATION: Primary | ICD-10-CM

## 2020-02-08 DIAGNOSIS — R07.9 CHEST PAIN: ICD-10-CM

## 2020-02-08 DIAGNOSIS — D72.829 LEUKOCYTOSIS, UNSPECIFIED TYPE: ICD-10-CM

## 2020-02-08 DIAGNOSIS — J44.9 CHRONIC OBSTRUCTIVE PULMONARY DISEASE, UNSPECIFIED COPD TYPE: ICD-10-CM

## 2020-02-08 DIAGNOSIS — N17.9 AKI (ACUTE KIDNEY INJURY): ICD-10-CM

## 2020-02-08 DIAGNOSIS — R06.02 SOB (SHORTNESS OF BREATH): ICD-10-CM

## 2020-02-08 PROBLEM — L02.419 LEG ABSCESS: Status: ACTIVE | Noted: 2020-02-08

## 2020-02-08 LAB
ALBUMIN SERPL BCP-MCNC: 3.9 G/DL (ref 3.5–5.2)
ALP SERPL-CCNC: 66 U/L (ref 55–135)
ALT SERPL W/O P-5'-P-CCNC: 28 U/L (ref 10–44)
ANION GAP SERPL CALC-SCNC: 13 MMOL/L (ref 8–16)
AST SERPL-CCNC: 21 U/L (ref 10–40)
BASOPHILS # BLD AUTO: 0.05 K/UL (ref 0–0.2)
BASOPHILS NFR BLD: 0.3 % (ref 0–1.9)
BILIRUB SERPL-MCNC: 0.5 MG/DL (ref 0.1–1)
BNP SERPL-MCNC: <10 PG/ML (ref 0–99)
BUN SERPL-MCNC: 24 MG/DL (ref 6–20)
CALCIUM SERPL-MCNC: 9.1 MG/DL (ref 8.7–10.5)
CHLORIDE SERPL-SCNC: 100 MMOL/L (ref 95–110)
CO2 SERPL-SCNC: 21 MMOL/L (ref 23–29)
CREAT SERPL-MCNC: 1.9 MG/DL (ref 0.5–1.4)
D DIMER PPP IA.FEU-MCNC: 0.49 MG/L FEU
DIFFERENTIAL METHOD: ABNORMAL
EOSINOPHIL # BLD AUTO: 0 K/UL (ref 0–0.5)
EOSINOPHIL NFR BLD: 0.2 % (ref 0–8)
ERYTHROCYTE [DISTWIDTH] IN BLOOD BY AUTOMATED COUNT: 12.8 % (ref 11.5–14.5)
EST. GFR  (AFRICAN AMERICAN): 43 ML/MIN/1.73 M^2
EST. GFR  (NON AFRICAN AMERICAN): 37 ML/MIN/1.73 M^2
GLUCOSE SERPL-MCNC: 121 MG/DL (ref 70–110)
HCT VFR BLD AUTO: 36.8 % (ref 40–54)
HGB BLD-MCNC: 11.8 G/DL (ref 14–18)
IMM GRANULOCYTES # BLD AUTO: 0.11 K/UL (ref 0–0.04)
IMM GRANULOCYTES NFR BLD AUTO: 0.7 % (ref 0–0.5)
LYMPHOCYTES # BLD AUTO: 1.5 K/UL (ref 1–4.8)
LYMPHOCYTES NFR BLD: 10.2 % (ref 18–48)
MCH RBC QN AUTO: 30.3 PG (ref 27–31)
MCHC RBC AUTO-ENTMCNC: 32.1 G/DL (ref 32–36)
MCV RBC AUTO: 94 FL (ref 82–98)
MONOCYTES # BLD AUTO: 2.6 K/UL (ref 0.3–1)
MONOCYTES NFR BLD: 17.2 % (ref 4–15)
NEUTROPHILS # BLD AUTO: 10.7 K/UL (ref 1.8–7.7)
NEUTROPHILS NFR BLD: 71.4 % (ref 38–73)
NRBC BLD-RTO: 0 /100 WBC
PLATELET # BLD AUTO: 261 K/UL (ref 150–350)
PMV BLD AUTO: 11.2 FL (ref 9.2–12.9)
POTASSIUM SERPL-SCNC: 3.5 MMOL/L (ref 3.5–5.1)
PROT SERPL-MCNC: 6.3 G/DL (ref 6–8.4)
RBC # BLD AUTO: 3.9 M/UL (ref 4.6–6.2)
SODIUM SERPL-SCNC: 134 MMOL/L (ref 136–145)
TROPONIN I SERPL DL<=0.01 NG/ML-MCNC: <0.006 NG/ML (ref 0–0.03)
WBC # BLD AUTO: 14.96 K/UL (ref 3.9–12.7)

## 2020-02-08 PROCEDURE — 96375 TX/PRO/DX INJ NEW DRUG ADDON: CPT

## 2020-02-08 PROCEDURE — 94640 AIRWAY INHALATION TREATMENT: CPT

## 2020-02-08 PROCEDURE — 93010 ELECTROCARDIOGRAM REPORT: CPT | Mod: ,,, | Performed by: INTERNAL MEDICINE

## 2020-02-08 PROCEDURE — 85379 FIBRIN DEGRADATION QUANT: CPT

## 2020-02-08 PROCEDURE — 83880 ASSAY OF NATRIURETIC PEPTIDE: CPT

## 2020-02-08 PROCEDURE — G0378 HOSPITAL OBSERVATION PER HR: HCPCS

## 2020-02-08 PROCEDURE — 63600175 PHARM REV CODE 636 W HCPCS: Performed by: EMERGENCY MEDICINE

## 2020-02-08 PROCEDURE — 25000242 PHARM REV CODE 250 ALT 637 W/ HCPCS: Performed by: EMERGENCY MEDICINE

## 2020-02-08 PROCEDURE — 99285 EMERGENCY DEPT VISIT HI MDM: CPT | Mod: 25

## 2020-02-08 PROCEDURE — 93010 EKG 12-LEAD: ICD-10-PCS | Mod: ,,, | Performed by: INTERNAL MEDICINE

## 2020-02-08 PROCEDURE — 96374 THER/PROPH/DIAG INJ IV PUSH: CPT

## 2020-02-08 PROCEDURE — 94761 N-INVAS EAR/PLS OXIMETRY MLT: CPT

## 2020-02-08 PROCEDURE — 84484 ASSAY OF TROPONIN QUANT: CPT

## 2020-02-08 PROCEDURE — 80053 COMPREHEN METABOLIC PANEL: CPT

## 2020-02-08 PROCEDURE — 93005 ELECTROCARDIOGRAM TRACING: CPT

## 2020-02-08 PROCEDURE — 85025 COMPLETE CBC W/AUTO DIFF WBC: CPT

## 2020-02-08 RX ORDER — KETOROLAC TROMETHAMINE 30 MG/ML
15 INJECTION, SOLUTION INTRAMUSCULAR; INTRAVENOUS
Status: COMPLETED | OUTPATIENT
Start: 2020-02-08 | End: 2020-02-08

## 2020-02-08 RX ORDER — FUROSEMIDE 40 MG/1
40 TABLET ORAL DAILY
Status: CANCELLED | OUTPATIENT
Start: 2020-02-09

## 2020-02-08 RX ORDER — IPRATROPIUM BROMIDE AND ALBUTEROL SULFATE 2.5; .5 MG/3ML; MG/3ML
3 SOLUTION RESPIRATORY (INHALATION) EVERY 5 MIN PRN
Status: COMPLETED | OUTPATIENT
Start: 2020-02-08 | End: 2020-02-08

## 2020-02-08 RX ORDER — DOXYCYCLINE 100 MG/1
100 TABLET ORAL 2 TIMES DAILY
Status: CANCELLED | OUTPATIENT
Start: 2020-02-08

## 2020-02-08 RX ORDER — METHYLPREDNISOLONE SOD SUCC 125 MG
125 VIAL (EA) INJECTION
Status: COMPLETED | OUTPATIENT
Start: 2020-02-08 | End: 2020-02-08

## 2020-02-08 RX ORDER — ASPIRIN 325 MG
325 TABLET ORAL
Status: ACTIVE | OUTPATIENT
Start: 2020-02-08 | End: 2020-02-09

## 2020-02-08 RX ORDER — CANDESARTAN 4 MG/1
4 TABLET ORAL DAILY
Status: CANCELLED | OUTPATIENT
Start: 2020-02-09

## 2020-02-08 RX ORDER — LEVOFLOXACIN 5 MG/ML
750 INJECTION, SOLUTION INTRAVENOUS
Status: COMPLETED | OUTPATIENT
Start: 2020-02-08 | End: 2020-02-08

## 2020-02-08 RX ADMIN — LEVOFLOXACIN 750 MG: 750 INJECTION, SOLUTION INTRAVENOUS at 09:02

## 2020-02-08 RX ADMIN — IPRATROPIUM BROMIDE AND ALBUTEROL SULFATE 3 ML: .5; 3 SOLUTION RESPIRATORY (INHALATION) at 08:02

## 2020-02-08 RX ADMIN — SODIUM CHLORIDE 1000 ML: 0.9 INJECTION, SOLUTION INTRAVENOUS at 09:02

## 2020-02-08 RX ADMIN — METHYLPREDNISOLONE SODIUM SUCCINATE 125 MG: 125 INJECTION, POWDER, FOR SOLUTION INTRAMUSCULAR; INTRAVENOUS at 06:02

## 2020-02-08 RX ADMIN — KETOROLAC TROMETHAMINE 15 MG: 30 INJECTION, SOLUTION INTRAMUSCULAR; INTRAVENOUS at 06:02

## 2020-02-08 NOTE — ED TRIAGE NOTES
Patient reports COPD is acting up he is coughing along with green sputum, sob, chills. Patient also has a red sore with green slough and some black eschar on inside of right inner calf.

## 2020-02-09 PROBLEM — L03.90 WOUND CELLULITIS: Status: ACTIVE | Noted: 2020-02-09

## 2020-02-09 LAB
ALBUMIN SERPL BCP-MCNC: 3.5 G/DL (ref 3.5–5.2)
ANION GAP SERPL CALC-SCNC: 13 MMOL/L (ref 8–16)
BASOPHILS # BLD AUTO: 0.01 K/UL (ref 0–0.2)
BASOPHILS NFR BLD: 0.1 % (ref 0–1.9)
BUN SERPL-MCNC: 23 MG/DL (ref 6–20)
CALCIUM SERPL-MCNC: 9.1 MG/DL (ref 8.7–10.5)
CALCIUM SERPL-MCNC: 9.1 MG/DL (ref 8.7–10.5)
CALCIUM SERPL-MCNC: 9.2 MG/DL (ref 8.7–10.5)
CHLORIDE SERPL-SCNC: 105 MMOL/L (ref 95–110)
CO2 SERPL-SCNC: 18 MMOL/L (ref 23–29)
CREAT SERPL-MCNC: 1.3 MG/DL (ref 0.5–1.4)
CRP SERPL-MCNC: 155 MG/L (ref 0–8.2)
DIFFERENTIAL METHOD: ABNORMAL
EOSINOPHIL # BLD AUTO: 0 K/UL (ref 0–0.5)
EOSINOPHIL NFR BLD: 0 % (ref 0–8)
ERYTHROCYTE [DISTWIDTH] IN BLOOD BY AUTOMATED COUNT: 12.3 % (ref 11.5–14.5)
EST. GFR  (AFRICAN AMERICAN): >60 ML/MIN/1.73 M^2
EST. GFR  (NON AFRICAN AMERICAN): 59 ML/MIN/1.73 M^2
GLUCOSE SERPL-MCNC: 208 MG/DL (ref 70–110)
GLUCOSE SERPL-MCNC: 208 MG/DL (ref 70–110)
GLUCOSE SERPL-MCNC: 211 MG/DL (ref 70–110)
HCT VFR BLD AUTO: 32.4 % (ref 40–54)
HGB BLD-MCNC: 10.7 G/DL (ref 14–18)
IMM GRANULOCYTES # BLD AUTO: 0.11 K/UL (ref 0–0.04)
IMM GRANULOCYTES NFR BLD AUTO: 0.9 % (ref 0–0.5)
LYMPHOCYTES # BLD AUTO: 0.7 K/UL (ref 1–4.8)
LYMPHOCYTES NFR BLD: 5.6 % (ref 18–48)
MCH RBC QN AUTO: 30.3 PG (ref 27–31)
MCHC RBC AUTO-ENTMCNC: 33 G/DL (ref 32–36)
MCV RBC AUTO: 92 FL (ref 82–98)
MONOCYTES # BLD AUTO: 0.7 K/UL (ref 0.3–1)
MONOCYTES NFR BLD: 5.8 % (ref 4–15)
NEUTROPHILS # BLD AUTO: 10.6 K/UL (ref 1.8–7.7)
NEUTROPHILS NFR BLD: 87.6 % (ref 38–73)
NRBC BLD-RTO: 0 /100 WBC
PHOSPHATE SERPL-MCNC: 2.3 MG/DL (ref 2.7–4.5)
PLATELET # BLD AUTO: 235 K/UL (ref 150–350)
PMV BLD AUTO: 10.6 FL (ref 9.2–12.9)
POCT GLUCOSE: 182 MG/DL (ref 70–110)
POCT GLUCOSE: 199 MG/DL (ref 70–110)
POCT GLUCOSE: 201 MG/DL (ref 70–110)
POCT GLUCOSE: 226 MG/DL (ref 70–110)
POTASSIUM SERPL-SCNC: 3.4 MMOL/L (ref 3.5–5.1)
PROCALCITONIN SERPL IA-MCNC: 0.16 NG/ML
RBC # BLD AUTO: 3.53 M/UL (ref 4.6–6.2)
SODIUM SERPL-SCNC: 136 MMOL/L (ref 136–145)
TROPONIN I SERPL DL<=0.01 NG/ML-MCNC: <0.006 NG/ML (ref 0–0.03)
TROPONIN I SERPL DL<=0.01 NG/ML-MCNC: <0.006 NG/ML (ref 0–0.03)
WBC # BLD AUTO: 12.07 K/UL (ref 3.9–12.7)

## 2020-02-09 PROCEDURE — 84145 PROCALCITONIN (PCT): CPT

## 2020-02-09 PROCEDURE — 36415 COLL VENOUS BLD VENIPUNCTURE: CPT

## 2020-02-09 PROCEDURE — 96376 TX/PRO/DX INJ SAME DRUG ADON: CPT | Performed by: EMERGENCY MEDICINE

## 2020-02-09 PROCEDURE — 63600175 PHARM REV CODE 636 W HCPCS: Performed by: FAMILY MEDICINE

## 2020-02-09 PROCEDURE — 94640 AIRWAY INHALATION TREATMENT: CPT

## 2020-02-09 PROCEDURE — 63600175 PHARM REV CODE 636 W HCPCS: Performed by: EMERGENCY MEDICINE

## 2020-02-09 PROCEDURE — 25000242 PHARM REV CODE 250 ALT 637 W/ HCPCS: Performed by: EMERGENCY MEDICINE

## 2020-02-09 PROCEDURE — 25000003 PHARM REV CODE 250: Performed by: PHYSICIAN ASSISTANT

## 2020-02-09 PROCEDURE — 96361 HYDRATE IV INFUSION ADD-ON: CPT | Performed by: EMERGENCY MEDICINE

## 2020-02-09 PROCEDURE — 80069 RENAL FUNCTION PANEL: CPT

## 2020-02-09 PROCEDURE — 86140 C-REACTIVE PROTEIN: CPT

## 2020-02-09 PROCEDURE — 80048 BASIC METABOLIC PNL TOTAL CA: CPT

## 2020-02-09 PROCEDURE — 25000003 PHARM REV CODE 250: Performed by: NURSE PRACTITIONER

## 2020-02-09 PROCEDURE — 25000003 PHARM REV CODE 250: Performed by: EMERGENCY MEDICINE

## 2020-02-09 PROCEDURE — 84484 ASSAY OF TROPONIN QUANT: CPT

## 2020-02-09 PROCEDURE — 25000003 PHARM REV CODE 250: Performed by: FAMILY MEDICINE

## 2020-02-09 PROCEDURE — 96372 THER/PROPH/DIAG INJ SC/IM: CPT | Mod: 59 | Performed by: EMERGENCY MEDICINE

## 2020-02-09 PROCEDURE — 94761 N-INVAS EAR/PLS OXIMETRY MLT: CPT

## 2020-02-09 PROCEDURE — 85025 COMPLETE CBC W/AUTO DIFF WBC: CPT

## 2020-02-09 PROCEDURE — 63600175 PHARM REV CODE 636 W HCPCS: Performed by: NURSE PRACTITIONER

## 2020-02-09 PROCEDURE — G0378 HOSPITAL OBSERVATION PER HR: HCPCS

## 2020-02-09 RX ORDER — QUETIAPINE FUMARATE 100 MG/1
300 TABLET, FILM COATED ORAL DAILY
Status: DISCONTINUED | OUTPATIENT
Start: 2020-02-09 | End: 2020-02-09

## 2020-02-09 RX ORDER — GABAPENTIN 300 MG/1
300 CAPSULE ORAL 2 TIMES DAILY
Status: DISCONTINUED | OUTPATIENT
Start: 2020-02-09 | End: 2020-02-10 | Stop reason: HOSPADM

## 2020-02-09 RX ORDER — METHYLPREDNISOLONE SOD SUCC 125 MG
80 VIAL (EA) INJECTION 2 TIMES DAILY
Status: DISCONTINUED | OUTPATIENT
Start: 2020-02-09 | End: 2020-02-10 | Stop reason: HOSPADM

## 2020-02-09 RX ORDER — LEVOFLOXACIN 5 MG/ML
750 INJECTION, SOLUTION INTRAVENOUS
Status: DISCONTINUED | OUTPATIENT
Start: 2020-02-09 | End: 2020-02-10 | Stop reason: HOSPADM

## 2020-02-09 RX ORDER — INSULIN ASPART 100 [IU]/ML
0-5 INJECTION, SOLUTION INTRAVENOUS; SUBCUTANEOUS
Status: DISCONTINUED | OUTPATIENT
Start: 2020-02-09 | End: 2020-02-10 | Stop reason: HOSPADM

## 2020-02-09 RX ORDER — SODIUM CHLORIDE, SODIUM LACTATE, POTASSIUM CHLORIDE, CALCIUM CHLORIDE 600; 310; 30; 20 MG/100ML; MG/100ML; MG/100ML; MG/100ML
INJECTION, SOLUTION INTRAVENOUS CONTINUOUS
Status: DISCONTINUED | OUTPATIENT
Start: 2020-02-09 | End: 2020-02-09

## 2020-02-09 RX ORDER — IBUPROFEN 200 MG
16 TABLET ORAL
Status: DISCONTINUED | OUTPATIENT
Start: 2020-02-09 | End: 2020-02-10 | Stop reason: HOSPADM

## 2020-02-09 RX ORDER — QUETIAPINE FUMARATE 100 MG/1
300 TABLET, FILM COATED ORAL DAILY
Status: DISCONTINUED | OUTPATIENT
Start: 2020-02-09 | End: 2020-02-10 | Stop reason: HOSPADM

## 2020-02-09 RX ORDER — ONDANSETRON 2 MG/ML
4 INJECTION INTRAMUSCULAR; INTRAVENOUS EVERY 6 HOURS PRN
Status: DISCONTINUED | OUTPATIENT
Start: 2020-02-09 | End: 2020-02-10 | Stop reason: HOSPADM

## 2020-02-09 RX ORDER — PANTOPRAZOLE SODIUM 40 MG/1
40 TABLET, DELAYED RELEASE ORAL DAILY
Status: DISCONTINUED | OUTPATIENT
Start: 2020-02-09 | End: 2020-02-10 | Stop reason: HOSPADM

## 2020-02-09 RX ORDER — GUAIFENESIN 600 MG/1
600 TABLET, EXTENDED RELEASE ORAL 2 TIMES DAILY
Status: DISCONTINUED | OUTPATIENT
Start: 2020-02-09 | End: 2020-02-10 | Stop reason: HOSPADM

## 2020-02-09 RX ORDER — SODIUM CHLORIDE 0.9 % (FLUSH) 0.9 %
10 SYRINGE (ML) INJECTION
Status: DISCONTINUED | OUTPATIENT
Start: 2020-02-09 | End: 2020-02-10 | Stop reason: HOSPADM

## 2020-02-09 RX ORDER — IBUPROFEN 200 MG
24 TABLET ORAL
Status: DISCONTINUED | OUTPATIENT
Start: 2020-02-09 | End: 2020-02-10 | Stop reason: HOSPADM

## 2020-02-09 RX ORDER — MUPIROCIN 20 MG/G
OINTMENT TOPICAL 2 TIMES DAILY
Status: DISCONTINUED | OUTPATIENT
Start: 2020-02-09 | End: 2020-02-10 | Stop reason: HOSPADM

## 2020-02-09 RX ORDER — TOPIRAMATE 25 MG/1
25 TABLET ORAL 2 TIMES DAILY
Status: DISCONTINUED | OUTPATIENT
Start: 2020-02-09 | End: 2020-02-10 | Stop reason: HOSPADM

## 2020-02-09 RX ORDER — GLUCAGON 1 MG
1 KIT INJECTION
Status: DISCONTINUED | OUTPATIENT
Start: 2020-02-09 | End: 2020-02-10 | Stop reason: HOSPADM

## 2020-02-09 RX ORDER — ACETAMINOPHEN 325 MG/1
650 TABLET ORAL EVERY 8 HOURS PRN
Status: DISCONTINUED | OUTPATIENT
Start: 2020-02-09 | End: 2020-02-10 | Stop reason: HOSPADM

## 2020-02-09 RX ORDER — CLINDAMYCIN HYDROCHLORIDE 150 MG/1
450 CAPSULE ORAL EVERY 8 HOURS
Status: DISCONTINUED | OUTPATIENT
Start: 2020-02-09 | End: 2020-02-10 | Stop reason: HOSPADM

## 2020-02-09 RX ORDER — BUSPIRONE HYDROCHLORIDE 10 MG/1
10 TABLET ORAL 3 TIMES DAILY
Status: DISCONTINUED | OUTPATIENT
Start: 2020-02-09 | End: 2020-02-10 | Stop reason: HOSPADM

## 2020-02-09 RX ORDER — IPRATROPIUM BROMIDE AND ALBUTEROL SULFATE 2.5; .5 MG/3ML; MG/3ML
3 SOLUTION RESPIRATORY (INHALATION) EVERY 4 HOURS
Status: DISCONTINUED | OUTPATIENT
Start: 2020-02-09 | End: 2020-02-10 | Stop reason: HOSPADM

## 2020-02-09 RX ORDER — ONDANSETRON 2 MG/ML
4 INJECTION INTRAMUSCULAR; INTRAVENOUS EVERY 8 HOURS PRN
Status: DISCONTINUED | OUTPATIENT
Start: 2020-02-09 | End: 2020-02-09 | Stop reason: SDUPTHER

## 2020-02-09 RX ORDER — HYDRALAZINE HYDROCHLORIDE 20 MG/ML
10 INJECTION INTRAMUSCULAR; INTRAVENOUS EVERY 8 HOURS PRN
Status: DISCONTINUED | OUTPATIENT
Start: 2020-02-09 | End: 2020-02-10 | Stop reason: HOSPADM

## 2020-02-09 RX ORDER — SODIUM CHLORIDE 9 MG/ML
1000 INJECTION, SOLUTION INTRAVENOUS CONTINUOUS
Status: DISCONTINUED | OUTPATIENT
Start: 2020-02-09 | End: 2020-02-09

## 2020-02-09 RX ORDER — AMLODIPINE BESYLATE 5 MG/1
10 TABLET ORAL DAILY
Status: DISCONTINUED | OUTPATIENT
Start: 2020-02-09 | End: 2020-02-10 | Stop reason: HOSPADM

## 2020-02-09 RX ORDER — IPRATROPIUM BROMIDE AND ALBUTEROL SULFATE 2.5; .5 MG/3ML; MG/3ML
3 SOLUTION RESPIRATORY (INHALATION) EVERY 4 HOURS
Status: DISCONTINUED | OUTPATIENT
Start: 2020-02-09 | End: 2020-02-09 | Stop reason: SDUPTHER

## 2020-02-09 RX ORDER — BUPROPION HYDROCHLORIDE 150 MG/1
300 TABLET ORAL EVERY MORNING
Status: DISCONTINUED | OUTPATIENT
Start: 2020-02-09 | End: 2020-02-10 | Stop reason: HOSPADM

## 2020-02-09 RX ORDER — TAMSULOSIN HYDROCHLORIDE 0.4 MG/1
1 CAPSULE ORAL DAILY
Status: DISCONTINUED | OUTPATIENT
Start: 2020-02-09 | End: 2020-02-10 | Stop reason: HOSPADM

## 2020-02-09 RX ORDER — SIMVASTATIN 40 MG/1
80 TABLET, FILM COATED ORAL DAILY
Status: DISCONTINUED | OUTPATIENT
Start: 2020-02-09 | End: 2020-02-10 | Stop reason: HOSPADM

## 2020-02-09 RX ORDER — POTASSIUM CHLORIDE 20 MEQ/1
40 TABLET, EXTENDED RELEASE ORAL ONCE
Status: COMPLETED | OUTPATIENT
Start: 2020-02-09 | End: 2020-02-09

## 2020-02-09 RX ORDER — HEPARIN SODIUM 5000 [USP'U]/ML
5000 INJECTION, SOLUTION INTRAVENOUS; SUBCUTANEOUS EVERY 8 HOURS
Status: DISCONTINUED | OUTPATIENT
Start: 2020-02-09 | End: 2020-02-10 | Stop reason: HOSPADM

## 2020-02-09 RX ORDER — TRAMADOL HYDROCHLORIDE 50 MG/1
50 TABLET ORAL EVERY 6 HOURS PRN
Status: DISCONTINUED | OUTPATIENT
Start: 2020-02-09 | End: 2020-02-10 | Stop reason: HOSPADM

## 2020-02-09 RX ADMIN — METHYLPREDNISOLONE SODIUM SUCCINATE 80 MG: 125 INJECTION, POWDER, FOR SOLUTION INTRAMUSCULAR; INTRAVENOUS at 09:02

## 2020-02-09 RX ADMIN — POTASSIUM CHLORIDE 40 MEQ: 1500 TABLET, EXTENDED RELEASE ORAL at 09:02

## 2020-02-09 RX ADMIN — QUETIAPINE FUMARATE 300 MG: 100 TABLET ORAL at 09:02

## 2020-02-09 RX ADMIN — IPRATROPIUM BROMIDE AND ALBUTEROL SULFATE 3 ML: .5; 3 SOLUTION RESPIRATORY (INHALATION) at 11:02

## 2020-02-09 RX ADMIN — TAMSULOSIN HYDROCHLORIDE 0.4 MG: 0.4 CAPSULE ORAL at 09:02

## 2020-02-09 RX ADMIN — MUPIROCIN: 20 OINTMENT TOPICAL at 01:02

## 2020-02-09 RX ADMIN — SIMVASTATIN 80 MG: 40 TABLET, FILM COATED ORAL at 09:02

## 2020-02-09 RX ADMIN — IPRATROPIUM BROMIDE AND ALBUTEROL SULFATE 3 ML: .5; 3 SOLUTION RESPIRATORY (INHALATION) at 04:02

## 2020-02-09 RX ADMIN — CLINDAMYCIN HYDROCHLORIDE 450 MG: 150 CAPSULE ORAL at 05:02

## 2020-02-09 RX ADMIN — GABAPENTIN 300 MG: 300 CAPSULE ORAL at 01:02

## 2020-02-09 RX ADMIN — BUSPIRONE HYDROCHLORIDE 10 MG: 10 TABLET ORAL at 02:02

## 2020-02-09 RX ADMIN — IPRATROPIUM BROMIDE AND ALBUTEROL SULFATE 3 ML: .5; 3 SOLUTION RESPIRATORY (INHALATION) at 08:02

## 2020-02-09 RX ADMIN — TOPIRAMATE 25 MG: 25 TABLET, FILM COATED ORAL at 09:02

## 2020-02-09 RX ADMIN — GABAPENTIN 300 MG: 300 CAPSULE ORAL at 09:02

## 2020-02-09 RX ADMIN — GUAIFENESIN 600 MG: 600 TABLET, EXTENDED RELEASE ORAL at 09:02

## 2020-02-09 RX ADMIN — PANTOPRAZOLE SODIUM 40 MG: 40 TABLET, DELAYED RELEASE ORAL at 09:02

## 2020-02-09 RX ADMIN — IPRATROPIUM BROMIDE AND ALBUTEROL SULFATE 3 ML: .5; 3 SOLUTION RESPIRATORY (INHALATION) at 05:02

## 2020-02-09 RX ADMIN — INSULIN ASPART 2 UNITS: 100 INJECTION, SOLUTION INTRAVENOUS; SUBCUTANEOUS at 05:02

## 2020-02-09 RX ADMIN — BUPROPION HYDROCHLORIDE 300 MG: 150 TABLET, FILM COATED, EXTENDED RELEASE ORAL at 06:02

## 2020-02-09 RX ADMIN — SODIUM CHLORIDE, SODIUM LACTATE, POTASSIUM CHLORIDE, AND CALCIUM CHLORIDE: .6; .31; .03; .02 INJECTION, SOLUTION INTRAVENOUS at 09:02

## 2020-02-09 RX ADMIN — AMLODIPINE BESYLATE 10 MG: 5 TABLET ORAL at 09:02

## 2020-02-09 RX ADMIN — BUSPIRONE HYDROCHLORIDE 10 MG: 10 TABLET ORAL at 09:02

## 2020-02-09 RX ADMIN — MUPIROCIN: 20 OINTMENT TOPICAL at 09:02

## 2020-02-09 RX ADMIN — IPRATROPIUM BROMIDE AND ALBUTEROL SULFATE 3 ML: .5; 3 SOLUTION RESPIRATORY (INHALATION) at 12:02

## 2020-02-09 RX ADMIN — HEPARIN SODIUM 5000 UNITS: 5000 INJECTION, SOLUTION INTRAVENOUS; SUBCUTANEOUS at 02:02

## 2020-02-09 RX ADMIN — IPRATROPIUM BROMIDE AND ALBUTEROL SULFATE 3 ML: .5; 3 SOLUTION RESPIRATORY (INHALATION) at 02:02

## 2020-02-09 RX ADMIN — TOPIRAMATE 25 MG: 25 TABLET, FILM COATED ORAL at 01:02

## 2020-02-09 RX ADMIN — HEPARIN SODIUM 5000 UNITS: 5000 INJECTION, SOLUTION INTRAVENOUS; SUBCUTANEOUS at 09:02

## 2020-02-09 RX ADMIN — SODIUM CHLORIDE 1000 ML: 0.9 INJECTION, SOLUTION INTRAVENOUS at 01:02

## 2020-02-09 RX ADMIN — GUAIFENESIN 600 MG: 600 TABLET, EXTENDED RELEASE ORAL at 01:02

## 2020-02-09 RX ADMIN — LEVOFLOXACIN 750 MG: 750 INJECTION, SOLUTION INTRAVENOUS at 10:02

## 2020-02-09 RX ADMIN — CLINDAMYCIN HYDROCHLORIDE 450 MG: 150 CAPSULE ORAL at 09:02

## 2020-02-09 RX ADMIN — HEPARIN SODIUM 5000 UNITS: 5000 INJECTION, SOLUTION INTRAVENOUS; SUBCUTANEOUS at 05:02

## 2020-02-09 RX ADMIN — CLINDAMYCIN HYDROCHLORIDE 450 MG: 150 CAPSULE ORAL at 02:02

## 2020-02-09 NOTE — ED PROVIDER NOTES
Encounter Date: 2/8/2020    SCRIBE #1 NOTE: I, Emeka Berman, am scribing for, and in the presence of,  Alma Rosa Barlow MD. I have scribed the following portions of the note - Other sections scribed: HPI, ROS, PE.       History     Chief Complaint   Patient presents with    Chest Pain     chest pain sharp left anterior 8/10;   hx of     Shortness of Breath     sob x3 day     CC: Shortness of Breath    HPI: This is a 59 y.o. male with PMHx HTN, HLD, DM, COPD, double pneumonia, mitral valve prolapse (at 24 y.o.) c/o SOB with associated productive coughs w/ green sputum, CP w/ coughs, back pain for past 3 days.  Pt had attempted treatment by using breathing machine at home and taking OTC cough syrup without relief.  States he has been taking abx (Clindamycin, Doxycycline) and applying Mupirocin salve for this R lower leg cellulitis with improvement.  Otherwise reports compliance with daily meds.  .        The history is provided by the patient and medical records. No  was used.     Review of patient's allergies indicates:   Allergen Reactions    Sulfa (sulfonamide antibiotics) Rash     Past Medical History:   Diagnosis Date    Bipolar 1 disorder, mixed     BPH (benign prostatic hypertrophy)     Cyst, eyelid     Dr. Broussard    Diabetes mellitus     GERD (gastroesophageal reflux disease)     Hyperlipidemia     Hypertension     Lumbar degenerative disc disease 3/7/2019    Migraine headache     Obesity      Past Surgical History:   Procedure Laterality Date    CERVICAL SPINE SURGERY      COLONOSCOPY N/A 7/27/2017    Procedure: COLONOSCOPY;  Surgeon: Genaro Nix MD;  Location: [facility];  Service: Endoscopy;  Laterality: N/A;    EYE SURGERY Left 03/2017    cyst removal on eyelid; Dr. Broussard    SHOULDER SURGERY      TONSILLECTOMY       Family History   Problem Relation Age of Onset    Cataracts Mother     Cancer Mother         throat    Hypertension Mother      Hypertension Father     Stroke Father         2 strokes    Hypertension Sister     Cancer Brother         spinal, kidney, spinal fluid    Hypertension Brother     Cancer Cousin         breast?     Social History     Tobacco Use    Smoking status: Former Smoker     Packs/day: 2.00     Years: 15.00     Pack years: 30.00     Types: Cigarettes     Last attempt to quit: 1984     Years since quittin.7    Smokeless tobacco: Never Used    Tobacco comment: Patient quit smoking at the age of 24 yo.   Substance Use Topics    Alcohol use: No    Drug use: No     Review of Systems   Constitutional: Negative for chills and fever.   HENT: Negative for congestion and sore throat.    Eyes: Negative for visual disturbance.   Respiratory: Positive for cough and shortness of breath.    Cardiovascular: Positive for chest pain (w/ coughing).   Gastrointestinal: Negative for abdominal pain, nausea and vomiting.   Genitourinary: Negative for dysuria.   Musculoskeletal: Positive for back pain.   Skin: Positive for wound.   Neurological: Negative for headaches.   Psychiatric/Behavioral: Negative for confusion.       Physical Exam     Initial Vitals [20 1711]   BP Pulse Resp Temp SpO2   123/65 90 20 98.8 °F (37.1 °C) 98 %      MAP       --         Physical Exam    Nursing note and vitals reviewed.  Constitutional: He appears well-developed and well-nourished. He is not diaphoretic. No distress.   HENT:   Mouth/Throat: Oropharynx is clear and moist.   Eyes: Pupils are equal, round, and reactive to light.   Neck: Neck supple.   Cardiovascular: Normal rate and regular rhythm.   Pulmonary/Chest: No respiratory distress. He has wheezes. He has no rhonchi. He has no rales.   end expiratory wheezing.   Abdominal: Soft. Bowel sounds are normal.   Musculoskeletal: He exhibits no edema.   Neurological: He is alert and oriented to person, place, and time.   Skin: Skin is warm and dry.   2cm x 2cm ulcerated lesion to right medial  calf without surrounding erythema.  Granulation tissue present.   Psychiatric: He has a normal mood and affect.         ED Course   Procedures  Labs Reviewed   CBC W/ AUTO DIFFERENTIAL - Abnormal; Notable for the following components:       Result Value    WBC 14.96 (*)     RBC 3.90 (*)     Hemoglobin 11.8 (*)     Hematocrit 36.8 (*)     Immature Granulocytes 0.7 (*)     Gran # (ANC) 10.7 (*)     Immature Grans (Abs) 0.11 (*)     Mono # 2.6 (*)     Lymph% 10.2 (*)     Mono% 17.2 (*)     All other components within normal limits   COMPREHENSIVE METABOLIC PANEL - Abnormal; Notable for the following components:    Sodium 134 (*)     CO2 21 (*)     Glucose 121 (*)     BUN, Bld 24 (*)     Creatinine 1.9 (*)     eGFR if  43 (*)     eGFR if non  37 (*)     All other components within normal limits    Narrative:     Recoll. [id number] by [facility] at 02/08/2020 18:51, reason: Specimen   hemolyzed,Tube has been refrigerated. called to KATHERINE Ruiz   02/08/2020  18:51   B-TYPE NATRIURETIC PEPTIDE    Narrative:     Recoll. [id number] by [facility] at 02/08/2020 18:51, reason: Specimen   hemolyzed,Tube has been refrigerated. called to KATHERINE Ruiz   02/08/2020  18:51   D DIMER, QUANTITATIVE    Narrative:     Recoll. [id number] by [facility] at 02/08/2020 18:51, reason: Specimen   hemolyzed,Tube has been refrigerated. called to KATHERINE Ruiz   02/08/2020  18:51   TROPONIN I    Narrative:     Recoll. [id number] by [facility] at 02/08/2020 18:51, reason: Specimen   hemolyzed,Tube has been refrigerated. called to KATHERINE Ruiz   02/08/2020  18:51     EKG Readings: (Independently Interpreted)   Normal sinus rhythm, rate 88 beats per minute, no STEMI.  Normal OH interval.   milliseconds.       Imaging Results          X-Ray Chest AP Portable (Final result)  Result time 02/08/20 18:55:27    Final result by Krishan Espinal MD (02/08/20 18:55:27)                 Impression:      No acute process.      Electronically signed by: Krishan  MD Teddy  Date:    02/08/2020  Time:    18:55             Narrative:    EXAMINATION:  XR CHEST AP PORTABLE    CLINICAL HISTORY:  cough;    TECHNIQUE:  Single frontal view of the chest was performed.    COMPARISON:  02/20/2019.    FINDINGS:  Monitoring EKG leads are present.  The trachea is unremarkable.  The cardiomediastinal silhouette is at the upper limits of normal for an AP projection.  The hemidiaphragms are unremarkable.  There is no evidence of free air beneath the hemidiaphragms.  There are no pleural effusions.  There is no evidence of a pneumothorax.  There is no evidence of pneumomediastinum.  No airspace opacity is present.    There are postoperative changes in the lower cervical spine.  There are chronic deformities involving the right upper ribs.  The remainder of the osseous structures are unremarkable.                                 Medical Decision Making:   Initial Assessment:   59-year-old male with history of diabetes, hypertension, hyperlipidemia, COPD presents with cough, chest pain, shortness of breath. Patient reports cough productive of green sputum.  He in fact shows this to me in an emesis bag.  His exam is notable for end expiratory wheezes without any respiratory distress. He has a wound to his right lower extremity that appears to be healing and does not appear infected at this time.  He has been on clindamycin, mupirocin ointment and doxycycline.  My overall impression this patient is pneumonia versus COPD exacerbation versus URI versus new onset CHF.  Low suspicion for ACS as chest pain occurs only when coughing.  Other consideration would be PE.  Workup initiated with labs including cardiac markers, chest x-ray, will treat with duo nebs and steroids and reassess.  Clinical Tests:   Lab Tests: Ordered and Reviewed  Radiological Study: Ordered and Reviewed  ED Management:  Patient's workup shows in a KI.  His baseline creatinine appears to be around 1 and tonight it is 1.9.  His  cardiac markers and D-dimer negative. He has a leukocytosis.  His chest x-ray does not show any focal infiltrate.  He has a plan to treat this patient with IV fluids and continue duo nebs overnight.  Given production of green sputum with a leukocytosis, I will broaden his antibiotic coverage with Levaquin.  Case discussed with Tyrell Smith for placement in the observation unit.     Additional MDM:   Heart Score:    History:          Slightly suspicious.  ECG:             Normal  Age:               45-65 years  Risk factors: >= 3 risk factors or history of atherosclerotic disease  Troponin:       Less than or equal to normal limit  Final Score: 3             Scribe Attestation:   Scribe #1: I performed the above scribed service and the documentation accurately describes the services I performed. I attest to the accuracy of the note.                          Clinical Impression:       ICD-10-CM ICD-9-CM   1. COPD exacerbation J44.1 491.21   2. Chest pain R07.9 786.50   3. SOB (shortness of breath) R06.02 786.05   4. Leukocytosis, unspecified type D72.829 288.60   5. ORION (acute kidney injury) N17.9 584.9       I, Alma Rosa Barlow MD, personally performed the services described in this documentation. All medical record entries made by the scribe were at my direction and in my presence. I have reviewed the chart and agree that the record reflects my personal performance and is accurate and complete.                      Alma Rosa Barlow MD  02/08/20 6417

## 2020-02-09 NOTE — NURSING
Arrived to unit with tele monitoring in process. Patient AAOx3. Placed in contact precaution room. Placed on tele SR with 1st degree AVB. Admit assessment completed. Educated on call first. Bed low and locked. Call bell in reach. Non-skid socks applied. NS infusing at 100 ml/hr to right arm. Site free from reddness, swelling, or erythema. NAD noted. Stable.

## 2020-02-09 NOTE — HPI
Pt is a 60 yo M with a h/o DM2, COPD, HTN, mitral valve prolapse (at age 24) who presented to the ER for chest pain, and dyspnea.  Pt had dyspnea alogn with cough productive of green phlegm for about 3 days now.  Pt tried some OTC meds along with breathing treatments at home without improvement.  His girlfried was worried and took him to the urgent care today, and they asked him to get admitted.  Pt states taht recently he was discahrged on clindamycin for a R leg abscess/cellulitis and his dermatologist manoj added doxycycline to it few days ago.

## 2020-02-09 NOTE — H&P
[facility] Medicine  History & Physical    Patient Name: Scott Bansal  MRN: [id number]  Admission Date: 2/8/2020  Attending Physician: Katiuska Ulrich MD   Primary Care Provider: Kaylie Chau MD         Patient information was obtained from ER records.     Subjective:       Chief Complaint:   Chief Complaint   Patient presents with    Chest Pain     chest pain sharp left anterior 8/10;   hx of     Shortness of Breath     sob x3 day        HPI: Pt is a 58 yo M with a h/o DM2, COPD, HTN, mitral valve prolapse (at age 24) who presented to the ER for chest pain, and dyspnea.  Pt had dyspnea alogn with cough productive of green phlegm for about 3 days now.  Pt tried some OTC meds along with breathing treatments at home without improvement.  His girlfried was worried and took him to the urgent care today, and they asked him to get admitted.  Pt states taht recently he was discahrged on clindamycin for a R leg abscess/cellulitis and his dermatologist manoj added doxycycline to it few days ago.          Past Medical History:   Diagnosis Date    Bipolar 1 disorder, mixed     BPH (benign prostatic hypertrophy)     Cyst, eyelid     Dr. Broussard    Diabetes mellitus     GERD (gastroesophageal reflux disease)     Hyperlipidemia     Hypertension     Lumbar degenerative disc disease 3/7/2019    Migraine headache     Obesity        Past Surgical History:   Procedure Laterality Date    CERVICAL SPINE SURGERY      COLONOSCOPY N/A 7/27/2017    Procedure: COLONOSCOPY;  Surgeon: Genaro Nix MD;  Location: [facility];  Service: Endoscopy;  Laterality: N/A;    EYE SURGERY Left 03/2017    cyst removal on eyelid; Dr. Broussard    SHOULDER SURGERY      TONSILLECTOMY         Review of patient's allergies indicates:   Allergen Reactions    Sulfa (sulfonamide antibiotics) Rash       Current Facility-Administered Medications on File Prior to Encounter   Medication    silver  nitrate applicators applicator 1 applicator     Current Outpatient Medications on File Prior to Encounter   Medication Sig    albuterol (VENTOLIN HFA) 90 mcg/actuation inhaler INHALE 2 PUFFS EVERY 4 HOURS AS NEEDED    amLODIPine (NORVASC) 10 MG tablet Take 1 tablet (10 mg total) by mouth once daily.    BREO ELLIPTA 100-25 mcg/dose diskus inhaler INHALE 1 PUFF INTO THE LUNGS ONCE DAILY. CONTROLLER    buPROPion (WELLBUTRIN XL) 300 MG 24 hr tablet Take 300 mg by mouth every morning.    busPIRone (BUSPAR) 10 MG tablet Take 10 mg by mouth 3 (three) times daily.    candesartan (ATACAND) 4 MG tablet Take 1 tablet (4 mg total) by mouth once daily.    clindamycin (CLEOCIN) 150 MG capsule Take 3 capsules (450 mg total) by mouth every 8 (eight) hours. for 10 days    crisaborole (EUCRISA) 2 % Oint Use for hand eczema bid.    diclofenac sodium (VOLTAREN) 1 % Gel Apply 2 g topically once daily.    doxycycline (MONODOX) 100 MG capsule Take twice a day with food. May cause upset stomach    fluticasone (FLONASE) 50 mcg/actuation nasal spray TAKE 1 SPRAY BY EACH NARE ROUTE ONCE DAILY.    fluticasone furoate-vilanterol (BREO) 100-25 mcg/dose diskus inhaler Inhale 1 puff into the lungs once daily. Controller    folic acid (FOLVITE) 800 MCG Tab Take 800 mcg by mouth once daily.    furosemide (LASIX) 40 MG tablet TAKE 1 TABLET BY MOUTH EVERY DAY    gabapentin (NEURONTIN) 300 MG capsule TAKE 1 CAPSULE BY MOUTH TWICE A DAY    gemfibrozil (LOPID) 600 MG tablet TAKE 1 TABLET (600 MG TOTAL) BY MOUTH 2 (TWO) TIMES DAILY BEFORE MEALS.    irbesartan (AVAPRO) 300 MG tablet Take 1 tablet (300 mg total) by mouth every evening.    levocetirizine (XYZAL) 5 MG tablet Take 1 tablet each morning.    metFORMIN (GLUCOPHAGE) 1000 MG tablet TAKE 1 TABLET BY MOUTH TWICE A DAY    mometasone 0.1% (ELOCON) 0.1 % cream Apply topically once daily. AAA bid prn for hand eczema    mupirocin (BACTROBAN) 2 % ointment Apply topically 2 (two) times  daily. Apply with Q-tip    pantoprazole (PROTONIX) 40 MG tablet TAKE 1 TABLET BY MOUTH DAILY    quetiapine (SEROQUEL) 300 MG Tab Take by mouth.    simvastatin (ZOCOR) 80 MG tablet TAKE 1 TABLET BY MOUTH EVERY DAY    sumatriptan (IMITREX) 100 MG tablet TAKE 1/2 TAB AT ONSET OF HEADACHE,THEN 1/2 TAB IN 1 HOUR IF NEEDED.MAX 1 TABLET/IN 24 HOURS.    tamsulosin (FLOMAX) 0.4 mg Cap TAKE 1 CAPSULE BY MOUTH EVERY DAY    triamcinolone acetonide 0.1% (KENALOG) 0.1 % cream AAA bid prn for rash on chest and arms. Do not use on face, underarms or groin    zonisamide (ZONEGRAN) 50 MG Cap TAKE 1 CAPSULE BY MOUTH TWICE A DAY    benzonatate (TESSALON) 200 MG capsule Take 1 capsule (200 mg total) by mouth 3 (three) times daily as needed for Cough.    LIDOCAINE VISCOUS 2 % solution     topiramate 25 mg capsule Take 1 capsule (25 mg total) by mouth 2 (two) times daily.    water Liqd 150 mL with Milk of Magnesia 400 mg/5 mL Susp 400 mg, diphenhydrAMINE 12.5 mg/5 mL Elix 60 mg, nystatin 100,000 unit/mL Susp 500,000 Units SWISH AND SPIT 15 MLS EVERY 4 HOURS    [DISCONTINUED] betamethasone dipropionate (DIPROLENE) 0.05 % cream APPLY TOPICALLY ONCE DAILY. FOR 10 DAYS     Family History     Problem Relation (Age of Onset)    Cancer Mother, Brother, Cousin    Cataracts Mother    Hypertension Mother, Father, Sister, Brother    Stroke Father        Tobacco Use    Smoking status: Former Smoker     Packs/day: 2.00     Years: 15.00     Pack years: 30.00     Types: Cigarettes     Last attempt to quit: 1984     Years since quittin.7    Smokeless tobacco: Never Used    Tobacco comment: Patient quit smoking at the age of 24 yo.   Substance and Sexual Activity    Alcohol use: No    Drug use: No    Sexual activity: Yes     Partners: Female     Review of Systems   Constitutional: Negative for diaphoresis and fever.   HENT: Negative for facial swelling and nosebleeds.    Eyes: Negative for pain and visual disturbance.    Respiratory: + for cough and chest tightness.    Cardiovascular: + for chest pain   Gastrointestinal: Negative for diarrhea and nausea.   Endocrine: Negative for cold intolerance and heat intolerance.   Genitourinary: Negative for hematuria and urgency.   Musculoskeletal: Negative for arthralgias and + back pain.   Skin: + R leg abscess/cellulitis.   Neurological: Negative for syncope and speech difficulty.   Hematological: Negative for adenopathy. Does not bruise/bleed easily.   Psychiatric/Behavioral: Negative for confusion and hallucinations.     Objective:     Vital Signs (Most Recent):  Temp: 98.8 °F (37.1 °C) (02/08/20 1711)  Pulse: 87 (02/08/20 2048)  Resp: 20 (02/08/20 2048)  BP: (!) 117/55 (02/08/20 1901)  SpO2: 96 % (02/08/20 2048) Vital Signs (24h Range):  Temp:  [98.8 °F (37.1 °C)] 98.8 °F (37.1 °C)  Pulse:  [85-90] 87  Resp:  [20-26] 20  SpO2:  [94 %-99 %] 96 %  BP: (115-137)/(55-77) 117/55     Weight: 111.6 kg (246 lb)  Body mass index is 36.33 kg/m².    Physical Exam   Constitutional: No distress.   HENT:   Head: Normocephalic and atraumatic.   Eyes: Pupils are equal, round, and reactive to light. EOM are normal.   Neck: Normal range of motion. No tracheal deviation present.   Cardiovascular: Normal rate and regular rhythm.   Pulmonary/Chest: Effort normal and breath sounds with BL wheezing.   Abdominal: Soft. Bowel sounds are normal.   Musculoskeletal: Normal range of motion. He exhibits no deformity.   Neurological: He is alert. He exhibits normal muscle tone.   Skin: Skin is warm. Capillary refill takes 2 to 3 seconds. He is not diaphoretic. No pallor. + RLE ulcerated region with surrounding cellulitis around the calf.  Psychiatric: He has a normal mood and affect. His behavior is normal.   Vitals reviewed.       Significant Labs: All pertinent labs within the past 24 hours have been reviewed.    Significant Imaging: I have reviewed all pertinent imaging results/findings within the past 24  hours.    Assessment/Plan:     Leg abscess  Wound care   Was MRSA +, Recently was on clindamycin, then in addition was put on doxycycline by dermatology.  Does not need both.  Would just continue with clindamycin      ORION (acute kidney injury)  Likely prerenal.  Gentle IVF.  Trend crt.  Hold metformin, lasix, ARB.        COPD exacerbation  IV abx, duonebs, steroids, oxygen support      Bipolar 1 disorder  Takes seroquel      Essential hypertension  Resume norvasc.  Hold ARB and lasix in setting of ARF  Prn hydralazine      Hyperlipidemia  Takes gembrozil and zocor as outpatient      Type 2 diabetes mellitus without complication  Non-insulin dependent.  Hold home metformin.  Accuchecks/SSI      VTE Risk Mitigation (From admission, onward)         Ordered     heparin (porcine) injection 5,000 Units  Every 8 hours      02/09/20 0013     IP VTE HIGH RISK PATIENT  Once      02/09/20 0013     Place sequential compression device  Until discontinued      02/09/20 0013                   Silvano Torres MD - Family & Internal Medicine  [facility]  Ph: [phone]

## 2020-02-09 NOTE — ASSESSMENT & PLAN NOTE
Wound care   Was MRSA +, Recently was on clindamycin, then in addition was put on doxycycline by dermatology.  Does not need both.  Would just continue with clindamycin

## 2020-02-09 NOTE — SUBJECTIVE & OBJECTIVE
Past Medical History:   Diagnosis Date    Bipolar 1 disorder, mixed     BPH (benign prostatic hypertrophy)     Cyst, eyelid     Dr. Broussard    Diabetes mellitus     GERD (gastroesophageal reflux disease)     Hyperlipidemia     Hypertension     Lumbar degenerative disc disease 3/7/2019    Migraine headache     Obesity        Past Surgical History:   Procedure Laterality Date    CERVICAL SPINE SURGERY      COLONOSCOPY N/A 7/27/2017    Procedure: COLONOSCOPY;  Surgeon: Genaro Nix MD;  Location: [facility];  Service: Endoscopy;  Laterality: N/A;    EYE SURGERY Left 03/2017    cyst removal on eyelid; Dr. Broussard    SHOULDER SURGERY      TONSILLECTOMY         Review of patient's allergies indicates:   Allergen Reactions    Sulfa (sulfonamide antibiotics) Rash       Current Facility-Administered Medications on File Prior to Encounter   Medication    silver nitrate applicators applicator 1 applicator     Current Outpatient Medications on File Prior to Encounter   Medication Sig    albuterol (VENTOLIN HFA) 90 mcg/actuation inhaler INHALE 2 PUFFS EVERY 4 HOURS AS NEEDED    amLODIPine (NORVASC) 10 MG tablet Take 1 tablet (10 mg total) by mouth once daily.    BREO ELLIPTA 100-25 mcg/dose diskus inhaler INHALE 1 PUFF INTO THE LUNGS ONCE DAILY. CONTROLLER    buPROPion (WELLBUTRIN XL) 300 MG 24 hr tablet Take 300 mg by mouth every morning.    busPIRone (BUSPAR) 10 MG tablet Take 10 mg by mouth 3 (three) times daily.    candesartan (ATACAND) 4 MG tablet Take 1 tablet (4 mg total) by mouth once daily.    clindamycin (CLEOCIN) 150 MG capsule Take 3 capsules (450 mg total) by mouth every 8 (eight) hours. for 10 days    crisaborole (EUCRISA) 2 % Oint Use for hand eczema bid.    diclofenac sodium (VOLTAREN) 1 % Gel Apply 2 g topically once daily.    doxycycline (MONODOX) 100 MG capsule Take twice a day with food. May cause upset stomach    fluticasone (FLONASE) 50 mcg/actuation nasal spray TAKE 1  SPRAY BY EACH NARE ROUTE ONCE DAILY.    fluticasone furoate-vilanterol (BREO) 100-25 mcg/dose diskus inhaler Inhale 1 puff into the lungs once daily. Controller    folic acid (FOLVITE) 800 MCG Tab Take 800 mcg by mouth once daily.    furosemide (LASIX) 40 MG tablet TAKE 1 TABLET BY MOUTH EVERY DAY    gabapentin (NEURONTIN) 300 MG capsule TAKE 1 CAPSULE BY MOUTH TWICE A DAY    gemfibrozil (LOPID) 600 MG tablet TAKE 1 TABLET (600 MG TOTAL) BY MOUTH 2 (TWO) TIMES DAILY BEFORE MEALS.    irbesartan (AVAPRO) 300 MG tablet Take 1 tablet (300 mg total) by mouth every evening.    levocetirizine (XYZAL) 5 MG tablet Take 1 tablet each morning.    metFORMIN (GLUCOPHAGE) 1000 MG tablet TAKE 1 TABLET BY MOUTH TWICE A DAY    mometasone 0.1% (ELOCON) 0.1 % cream Apply topically once daily. AAA bid prn for hand eczema    mupirocin (BACTROBAN) 2 % ointment Apply topically 2 (two) times daily. Apply with Q-tip    pantoprazole (PROTONIX) 40 MG tablet TAKE 1 TABLET BY MOUTH DAILY    quetiapine (SEROQUEL) 300 MG Tab Take by mouth.    simvastatin (ZOCOR) 80 MG tablet TAKE 1 TABLET BY MOUTH EVERY DAY    sumatriptan (IMITREX) 100 MG tablet TAKE 1/2 TAB AT ONSET OF HEADACHE,THEN 1/2 TAB IN 1 HOUR IF NEEDED.MAX 1 TABLET/IN 24 HOURS.    tamsulosin (FLOMAX) 0.4 mg Cap TAKE 1 CAPSULE BY MOUTH EVERY DAY    triamcinolone acetonide 0.1% (KENALOG) 0.1 % cream AAA bid prn for rash on chest and arms. Do not use on face, underarms or groin    zonisamide (ZONEGRAN) 50 MG Cap TAKE 1 CAPSULE BY MOUTH TWICE A DAY    benzonatate (TESSALON) 200 MG capsule Take 1 capsule (200 mg total) by mouth 3 (three) times daily as needed for Cough.    LIDOCAINE VISCOUS 2 % solution     topiramate 25 mg capsule Take 1 capsule (25 mg total) by mouth 2 (two) times daily.    water Liqd 150 mL with Milk of Magnesia 400 mg/5 mL Susp 400 mg, diphenhydrAMINE 12.5 mg/5 mL Elix 60 mg, nystatin 100,000 unit/mL Susp 500,000 Units SWISH AND SPIT 15 MLS EVERY 4  HOURS    [DISCONTINUED] betamethasone dipropionate (DIPROLENE) 0.05 % cream APPLY TOPICALLY ONCE DAILY. FOR 10 DAYS     Family History     Problem Relation (Age of Onset)    Cancer Mother, Brother, Cousin    Cataracts Mother    Hypertension Mother, Father, Sister, Brother    Stroke Father        Tobacco Use    Smoking status: Former Smoker     Packs/day: 2.00     Years: 15.00     Pack years: 30.00     Types: Cigarettes     Last attempt to quit: 1984     Years since quittin.7    Smokeless tobacco: Never Used    Tobacco comment: Patient quit smoking at the age of 26 yo.   Substance and Sexual Activity    Alcohol use: No    Drug use: No    Sexual activity: Yes     Partners: Female     Review of Systems   Constitutional: Negative for diaphoresis and fever.   HENT: Negative for facial swelling and nosebleeds.    Eyes: Negative for pain and visual disturbance.   Respiratory: Negative for cough and chest tightness.    Cardiovascular: Negative for chest pain and palpitations.   Gastrointestinal: Negative for diarrhea and nausea.   Endocrine: Negative for cold intolerance and heat intolerance.   Genitourinary: Negative for hematuria and urgency.   Musculoskeletal: Negative for arthralgias and back pain.   Skin: Negative for pallor and rash.   Neurological: Negative for syncope and speech difficulty.   Hematological: Negative for adenopathy. Does not bruise/bleed easily.   Psychiatric/Behavioral: Negative for confusion and hallucinations.     Objective:     Vital Signs (Most Recent):  Temp: 98.8 °F (37.1 °C) (20 171)  Pulse: 87 (20)  Resp: 20 (20)  BP: (!) 117/55 (20 1901)  SpO2: 96 % (20) Vital Signs (24h Range):  Temp:  [98.8 °F (37.1 °C)] 98.8 °F (37.1 °C)  Pulse:  [85-90] 87  Resp:  [20-26] 20  SpO2:  [94 %-99 %] 96 %  BP: (115-137)/(55-77) 117/55     Weight: 111.6 kg (246 lb)  Body mass index is 36.33 kg/m².    Physical Exam   Constitutional: No distress.    HENT:   Head: Normocephalic and atraumatic.   Eyes: Pupils are equal, round, and reactive to light. EOM are normal.   Neck: Normal range of motion. No tracheal deviation present.   Cardiovascular: Normal rate and regular rhythm.   Pulmonary/Chest: Effort normal and breath sounds normal.   Abdominal: Soft. Bowel sounds are normal.   Musculoskeletal: Normal range of motion. He exhibits no deformity.   Neurological: He is alert. He exhibits normal muscle tone.   Skin: Skin is warm. Capillary refill takes 2 to 3 seconds. He is not diaphoretic. No pallor.   Psychiatric: He has a normal mood and affect. His behavior is normal.   Vitals reviewed.       Significant Labs: All pertinent labs within the past 24 hours have been reviewed.    Significant Imaging: I have reviewed all pertinent imaging results/findings within the past 24 hours.

## 2020-02-09 NOTE — ASSESSMENT & PLAN NOTE
IV abx, duonebs, steroids, oxygen support  CXR no infiltration  Still with expiratory wheezing through out   Continue Levaquin, steroid, duoneb

## 2020-02-09 NOTE — ASSESSMENT & PLAN NOTE
Lab Results   Component Value Date    HGBA1C 5.5 12/04/2019   Non-insulin dependent.  Hold home metformin.  Accuchecks/SSI

## 2020-02-09 NOTE — SUBJECTIVE & OBJECTIVE
Interval History: Breathing not at baseline and non productive cough.    Review of Systems   Constitutional: Negative for diaphoresis and fever.   HENT: Negative for facial swelling and nosebleeds.    Eyes: Negative for pain and visual disturbance.   Respiratory: Positive for cough, shortness of breath and wheezing. Negative for chest tightness.    Cardiovascular: Negative for chest pain and palpitations.   Gastrointestinal: Negative for diarrhea and nausea.   Endocrine: Negative for cold intolerance and heat intolerance.   Genitourinary: Negative for hematuria and urgency.   Musculoskeletal: Negative for arthralgias and back pain.   Skin: Positive for wound. Negative for pallor and rash.   Neurological: Negative for syncope and speech difficulty.   Hematological: Negative for adenopathy. Does not bruise/bleed easily.   Psychiatric/Behavioral: Negative for confusion and hallucinations.     Objective:     Vital Signs (Most Recent):  Temp: 98.1 °F (36.7 °C) (02/09/20 1152)  Pulse: 102 (02/09/20 1210)  Resp: 17 (02/09/20 1210)  BP: 126/77 (02/09/20 1152)  SpO2: 98 % (02/09/20 1210) Vital Signs (24h Range):  Temp:  [97.7 °F (36.5 °C)-98.8 °F (37.1 °C)] 98.1 °F (36.7 °C)  Pulse:  [] 102  Resp:  [17-26] 17  SpO2:  [93 %-99 %] 98 %  BP: (105-159)/(55-80) 126/77     Weight: 108.3 kg (238 lb 12.1 oz)  Body mass index is 35.26 kg/m².    Intake/Output Summary (Last 24 hours) at 2/9/2020 1408  Last data filed at 2/9/2020 0500  Gross per 24 hour   Intake 515 ml   Output 950 ml   Net -435 ml      Physical Exam   Constitutional: He is oriented to person, place, and time. He appears well-developed and well-nourished. No distress.   HENT:   Head: Atraumatic.   Eyes: EOM are normal.   Neck: Neck supple.   Cardiovascular: Normal rate and regular rhythm.   Pulmonary/Chest: Wheezes: scattered inspiratory wheezes.   Abdominal: Soft. Bowel sounds are normal. He exhibits no distension.   Musculoskeletal: Normal range of motion. He  exhibits no edema.   Neurological: He is alert and oriented to person, place, and time.   Skin: Skin is warm and dry.   RLE wound /cellulitis (see media)-Quarter size wound with granulating material with erythema sounding.        Significant Labs: All pertinent labs within the past 24 hours have been reviewed.    Significant Imaging: I have reviewed and interpreted all pertinent imaging results/findings within the past 24 hours.

## 2020-02-09 NOTE — PLAN OF CARE
"SW met with patient to complete discharge planning assessment. Prior to beginning the discharge planning assessment, patient verified name and date of birth. SW educated patient on the discharge process and explained that discharge planning begins at admission. SW reviewed  "Help at home", "Managing your health", and "Your preferences". Patient stated that his girlfriend is his help at home. SW wrote name and phone number on white communication board.     02/09/20 1502   Discharge Assessment   Assessment Type Discharge Planning Assessment   Confirmed/corrected address and phone number on facesheet? Yes   Assessment information obtained from? Patient   Communicated expected length of stay with patient/caregiver yes   Prior to hospitilization cognitive status: Alert/Oriented   Prior to hospitalization functional status: Independent   Current cognitive status: Alert/Oriented   Current Functional Status: Independent   Facility Arrived From: Home   Lives With significant other   Able to Return to Prior Arrangements yes   Is patient able to care for self after discharge? Yes   Patient's perception of discharge disposition home or selfcare   Readmission Within the Last 30 Days no previous admission in last 30 days   Patient currently being followed by outpatient case management? No   Patient currently receives any other outside agency services? No   Equipment Currently Used at Home none   Do you have any problems affording any of your prescribed medications? No   Is the patient taking medications as prescribed? yes   Does the patient have transportation home? Yes   Transportation Anticipated family or friend will provide   Does the patient receive services at the [facility]? No   Discharge Plan A Home  (PCP/Pulm)   Discharge Plan B Home  (PCP/Pulm)   DME Needed Upon Discharge  none   Patient/Family in Agreement with Plan yes   Does the patient have transportation to healthcare appointments? Yes   Kaylie Chau, " MD      [id number] - [place], [place] - [address]  [address]  [place] [address]  Phone: [phone] Fax: [phone]    [place], [place] - [address]  [address]  [address]  [facility] [address]  Phone: [phone] Fax: [phone]    [place], [place] - [address] [facility]  [address] [address]  Phone: [phone] Fax: [phone]    [facility] - [place], [place] - [address]  [address] [address]  [address] [place] [address]  Phone: [phone] Fax: [phone]      Extended Emergency Contact Information  Primary Emergency Contact: Kamila Bush  Address: [address] [place] [address] [place] [address] [facility]  Home Phone: [phone]  Mobile Phone: [phone]  Relation: Significant other

## 2020-02-09 NOTE — ASSESSMENT & PLAN NOTE
Likely prerenal.  Gentle IVF.  Trend crt.  Hold metformin, lasix, ARB.    Improving. Follow up repeat RFP

## 2020-02-09 NOTE — PLAN OF CARE
Problem: Adult Inpatient Plan of Care  Goal: Plan of Care Review  Outcome: Ongoing, Progressing  Goal: Patient-Specific Goal (Individualization)  Outcome: Ongoing, Progressing  Goal: Absence of Hospital-Acquired Illness or Injury  Outcome: Ongoing, Progressing  Goal: Optimal Comfort and Wellbeing  Outcome: Ongoing, Progressing     Problem: Infection  Goal: Infection Symptom Resolution  Outcome: Ongoing, Progressing     Problem: Fall Injury Risk  Goal: Absence of Fall and Fall-Related Injury  Outcome: Ongoing, Progressing

## 2020-02-09 NOTE — PROGRESS NOTES
[facility] Hospital Medicine  Progress Note    Patient Name: Scott Bansal  MRN: [id number]  Patient Class: OP- Observation   Admission Date: 2/8/2020  Length of Stay: 0 days  Attending Physician: Katiuska Ulrich MD  Primary Care Provider: Kaylie Chau MD        Subjective:     Principal Problem:COPD exacerbation        HPI:  Pt is a 60 yo M with a h/o DM2, COPD, HTN, mitral valve prolapse (at age 24) who presented to the ER for chest pain, and dyspnea.  Pt had dyspnea alogn with cough productive of green phlegm for about 3 days now.  Pt tried some OTC meds along with breathing treatments at home without improvement.  His girlfried was worried and took him to the urgent care today, and they asked him to get admitted.  Pt states taht recently he was discahrged on clindamycin for a R leg abscess/cellulitis and his dermatologist manoj added doxycycline to it few days ago.          Overview/Hospital Course:  No notes on file    Interval History: Breathing not at baseline and non productive cough.     Review of Systems   Constitutional: Negative for diaphoresis and fever.   HENT: Negative for facial swelling and nosebleeds.    Eyes: Negative for pain and visual disturbance.   Respiratory: Positive for cough, shortness of breath and wheezing. Negative for chest tightness.    Cardiovascular: Negative for chest pain and palpitations.   Gastrointestinal: Negative for diarrhea and nausea.   Endocrine: Negative for cold intolerance and heat intolerance.   Genitourinary: Negative for hematuria and urgency.   Musculoskeletal: Negative for arthralgias and back pain.   Skin: Positive for wound. Negative for pallor and rash.   Neurological: Negative for syncope and speech difficulty.   Hematological: Negative for adenopathy. Does not bruise/bleed easily.   Psychiatric/Behavioral: Negative for confusion and hallucinations.     Objective:     Vital Signs (Most Recent):  Temp: 98.1 °F (36.7 °C) (02/09/20  1152)  Pulse: 102 (02/09/20 1210)  Resp: 17 (02/09/20 1210)  BP: 126/77 (02/09/20 1152)  SpO2: 98 % (02/09/20 1210) Vital Signs (24h Range):  Temp:  [97.7 °F (36.5 °C)-98.8 °F (37.1 °C)] 98.1 °F (36.7 °C)  Pulse:  [] 102  Resp:  [17-26] 17  SpO2:  [93 %-99 %] 98 %  BP: (105-159)/(55-80) 126/77     Weight: 108.3 kg (238 lb 12.1 oz)  Body mass index is 35.26 kg/m².    Intake/Output Summary (Last 24 hours) at 2/9/2020 1408  Last data filed at 2/9/2020 0500  Gross per 24 hour   Intake 515 ml   Output 950 ml   Net -435 ml      Physical Exam   Constitutional: He is oriented to person, place, and time. He appears well-developed and well-nourished. No distress.   HENT:   Head: Atraumatic.   Eyes: EOM are normal.   Neck: Neck supple.   Cardiovascular: Normal rate and regular rhythm.   Pulmonary/Chest: Wheezes: scattered inspiratory wheezes.   Abdominal: Soft. Bowel sounds are normal. He exhibits no distension.   Musculoskeletal: Normal range of motion. He exhibits no edema.   Neurological: He is alert and oriented to person, place, and time.   Skin: Skin is warm and dry.   RLE wound /cellulitis (see media)-Quarter size wound with granulating material with erythema sounding.        Significant Labs: All pertinent labs within the past 24 hours have been reviewed.    Significant Imaging: I have reviewed and interpreted all pertinent imaging results/findings within the past 24 hours.      Assessment/Plan:      * COPD exacerbation  IV abx, duonebs, steroids, oxygen support  CXR no infiltration  Still with expiratory wheezing through out   Continue Levaquin, steroid, duoneb    Wound cellulitis        Leg abscess  Wound care   Was MRSA +, Recently was on clindamycin, then in addition was put on doxycycline by dermatology.  Does not need both.  Would just continue with clindamycin      ORION (acute kidney injury)  Likely prerenal.  Gentle IVF.  Trend crt.  Hold metformin, lasix, ARB.    Improving. Follow up repeat RFP    Bipolar  1 disorder  Takes seroquel      Essential hypertension  Resume norvasc.  Hold ARB and lasix in setting of ARF  Prn hydralazine      Hyperlipidemia  Takes gembrozil and zocor as outpatient      Type 2 diabetes mellitus without complication  Lab Results   Component Value Date    HGBA1C 5.5 12/04/2019   Non-insulin dependent.  Hold home metformin.  Accuchecks/SSI        VTE Risk Mitigation (From admission, onward)         Ordered     heparin (porcine) injection 5,000 Units  Every 8 hours      02/09/20 0013     IP VTE HIGH RISK PATIENT  Once      02/09/20 0013     Place sequential compression device  Until discontinued      02/09/20 0013                      Reba Kenney NP  Department of Hospital Medicine   [facility]

## 2020-02-09 NOTE — H&P
[facility] Hospital Medicine  History & Physical    Patient Name: Scott Bansal  MRN: [id number]  Admission Date: 2/8/2020  Attending Physician: Katiuska Ulrich MD   Primary Care Provider: Kaylie Chau MD         Patient information was obtained from patient and ER records.     Subjective:     Principal Problem:COPD exacerbation    Chief Complaint:   Chief Complaint   Patient presents with    Chest Pain     chest pain sharp left anterior 8/10;   hx of     Shortness of Breath     sob x3 day        HPI: Pt is a 60 yo M with a h/o DM2, COPD, HTN, mitral valve prolapse (at age 24) who presented to the ER for chest pain, and dyspnea.  Pt had dyspnea alogn with cough productive of green phlegm for about 3 days now.  Pt tried some OTC meds along with breathing treatments at home without improvement.  His girlfried was worried and took him to the urgent care today, and they asked him to get admitted.  Pt states taht recently he was discahrged on clindamycin for a R leg abscess/cellulitis and his dermatologist manoj added doxycycline to it few days ago.          Interval History: Still with productive white brownish sputum and wheezing.     Review of Systems   Constitutional: Negative for diaphoresis and fever.   HENT: Negative for facial swelling and nosebleeds.    Eyes: Negative for pain and visual disturbance.   Respiratory: Positive for cough, shortness of breath and wheezing. Negative for chest tightness.    Cardiovascular: Negative for chest pain and palpitations.   Gastrointestinal: Negative for diarrhea and nausea.   Endocrine: Negative for cold intolerance and heat intolerance.   Genitourinary: Negative for hematuria and urgency.   Musculoskeletal: Negative for arthralgias and back pain.   Skin: Positive for wound. Negative for pallor and rash.   Neurological: Negative for syncope and speech difficulty.   Hematological: Negative for adenopathy. Does not bruise/bleed easily.    Psychiatric/Behavioral: Negative for confusion and hallucinations.     Objective:     Vital Signs (Most Recent):  Temp: 98.1 °F (36.7 °C) (02/09/20 1152)  Pulse: 102 (02/09/20 1210)  Resp: 17 (02/09/20 1210)  BP: 126/77 (02/09/20 1152)  SpO2: 98 % (02/09/20 1210) Vital Signs (24h Range):  Temp:  [97.7 °F (36.5 °C)-98.8 °F (37.1 °C)] 98.1 °F (36.7 °C)  Pulse:  [] 102  Resp:  [17-26] 17  SpO2:  [93 %-99 %] 98 %  BP: (105-159)/(55-80) 126/77     Weight: 108.3 kg (238 lb 12.1 oz)  Body mass index is 35.26 kg/m².    Intake/Output Summary (Last 24 hours) at 2/9/2020 1345  Last data filed at 2/9/2020 0500  Gross per 24 hour   Intake 515 ml   Output 950 ml   Net -435 ml      Physical Exam   Constitutional: He is oriented to person, place, and time. He appears well-developed and well-nourished. No distress.   HENT:   Head: Atraumatic.   Eyes: EOM are normal.   Neck: Neck supple.   Cardiovascular: Normal rate and regular rhythm.   Pulmonary/Chest: Wheezes: scattered inspiratory wheezes.   Abdominal: Soft. Bowel sounds are normal. He exhibits no distension.   Musculoskeletal: Normal range of motion. He exhibits no edema.   Neurological: He is alert and oriented to person, place, and time.   Skin: Skin is warm and dry.   RLE wound /cellulitis (see media)-Quarter size wound with granulating material with erythema sounding.        Significant Labs: All pertinent labs within the past 24 hours have been reviewed.    Significant Imaging: I have reviewed and interpreted all pertinent imaging results/findings within the past 24 hours.    Assessment/Plan:     * COPD exacerbation  IV abx, duonebs, steroids, oxygen support  CXR no infiltration  Still with expiratory wheezing through out   Continue Levaquin, steroid, duoneb    Wound cellulitis        Leg abscess  Wound care   Was MRSA +, Recently was on clindamycin, then in addition was put on doxycycline by dermatology.  Does not need both.  Would just continue with  clindamycin      ORION (acute kidney injury)  Likely prerenal.  Gentle IVF.  Trend crt.  Hold metformin, lasix, ARB.    Improving. Follow up repeat RFP    Bipolar 1 disorder  Takes seroquel      Essential hypertension  Resume norvasc.  Hold ARB and lasix in setting of ARF  Prn hydralazine      Hyperlipidemia  Takes gembrozil and zocor as outpatient      Type 2 diabetes mellitus without complication  Lab Results   Component Value Date    HGBA1C 5.5 12/04/2019   Non-insulin dependent.  Hold home metformin.  Accuchecks/SSI        VTE Risk Mitigation (From admission, onward)         Ordered     heparin (porcine) injection 5,000 Units  Every 8 hours      02/09/20 0013     IP VTE HIGH RISK PATIENT  Once      02/09/20 0013     Place sequential compression device  Until discontinued      02/09/20 0013                   Reba Kenney NP  Department of Hospital Medicine   [facility]

## 2020-02-09 NOTE — SUBJECTIVE & OBJECTIVE
Interval History: Still with productive white brownish sputum and wheezing.     Review of Systems   Constitutional: Negative for diaphoresis and fever.   HENT: Negative for facial swelling and nosebleeds.    Eyes: Negative for pain and visual disturbance.   Respiratory: Positive for cough, shortness of breath and wheezing. Negative for chest tightness.    Cardiovascular: Negative for chest pain and palpitations.   Gastrointestinal: Negative for diarrhea and nausea.   Endocrine: Negative for cold intolerance and heat intolerance.   Genitourinary: Negative for hematuria and urgency.   Musculoskeletal: Negative for arthralgias and back pain.   Skin: Positive for wound. Negative for pallor and rash.   Neurological: Negative for syncope and speech difficulty.   Hematological: Negative for adenopathy. Does not bruise/bleed easily.   Psychiatric/Behavioral: Negative for confusion and hallucinations.     Objective:     Vital Signs (Most Recent):  Temp: 98.1 °F (36.7 °C) (02/09/20 1152)  Pulse: 102 (02/09/20 1210)  Resp: 17 (02/09/20 1210)  BP: 126/77 (02/09/20 1152)  SpO2: 98 % (02/09/20 1210) Vital Signs (24h Range):  Temp:  [97.7 °F (36.5 °C)-98.8 °F (37.1 °C)] 98.1 °F (36.7 °C)  Pulse:  [] 102  Resp:  [17-26] 17  SpO2:  [93 %-99 %] 98 %  BP: (105-159)/(55-80) 126/77     Weight: 108.3 kg (238 lb 12.1 oz)  Body mass index is 35.26 kg/m².    Intake/Output Summary (Last 24 hours) at 2/9/2020 1345  Last data filed at 2/9/2020 0500  Gross per 24 hour   Intake 515 ml   Output 950 ml   Net -435 ml      Physical Exam   Constitutional: He is oriented to person, place, and time. He appears well-developed and well-nourished. No distress.   HENT:   Head: Atraumatic.   Eyes: EOM are normal.   Neck: Neck supple.   Cardiovascular: Normal rate and regular rhythm.   Pulmonary/Chest: Wheezes: scattered inspiratory wheezes.   Abdominal: Soft. Bowel sounds are normal. He exhibits no distension.   Musculoskeletal: Normal range of  motion. He exhibits no edema.   Neurological: He is alert and oriented to person, place, and time.   Skin: Skin is warm and dry.   RLE wound /cellulitis (see media)-Quarter size wound with granulating material with erythema sounding.        Significant Labs: All pertinent labs within the past 24 hours have been reviewed.    Significant Imaging: I have reviewed and interpreted all pertinent imaging results/findings within the past 24 hours.

## 2020-02-10 VITALS
DIASTOLIC BLOOD PRESSURE: 71 MMHG | BODY MASS INDEX: 36.83 KG/M2 | HEIGHT: 69 IN | OXYGEN SATURATION: 97 % | WEIGHT: 248.69 LBS | SYSTOLIC BLOOD PRESSURE: 133 MMHG | RESPIRATION RATE: 18 BRPM | HEART RATE: 99 BPM | TEMPERATURE: 98 F

## 2020-02-10 LAB
ANION GAP SERPL CALC-SCNC: 10 MMOL/L (ref 8–16)
BASOPHILS # BLD AUTO: 0.01 K/UL (ref 0–0.2)
BASOPHILS NFR BLD: 0.1 % (ref 0–1.9)
BUN SERPL-MCNC: 24 MG/DL (ref 6–20)
CALCIUM SERPL-MCNC: 9.2 MG/DL (ref 8.7–10.5)
CHLORIDE SERPL-SCNC: 109 MMOL/L (ref 95–110)
CO2 SERPL-SCNC: 19 MMOL/L (ref 23–29)
CREAT SERPL-MCNC: 1.1 MG/DL (ref 0.5–1.4)
DIFFERENTIAL METHOD: ABNORMAL
EOSINOPHIL # BLD AUTO: 0 K/UL (ref 0–0.5)
EOSINOPHIL NFR BLD: 0 % (ref 0–8)
ERYTHROCYTE [DISTWIDTH] IN BLOOD BY AUTOMATED COUNT: 12.4 % (ref 11.5–14.5)
EST. GFR  (AFRICAN AMERICAN): >60 ML/MIN/1.73 M^2
EST. GFR  (NON AFRICAN AMERICAN): >60 ML/MIN/1.73 M^2
GLUCOSE SERPL-MCNC: 183 MG/DL (ref 70–110)
HCT VFR BLD AUTO: 31.7 % (ref 40–54)
HGB BLD-MCNC: 10 G/DL (ref 14–18)
IMM GRANULOCYTES # BLD AUTO: 0.15 K/UL (ref 0–0.04)
IMM GRANULOCYTES NFR BLD AUTO: 1.1 % (ref 0–0.5)
LYMPHOCYTES # BLD AUTO: 0.9 K/UL (ref 1–4.8)
LYMPHOCYTES NFR BLD: 6.4 % (ref 18–48)
MCH RBC QN AUTO: 29.5 PG (ref 27–31)
MCHC RBC AUTO-ENTMCNC: 31.5 G/DL (ref 32–36)
MCV RBC AUTO: 94 FL (ref 82–98)
MONOCYTES # BLD AUTO: 0.9 K/UL (ref 0.3–1)
MONOCYTES NFR BLD: 6.3 % (ref 4–15)
NEUTROPHILS # BLD AUTO: 11.9 K/UL (ref 1.8–7.7)
NEUTROPHILS NFR BLD: 86.1 % (ref 38–73)
NRBC BLD-RTO: 0 /100 WBC
PLATELET # BLD AUTO: 269 K/UL (ref 150–350)
PMV BLD AUTO: 10.9 FL (ref 9.2–12.9)
POCT GLUCOSE: 239 MG/DL (ref 70–110)
POTASSIUM SERPL-SCNC: 3.9 MMOL/L (ref 3.5–5.1)
RBC # BLD AUTO: 3.39 M/UL (ref 4.6–6.2)
SODIUM SERPL-SCNC: 138 MMOL/L (ref 136–145)
WBC # BLD AUTO: 13.85 K/UL (ref 3.9–12.7)

## 2020-02-10 PROCEDURE — 94640 AIRWAY INHALATION TREATMENT: CPT

## 2020-02-10 PROCEDURE — 94761 N-INVAS EAR/PLS OXIMETRY MLT: CPT

## 2020-02-10 PROCEDURE — 36415 COLL VENOUS BLD VENIPUNCTURE: CPT

## 2020-02-10 PROCEDURE — 25000003 PHARM REV CODE 250: Performed by: FAMILY MEDICINE

## 2020-02-10 PROCEDURE — 25000003 PHARM REV CODE 250: Performed by: EMERGENCY MEDICINE

## 2020-02-10 PROCEDURE — 80048 BASIC METABOLIC PNL TOTAL CA: CPT

## 2020-02-10 PROCEDURE — 25000242 PHARM REV CODE 250 ALT 637 W/ HCPCS: Performed by: EMERGENCY MEDICINE

## 2020-02-10 PROCEDURE — 85025 COMPLETE CBC W/AUTO DIFF WBC: CPT

## 2020-02-10 PROCEDURE — 25000003 PHARM REV CODE 250: Performed by: NURSE PRACTITIONER

## 2020-02-10 PROCEDURE — G0378 HOSPITAL OBSERVATION PER HR: HCPCS

## 2020-02-10 PROCEDURE — 96372 THER/PROPH/DIAG INJ SC/IM: CPT | Mod: 59 | Performed by: EMERGENCY MEDICINE

## 2020-02-10 PROCEDURE — 63600175 PHARM REV CODE 636 W HCPCS: Performed by: FAMILY MEDICINE

## 2020-02-10 PROCEDURE — 96376 TX/PRO/DX INJ SAME DRUG ADON: CPT | Performed by: EMERGENCY MEDICINE

## 2020-02-10 RX ORDER — PREDNISONE 20 MG/1
40 TABLET ORAL DAILY
Qty: 10 TABLET | Refills: 0 | Status: SHIPPED | OUTPATIENT
Start: 2020-02-10 | End: 2020-02-15

## 2020-02-10 RX ORDER — LEVOFLOXACIN 750 MG/1
750 TABLET ORAL DAILY
Qty: 5 TABLET | Refills: 0 | Status: SHIPPED | OUTPATIENT
Start: 2020-02-10 | End: 2020-02-15

## 2020-02-10 RX ORDER — ZONISAMIDE 25 MG/1
50 CAPSULE ORAL 2 TIMES DAILY
Status: DISCONTINUED | OUTPATIENT
Start: 2020-02-10 | End: 2020-02-10 | Stop reason: HOSPADM

## 2020-02-10 RX ORDER — GUAIFENESIN 600 MG/1
600 TABLET, EXTENDED RELEASE ORAL 2 TIMES DAILY
Qty: 20 TABLET | Refills: 0 | Status: SHIPPED | OUTPATIENT
Start: 2020-02-10 | End: 2020-02-20

## 2020-02-10 RX ADMIN — ZONISAMIDE 50 MG: 25 CAPSULE ORAL at 09:02

## 2020-02-10 RX ADMIN — BUSPIRONE HYDROCHLORIDE 10 MG: 10 TABLET ORAL at 08:02

## 2020-02-10 RX ADMIN — GUAIFENESIN 600 MG: 600 TABLET, EXTENDED RELEASE ORAL at 08:02

## 2020-02-10 RX ADMIN — METHYLPREDNISOLONE SODIUM SUCCINATE 80 MG: 125 INJECTION, POWDER, FOR SOLUTION INTRAMUSCULAR; INTRAVENOUS at 12:02

## 2020-02-10 RX ADMIN — HEPARIN SODIUM 5000 UNITS: 5000 INJECTION, SOLUTION INTRAVENOUS; SUBCUTANEOUS at 05:02

## 2020-02-10 RX ADMIN — GABAPENTIN 300 MG: 300 CAPSULE ORAL at 09:02

## 2020-02-10 RX ADMIN — TAMSULOSIN HYDROCHLORIDE 0.4 MG: 0.4 CAPSULE ORAL at 08:02

## 2020-02-10 RX ADMIN — INSULIN ASPART 1 UNITS: 100 INJECTION, SOLUTION INTRAVENOUS; SUBCUTANEOUS at 12:02

## 2020-02-10 RX ADMIN — CLINDAMYCIN HYDROCHLORIDE 450 MG: 150 CAPSULE ORAL at 05:02

## 2020-02-10 RX ADMIN — IPRATROPIUM BROMIDE AND ALBUTEROL SULFATE 3 ML: .5; 3 SOLUTION RESPIRATORY (INHALATION) at 04:02

## 2020-02-10 RX ADMIN — AMLODIPINE BESYLATE 10 MG: 5 TABLET ORAL at 08:02

## 2020-02-10 RX ADMIN — BUPROPION HYDROCHLORIDE 300 MG: 150 TABLET, FILM COATED, EXTENDED RELEASE ORAL at 09:02

## 2020-02-10 RX ADMIN — MUPIROCIN: 20 OINTMENT TOPICAL at 08:02

## 2020-02-10 RX ADMIN — PANTOPRAZOLE SODIUM 40 MG: 40 TABLET, DELAYED RELEASE ORAL at 08:02

## 2020-02-10 RX ADMIN — INSULIN ASPART 2 UNITS: 100 INJECTION, SOLUTION INTRAVENOUS; SUBCUTANEOUS at 09:02

## 2020-02-10 RX ADMIN — SIMVASTATIN 80 MG: 40 TABLET, FILM COATED ORAL at 08:02

## 2020-02-10 RX ADMIN — IPRATROPIUM BROMIDE AND ALBUTEROL SULFATE 3 ML: .5; 3 SOLUTION RESPIRATORY (INHALATION) at 07:02

## 2020-02-10 RX ADMIN — METHYLPREDNISOLONE SODIUM SUCCINATE 80 MG: 125 INJECTION, POWDER, FOR SOLUTION INTRAMUSCULAR; INTRAVENOUS at 09:02

## 2020-02-10 RX ADMIN — TOPIRAMATE 25 MG: 25 TABLET, FILM COATED ORAL at 08:02

## 2020-02-10 NOTE — PROGRESS NOTES
WRITTEN HEALTHCARE DISCHARGE INFORMATION      Things that YOU are RESPONSIBLE for to Manage Your Care At Home:     1. Getting your prescriptions filled.  2. Taking you medications as directed. DO NOT MISS ANY DOSES!  3. Going to your follow-up doctor appointments. This is important because it allows the doctor to monitor your progress and to determine if any changes need to be made to your treatment plan.     If you are unable to make your follow up appointments, please call the number listed and reschedule this appointment.      ____________HELP AT HOME____________________     Experiencing any SIGNS or SYMPTOMS: YOU CAN     Schedule a same day appopintment with your Primary Care Doctor or  you can call [facility] On Call Nurse Care Line for 24/7 assistance at [phone]     If you are experience any signs or symptoms that have become severe, Call 911 and come to your nearest Emergency Room.     Thank you for choosing [facility] and allowing us to care for you.   From your care management team:      You should receive a call from [facility]charge [facility] within 48-72 hours to help manage your care after discharge. Please try to make sure that you answer your phone for this important phone call.    Follow-up Information     Kaylie Chau MD On 2/12/2020.    Specialties:  Family Medicine, Wound Care  Why:  Appointment scheduled for February 12th at 8:20am  Contact information:  [address] [id number]  [phone]

## 2020-02-10 NOTE — PLAN OF CARE
02/10/20 0918   Final Note   Assessment Type Final Discharge Note   Anticipated Discharge Disposition Home   Hospital Follow Up  Appt(s) scheduled? Yes   Discharge plans and expectations educations in teach back method with documentation complete? Yes   Right Care Referral Info   Post Acute Recommendation No Care        02/10/20 0918   Final Note   Assessment Type Final Discharge Note   Anticipated Discharge Disposition Home   Hospital Follow Up  Appt(s) scheduled? Yes   Discharge plans and expectations educations in teach back method with documentation complete? Yes   Right Care Referral Info   Post Acute Recommendation No Care   pts nurse Balbina notified that the pt can d/c from CM standpoint.

## 2020-02-10 NOTE — DISCHARGE SUMMARY
[facility] Hospital Medicine  Discharge Summary      Patient Name: Scott Bansal  MRN: [id number]  Admission Date: 2/8/2020  Hospital Length of Stay: 0 days  Discharge Date and Time:  02/10/2020 7:56 AM  Attending Physician: Katiuska Ulrich MD   Discharging Provider: Reba Kenney NP  Primary Care Provider: Kaylie Chau MD      HPI:   Pt is a 60 yo M with a h/o DM2, COPD, HTN, mitral valve prolapse (at age 24) who presented to the ER for chest pain, and dyspnea.  Pt had dyspnea alogn with cough productive of green phlegm for about 3 days now.  Pt tried some OTC meds along with breathing treatments at home without improvement.  His girlfried was worried and took him to the urgent care today, and they asked him to get admitted.  Pt states taht recently he was discahrged on clindamycin for a R leg abscess/cellulitis and his dermatologist manoj added doxycycline to it few days ago.          * No surgery found *      Hospital Course:   Mr. Bansal is a 60 yo male who was admitted to observation for acute bronchitis and COPD exacerbation. Patient also noted have RLE wound/cellulitis that is improving compared previous pictures in media (see media). Productive cough and wheezing improved with IV steroid, levaquin, prednisone, mucinex, duoneb. ORION resolved. Patient continue to be afebrile and non toxic appearing during observation stay. Blood sugars elevated due to steroid. Blood sugars will improved with steroid taper. Patient stable for discharge home with close followup with PCP on Wednesday 2/12/20. Continue clindamycin for RLE wound/cellulitis and Levaquin (total 7 days) for acute bronchitis (was on doxy PTA for leg wound). Continue prednisone x 5 days. Ambulatory referral to wound care. See below for discharge home medication list.      Consults:     No new Assessment & Plan notes have been filed under this hospital service since the last note was generated.  Service: [facility]  Medicine    Final Active Diagnoses:    Diagnosis Date Noted POA    PRINCIPAL PROBLEM:  COPD exacerbation [J44.1] 02/08/2020 Yes    Wound cellulitis [L03.90] 02/09/2020 Yes    ORION (acute kidney injury) [N17.9] 02/08/2020 Yes    Leg abscess [L02.419] 02/08/2020 Yes    Essential hypertension [I10] 01/20/2016 Yes    Type 2 diabetes mellitus without complication [E11.9] 01/20/2016 Yes    Hyperlipidemia [E78.5] 01/20/2016 Yes    Bipolar 1 disorder [F31.9] 01/20/2016 Yes      Problems Resolved During this Admission:       Discharged Condition: good    Disposition: Home or Self Care    Follow Up:    Patient Instructions:      Ambulatory referral/consult to Wound Clinic   Standing Status: Future   Referral Priority: Routine Referral Type: Consultation   Referral Reason: Specialty Services Required   Requested Specialty: Wound Care   Number of Visits Requested: 1     Diet Cardiac     Diet diabetic     Notify your health care provider if you experience any of the following:  temperature >100.4     Notify your health care provider if you experience any of the following:  difficulty breathing or increased cough     Notify your health care provider if you experience any of the following:  increased confusion or weakness     Activity as tolerated       Significant Diagnostic Studies: Labs: All labs within the past 24 hours have been reviewed    Pending Diagnostic Studies:     None         Medications:  Reconciled Home Medications:      Medication List      START taking these medications    guaiFENesin 600 mg 12 hr tablet  Commonly known as:  MUCINEX  Take 1 tablet (600 mg total) by mouth 2 (two) times daily. for 10 days     levoFLOXacin 750 MG tablet  Commonly known as:  LEVAQUIN  Take 1 tablet (750 mg total) by mouth once daily. for 5 days     predniSONE 20 MG tablet  Commonly known as:  DELTASONE  Take 2 tablets (40 mg total) by mouth once daily. for 5 days        CONTINUE taking these medications    albuterol 90  mcg/actuation inhaler  Commonly known as:  Ventolin HFA  INHALE 2 PUFFS EVERY 4 HOURS AS NEEDED     amLODIPine 10 MG tablet  Commonly known as:  NORVASC  Take 1 tablet (10 mg total) by mouth once daily.     benzonatate 200 MG capsule  Commonly known as:  TESSALON  Take 1 capsule (200 mg total) by mouth 3 (three) times daily as needed for Cough.     buPROPion 300 MG 24 hr tablet  Commonly known as:  WELLBUTRIN XL  Take 300 mg by mouth every morning.     busPIRone 10 MG tablet  Commonly known as:  BUSPAR  Take 10 mg by mouth 3 (three) times daily.     candesartan 4 MG tablet  Commonly known as:  ATACAND  Take 1 tablet (4 mg total) by mouth once daily.     clindamycin 150 MG capsule  Commonly known as:  CLEOCIN  Take 3 capsules (450 mg total) by mouth every 8 (eight) hours. for 10 days     crisaborole 2 % Oint  Commonly known as:  Eucrisa  Use for hand eczema bid.     diclofenac sodium 1 % Gel  Commonly known as:  Voltaren  Apply 2 g topically once daily.     * fluticasone furoate-vilanterol 100-25 mcg/dose diskus inhaler  Commonly known as:  BREO  Inhale 1 puff into the lungs once daily. Controller     * Breo Ellipta 100-25 mcg/dose diskus inhaler  Generic drug:  fluticasone furoate-vilanterol  INHALE 1 PUFF INTO THE LUNGS ONCE DAILY. CONTROLLER     fluticasone propionate 50 mcg/actuation nasal spray  Commonly known as:  FLONASE  TAKE 1 SPRAY BY EACH NARE ROUTE ONCE DAILY.     folic acid 800 MCG Tab  Commonly known as:  FOLVITE  Take 800 mcg by mouth once daily.     gabapentin 300 MG capsule  Commonly known as:  NEURONTIN  TAKE 1 CAPSULE BY MOUTH TWICE A DAY     gemfibrozil 600 MG tablet  Commonly known as:  LOPID  TAKE 1 TABLET (600 MG TOTAL) BY MOUTH 2 (TWO) TIMES DAILY BEFORE MEALS.     irbesartan 300 MG tablet  Commonly known as:  AVAPRO  Take 1 tablet (300 mg total) by mouth every evening.     levocetirizine 5 MG tablet  Commonly known as:  Xyzal  Take 1 tablet each morning.     Lidocaine Viscous 2 %  Soln  Generic drug:  lidocaine HCl 2%     metFORMIN 1000 MG tablet  Commonly known as:  GLUCOPHAGE  TAKE 1 TABLET BY MOUTH TWICE A DAY     mometasone 0.1% 0.1 % cream  Commonly known as:  ELOCON  Apply topically once daily. AAA bid prn for hand eczema     mupirocin 2 % ointment  Commonly known as:  BACTROBAN  Apply topically 2 (two) times daily. Apply with Q-tip     pantoprazole 40 MG tablet  Commonly known as:  PROTONIX  TAKE 1 TABLET BY MOUTH DAILY     QUEtiapine 300 MG Tab  Commonly known as:  SEROQUEL  Take by mouth.     simvastatin 80 MG tablet  Commonly known as:  ZOCOR  TAKE 1 TABLET BY MOUTH EVERY DAY     sumatriptan 100 MG tablet  Commonly known as:  IMITREX  TAKE 1/2 TAB AT ONSET OF HEADACHE,THEN 1/2 TAB IN 1 HOUR IF NEEDED.MAX 1 TABLET/IN 24 HOURS.     tamsulosin 0.4 mg Cap  Commonly known as:  FLOMAX  TAKE 1 CAPSULE BY MOUTH EVERY DAY     topiramate 25 mg capsule  Take 1 capsule (25 mg total) by mouth 2 (two) times daily.     triamcinolone acetonide 0.1% 0.1 % cream  Commonly known as:  KENALOG  AAA bid prn for rash on chest and arms. Do not use on face, underarms or groin     water Liqd 150 mL with Milk of Magnesia 400 mg/5 mL Susp 400 mg, diphenhydrAMINE 12.5 mg/5 mL Elix 60 mg, nystatin 100,000 unit/mL Susp 500,000 Units  SWISH AND SPIT 15 MLS EVERY 4 HOURS     zonisamide 50 MG Cap  Commonly known as:  ZONEGRAN  TAKE 1 CAPSULE BY MOUTH TWICE A DAY         * This list has 2 medication(s) that are the same as other medications prescribed for you. Read the directions carefully, and ask your doctor or other care provider to review them with you.            STOP taking these medications    betamethasone dipropionate 0.05 % cream  Commonly known as:  DIPROLENE     doxycycline 100 MG capsule  Commonly known as:  MONODOX     furosemide 40 MG tablet  Commonly known as:  LASIX            Indwelling Lines/Drains at time of discharge:   Lines/Drains/Airways     None                 Time spent on the discharge of  patient: 30 minutes  Patient was seen and examined on the date of discharge and determined to be suitable for discharge.         Reba Kenney NP  Department of Hospital Medicine  [facility]

## 2020-02-10 NOTE — PLAN OF CARE
Problem: Adult Inpatient Plan of Care  Goal: Plan of Care Review  Outcome: Ongoing, Progressing  Goal: Patient-Specific Goal (Individualization)  Outcome: Ongoing, Progressing  Goal: Absence of Hospital-Acquired Illness or Injury  Outcome: Ongoing, Progressing  Goal: Optimal Comfort and Wellbeing  Outcome: Ongoing, Progressing  Goal: Readiness for Transition of Care  Outcome: Ongoing, Progressing  Goal: Rounds/Family Conference  Outcome: Ongoing, Progressing     Problem: Diabetes Comorbidity  Goal: Blood Glucose Level Within Desired Range  Outcome: Ongoing, Progressing     Problem: Infection  Goal: Infection Symptom Resolution  Outcome: Ongoing, Progressing     Problem: Fall Injury Risk  Goal: Absence of Fall and Fall-Related Injury  Outcome: Ongoing, Progressing     Problem: Wound  Goal: Optimal Wound Healing  Outcome: Ongoing, Progressing

## 2020-02-10 NOTE — NURSING
Telemetry removed. IV removed. Reviewed discharge instructions with patient. Reviewed wound care instructions. Sent vashe, 4x4's and a few mepilex dressings with him. Stated understanding.

## 2020-02-10 NOTE — HOSPITAL COURSE
Mr. Bansal is a 60 yo male who was admitted to observation for acute bronchitis and COPD exacerbation. Patient also noted have RLE wound/cellulitis that is improving compared previous pictures in media (see media). Productive cough and wheezing improved with IV steroid, levaquin, prednisone, mucinex, duoneb. ORION resolved. Patient continue to be afebrile and non toxic appearing during observation stay. Blood sugars elevated due to steroid. Blood sugars will improved with steroid taper. Patient stable for discharge home with close followup with PCP on Wednesday 2/12/20. Continue clindamycin for RLE wound/cellulitis and Levaquin (total 7 days) for acute bronchitis (was on doxy PTA for leg wound). Continue prednisone x 5 days. Ambulatory referral to wound care. See below for discharge home medication list.

## 2020-02-12 ENCOUNTER — OFFICE VISIT (OUTPATIENT)
Dept: FAMILY MEDICINE | Facility: CLINIC | Age: 60
End: 2020-02-12
Payer: MEDICARE

## 2020-02-12 ENCOUNTER — LAB VISIT (OUTPATIENT)
Dept: LAB | Facility: HOSPITAL | Age: 60
End: 2020-02-12
Attending: FAMILY MEDICINE
Payer: MEDICARE

## 2020-02-12 VITALS
DIASTOLIC BLOOD PRESSURE: 78 MMHG | HEIGHT: 69 IN | HEART RATE: 88 BPM | SYSTOLIC BLOOD PRESSURE: 122 MMHG | OXYGEN SATURATION: 97 % | BODY MASS INDEX: 36.18 KG/M2 | WEIGHT: 244.25 LBS | TEMPERATURE: 98 F

## 2020-02-12 DIAGNOSIS — J44.1 CHRONIC OBSTRUCTIVE PULMONARY DISEASE WITH ACUTE EXACERBATION: ICD-10-CM

## 2020-02-12 DIAGNOSIS — E11.9 TYPE 2 DIABETES MELLITUS WITHOUT COMPLICATION, WITHOUT LONG-TERM CURRENT USE OF INSULIN: ICD-10-CM

## 2020-02-12 DIAGNOSIS — F31.9 BIPOLAR 1 DISORDER: ICD-10-CM

## 2020-02-12 DIAGNOSIS — I70.0 ATHEROSCLEROSIS OF AORTA: ICD-10-CM

## 2020-02-12 DIAGNOSIS — N40.1 BENIGN PROSTATIC HYPERPLASIA WITH POST-VOID DRIBBLING: ICD-10-CM

## 2020-02-12 DIAGNOSIS — E11.42 DIABETIC POLYNEUROPATHY ASSOCIATED WITH TYPE 2 DIABETES MELLITUS: ICD-10-CM

## 2020-02-12 DIAGNOSIS — I10 ESSENTIAL HYPERTENSION: ICD-10-CM

## 2020-02-12 DIAGNOSIS — Z11.4 ENCOUNTER FOR SCREENING FOR HIV: ICD-10-CM

## 2020-02-12 DIAGNOSIS — G44.229 CHRONIC TENSION-TYPE HEADACHE, NOT INTRACTABLE: ICD-10-CM

## 2020-02-12 DIAGNOSIS — N39.43 BENIGN PROSTATIC HYPERPLASIA WITH POST-VOID DRIBBLING: ICD-10-CM

## 2020-02-12 DIAGNOSIS — J44.9 CHRONIC OBSTRUCTIVE PULMONARY DISEASE, UNSPECIFIED COPD TYPE: ICD-10-CM

## 2020-02-12 DIAGNOSIS — Z00.00 ANNUAL PHYSICAL EXAM: Primary | ICD-10-CM

## 2020-02-12 DIAGNOSIS — J45.20 MILD INTERMITTENT ASTHMATIC BRONCHITIS WITHOUT COMPLICATION: ICD-10-CM

## 2020-02-12 DIAGNOSIS — E78.5 HYPERLIPIDEMIA, UNSPECIFIED HYPERLIPIDEMIA TYPE: ICD-10-CM

## 2020-02-12 DIAGNOSIS — E66.01 SEVERE OBESITY (BMI 35.0-39.9) WITH COMORBIDITY: ICD-10-CM

## 2020-02-12 DIAGNOSIS — Z00.00 ANNUAL PHYSICAL EXAM: ICD-10-CM

## 2020-02-12 PROBLEM — N17.9 AKI (ACUTE KIDNEY INJURY): Status: RESOLVED | Noted: 2020-02-08 | Resolved: 2020-02-12

## 2020-02-12 PROBLEM — L02.419 LEG ABSCESS: Status: RESOLVED | Noted: 2020-02-08 | Resolved: 2020-02-12

## 2020-02-12 PROBLEM — L03.90 WOUND CELLULITIS: Status: RESOLVED | Noted: 2020-02-09 | Resolved: 2020-02-12

## 2020-02-12 LAB
ALBUMIN SERPL BCP-MCNC: 3.8 G/DL (ref 3.5–5.2)
ALP SERPL-CCNC: 69 U/L (ref 55–135)
ALT SERPL W/O P-5'-P-CCNC: 29 U/L (ref 10–44)
ANION GAP SERPL CALC-SCNC: 10 MMOL/L (ref 8–16)
AST SERPL-CCNC: 24 U/L (ref 10–40)
BASOPHILS NFR BLD: 0 % (ref 0–1.9)
BILIRUB SERPL-MCNC: 0.4 MG/DL (ref 0.1–1)
BUN SERPL-MCNC: 21 MG/DL (ref 6–20)
CALCIUM SERPL-MCNC: 10 MG/DL (ref 8.7–10.5)
CHLORIDE SERPL-SCNC: 107 MMOL/L (ref 95–110)
CHOLEST SERPL-MCNC: 123 MG/DL (ref 120–199)
CHOLEST/HDLC SERPL: 4 {RATIO} (ref 2–5)
CO2 SERPL-SCNC: 22 MMOL/L (ref 23–29)
CREAT SERPL-MCNC: 1 MG/DL (ref 0.5–1.4)
DIFFERENTIAL METHOD: ABNORMAL
EOSINOPHIL NFR BLD: 0 % (ref 0–8)
ERYTHROCYTE [DISTWIDTH] IN BLOOD BY AUTOMATED COUNT: 12.2 % (ref 11.5–14.5)
EST. GFR  (AFRICAN AMERICAN): >60 ML/MIN/1.73 M^2
EST. GFR  (NON AFRICAN AMERICAN): >60 ML/MIN/1.73 M^2
GLUCOSE SERPL-MCNC: 102 MG/DL (ref 70–110)
HCT VFR BLD AUTO: 39.3 % (ref 40–54)
HDLC SERPL-MCNC: 31 MG/DL (ref 40–75)
HDLC SERPL: 25.2 % (ref 20–50)
HGB BLD-MCNC: 12.2 G/DL (ref 14–18)
IMM GRANULOCYTES # BLD AUTO: ABNORMAL K/UL (ref 0–0.04)
IMM GRANULOCYTES NFR BLD AUTO: ABNORMAL % (ref 0–0.5)
LDLC SERPL CALC-MCNC: 59.8 MG/DL (ref 63–159)
LYMPHOCYTES NFR BLD: 24 % (ref 18–48)
MCH RBC QN AUTO: 30 PG (ref 27–31)
MCHC RBC AUTO-ENTMCNC: 31 G/DL (ref 32–36)
MCV RBC AUTO: 97 FL (ref 82–98)
MONOCYTES NFR BLD: 11 % (ref 4–15)
NEUTROPHILS NFR BLD: 65 % (ref 38–73)
NONHDLC SERPL-MCNC: 92 MG/DL
NRBC BLD-RTO: 0 /100 WBC
PLATELET # BLD AUTO: 312 K/UL (ref 150–350)
PMV BLD AUTO: 10.7 FL (ref 9.2–12.9)
POTASSIUM SERPL-SCNC: 4.1 MMOL/L (ref 3.5–5.1)
PROT SERPL-MCNC: 6.7 G/DL (ref 6–8.4)
RBC # BLD AUTO: 4.07 M/UL (ref 4.6–6.2)
SODIUM SERPL-SCNC: 139 MMOL/L (ref 136–145)
TRIGL SERPL-MCNC: 161 MG/DL (ref 30–150)
TSH SERPL DL<=0.005 MIU/L-ACNC: 0.92 UIU/ML (ref 0.4–4)
WBC # BLD AUTO: 10.79 K/UL (ref 3.9–12.7)

## 2020-02-12 PROCEDURE — 99999 PR PBB SHADOW E&M-EST. PATIENT-LVL III: ICD-10-PCS | Mod: PBBFAC,,, | Performed by: FAMILY MEDICINE

## 2020-02-12 PROCEDURE — 80053 COMPREHEN METABOLIC PANEL: CPT

## 2020-02-12 PROCEDURE — 99999 PR PBB SHADOW E&M-EST. PATIENT-LVL III: CPT | Mod: PBBFAC,,, | Performed by: FAMILY MEDICINE

## 2020-02-12 PROCEDURE — 99214 OFFICE O/P EST MOD 30 MIN: CPT | Mod: S$GLB,,, | Performed by: FAMILY MEDICINE

## 2020-02-12 PROCEDURE — 85027 COMPLETE CBC AUTOMATED: CPT

## 2020-02-12 PROCEDURE — 83036 HEMOGLOBIN GLYCOSYLATED A1C: CPT

## 2020-02-12 PROCEDURE — 85007 BL SMEAR W/DIFF WBC COUNT: CPT

## 2020-02-12 PROCEDURE — 3078F PR MOST RECENT DIASTOLIC BLOOD PRESSURE < 80 MM HG: ICD-10-PCS | Mod: CPTII,S$GLB,, | Performed by: FAMILY MEDICINE

## 2020-02-12 PROCEDURE — 86703 HIV-1/HIV-2 1 RESULT ANTBDY: CPT

## 2020-02-12 PROCEDURE — 3044F PR MOST RECENT HEMOGLOBIN A1C LEVEL <7.0%: ICD-10-PCS | Mod: CPTII,S$GLB,, | Performed by: FAMILY MEDICINE

## 2020-02-12 PROCEDURE — 3078F DIAST BP <80 MM HG: CPT | Mod: CPTII,S$GLB,, | Performed by: FAMILY MEDICINE

## 2020-02-12 PROCEDURE — 36415 COLL VENOUS BLD VENIPUNCTURE: CPT | Mod: PO

## 2020-02-12 PROCEDURE — 99214 PR OFFICE/OUTPT VISIT, EST, LEVL IV, 30-39 MIN: ICD-10-PCS | Mod: S$GLB,,, | Performed by: FAMILY MEDICINE

## 2020-02-12 PROCEDURE — 3074F PR MOST RECENT SYSTOLIC BLOOD PRESSURE < 130 MM HG: ICD-10-PCS | Mod: CPTII,S$GLB,, | Performed by: FAMILY MEDICINE

## 2020-02-12 PROCEDURE — 84443 ASSAY THYROID STIM HORMONE: CPT

## 2020-02-12 PROCEDURE — 3044F HG A1C LEVEL LT 7.0%: CPT | Mod: CPTII,S$GLB,, | Performed by: FAMILY MEDICINE

## 2020-02-12 PROCEDURE — 80061 LIPID PANEL: CPT

## 2020-02-12 PROCEDURE — 3074F SYST BP LT 130 MM HG: CPT | Mod: CPTII,S$GLB,, | Performed by: FAMILY MEDICINE

## 2020-02-12 RX ORDER — TAMSULOSIN HYDROCHLORIDE 0.4 MG/1
0.8 CAPSULE ORAL DAILY
Qty: 180 CAPSULE | Refills: 2 | Status: SHIPPED | OUTPATIENT
Start: 2020-02-12 | End: 2020-02-26

## 2020-02-12 RX ORDER — FLUTICASONE FUROATE AND VILANTEROL 100; 25 UG/1; UG/1
1 POWDER RESPIRATORY (INHALATION) DAILY
Qty: 90 EACH | Refills: 1
Start: 2020-02-12 | End: 2022-10-04

## 2020-02-12 RX ORDER — CYCLOBENZAPRINE HCL 10 MG
10 TABLET ORAL DAILY PRN
COMMUNITY
End: 2020-04-06 | Stop reason: SDUPTHER

## 2020-02-12 RX ORDER — TIZANIDINE 4 MG/1
4 TABLET ORAL EVERY 8 HOURS
COMMUNITY
End: 2021-03-02 | Stop reason: SDUPTHER

## 2020-02-12 RX ORDER — SIMVASTATIN 80 MG/1
80 TABLET, FILM COATED ORAL DAILY
Qty: 90 TABLET | Refills: 2 | Status: SHIPPED | OUTPATIENT
Start: 2020-02-12 | End: 2021-01-08

## 2020-02-12 RX ORDER — FUROSEMIDE 40 MG/1
40 TABLET ORAL DAILY
COMMUNITY
End: 2020-02-21 | Stop reason: SDUPTHER

## 2020-02-12 NOTE — PROGRESS NOTES
Assessment & Plan  Problem List Items Addressed This Visit        Neuro    Diabetic polyneuropathy associated with type 2 diabetes mellitus    Current Assessment & Plan     Patient is stable.  Assess and addressed all modifiable risk factors.  Continue with appropriate management to prevent complications.           Relevant Orders    Comprehensive metabolic panel    CBC auto differential    Hemoglobin A1c    Lipid panel    TSH    HIV 1/2 Ag/Ab (4th Gen)    Chronic tension-type headache, not intractable    Overview     Cannot tolerate propranolol because side effects (dizziness, decreased alertness)  No improvement with imitrex  Minimal improvement with fiorcet          Current Assessment & Plan     Noted             Psychiatric    Bipolar 1 disorder    Overview     Currently on buspar, bupropion, seroquel  Dr. Cordova at [facility]          Current Assessment & Plan     Pt is currently stable on medication regimen.  Continue current therapy as scheduled.  Contact office with any questions about adjustments on medications.               Pulmonary    COPD (chronic obstructive pulmonary disease)    Overview     Currently on breo and albuterol          Current Assessment & Plan     Pt is currently stable on medication regimen.  Continue current therapy as scheduled.  Contact office with any questions about adjustments on medications.            Relevant Medications    fluticasone furoate-vilanterol (BREO) 100-25 mcg/dose diskus inhaler       Cardiac/Vascular    Hyperlipidemia    Current Assessment & Plan     Pt is currently stable on medication regimen.  Continue current therapy as scheduled.  Contact office with any questions about adjustments on medications.            Relevant Medications    simvastatin (ZOCOR) 80 MG tablet    Other Relevant Orders    Comprehensive metabolic panel    CBC auto differential    Hemoglobin A1c    Lipid panel    TSH    HIV 1/2 Ag/Ab (4th Gen)    Essential hypertension     Overview     Cannot tolerate sulfa  Currently on amlodipine, candesartan, furosemide  Previously took irbesartan and clonidine          Current Assessment & Plan     Pt is currently stable on medication regimen.  Continue current therapy as scheduled.  Contact office with any questions about adjustments on medications.            Relevant Orders    Comprehensive metabolic panel    CBC auto differential    Hemoglobin A1c    Lipid panel    TSH    HIV 1/2 Ag/Ab (4th Gen)    Atherosclerosis of aorta    Overview     - noted on CXR 3/2018         Current Assessment & Plan     Patient is stable.  Assess and addressed all modifiable risk factors.  Continue with appropriate management to prevent complications.           Relevant Orders    Comprehensive metabolic panel    CBC auto differential    Hemoglobin A1c    Lipid panel    TSH    HIV 1/2 Ag/Ab (4th Gen)       Renal/    BPH (benign prostatic hyperplasia)    Relevant Medications    tamsulosin (FLOMAX) 0.4 mg Cap       Endocrine    Type 2 diabetes mellitus without complication    Current Assessment & Plan     Pt is currently stable on medication regimen.  Continue current therapy as scheduled.  Contact office with any questions about adjustments on medications.            Relevant Orders    Comprehensive metabolic panel    CBC auto differential    Hemoglobin A1c    Lipid panel    TSH    HIV 1/2 Ag/Ab (4th Gen)    Severe obesity (BMI 35.0-39.9) with comorbidity    Current Assessment & Plan     The patient is asked to make an attempt to improve diet and exercise patterns to aid in medical management of this problem.             Other Visit Diagnoses     Annual physical exam    -  Primary    Relevant Orders    Comprehensive metabolic panel    CBC auto differential    Hemoglobin A1c    Lipid panel    TSH    HIV 1/2 Ag/Ab (4th Gen)    Mild intermittent asthmatic bronchitis without complication        Encounter for screening for HIV        Relevant Orders    HIV 1/2 Ag/Ab (4th  Gen)      I addressed all major concerns as it related to health maintenance.  All were ordered and scheduled based on the patients wishes.  Any additional health maintenance will be readdressed at the next physical if declined or deferred by the patient.        Health Maintenance reviewed.    Follow-up: Follow up in about 1 year (around 2/12/2021) for annual exam.    ______________________________________________________________________    Chief Complaint  Chief Complaint   Patient presents with    Annual Exam       HPI  Scott Bansal is a 59 y.o. male with multiple medical diagnoses as listed in the medical history and problem list that presents for annual exam.  Pt is known to me with last appointment 5/2/2019.      Patient denies any new symptoms including chest pain, SOB, blurry vision, N/V, diarrhea.  He reports that he has increased dribbling after voiding.  He would like to increased his tamsulosin.  He was recently hospitalized for COPD exacerbation.        PAST MEDICAL HISTORY:  Past Medical History:   Diagnosis Date    Bipolar 1 disorder, mixed     BPH (benign prostatic hypertrophy)     Cyst, eyelid     Dr. Broussard    Diabetes mellitus     GERD (gastroesophageal reflux disease)     Hyperlipidemia     Hypertension     Lumbar degenerative disc disease 3/7/2019    Migraine headache     Obesity        PAST SURGICAL HISTORY:  Past Surgical History:   Procedure Laterality Date    CERVICAL SPINE SURGERY      COLONOSCOPY N/A 7/27/2017    Procedure: COLONOSCOPY;  Surgeon: Genaro Nix MD;  Location: [facility];  Service: Endoscopy;  Laterality: N/A;    EYE SURGERY Left 03/2017    cyst removal on eyelid; Dr. Broussard    SHOULDER SURGERY      TONSILLECTOMY         SOCIAL HISTORY:  Social History     Socioeconomic History    Marital status:      Spouse name: Not on file    Number of children: Not on file    Years of education: Not on file    Highest education level: Not on file    Occupational History    Not on file   Social Needs    Financial resource strain: Not on file    Food insecurity:     Worry: Not on file     Inability: Not on file    Transportation needs:     Medical: Not on file     Non-medical: Not on file   Tobacco Use    Smoking status: Former Smoker     Packs/day: 2.00     Years: 15.00     Pack years: 30.00     Types: Cigarettes     Last attempt to quit: 1984     Years since quittin.7    Smokeless tobacco: Never Used    Tobacco comment: Patient quit smoking at the age of 26 yo.   Substance and Sexual Activity    Alcohol use: No    Drug use: No    Sexual activity: Yes     Partners: Female   Lifestyle    Physical activity:     Days per week: Not on file     Minutes per session: Not on file    Stress: Only a little   Relationships    Social connections:     Talks on phone: Not on file     Gets together: Not on file     Attends Oriental orthodox service: Not on file     Active member of club or organization: Not on file     Attends meetings of clubs or organizations: Not on file     Relationship status: Not on file   Other Topics Concern    Not on file   Social History Narrative    Not on file       FAMILY HISTORY:  Family History   Problem Relation Age of Onset    Cataracts Mother     Cancer Mother         throat    Hypertension Mother     Hypertension Father     Stroke Father         2 strokes    Hypertension Sister     Cancer Brother         spinal, kidney, spinal fluid    Hypertension Brother     Cancer Cousin         breast?       ALLERGIES AND MEDICATIONS: updated and reviewed.  Review of patient's allergies indicates:   Allergen Reactions    Sulfa (sulfonamide antibiotics) Rash     Current Outpatient Medications   Medication Sig Dispense Refill    albuterol (VENTOLIN HFA) 90 mcg/actuation inhaler INHALE 2 PUFFS EVERY 4 HOURS AS NEEDED 18 Inhaler 6    amLODIPine (NORVASC) 10 MG tablet Take 1 tablet (10 mg total) by mouth once daily. 90 tablet 0     buPROPion (WELLBUTRIN XL) 300 MG 24 hr tablet Take 300 mg by mouth every morning.  2    busPIRone (BUSPAR) 10 MG tablet Take 10 mg by mouth 3 (three) times daily.      candesartan (ATACAND) 4 MG tablet Take 1 tablet (4 mg total) by mouth once daily. 90 tablet 3    crisaborole (EUCRISA) 2 % Oint Use for hand eczema bid. 60 g 5    cyclobenzaprine (FLEXERIL) 10 MG tablet Take 10 mg by mouth daily as needed for Muscle spasms.      diclofenac sodium (VOLTAREN) 1 % Gel Apply 2 g topically once daily. 100 g 0    fluticasone (FLONASE) 50 mcg/actuation nasal spray TAKE 1 SPRAY BY EACH NARE ROUTE ONCE DAILY. 16 g 5    folic acid (FOLVITE) 800 MCG Tab Take 800 mcg by mouth once daily.      furosemide (LASIX) 40 MG tablet Take 40 mg by mouth once daily.      gabapentin (NEURONTIN) 300 MG capsule TAKE 1 CAPSULE BY MOUTH TWICE A  capsule 3    gemfibrozil (LOPID) 600 MG tablet TAKE 1 TABLET (600 MG TOTAL) BY MOUTH 2 (TWO) TIMES DAILY BEFORE MEALS. 120 tablet 0    guaiFENesin (MUCINEX) 600 mg 12 hr tablet Take 1 tablet (600 mg total) by mouth 2 (two) times daily. for 10 days 20 tablet 0    levocetirizine (XYZAL) 5 MG tablet Take 1 tablet each morning. 30 tablet 11    levoFLOXacin (LEVAQUIN) 750 MG tablet Take 1 tablet (750 mg total) by mouth once daily. for 5 days 5 tablet 0    metFORMIN (GLUCOPHAGE) 1000 MG tablet TAKE 1 TABLET BY MOUTH TWICE A  tablet 0    mometasone 0.1% (ELOCON) 0.1 % cream Apply topically once daily. AAA bid prn for hand eczema 45 g 1    mupirocin (BACTROBAN) 2 % ointment Apply topically 2 (two) times daily. Apply with Q-tip 30 g 3    pantoprazole (PROTONIX) 40 MG tablet TAKE 1 TABLET BY MOUTH DAILY 90 tablet 3    predniSONE (DELTASONE) 20 MG tablet Take 2 tablets (40 mg total) by mouth once daily. for 5 days 10 tablet 0    quetiapine (SEROQUEL) 300 MG Tab Take by mouth.      simvastatin (ZOCOR) 80 MG tablet Take 1 tablet (80 mg total) by mouth once daily. 90 tablet 2     sodium chlor/hypochlorous acid (VASHE WOUND THERAPY IR) Irrigate with as directed.      sumatriptan (IMITREX) 100 MG tablet TAKE 1/2 TAB AT ONSET OF HEADACHE,THEN 1/2 TAB IN 1 HOUR IF NEEDED.MAX 1 TABLET/IN 24 HOURS. 12 tablet 1    tamsulosin (FLOMAX) 0.4 mg Cap Take 2 capsules (0.8 mg total) by mouth once daily. 180 capsule 2    tiZANidine (ZANAFLEX) 4 MG tablet Take 4 mg by mouth every 8 (eight) hours.      triamcinolone acetonide 0.1% (KENALOG) 0.1 % cream AAA bid prn for rash on chest and arms. Do not use on face, underarms or groin 454 g 1    zonisamide (ZONEGRAN) 50 MG Cap TAKE 1 CAPSULE BY MOUTH TWICE A DAY 60 capsule 0    benzonatate (TESSALON) 200 MG capsule Take 1 capsule (200 mg total) by mouth 3 (three) times daily as needed for Cough. (Patient not taking: Reported on 2/12/2020) 90 capsule 1    fluticasone furoate-vilanterol (BREO) 100-25 mcg/dose diskus inhaler Inhale 1 puff into the lungs once daily. Controller 90 each 1    LIDOCAINE VISCOUS 2 % solution       topiramate 25 mg capsule Take 1 capsule (25 mg total) by mouth 2 (two) times daily. 60 capsule 0    water Liqd 150 mL with Milk of Magnesia 400 mg/5 mL Susp 400 mg, diphenhydrAMINE 12.5 mg/5 mL Elix 60 mg, nystatin 100,000 unit/mL Susp 500,000 Units SWISH AND SPIT 15 MLS EVERY 4 HOURS  0     No current facility-administered medications for this visit.          ROS  Review of Systems   Constitutional: Negative for activity change, appetite change, fatigue, fever and unexpected weight change.   HENT: Negative for congestion and facial swelling.    Eyes: Negative for visual disturbance.   Respiratory: Negative for chest tightness, shortness of breath, wheezing and stridor.    Cardiovascular: Negative for chest pain, palpitations and leg swelling.   Gastrointestinal: Negative for abdominal distention, abdominal pain, constipation, diarrhea, nausea and vomiting.   Endocrine: Negative for cold intolerance, heat intolerance, polydipsia and  "polyuria.   Skin: Negative.    Allergic/Immunologic: Negative.    Neurological: Negative for dizziness, light-headedness, numbness and headaches.   Psychiatric/Behavioral: Negative for agitation and decreased concentration.           Physical Exam  Vitals:    02/12/20 0801   BP: (!) 138/96   BP Location: Right arm   Patient Position: Sitting   BP Method: Large (Manual)   Pulse: 88   Temp: 97.8 °F (36.6 °C)   TempSrc: Oral   SpO2: 97%   Weight: 110.8 kg (244 lb 4.3 oz)   Height: 5' 9" (1.753 m)    Body mass index is 36.07 kg/m².  Weight: 110.8 kg (244 lb 4.3 oz)   Height: 5' 9" (175.3 cm)   Physical Exam   Constitutional: He is oriented to person, place, and time. He appears well-developed and well-nourished.   HENT:   Head: Normocephalic and atraumatic.   Right Ear: External ear normal.   Left Ear: External ear normal.   Nose: Nose normal.   Mouth/Throat: Oropharynx is clear and moist. No oropharyngeal exudate.   Eyes: Pupils are equal, round, and reactive to light. Conjunctivae and EOM are normal.   Neck: Normal range of motion. Neck supple.   Cardiovascular: Normal rate, regular rhythm, normal heart sounds and intact distal pulses. Exam reveals no gallop and no friction rub.   No murmur heard.  Pulmonary/Chest: Effort normal and breath sounds normal.   Abdominal: Soft. Bowel sounds are normal.   Musculoskeletal: Normal range of motion.   Neurological: He is alert and oriented to person, place, and time. He has normal reflexes.   Skin: Skin is warm and dry.   Psychiatric: He has a normal mood and affect. His behavior is normal. Thought content normal.   Vitals reviewed.        Health Maintenance       Date Due Completion Date    HIV Screening 11/26/1975 ---    Shingles Vaccine (1 of 2) 11/26/2009 ---    Influenza Vaccine (1) 09/01/2019 ---    Foot Exam 10/08/2019 10/8/2018    Lipid Panel 03/12/2020 3/12/2019    Hemoglobin A1c 06/04/2020 12/4/2019    Colonoscopy 07/27/2020 7/27/2017    Eye Exam 08/27/2020 8/27/2019 "    High Dose Statin 02/12/2021 2/12/2020    TETANUS VACCINE 07/07/2027 7/7/2017              Patient note was created using [employer].  Any errors in syntax or even information may not have been identified and edited on initial review prior to signing this note.

## 2020-02-13 LAB
ESTIMATED AVG GLUCOSE: 120 MG/DL (ref 68–131)
HBA1C MFR BLD HPLC: 5.8 % (ref 4–5.6)
HIV 1+2 AB+HIV1 P24 AG SERPL QL IA: NEGATIVE

## 2020-02-17 ENCOUNTER — TELEPHONE (OUTPATIENT)
Dept: FAMILY MEDICINE | Facility: CLINIC | Age: 60
End: 2020-02-17

## 2020-02-17 ENCOUNTER — OFFICE VISIT (OUTPATIENT)
Dept: FAMILY MEDICINE | Facility: CLINIC | Age: 60
End: 2020-02-17
Payer: MEDICARE

## 2020-02-17 VITALS
SYSTOLIC BLOOD PRESSURE: 114 MMHG | WEIGHT: 245.13 LBS | HEART RATE: 81 BPM | OXYGEN SATURATION: 96 % | TEMPERATURE: 98 F | HEIGHT: 69 IN | BODY MASS INDEX: 36.31 KG/M2 | DIASTOLIC BLOOD PRESSURE: 72 MMHG

## 2020-02-17 DIAGNOSIS — I10 ESSENTIAL HYPERTENSION: ICD-10-CM

## 2020-02-17 DIAGNOSIS — E11.9 TYPE 2 DIABETES MELLITUS WITHOUT COMPLICATION, WITHOUT LONG-TERM CURRENT USE OF INSULIN: ICD-10-CM

## 2020-02-17 DIAGNOSIS — J44.89 CHRONIC BRONCHITIS WITH COPD (CHRONIC OBSTRUCTIVE PULMONARY DISEASE): Primary | ICD-10-CM

## 2020-02-17 PROCEDURE — 3008F PR BODY MASS INDEX (BMI) DOCUMENTED: ICD-10-PCS | Mod: CPTII,S$GLB,, | Performed by: INTERNAL MEDICINE

## 2020-02-17 PROCEDURE — 99999 PR PBB SHADOW E&M-EST. PATIENT-LVL IV: CPT | Mod: PBBFAC,,, | Performed by: INTERNAL MEDICINE

## 2020-02-17 PROCEDURE — 3044F PR MOST RECENT HEMOGLOBIN A1C LEVEL <7.0%: ICD-10-PCS | Mod: CPTII,S$GLB,, | Performed by: INTERNAL MEDICINE

## 2020-02-17 PROCEDURE — 99999 PR PBB SHADOW E&M-EST. PATIENT-LVL IV: ICD-10-PCS | Mod: PBBFAC,,, | Performed by: INTERNAL MEDICINE

## 2020-02-17 PROCEDURE — 3044F HG A1C LEVEL LT 7.0%: CPT | Mod: CPTII,S$GLB,, | Performed by: INTERNAL MEDICINE

## 2020-02-17 PROCEDURE — 3074F PR MOST RECENT SYSTOLIC BLOOD PRESSURE < 130 MM HG: ICD-10-PCS | Mod: CPTII,S$GLB,, | Performed by: INTERNAL MEDICINE

## 2020-02-17 PROCEDURE — 3074F SYST BP LT 130 MM HG: CPT | Mod: CPTII,S$GLB,, | Performed by: INTERNAL MEDICINE

## 2020-02-17 PROCEDURE — 3078F PR MOST RECENT DIASTOLIC BLOOD PRESSURE < 80 MM HG: ICD-10-PCS | Mod: CPTII,S$GLB,, | Performed by: INTERNAL MEDICINE

## 2020-02-17 PROCEDURE — 3008F BODY MASS INDEX DOCD: CPT | Mod: CPTII,S$GLB,, | Performed by: INTERNAL MEDICINE

## 2020-02-17 PROCEDURE — 99214 OFFICE O/P EST MOD 30 MIN: CPT | Mod: S$GLB,,, | Performed by: INTERNAL MEDICINE

## 2020-02-17 PROCEDURE — 99214 PR OFFICE/OUTPT VISIT, EST, LEVL IV, 30-39 MIN: ICD-10-PCS | Mod: S$GLB,,, | Performed by: INTERNAL MEDICINE

## 2020-02-17 PROCEDURE — 3078F DIAST BP <80 MM HG: CPT | Mod: CPTII,S$GLB,, | Performed by: INTERNAL MEDICINE

## 2020-02-17 RX ORDER — PREDNISONE 20 MG/1
20 TABLET ORAL DAILY
Qty: 5 TABLET | Refills: 0 | Status: SHIPPED | OUTPATIENT
Start: 2020-02-17 | End: 2020-02-22

## 2020-02-17 RX ORDER — TIOTROPIUM BROMIDE 18 UG/1
18 CAPSULE ORAL; RESPIRATORY (INHALATION) DAILY
Qty: 30 CAPSULE | Refills: 0 | Status: SHIPPED | OUTPATIENT
Start: 2020-02-17 | End: 2020-03-10

## 2020-02-17 NOTE — TELEPHONE ENCOUNTER
----- Message from Christosdoug Chang sent at 2/17/2020 11:25 AM CST -----  Contact: Self  Type: Patient Call Back    Who called: self    What is the request in detail: fluticasone furoate-vilanterol (BREO) 100-25 mcg/dose diskus inhaler and tiotropium (SPIRIVA) 18 mcg inhalation capsule are to be taken in combo or is he suppose to discontinue Breo?  Can the clinic reply by [facility]? No     Would the patient rather a call back or a response via [facility]?  Call     Best call back number: [phone]

## 2020-02-17 NOTE — PROGRESS NOTES
Subjective:       Chief Complaint  Chief Complaint   Patient presents with    Chest Congestion     patient c/o chest congestion x 1 mth    Cough       HPI  Scott Bansal is a 59 y.o. male with multiple medical diagnoses as listed in the medical history and problem list that presents for cough.     Cough   Associated symptoms include chest pain, shortness of breath and wheezing. Pertinent negatives include no chills or fever.   patient her for persistent cough symptoms noted over the past month approximately  seen on 2/8 for presumed COPD exacerbation - discharged on 2/10  Levaquin course prescribed with prednisone - completed medications a few days prior  Noted some steroid induced hyperglycemia  Taking Breo Ellipta as new inhaler recently     Patient Care Team:  Kaylie Chau MD as PCP - General (Family Medicine)  Warren Rondon OD as Consulting Physician (Optometry)  Jessica Black LPN as Licensed Practical Nurse      PAST MEDICAL HISTORY:  Past Medical History:   Diagnosis Date    Bipolar 1 disorder, mixed     BPH (benign prostatic hypertrophy)     Cyst, eyelid     Dr. Broussard    Diabetes mellitus     GERD (gastroesophageal reflux disease)     Hyperlipidemia     Hypertension     Lumbar degenerative disc disease 3/7/2019    Migraine headache     Obesity        PAST SURGICAL HISTORY:  Past Surgical History:   Procedure Laterality Date    CERVICAL SPINE SURGERY      COLONOSCOPY N/A 7/27/2017    Procedure: COLONOSCOPY;  Surgeon: Genaro Nix MD;  Location: [facility];  Service: Endoscopy;  Laterality: N/A;    EYE SURGERY Left 03/2017    cyst removal on eyelid; Dr. Broussard    SHOULDER SURGERY      TONSILLECTOMY         SOCIAL HISTORY:  Social History     Socioeconomic History    Marital status:      Spouse name: Not on file    Number of children: Not on file    Years of education: Not on file    Highest education level: Not on file   Occupational History     Not on file   Social Needs    Financial resource strain: Not on file    Food insecurity:     Worry: Not on file     Inability: Not on file    Transportation needs:     Medical: Not on file     Non-medical: Not on file   Tobacco Use    Smoking status: Former Smoker     Packs/day: 2.00     Years: 15.00     Pack years: 30.00     Types: Cigarettes     Last attempt to quit: 1984     Years since quittin.7    Smokeless tobacco: Never Used    Tobacco comment: Patient quit smoking at the age of 24 yo.   Substance and Sexual Activity    Alcohol use: No    Drug use: No    Sexual activity: Yes     Partners: Female   Lifestyle    Physical activity:     Days per week: Not on file     Minutes per session: Not on file    Stress: Only a little   Relationships    Social connections:     Talks on phone: Not on file     Gets together: Not on file     Attends [employer] service: Not on file     Active member of club or organization: Not on file     Attends meetings of clubs or organizations: Not on file     Relationship status: Not on file   Other Topics Concern    Not on file   Social History Narrative    Not on file       FAMILY HISTORY:  Family History   Problem Relation Age of Onset    Cataracts Mother     Cancer Mother         throat    Hypertension Mother     Hypertension Father     Stroke Father         2 strokes    Hypertension Sister     Cancer Brother         spinal, kidney, spinal fluid    Hypertension Brother     Cancer Cousin         breast?       ALLERGIES AND MEDICATIONS: updated and reviewed.  Review of patient's allergies indicates:   Allergen Reactions    Sulfa (sulfonamide antibiotics) Rash     Current Outpatient Medications   Medication Sig Dispense Refill    albuterol (VENTOLIN HFA) 90 mcg/actuation inhaler INHALE 2 PUFFS EVERY 4 HOURS AS NEEDED 18 Inhaler 6    amLODIPine (NORVASC) 10 MG tablet Take 1 tablet (10 mg total) by mouth once daily. 90 tablet 0    benzonatate (TESSALON)  200 MG capsule Take 1 capsule (200 mg total) by mouth 3 (three) times daily as needed for Cough. (Patient not taking: Reported on 2/12/2020) 90 capsule 1    buPROPion (WELLBUTRIN XL) 300 MG 24 hr tablet Take 300 mg by mouth every morning.  2    busPIRone (BUSPAR) 10 MG tablet Take 10 mg by mouth 3 (three) times daily.      candesartan (ATACAND) 4 MG tablet Take 1 tablet (4 mg total) by mouth once daily. 90 tablet 3    crisaborole (EUCRISA) 2 % Oint Use for hand eczema bid. 60 g 5    cyclobenzaprine (FLEXERIL) 10 MG tablet Take 10 mg by mouth daily as needed for Muscle spasms.      diclofenac sodium (VOLTAREN) 1 % Gel Apply 2 g topically once daily. 100 g 0    fluticasone (FLONASE) 50 mcg/actuation nasal spray TAKE 1 SPRAY BY EACH NARE ROUTE ONCE DAILY. 16 g 5    fluticasone furoate-vilanterol (BREO) 100-25 mcg/dose diskus inhaler Inhale 1 puff into the lungs once daily. Controller 90 each 1    folic acid (FOLVITE) 800 MCG Tab Take 800 mcg by mouth once daily.      furosemide (LASIX) 40 MG tablet Take 40 mg by mouth once daily.      gabapentin (NEURONTIN) 300 MG capsule TAKE 1 CAPSULE BY MOUTH TWICE A  capsule 3    gemfibrozil (LOPID) 600 MG tablet TAKE 1 TABLET (600 MG TOTAL) BY MOUTH 2 (TWO) TIMES DAILY BEFORE MEALS. 120 tablet 0    guaiFENesin (MUCINEX) 600 mg 12 hr tablet Take 1 tablet (600 mg total) by mouth 2 (two) times daily. for 10 days 20 tablet 0    levocetirizine (XYZAL) 5 MG tablet Take 1 tablet each morning. 30 tablet 11    LIDOCAINE VISCOUS 2 % solution       metFORMIN (GLUCOPHAGE) 1000 MG tablet TAKE 1 TABLET BY MOUTH TWICE A  tablet 0    mometasone 0.1% (ELOCON) 0.1 % cream Apply topically once daily. AAA bid prn for hand eczema 45 g 1    mupirocin (BACTROBAN) 2 % ointment Apply topically 2 (two) times daily. Apply with Q-tip 30 g 3    pantoprazole (PROTONIX) 40 MG tablet TAKE 1 TABLET BY MOUTH DAILY 90 tablet 3    quetiapine (SEROQUEL) 300 MG Tab Take by mouth.    "   simvastatin (ZOCOR) 80 MG tablet Take 1 tablet (80 mg total) by mouth once daily. 90 tablet 2    sodium chlor/hypochlorous acid (VASHE WOUND THERAPY IR) Irrigate with as directed.      sumatriptan (IMITREX) 100 MG tablet TAKE 1/2 TAB AT ONSET OF HEADACHE,THEN 1/2 TAB IN 1 HOUR IF NEEDED.MAX 1 TABLET/IN 24 HOURS. 12 tablet 1    tamsulosin (FLOMAX) 0.4 mg Cap Take 2 capsules (0.8 mg total) by mouth once daily. 180 capsule 2    tiZANidine (ZANAFLEX) 4 MG tablet Take 4 mg by mouth every 8 (eight) hours.      topiramate 25 mg capsule Take 1 capsule (25 mg total) by mouth 2 (two) times daily. 60 capsule 0    triamcinolone acetonide 0.1% (KENALOG) 0.1 % cream AAA bid prn for rash on chest and arms. Do not use on face, underarms or groin 454 g 1    water Liqd 150 mL with Milk of Magnesia 400 mg/5 mL Susp 400 mg, diphenhydrAMINE 12.5 mg/5 mL Elix 60 mg, nystatin 100,000 unit/mL Susp 500,000 Units SWISH AND SPIT 15 MLS EVERY 4 HOURS  0    zonisamide (ZONEGRAN) 50 MG Cap TAKE 1 CAPSULE BY MOUTH TWICE A DAY 60 capsule 0     No current facility-administered medications for this visit.          ROS  Review of Systems   Constitutional: Negative for chills, diaphoresis and fever.   HENT: Negative.    Respiratory: Positive for cough (productive of scant sputum), shortness of breath and wheezing.    Cardiovascular: Positive for chest pain.   Allergic/Immunologic: Negative.          Objective:       Physical Exam  Vitals:    02/17/20 0930   BP: 114/72   BP Location: Right arm   Patient Position: Sitting   BP Method: Large (Manual)   Pulse: 81   Temp: 97.7 °F (36.5 °C)   TempSrc: Oral   SpO2: 96%   Weight: 111.2 kg (245 lb 2.4 oz)   Height: 5' 9" (1.753 m)    Body mass index is 36.2 kg/m².  Weight: 111.2 kg (245 lb 2.4 oz)   Height: 5' 9" (175.3 cm)   Physical Exam   Constitutional: He appears well-developed and well-nourished. No distress.   HENT:   Head: Normocephalic and atraumatic.   Mouth/Throat: Oropharynx is clear " and moist.   Eyes: Pupils are equal, round, and reactive to light. Conjunctivae and EOM are normal.   Cardiovascular: Normal rate. Exam reveals no gallop and no friction rub.   No murmur heard.  Pulmonary/Chest: He has wheezes (expiratory wheezes throughout all lung fields).   Intermittent dry cough   Musculoskeletal: He exhibits no tenderness.   Neurological: He is alert. He displays normal reflexes. No cranial nerve deficit or sensory deficit. He exhibits normal muscle tone.   Skin: Skin is warm and dry. No rash noted.   Psychiatric: He has a normal mood and affect. His behavior is normal.           Assessment:     1. Chronic bronchitis with COPD (chronic obstructive pulmonary disease)    2. Essential hypertension    3. Type 2 diabetes mellitus without complication, without long-term current use of insulin      Plan:     Scott was seen today for chest congestion and cough.    Diagnoses and all orders for this visit:    Chronic bronchitis with COPD (chronic obstructive pulmonary disease)  Still symptomatic with recent exacerbation - will give additional prednisone course presently  Suspect inadequate chronic bronchitis/COPD treatment - add Spiriva to LABA/ICS therapy (Breo Ellipta)  Needs re-evaluation by Pulmonary/PFTs - last seen by Dr Hanson in May 2018 - referral placed  -     Ambulatory referral/consult to Pulmonology; Future  -     tiotropium (SPIRIVA) 18 mcg inhalation capsule; Inhale 1 capsule (18 mcg total) into the lungs once daily. Controller  -     predniSONE (DELTASONE) 20 MG tablet; Take 1 tablet (20 mg total) by mouth once daily. for 5 days    Essential hypertension  BP controlled presently - reviewed anti-hypertensive regimen - continue current therapy    Type 2 diabetes mellitus without complication, without long-term current use of insulin  A1c 5.8% - discussed/reviewed  The current medical regimen is effective;  continue present plan and medications.        Health Maintenance       Date Due  Completion Date    Shingles Vaccine (1 of 2) 11/26/2009 ---    Influenza Vaccine (1) 09/01/2019 ---    Foot Exam 10/08/2019 10/8/2018    Colonoscopy 07/27/2020 7/27/2017    Hemoglobin A1c 08/12/2020 2/12/2020    Eye Exam 08/27/2020 8/27/2019    High Dose Statin 02/12/2021 2/12/2020    Lipid Panel 02/12/2021 2/12/2020    TETANUS VACCINE 07/07/2027 7/7/2017            Health Maintenance reviewed, addressed as per orders    No follow-ups on file.    The patient expressed understanding and no barriers to adherence were identified.     1. The patient indicates understanding of these issues and agrees with the plan. Brief care plan is updated and reviewed with the patient as applicable.     2. The patient is given an After Visit Summary that lists all medications with directions, allergies, orders placed during this encounter and follow-up instructions.     3. I have reviewed the patient's medical information including past medical, family, and social history sections including the medications and allergies.     4. We discussed the patient's current medications. I reconciled the patient's medication list and prepared and supplied needed refills.       Alexander Gruber MD  Internal Medicine-Pediatrics

## 2020-02-17 NOTE — PATIENT INSTRUCTIONS
Please contact our Referrals Coordinator at [phone] if you have not received a call within 5 business days to check on the status of your referral (PULMONARY MEDICINE)    Appointment to be scheduled at [facility]

## 2020-02-17 NOTE — TELEPHONE ENCOUNTER
----- Message from Christosdoug Chang sent at 2/17/2020 11:25 AM CST -----  Contact: Self  Type: Patient Call Back    Who called: self    What is the request in detail: fluticasone furoate-vilanterol (BREO) 100-25 mcg/dose diskus inhaler and tiotropium (SPIRIVA) 18 mcg inhalation capsule are to be taken in combo or is he suppose to discontinue Breo?  Can the clinic reply by [facility]? No     Would the patient rather a call back or a response via [facility]?  Call     Best call back number: [phone]

## 2020-02-18 ENCOUNTER — CLINICAL SUPPORT (OUTPATIENT)
Dept: DERMATOLOGY | Facility: CLINIC | Age: 60
End: 2020-02-18
Payer: MEDICARE

## 2020-02-18 DIAGNOSIS — L02.415 ABSCESS OF RIGHT LEG: Primary | ICD-10-CM

## 2020-02-18 PROCEDURE — 99999 PR PBB SHADOW E&M-EST. PATIENT-LVL IV: CPT | Mod: PBBFAC,,,

## 2020-02-18 PROCEDURE — 99499 UNLISTED E&M SERVICE: CPT | Mod: S$GLB,,, | Performed by: DERMATOLOGY

## 2020-02-18 PROCEDURE — 99999 PR PBB SHADOW E&M-EST. PATIENT-LVL IV: ICD-10-PCS | Mod: PBBFAC,,,

## 2020-02-18 PROCEDURE — 87070 CULTURE OTHR SPECIMN AEROBIC: CPT

## 2020-02-18 PROCEDURE — 99499 NO LOS: ICD-10-PCS | Mod: S$GLB,,, | Performed by: DERMATOLOGY

## 2020-02-18 RX ORDER — MUPIROCIN 20 MG/G
OINTMENT TOPICAL 2 TIMES DAILY
Qty: 30 G | Refills: 3 | Status: SHIPPED | OUTPATIENT
Start: 2020-02-18 | End: 2020-06-10 | Stop reason: SDUPTHER

## 2020-02-18 RX ORDER — DOXYCYCLINE 100 MG/1
CAPSULE ORAL
Qty: 20 CAPSULE | Refills: 0 | Status: SHIPPED | OUTPATIENT
Start: 2020-02-18 | End: 2020-05-07

## 2020-02-18 NOTE — PROGRESS NOTES
Hx of MRSA of the right leg, s/p doxy and PO clinda.  + drainage and erythema.  Culture done today.  Discussed importance of covering the area with bandage and use of mupirocin ointment bid as prescribed.  Discussed that pt should avoid use of topical steroids (for hand eczema) or hibiclens directly into the wound. The patient acknowledged understanding. + allergy in patient's chart of sulfa, will restart doxy instead and refill mupirocin. F/u in 1 week as nurse's visit.

## 2020-02-18 NOTE — PROGRESS NOTES
Subjective:       Patient ID:  Scott Bansal is a 59 y.o. male who presents for No chief complaint on file.    HPI    Review of Systems     Objective:    Physical Exam       Diagram Legend     Erythematous scaling macule/papule c/w actinic keratosis       Vascular papule c/w angioma      Pigmented verrucoid papule/plaque c/w seborrheic keratosis      Yellow umbilicated papule c/w sebaceous hyperplasia      Irregularly shaped tan macule c/w lentigo     1-2 mm smooth white papules consistent with Milia      Movable subcutaneous cyst with punctum c/w epidermal inclusion cyst      Subcutaneous movable cyst c/w pilar cyst      Firm pink to brown papule c/w dermatofibroma      Pedunculated fleshy papule(s) c/w skin tag(s)      Evenly pigmented macule c/w junctional nevus     Mildly variegated pigmented, slightly irregular-bordered macule c/w mildly atypical nevus      Flesh colored to evenly pigmented papule c/w intradermal nevus       Pink pearly papule/plaque c/w basal cell carcinoma      Erythematous hyperkeratotic cursted plaque c/w SCC      Surgical scar with no sign of skin cancer recurrence      Open and closed comedones      Inflammatory papules and pustules      Verrucoid papule consistent consistent with wart     Erythematous eczematous patches and plaques     Dystrophic onycholytic nail with subungual debris c/w onychomycosis     Umbilicated papule    Erythematous-base heme-crusted tan verrucoid plaque consistent with inflamed seborrheic keratosis     Erythematous Silvery Scaling Plaque c/w Psoriasis     See annotation      Assessment / Plan:        There are no diagnoses linked to this encounter.         No follow-ups on file.

## 2020-02-22 LAB — BACTERIA SPEC AEROBE CULT: NO GROWTH

## 2020-02-24 RX ORDER — FUROSEMIDE 40 MG/1
40 TABLET ORAL DAILY
Qty: 90 TABLET | Refills: 0 | Status: SHIPPED | OUTPATIENT
Start: 2020-02-24 | End: 2020-06-04 | Stop reason: SDUPTHER

## 2020-02-25 DIAGNOSIS — N39.43 BENIGN PROSTATIC HYPERPLASIA WITH POST-VOID DRIBBLING: ICD-10-CM

## 2020-02-25 DIAGNOSIS — N40.1 BENIGN PROSTATIC HYPERPLASIA WITH POST-VOID DRIBBLING: ICD-10-CM

## 2020-02-26 ENCOUNTER — OFFICE VISIT (OUTPATIENT)
Dept: DERMATOLOGY | Facility: CLINIC | Age: 60
End: 2020-02-26
Payer: MEDICARE

## 2020-02-26 DIAGNOSIS — L02.415 ABSCESS OF RIGHT LEG: Primary | ICD-10-CM

## 2020-02-26 PROCEDURE — 99999 PR PBB SHADOW E&M-EST. PATIENT-LVL I: CPT | Mod: PBBFAC,,, | Performed by: DERMATOLOGY

## 2020-02-26 PROCEDURE — 99213 OFFICE O/P EST LOW 20 MIN: CPT | Mod: S$GLB,,, | Performed by: DERMATOLOGY

## 2020-02-26 PROCEDURE — 99999 PR PBB SHADOW E&M-EST. PATIENT-LVL I: ICD-10-PCS | Mod: PBBFAC,,, | Performed by: DERMATOLOGY

## 2020-02-26 PROCEDURE — 99213 PR OFFICE/OUTPT VISIT, EST, LEVL III, 20-29 MIN: ICD-10-PCS | Mod: S$GLB,,, | Performed by: DERMATOLOGY

## 2020-02-26 RX ORDER — TAMSULOSIN HYDROCHLORIDE 0.4 MG/1
CAPSULE ORAL
Qty: 30 CAPSULE | Refills: 0 | Status: SHIPPED | OUTPATIENT
Start: 2020-02-26 | End: 2020-11-12

## 2020-02-26 NOTE — PROGRESS NOTES
Subjective:       Patient ID:  Scott Bansal is a 59 y.o. male who presents for   Chief Complaint   Patient presents with    Recurrent Skin Infections     on right lowe leg for 1 month. Pt states it drains and has used clinda 450 mg and ointment for tx     Hx of MRSA abscess of the right lower leg (s/p positive MRSA bacterial culture on 1/31/20, subsequent negative , last seen in clinic on 2/4/20 and as nurse's visit on 2/18/20.  He is s/p clinda, doxy and mupirocin.  He is currently using an unknown topical cleansing agent and is allowing the area to dry out. Denies F/C/N/V.          Review of Systems   Constitutional: Negative for fever and chills.   Gastrointestinal: Negative for nausea and vomiting.   Skin: Negative for daily sunscreen use, activity-related sunscreen use and recent sunburn.   Hematologic/Lymphatic: Does not bruise/bleed easily.        Objective:    Physical Exam   Constitutional: He appears well-developed and well-nourished. No distress.   Neurological: He is alert and oriented to person, place, and time. He is not disoriented.   Psychiatric: He has a normal mood and affect.   Skin:   Areas Examined (abnormalities noted in diagram):   Head / Face Inspection Performed  Neck Inspection Performed  Chest / Axilla Inspection Performed  RLE Inspected  LLE Inspection Performed               Assessment / Plan:        Abscess of right leg    Improving, recommend more frequent use of vaseline.  Most recent bacterial culture on 2/18/20 negative.  Given improved healing and + MRSA, unlikely pyoderma gangrenosum but will continue to monitor.         Follow up in about 3 months (around 5/26/2020).

## 2020-03-01 DIAGNOSIS — G44.229 CHRONIC TENSION-TYPE HEADACHE, NOT INTRACTABLE: ICD-10-CM

## 2020-03-02 RX ORDER — ZONISAMIDE 50 MG/1
CAPSULE ORAL
Qty: 60 CAPSULE | Refills: 0 | Status: SHIPPED | OUTPATIENT
Start: 2020-03-02 | End: 2020-03-26

## 2020-03-03 ENCOUNTER — OFFICE VISIT (OUTPATIENT)
Dept: FAMILY MEDICINE | Facility: CLINIC | Age: 60
End: 2020-03-03
Payer: MEDICARE

## 2020-03-03 VITALS
WEIGHT: 243.81 LBS | DIASTOLIC BLOOD PRESSURE: 84 MMHG | HEIGHT: 69 IN | TEMPERATURE: 98 F | SYSTOLIC BLOOD PRESSURE: 126 MMHG | BODY MASS INDEX: 36.11 KG/M2 | HEART RATE: 98 BPM | OXYGEN SATURATION: 95 %

## 2020-03-03 DIAGNOSIS — F31.9 BIPOLAR 1 DISORDER: ICD-10-CM

## 2020-03-03 DIAGNOSIS — J44.1 COPD EXACERBATION: Primary | ICD-10-CM

## 2020-03-03 DIAGNOSIS — I70.0 ATHEROSCLEROSIS OF AORTA: ICD-10-CM

## 2020-03-03 DIAGNOSIS — L30.9 HAND ECZEMA: ICD-10-CM

## 2020-03-03 DIAGNOSIS — E11.9 TYPE 2 DIABETES MELLITUS WITHOUT COMPLICATION, WITHOUT LONG-TERM CURRENT USE OF INSULIN: ICD-10-CM

## 2020-03-03 DIAGNOSIS — E78.5 HYPERLIPIDEMIA, UNSPECIFIED HYPERLIPIDEMIA TYPE: ICD-10-CM

## 2020-03-03 DIAGNOSIS — I10 ESSENTIAL HYPERTENSION: ICD-10-CM

## 2020-03-03 PROCEDURE — 99999 PR PBB SHADOW E&M-EST. PATIENT-LVL V: CPT | Mod: PBBFAC,,, | Performed by: NURSE PRACTITIONER

## 2020-03-03 PROCEDURE — 3044F PR MOST RECENT HEMOGLOBIN A1C LEVEL <7.0%: ICD-10-PCS | Mod: CPTII,S$GLB,, | Performed by: NURSE PRACTITIONER

## 2020-03-03 PROCEDURE — 3074F PR MOST RECENT SYSTOLIC BLOOD PRESSURE < 130 MM HG: ICD-10-PCS | Mod: CPTII,S$GLB,, | Performed by: NURSE PRACTITIONER

## 2020-03-03 PROCEDURE — 99215 OFFICE O/P EST HI 40 MIN: CPT | Mod: 25,S$GLB,, | Performed by: NURSE PRACTITIONER

## 2020-03-03 PROCEDURE — 99999 PR PBB SHADOW E&M-EST. PATIENT-LVL V: ICD-10-PCS | Mod: PBBFAC,,, | Performed by: NURSE PRACTITIONER

## 2020-03-03 PROCEDURE — 3008F BODY MASS INDEX DOCD: CPT | Mod: CPTII,S$GLB,, | Performed by: NURSE PRACTITIONER

## 2020-03-03 PROCEDURE — 3044F HG A1C LEVEL LT 7.0%: CPT | Mod: CPTII,S$GLB,, | Performed by: NURSE PRACTITIONER

## 2020-03-03 PROCEDURE — 3079F DIAST BP 80-89 MM HG: CPT | Mod: CPTII,S$GLB,, | Performed by: NURSE PRACTITIONER

## 2020-03-03 PROCEDURE — 3008F PR BODY MASS INDEX (BMI) DOCUMENTED: ICD-10-PCS | Mod: CPTII,S$GLB,, | Performed by: NURSE PRACTITIONER

## 2020-03-03 PROCEDURE — 99215 PR OFFICE/OUTPT VISIT, EST, LEVL V, 40-54 MIN: ICD-10-PCS | Mod: 25,S$GLB,, | Performed by: NURSE PRACTITIONER

## 2020-03-03 PROCEDURE — 3074F SYST BP LT 130 MM HG: CPT | Mod: CPTII,S$GLB,, | Performed by: NURSE PRACTITIONER

## 2020-03-03 PROCEDURE — 94640 AIRWAY INHALATION TREATMENT: CPT | Mod: S$GLB,,, | Performed by: NURSE PRACTITIONER

## 2020-03-03 PROCEDURE — 94640 PR INHAL RX, AIRWAY OBST/DX SPUTUM INDUCT: ICD-10-PCS | Mod: S$GLB,,, | Performed by: NURSE PRACTITIONER

## 2020-03-03 PROCEDURE — 3079F PR MOST RECENT DIASTOLIC BLOOD PRESSURE 80-89 MM HG: ICD-10-PCS | Mod: CPTII,S$GLB,, | Performed by: NURSE PRACTITIONER

## 2020-03-03 RX ORDER — LEVALBUTEROL INHALATION SOLUTION 1.25 MG/3ML
1.25 SOLUTION RESPIRATORY (INHALATION)
Status: COMPLETED | OUTPATIENT
Start: 2020-03-03 | End: 2020-03-03

## 2020-03-03 RX ORDER — GEMFIBROZIL 600 MG/1
600 TABLET, FILM COATED ORAL
Qty: 180 TABLET | Refills: 0 | Status: SHIPPED | OUTPATIENT
Start: 2020-03-03 | End: 2020-05-26

## 2020-03-03 RX ORDER — METHYLPREDNISOLONE 4 MG/1
TABLET ORAL
Qty: 1 PACKAGE | Refills: 0 | Status: SHIPPED | OUTPATIENT
Start: 2020-03-03 | End: 2020-03-23 | Stop reason: SDUPTHER

## 2020-03-03 RX ADMIN — LEVALBUTEROL INHALATION SOLUTION 1.25 MG: 1.25 SOLUTION RESPIRATORY (INHALATION) at 06:03

## 2020-03-04 NOTE — PATIENT INSTRUCTIONS
Continue creams prescribed by dermatology   Mupirocin to sore on right lower leg  Medrol dose pack as directed  Continue all inhalers and neb treatments every 4-6 hours as needed  Return if spike a fever or gets worse

## 2020-03-04 NOTE — PROGRESS NOTES
Subjective:       Patient ID: Scott Bansal is a 59 y.o. male.    Chief Complaint: Chest Congestion (6 weeks)    59-year-old male presents to the clinic today with complaint of coughing, shortness of breath, and wheezing for the last couple of days.  He denies any fever, chills, sore throat, sinus congestion, ear pain, abdominal pain, nausea, vomiting, or diarrhea. He denies any headaches, dizziness, or blurred vision.  He denies any cardiac chest pain, heart palpitations, or swelling to lower extremities.  He was hospitalized on 02/08/2020 for COPD and was treated with antibiotics and prednisone.  He also received breathing treatments while in the hospital.  His chest x-ray showed no evidence of pneumonia.  He was also seen by dermatologist Dr. Jennifer Chapin on 02/04/2020 and diagnosed with an abscess to right lower extremity and eczema of both of his hands.  He was prescribed doxycycline, Bactroban, on 3 different topical creams for his hands.  He states his abscess to his right lower leg is much improved.  He states his hands are still peeling but he is scheduled follow up with his dermatologist again within a couple weeks.  He has been using Bactroban to the sore to his right lower extremity that looks like it is healing.  His last hemoglobin A1c from 02/12/2020 was 5.8.    Past Medical History:   Diagnosis Date    Bipolar 1 disorder, mixed     BPH (benign prostatic hypertrophy)     Cyst, eyelid     Dr. Broussard    Diabetes mellitus     GERD (gastroesophageal reflux disease)     Hyperlipidemia     Hypertension     Lumbar degenerative disc disease 3/7/2019    Migraine headache     Obesity      Past Surgical History:   Procedure Laterality Date    CERVICAL SPINE SURGERY      COLONOSCOPY N/A 7/27/2017    Procedure: COLONOSCOPY;  Surgeon: Genaro Nix MD;  [facility];  Service: Endoscopy;  Laterality: N/A;    EYE SURGERY Left 03/2017    cyst removal on eyelid; Dr. Broussard    SHOULDER  SURGERY      TONSILLECTOMY        reports that he quit smoking about 35 years ago. His smoking use included cigarettes. He has a 30.00 pack-year smoking history. He has never used smokeless tobacco. He reports that he does not drink alcohol or use drugs.  Review of Systems   Constitutional: Negative for chills, fatigue and fever.   HENT: Negative for congestion, ear discharge, ear pain, nosebleeds, postnasal drip, rhinorrhea, sinus pressure, sneezing and sore throat.    Respiratory: Positive for cough, shortness of breath and wheezing.    Cardiovascular: Negative for chest pain, palpitations and leg swelling.   Gastrointestinal: Negative for abdominal pain, constipation, diarrhea, nausea and vomiting.   Musculoskeletal: Negative for back pain, gait problem and neck pain.   Skin: Positive for rash and wound. Negative for color change.   Neurological: Negative for dizziness, light-headedness and headaches.       Objective:      Physical Exam   Constitutional: He is oriented to person, place, and time. He appears well-developed and well-nourished. No distress.   HENT:   Head: Normocephalic and atraumatic.   Right Ear: External ear normal.   Left Ear: External ear normal.   Nose: Nose normal.   Mouth/Throat: Oropharynx is clear and moist. No oropharyngeal exudate.   Eyes: Pupils are equal, round, and reactive to light. Conjunctivae and EOM are normal. Right eye exhibits no discharge. Left eye exhibits no discharge. No scleral icterus.   Neck: Normal range of motion. Neck supple.   Cardiovascular: Normal rate and regular rhythm. Exam reveals no gallop and no friction rub.   No murmur heard.  Pulmonary/Chest: Effort normal. No respiratory distress. He has wheezes. He has no rales.   Scattered wheezing noted no rales or rhonchi noted    Abdominal: Soft. Bowel sounds are normal. There is no tenderness.   Musculoskeletal: Normal range of motion. He exhibits no edema.   Lymphadenopathy:     He has no cervical adenopathy.    Neurological: He is alert and oriented to person, place, and time.   Skin: Skin is warm and dry. Rash noted. He is not diaphoretic.   Both hands eczema noted with some redness no drainage peeling of skin to palms of both hands some sore right lower leg without any drainage appears to be healing form previous large abscess site    Psychiatric: He has a normal mood and affect.           Assessment:       1. COPD exacerbation    2. Hand eczema    3. Type 2 diabetes mellitus without complication, without long-term current use of insulin    4. Bipolar 1 disorder    5. Atherosclerosis of aorta    6. Essential hypertension        Plan:         COPD exacerbation  -     levalbuterol nebulizer solution 1.25 mg  -     methylPREDNISolone (MEDROL DOSEPACK) 4 mg tablet; use as directed  Dispense: 1 Package; Refill: 0  - continue all inhalers and neb treatments every 4-6 hours as needed     Hand eczema  - continue current creams prescribed by dermatologist     Type 2 diabetes mellitus without complication, without long-term current use of insulin  - diet controlled    Bipolar 1 disorder  - The current medical regimen is effective;  continue present plan and medications.    Atherosclerosis of aorta  - Stable / Asymptomatic is on blood pressure and cholesterol lowering medications    Essential hypertension  - The current medical regimen is effective;  continue present plan and medications.    - Total time spent greater than 45 minutes with greater than 50% of the time spent counseling and coordination of care

## 2020-03-05 ENCOUNTER — TELEPHONE (OUTPATIENT)
Dept: FAMILY MEDICINE | Facility: CLINIC | Age: 60
End: 2020-03-05

## 2020-03-05 NOTE — TELEPHONE ENCOUNTER
I left a message for the patient's mother to call the office back to discuss Fransico's MRI results.

## 2020-03-10 DIAGNOSIS — J44.89 CHRONIC BRONCHITIS WITH COPD (CHRONIC OBSTRUCTIVE PULMONARY DISEASE): ICD-10-CM

## 2020-03-10 RX ORDER — TIOTROPIUM BROMIDE 18 UG/1
CAPSULE ORAL; RESPIRATORY (INHALATION)
Qty: 30 CAPSULE | Refills: 0 | Status: SHIPPED | OUTPATIENT
Start: 2020-03-10 | End: 2022-10-04

## 2020-03-11 DIAGNOSIS — G43.711 INTRACTABLE CHRONIC MIGRAINE WITHOUT AURA AND WITH STATUS MIGRAINOSUS: ICD-10-CM

## 2020-03-12 RX ORDER — SUMATRIPTAN SUCCINATE 100 MG/1
TABLET ORAL
Qty: 12 TABLET | Refills: 1 | Status: SHIPPED | OUTPATIENT
Start: 2020-03-12 | End: 2020-03-24 | Stop reason: SDUPTHER

## 2020-03-22 DIAGNOSIS — I10 ESSENTIAL HYPERTENSION: ICD-10-CM

## 2020-03-23 ENCOUNTER — OFFICE VISIT (OUTPATIENT)
Dept: FAMILY MEDICINE | Facility: CLINIC | Age: 60
End: 2020-03-23
Payer: MEDICARE

## 2020-03-23 VITALS
TEMPERATURE: 98 F | BODY MASS INDEX: 35.55 KG/M2 | DIASTOLIC BLOOD PRESSURE: 88 MMHG | WEIGHT: 240 LBS | OXYGEN SATURATION: 97 % | HEIGHT: 69 IN | SYSTOLIC BLOOD PRESSURE: 128 MMHG | HEART RATE: 77 BPM

## 2020-03-23 DIAGNOSIS — E11.9 TYPE 2 DIABETES MELLITUS WITHOUT COMPLICATION, WITHOUT LONG-TERM CURRENT USE OF INSULIN: ICD-10-CM

## 2020-03-23 DIAGNOSIS — L30.1 DYSHIDROTIC ECZEMA: Primary | ICD-10-CM

## 2020-03-23 DIAGNOSIS — I10 ESSENTIAL HYPERTENSION: ICD-10-CM

## 2020-03-23 DIAGNOSIS — F31.9 BIPOLAR 1 DISORDER: ICD-10-CM

## 2020-03-23 DIAGNOSIS — J44.1 COPD EXACERBATION: ICD-10-CM

## 2020-03-23 DIAGNOSIS — E66.01 SEVERE OBESITY (BMI 35.0-39.9) WITH COMORBIDITY: ICD-10-CM

## 2020-03-23 DIAGNOSIS — I70.0 ATHEROSCLEROSIS OF AORTA: ICD-10-CM

## 2020-03-23 DIAGNOSIS — E11.42 DIABETIC POLYNEUROPATHY ASSOCIATED WITH TYPE 2 DIABETES MELLITUS: ICD-10-CM

## 2020-03-23 PROCEDURE — 3044F HG A1C LEVEL LT 7.0%: CPT | Mod: CPTII,S$GLB,, | Performed by: FAMILY MEDICINE

## 2020-03-23 PROCEDURE — 99999 PR PBB SHADOW E&M-EST. PATIENT-LVL III: ICD-10-PCS | Mod: PBBFAC,,, | Performed by: FAMILY MEDICINE

## 2020-03-23 PROCEDURE — 3074F SYST BP LT 130 MM HG: CPT | Mod: CPTII,S$GLB,, | Performed by: FAMILY MEDICINE

## 2020-03-23 PROCEDURE — 3044F PR MOST RECENT HEMOGLOBIN A1C LEVEL <7.0%: ICD-10-PCS | Mod: CPTII,S$GLB,, | Performed by: FAMILY MEDICINE

## 2020-03-23 PROCEDURE — 99214 OFFICE O/P EST MOD 30 MIN: CPT | Mod: S$GLB,,, | Performed by: FAMILY MEDICINE

## 2020-03-23 PROCEDURE — 99999 PR PBB SHADOW E&M-EST. PATIENT-LVL III: CPT | Mod: PBBFAC,,, | Performed by: FAMILY MEDICINE

## 2020-03-23 PROCEDURE — 3008F BODY MASS INDEX DOCD: CPT | Mod: CPTII,S$GLB,, | Performed by: FAMILY MEDICINE

## 2020-03-23 PROCEDURE — 3079F PR MOST RECENT DIASTOLIC BLOOD PRESSURE 80-89 MM HG: ICD-10-PCS | Mod: CPTII,S$GLB,, | Performed by: FAMILY MEDICINE

## 2020-03-23 PROCEDURE — 3008F PR BODY MASS INDEX (BMI) DOCUMENTED: ICD-10-PCS | Mod: CPTII,S$GLB,, | Performed by: FAMILY MEDICINE

## 2020-03-23 PROCEDURE — 99214 PR OFFICE/OUTPT VISIT, EST, LEVL IV, 30-39 MIN: ICD-10-PCS | Mod: S$GLB,,, | Performed by: FAMILY MEDICINE

## 2020-03-23 PROCEDURE — 3079F DIAST BP 80-89 MM HG: CPT | Mod: CPTII,S$GLB,, | Performed by: FAMILY MEDICINE

## 2020-03-23 PROCEDURE — 3074F PR MOST RECENT SYSTOLIC BLOOD PRESSURE < 130 MM HG: ICD-10-PCS | Mod: CPTII,S$GLB,, | Performed by: FAMILY MEDICINE

## 2020-03-23 RX ORDER — BETAMETHASONE DIPROPIONATE 0.5 MG/G
OINTMENT TOPICAL 2 TIMES DAILY
Qty: 45 G | Refills: 1 | Status: SHIPPED | OUTPATIENT
Start: 2020-03-23 | End: 2022-10-04

## 2020-03-23 RX ORDER — AMLODIPINE BESYLATE 10 MG/1
TABLET ORAL
Qty: 90 TABLET | Refills: 3 | Status: SHIPPED | OUTPATIENT
Start: 2020-03-23 | End: 2021-03-09

## 2020-03-23 RX ORDER — METHYLPREDNISOLONE 4 MG/1
TABLET ORAL
Qty: 1 PACKAGE | Refills: 0 | Status: SHIPPED | OUTPATIENT
Start: 2020-03-23 | End: 2020-05-07

## 2020-03-23 RX ORDER — HYDROXYZINE HYDROCHLORIDE 25 MG/1
25 TABLET, FILM COATED ORAL 3 TIMES DAILY PRN
Qty: 90 TABLET | Refills: 1 | Status: SHIPPED | OUTPATIENT
Start: 2020-03-23 | End: 2020-04-14

## 2020-03-23 NOTE — PROGRESS NOTES
Routine Office Visit    Patient Name: Scott Bansal    : 1960  MRN: [id number]    Subjective:  Scott is a 59 y.o. male who presents today for     1. Bilateral excoriated hand eczema - chronic condition for patient. He sees dermatology who is in [place] and was prescribed mometasone cream and betamethasone cream. He uses this nightly and wears gloves. He washes his hands frequently. He states he does not know what creams to use because his hands constantly blister and break out.   2. Rashes throughout his body. He has new rashes throughout his body. Some look like scratches, some look like old abscesses. He states he has history of impetigo and staph infection where he was treated with clindamycin and doxycycline. He has no signs of current infection at this time.     Review of Systems   Constitutional: Negative for chills and fever.   HENT: Negative for congestion.    Eyes: Negative for blurred vision.   Respiratory: Negative for cough.    Cardiovascular: Negative for chest pain.   Gastrointestinal: Negative for abdominal pain, constipation, diarrhea, heartburn, nausea and vomiting.   Genitourinary: Negative for dysuria.   Musculoskeletal: Negative for myalgias.   Skin: Positive for itching and rash.   Neurological: Negative for dizziness and headaches.   Psychiatric/Behavioral: Negative for depression.       Active Problem List  Patient Active Problem List   Diagnosis    History of fusion of cervical spine    Type 2 diabetes mellitus without complication    Hyperlipidemia    Essential hypertension    Chronic tension-type headache, not intractable    Bipolar 1 disorder    BPH (benign prostatic hyperplasia)    Gastroesophageal reflux disease without esophagitis    Sciatica of right side    History of pneumonia    COPD (chronic obstructive pulmonary disease)    Former smoker    Lumbar compression fracture    Diabetic polyneuropathy associated with type 2 diabetes mellitus    Lumbar  degenerative disc disease    Severe obesity (BMI 35.0-39.9) with comorbidity    Atherosclerosis of aorta    Chronic bilateral low back pain without sciatica    Muscle spasm       Past Surgical History  Past Surgical History:   Procedure Laterality Date    CERVICAL SPINE SURGERY      COLONOSCOPY N/A 2017    Procedure: COLONOSCOPY;  Surgeon: Genaro Nix MD;  [facility];  Service: Endoscopy;  Laterality: N/A;    EYE SURGERY Left 2017    cyst removal on eyelid; Dr. Broussard    SHOULDER SURGERY      TONSILLECTOMY         Family History  Family History   Problem Relation Age of Onset    Cataracts Mother     Cancer Mother         throat    Hypertension Mother     Hypertension Father     Stroke Father         2 strokes    Hypertension Sister     Cancer Brother         spinal, kidney, spinal fluid    Hypertension Brother     Cancer Cousin         breast?       Social History  Social History     Socioeconomic History    Marital status:      Spouse name: Not on file    Number of children: Not on file    Years of education: Not on file    Highest education level: Not on file   Occupational History    Not on file   Social Needs    Financial resource strain: Not on file    Food insecurity:     Worry: Not on file     Inability: Not on file    Transportation needs:     Medical: Not on file     Non-medical: Not on file   Tobacco Use    Smoking status: Former Smoker     Packs/day: 2.00     Years: 15.00     Pack years: 30.00     Types: Cigarettes     Last attempt to quit: 1984     Years since quittin.8    Smokeless tobacco: Never Used    Tobacco comment: Patient quit smoking at the age of 24 yo.   Substance and Sexual Activity    Alcohol use: No    Drug use: No    Sexual activity: Yes     Partners: Female   Lifestyle    Physical activity:     Days per week: Not on file     Minutes per session: Not on file    Stress: Only a little   Relationships    Social  connections:     Talks on phone: Not on file     Gets together: Not on file     Attends Mandaen service: Not on file     Active member of club or organization: Not on file     Attends meetings of clubs or organizations: Not on file     Relationship status: Not on file   Other Topics Concern    Not on file   Social History Narrative    Not on file       Medications and Allergies  Reviewed and updated.   Current Outpatient Medications   Medication Sig    albuterol (VENTOLIN HFA) 90 mcg/actuation inhaler INHALE 2 PUFFS EVERY 4 HOURS AS NEEDED    amLODIPine (NORVASC) 10 MG tablet TAKE 1 TABLET BY MOUTH EVERY DAY    benzonatate (TESSALON) 200 MG capsule Take 1 capsule (200 mg total) by mouth 3 (three) times daily as needed for Cough.    buPROPion (WELLBUTRIN XL) 300 MG 24 hr tablet Take 300 mg by mouth every morning.    busPIRone (BUSPAR) 10 MG tablet Take 10 mg by mouth 3 (three) times daily.    candesartan (ATACAND) 4 MG tablet Take 1 tablet (4 mg total) by mouth once daily.    crisaborole (EUCRISA) 2 % Oint Use for hand eczema bid.    cyclobenzaprine (FLEXERIL) 10 MG tablet Take 10 mg by mouth daily as needed for Muscle spasms.    diclofenac sodium (VOLTAREN) 1 % Gel Apply 2 g topically once daily.    doxycycline (MONODOX) 100 MG capsule Take twice a day with food. May cause upset stomach    fluticasone (FLONASE) 50 mcg/actuation nasal spray TAKE 1 SPRAY BY EACH NARE ROUTE ONCE DAILY.    fluticasone furoate-vilanterol (BREO) 100-25 mcg/dose diskus inhaler Inhale 1 puff into the lungs once daily. Controller    folic acid (FOLVITE) 800 MCG Tab Take 800 mcg by mouth once daily.    furosemide (LASIX) 40 MG tablet Take 1 tablet (40 mg total) by mouth once daily.    gabapentin (NEURONTIN) 300 MG capsule TAKE 1 CAPSULE BY MOUTH TWICE A DAY    gemfibrozil (LOPID) 600 MG tablet Take 1 tablet (600 mg total) by mouth 2 (two) times daily before meals.    levocetirizine (XYZAL) 5 MG tablet Take 1 tablet each  morning.    LIDOCAINE VISCOUS 2 % solution     metFORMIN (GLUCOPHAGE) 1000 MG tablet TAKE 1 TABLET BY MOUTH TWICE A DAY    methylPREDNISolone (MEDROL DOSEPACK) 4 mg tablet use as directed    mometasone 0.1% (ELOCON) 0.1 % cream Apply topically once daily. AAA bid prn for hand eczema    mupirocin (BACTROBAN) 2 % ointment Apply topically 2 (two) times daily. Apply with Q-tip    pantoprazole (PROTONIX) 40 MG tablet TAKE 1 TABLET BY MOUTH DAILY    quetiapine (SEROQUEL) 300 MG Tab Take by mouth.    simvastatin (ZOCOR) 80 MG tablet Take 1 tablet (80 mg total) by mouth once daily.    sodium chlor/hypochlorous acid (VASHE WOUND THERAPY IR) Irrigate with as directed.    SPIRIVA WITH HANDIHALER 18 mcg inhalation capsule INHALE 1 CAPSULE (18 MCG TOTAL) INTO THE LUNGS ONCE DAILY. CONTROLLER    sumatriptan (IMITREX) 100 MG tablet PLEASE SEE ATTACHED FOR DETAILED DIRECTIONS    tamsulosin (FLOMAX) 0.4 mg Cap TAKE 1 CAPSULE BY MOUTH EVERY DAY    tiZANidine (ZANAFLEX) 4 MG tablet Take 4 mg by mouth every 8 (eight) hours.    topiramate 25 mg capsule Take 1 capsule (25 mg total) by mouth 2 (two) times daily.    triamcinolone acetonide 0.1% (KENALOG) 0.1 % cream AAA bid prn for rash on chest and arms. Do not use on face, underarms or groin    water Liqd 150 mL with Milk of Magnesia 400 mg/5 mL Susp 400 mg, diphenhydrAMINE 12.5 mg/5 mL Elix 60 mg, nystatin 100,000 unit/mL Susp 500,000 Units SWISH AND SPIT 15 MLS EVERY 4 HOURS    zonisamide (ZONEGRAN) 50 MG Cap TAKE 1 CAPSULE BY MOUTH TWICE A DAY    betamethasone dipropionate (DIPROLENE) 0.05 % ointment Apply topically 2 (two) times daily.    hydroxyzine HCL (ATARAX) 25 MG tablet Take 1 tablet (25 mg total) by mouth 3 (three) times daily as needed for Itching.     No current facility-administered medications for this visit.        Physical Exam  /88 (BP Location: Left arm, Patient Position: Sitting, BP Method: Large (Automatic))   Pulse 77   Temp 97.7 °F (36.5  "°C) (Oral)   Ht 5' 9" (1.753 m)   Wt 108.9 kg (240 lb)   SpO2 97%   BMI 35.44 kg/m²   Physical Exam   Constitutional: He is oriented to person, place, and time. He appears well-developed and well-nourished.   HENT:   Head: Normocephalic and atraumatic.   Eyes: Pupils are equal, round, and reactive to light. Conjunctivae and EOM are normal.   Neck: Normal range of motion. Neck supple. No JVD present. No thyromegaly present.   Cardiovascular: Normal rate, regular rhythm and normal heart sounds.   Pulmonary/Chest: Effort normal and breath sounds normal. He has no wheezes.   Abdominal: Soft. Bowel sounds are normal. He exhibits no distension. There is no tenderness. There is no guarding.   Musculoskeletal: Normal range of motion.   Lymphadenopathy:     He has no cervical adenopathy.   Neurological: He is alert and oriented to person, place, and time.   Skin: Skin is warm and dry.        Psychiatric: He has a normal mood and affect. His behavior is normal.         Assessment/Plan:  Scott Bansal is a 59 y.o. male who presents today for :    Problem List Items Addressed This Visit        Neuro    Diabetic polyneuropathy associated with type 2 diabetes mellitus  Noted in chart         Psychiatric    Bipolar 1 disorder    Overview     Currently on buspar, bupropion, seroquel  Dr. Cordova at [facility]             Cardiac/Vascular    Atherosclerosis of aorta    Overview     - noted on CXR 3/2018  Patient with Atherosclerosis of the Aorta.  Stable/asymptomatic. Currently stable on lipid lowering treatment and b/p monitoring.           Essential hypertension    Overview     Cannot tolerate sulfa  Currently on amlodipine, candesartan, furosemide  Previously took irbesartan and clonidine             Endocrine    Severe obesity (BMI 35.0-39.9) with comorbidity  Noted       Type 2 diabetes mellitus without complication  Noted  Reviewed A1c  Advised of side effects of short course steroid         Other Visit " Diagnoses     Dyshidrotic eczema    -  Primary    Relevant Medications    hydroxyzine HCL (ATARAX) 25 MG tablet    betamethasone dipropionate (DIPROLENE) 0.05 % ointment  methylPREDNISolone (MEDROL DOSEPACK) 4 mg tablet          Follow up in about 5 months (around 8/23/2020).

## 2020-03-23 NOTE — PATIENT INSTRUCTIONS
Nonspecific Dermatitis  Dermatitis is a skin rash caused by something that touches the skin and makes it irritated and inflamed.  Your skin may be red, swollen, dry, and may be cracked. Blisters may form and ooze. The rash will itch.  Dermatitis can form on the face and neck, backs of hands, forearms, genitals, and lower legs. Dermatitis is not passed from person to person.  Talk with your health care provider about what may have caused the rash. Common things that cause skin allergies are metal in jewelry, plants like poison ivy or poison oak, and certain skin care products. You will need to avoid the source of your rash in the future to prevent it from coming back. In some cases, the cause of the dermatitis may not be found.  Treatment is done to relieve itching and prevent the rash from coming back. The rash should go away in a few days to a few weeks.  Home care  The health care provider may prescribe medications to relieve swelling and itching. Follow all instructions when using these medications.  · Avoid anything that heats up your skin, such as hot showers or baths, or direct sunlight. This can make itching worse.  · Stay away from whatever you think caused the rash.  · Bathe in warm, not hot, water. Apply a moisturizing lotion after bathing to prevent dry skin.  · Avoid skin irritants such as wool or silk clothing, grease, oils, harsh soaps, and detergents.  · Apply cold compresses to soothe your sores to help relieve your symptoms. Do this for 30 minutes 3 to 4 times a day. You can make a cold compress by soaking a cloth in cold water. Squeeze out excess water. You can add colloidal oatmeal to the water to help reduce itching. For severe itching in a small area, apply an ice pack wrapped in a thin towel. Do this for 20 minutes 3 to 4 times a day.  · You can also help relieve large areas of itching by taking a lukewarm bath with colloidal oatmeal added to the water.  · Use hydrocortisone cream for redness  and irritation, unless another medicine was prescribed. You can also use benzocaine anesthetic cream or spray.  · Use oral diphenhydramine to help reduce itching. This is an antihistamine you can buy at drug and grocery stores. It can make you sleepy, so use lower doses during the daytime. Or you can use loratadine. This is an antihistamine that will not make you sleepy. Dont use diphenhydramine if you have glaucoma or have trouble urinating because of an enlarged prostate.  · Wash your hands or use an antibacterial gel often to prevent the spread of the rash.  Follow-up care  Follow up with your health care provider. Make an appointment with your health care provider if your symptoms do not get better in the next 1 to 2 weeks.  When to seek medical advice  Call your health care provider right away if any of these occur:  · Spreading of the rash to other parts of your body  · Severe swelling of your face, eyelids, mouth, throat or tongue  · Trouble urinating due to swelling in the genital area  · Fever of 100.4°F (38°C) or higher  · Redness or swelling that gets worse  · Pain that gets worse  · Foul-smelling fluid leaking from the skin  · Yellow-brown crusts on the open blisters  · Joint pain   Date Last Reviewed: 7/23/2014  © 9523-1086 [employer]. [address] [place] [address], [place] [address]. All rights reserved. This information is not intended as a substitute for professional medical care. Always follow your healthcare professional's instructions.

## 2020-03-24 ENCOUNTER — TELEPHONE (OUTPATIENT)
Dept: FAMILY MEDICINE | Facility: CLINIC | Age: 60
End: 2020-03-24

## 2020-03-24 DIAGNOSIS — G43.711 INTRACTABLE CHRONIC MIGRAINE WITHOUT AURA AND WITH STATUS MIGRAINOSUS: ICD-10-CM

## 2020-03-24 RX ORDER — SUMATRIPTAN SUCCINATE 100 MG/1
100 TABLET ORAL
Qty: 12 TABLET | Refills: 1 | Status: SHIPPED | OUTPATIENT
Start: 2020-03-24 | End: 2020-05-15

## 2020-03-24 NOTE — TELEPHONE ENCOUNTER
----- Message from Coleen Watson sent at 3/24/2020  7:55 AM CDT -----  Contact: Self   Type: Patient Call Back    Who called: Self     What is the request in detail:patient states he need to speak with the nurse about his medication it was sent in with no directions. Please call     sumatriptan (IMITREX) 100 MG tablet    Can the clinic reply by [facility]? No     Would the patient rather a call back or a response via [facility]?  Call     Best call back number:[phone]    [facility]/pharmacy #[id number] - [place], [place] - [address]. [phone] (Phone)  [phone] (Fax)

## 2020-03-26 DIAGNOSIS — G44.229 CHRONIC TENSION-TYPE HEADACHE, NOT INTRACTABLE: ICD-10-CM

## 2020-03-26 RX ORDER — ZONISAMIDE 50 MG/1
CAPSULE ORAL
Qty: 60 CAPSULE | Refills: 3 | Status: SHIPPED | OUTPATIENT
Start: 2020-03-26 | End: 2020-06-04

## 2020-04-06 DIAGNOSIS — E11.9 DIABETES MELLITUS WITHOUT COMPLICATION: ICD-10-CM

## 2020-04-06 RX ORDER — CYCLOBENZAPRINE HCL 10 MG
10 TABLET ORAL DAILY PRN
Qty: 90 TABLET | Refills: 1 | Status: SHIPPED | OUTPATIENT
Start: 2020-04-06 | End: 2020-06-04 | Stop reason: SDUPTHER

## 2020-04-06 RX ORDER — METFORMIN HYDROCHLORIDE 1000 MG/1
1000 TABLET ORAL 2 TIMES DAILY
Qty: 180 TABLET | Refills: 0 | Status: SHIPPED | OUTPATIENT
Start: 2020-04-06 | End: 2020-04-07 | Stop reason: SDUPTHER

## 2020-04-07 DIAGNOSIS — E11.9 DIABETES MELLITUS WITHOUT COMPLICATION: ICD-10-CM

## 2020-04-07 RX ORDER — METFORMIN HYDROCHLORIDE 1000 MG/1
1000 TABLET ORAL 2 TIMES DAILY
Qty: 180 TABLET | Refills: 0 | Status: SHIPPED | OUTPATIENT
Start: 2020-04-07 | End: 2020-09-21

## 2020-04-07 NOTE — TELEPHONE ENCOUNTER
----- Message from Gwendolyn Manzano sent at 4/7/2020  9:48 AM CDT -----  Contact: Patient   Type: RX Refill Request    Who Called: Patient     Have you contacted your pharmacy: yes     Refill or New Rx: Refill     RX Name and Strength: metFORMIN (GLUCOPHAGE) 1000 MG tablet    How is the patient currently taking it? (ex. 1XDay):    Is this a 30 day or 90 day RX:    Preferred Pharmacy with phone number:   [facility]/pharmacy #[id number] - [place] - [address] [facility].  [address] [facility]  [place] [address]  Phone: [phone] Fax: [phone]      Local or Mail Order: Local     Ordering Provider: Dr. Chau     Would the patient rather a call back or a response [facility]? Call back     Best Call Back Number: [phone]

## 2020-04-14 DIAGNOSIS — L30.1 DYSHIDROTIC ECZEMA: ICD-10-CM

## 2020-04-14 RX ORDER — HYDROXYZINE HYDROCHLORIDE 25 MG/1
25 TABLET, FILM COATED ORAL 3 TIMES DAILY PRN
Qty: 90 TABLET | Refills: 1 | Status: SHIPPED | OUTPATIENT
Start: 2020-04-14 | End: 2020-05-07 | Stop reason: SDUPTHER

## 2020-05-06 ENCOUNTER — TELEPHONE (OUTPATIENT)
Dept: FAMILY MEDICINE | Facility: CLINIC | Age: 60
End: 2020-05-06

## 2020-05-06 NOTE — TELEPHONE ENCOUNTER
----- Message from Chani Birmingham sent at 5/6/2020 11:49 AM CDT -----  Contact: Patient [phone]  .Name of Caller Patient    Reason for Visit/Symptoms Really bad Eczema. Itching really bad.    Best Contact Number or Confirm if Mychart Preferred [phone]    Preferred Date/Time of Appointment 05-06-20    Interested in Virtual Visit (yes/no) NO    Additional Information Patient wants to come in to the clinic. Declined a Virtual Visit and Virtual Audio Visit. Please call.

## 2020-05-06 NOTE — TELEPHONE ENCOUNTER
Schedule in office visit.  Please schedule for 11AM.  Okay to overbook.  Have patient come in at 8:20 AM

## 2020-05-06 NOTE — TELEPHONE ENCOUNTER
Spoke with patient and scheduled him for tomorrow in the 11 o'clock spot and told him to come in for 8:20 per Dr. Chau.

## 2020-05-07 ENCOUNTER — OFFICE VISIT (OUTPATIENT)
Dept: FAMILY MEDICINE | Facility: CLINIC | Age: 60
End: 2020-05-07
Payer: MEDICARE

## 2020-05-07 VITALS
DIASTOLIC BLOOD PRESSURE: 82 MMHG | WEIGHT: 243.81 LBS | BODY MASS INDEX: 36.11 KG/M2 | HEART RATE: 104 BPM | TEMPERATURE: 99 F | HEIGHT: 69 IN | SYSTOLIC BLOOD PRESSURE: 120 MMHG | OXYGEN SATURATION: 99 %

## 2020-05-07 DIAGNOSIS — L30.1 DYSHIDROTIC ECZEMA: ICD-10-CM

## 2020-05-07 DIAGNOSIS — B36.9 FUNGAL DERMATITIS: Primary | ICD-10-CM

## 2020-05-07 PROCEDURE — 3008F PR BODY MASS INDEX (BMI) DOCUMENTED: ICD-10-PCS | Mod: CPTII,S$GLB,, | Performed by: FAMILY MEDICINE

## 2020-05-07 PROCEDURE — 99215 PR OFFICE/OUTPT VISIT, EST, LEVL V, 40-54 MIN: ICD-10-PCS | Mod: S$GLB,,, | Performed by: FAMILY MEDICINE

## 2020-05-07 PROCEDURE — 3008F BODY MASS INDEX DOCD: CPT | Mod: CPTII,S$GLB,, | Performed by: FAMILY MEDICINE

## 2020-05-07 PROCEDURE — 99999 PR PBB SHADOW E&M-EST. PATIENT-LVL IV: CPT | Mod: PBBFAC,,, | Performed by: FAMILY MEDICINE

## 2020-05-07 PROCEDURE — 3079F PR MOST RECENT DIASTOLIC BLOOD PRESSURE 80-89 MM HG: ICD-10-PCS | Mod: CPTII,S$GLB,, | Performed by: FAMILY MEDICINE

## 2020-05-07 PROCEDURE — 3079F DIAST BP 80-89 MM HG: CPT | Mod: CPTII,S$GLB,, | Performed by: FAMILY MEDICINE

## 2020-05-07 PROCEDURE — 3074F PR MOST RECENT SYSTOLIC BLOOD PRESSURE < 130 MM HG: ICD-10-PCS | Mod: CPTII,S$GLB,, | Performed by: FAMILY MEDICINE

## 2020-05-07 PROCEDURE — 99215 OFFICE O/P EST HI 40 MIN: CPT | Mod: S$GLB,,, | Performed by: FAMILY MEDICINE

## 2020-05-07 PROCEDURE — 99999 PR PBB SHADOW E&M-EST. PATIENT-LVL IV: ICD-10-PCS | Mod: PBBFAC,,, | Performed by: FAMILY MEDICINE

## 2020-05-07 PROCEDURE — 3074F SYST BP LT 130 MM HG: CPT | Mod: CPTII,S$GLB,, | Performed by: FAMILY MEDICINE

## 2020-05-07 RX ORDER — ETODOLAC 500 MG/1
500 TABLET, FILM COATED ORAL 2 TIMES DAILY
Qty: 60 TABLET | Refills: 0 | Status: SHIPPED | OUTPATIENT
Start: 2020-05-07 | End: 2020-06-08

## 2020-05-07 RX ORDER — CLOTRIMAZOLE AND BETAMETHASONE DIPROPIONATE 10; .64 MG/G; MG/G
CREAM TOPICAL 2 TIMES DAILY
Qty: 90 G | Refills: 2 | Status: SHIPPED | OUTPATIENT
Start: 2020-05-07 | End: 2021-03-02

## 2020-05-07 RX ORDER — HYDROXYZINE HYDROCHLORIDE 50 MG/1
50 TABLET, FILM COATED ORAL 3 TIMES DAILY PRN
Qty: 90 TABLET | Refills: 3 | Status: SHIPPED | OUTPATIENT
Start: 2020-05-07 | End: 2020-07-17

## 2020-05-07 RX ORDER — HYDROXYZINE HYDROCHLORIDE 25 MG/1
25 TABLET, FILM COATED ORAL 3 TIMES DAILY PRN
Qty: 90 TABLET | Refills: 1 | Status: SHIPPED | OUTPATIENT
Start: 2020-05-07 | End: 2020-07-02

## 2020-05-07 NOTE — PROGRESS NOTES
Assessment & Plan  Problem List Items Addressed This Visit     None      Visit Diagnoses     Fungal dermatitis    -  Primary    Relevant Medications    clotrimazole-betamethasone 1-0.05% (LOTRISONE) cream    etodolac (LODINE) 500 MG tablet    Dyshidrotic eczema        Relevant Medications    hydroxyzine HCL (ATARAX) 50 MG tablet        Greater than 45 minutes was spent with this patient with greater than 50% spent with face-to-face counseling on above listed conditions.      Health Maintenance reviewed.    Follow-up: Follow up in about 4 weeks (around 6/4/2020), or if symptoms worsen or fail to improve.    ______________________________________________________________________    Chief Complaint  Chief Complaint   Patient presents with    Rash       HPI  Scott Bansal is a 59 y.o. male with multiple medical diagnoses as listed in the medical history and problem list that presents for rash.  Pt is known to me with last appointment 2/12/2020.    Rash:  From previous doctor visit: 1. Bilateral excoriated hand eczema - chronic condition for patient. He sees dermatology who is in [place] and was prescribed mometasone cream and betamethasone cream. He uses this nightly and wears gloves. He washes his hands frequently. He states he does not know what creams to use because his hands constantly blister and break out.      Update:  Rash is much worse.  He has all over body itching.  He has increased pain secondary to the continued itching. No recent in his diet.  He has a normal diet.  No particular food will make his rash worse. He states it is awful.     From previous doctor visit: 2. Rashes throughout his body. He has new rashes throughout his body. Some look like scratches, some look like old abscesses. He states he has history of impetigo and staph infection where he was treated with clindamycin and doxycycline. He has no signs of current infection at this time.    Update:  Serpentine appearance to the rash.   Extending from hands to shoulder.  Noted excoriations with erythema.  He has been scratching.  Includes his back and neck.          PAST MEDICAL HISTORY:  Past Medical History:   Diagnosis Date    Bipolar 1 disorder, mixed     BPH (benign prostatic hypertrophy)     Cyst, eyelid     Dr. Broussard    Diabetes mellitus     GERD (gastroesophageal reflux disease)     Hyperlipidemia     Hypertension     Lumbar degenerative disc disease 3/7/2019    Migraine headache     Obesity        PAST SURGICAL HISTORY:  Past Surgical History:   Procedure Laterality Date    CERVICAL SPINE SURGERY      COLONOSCOPY N/A 2017    Procedure: COLONOSCOPY;  Surgeon: Genaro Nix MD;  Location: [facility];  Service: Endoscopy;  Laterality: N/A;    EYE SURGERY Left 2017    cyst removal on eyelid; Dr. Broussard    SHOULDER SURGERY      TONSILLECTOMY         SOCIAL HISTORY:  Social History     Socioeconomic History    Marital status:      Spouse name: Not on file    Number of children: Not on file    Years of education: Not on file    Highest education level: Not on file   Occupational History    Not on file   Social Needs    Financial resource strain: Not on file    Food insecurity:     Worry: Not on file     Inability: Not on file    Transportation needs:     Medical: Not on file     Non-medical: Not on file   Tobacco Use    Smoking status: Former Smoker     Packs/day: 2.00     Years: 15.00     Pack years: 30.00     Types: Cigarettes     Last attempt to quit: 1984     Years since quittin.0    Smokeless tobacco: Never Used    Tobacco comment: Patient quit smoking at the age of 24 yo.   Substance and Sexual Activity    Alcohol use: No    Drug use: No    Sexual activity: Yes     Partners: Female   Lifestyle    Physical activity:     Days per week: Not on file     Minutes per session: Not on file    Stress: Only a little   Relationships    Social connections:     Talks on phone: Not on  file     Gets together: Not on file     Attends Mandaeism service: Not on file     Active member of club or organization: Not on file     Attends meetings of clubs or organizations: Not on file     Relationship status: Not on file   Other Topics Concern    Not on file   Social History Narrative    Not on file       FAMILY HISTORY:  Family History   Problem Relation Age of Onset    Cataracts Mother     Cancer Mother         throat    Hypertension Mother     Hypertension Father     Stroke Father         2 strokes    Hypertension Sister     Cancer Brother         spinal, kidney, spinal fluid    Hypertension Brother     Cancer Cousin         breast?       ALLERGIES AND MEDICATIONS: updated and reviewed.  Review of patient's allergies indicates:   Allergen Reactions    Sulfa (sulfonamide antibiotics) Rash     Current Outpatient Medications   Medication Sig Dispense Refill    albuterol (VENTOLIN HFA) 90 mcg/actuation inhaler INHALE 2 PUFFS EVERY 4 HOURS AS NEEDED 18 Inhaler 6    amLODIPine (NORVASC) 10 MG tablet TAKE 1 TABLET BY MOUTH EVERY DAY 90 tablet 3    betamethasone dipropionate (DIPROLENE) 0.05 % ointment Apply topically 2 (two) times daily. 45 g 1    buPROPion (WELLBUTRIN XL) 300 MG 24 hr tablet Take 300 mg by mouth every morning.  2    busPIRone (BUSPAR) 10 MG tablet Take 10 mg by mouth 3 (three) times daily.      candesartan (ATACAND) 4 MG tablet Take 1 tablet (4 mg total) by mouth once daily. 90 tablet 3    crisaborole (EUCRISA) 2 % Oint Use for hand eczema bid. 60 g 5    cyclobenzaprine (FLEXERIL) 10 MG tablet Take 1 tablet (10 mg total) by mouth daily as needed for Muscle spasms. 90 tablet 1    fluticasone (FLONASE) 50 mcg/actuation nasal spray TAKE 1 SPRAY BY EACH NARE ROUTE ONCE DAILY. 16 g 5    fluticasone furoate-vilanterol (BREO) 100-25 mcg/dose diskus inhaler Inhale 1 puff into the lungs once daily. Controller 90 each 1    folic acid (FOLVITE) 800 MCG Tab Take 800 mcg by mouth  once daily.      furosemide (LASIX) 40 MG tablet Take 1 tablet (40 mg total) by mouth once daily. 90 tablet 0    gabapentin (NEURONTIN) 300 MG capsule TAKE 1 CAPSULE BY MOUTH TWICE A  capsule 3    gemfibrozil (LOPID) 600 MG tablet Take 1 tablet (600 mg total) by mouth 2 (two) times daily before meals. 180 tablet 0    hydroxyzine HCL (ATARAX) 50 MG tablet Take 1 tablet (50 mg total) by mouth 3 (three) times daily as needed for Itching. 90 tablet 3    levocetirizine (XYZAL) 5 MG tablet Take 1 tablet each morning. 30 tablet 11    LIDOCAINE VISCOUS 2 % solution       metFORMIN (GLUCOPHAGE) 1000 MG tablet Take 1 tablet (1,000 mg total) by mouth 2 (two) times daily. 180 tablet 0    mometasone 0.1% (ELOCON) 0.1 % cream Apply topically once daily. AAA bid prn for hand eczema 45 g 1    mupirocin (BACTROBAN) 2 % ointment Apply topically 2 (two) times daily. Apply with Q-tip 30 g 3    pantoprazole (PROTONIX) 40 MG tablet TAKE 1 TABLET BY MOUTH DAILY 90 tablet 3    quetiapine (SEROQUEL) 300 MG Tab Take by mouth.      simvastatin (ZOCOR) 80 MG tablet Take 1 tablet (80 mg total) by mouth once daily. 90 tablet 2    sumatriptan (IMITREX) 100 MG tablet Take 1 tablet (100 mg total) by mouth every 2 (two) hours as needed for Migraine. 12 tablet 1    tiZANidine (ZANAFLEX) 4 MG tablet Take 4 mg by mouth every 8 (eight) hours.      topiramate 25 mg capsule Take 1 capsule (25 mg total) by mouth 2 (two) times daily. 60 capsule 0    zonisamide (ZONEGRAN) 50 MG Cap TAKE 1 CAPSULE BY MOUTH TWICE A DAY 60 capsule 3    clotrimazole-betamethasone 1-0.05% (LOTRISONE) cream Apply topically 2 (two) times daily. 90 g 2    etodolac (LODINE) 500 MG tablet Take 1 tablet (500 mg total) by mouth 2 (two) times daily. 60 tablet 0    hydroxyzine HCL (ATARAX) 25 MG tablet TAKE 1 TABLET (25 MG TOTAL) BY MOUTH 3 (THREE) TIMES DAILY AS NEEDED FOR ITCHING. 90 tablet 1    SPIRIVA WITH HANDIHALER 18 mcg inhalation capsule INHALE 1  "CAPSULE (18 MCG TOTAL) INTO THE LUNGS ONCE DAILY. CONTROLLER (Patient not taking: Reported on 5/7/2020) 30 capsule 0    tamsulosin (FLOMAX) 0.4 mg Cap TAKE 1 CAPSULE BY MOUTH EVERY DAY 30 capsule 0    triamcinolone acetonide 0.1% (KENALOG) 0.1 % cream AAA bid prn for rash on chest and arms. Do not use on face, underarms or groin 454 g 1     No current facility-administered medications for this visit.          ROS  Review of Systems   Constitutional: Negative for activity change, appetite change, fatigue, fever and unexpected weight change.   HENT: Negative for congestion and facial swelling.    Eyes: Negative for visual disturbance.   Respiratory: Negative for chest tightness, shortness of breath, wheezing and stridor.    Cardiovascular: Negative for chest pain, palpitations and leg swelling.   Gastrointestinal: Negative for abdominal distention, abdominal pain, constipation, diarrhea, nausea and vomiting.   Endocrine: Negative for cold intolerance, heat intolerance, polydipsia and polyuria.   Skin: Positive for rash.   Allergic/Immunologic: Negative.    Neurological: Negative for dizziness, light-headedness, numbness and headaches.   Psychiatric/Behavioral: Negative for agitation and decreased concentration.           Physical Exam  Vitals:    05/07/20 0821   BP: 120/82   BP Location: Left arm   Patient Position: Sitting   BP Method: Large (Manual)   Pulse: 104   Temp: 98.6 °F (37 °C)   TempSrc: Oral   SpO2: 99%   Weight: 110.6 kg (243 lb 13.3 oz)   Height: 5' 9" (1.753 m)    Body mass index is 36.01 kg/m².  Weight: 110.6 kg (243 lb 13.3 oz)   Height: 5' 9" (175.3 cm)   Physical Exam   Constitutional: He is oriented to person, place, and time. He appears well-developed and well-nourished.   HENT:   Head: Normocephalic and atraumatic.   Right Ear: External ear normal.   Left Ear: External ear normal.   Nose: Nose normal.   Mouth/Throat: Oropharynx is clear and moist.   Eyes: Pupils are equal, round, and reactive " to light. Conjunctivae and EOM are normal.   Neck: Normal range of motion.   Cardiovascular: Normal rate, regular rhythm and normal heart sounds.   Pulmonary/Chest: Effort normal and breath sounds normal.   Neurological: He is alert and oriented to person, place, and time.   Skin: Skin is warm and dry.   Psychiatric: He has a normal mood and affect. His behavior is normal.   Vitals reviewed.                    Health Maintenance       Date Due Completion Date    Shingles Vaccine (1 of 2) 11/26/2009 ---    Foot Exam 10/08/2019 10/8/2018    Colonoscopy 07/27/2020 7/27/2017    Eye Exam 08/27/2020 8/27/2019    Influenza Vaccine (Season Ended) 09/01/2020 ---    Hemoglobin A1c 09/03/2020 3/3/2020    Lipid Panel 02/12/2021 2/12/2020    High Dose Statin 03/24/2021 3/24/2020    TETANUS VACCINE 07/07/2027 7/7/2017              Patient note was created using [employer].  Any errors in syntax or even information may not have been identified and edited on initial review prior to signing this note.

## 2020-05-12 ENCOUNTER — OFFICE VISIT (OUTPATIENT)
Dept: DERMATOLOGY | Facility: CLINIC | Age: 60
End: 2020-05-12
Payer: MEDICARE

## 2020-05-12 DIAGNOSIS — L98.9 DERMATOSIS: Primary | ICD-10-CM

## 2020-05-12 PROCEDURE — 11104 PR PUNCH BIOPSY, SKIN, SINGLE LESION: ICD-10-PCS | Mod: S$GLB,,, | Performed by: DERMATOLOGY

## 2020-05-12 PROCEDURE — 88312 SPECIAL STAINS GROUP 1: CPT | Mod: 26,,, | Performed by: PATHOLOGY

## 2020-05-12 PROCEDURE — 88305 TISSUE EXAM BY PATHOLOGIST: ICD-10-PCS | Mod: 26,,, | Performed by: PATHOLOGY

## 2020-05-12 PROCEDURE — 11105 PUNCH BX SKIN EA SEP/ADDL: CPT | Mod: S$GLB,,, | Performed by: DERMATOLOGY

## 2020-05-12 PROCEDURE — 88305 TISSUE EXAM BY PATHOLOGIST: CPT | Mod: 59 | Performed by: PATHOLOGY

## 2020-05-12 PROCEDURE — 11105 PR PUNCH BIOPSY, SKIN, EA ADDTL LESION: ICD-10-PCS | Mod: S$GLB,,, | Performed by: DERMATOLOGY

## 2020-05-12 PROCEDURE — 88305 TISSUE EXAM BY PATHOLOGIST: CPT | Mod: 26,,, | Performed by: PATHOLOGY

## 2020-05-12 PROCEDURE — 88312 PR  SPECIAL STAINS,GROUP I: ICD-10-PCS | Mod: 26,,, | Performed by: PATHOLOGY

## 2020-05-12 PROCEDURE — 88312 SPECIAL STAINS GROUP 1: CPT | Mod: 59 | Performed by: PATHOLOGY

## 2020-05-12 PROCEDURE — 99214 PR OFFICE/OUTPT VISIT, EST, LEVL IV, 30-39 MIN: ICD-10-PCS | Mod: 25,S$GLB,, | Performed by: DERMATOLOGY

## 2020-05-12 PROCEDURE — 99999 PR PBB SHADOW E&M-EST. PATIENT-LVL II: CPT | Mod: PBBFAC,,, | Performed by: DERMATOLOGY

## 2020-05-12 PROCEDURE — 88313 SPECIAL STAINS GROUP 2: CPT | Mod: 26,,, | Performed by: PATHOLOGY

## 2020-05-12 PROCEDURE — 88313 SPECIAL STAINS GROUP 2: CPT | Mod: 59 | Performed by: PATHOLOGY

## 2020-05-12 PROCEDURE — 99214 OFFICE O/P EST MOD 30 MIN: CPT | Mod: 25,S$GLB,, | Performed by: DERMATOLOGY

## 2020-05-12 PROCEDURE — 11104 PUNCH BX SKIN SINGLE LESION: CPT | Mod: S$GLB,,, | Performed by: DERMATOLOGY

## 2020-05-12 PROCEDURE — 88313 PR  SPECIAL STAINS,GROUP II: ICD-10-PCS | Mod: 26,,, | Performed by: PATHOLOGY

## 2020-05-12 PROCEDURE — 99999 PR PBB SHADOW E&M-EST. PATIENT-LVL II: ICD-10-PCS | Mod: PBBFAC,,, | Performed by: DERMATOLOGY

## 2020-05-12 RX ORDER — DOXYCYCLINE 100 MG/1
CAPSULE ORAL
Qty: 20 CAPSULE | Refills: 0 | Status: SHIPPED | OUTPATIENT
Start: 2020-05-12 | End: 2020-05-20

## 2020-05-12 RX ORDER — MUPIROCIN 20 MG/G
OINTMENT TOPICAL 2 TIMES DAILY
Qty: 30 G | Refills: 3 | Status: SHIPPED | OUTPATIENT
Start: 2020-05-12 | End: 2020-06-10

## 2020-05-12 RX ORDER — PREDNISONE 20 MG/1
TABLET ORAL
Qty: 42 TABLET | Refills: 0 | Status: SHIPPED | OUTPATIENT
Start: 2020-05-12 | End: 2020-05-26

## 2020-05-12 NOTE — PROGRESS NOTES
Subjective:       Patient ID:  Scott Bansal is a 59 y.o. male who presents for   Chief Complaint   Patient presents with    Eczema     Hx of MRSA abscess of the right lower leg (s/p positive MRSA bacterial culture on 1/31/20, subsequent negative, last seen in clinic on 2/26/20.  Here today for new issue:    History of Present Illness: The patient presents with chief complaint of rash.  Location: arms, abdomen, bilateral arms, legs  Duration: 6 weeks  Signs/Symptoms: pruritus    Prior treatments: hydroxyzine 50 mg TID, clotrimazole-betamethasone, mupirocin, Aveeno Baby, betamethasone oint        Review of Systems   Constitutional: Negative for fever and chills.   Gastrointestinal: Negative for nausea and vomiting.   Skin: Positive for itching, rash, dry skin and activity-related sunscreen use. Negative for daily sunscreen use and recent sunburn.   Hematologic/Lymphatic: Does not bruise/bleed easily.        Objective:    Physical Exam   Constitutional: He appears well-developed and well-nourished. No distress.   Neurological: He is alert and oriented to person, place, and time. He is not disoriented.   Psychiatric: He has a normal mood and affect.   Skin:   Areas Examined (abnormalities noted in diagram):   Head / Face Inspection Performed  Neck Inspection Performed  Chest / Axilla Inspection Performed  Abdomen Inspection Performed  Back Inspection Performed  RUE Inspected  LUE Inspection Performed  RLE Inspected  LLE Inspection Performed  Nails and Digits Inspection Performed                 Assessment / Plan:      Pathology Orders:     Normal Orders This Visit    Specimen to Pathology, Dermatology     Questions:    Procedure Type:  Dermatology and skin neoplasms    Number of Specimens:  2    ------------------------:  -------------------------    Spec 1 Procedure:  Biopsy    Spec 1 Clinical Impression:  SCLE vs. eczema vs. r/o CTCL    Spec 1 Source:  right upper arm    ------------------------:   -------------------------    Spec 2 Procedure:  Biopsy    Spec 2 Clinical Impression:  SCLE vs. eczema vs. r/o CTCL    Spec 2 Source:  right forearm    Clinical Information:  SCLE vs. eczema vs. r/o CTCL        Dermatosis  -     doxycycline (MONODOX) 100 MG capsule; Take twice a day with food. May cause upset stomach  Dispense: 20 capsule; Refill: 0  -     Specimen to Pathology, Dermatology  -     mupirocin (BACTROBAN) 2 % ointment; Apply topically 2 (two) times daily.  Dispense: 30 g; Refill: 3  -     predniSONE (DELTASONE) 20 MG tablet; Take 3 pills po qam with breakfast x 1 wk then take 2 po qam with breakfast x 1 wk then take 1 po qam with breakfast x 1 wk  Dispense: 42 tablet; Refill: 0  -     2 punch biopsies done.  RTC in 3 weeks for results. Reviewed sensitive skin care.  Will add doxy and continue mupirocin given hx of MRSA.  Will start steroid taper.            Follow up in about 3 weeks (around 6/2/2020).     PROCEDURE NOTE -- PUNCH BIOPSY  Location: right upper arm, right forearm    After risks, benefits, and alternatives were discussed with the patient, the patient agrees to the procedure by verbal consent. The area(s) is cleansed with alcohol. A total of 2 cc of lidocaine 1% with epinephrine and sodium bicarbonate was injected for local anesthesia. A 3 mm punch biopsy was used to remove part or all of the lesion(s). The specimen was sent for tissue pathology. The defect(s) was then packed with gelfoam. The area was dressed with antibiotic ointment and bandaged. The patient tolerated the procedure well without adverse events.  Wound care instructions were given to the patient on the AVS.  The patient will be notified of pathology results once available. Results will also be available in Epic.

## 2020-05-12 NOTE — PATIENT INSTRUCTIONS
"New Skin Care Regimen  Soap: Dove sensitive skin bar or liquid  Moisturizer: ceraVe or cetaphil cream  Detergent: Tide Free, All Free, or Cheer Free   Fabric softener: do not use  Colognes/Perfumes/Fragrances: do not use  Bathing: shower or bathe with lukewarm water < 10 minutes    Punch Biopsy Wound Care    Your doctor has performed a punch biopsy today.  A band aid and antibiotic ointment has been placed over the site.  This should remain in place for 24 hours.  It is recommended that you keep the area dry for the first 24 hours.  After 24 hours, you may remove the band aid and wash the area with warm soap and water and apply Vaseline jelly.  Many patients prefer to use Neosporin or Bacitracin ointment.  This is acceptable; however know that you can develop an allergy to this medication even if you have used it safely for years.  It is important to keep the area moist.  Letting it dry out and get air slows healing time, will worsen the scar, and make it more difficult to remove the stitches if they were placed.  Band aid is optional after first 24 hours.      If you notice increasing redness, tenderness, pain, or yellow drainage at the biopsy or surgical site, please notify your doctor.  These are signs of an infection.    If your biopsy/surgical site is bleeding, apply firm pressure for 15 minutes straight.  Repeat for another 15 minutes, if it is still bleeding.   If the surgical site continues to bleed, then please contact your doctor.      For [facility] users:   You will receive a [facility] notification after the pathologist has finished reviewing your biopsy specimen. Pathology results, however, will not be released online so you will see a "no content" message. Once your dermatologist reviews and clinically correlates your biopsy results, you will either receive a letter in the mail with the results of a phone call from your doctor's office if further explanation or treatment is warranted.       [address] [place] [address]/ [phone] FAX/ [email or web address]

## 2020-05-15 DIAGNOSIS — G43.711 INTRACTABLE CHRONIC MIGRAINE WITHOUT AURA AND WITH STATUS MIGRAINOSUS: ICD-10-CM

## 2020-05-15 LAB
FINAL PATHOLOGIC DIAGNOSIS: NORMAL
GROSS: NORMAL

## 2020-05-15 RX ORDER — SUMATRIPTAN SUCCINATE 100 MG/1
TABLET ORAL
Qty: 12 TABLET | Refills: 1 | Status: SHIPPED | OUTPATIENT
Start: 2020-05-15 | End: 2020-07-13

## 2020-05-18 ENCOUNTER — TELEPHONE (OUTPATIENT)
Dept: DERMATOLOGY | Facility: CLINIC | Age: 60
End: 2020-05-18

## 2020-05-18 ENCOUNTER — TELEPHONE (OUTPATIENT)
Dept: FAMILY MEDICINE | Facility: CLINIC | Age: 60
End: 2020-05-18

## 2020-05-18 NOTE — TELEPHONE ENCOUNTER
----- Message from Nkechi Lucas sent at 5/18/2020  8:24 AM CDT -----  Contact: pt   Type:  Test Results    Who Called:  Pt   Name of Test (Lab/Mammo/Etc):  Bio on right arm  Date of Test:  05/07  Ordering Provider: Dr Angie Chau   Where the test was performed:  [facility]  Would the patient rather a call back or a response via [facility]?  Call back   Best Call Back Number: [phone]  Additional Information:

## 2020-05-18 NOTE — TELEPHONE ENCOUNTER
Spoke to patient and explained that he would need to contact his dermatologist for his results. Patient stated he made a mistake and left a message with the wrong doctors office.. Patient stated good understanding and is calling his dermatologist right now.

## 2020-05-18 NOTE — TELEPHONE ENCOUNTER
----- Message from Nichelle Gruber sent at 5/18/2020  9:00 AM CDT -----  Contact: pt   Would like to consult with nurse to get the test results from his biopsy, please give a call back at [phone].              Thanks,  Nichelle LUIS

## 2020-05-18 NOTE — TELEPHONE ENCOUNTER
Pt wanted to know if results had come to office, informed pt that they have come back but Dr. Chapin has not resulted, when she does we will give him the plan of care. Pt verbalized understanding.

## 2020-05-20 ENCOUNTER — OFFICE VISIT (OUTPATIENT)
Dept: DERMATOLOGY | Facility: CLINIC | Age: 60
End: 2020-05-20
Payer: MEDICARE

## 2020-05-20 DIAGNOSIS — L08.9 SKIN INFECTION: ICD-10-CM

## 2020-05-20 DIAGNOSIS — L23.89 ALLERGIC CONTACT DERMATITIS DUE TO OTHER AGENTS: Primary | ICD-10-CM

## 2020-05-20 PROCEDURE — 99999 PR PBB SHADOW E&M-EST. PATIENT-LVL II: CPT | Mod: PBBFAC,,, | Performed by: DERMATOLOGY

## 2020-05-20 PROCEDURE — 87070 CULTURE OTHR SPECIMN AEROBIC: CPT

## 2020-05-20 PROCEDURE — 99213 OFFICE O/P EST LOW 20 MIN: CPT | Mod: S$GLB,,, | Performed by: DERMATOLOGY

## 2020-05-20 PROCEDURE — 99213 PR OFFICE/OUTPT VISIT, EST, LEVL III, 20-29 MIN: ICD-10-PCS | Mod: S$GLB,,, | Performed by: DERMATOLOGY

## 2020-05-20 PROCEDURE — 99999 PR PBB SHADOW E&M-EST. PATIENT-LVL II: ICD-10-PCS | Mod: PBBFAC,,, | Performed by: DERMATOLOGY

## 2020-05-20 PROCEDURE — 87186 SC STD MICRODIL/AGAR DIL: CPT

## 2020-05-20 PROCEDURE — 87077 CULTURE AEROBIC IDENTIFY: CPT

## 2020-05-20 NOTE — PROGRESS NOTES
Subjective:       Patient ID:  Scott Bansal is a 59 y.o. male who presents for   Chief Complaint   Patient presents with    Rash     biopsy results     Spot     spot of left leg x 1 week      Hx of MRSA abscess of the right lower leg (s/p positive MRSA bacterial culture on 1/31/20, subsequent negative), persistent rash x 6 week, last seen in clinic on 5/12/20.  S/p biopsy at last visit.  Here today to discuss results. Currently on 3 week prednisone taper.  Pt also on doxy and mupirocin to prevent MRSA in biopsy sites, given recent hx of MRSA.      He c/o new lesion of the left lower leg, + mild tenderness.  Denies drainage.  Currently on doxy and mupirocin empirically for biopsy site wounds    Prior treatments: PO prednisone, hydroxyzine 50 mg TID, clotrimazole-betamethasone, mupirocin, Aveeno Baby, betamethasone oint    Current Skin Care Regimen  Soap: aveeno  Moisturizer: none  Detergent: All free and clear   Fabric softener: none  Colognes/Perfumes/Fragrances: none  Bathing: showers, lukewarm, 10 min          Review of Systems   Constitutional: Negative for fever and chills.   Gastrointestinal: Negative for nausea and vomiting.   Skin: Positive for itching, rash and dry skin. Negative for daily sunscreen use, activity-related sunscreen use and recent sunburn.   Hematologic/Lymphatic: Does not bruise/bleed easily.        Objective:    Physical Exam   Constitutional: He appears well-developed and well-nourished. No distress.   Neurological: He is alert and oriented to person, place, and time. He is not disoriented.   Psychiatric: He has a normal mood and affect.   Skin:   Areas Examined (abnormalities noted in diagram):   Scalp / Hair Palpated and Inspected  Head / Face Inspection Performed  Neck Inspection Performed  Chest / Axilla Inspection Performed  Abdomen Inspection Performed  RUE Inspected  LUE Inspection Performed  RLE Inspected  LLE Inspection Performed  Nails and Digits Inspection Performed                 1.  Skin, right upper arm, punch biopsy:   - SUBACUTE SPONGIOTIC DERMATITIS (See Comment).   MICROSCOPIC DESCRIPTION: The biopsy shows parakeratosis, epidermal acanthosis   with elongation of rete ridges, spongiosis with exocytosis of lymphocytes and   a superficial perivascular dermal lymphocyte predominant infiltrate with rare   eosinophils.  Colloidal iron stain fails to reveal increased dermal mucin.   PAS stain is negative for fungi.  Appropriately reactive controls were   reviewed.  Multiple levels were examined.   COMMENT: Differential diagnosis includes atopic dermatitis, allergic contact   dermatitis, nummular dermatitis or id reaction.  A drug eruption cannot be   entirely excluded.   1.  Skin, right forearm, punch biopsy:   - SUBACUTE SPONGIOTIC DERMATITIS (See Comment).   MICROSCOPIC DESCRIPTION: The biopsy shows parakeratosis, epidermal acanthosis   with elongation of rete ridges, spongiosis with exocytosis of lymphocytes and   a superficial to mid dermal perivascular lymphocyte-predominant infiltrate   with rare eosinophils.  Colloidal iron stain fails to reveal increased dermal   mucin.  PAS stain is negative for fungi.  Appropriately reactive controls   were reviewed.  Multiple levels were examined.   COMMENT: Differential diagnosis includes atopic dermatitis, allergic contact   dermatitis, nummular dermatitis or id reaction.  A drug eruption cannot be   entirely excluded.     Assessment / Plan:        Allergic contact dermatitis due to other agents  Possible ACD, however drug eruption can not be excluded. Out of medications pt is currently on, possibility lasix (given sulfa) vs. Gabapentin. Will consider prednisone taper.  Continue change in detergent and soap.  If rash recurs, will consider d/c of lasix and/or gabapentin.     Skin infection  -     Aerobic culture  -     Hx of MRSA, culture done of the left leg, continue doxy empirically until results available.              Follow up in  about 6 weeks (around 7/1/2020) for TH visit.

## 2020-05-20 NOTE — PATIENT INSTRUCTIONS
New Skin Care Regimen  Soap: Dove sensitive skin bar or liquid  Moisturizer: ceraVe or cetaphil cream  Detergent: Tide Free, All Free, or Cheer Free   Fabric softener: do not use  Colognes/Perfumes/Fragrances: do not use  Bathing: shower or bathe with lukewarm water < 10 minutes      XEROSIS (DRY SKIN)      1. Definition    Xerosis is the term for dry skin.  We all have a natural oil coating over our skin produced by the skin oil glands.  If this oil is removed, the skin becomes dry which can lead to cracking, which can lead to inflammation.  Xerosis is usually a long-term problem that recurs often, especially in the winter.    2. Cause     Long hot baths or showers can remove our natural oil and lead to xerosis.  One should never take more than one bath or shower a day and for no longer than ten minutes.   Use of harsh soaps such as Zest, Dial, [place] Spring, Lever and Ivory can worsen and cause xerosis.   Cold winter weather worsens xerosis because the amount of moisture contained in cold air is much less than the amount of moisture in warm air.    3. Treatment     Treatment is intended to restore the natural oil to your skin.  Keep the skin lubricated.     Do not take more than one bath or shower a day.  Use lukewarm water, not hot.  Hot water dries out the skin.     Use a gentle moisturizing soap such as Cetaphil soap, Dove, or Cetaphil Restoraderm cleanser.     When toweling dry, dont rub.  Blot the skin so there is still some water left on the skin.  You should apply a moisturizing cream to all of the skin such as Cerave cream, Cetaphil cream, Restoraderm or Eucerin Original Formula cream.   Alpha hydroxyacid lotions, i.e., AmLactin, also work very well for preventing dry skin, but may burn when used on inflamed or reddened skin.      [facility]   DERMATOLOGY  [address] [facility] [place] [address]    Dept: [phone]   Dept Fax: [phone]

## 2020-05-23 DIAGNOSIS — L98.9 DERMATOSIS: ICD-10-CM

## 2020-05-23 LAB — BACTERIA SPEC AEROBE CULT: ABNORMAL

## 2020-05-24 DIAGNOSIS — E78.5 HYPERLIPIDEMIA, UNSPECIFIED HYPERLIPIDEMIA TYPE: ICD-10-CM

## 2020-05-26 DIAGNOSIS — L08.9 INFECTION OF SKIN DUE TO METHICILLIN RESISTANT STAPHYLOCOCCUS AUREUS (MRSA): Primary | ICD-10-CM

## 2020-05-26 DIAGNOSIS — B95.62 INFECTION OF SKIN DUE TO METHICILLIN RESISTANT STAPHYLOCOCCUS AUREUS (MRSA): Primary | ICD-10-CM

## 2020-05-26 RX ORDER — GEMFIBROZIL 600 MG/1
600 TABLET, FILM COATED ORAL
Qty: 180 TABLET | Refills: 0 | Status: SHIPPED | OUTPATIENT
Start: 2020-05-26 | End: 2020-08-21 | Stop reason: SDUPTHER

## 2020-05-26 RX ORDER — PREDNISONE 20 MG/1
TABLET ORAL
Qty: 42 TABLET | Refills: 0 | OUTPATIENT
Start: 2020-05-26

## 2020-05-27 ENCOUNTER — OFFICE VISIT (OUTPATIENT)
Dept: FAMILY MEDICINE | Facility: CLINIC | Age: 60
End: 2020-05-27
Payer: MEDICARE

## 2020-05-27 VITALS
HEART RATE: 92 BPM | HEIGHT: 69 IN | DIASTOLIC BLOOD PRESSURE: 72 MMHG | BODY MASS INDEX: 36.6 KG/M2 | TEMPERATURE: 98 F | SYSTOLIC BLOOD PRESSURE: 124 MMHG | OXYGEN SATURATION: 98 % | WEIGHT: 247.13 LBS

## 2020-05-27 DIAGNOSIS — L03.116 CELLULITIS AND ABSCESS OF LEFT LEG: Primary | ICD-10-CM

## 2020-05-27 DIAGNOSIS — L02.416 CELLULITIS AND ABSCESS OF LEFT LEG: Primary | ICD-10-CM

## 2020-05-27 PROCEDURE — 87186 SC STD MICRODIL/AGAR DIL: CPT

## 2020-05-27 PROCEDURE — 99999 PR PBB SHADOW E&M-EST. PATIENT-LVL III: ICD-10-PCS | Mod: PBBFAC,,, | Performed by: PHYSICIAN ASSISTANT

## 2020-05-27 PROCEDURE — 87077 CULTURE AEROBIC IDENTIFY: CPT

## 2020-05-27 PROCEDURE — 99214 PR OFFICE/OUTPT VISIT, EST, LEVL IV, 30-39 MIN: ICD-10-PCS | Mod: S$GLB,,, | Performed by: PHYSICIAN ASSISTANT

## 2020-05-27 PROCEDURE — 3078F DIAST BP <80 MM HG: CPT | Mod: CPTII,S$GLB,, | Performed by: PHYSICIAN ASSISTANT

## 2020-05-27 PROCEDURE — 99999 PR PBB SHADOW E&M-EST. PATIENT-LVL III: CPT | Mod: PBBFAC,,, | Performed by: PHYSICIAN ASSISTANT

## 2020-05-27 PROCEDURE — 3078F PR MOST RECENT DIASTOLIC BLOOD PRESSURE < 80 MM HG: ICD-10-PCS | Mod: CPTII,S$GLB,, | Performed by: PHYSICIAN ASSISTANT

## 2020-05-27 PROCEDURE — 87070 CULTURE OTHR SPECIMN AEROBIC: CPT

## 2020-05-27 PROCEDURE — 99214 OFFICE O/P EST MOD 30 MIN: CPT | Mod: S$GLB,,, | Performed by: PHYSICIAN ASSISTANT

## 2020-05-27 PROCEDURE — 3008F PR BODY MASS INDEX (BMI) DOCUMENTED: ICD-10-PCS | Mod: CPTII,S$GLB,, | Performed by: PHYSICIAN ASSISTANT

## 2020-05-27 PROCEDURE — 3074F SYST BP LT 130 MM HG: CPT | Mod: CPTII,S$GLB,, | Performed by: PHYSICIAN ASSISTANT

## 2020-05-27 PROCEDURE — 3074F PR MOST RECENT SYSTOLIC BLOOD PRESSURE < 130 MM HG: ICD-10-PCS | Mod: CPTII,S$GLB,, | Performed by: PHYSICIAN ASSISTANT

## 2020-05-27 PROCEDURE — 3008F BODY MASS INDEX DOCD: CPT | Mod: CPTII,S$GLB,, | Performed by: PHYSICIAN ASSISTANT

## 2020-05-27 RX ORDER — CLINDAMYCIN HYDROCHLORIDE 300 MG/1
300 CAPSULE ORAL EVERY 6 HOURS
Qty: 20 CAPSULE | Refills: 0 | Status: SHIPPED | OUTPATIENT
Start: 2020-05-27 | End: 2020-06-01

## 2020-05-27 NOTE — PROGRESS NOTES
Patient Name: Scott Bansal    : 1960  MRN: [id number]    Subjective:  Scott is a 59 y.o. male who presents today for:    Chief Complaint   Patient presents with    wound on leg       HPI  Patient has multiple medical diagnoses as listed below in the history. Patient is new to me, but known to the clinic. He complains of left leg infection with abscess for 1 week. He was seen on  by dermatology. A culture of the skin was taken resulting in MRSA that is sensitive to Bactrim and Vanco only. Empiric Doxycyline was d/c. A referral to ID was ordered by Dermatologist. He reports increased redness and size of the abscess. At the time of initial culture he reports no drainage or purulent material. He reports spontaneous drainage with purulent material today. He is requesting new culture today. He denies fever, chills, nausea/vomiting, red streaks, LE swelling, numbness/tingling, weakness or any other systemic symptoms.       Past Medical History  Past Medical History:   Diagnosis Date    Bipolar 1 disorder, mixed     BPH (benign prostatic hypertrophy)     Cyst, eyelid     Dr. Broussard    Diabetes mellitus     GERD (gastroesophageal reflux disease)     Hyperlipidemia     Hypertension     Lumbar degenerative disc disease 3/7/2019    Migraine headache     Obesity        Past Surgical History  Past Surgical History:   Procedure Laterality Date    CERVICAL SPINE SURGERY      COLONOSCOPY N/A 2017    Procedure: COLONOSCOPY;  Surgeon: Genaro Nix MD;  Location: [facility];  Service: Endoscopy;  Laterality: N/A;    EYE SURGERY Left 2017    cyst removal on eyelid; Dr. Broussard    SHOULDER SURGERY      TONSILLECTOMY         Family History  Family History   Problem Relation Age of Onset    Cataracts Mother     Cancer Mother         throat    Hypertension Mother     Hypertension Father     Stroke Father         2 strokes    Hypertension Sister     Cancer Brother         spinal, kidney,  spinal fluid    Hypertension Brother     Cancer Cousin         breast?       Social History  Social History     Socioeconomic History    Marital status:      Spouse name: Not on file    Number of children: Not on file    Years of education: Not on file    Highest education level: Not on file   Occupational History    Not on file   Social Needs    Financial resource strain: Not on file    Food insecurity:     Worry: Not on file     Inability: Not on file    Transportation needs:     Medical: Not on file     Non-medical: Not on file   Tobacco Use    Smoking status: Former Smoker     Packs/day: 2.00     Years: 15.00     Pack years: 30.00     Types: Cigarettes     Last attempt to quit: 1984     Years since quittin.0    Smokeless tobacco: Never Used    Tobacco comment: Patient quit smoking at the age of 24 yo.   Substance and Sexual Activity    Alcohol use: No    Drug use: No    Sexual activity: Yes     Partners: Female   Lifestyle    Physical activity:     Days per week: Not on file     Minutes per session: Not on file    Stress: Only a little   Relationships    Social connections:     Talks on phone: Not on file     Gets together: Not on file     Attends [employer] service: Not on file     Active member of club or organization: Not on file     Attends meetings of clubs or organizations: Not on file     Relationship status: Not on file   Other Topics Concern    Not on file   Social History Narrative    Not on file       Current Medications  Current Outpatient Medications on File Prior to Visit   Medication Sig Dispense Refill    albuterol (VENTOLIN HFA) 90 mcg/actuation inhaler INHALE 2 PUFFS EVERY 4 HOURS AS NEEDED 18 Inhaler 6    amLODIPine (NORVASC) 10 MG tablet TAKE 1 TABLET BY MOUTH EVERY DAY 90 tablet 3    betamethasone dipropionate (DIPROLENE) 0.05 % ointment Apply topically 2 (two) times daily. 45 g 1    buPROPion (WELLBUTRIN XL) 300 MG 24 hr tablet Take 300 mg by mouth  every morning.  2    busPIRone (BUSPAR) 10 MG tablet Take 10 mg by mouth 3 (three) times daily.      candesartan (ATACAND) 4 MG tablet Take 1 tablet (4 mg total) by mouth once daily. 90 tablet 3    clotrimazole-betamethasone 1-0.05% (LOTRISONE) cream Apply topically 2 (two) times daily. 90 g 2    crisaborole (EUCRISA) 2 % Oint Use for hand eczema bid. 60 g 5    cyclobenzaprine (FLEXERIL) 10 MG tablet Take 1 tablet (10 mg total) by mouth daily as needed for Muscle spasms. 90 tablet 1    etodolac (LODINE) 500 MG tablet Take 1 tablet (500 mg total) by mouth 2 (two) times daily. 60 tablet 0    fluticasone (FLONASE) 50 mcg/actuation nasal spray TAKE 1 SPRAY BY EACH NARE ROUTE ONCE DAILY. 16 g 5    folic acid (FOLVITE) 800 MCG Tab Take 800 mcg by mouth once daily.      furosemide (LASIX) 40 MG tablet Take 1 tablet (40 mg total) by mouth once daily. 90 tablet 0    gabapentin (NEURONTIN) 300 MG capsule TAKE 1 CAPSULE BY MOUTH TWICE A  capsule 3    gemfibroziL (LOPID) 600 MG tablet TAKE 1 TABLET (600 MG TOTAL) BY MOUTH 2 (TWO) TIMES DAILY BEFORE MEALS. 180 tablet 0    hydroxyzine HCL (ATARAX) 25 MG tablet TAKE 1 TABLET (25 MG TOTAL) BY MOUTH 3 (THREE) TIMES DAILY AS NEEDED FOR ITCHING. 90 tablet 1    hydroxyzine HCL (ATARAX) 50 MG tablet Take 1 tablet (50 mg total) by mouth 3 (three) times daily as needed for Itching. 90 tablet 3    levocetirizine (XYZAL) 5 MG tablet Take 1 tablet each morning. 30 tablet 11    LIDOCAINE VISCOUS 2 % solution       metFORMIN (GLUCOPHAGE) 1000 MG tablet Take 1 tablet (1,000 mg total) by mouth 2 (two) times daily. 180 tablet 0    mometasone 0.1% (ELOCON) 0.1 % cream Apply topically once daily. AAA bid prn for hand eczema 45 g 1    mupirocin (BACTROBAN) 2 % ointment Apply topically 2 (two) times daily. Apply with Q-tip 30 g 3    mupirocin (BACTROBAN) 2 % ointment Apply topically 2 (two) times daily. 30 g 3    pantoprazole (PROTONIX) 40 MG tablet TAKE 1 TABLET BY MOUTH  "DAILY 90 tablet 3    quetiapine (SEROQUEL) 300 MG Tab Take by mouth.      SPIRIVA WITH HANDIHALER 18 mcg inhalation capsule INHALE 1 CAPSULE (18 MCG TOTAL) INTO THE LUNGS ONCE DAILY. CONTROLLER 30 capsule 0    sumatriptan (IMITREX) 100 MG tablet TAKE 1 TABLET BY MOUTH EVERY 2 HOURS AS NEEDED FOR MIGRAINE 12 tablet 1    tiZANidine (ZANAFLEX) 4 MG tablet Take 4 mg by mouth every 8 (eight) hours.      topiramate 25 mg capsule Take 1 capsule (25 mg total) by mouth 2 (two) times daily. 60 capsule 0    triamcinolone acetonide 0.1% (KENALOG) 0.1 % cream AAA bid prn for rash on chest and arms. Do not use on face, underarms or groin 454 g 1    zonisamide (ZONEGRAN) 50 MG Cap TAKE 1 CAPSULE BY MOUTH TWICE A DAY 60 capsule 3    fluticasone furoate-vilanterol (BREO) 100-25 mcg/dose diskus inhaler Inhale 1 puff into the lungs once daily. Controller 90 each 1    simvastatin (ZOCOR) 80 MG tablet Take 1 tablet (80 mg total) by mouth once daily. 90 tablet 2    tamsulosin (FLOMAX) 0.4 mg Cap TAKE 1 CAPSULE BY MOUTH EVERY DAY 30 capsule 0     No current facility-administered medications on file prior to visit.        Allergies   Review of patient's allergies indicates:   Allergen Reactions    Sulfa (sulfonamide antibiotics) Rash         ROS  Review of Systems   Constitutional: Negative for fatigue and fever.   Respiratory: Negative for shortness of breath.    Cardiovascular: Negative for chest pain.   Gastrointestinal: Negative for nausea.   Musculoskeletal: Negative for arthralgias and myalgias.   Skin: Positive for color change.        + left leg abscess   Neurological: Negative for weakness and numbness.   Hematological: Negative for adenopathy.         Objective:    /72 (BP Location: Right arm, Patient Position: Sitting, BP Method: Large (Manual))   Pulse 92   Temp 97.5 °F (36.4 °C) (Oral)   Ht 5' 9" (1.753 m)   Wt 112.1 kg (247 lb 2.2 oz)   SpO2 98%   BMI 36.50 kg/m²     Physical Exam   Constitutional: He " does not appear ill.   Eyes: EOM are normal.   Pulmonary/Chest: Effort normal.   Neurological: He is alert.   Skin: Skin is warm and dry. Capillary refill takes less than 2 seconds.        Psychiatric: He has a normal mood and affect.       Assessment/Plan:  Scott Bansal is a 59 y.o. male who presents today for :    Scott was seen today for wound on leg.    Diagnoses and all orders for this visit:    Cellulitis and abscess of left leg  -     clindamycin (CLEOCIN) 300 MG capsule; Take 1 capsule (300 mg total) by mouth every 6 (six) hours. for 5 days  -     CULTURE, AEROBIC  (SPECIFY SOURCE)  Spontaneous drainage of abscess. Was able to express most of the purulent drainage from abscess. Culture of material obtain. Empiric clindamycin. Most recent culture showed resistance to clindamycin, however the abscess was not draining at the time of collection resulting in skin specimen only. Referral of ID has been placed by dermatology. Encouraged to follow up given antibiotic allergies and resistance factors. Applied clean dry bandage to the abscess. Encouraged to apply warm compress and keep area clean and dry.  No signs or symptoms of systemic infection/involvement.     Counseled patient on the clinical course of condition including symptomatology, treatment and prevention.  Counseled regarding risks, benefits, and limitations of medications.  Advised patient to seek urgent/emergent care if symptoms intensify.  Sent patient with informational material about diagnosis.  Patient gave verbal understanding and agreement of plan.      I spent >50% of this 25 minute encounter counseling the patient on diagnoses, risk factors, and treatments.         Encouraged to call/return to clinic if symptoms persist or worsen    Norah Chacon PA-C  [facility]/ [facility]

## 2020-05-29 LAB — BACTERIA SPEC AEROBE CULT: ABNORMAL

## 2020-05-29 NOTE — PROGRESS NOTES
Spoke with Dr. Moulton who will try to arrange appt for patient early next week. Called pt and discussed MRSA with resistance to doxy and clinda. Sensitive to bactrim and vancomycin, however pt with sulfa allergy. Discussed case with Dr. Moulton who agrees to see pt. There may be oral options available prescribed by ID. Will also check with [facility] at [facility] for sooner availability on Monday. Discussed with pt that if he develops fe arcadio, chills, worsening infection of the leg, or for any other concerns, then he should present to ER for start of IV vancomycin. Pt acknowledged understanding.

## 2020-06-01 ENCOUNTER — TELEPHONE (OUTPATIENT)
Dept: FAMILY MEDICINE | Facility: CLINIC | Age: 60
End: 2020-06-01

## 2020-06-01 ENCOUNTER — TELEPHONE (OUTPATIENT)
Dept: INFECTIOUS DISEASES | Facility: CLINIC | Age: 60
End: 2020-06-01

## 2020-06-01 NOTE — TELEPHONE ENCOUNTER
----- Message from Norah Chacon PA-C sent at 6/1/2020  6:35 AM CDT -----  Please inform patient that his culture shows resistant to Clindamycin. He can stop this antibiotic. He will need to follow up with infectious disease. The referral was previousl placed by dermatology.

## 2020-06-01 NOTE — TELEPHONE ENCOUNTER
----- Message from Herb Moulton MD sent at 5/29/2020  6:47 PM CDT -----  Regarding: RE-ASAP   Can we see this patient ASAP next week.  Maybe virtual visit

## 2020-06-04 ENCOUNTER — OFFICE VISIT (OUTPATIENT)
Dept: FAMILY MEDICINE | Facility: CLINIC | Age: 60
End: 2020-06-04
Payer: MEDICARE

## 2020-06-04 VITALS
TEMPERATURE: 97 F | WEIGHT: 248.88 LBS | BODY MASS INDEX: 36.86 KG/M2 | DIASTOLIC BLOOD PRESSURE: 80 MMHG | HEART RATE: 104 BPM | OXYGEN SATURATION: 97 % | HEIGHT: 69 IN | SYSTOLIC BLOOD PRESSURE: 124 MMHG

## 2020-06-04 DIAGNOSIS — L03.90 MRSA CELLULITIS: Primary | ICD-10-CM

## 2020-06-04 DIAGNOSIS — B95.62 MRSA CELLULITIS: Primary | ICD-10-CM

## 2020-06-04 DIAGNOSIS — E11.42 DIABETIC POLYNEUROPATHY ASSOCIATED WITH TYPE 2 DIABETES MELLITUS: ICD-10-CM

## 2020-06-04 DIAGNOSIS — E11.9 TYPE 2 DIABETES MELLITUS WITHOUT COMPLICATION, WITHOUT LONG-TERM CURRENT USE OF INSULIN: ICD-10-CM

## 2020-06-04 PROCEDURE — 3008F BODY MASS INDEX DOCD: CPT | Mod: CPTII,S$GLB,, | Performed by: FAMILY MEDICINE

## 2020-06-04 PROCEDURE — 3074F PR MOST RECENT SYSTOLIC BLOOD PRESSURE < 130 MM HG: ICD-10-PCS | Mod: CPTII,S$GLB,, | Performed by: FAMILY MEDICINE

## 2020-06-04 PROCEDURE — 99215 PR OFFICE/OUTPT VISIT, EST, LEVL V, 40-54 MIN: ICD-10-PCS | Mod: S$GLB,,, | Performed by: FAMILY MEDICINE

## 2020-06-04 PROCEDURE — 99999 PR PBB SHADOW E&M-EST. PATIENT-LVL V: CPT | Mod: PBBFAC,,, | Performed by: FAMILY MEDICINE

## 2020-06-04 PROCEDURE — 3074F SYST BP LT 130 MM HG: CPT | Mod: CPTII,S$GLB,, | Performed by: FAMILY MEDICINE

## 2020-06-04 PROCEDURE — 3008F PR BODY MASS INDEX (BMI) DOCUMENTED: ICD-10-PCS | Mod: CPTII,S$GLB,, | Performed by: FAMILY MEDICINE

## 2020-06-04 PROCEDURE — 99499 RISK ADDL DX/OHS AUDIT: ICD-10-PCS | Mod: S$GLB,,, | Performed by: FAMILY MEDICINE

## 2020-06-04 PROCEDURE — 3044F HG A1C LEVEL LT 7.0%: CPT | Mod: CPTII,S$GLB,, | Performed by: FAMILY MEDICINE

## 2020-06-04 PROCEDURE — 99499 UNLISTED E&M SERVICE: CPT | Mod: S$GLB,,, | Performed by: FAMILY MEDICINE

## 2020-06-04 PROCEDURE — 99215 OFFICE O/P EST HI 40 MIN: CPT | Mod: S$GLB,,, | Performed by: FAMILY MEDICINE

## 2020-06-04 PROCEDURE — 3044F PR MOST RECENT HEMOGLOBIN A1C LEVEL <7.0%: ICD-10-PCS | Mod: CPTII,S$GLB,, | Performed by: FAMILY MEDICINE

## 2020-06-04 PROCEDURE — 99999 PR PBB SHADOW E&M-EST. PATIENT-LVL V: ICD-10-PCS | Mod: PBBFAC,,, | Performed by: FAMILY MEDICINE

## 2020-06-04 PROCEDURE — 3079F DIAST BP 80-89 MM HG: CPT | Mod: CPTII,S$GLB,, | Performed by: FAMILY MEDICINE

## 2020-06-04 PROCEDURE — 3079F PR MOST RECENT DIASTOLIC BLOOD PRESSURE 80-89 MM HG: ICD-10-PCS | Mod: CPTII,S$GLB,, | Performed by: FAMILY MEDICINE

## 2020-06-04 RX ORDER — FUROSEMIDE 40 MG/1
40 TABLET ORAL DAILY
Qty: 90 TABLET | Refills: 0 | Status: SHIPPED | OUTPATIENT
Start: 2020-06-04 | End: 2020-07-02

## 2020-06-04 RX ORDER — CYCLOBENZAPRINE HCL 10 MG
10 TABLET ORAL 3 TIMES DAILY PRN
Qty: 270 TABLET | Refills: 1 | Status: SHIPPED | OUTPATIENT
Start: 2020-06-04 | End: 2021-03-22 | Stop reason: SDUPTHER

## 2020-06-04 NOTE — PROGRESS NOTES
Assessment & Plan  Problem List Items Addressed This Visit        Neuro    Diabetic polyneuropathy associated with type 2 diabetes mellitus    Relevant Medications    blood sugar diagnostic (TRUETEST TEST STRIPS) Strp (Start on 6/5/2020)       Endocrine    Type 2 diabetes mellitus without complication    Relevant Medications    blood sugar diagnostic (TRUETEST TEST STRIPS) Strp (Start on 6/5/2020)      Other Visit Diagnoses     MRSA cellulitis    -  Primary      he is scheduled to meet with ID.  He will need IV vancomycin.  Needs to go to ER if he develops other symptoms.  He has an all over rash.      Greater than 45 minutes was spent with this patient with greater than 50% spent with face-to-face counseling on above listed conditions.      Health Maintenance reviewed.    Follow-up: No follow-ups on file.    ______________________________________________________________________    Chief Complaint  Chief Complaint   Patient presents with    Diabetes    check lspot on left leg    Medication Refill       HPI  Scott Bansal is a 59 y.o. male with multiple medical diagnoses as listed in the medical history and problem list that presents for rash and medication refill.  Pt is known to me with last appointment 5/7/2020.    His rash continues to worsen.  He is allergic to sulfa.  His zonegran may be the cause of the current rash, but the rash is worsening secondary to the MRSA.  He is aware the rash.    We discussed current weaning off the medication.  Developing multiple boils on the skin.  He is worsening.  He does not have any other symptoms aside from the rash.  Long discussion about going to the ER.       PAST MEDICAL HISTORY:  Past Medical History:   Diagnosis Date    Bipolar 1 disorder, mixed     BPH (benign prostatic hypertrophy)     Cyst, eyelid     Dr. Broussard    Diabetes mellitus     GERD (gastroesophageal reflux disease)     Hyperlipidemia     Hypertension     Lumbar degenerative disc disease  3/7/2019    Migraine headache     Obesity        PAST SURGICAL HISTORY:  Past Surgical History:   Procedure Laterality Date    CERVICAL SPINE SURGERY      COLONOSCOPY N/A 2017    Procedure: COLONOSCOPY;  Surgeon: Genaro Nix MD;  [facility];  Service: Endoscopy;  Laterality: N/A;    EYE SURGERY Left 2017    cyst removal on eyelid; Dr. Broussard    SHOULDER SURGERY      TONSILLECTOMY         SOCIAL HISTORY:  Social History     Socioeconomic History    Marital status:      Spouse name: Not on file    Number of children: Not on file    Years of education: Not on file    Highest education level: Not on file   Occupational History    Not on file   Social Needs    Financial resource strain: Not on file    Food insecurity:     Worry: Not on file     Inability: Not on file    Transportation needs:     Medical: Not on file     Non-medical: Not on file   Tobacco Use    Smoking status: Former Smoker     Packs/day: 2.00     Years: 15.00     Pack years: 30.00     Types: Cigarettes     Last attempt to quit: 1984     Years since quittin.0    Smokeless tobacco: Never Used    Tobacco comment: Patient quit smoking at the age of 26 yo.   Substance and Sexual Activity    Alcohol use: No    Drug use: No    Sexual activity: Yes     Partners: Female   Lifestyle    Physical activity:     Days per week: Not on file     Minutes per session: Not on file    Stress: Only a little   Relationships    Social connections:     Talks on phone: Not on file     Gets together: Not on file     Attends Sikhism service: Not on file     Active member of club or organization: Not on file     Attends meetings of clubs or organizations: Not on file     Relationship status: Not on file   Other Topics Concern    Not on file   Social History Narrative    Not on file       FAMILY HISTORY:  Family History   Problem Relation Age of Onset    Cataracts Mother     Cancer Mother         throat     Hypertension Mother     Hypertension Father     Stroke Father         2 strokes    Hypertension Sister     Cancer Brother         spinal, kidney, spinal fluid    Hypertension Brother     Cancer Cousin         breast?       ALLERGIES AND MEDICATIONS: updated and reviewed.  Review of patient's allergies indicates:   Allergen Reactions    Sulfa (sulfonamide antibiotics) Rash     Current Outpatient Medications   Medication Sig Dispense Refill    albuterol (VENTOLIN HFA) 90 mcg/actuation inhaler INHALE 2 PUFFS EVERY 4 HOURS AS NEEDED 18 Inhaler 6    amLODIPine (NORVASC) 10 MG tablet TAKE 1 TABLET BY MOUTH EVERY DAY 90 tablet 3    betamethasone dipropionate (DIPROLENE) 0.05 % ointment Apply topically 2 (two) times daily. 45 g 1    buPROPion (WELLBUTRIN XL) 300 MG 24 hr tablet Take 300 mg by mouth every morning.  2    busPIRone (BUSPAR) 10 MG tablet Take 10 mg by mouth 3 (three) times daily.      candesartan (ATACAND) 4 MG tablet Take 1 tablet (4 mg total) by mouth once daily. 90 tablet 3    clotrimazole-betamethasone 1-0.05% (LOTRISONE) cream Apply topically 2 (two) times daily. 90 g 2    crisaborole (EUCRISA) 2 % Oint Use for hand eczema bid. 60 g 5    cyclobenzaprine (FLEXERIL) 10 MG tablet Take 1 tablet (10 mg total) by mouth 3 (three) times daily as needed for Muscle spasms. 270 tablet 1    etodolac (LODINE) 500 MG tablet Take 1 tablet (500 mg total) by mouth 2 (two) times daily. 60 tablet 0    fluticasone (FLONASE) 50 mcg/actuation nasal spray TAKE 1 SPRAY BY EACH NARE ROUTE ONCE DAILY. 16 g 5    folic acid (FOLVITE) 800 MCG Tab Take 800 mcg by mouth once daily.      furosemide (LASIX) 40 MG tablet Take 1 tablet (40 mg total) by mouth once daily. 90 tablet 0    gabapentin (NEURONTIN) 300 MG capsule TAKE 1 CAPSULE BY MOUTH TWICE A  capsule 3    gemfibroziL (LOPID) 600 MG tablet TAKE 1 TABLET (600 MG TOTAL) BY MOUTH 2 (TWO) TIMES DAILY BEFORE MEALS. 180 tablet 0    hydroxyzine HCL (ATARAX)  25 MG tablet TAKE 1 TABLET (25 MG TOTAL) BY MOUTH 3 (THREE) TIMES DAILY AS NEEDED FOR ITCHING. 90 tablet 1    hydroxyzine HCL (ATARAX) 50 MG tablet Take 1 tablet (50 mg total) by mouth 3 (three) times daily as needed for Itching. 90 tablet 3    levocetirizine (XYZAL) 5 MG tablet Take 1 tablet each morning. 30 tablet 11    LIDOCAINE VISCOUS 2 % solution       metFORMIN (GLUCOPHAGE) 1000 MG tablet Take 1 tablet (1,000 mg total) by mouth 2 (two) times daily. 180 tablet 0    mometasone 0.1% (ELOCON) 0.1 % cream Apply topically once daily. AAA bid prn for hand eczema 45 g 1    mupirocin (BACTROBAN) 2 % ointment Apply topically 2 (two) times daily. Apply with Q-tip 30 g 3    mupirocin (BACTROBAN) 2 % ointment Apply topically 2 (two) times daily. 30 g 3    pantoprazole (PROTONIX) 40 MG tablet TAKE 1 TABLET BY MOUTH DAILY 90 tablet 3    quetiapine (SEROQUEL) 300 MG Tab Take by mouth.      SPIRIVA WITH HANDIHALER 18 mcg inhalation capsule INHALE 1 CAPSULE (18 MCG TOTAL) INTO THE LUNGS ONCE DAILY. CONTROLLER 30 capsule 0    sumatriptan (IMITREX) 100 MG tablet TAKE 1 TABLET BY MOUTH EVERY 2 HOURS AS NEEDED FOR MIGRAINE 12 tablet 1    tiZANidine (ZANAFLEX) 4 MG tablet Take 4 mg by mouth every 8 (eight) hours.      topiramate 25 mg capsule Take 1 capsule (25 mg total) by mouth 2 (two) times daily. 60 capsule 0    triamcinolone acetonide 0.1% (KENALOG) 0.1 % cream AAA bid prn for rash on chest and arms. Do not use on face, underarms or groin 454 g 1    zonisamide (ZONEGRAN) 50 MG Cap TAKE 1 CAPSULE BY MOUTH TWICE A DAY 60 capsule 3    [START ON 6/5/2020] blood sugar diagnostic (TRUETEST TEST STRIPS) Strp 1 each by Misc.(Non-Drug; Combo Route) route 3 (three) times a week. 100 each 3    fluticasone furoate-vilanterol (BREO) 100-25 mcg/dose diskus inhaler Inhale 1 puff into the lungs once daily. Controller 90 each 1    simvastatin (ZOCOR) 80 MG tablet Take 1 tablet (80 mg total) by mouth once daily. 90 tablet 2  "   tamsulosin (FLOMAX) 0.4 mg Cap TAKE 1 CAPSULE BY MOUTH EVERY DAY 30 capsule 0     No current facility-administered medications for this visit.          ROS  Review of Systems   Constitutional: Negative for activity change, appetite change, fatigue, fever and unexpected weight change.   HENT: Negative for congestion and facial swelling.    Eyes: Negative for visual disturbance.   Respiratory: Negative for chest tightness, shortness of breath, wheezing and stridor.    Cardiovascular: Negative for chest pain, palpitations and leg swelling.   Gastrointestinal: Negative for abdominal distention, abdominal pain, constipation, diarrhea, nausea and vomiting.   Endocrine: Negative for cold intolerance, heat intolerance, polydipsia and polyuria.   Skin: Positive for rash.   Allergic/Immunologic: Negative.    Neurological: Negative for dizziness, light-headedness, numbness and headaches.   Psychiatric/Behavioral: Negative for agitation and decreased concentration.           Physical Exam  Vitals:    06/04/20 1457   BP: 124/80   BP Location: Left arm   Patient Position: Sitting   BP Method: Large (Manual)   Pulse: 104   Temp: 97.3 °F (36.3 °C)   TempSrc: Temporal   SpO2: 97%   Weight: 112.9 kg (248 lb 14.4 oz)   Height: 5' 9" (1.753 m)    Body mass index is 36.76 kg/m².  Weight: 112.9 kg (248 lb 14.4 oz)   Height: 5' 9" (175.3 cm)   Physical Exam   Constitutional: He is oriented to person, place, and time. He appears well-developed and well-nourished.   HENT:   Head: Normocephalic and atraumatic.   Right Ear: External ear normal.   Left Ear: External ear normal.   Nose: Nose normal.   Mouth/Throat: Oropharynx is clear and moist.   Eyes: Pupils are equal, round, and reactive to light. Conjunctivae and EOM are normal.   Neck: Normal range of motion.   Cardiovascular: Normal rate, regular rhythm and normal heart sounds.   Pulmonary/Chest: Effort normal and breath sounds normal.   Neurological: He is alert and oriented to " person, place, and time.   Skin: Skin is warm and dry.   Psychiatric: He has a normal mood and affect. His behavior is normal.   Vitals reviewed.            Health Maintenance       Date Due Completion Date    High Dose Statin 11/26/1980 ---    Shingles Vaccine (1 of 2) 11/26/2009 ---    Foot Exam 10/08/2019 10/8/2018    Colonoscopy 07/27/2020 7/27/2017    Eye Exam 08/27/2020 8/27/2019    Influenza Vaccine (Season Ended) 09/01/2020 ---    Hemoglobin A1c 09/23/2020 3/23/2020    Lipid Panel 02/12/2021 2/12/2020    TETANUS VACCINE 07/07/2027 7/7/2017              Patient note was created using [employer].  Any errors in syntax or even information may not have been identified and edited on initial review prior to signing this note.

## 2020-06-09 ENCOUNTER — OFFICE VISIT (OUTPATIENT)
Dept: PULMONOLOGY | Facility: CLINIC | Age: 60
End: 2020-06-09
Payer: MEDICARE

## 2020-06-09 VITALS
WEIGHT: 248 LBS | HEIGHT: 69 IN | BODY MASS INDEX: 36.73 KG/M2 | OXYGEN SATURATION: 97 % | SYSTOLIC BLOOD PRESSURE: 134 MMHG | HEART RATE: 105 BPM | TEMPERATURE: 98 F | DIASTOLIC BLOOD PRESSURE: 89 MMHG

## 2020-06-09 DIAGNOSIS — J44.9 CHRONIC OBSTRUCTIVE PULMONARY DISEASE, UNSPECIFIED COPD TYPE: ICD-10-CM

## 2020-06-09 DIAGNOSIS — J34.89 NASAL OBSTRUCTION: ICD-10-CM

## 2020-06-09 DIAGNOSIS — G47.33 OSA (OBSTRUCTIVE SLEEP APNEA): ICD-10-CM

## 2020-06-09 DIAGNOSIS — J44.89 CHRONIC BRONCHITIS WITH COPD (CHRONIC OBSTRUCTIVE PULMONARY DISEASE): ICD-10-CM

## 2020-06-09 PROCEDURE — 99214 PR OFFICE/OUTPT VISIT, EST, LEVL IV, 30-39 MIN: ICD-10-PCS | Mod: S$GLB,,, | Performed by: INTERNAL MEDICINE

## 2020-06-09 PROCEDURE — 3079F PR MOST RECENT DIASTOLIC BLOOD PRESSURE 80-89 MM HG: ICD-10-PCS | Mod: CPTII,S$GLB,, | Performed by: INTERNAL MEDICINE

## 2020-06-09 PROCEDURE — 3075F PR MOST RECENT SYSTOLIC BLOOD PRESS GE 130-139MM HG: ICD-10-PCS | Mod: CPTII,S$GLB,, | Performed by: INTERNAL MEDICINE

## 2020-06-09 PROCEDURE — 3079F DIAST BP 80-89 MM HG: CPT | Mod: CPTII,S$GLB,, | Performed by: INTERNAL MEDICINE

## 2020-06-09 PROCEDURE — 3008F PR BODY MASS INDEX (BMI) DOCUMENTED: ICD-10-PCS | Mod: CPTII,S$GLB,, | Performed by: INTERNAL MEDICINE

## 2020-06-09 PROCEDURE — 99999 PR PBB SHADOW E&M-EST. PATIENT-LVL V: CPT | Mod: PBBFAC,,, | Performed by: INTERNAL MEDICINE

## 2020-06-09 PROCEDURE — 99499 RISK ADDL DX/OHS AUDIT: ICD-10-PCS | Mod: S$GLB,,, | Performed by: INTERNAL MEDICINE

## 2020-06-09 PROCEDURE — 99999 PR PBB SHADOW E&M-EST. PATIENT-LVL V: ICD-10-PCS | Mod: PBBFAC,,, | Performed by: INTERNAL MEDICINE

## 2020-06-09 PROCEDURE — 3075F SYST BP GE 130 - 139MM HG: CPT | Mod: CPTII,S$GLB,, | Performed by: INTERNAL MEDICINE

## 2020-06-09 PROCEDURE — 3008F BODY MASS INDEX DOCD: CPT | Mod: CPTII,S$GLB,, | Performed by: INTERNAL MEDICINE

## 2020-06-09 PROCEDURE — 99214 OFFICE O/P EST MOD 30 MIN: CPT | Mod: S$GLB,,, | Performed by: INTERNAL MEDICINE

## 2020-06-09 PROCEDURE — 99499 UNLISTED E&M SERVICE: CPT | Mod: S$GLB,,, | Performed by: INTERNAL MEDICINE

## 2020-06-09 NOTE — PROGRESS NOTES
"Scott Bansal  was seen as a follow up.    CHIEF COMPLAINT:  COPD      HISTORY OF PRESENT ILLNESS: Scott Bansal is a 59 y.o. male  has a past medical history of Bipolar 1 disorder, mixed, BPH (benign prostatic hypertrophy), Cyst, eyelid, Diabetes mellitus, GERD (gastroesophageal reflux disease), Hyperlipidemia, Hypertension, Lumbar degenerative disc disease (3/7/2019), Migraine headache, and Obesity.  Our first encounter was 5/11/18.  Patient smoked 2 ppd since age of 6.  Patient quit at age of 24.  Patient with recurrent uri.  "10-15 pneumonia" since 20s.  Typical infection began with increased sputum production a/w wheezing and dyspnea.      Last hospitalization was 2/28/20 for copd exacerbation.  Patient was treated with steroid, nebs and levaquin.  Patient also wit abscess rle.  Patient is being followed by dermatology.      Today, breathing is at baseline.  No pft since our last encounter.  Currently on proair and breo.  Does not used breo routinely due to cost of copay.  No coughing or wheezing.  No chest pain.      PAST MEDICAL HISTORY:    Active Ambulatory Problems     Diagnosis Date Noted    History of fusion of cervical spine 01/20/2016    Type 2 diabetes mellitus without complication 01/20/2016    Hyperlipidemia 01/20/2016    Essential hypertension 01/20/2016    Chronic tension-type headache, not intractable 01/20/2016    Bipolar 1 disorder 01/20/2016    BPH (benign prostatic hyperplasia) 01/20/2016    Gastroesophageal reflux disease without esophagitis 01/20/2016    Sciatica of right side 01/20/2016    History of pneumonia 01/20/2016    COPD (chronic obstructive pulmonary disease) 01/20/2016    Former smoker 01/20/2016    Lumbar compression fracture 01/20/2016    Diabetic polyneuropathy associated with type 2 diabetes mellitus 02/20/2019    Lumbar degenerative disc disease 03/07/2019    Severe obesity (BMI 35.0-39.9) with comorbidity 05/20/2019    Atherosclerosis of aorta " 2019    Chronic bilateral low back pain without sciatica 2019    Muscle spasm 2019    Nasal obstruction 2020    KRISS (obstructive sleep apnea) 2020     Resolved Ambulatory Problems     Diagnosis Date Noted    COPD exacerbation 2020    ORION (acute kidney injury) 2020    Leg abscess 2020    Wound cellulitis 2020     Past Medical History:   Diagnosis Date    Bipolar 1 disorder, mixed     BPH (benign prostatic hypertrophy)     Cyst, eyelid     Diabetes mellitus     GERD (gastroesophageal reflux disease)     Hypertension     Migraine headache     Obesity                 PAST SURGICAL HISTORY:    Past Surgical History:   Procedure Laterality Date    CERVICAL SPINE SURGERY      COLONOSCOPY N/A 2017    Procedure: COLONOSCOPY;  Surgeon: Genaro Nix MD;  Location: [facility];  Service: Endoscopy;  Laterality: N/A;    EYE SURGERY Left 2017    cyst removal on eyelid; Dr. Broussard    SHOULDER SURGERY      TONSILLECTOMY           FAMILY HISTORY:                Family History   Problem Relation Age of Onset    Cataracts Mother     Cancer Mother         throat    Hypertension Mother     Hypertension Father     Stroke Father         2 strokes    Hypertension Sister     Cancer Brother         spinal, kidney, spinal fluid    Hypertension Brother     Cancer Cousin         breast?       SOCIAL HISTORY:          Tobacco:   Social History     Tobacco Use   Smoking Status Former Smoker    Packs/day: 2.00    Years: 15.00    Pack years: 30.00    Types: Cigarettes    Last attempt to quit: 1984    Years since quittin.1   Smokeless Tobacco Never Used   Tobacco Comment    Patient quit smoking at the age of 26 yo.     alcohol use:    Social History     Substance and Sexual Activity   Alcohol Use No               Occupation:  Former     ALLERGIES:    Review of patient's allergies indicates:   Allergen Reactions    Sulfa  (sulfonamide antibiotics) Rash       CURRENT MEDICATIONS:    Current Outpatient Medications   Medication Sig Dispense Refill    albuterol (VENTOLIN HFA) 90 mcg/actuation inhaler INHALE 2 PUFFS EVERY 4 HOURS AS NEEDED 18 Inhaler 6    amLODIPine (NORVASC) 10 MG tablet TAKE 1 TABLET BY MOUTH EVERY DAY 90 tablet 3    betamethasone dipropionate (DIPROLENE) 0.05 % ointment Apply topically 2 (two) times daily. 45 g 1    blood sugar diagnostic (TRUETEST TEST STRIPS) Strp 1 each by Misc.(Non-Drug; Combo Route) route 3 (three) times a week. 100 each 3    buPROPion (WELLBUTRIN XL) 300 MG 24 hr tablet Take 300 mg by mouth every morning.  2    busPIRone (BUSPAR) 10 MG tablet Take 10 mg by mouth 3 (three) times daily.      candesartan (ATACAND) 4 MG tablet Take 1 tablet (4 mg total) by mouth once daily. 90 tablet 3    clotrimazole-betamethasone 1-0.05% (LOTRISONE) cream Apply topically 2 (two) times daily. 90 g 2    crisaborole (EUCRISA) 2 % Oint Use for hand eczema bid. 60 g 5    cyclobenzaprine (FLEXERIL) 10 MG tablet Take 1 tablet (10 mg total) by mouth 3 (three) times daily as needed for Muscle spasms. 270 tablet 1    etodolac (LODINE) 500 MG tablet TAKE 1 TABLET BY MOUTH TWICE A DAY 60 tablet 0    fluticasone (FLONASE) 50 mcg/actuation nasal spray TAKE 1 SPRAY BY EACH NARE ROUTE ONCE DAILY. 16 g 5    folic acid (FOLVITE) 800 MCG Tab Take 800 mcg by mouth once daily.      furosemide (LASIX) 40 MG tablet Take 1 tablet (40 mg total) by mouth once daily. 90 tablet 0    gabapentin (NEURONTIN) 300 MG capsule TAKE 1 CAPSULE BY MOUTH TWICE A  capsule 3    gemfibroziL (LOPID) 600 MG tablet TAKE 1 TABLET (600 MG TOTAL) BY MOUTH 2 (TWO) TIMES DAILY BEFORE MEALS. 180 tablet 0    hydroxyzine HCL (ATARAX) 25 MG tablet TAKE 1 TABLET (25 MG TOTAL) BY MOUTH 3 (THREE) TIMES DAILY AS NEEDED FOR ITCHING. 90 tablet 1    hydroxyzine HCL (ATARAX) 50 MG tablet Take 1 tablet (50 mg total) by mouth 3 (three) times daily as  needed for Itching. 90 tablet 3    levocetirizine (XYZAL) 5 MG tablet Take 1 tablet each morning. 30 tablet 11    LIDOCAINE VISCOUS 2 % solution       metFORMIN (GLUCOPHAGE) 1000 MG tablet Take 1 tablet (1,000 mg total) by mouth 2 (two) times daily. 180 tablet 0    mometasone 0.1% (ELOCON) 0.1 % cream Apply topically once daily. AAA bid prn for hand eczema 45 g 1    mupirocin (BACTROBAN) 2 % ointment Apply topically 2 (two) times daily. Apply with Q-tip 30 g 3    mupirocin (BACTROBAN) 2 % ointment Apply topically 2 (two) times daily. 30 g 3    pantoprazole (PROTONIX) 40 MG tablet TAKE 1 TABLET BY MOUTH DAILY 90 tablet 3    quetiapine (SEROQUEL) 300 MG Tab Take by mouth.      SPIRIVA WITH HANDIHALER 18 mcg inhalation capsule INHALE 1 CAPSULE (18 MCG TOTAL) INTO THE LUNGS ONCE DAILY. CONTROLLER 30 capsule 0    sumatriptan (IMITREX) 100 MG tablet TAKE 1 TABLET BY MOUTH EVERY 2 HOURS AS NEEDED FOR MIGRAINE 12 tablet 1    tiZANidine (ZANAFLEX) 4 MG tablet Take 4 mg by mouth every 8 (eight) hours.      triamcinolone acetonide 0.1% (KENALOG) 0.1 % cream AAA bid prn for rash on chest and arms. Do not use on face, underarms or groin 454 g 1    fluticasone furoate-vilanterol (BREO) 100-25 mcg/dose diskus inhaler Inhale 1 puff into the lungs once daily. Controller 90 each 1    simvastatin (ZOCOR) 80 MG tablet Take 1 tablet (80 mg total) by mouth once daily. 90 tablet 2    tamsulosin (FLOMAX) 0.4 mg Cap TAKE 1 CAPSULE BY MOUTH EVERY DAY 30 capsule 0     No current facility-administered medications for this visit.                   REVIEW OF SYSTEMS:     Pulmonary related symptoms as per HPI.  Gen:  no weight loss, no fever, no night sweat  HEENT:  no visual changes, no sore throat, + hearing loss on right ear.  CV:  Per hpi  GI:  no melena, no hematochezia, no diarhea, no constipation.  :  no dysuria, no hematuria, no hesistancy, no dribbling  Neuro:  no syncope, no vertigo, no tinitus  Psych:  No homocide or  "suicide ideation; no depression.  Endocrine:  No heat or cold intolerance.  Sleep:  + snoring; no witnessed apnea.  Feeling tire upon awake.    Otherwise, a balance of systems reviewed is negative.          PHYSICAL EXAM:  Vitals:    06/09/20 0929   BP: 134/89   Pulse: 105   Temp: 98.2 °F (36.8 °C)   TempSrc: Oral   SpO2: 97%   Weight: 112.5 kg (248 lb 0.3 oz)   Height: 5' 9" (1.753 m)   PainSc:   9   PainLoc: Hand     Body mass index is 36.63 kg/m².     GENERAL:  well develop; no apparent distress  HEENT:  + nasal congestion; no discharge noted; class 2 modified mallampatti.   NECK:  supple; no palpable masses.  CARDIO: regular rate and rhythm  PULM:  clear to auscultation bilaterally; no intercostals retractions; no accessory muscle usage   ABDOMEN:  soft nontender/nondistended.  +bowel sound  EXTREMITIES no cce  NEURO:  CN II-XII intact.  5/5 motor in all extremities.  sensation grossly intact   to light touch.  PSYCH:  normal affect.  Alert and oriented x 4    LABS  Pulmonary Functions Testing Results: none  ABG none  CXR:  2/1/18 no effusion or consolidation  CT CHEST:  none    ASSESSMENT  Problem List Items Addressed This Visit     COPD (chronic obstructive pulmonary disease)    Overview     Currently on breo and albuterol.  Recommend routine usage of breo.  quit smoking since age 25.           Current Assessment & Plan     Baseline pft.             Relevant Orders    Complete PFT with bronchodilator    COVID-19 Routine Screening    Nasal obstruction    Overview     h/o nasal trauma.  S/p ent surgery in the past.           Current Assessment & Plan     Will refer to ent.           Relevant Orders    Ambulatory referral/consult to ENT    KRISS (obstructive sleep apnea)    Overview     snoring, restless sleep, htn, elevated bmi warrants sleep study.  Per patient, he underwent sleep study at [facility] but unable to obtain record.  Suspect nasal obstruction may be a barrier to cpap tolerance.  Patient declined cpap " therapy.  Not interested in cpap or sleep study at this time.               Other Visit Diagnoses     Chronic bronchitis with COPD (chronic obstructive pulmonary disease)               Obesity - aware of need for drastic weight loss.     Patient will Follow up in about 3 months (around 9/9/2020). with md.    CC: Send copy of this note to Alexander Gruber MD

## 2020-06-09 NOTE — LETTER
June 9, 2020      Alexander Gruber MD  [address] [facility]  [place] [address]           [facility]  [address]  [place] [address]  Phone: [phone]  Fax: [phone]          Patient: Scott Bansal   MR Number: [id number]   YOB: 1960   Date of Visit: 6/9/2020       Dear Dr. Alexander Gruber:    Thank you for referring Scott Bansal to me for evaluation. Attached you will find relevant portions of my assessment and plan of care.    If you have questions, please do not hesitate to call me. I look forward to following Scott Bansal along with you.    Sincerely,    Mickey Hanson MD    Enclosure  CC:  No Recipients    If you would like to receive this communication electronically, please contact [email or web address] or [phone] to request more information on [employer] access.    For providers and/or their staff who would like to refer a patient to [facility], please contact us through our one-stop-shop provider referral line, [facility] [place], at [phone].    If you feel you have received this communication in error or would no longer like to receive these types of communications, please e-mail [email or web address]

## 2020-06-10 ENCOUNTER — OFFICE VISIT (OUTPATIENT)
Dept: INFECTIOUS DISEASES | Facility: CLINIC | Age: 60
End: 2020-06-10
Payer: MEDICARE

## 2020-06-10 VITALS
HEART RATE: 98 BPM | HEIGHT: 69 IN | BODY MASS INDEX: 36.6 KG/M2 | WEIGHT: 247.13 LBS | SYSTOLIC BLOOD PRESSURE: 139 MMHG | TEMPERATURE: 98 F | DIASTOLIC BLOOD PRESSURE: 93 MMHG

## 2020-06-10 DIAGNOSIS — L30.9 DERMATITIS OF UNKNOWN ETIOLOGY: ICD-10-CM

## 2020-06-10 DIAGNOSIS — L30.9 HAND ECZEMA: ICD-10-CM

## 2020-06-10 DIAGNOSIS — L08.9 INFECTION OF SKIN DUE TO METHICILLIN RESISTANT STAPHYLOCOCCUS AUREUS (MRSA): Primary | ICD-10-CM

## 2020-06-10 DIAGNOSIS — B95.62 INFECTION OF SKIN DUE TO METHICILLIN RESISTANT STAPHYLOCOCCUS AUREUS (MRSA): Primary | ICD-10-CM

## 2020-06-10 DIAGNOSIS — L02.415 ABSCESS OF RIGHT LEG: ICD-10-CM

## 2020-06-10 DIAGNOSIS — L02.416 ABSCESS OF LEFT LEG: ICD-10-CM

## 2020-06-10 PROCEDURE — 3080F DIAST BP >= 90 MM HG: CPT | Mod: CPTII,S$GLB,, | Performed by: PHYSICIAN ASSISTANT

## 2020-06-10 PROCEDURE — 3080F PR MOST RECENT DIASTOLIC BLOOD PRESSURE >= 90 MM HG: ICD-10-PCS | Mod: CPTII,S$GLB,, | Performed by: PHYSICIAN ASSISTANT

## 2020-06-10 PROCEDURE — 99204 PR OFFICE/OUTPT VISIT, NEW, LEVL IV, 45-59 MIN: ICD-10-PCS | Mod: S$GLB,,, | Performed by: PHYSICIAN ASSISTANT

## 2020-06-10 PROCEDURE — 99999 PR PBB SHADOW E&M-EST. PATIENT-LVL V: CPT | Mod: PBBFAC,,, | Performed by: PHYSICIAN ASSISTANT

## 2020-06-10 PROCEDURE — 3008F BODY MASS INDEX DOCD: CPT | Mod: CPTII,S$GLB,, | Performed by: PHYSICIAN ASSISTANT

## 2020-06-10 PROCEDURE — 99999 PR PBB SHADOW E&M-EST. PATIENT-LVL V: ICD-10-PCS | Mod: PBBFAC,,, | Performed by: PHYSICIAN ASSISTANT

## 2020-06-10 PROCEDURE — 3008F PR BODY MASS INDEX (BMI) DOCUMENTED: ICD-10-PCS | Mod: CPTII,S$GLB,, | Performed by: PHYSICIAN ASSISTANT

## 2020-06-10 PROCEDURE — 3075F SYST BP GE 130 - 139MM HG: CPT | Mod: CPTII,S$GLB,, | Performed by: PHYSICIAN ASSISTANT

## 2020-06-10 PROCEDURE — 3075F PR MOST RECENT SYSTOLIC BLOOD PRESS GE 130-139MM HG: ICD-10-PCS | Mod: CPTII,S$GLB,, | Performed by: PHYSICIAN ASSISTANT

## 2020-06-10 PROCEDURE — 99204 OFFICE O/P NEW MOD 45 MIN: CPT | Mod: S$GLB,,, | Performed by: PHYSICIAN ASSISTANT

## 2020-06-10 RX ORDER — MUPIROCIN 20 MG/G
OINTMENT TOPICAL 2 TIMES DAILY
Qty: 30 G | Refills: 3 | Status: SHIPPED | OUTPATIENT
Start: 2020-06-10 | End: 2021-03-02

## 2020-06-10 NOTE — PROGRESS NOTES
Subjective:      Patient ID: Scott Bansal is a 59 y.o. male.    Chief Complaint:MRSA      History of Present Illness    HPI  Scott Bansal is a 59 y.o. male with history of eczema referred to ID clinic for management of an MRSA infection. He has a history of a right leg abscess/cellulitis beginning in January. Cx of this abscess grew MRSA (sensitive Clinda, Tetra, bactrim, Vanc).  He completed a course of Clindamycin and Doxycyline and aggressive wound care with eventual resolution.    In March he saw his PCP for evaluation of his hand eczema and a diffuse rash. He was prescribed mometasone cream and betamethasone cream for his hand eczema. Rash did not improve. He followed up with his PCP May 7th. Rash extended from arms to shoulders, abdomen, legs. He was switched to clotrimazole-betamethasone and etodolac for his rash. He was given hydroxyzine for the pruritis. On May 12th he followed up with Dermatology for evaluation of his rash of unclear etiology. He was started on doxycyline and a steroid taper. Two punch biopsies obtained. He followed up on May 20th. It was thought the rash was possibly allergic contact dermatitis vs drug eruption.  Culprits could be lasix (given sulfa) vs. Gabapentin. Prednisone taper continued.  He was instructed to change his soap and detergent.  A new left leg abscess had also developed.  A culture of the skin was taken and returned positive for MRSA (sensitive to bactrim and vanc only). He was continued on empiric Doxy that was d/c after susceptibilities returned. Prednisone also discontinued. On May 27th he was seen by primary care and reported increased redness. The abscess had spontaneously drained. Cultures taken which again grew a more resistant MRSA. Clindamycin prescribed. He was referred to ID.    Patient is seen today in clinic.  He denies fever, chills, sweats. His left leg wound is now scabbed over. He has erythema though no ongoing drainage, fluctuance, tenderness,  "warmth or evidence of ongoing infection. His diffuse rash has not improved. He has extreme itching and admits to scratching. Excoriations noted. He is currently cleaning with dial soap. He did change his laundry detergent. He denies family history of autoimmune processes.      Review of Systems   Constitution: Positive for weight gain. Negative for chills, decreased appetite, fever, malaise/fatigue, night sweats and weight loss.   HENT: Negative for congestion, ear pain, hearing loss, hoarse voice, sore throat and tinnitus.    Eyes: Negative for blurred vision, redness and visual disturbance.   Cardiovascular: Negative for chest pain, leg swelling and palpitations.   Respiratory: Positive for wheezing. Negative for cough, hemoptysis, shortness of breath and sputum production.    Hematologic/Lymphatic: Negative for adenopathy. Does not bruise/bleed easily.   Skin: Positive for dry skin, itching and rash. Negative for suspicious lesions.   Musculoskeletal: Negative for back pain, joint pain, myalgias and neck pain.   Gastrointestinal: Negative for abdominal pain, constipation, diarrhea, heartburn, nausea and vomiting.   Genitourinary: Negative for dysuria, flank pain, frequency, hematuria, hesitancy and urgency.   Neurological: Positive for headaches. Negative for dizziness, numbness, paresthesias and weakness.   Psychiatric/Behavioral: Negative for depression and memory loss. The patient does not have insomnia and is not nervous/anxious.    Allergic/Immunologic: Negative for environmental allergies, HIV exposure, hives and persistent infections.     Objective:     Vitals:    06/10/20 0917   BP: (!) 139/93   Pulse: 98   Temp: 98.2 °F (36.8 °C)   TempSrc: Oral   Weight: 112.1 kg (247 lb 2.2 oz)   Height: 5' 9" (1.753 m)   PainSc:   9     Physical Exam   Constitutional: He is oriented to person, place, and time. He appears well-developed and well-nourished. No distress.   HENT:   Head: Normocephalic and atraumatic. "   Mouth/Throat: No oropharyngeal exudate.   Eyes: Conjunctivae are normal. No scleral icterus.   Cardiovascular: Normal rate and regular rhythm.   Pulmonary/Chest: Effort normal and breath sounds normal. No respiratory distress.   Abdominal: Soft. He exhibits no distension.   Musculoskeletal: Normal range of motion. He exhibits no tenderness.   Neurological: He is alert and oriented to person, place, and time.   Skin: Skin is warm and dry. Rash noted. He is not diaphoretic. There is erythema.   Psychiatric: He has a normal mood and affect. His behavior is normal. Thought content normal.        6/4/20              1.  Skin, right upper arm, punch biopsy:   - SUBACUTE SPONGIOTIC DERMATITIS (See Comment).   MICROSCOPIC DESCRIPTION: The biopsy shows parakeratosis, epidermal acanthosis   with elongation of rete ridges, spongiosis with exocytosis of lymphocytes and   a superficial perivascular dermal lymphocyte predominant infiltrate with rare   eosinophils.  Colloidal iron stain fails to reveal increased dermal mucin.   PAS stain is negative for fungi.  Appropriately reactive controls were   reviewed.  Multiple levels were examined.   COMMENT: Differential diagnosis includes atopic dermatitis, allergic contact   dermatitis, nummular dermatitis or id reaction.  A drug eruption cannot be   entirely excluded.   1.  Skin, right forearm, punch biopsy:   - SUBACUTE SPONGIOTIC DERMATITIS (See Comment).   MICROSCOPIC DESCRIPTION: The biopsy shows parakeratosis, epidermal acanthosis   with elongation of rete ridges, spongiosis with exocytosis of lymphocytes and   a superficial to mid dermal perivascular lymphocyte-predominant infiltrate   with rare eosinophils.  Colloidal iron stain fails to reveal increased dermal   mucin.  PAS stain is negative for fungi.  Appropriately reactive controls   were reviewed.  Multiple levels were examined.   COMMENT: Differential diagnosis includes atopic dermatitis, allergic contact   dermatitis,  nummular dermatitis or id reaction.  A drug eruption cannot be   entirely excluded.   Assessment:       1. Infection of skin due to methicillin resistant Staphylococcus aureus (MRSA)    2. Abscess of right leg - resolved    3. Hand eczema    4. Dermatitis of unknown etiology    5. Abscess of left leg          Plan:      The patient's left leg abscess has spontaneously drained. He has no ongoing drainage, fluctuance, tenderness, warmth or evidence of active infection. Erythema not consistent with cellulitis. At this time, he does not need further antibiotic therapy and was instructed to continue local wound care. We also discussed that he has poor antibiotic options available due to his highly resistant MRSA isolate, which is likely due to prolonged antibiotic exposure. Given his sulfa allergy, the only oral antibiotic option includes Linezolid which has known drug-drug interactions with many of the patient's current medications and would avoid given risk of serotonin syndrome. The patient is likely colonized with MRSA. Staph decolonization protocol reviewed with the patient. If he develops new boils, encourage warm compresses and topical mupirocin as needed. Recommend Hibiclens washes. Lastly, discussed that we need to control his underlying skin issue to prevent secondary bacterial infections. Refrain from scratching. Continue hydroxyzine and topical steroids to skin lesions. Unclear if his rash is 2/2 drug reaction from his prior antibiotics. Would consider restarting steroids. Will defer management to dermatology.     Patient seen and plan discussed with ID staff. If he develops a new abscess, would be happy to aid in antibiotic selection.     The patient understands the plan of care. All questions were answered. Business card provided.

## 2020-06-10 NOTE — LETTER
June 11, 2020      Cinthia Chapin MD  [address]  [place] [address]           [facility]  [address]  [facility] [address]  Phone: [phone]  Fax: [phone]          Patient: Scott Bansal   MR Number: [id number]   YOB: 1960   Date of Visit: 6/10/2020       Dear Dr. Cinthia Chapin:    Thank you for referring Scott Bansal to me for evaluation. Attached you will find relevant portions of my assessment and plan of care.    If you have questions, please do not hesitate to call me. I look forward to following Scott Bansal along with you.    Sincerely,    Maranda Graves PA-C    Enclosure  CC:  No Recipients    If you would like to receive this communication electronically, please contact [email or web address] or [phone] to request more information on [employer] Link access.    For providers and/or their staff who would like to refer a patient to [facility], please contact us through our one-stop-shop provider referral line, [facility], at [phone].    If you feel you have received this communication in error or would no longer like to receive these types of communications, please e-mail [email or web address]

## 2020-06-11 ENCOUNTER — TELEPHONE (OUTPATIENT)
Dept: OTOLARYNGOLOGY | Facility: CLINIC | Age: 60
End: 2020-06-11

## 2020-06-11 DIAGNOSIS — J34.89 NASAL VALVE BLOCKAGE: Primary | ICD-10-CM

## 2020-06-11 NOTE — TELEPHONE ENCOUNTER
Notified Patient that he will need a Covid Test within 48 Hours prior to his appt to see Dr. Erazo on 7/17/20.

## 2020-07-02 ENCOUNTER — OFFICE VISIT (OUTPATIENT)
Dept: DERMATOLOGY | Facility: CLINIC | Age: 60
End: 2020-07-02
Payer: MEDICARE

## 2020-07-02 ENCOUNTER — LAB VISIT (OUTPATIENT)
Dept: LAB | Facility: HOSPITAL | Age: 60
End: 2020-07-02
Attending: DERMATOLOGY
Payer: MEDICARE

## 2020-07-02 ENCOUNTER — TELEPHONE (OUTPATIENT)
Dept: DERMATOLOGY | Facility: CLINIC | Age: 60
End: 2020-07-02

## 2020-07-02 DIAGNOSIS — Z79.899 ENCOUNTER FOR LONG-TERM (CURRENT) USE OF MEDICATIONS: ICD-10-CM

## 2020-07-02 DIAGNOSIS — L29.9 PRURITUS: ICD-10-CM

## 2020-07-02 DIAGNOSIS — L20.89 OTHER ATOPIC DERMATITIS: ICD-10-CM

## 2020-07-02 DIAGNOSIS — Z11.4 ENCOUNTER FOR SCREENING FOR HUMAN IMMUNODEFICIENCY VIRUS (HIV): ICD-10-CM

## 2020-07-02 DIAGNOSIS — R53.83 OTHER FATIGUE: ICD-10-CM

## 2020-07-02 DIAGNOSIS — L20.89 OTHER ATOPIC DERMATITIS: Primary | ICD-10-CM

## 2020-07-02 DIAGNOSIS — L01.00 IMPETIGO: ICD-10-CM

## 2020-07-02 LAB
ALBUMIN SERPL BCP-MCNC: 4.5 G/DL (ref 3.5–5.2)
ALP SERPL-CCNC: 86 U/L (ref 55–135)
ALT SERPL W/O P-5'-P-CCNC: 32 U/L (ref 10–44)
ANION GAP SERPL CALC-SCNC: 11 MMOL/L (ref 8–16)
AST SERPL-CCNC: 19 U/L (ref 10–40)
BASOPHILS # BLD AUTO: 0.07 K/UL (ref 0–0.2)
BASOPHILS NFR BLD: 0.8 % (ref 0–1.9)
BILIRUB SERPL-MCNC: 0.7 MG/DL (ref 0.1–1)
BUN SERPL-MCNC: 17 MG/DL (ref 6–20)
CALCIUM SERPL-MCNC: 10.4 MG/DL (ref 8.7–10.5)
CHLORIDE SERPL-SCNC: 107 MMOL/L (ref 95–110)
CO2 SERPL-SCNC: 22 MMOL/L (ref 23–29)
CREAT SERPL-MCNC: 1.1 MG/DL (ref 0.5–1.4)
DIFFERENTIAL METHOD: ABNORMAL
EOSINOPHIL # BLD AUTO: 0.3 K/UL (ref 0–0.5)
EOSINOPHIL NFR BLD: 3.7 % (ref 0–8)
ERYTHROCYTE [DISTWIDTH] IN BLOOD BY AUTOMATED COUNT: 12.2 % (ref 11.5–14.5)
EST. GFR  (AFRICAN AMERICAN): >60 ML/MIN/1.73 M^2
EST. GFR  (NON AFRICAN AMERICAN): >60 ML/MIN/1.73 M^2
GLUCOSE SERPL-MCNC: 95 MG/DL (ref 70–110)
HCT VFR BLD AUTO: 41.9 % (ref 40–54)
HGB BLD-MCNC: 13.4 G/DL (ref 14–18)
IMM GRANULOCYTES # BLD AUTO: 0.08 K/UL (ref 0–0.04)
IMM GRANULOCYTES NFR BLD AUTO: 0.9 % (ref 0–0.5)
LYMPHOCYTES # BLD AUTO: 2.1 K/UL (ref 1–4.8)
LYMPHOCYTES NFR BLD: 24.5 % (ref 18–48)
MCH RBC QN AUTO: 28.9 PG (ref 27–31)
MCHC RBC AUTO-ENTMCNC: 32 G/DL (ref 32–36)
MCV RBC AUTO: 90 FL (ref 82–98)
MONOCYTES # BLD AUTO: 1 K/UL (ref 0.3–1)
MONOCYTES NFR BLD: 11.4 % (ref 4–15)
NEUTROPHILS # BLD AUTO: 5 K/UL (ref 1.8–7.7)
NEUTROPHILS NFR BLD: 58.7 % (ref 38–73)
NRBC BLD-RTO: 0 /100 WBC
PLATELET # BLD AUTO: 269 K/UL (ref 150–350)
PMV BLD AUTO: 10.4 FL (ref 9.2–12.9)
POTASSIUM SERPL-SCNC: 4.2 MMOL/L (ref 3.5–5.1)
PROT SERPL-MCNC: 7.2 G/DL (ref 6–8.4)
RBC # BLD AUTO: 4.64 M/UL (ref 4.6–6.2)
SODIUM SERPL-SCNC: 140 MMOL/L (ref 136–145)
WBC # BLD AUTO: 8.58 K/UL (ref 3.9–12.7)

## 2020-07-02 PROCEDURE — 99999 PR PBB SHADOW E&M-EST. PATIENT-LVL V: CPT | Mod: PBBFAC,,, | Performed by: DERMATOLOGY

## 2020-07-02 PROCEDURE — 36415 COLL VENOUS BLD VENIPUNCTURE: CPT

## 2020-07-02 PROCEDURE — 87186 SC STD MICRODIL/AGAR DIL: CPT

## 2020-07-02 PROCEDURE — 99214 OFFICE O/P EST MOD 30 MIN: CPT | Mod: S$GLB,,, | Performed by: DERMATOLOGY

## 2020-07-02 PROCEDURE — 85025 COMPLETE CBC W/AUTO DIFF WBC: CPT

## 2020-07-02 PROCEDURE — 87077 CULTURE AEROBIC IDENTIFY: CPT

## 2020-07-02 PROCEDURE — 80074 ACUTE HEPATITIS PANEL: CPT

## 2020-07-02 PROCEDURE — 86694 HERPES SIMPLEX NES ANTBDY: CPT

## 2020-07-02 PROCEDURE — 86696 HERPES SIMPLEX TYPE 2 TEST: CPT

## 2020-07-02 PROCEDURE — 99999 PR PBB SHADOW E&M-EST. PATIENT-LVL V: ICD-10-PCS | Mod: PBBFAC,,, | Performed by: DERMATOLOGY

## 2020-07-02 PROCEDURE — 86703 HIV-1/HIV-2 1 RESULT ANTBDY: CPT

## 2020-07-02 PROCEDURE — 80053 COMPREHEN METABOLIC PANEL: CPT

## 2020-07-02 PROCEDURE — 99214 PR OFFICE/OUTPT VISIT, EST, LEVL IV, 30-39 MIN: ICD-10-PCS | Mod: S$GLB,,, | Performed by: DERMATOLOGY

## 2020-07-02 PROCEDURE — 87070 CULTURE OTHR SPECIMN AEROBIC: CPT

## 2020-07-02 RX ORDER — TRIAMCINOLONE ACETONIDE 1 MG/G
OINTMENT TOPICAL
Qty: 454 G | Refills: 1 | Status: SHIPPED | OUTPATIENT
Start: 2020-07-02 | End: 2021-01-31

## 2020-07-02 RX ORDER — MUPIROCIN 20 MG/G
OINTMENT TOPICAL
COMMUNITY
Start: 2020-06-10 | End: 2020-07-02

## 2020-07-02 RX ORDER — TIZANIDINE 4 MG/1
TABLET ORAL
COMMUNITY
Start: 2020-06-22 | End: 2020-07-02

## 2020-07-02 RX ORDER — LIDOCAINE 50 MG/G
OINTMENT TOPICAL
Qty: 50 G | Refills: 3 | Status: SHIPPED | OUTPATIENT
Start: 2020-07-02 | End: 2021-03-02

## 2020-07-02 RX ORDER — SUMATRIPTAN SUCCINATE 100 MG/1
TABLET ORAL
COMMUNITY
Start: 2020-06-12 | End: 2020-07-02

## 2020-07-02 RX ORDER — HYDROXYZINE HYDROCHLORIDE 50 MG/1
TABLET, FILM COATED ORAL
COMMUNITY
Start: 2020-06-24 | End: 2020-10-27

## 2020-07-02 RX ORDER — METFORMIN HYDROCHLORIDE 1000 MG/1
TABLET ORAL
COMMUNITY
Start: 2020-06-30 | End: 2020-07-02

## 2020-07-02 RX ORDER — QUETIAPINE FUMARATE 300 MG/1
TABLET, FILM COATED ORAL
COMMUNITY
Start: 2020-06-16 | End: 2020-07-02

## 2020-07-02 NOTE — PATIENT INSTRUCTIONS
Stop dial soap  Stop hibiclens directly to wounds to prevent irritant contact dermatitis.  Can use in shower 2 times/week, mix with soap and water.     New Skin Care Regimen  Soap: Dove sensitive skin bar or liquid  Moisturizer: ceraVe or cetaphil cream  Detergent: Tide Free, All Free, or Cheer Free   Fabric softener: do not use  Colognes/Perfumes/Fragrances: do not use  Bathing: shower or bathe with lukewarm water < 10 minutes

## 2020-07-02 NOTE — TELEPHONE ENCOUNTER
----- Message from Cammy Corrigan sent at 7/2/2020 11:18 AM CDT -----  Regarding: skin condition  Contact: patient  Patient needs to talk to nurse about getting some sort of pain medication for his skin- [facility] in [place], please call him back at [phone]

## 2020-07-02 NOTE — TELEPHONE ENCOUNTER
----- Message from Cammy Corrigan sent at 7/2/2020 11:18 AM CDT -----  Regarding: skin condition  Contact: patient  Patient needs to talk to nurse about getting some sort of pain medication for his skin- [facility] in [place], please call him back at [phone]

## 2020-07-02 NOTE — PROGRESS NOTES
Subjective:       Patient ID:  Scott Bansal is a 59 y.o. male who presents for   Chief Complaint   Patient presents with    Eczema     hands and arms     Spot     back of head      Hx of MRSA abscess of the right lower leg, persistent rash x 5 months (s/p biopsy showing subacute spong, ACD vs. Eczema, drug rash could not be ruled out), last seen in clinic on 5/20/20.  + flare of the skin of the hands with burning. Currently using calamine.  He dc'd lasix, gabapentin and zonisamide.       Prior treatments: PO prednisone, hydroxyzine 50 mg TID, clotrimazole-betamethasone, mupirocin, Aveeno Baby, betamethasone oint    Current Skin Care Regimen  Soap: dial  Moisturizer: none  Detergent: All free and clear (dc'd gain)  Fabric softener: none  Colognes/Perfumes/Fragrances: none  Bathing: showers, lukewarm, 10 min          Review of Systems   Constitutional: Negative for fever and chills.   Gastrointestinal: Negative for nausea and vomiting.   Skin: Positive for itching, rash and dry skin. Negative for daily sunscreen use, activity-related sunscreen use and recent sunburn.   Hematologic/Lymphatic: Does not bruise/bleed easily.        Objective:    Physical Exam   Constitutional: He appears well-developed and well-nourished. No distress.   Neurological: He is alert and oriented to person, place, and time. He is not disoriented.   Psychiatric: He has a normal mood and affect.   Skin:   Areas Examined (abnormalities noted in diagram):   Head / Face Inspection Performed  Neck Inspection Performed  Chest / Axilla Inspection Performed  Abdomen Inspection Performed  Back Inspection Performed  RUE Inspected  LUE Inspection Performed  RLE Inspected  LLE Inspection Performed  Nails and Digits Inspection Performed                                                          Assessment / Plan:        Other atopic dermatitis  Encounter for long-term (current) use of medications  -     CBC auto differential; Future; Expected date:  07/02/2020  -     Comprehensive metabolic panel; Future; Expected date: 07/02/2020  -     HIV 1/2 Ag/Ab (4th Gen); Future; Expected date: 07/02/2020  -     HEPATITIS PANEL, ACUTE; Future; Expected date: 07/02/2020  -     HERPES SIMPLEX 1&2 IGG; Future; Expected date: 07/02/2020  -     HERPES SIMPLEX 1 & 2 IGM; Future; Expected date: 07/02/2020  -     triamcinolone acetonide 0.1% (KENALOG) 0.1 % ointment; AAA bid  Dispense: 454 g; Refill: 1  -     Failure to improve with sensitive skin care and d/c of gabapentin, zonisamide and furosemide.  Will check above labs in anticipation of start of dupixent if pt amenable.  Pt does have hx of cold sores and will most likely tx with c/ valtrex ppx. The patient acknowledged understanding. D/c dial soap and hibiclens directly to wounds.      Severe (EASI) atopic dermatitis with scarring. Discussed start of dupixent.  Reviewed herpes reactivation and discussed pt should notify derm clinic if cold sores or other herpes infections while on dupixent.  Reviewed side effects of immunosuppression, conjunctivitis/keratitis and reactivation of the herpes virus. The patient acknowledged understanding and wishes to proceed with Dupixent therapy.       Impetigo  -     Aerobic culture  -     Of scalp, recommend start of mupirocin ointment bid.  Culture done today, will notify pt of results.              Follow up in about 4 weeks (around 7/30/2020).

## 2020-07-02 NOTE — TELEPHONE ENCOUNTER
Spoke with patients advised that dr simental will send in a prescription for a topical lidocaine. Also answered questions about his lab work for today.

## 2020-07-03 LAB
HAV IGM SERPL QL IA: NEGATIVE
HBV CORE IGM SERPL QL IA: NEGATIVE
HBV SURFACE AG SERPL QL IA: NEGATIVE
HCV AB SERPL QL IA: NEGATIVE
HIV 1+2 AB+HIV1 P24 AG SERPL QL IA: NEGATIVE

## 2020-07-06 LAB — BACTERIA SPEC AEROBE CULT: ABNORMAL

## 2020-07-07 LAB
HSV1 IGG SERPL QL IA: NEGATIVE
HSV2 IGG SERPL QL IA: POSITIVE

## 2020-07-08 LAB — HSV AB, IGM BY EIA: 0.23 INDEX

## 2020-07-09 ENCOUNTER — TELEPHONE (OUTPATIENT)
Dept: FAMILY MEDICINE | Facility: CLINIC | Age: 60
End: 2020-07-09

## 2020-07-09 ENCOUNTER — TELEPHONE (OUTPATIENT)
Dept: INFECTIOUS DISEASES | Facility: CLINIC | Age: 60
End: 2020-07-09

## 2020-07-09 DIAGNOSIS — A49.02 MRSA INFECTION: Primary | ICD-10-CM

## 2020-07-09 NOTE — TELEPHONE ENCOUNTER
He needs to contact the doctor who gave him this.  I am unfamiliar and will not be able to help in this situation.

## 2020-07-09 NOTE — TELEPHONE ENCOUNTER
----- Message from Maranda Graves PA-C sent at 7/9/2020  2:29 PM CDT -----  Regarding: RE: Prescription Inqury  Please tell him to disregard this and not pick it up as we will arrange IV antibiotics   ----- Message -----  From: Lida Trujillo MA  Sent: 7/9/2020   2:02 PM CDT  To: Maranda Graves PA-C  Subject: FW: Prescription Inqury                          Please advise. Wait for visit tomorrow?  ----- Message -----  From: Ashley Narayan  Sent: 7/9/2020   1:38 PM CDT  To: [facility]  Subject: Prescription Inqury                                tedizolid 200 mg Tab     Pt is requesting replacing prescription due to cost of 3,000 dollars         [phone]

## 2020-07-09 NOTE — TELEPHONE ENCOUNTER
----- Message from Rajinder Chang sent at 7/9/2020 12:49 PM CDT -----  Regarding: self  Type: Patient Call Back    Who called:Self    What is the request in detail: please consult with Dr Graves to determine another medication. tedizolid 200 mg Tab 6 tablets  for $3,000 is  too expensive. Please see if there is a discount drug program available since insurance wont cover.    Can the clinic reply by [facility]? No     Would the patient rather a call back or a response via [facility]? call    Best call back number: [phone]

## 2020-07-09 NOTE — TELEPHONE ENCOUNTER
----- Message from Maranda Graves PA-C sent at 7/9/2020  1:03 PM CDT -----  Solange Hilton,    Can you call Mr. Bansal and see if he is available to come in for an appointment tomorrow? His dermatologist and I spoke and we need to start him on IV Vancomycin. I'd like him to come in and discuss this in person and set him up for first dosing with infusion center and hopefully have a line placed as soon as possible.     Thank you,      Maranda

## 2020-07-09 NOTE — TELEPHONE ENCOUNTER
Called patient back and informed provider tomorrow will discuss new abx to not worry about the too expensive medication.

## 2020-07-09 NOTE — TELEPHONE ENCOUNTER
Called patient and scheduled appointment for ID with beth tineo 07/10/2020 at 10:30am. Patient agreed to appointment date/time/location.

## 2020-07-10 ENCOUNTER — DOCUMENTATION ONLY (OUTPATIENT)
Dept: INFUSION THERAPY | Facility: CLINIC | Age: 60
End: 2020-07-10

## 2020-07-10 ENCOUNTER — HOSPITAL ENCOUNTER (OUTPATIENT)
Dept: RADIOLOGY | Facility: OTHER | Age: 60
Discharge: HOME | End: 2020-07-10
Attending: PHYSICIAN ASSISTANT
Payer: MEDICARE

## 2020-07-10 ENCOUNTER — OFFICE VISIT (OUTPATIENT)
Dept: INFECTIOUS DISEASES | Facility: CLINIC | Age: 60
End: 2020-07-10
Payer: MEDICARE

## 2020-07-10 VITALS
HEART RATE: 95 BPM | WEIGHT: 246.06 LBS | SYSTOLIC BLOOD PRESSURE: 148 MMHG | HEIGHT: 69 IN | BODY MASS INDEX: 36.44 KG/M2 | DIASTOLIC BLOOD PRESSURE: 96 MMHG | TEMPERATURE: 99 F

## 2020-07-10 DIAGNOSIS — Z22.322 MRSA COLONIZATION: ICD-10-CM

## 2020-07-10 DIAGNOSIS — D84.9 IMMUNOCOMPROMISED, ACQUIRED: Primary | ICD-10-CM

## 2020-07-10 DIAGNOSIS — Z95.828 S/P PICC CENTRAL LINE PLACEMENT: ICD-10-CM

## 2020-07-10 DIAGNOSIS — D84.9 IMMUNOCOMPROMISED, ACQUIRED: ICD-10-CM

## 2020-07-10 DIAGNOSIS — L02.92 RECURRENT BOILS: ICD-10-CM

## 2020-07-10 PROCEDURE — 36573 INSJ PICC RS&I 5 YR+: CPT

## 2020-07-10 PROCEDURE — 99999 PR PBB SHADOW E&M-EST. PATIENT-LVL V: ICD-10-PCS | Mod: PBBFAC,,, | Performed by: PHYSICIAN ASSISTANT

## 2020-07-10 PROCEDURE — 3080F DIAST BP >= 90 MM HG: CPT | Mod: CPTII,S$GLB,, | Performed by: PHYSICIAN ASSISTANT

## 2020-07-10 PROCEDURE — 99213 OFFICE O/P EST LOW 20 MIN: CPT | Mod: S$GLB,ICN,, | Performed by: PHYSICIAN ASSISTANT

## 2020-07-10 PROCEDURE — 36569 INSJ PICC 5 YR+ W/O IMAGING: CPT

## 2020-07-10 PROCEDURE — C1751 CATH, INF, PER/CENT/MIDLINE: HCPCS

## 2020-07-10 PROCEDURE — 76937 US GUIDE VASCULAR ACCESS: CPT

## 2020-07-10 PROCEDURE — 3008F PR BODY MASS INDEX (BMI) DOCUMENTED: ICD-10-PCS | Mod: CPTII,S$GLB,, | Performed by: PHYSICIAN ASSISTANT

## 2020-07-10 PROCEDURE — 3080F PR MOST RECENT DIASTOLIC BLOOD PRESSURE >= 90 MM HG: ICD-10-PCS | Mod: CPTII,S$GLB,, | Performed by: PHYSICIAN ASSISTANT

## 2020-07-10 PROCEDURE — 99999 PR PBB SHADOW E&M-EST. PATIENT-LVL V: CPT | Mod: PBBFAC,,, | Performed by: PHYSICIAN ASSISTANT

## 2020-07-10 PROCEDURE — 99213 PR OFFICE/OUTPT VISIT, EST, LEVL III, 20-29 MIN: ICD-10-PCS | Mod: S$GLB,ICN,, | Performed by: PHYSICIAN ASSISTANT

## 2020-07-10 PROCEDURE — 3008F BODY MASS INDEX DOCD: CPT | Mod: CPTII,S$GLB,, | Performed by: PHYSICIAN ASSISTANT

## 2020-07-10 PROCEDURE — 71045 XR CHEST 1 VIEW S/P PICC LINE BY NURSING: ICD-10-PCS | Mod: 26,$0,, | Performed by: RADIOLOGY

## 2020-07-10 PROCEDURE — 71045 X-RAY EXAM CHEST 1 VIEW: CPT | Mod: 26,$0,, | Performed by: RADIOLOGY

## 2020-07-10 PROCEDURE — 3077F PR MOST RECENT SYSTOLIC BLOOD PRESSURE >= 140 MM HG: ICD-10-PCS | Mod: CPTII,S$GLB,, | Performed by: PHYSICIAN ASSISTANT

## 2020-07-10 PROCEDURE — 3077F SYST BP >= 140 MM HG: CPT | Mod: CPTII,S$GLB,, | Performed by: PHYSICIAN ASSISTANT

## 2020-07-10 RX ORDER — MUPIROCIN 20 MG/G
OINTMENT TOPICAL
Qty: 30 G | Refills: 1 | Status: SHIPPED | OUTPATIENT
Start: 2020-07-10 | End: 2021-03-02

## 2020-07-10 NOTE — PROCEDURES
Scott Bansal is a 59 y.o. male patient.         PICC  Date/Time: 7/10/2020 2:45 PM  Location procedure was performed: [facility] PICC LINE PLACEMENT  Performed by: Haley Lebron RN  Supervising provider: Eloina Silvestre RN  Assisting provider: Eloina Silvestre RN  Consent Done: Yes  Time out: Immediately prior to procedure a time out was called to verify the correct patient, procedure, equipment, support staff and site/side marked as required  Indications: med administration and vascular access  Anesthesia: local infiltration  Local anesthetic: lidocaine 1% without epinephrine  Anesthetic Total (mL): 2  Preparation: skin prepped with ChloraPrep  Skin prep agent dried: skin prep agent completely dried prior to procedure  Sterile barriers: all five maximum sterile barriers used - cap, mask, sterile gown, sterile gloves, and large sterile sheet  Hand hygiene: hand hygiene performed prior to central venous catheter insertion  Location details: left basilic  Catheter type: double lumen  Catheter size: 5 Fr  Catheter Length: 52cm    Ultrasound guidance: yes  Vessel Caliber: medium, compressibility normal  Vascular Doppler: not done  Needle advanced into vessel with real time Ultrasound guidance.  Guidewire confirmed in vessel.  Sterile sheath used.  no esophageal manometryNumber of attempts: 1  Post-procedure: blood return through all ports, chlorhexidine patch and sterile dressing applied  Technical procedures used: Microintroducer with ultrasound  Specimens: No  Implants: No  Assessment: placement verified by x-ray, successful placement, no pneumothorax on x-ray and tip termination  Tip Termination Explanation: SVC  Complications: none          Haley Lebron  7/10/2020

## 2020-07-10 NOTE — PROGRESS NOTES
Subjective:      Patient ID: Scott Bansal is a 59 y.o. male.    Chief Complaint:No chief complaint on file.      History of Present Illness  Scott Bansal is a 59 y.o. male with history of eczema referred to ID clinic for management of an MRSA infection. He has a history of a right leg abscess/cellulitis beginning in January. Cx of this abscess grew MRSA (sensitive Clinda, Tetra, bactrim, Vanc).  He completed a course of Clindamycin and Doxycyline and aggressive wound care with eventual resolution.     In March he saw his PCP for evaluation of his hand eczema and a diffuse rash. He was prescribed mometasone cream and betamethasone cream for his hand eczema. Rash did not improve. He followed up with his PCP May 7th. Rash extended from arms to shoulders, abdomen, legs. He was switched to clotrimazole-betamethasone and etodolac for his rash. He was given hydroxyzine for the pruritis. On May 12th he followed up with Dermatology for evaluation of his rash of unclear etiology. He was started on doxycyline and a steroid taper. Two punch biopsies obtained. He followed up on May 20th. It was thought the rash was possibly allergic contact dermatitis vs drug eruption.  Culprits could be lasix (given sulfa) vs. Gabapentin. Prednisone taper continued.  He was instructed to change his soap and detergent.  A new left leg abscess had also developed.  A culture of the skin was taken and returned positive for MRSA (sensitive to bactrim and vanc only). He was continued on empiric Doxy that was d/c after susceptibilities returned. Prednisone also discontinued. On May 27th he was seen by primary care and reported increased redness. The abscess had spontaneously drained. Cultures taken which again grew a more resistant MRSA. Clindamycin prescribed. He was referred to ID.     Patient is seen today in clinic.  He denies fever, chills, sweats. His left legs wounds are now scabbed over.  His diffuse rash has not improved and  Dupixent planned per his dermatologist.  His dermatologist has been in contact with Maranda Graves PA-C and 2 weeks of vanc and decolonization.  He is to have PICC line and 1st dose of vanc today as previously arranged.  He reports girlfriend also has recurrent boils.     Review of Systems   Constitution: Negative for chills, decreased appetite, fever, malaise/fatigue, night sweats, weight gain and weight loss.   HENT: Negative for congestion, ear pain, hearing loss, hoarse voice, sore throat and tinnitus.    Eyes: Negative for blurred vision, redness and visual disturbance.   Cardiovascular: Negative for chest pain, leg swelling and palpitations.   Respiratory: Negative for cough, hemoptysis, shortness of breath, sputum production and wheezing.    Endocrine: Negative for cold intolerance and heat intolerance.   Hematologic/Lymphatic: Negative for adenopathy. Does not bruise/bleed easily.   Skin: Positive for itching and rash. Negative for dry skin and suspicious lesions.        Recurrent skin boils   Musculoskeletal: Negative for back pain, joint pain, myalgias and neck pain.   Gastrointestinal: Negative for abdominal pain, constipation, diarrhea, heartburn, nausea and vomiting.   Genitourinary: Negative for dysuria, flank pain, frequency, hematuria, hesitancy and urgency.   Neurological: Negative for dizziness, headaches, numbness, paresthesias and weakness.   Psychiatric/Behavioral: Negative for depression and memory loss. The patient does not have insomnia and is not nervous/anxious.    Allergic/Immunologic: Negative for environmental allergies, HIV exposure, hives and persistent infections.     Objective:   Physical Exam  Constitutional:       General: He is not in acute distress.     Appearance: He is well-developed. He is not diaphoretic.       HENT:      Head: Normocephalic and atraumatic.   Cardiovascular:      Rate and Rhythm: Normal rate and regular rhythm.      Heart sounds: Normal heart sounds. No  murmur. No friction rub. No gallop.    Pulmonary:      Effort: Pulmonary effort is normal. No respiratory distress.      Breath sounds: Normal breath sounds. No wheezing or rales.   Abdominal:      General: Bowel sounds are normal. There is no distension.      Palpations: Abdomen is soft. There is no mass.      Tenderness: There is no abdominal tenderness. There is no guarding or rebound.   Skin:     General: Skin is warm and dry.      Comments: Diffuse dermatitis  Leg lesions developing? Resolving     Neurological:      Mental Status: He is alert and oriented to person, place, and time.   Psychiatric:         Behavior: Behavior normal.     Scalp:    Dermatitis                          Labs:     Ref. Range 7/20/2016 09:26 2/12/2020 08:55 7/2/2020 10:43 7/10/2020 11:23   Hep A IgM Latest Ref Range: Negative    Negative    Hep B C IgM Latest Ref Range: Negative    Negative    Hepatitis B Surface Ag Latest Ref Range: Negative    Negative    Hepatitis C Ab Latest Ref Range: Negative  Negative  Negative    HIV 1/2 Ag/Ab Latest Ref Range: Negative   Negative Negative    HSV Ab, IgM by EIA Latest Ref Range: <=0.90 INDEX   0.23    RPR Latest Ref Range: Non-reactive     Non-reactive   HSV 1 IgG Latest Ref Range: Negative    Negative    HSV 2 IgG Latest Ref Range: Negative    Positive (A)      Assessment:       1. Immunocompromised, acquired    2. MRSA colonization    3. Recurrent boils    4. 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 PICC and start vanc today as previously planned  2. Vanc x 10d  3. Staph decolonization protocol to be followed by patient and girlfriend  4. Mupirocin intranasal sent in to pharmacy  5. Patient counseled about HSV2 + antibodies and to be aware could have lesions on Dupixent, transmission risk, and to contact us if occurs  6. Check RPR and Strongyloides IgG  7. Vaccines in future  Prevnar  Heplisav B  Hep A   Shingrix

## 2020-07-10 NOTE — PROGRESS NOTES
1545. Patient presented ambulatory, AAOX4. PICC line to ANTONYGILA noted, patet and intact. VSS: 148/90-88-18- 97.6.    [facility] educator met with patient and family to discuss IVABX to discharge home with. Patient will dc home with family support. Patient will infuse medication. Educated patient on S.A.S.H procedure. S.A.S.H mat provided.  Patient education checklist reviewed with patient and family. Patient is agreeable to dc home with infusion. Patient is able to return demonstration of administration with example provided. Patient feels comfortable with infusion. Patient will dc home with Vancomycin 1,250 mg IV q12 hours for estimated dc 7/17/20 . Dosing times are Q12H. Extension placed to right double lumen picc. Patient will come to [facility] for weekly dressing changes and lab draw on Monday, 7/13/20. Time allotted for questions. Teaching has been completed, voiced understanding.    1615. Vancomycin 1,250 mg IVPB 1st dose give, patient monitored I suite. Instructed patient to notify nurse of SOB, itching, swelling, etc. Voiced understanding.     Medication delivery hand delivered.     1800. VSS: 148/90-88-20. Tolerated well. DC to home ambulatory.

## 2020-07-13 ENCOUNTER — LAB VISIT (OUTPATIENT)
Dept: LAB | Facility: HOSPITAL | Age: 60
End: 2020-07-13
Attending: PHYSICIAN ASSISTANT
Payer: MEDICARE

## 2020-07-13 ENCOUNTER — DOCUMENTATION ONLY (OUTPATIENT)
Dept: INFUSION THERAPY | Facility: CLINIC | Age: 60
End: 2020-07-13

## 2020-07-13 DIAGNOSIS — D89.9 DISEASE OF IMMUNE SYSTEM: Primary | ICD-10-CM

## 2020-07-13 LAB
ALBUMIN SERPL BCP-MCNC: 4 G/DL (ref 3.5–5.2)
ALP SERPL-CCNC: 87 U/L (ref 55–135)
ALT SERPL W/O P-5'-P-CCNC: 23 U/L (ref 10–44)
ANION GAP SERPL CALC-SCNC: 11 MMOL/L (ref 8–16)
AST SERPL-CCNC: 16 U/L (ref 10–40)
BASOPHILS # BLD AUTO: 0.06 K/UL (ref 0–0.2)
BASOPHILS NFR BLD: 0.6 % (ref 0–1.9)
BILIRUB SERPL-MCNC: 0.5 MG/DL (ref 0.1–1)
BUN SERPL-MCNC: 17 MG/DL (ref 6–20)
CALCIUM SERPL-MCNC: 9.4 MG/DL (ref 8.7–10.5)
CHLORIDE SERPL-SCNC: 108 MMOL/L (ref 95–110)
CO2 SERPL-SCNC: 22 MMOL/L (ref 23–29)
CREAT SERPL-MCNC: 0.9 MG/DL (ref 0.5–1.4)
CRP SERPL-MCNC: 4.3 MG/L (ref 0–8.2)
DIFFERENTIAL METHOD: ABNORMAL
EOSINOPHIL # BLD AUTO: 0.6 K/UL (ref 0–0.5)
EOSINOPHIL NFR BLD: 6 % (ref 0–8)
ERYTHROCYTE [DISTWIDTH] IN BLOOD BY AUTOMATED COUNT: 12.3 % (ref 11.5–14.5)
ERYTHROCYTE [SEDIMENTATION RATE] IN BLOOD BY WESTERGREN METHOD: <2 MM/HR (ref 0–23)
EST. GFR  (AFRICAN AMERICAN): >60 ML/MIN/1.73 M^2
EST. GFR  (NON AFRICAN AMERICAN): >60 ML/MIN/1.73 M^2
GLUCOSE SERPL-MCNC: 111 MG/DL (ref 70–110)
HCT VFR BLD AUTO: 36.8 % (ref 40–54)
HGB BLD-MCNC: 11.7 G/DL (ref 14–18)
IMM GRANULOCYTES # BLD AUTO: 0.04 K/UL (ref 0–0.04)
IMM GRANULOCYTES NFR BLD AUTO: 0.4 % (ref 0–0.5)
LYMPHOCYTES # BLD AUTO: 1.7 K/UL (ref 1–4.8)
LYMPHOCYTES NFR BLD: 16.7 % (ref 18–48)
MCH RBC QN AUTO: 28.5 PG (ref 27–31)
MCHC RBC AUTO-ENTMCNC: 31.8 G/DL (ref 32–36)
MCV RBC AUTO: 90 FL (ref 82–98)
MONOCYTES # BLD AUTO: 1 K/UL (ref 0.3–1)
MONOCYTES NFR BLD: 10.3 % (ref 4–15)
NEUTROPHILS # BLD AUTO: 6.6 K/UL (ref 1.8–7.7)
NEUTROPHILS NFR BLD: 66 % (ref 38–73)
NRBC BLD-RTO: 0 /100 WBC
PLATELET # BLD AUTO: 225 K/UL (ref 150–350)
PMV BLD AUTO: 10.9 FL (ref 9.2–12.9)
POTASSIUM SERPL-SCNC: 3.6 MMOL/L (ref 3.5–5.1)
PROT SERPL-MCNC: 6.4 G/DL (ref 6–8.4)
RBC # BLD AUTO: 4.11 M/UL (ref 4.6–6.2)
SODIUM SERPL-SCNC: 141 MMOL/L (ref 136–145)
VANCOMYCIN TROUGH SERPL-MCNC: 10.5 UG/ML (ref 10–22)
WBC # BLD AUTO: 10 K/UL (ref 3.9–12.7)

## 2020-07-13 PROCEDURE — 85652 RBC SED RATE AUTOMATED: CPT

## 2020-07-13 PROCEDURE — 80053 COMPREHEN METABOLIC PANEL: CPT

## 2020-07-13 PROCEDURE — 86140 C-REACTIVE PROTEIN: CPT

## 2020-07-13 PROCEDURE — 80202 ASSAY OF VANCOMYCIN: CPT

## 2020-07-13 PROCEDURE — 85025 COMPLETE CBC W/AUTO DIFF WBC: CPT

## 2020-07-13 NOTE — PROGRESS NOTES
Patient presented ambulatory.Temp = 97.7. ESR, CMP, Vanc Trough, CBC w/diff, CRP drawn, Line draws back and labs drawn. Tolerated well. NAD noted.

## 2020-07-15 ENCOUNTER — LAB VISIT (OUTPATIENT)
Dept: FAMILY MEDICINE | Facility: CLINIC | Age: 60
End: 2020-07-15
Payer: MEDICARE

## 2020-07-15 DIAGNOSIS — J34.89 NASAL VALVE BLOCKAGE: ICD-10-CM

## 2020-07-15 PROCEDURE — U0003 INFECTIOUS AGENT DETECTION BY NUCLEIC ACID (DNA OR RNA); SEVERE ACUTE RESPIRATORY SYNDROME CORONAVIRUS 2 (SARS-COV-2) (CORONAVIRUS DISEASE [COVID-19]), AMPLIFIED PROBE TECHNIQUE, MAKING USE OF HIGH THROUGHPUT TECHNOLOGIES AS DESCRIBED BY CMS-2020-01-R: HCPCS

## 2020-07-16 ENCOUNTER — LAB VISIT (OUTPATIENT)
Dept: LAB | Facility: HOSPITAL | Age: 60
End: 2020-07-16
Attending: INTERNAL MEDICINE
Payer: MEDICARE

## 2020-07-16 ENCOUNTER — DOCUMENTATION ONLY (OUTPATIENT)
Dept: INFUSION THERAPY | Facility: CLINIC | Age: 60
End: 2020-07-16

## 2020-07-16 DIAGNOSIS — Z22.322 MRSA (METHICILLIN RESISTANT STAPHYLOCOCCUS AUREUS) CARRIER: Primary | ICD-10-CM

## 2020-07-16 LAB
ALBUMIN SERPL BCP-MCNC: 4 G/DL (ref 3.5–5.2)
ALP SERPL-CCNC: 84 U/L (ref 55–135)
ALT SERPL W/O P-5'-P-CCNC: 32 U/L (ref 10–44)
ANION GAP SERPL CALC-SCNC: 9 MMOL/L (ref 8–16)
AST SERPL-CCNC: 21 U/L (ref 10–40)
BASOPHILS # BLD AUTO: 0.07 K/UL (ref 0–0.2)
BASOPHILS NFR BLD: 1.1 % (ref 0–1.9)
BILIRUB SERPL-MCNC: 0.6 MG/DL (ref 0.1–1)
BUN SERPL-MCNC: 17 MG/DL (ref 6–20)
CALCIUM SERPL-MCNC: 9.4 MG/DL (ref 8.7–10.5)
CHLORIDE SERPL-SCNC: 107 MMOL/L (ref 95–110)
CO2 SERPL-SCNC: 25 MMOL/L (ref 23–29)
CREAT SERPL-MCNC: 0.9 MG/DL (ref 0.5–1.4)
CRP SERPL-MCNC: 1.5 MG/L (ref 0–8.2)
DIFFERENTIAL METHOD: ABNORMAL
EOSINOPHIL # BLD AUTO: 0.5 K/UL (ref 0–0.5)
EOSINOPHIL NFR BLD: 7.1 % (ref 0–8)
ERYTHROCYTE [DISTWIDTH] IN BLOOD BY AUTOMATED COUNT: 12 % (ref 11.5–14.5)
ERYTHROCYTE [SEDIMENTATION RATE] IN BLOOD BY WESTERGREN METHOD: <2 MM/HR (ref 0–23)
EST. GFR  (AFRICAN AMERICAN): >60 ML/MIN/1.73 M^2
EST. GFR  (NON AFRICAN AMERICAN): >60 ML/MIN/1.73 M^2
GLUCOSE SERPL-MCNC: 98 MG/DL (ref 70–110)
HCT VFR BLD AUTO: 39 % (ref 40–54)
HGB BLD-MCNC: 12.7 G/DL (ref 14–18)
IMM GRANULOCYTES # BLD AUTO: 0.05 K/UL (ref 0–0.04)
IMM GRANULOCYTES NFR BLD AUTO: 0.8 % (ref 0–0.5)
LYMPHOCYTES # BLD AUTO: 1.6 K/UL (ref 1–4.8)
LYMPHOCYTES NFR BLD: 24 % (ref 18–48)
MCH RBC QN AUTO: 28.4 PG (ref 27–31)
MCHC RBC AUTO-ENTMCNC: 32.6 G/DL (ref 32–36)
MCV RBC AUTO: 87 FL (ref 82–98)
MONOCYTES # BLD AUTO: 0.8 K/UL (ref 0.3–1)
MONOCYTES NFR BLD: 11.7 % (ref 4–15)
NEUTROPHILS # BLD AUTO: 3.7 K/UL (ref 1.8–7.7)
NEUTROPHILS NFR BLD: 55.3 % (ref 38–73)
NRBC BLD-RTO: 0 /100 WBC
PLATELET # BLD AUTO: 254 K/UL (ref 150–350)
PMV BLD AUTO: 10.5 FL (ref 9.2–12.9)
POTASSIUM SERPL-SCNC: 3.8 MMOL/L (ref 3.5–5.1)
PROT SERPL-MCNC: 6.5 G/DL (ref 6–8.4)
RBC # BLD AUTO: 4.47 M/UL (ref 4.6–6.2)
SARS-COV-2 RNA RESP QL NAA+PROBE: NOT DETECTED
SODIUM SERPL-SCNC: 141 MMOL/L (ref 136–145)
VANCOMYCIN TROUGH SERPL-MCNC: 12.5 UG/ML (ref 10–22)
WBC # BLD AUTO: 6.66 K/UL (ref 3.9–12.7)

## 2020-07-16 PROCEDURE — 86140 C-REACTIVE PROTEIN: CPT

## 2020-07-16 PROCEDURE — 85025 COMPLETE CBC W/AUTO DIFF WBC: CPT

## 2020-07-16 PROCEDURE — 80202 ASSAY OF VANCOMYCIN: CPT

## 2020-07-16 PROCEDURE — 85652 RBC SED RATE AUTOMATED: CPT

## 2020-07-16 PROCEDURE — 80053 COMPREHEN METABOLIC PANEL: CPT

## 2020-07-16 NOTE — PROGRESS NOTES
Patient arrived to Infusion Suite ambulatory, alone. AAOx4. Temp = 97.7.      CBC w/diff, CRP, ESR, CMP and Vanc Trough drawn via needle draw.  Both ports unable to draw back blood, but both ports flush well. sent to lab via . Left upper arm dressing changed. No drainage or redness to insertion site. Sorbaview and stabilizer applied.      Tolerated visit well, to return Thursday, 7/23/20 at 0830 for labs and dressing change. Voiced understanding. NAD noted. Left ambulatory to motor vehicle

## 2020-07-17 ENCOUNTER — LAB VISIT (OUTPATIENT)
Dept: LAB | Facility: HOSPITAL | Age: 60
End: 2020-07-17
Attending: OTOLARYNGOLOGY
Payer: MEDICARE

## 2020-07-17 ENCOUNTER — OFFICE VISIT (OUTPATIENT)
Dept: OTOLARYNGOLOGY | Facility: CLINIC | Age: 60
End: 2020-07-17
Payer: MEDICARE

## 2020-07-17 VITALS
TEMPERATURE: 97 F | HEIGHT: 69 IN | BODY MASS INDEX: 35.72 KG/M2 | SYSTOLIC BLOOD PRESSURE: 142 MMHG | WEIGHT: 241.19 LBS | DIASTOLIC BLOOD PRESSURE: 80 MMHG

## 2020-07-17 DIAGNOSIS — J32.9 RECURRENT SINUSITIS: ICD-10-CM

## 2020-07-17 DIAGNOSIS — J34.2 DEVIATED NASAL SEPTUM: Primary | ICD-10-CM

## 2020-07-17 DIAGNOSIS — J34.89 NASAL OBSTRUCTION: ICD-10-CM

## 2020-07-17 PROCEDURE — 3077F PR MOST RECENT SYSTOLIC BLOOD PRESSURE >= 140 MM HG: ICD-10-PCS | Mod: CPTII,S$GLB,, | Performed by: OTOLARYNGOLOGY

## 2020-07-17 PROCEDURE — 3079F PR MOST RECENT DIASTOLIC BLOOD PRESSURE 80-89 MM HG: ICD-10-PCS | Mod: CPTII,S$GLB,, | Performed by: OTOLARYNGOLOGY

## 2020-07-17 PROCEDURE — 31231 NASAL ENDOSCOPY DX: CPT | Mod: S$GLB,,, | Performed by: OTOLARYNGOLOGY

## 2020-07-17 PROCEDURE — 99204 PR OFFICE/OUTPT VISIT, NEW, LEVL IV, 45-59 MIN: ICD-10-PCS | Mod: 25,S$GLB,, | Performed by: OTOLARYNGOLOGY

## 2020-07-17 PROCEDURE — 3077F SYST BP >= 140 MM HG: CPT | Mod: CPTII,S$GLB,, | Performed by: OTOLARYNGOLOGY

## 2020-07-17 PROCEDURE — 3008F BODY MASS INDEX DOCD: CPT | Mod: CPTII,S$GLB,, | Performed by: OTOLARYNGOLOGY

## 2020-07-17 PROCEDURE — 3008F PR BODY MASS INDEX (BMI) DOCUMENTED: ICD-10-PCS | Mod: CPTII,S$GLB,, | Performed by: OTOLARYNGOLOGY

## 2020-07-17 PROCEDURE — 86038 ANTINUCLEAR ANTIBODIES: CPT

## 2020-07-17 PROCEDURE — 86255 FLUORESCENT ANTIBODY SCREEN: CPT

## 2020-07-17 PROCEDURE — 3079F DIAST BP 80-89 MM HG: CPT | Mod: CPTII,S$GLB,, | Performed by: OTOLARYNGOLOGY

## 2020-07-17 PROCEDURE — 99204 OFFICE O/P NEW MOD 45 MIN: CPT | Mod: 25,S$GLB,, | Performed by: OTOLARYNGOLOGY

## 2020-07-17 PROCEDURE — 31231 PR NASAL ENDOSCOPY, DX: ICD-10-PCS | Mod: S$GLB,,, | Performed by: OTOLARYNGOLOGY

## 2020-07-17 RX ORDER — AZELASTINE 1 MG/ML
1 SPRAY, METERED NASAL 2 TIMES DAILY
Qty: 30 ML | Refills: 3 | Status: SHIPPED | OUTPATIENT
Start: 2020-07-17 | End: 2020-11-09

## 2020-07-17 NOTE — PATIENT INSTRUCTIONS
"Information and instructions from your visit with me today:    · Start using the following medication nasal sprays:   · Fluticasone spray:    This medication is a steroid spray. It stays within the nose and does not have absorption into the body that leads to side effects that one has with oral steroid medication. Fluticasone nasal spray is the same as the Flonase brand nasal spray. Discuss with your pharmacist if the price is lower over the counter or with a prescription ( this varies depending on insurance). The medication that is over the counter is the same as the prescription medication. Use this medication as instructed on the prescription, 2 sprays on each side of your nose twice daily.     · Azelastine  spray:  This medication is an antihistamine used to treat nasal symptoms of allergy, which works specifically in the nose unlike antihistamine pills which have more of an effect on the whole body. Use this medication as instructed on the prescription, 1 spray on each side of your nose twice daily.     Additional instructions for medication sprays  Place the tip of the medication bottle in your nose and aim slightly up and out on each side to get medication high and deep into your nose and sinuses, and not have it all deposit in the very front of your nose. Aim the tip of the nozzle towards the outer corner of your eye . You can imagine aiming towards the back of your eyeball on each side for this, as opposed to straight back to the center of your nose and head.     You need to use this medication every day regardless of symptoms, as it takes time ( a few weeks) to work and get the benefits. It does not work on an "as needed" basis like taking a decongestant. If your symptoms only occur in a particular season, then the medication can be used seasonally instead of year long. For seasonal symptoms, you should start using the spray twice daily a month before when you normally have symptoms ( for example, if " symptoms start in August, should start at the end of June).     · Start nasal irrigations with saline solution:    SALINE SINUS RINSE (Chaim Med brand): You should do a full bottle, half on one side of your nose and half on the other, 1-2 times per day (or more if able to, you cannot do this too much). Follow the instructions on the box: mix the salt packet with clean water (bottle, previously boiled, distilled, etc -- not tap water) to the line on the bottle to make the irrigation.     NASAL SALINE SPRAY (simply saline) There are several different brands found in the cold and flu aisle of the pharmacy. You can also use simply saline or other brand of saline spray that delivers saline by gentle mist if you have difficulty or discomfort with neilmed rinse. Always rinse your nose with saline prior to using medication sprays and wait a couple of hours before using again.      · Do not use nasal decongestant sprays such as Afrin or similar products.  This can cause long term physical nasal addiction. Afrin should only be used if having nose bleeds and should not be used for more than 2-3 days in a row . It is a not a medication that should be used for a long period of time.     It was nice meeting you today, and I look forward to helping you feel better soon. Please don't hesitate to call if you have any other questions or concerns, or if I can be of any assistance in the meantime.      Suzi Erazo MD    [facility]    Phone  [phone]    Fax      [phone]        Suzi Erazo MD  Otorhinolaryngology
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
## 2020-07-17 NOTE — LETTER
July 24, 2020      Mickey Hanson MD  [address] [facility] [address]  [address]  [place] [address]           [facility]  [address]  [place] [address]  Phone: [phone]          Patient: Scott Bansal   MR Number: [id number]   YOB: 1960   Date of Visit: 7/17/2020       Dear Dr. Mickey Hanson:    Thank you for referring Scott Bansal to me for evaluation. Attached you will find relevant portions of my assessment and plan of care.    If you have questions, please do not hesitate to call me. I look forward to following Scott Bansal along with you.    Sincerely,    Suzi Erazo MD    Enclosure  CC:  No Recipients    If you would like to receive this communication electronically, please contact [email or web address] or [phone] to request more information on [employer] Link access.    For providers and/or their staff who would like to refer a patient to [facility], please contact us through our one-stop-shop provider referral line, [facility], at [phone].    If you feel you have received this communication in error or would no longer like to receive these types of communications, please e-mail [email or web address]

## 2020-07-19 NOTE — PROGRESS NOTES
Subjective:      Patient ID: Scott Bansal is a 59 y.o. male.    Chief Complaint:skin infection and immunocompromised      History of Present Illness      Scott Bansal is a 59 y.o. male with history of eczema who was originally  referred to ID clinic for management of an MRSA infection.   See ID note of 7/13.  He is here today for end of care after completing planned 10 day course of  vancomycin for recurrent MRSA prior to starting therapy with dupixent.         Reports abscess on head and leg has resolved.  Reports he picked up something hot a few days ago and burned left hand - was applying cool compresses, topical ointment/lidocaine.  Painful at first, but better now. No blistering      Denies fevers/chills/sweats.  Tolerated vancomycin therapy.        RPR and strongyloides ordered last visit were negative.  At last visit plan was to update vaccines prior to biologic therapy.      Review of Systems   Constitution: Positive for weight gain. Negative for chills, decreased appetite, fever, malaise/fatigue, night sweats and weight loss.   HENT: Negative for congestion, ear pain, hearing loss, hoarse voice, sore throat and tinnitus.    Eyes: Negative for blurred vision, redness and visual disturbance.   Cardiovascular: Negative for chest pain, leg swelling and palpitations.   Respiratory: Positive for wheezing. Negative for cough, hemoptysis, shortness of breath and sputum production.    Endocrine: Negative for cold intolerance and heat intolerance.   Hematologic/Lymphatic: Negative for adenopathy. Does not bruise/bleed easily.   Skin: Positive for itching and rash. Negative for dry skin and suspicious lesions.        Recurrent skin boils   Musculoskeletal: Negative for back pain, joint pain, myalgias and neck pain.   Gastrointestinal: Negative for abdominal pain, constipation, diarrhea, heartburn, nausea and vomiting.   Genitourinary: Negative for dysuria, flank pain, frequency, hematuria, hesitancy and  urgency.   Neurological: Negative for dizziness, headaches, numbness, paresthesias and weakness.   Psychiatric/Behavioral: Negative for depression and memory loss. The patient is nervous/anxious. The patient does not have insomnia.      Objective:   Physical Exam  Vitals signs reviewed.   Constitutional:       General: He is not in acute distress.     Appearance: Normal appearance. He is well-developed. He is not ill-appearing, toxic-appearing or diaphoretic.       HENT:      Head: Normocephalic and atraumatic.   Cardiovascular:      Rate and Rhythm: Normal rate and regular rhythm.      Heart sounds: Normal heart sounds. No murmur. No friction rub. No gallop.    Pulmonary:      Effort: Pulmonary effort is normal. No respiratory distress.      Breath sounds: Normal breath sounds. No wheezing or rales.   Abdominal:      General: Bowel sounds are normal. There is no distension.      Palpations: Abdomen is soft. There is no mass.      Tenderness: There is no abdominal tenderness. There is no guarding or rebound.   Skin:     General: Skin is warm and dry.      Comments: Diffuse dermatitis/eczema - arms/hands  Leg lesions resolved   Increased erythema left palm.  No blistering or skin breakdown.   PICC LUE - site clear   Neurological:      Mental Status: He is alert and oriented to person, place, and time.   Psychiatric:         Mood and Affect: Mood normal.         Behavior: Behavior normal.       Results for GARO JUAREZ (MRN [id number]) as of 7/25/2020 18:22   Ref. Range 7/2/2020 10:43 7/10/2020 11:23 7/15/2020 08:28   Hep A IgM Latest Ref Range: Negative  Negative     Hep B C IgM Latest Ref Range: Negative  Negative     Hepatitis B Surface Ag Latest Ref Range: Negative  Negative     Hepatitis C Ab Latest Ref Range: Negative  Negative     SARS-CoV2 (COVID-19) Qualitative PCR Latest Ref Range: Not Detected    Not Detected   HIV 1/2 Ag/Ab Latest Ref Range: Negative  Negative     HSV Ab, IgM by EIA Latest Ref Range:  <=0.90 INDEX 0.23     RPR Latest Ref Range: Non-reactive   Non-reactive    HSV 1 IgG Latest Ref Range: Negative  Negative     HSV 2 IgG Latest Ref Range: Negative  Positive (A)     Strongyloides Ab IgG Latest Ref Range: Negative   Negative      Assessment:       1. MRSA colonization    2. Recurrent boils    3. Need for pneumococcal vaccine    4. Need for hepatitis A and B vaccination    5. Need for shingles vaccine    6. Receiving intravenous antibiotic treatment as outpatient           History of recurrent boils/ abscesses with MRSA.  Planning Dupixent for treatment of severe atopic dermatitis.  Completed 10 day course of IV vancomycin for decolonization and treatment of leg abscesses (resolved).   ID serologies acceptable.  RPR and strongy ordered last visit negative.     Plan:   1.  Completed vancomycin therapy with improvement.  Remove PICC   2.  Vaccines initiated today:  Twinrix, Shingrix, Prevnar (Tdap and PPSV23 up to date).  The patient was encouraged to contact us about any problems that may develop after immunization and possible side effects were reviewed.  Vaccine appointments for completion of series scheduled.   3.  Mupirocin ointment to bilateral nares twice daily X 5 days for decolonization.  Ordered last visit, but   patient has not been applying   4.  Follow up with Dermatology on 7/30  5.  Follow up with ID as needed.  Patient given my contact information.

## 2020-07-21 ENCOUNTER — CLINICAL SUPPORT (OUTPATIENT)
Dept: INFECTIOUS DISEASES | Facility: CLINIC | Age: 60
End: 2020-07-21
Payer: MEDICARE

## 2020-07-21 ENCOUNTER — OFFICE VISIT (OUTPATIENT)
Dept: INFECTIOUS DISEASES | Facility: CLINIC | Age: 60
End: 2020-07-21
Payer: MEDICARE

## 2020-07-21 ENCOUNTER — INFUSION (OUTPATIENT)
Dept: INFECTIOUS DISEASES | Facility: HOSPITAL | Age: 60
End: 2020-07-21
Attending: INTERNAL MEDICINE
Payer: MEDICARE

## 2020-07-21 VITALS
DIASTOLIC BLOOD PRESSURE: 94 MMHG | HEART RATE: 92 BPM | WEIGHT: 241.63 LBS | SYSTOLIC BLOOD PRESSURE: 136 MMHG | HEIGHT: 69 IN | TEMPERATURE: 98 F | BODY MASS INDEX: 35.79 KG/M2

## 2020-07-21 DIAGNOSIS — Z79.2 RECEIVING INTRAVENOUS ANTIBIOTIC TREATMENT AS OUTPATIENT: ICD-10-CM

## 2020-07-21 DIAGNOSIS — D84.9 IMMUNOCOMPROMISED, ACQUIRED: ICD-10-CM

## 2020-07-21 DIAGNOSIS — Z23 NEED FOR SHINGLES VACCINE: ICD-10-CM

## 2020-07-21 DIAGNOSIS — Z23 NEED FOR PNEUMOCOCCAL VACCINE: ICD-10-CM

## 2020-07-21 DIAGNOSIS — Z22.322 MRSA COLONIZATION: Primary | ICD-10-CM

## 2020-07-21 DIAGNOSIS — Z23 NEED FOR HEPATITIS A AND B VACCINATION: ICD-10-CM

## 2020-07-21 DIAGNOSIS — L02.92 RECURRENT BOILS: ICD-10-CM

## 2020-07-21 LAB — ANA SER QL IF: NORMAL

## 2020-07-21 PROCEDURE — 90670 PCV13 VACCINE IM: CPT | Mod: S$GLB,,, | Performed by: INTERNAL MEDICINE

## 2020-07-21 PROCEDURE — 90472 PR IMMUNIZ,ADMIN,EACH ADDL: ICD-10-PCS | Mod: S$GLB,,, | Performed by: INTERNAL MEDICINE

## 2020-07-21 PROCEDURE — 90636 PR HEPA/HEPB VACCINE ADULT IM: ICD-10-PCS | Mod: S$GLB,,, | Performed by: INTERNAL MEDICINE

## 2020-07-21 PROCEDURE — 90670 PNEUMOCOCCAL CONJUGATE VACCINE 13-VALENT LESS THAN 5YO & GREATER THAN: ICD-10-PCS | Mod: S$GLB,,, | Performed by: INTERNAL MEDICINE

## 2020-07-21 PROCEDURE — 99999 PR PBB SHADOW E&M-EST. PATIENT-LVL V: CPT | Mod: PBBFAC,,, | Performed by: NURSE PRACTITIONER

## 2020-07-21 PROCEDURE — 3075F SYST BP GE 130 - 139MM HG: CPT | Mod: CPTII,S$GLB,, | Performed by: NURSE PRACTITIONER

## 2020-07-21 PROCEDURE — 90750 ZOSTER RECOMBINANT VACCINE: ICD-10-PCS | Mod: S$GLB,,, | Performed by: INTERNAL MEDICINE

## 2020-07-21 PROCEDURE — 99213 PR OFFICE/OUTPT VISIT, EST, LEVL III, 20-29 MIN: ICD-10-PCS | Mod: S$GLB,,, | Performed by: NURSE PRACTITIONER

## 2020-07-21 PROCEDURE — 90471 ZOSTER RECOMBINANT VACCINE: ICD-10-PCS | Mod: S$GLB,,, | Performed by: INTERNAL MEDICINE

## 2020-07-21 PROCEDURE — 90471 IMMUNIZATION ADMIN: CPT | Mod: S$GLB,,, | Performed by: INTERNAL MEDICINE

## 2020-07-21 PROCEDURE — 90636 HEP A/HEP B VACC ADULT IM: CPT | Mod: S$GLB,,, | Performed by: INTERNAL MEDICINE

## 2020-07-21 PROCEDURE — 3008F BODY MASS INDEX DOCD: CPT | Mod: CPTII,S$GLB,, | Performed by: NURSE PRACTITIONER

## 2020-07-21 PROCEDURE — 90750 HZV VACC RECOMBINANT IM: CPT | Mod: S$GLB,,, | Performed by: INTERNAL MEDICINE

## 2020-07-21 PROCEDURE — 90472 IMMUNIZATION ADMIN EACH ADD: CPT | Mod: S$GLB,,, | Performed by: INTERNAL MEDICINE

## 2020-07-21 PROCEDURE — 3080F DIAST BP >= 90 MM HG: CPT | Mod: CPTII,S$GLB,, | Performed by: NURSE PRACTITIONER

## 2020-07-21 PROCEDURE — 3080F PR MOST RECENT DIASTOLIC BLOOD PRESSURE >= 90 MM HG: ICD-10-PCS | Mod: CPTII,S$GLB,, | Performed by: NURSE PRACTITIONER

## 2020-07-21 PROCEDURE — G0009 PNEUMOCOCCAL CONJUGATE VACCINE 13-VALENT LESS THAN 5YO & GREATER THAN: ICD-10-PCS | Mod: 59,S$GLB,, | Performed by: INTERNAL MEDICINE

## 2020-07-21 PROCEDURE — 99999 PR PBB SHADOW E&M-EST. PATIENT-LVL V: ICD-10-PCS | Mod: PBBFAC,,, | Performed by: NURSE PRACTITIONER

## 2020-07-21 PROCEDURE — G0009 ADMIN PNEUMOCOCCAL VACCINE: HCPCS | Mod: 59,S$GLB,, | Performed by: INTERNAL MEDICINE

## 2020-07-21 PROCEDURE — 99213 OFFICE O/P EST LOW 20 MIN: CPT | Mod: S$GLB,,, | Performed by: NURSE PRACTITIONER

## 2020-07-21 PROCEDURE — 3008F PR BODY MASS INDEX (BMI) DOCUMENTED: ICD-10-PCS | Mod: CPTII,S$GLB,, | Performed by: NURSE PRACTITIONER

## 2020-07-21 PROCEDURE — 3075F PR MOST RECENT SYSTOLIC BLOOD PRESS GE 130-139MM HG: ICD-10-PCS | Mod: CPTII,S$GLB,, | Performed by: NURSE PRACTITIONER

## 2020-07-21 NOTE — PROGRESS NOTES
Mr. Bianchi received Hepatitis A/B, Prevnar 13 and Shingles vaccines   Tolerated well  Left unit in NAD

## 2020-07-21 NOTE — PATIENT INSTRUCTIONS
1. Vaccines today :  Prevnar, Hepatitis A/B, Shingles vaccine   2. Will need 2nd Hepatitis A/B vaccine one month or after from the first - on or after 8/21/20  3.  Will need 2nd Shingrix vaccine two to six months after the first. Between 9/21/20 and 1/21/21  4. Will need 3rd Hep A/B in 6 months - on or after 1/21/21  5. We can schedule all follow up vaccines here.     6.  Continue with your Staph de-colonization.  Use your mupirocin ointment in each nostril twice a day X 5 days.    7. . Call me with any concerns.

## 2020-07-21 NOTE — PATIENT INSTRUCTIONS
Instructed to keep dressing in place for twenty four hours (3PM), Instructed to monitor for s/s of infections which includes, but is not limited to; fever greater than 100.4, redness, swelling/drainage to site, or any pain that is not relieved, Please call [phone] with any questions/concerns.

## 2020-07-21 NOTE — PROGRESS NOTES
PICC line removed to left upper arm as per order. Tip intact. Length confirmed (52 cm). Vaseline gauze dressing applied to site. Pt instructed to keep dressing x24 hours hours.   Pt instructed to monitor for s/s of infection (redness, swelling, drainage or temp>100.4 F), do not submerge arm with removal of PICC in water for the next 72 hours (three days) post removal, once dressing is removed, leave open to air (BAILEY), do not place Band-Aid or dressing to site, pt instructed to call with any questions/concerns, pt verbalized understanding, pt monitored 30 minutes post removal of PICC, no reactions/discomfort noted

## 2020-07-22 LAB
ANCA AB TITR SER IF: NORMAL TITER
P-ANCA TITR SER IF: NORMAL TITER

## 2020-07-29 ENCOUNTER — PATIENT OUTREACH (OUTPATIENT)
Dept: ADMINISTRATIVE | Facility: OTHER | Age: 60
End: 2020-07-29

## 2020-07-30 ENCOUNTER — OFFICE VISIT (OUTPATIENT)
Dept: DERMATOLOGY | Facility: CLINIC | Age: 60
End: 2020-07-30
Payer: MEDICARE

## 2020-07-30 ENCOUNTER — TELEPHONE (OUTPATIENT)
Dept: PHARMACY | Facility: CLINIC | Age: 60
End: 2020-07-30

## 2020-07-30 DIAGNOSIS — L20.89 OTHER ATOPIC DERMATITIS: Primary | ICD-10-CM

## 2020-07-30 DIAGNOSIS — Z79.899 ENCOUNTER FOR LONG-TERM (CURRENT) USE OF MEDICATIONS: ICD-10-CM

## 2020-07-30 PROCEDURE — 99999 PR PBB SHADOW E&M-EST. PATIENT-LVL IV: ICD-10-PCS | Mod: PBBFAC,,, | Performed by: DERMATOLOGY

## 2020-07-30 PROCEDURE — 99214 OFFICE O/P EST MOD 30 MIN: CPT | Mod: S$GLB,,, | Performed by: DERMATOLOGY

## 2020-07-30 PROCEDURE — 99214 PR OFFICE/OUTPT VISIT, EST, LEVL IV, 30-39 MIN: ICD-10-PCS | Mod: S$GLB,,, | Performed by: DERMATOLOGY

## 2020-07-30 PROCEDURE — 99999 PR PBB SHADOW E&M-EST. PATIENT-LVL IV: CPT | Mod: PBBFAC,,, | Performed by: DERMATOLOGY

## 2020-07-30 RX ORDER — PREDNISONE 20 MG/1
TABLET ORAL
Qty: 42 TABLET | Refills: 0 | Status: SHIPPED | OUTPATIENT
Start: 2020-07-30 | End: 2020-08-20 | Stop reason: SDUPTHER

## 2020-07-30 RX ORDER — DUPILUMAB 300 MG/2ML
INJECTION, SOLUTION SUBCUTANEOUS
Qty: 4 ML | Refills: 2 | Status: SHIPPED | OUTPATIENT
Start: 2020-07-30 | End: 2021-04-29 | Stop reason: SDUPTHER

## 2020-07-30 RX ORDER — DUPILUMAB 300 MG/2ML
INJECTION, SOLUTION SUBCUTANEOUS
Qty: 8 ML | Refills: 0 | Status: SHIPPED | OUTPATIENT
Start: 2020-07-30 | End: 2021-04-29 | Stop reason: SDUPTHER

## 2020-07-30 NOTE — PROGRESS NOTES
Subjective:       Patient ID:  Soctt Bansal is a 59 y.o. 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 Eczema, drug rash could not be ruled out), last seen in clinic on 7/2/20.  + flare of the skin of the hands with burning. He is s/p IV vancomycin x 10 days via PICC line with Infectious Disease at [facility].  + improvement in scalp and lesions of the lower legs.     Prior treatments: PO prednisone, hydroxyzine 50 mg TID, clotrimazole-betamethasone, mometasone, TAC oint, TAC cream, mupirocin, betamethasone oint    Denies history of fever blisters.     He discontinued lasix, gabapentin, and zonisamide due to possiblity of drug rash.         Review of Systems   Constitutional: Negative for fever and chills.   Gastrointestinal: Negative for nausea and vomiting.   Skin: Positive for itching, rash and dry skin. Negative for daily sunscreen use, activity-related sunscreen use and recent sunburn.   Hematologic/Lymphatic: Does not bruise/bleed easily.        Objective:    Physical Exam   Constitutional: He appears well-developed and well-nourished. No distress.   Neurological: He is alert and oriented to person, place, and time. He is not disoriented.   Psychiatric: He has a normal mood and affect.   Skin:   Areas Examined (abnormalities noted in diagram):   Head / Face Inspection Performed  Neck Inspection Performed  Chest / Axilla Inspection Performed  Abdomen Inspection Performed  Back Inspection Performed  RUE Inspected  LUE Inspection Performed  RLE Inspected  LLE Inspection Performed  Nails and Digits Inspection Performed                     Assessment / Plan:        Other atopic dermatitis  Encounter for long-term (current) use of  medications  -     predniSONE (DELTASONE) 20 MG tablet; Take 3 pills po qam with breakfast x 1 wk then take 2 po qam with breakfast x 1 wk then take 1 po qam with breakfast x 1 wk  Dispense: 42 tablet; Refill: 0  -     DUPIXENT 300 mg/2 mL Syrg; Inject 600mg (4ml) into the skin on day 1 then 300mg (2ml) on day 14 and day 28  Dispense: 8 mL; Refill: 0  -     DUPIXENT 300 mg/2 mL Syrg; Inject 300mg (2ml) into the skin  every other week  Dispense: 4 mL; Refill: 2  -     S/p IV vanc to tx resistant MRSA.  + severe pruritus and effect on quality of life. Photodocumentation done at last visit. Will proceed with prednisone taper and start of dupixent.  Severe (EASI) atopic dermatitis with scarring. Discussed start of dupixent.  Reviewed herpes reactivation and discussed pt should notify derm clinic if cold sores or other herpes infections while on dupixent.  Reviewed side effects of immunosuppression, conjunctivitis/keratitis and reactivation of the herpes virus. The patient acknowledged understanding and wishes to proceed with Dupixent therapy.                Follow up in about 6 weeks (around 9/10/2020).

## 2020-07-30 NOTE — TELEPHONE ENCOUNTER
Informed Patient  that [facility] received prescription for Dupixent(2) and prior authorization is required.  [facility] will be back in touch once insurance determination is received.

## 2020-08-03 NOTE — TELEPHONE ENCOUNTER
Calling patient to offer possible financial assistance via  due to $1,350.85 copay of medication. Patient reached-- stated he would like to proceed with  assistance. Patient stated he does not file taxes, HH#1, and informed me of his Social Security amount awarded. Patient stated it would be best to mail him the application, address confirmed. Patient is aware that proof of income is needed and stated he will send his Social Security Award letter with signed application. Will form application accordingly. @9:15AM [facility]

## 2020-08-12 DIAGNOSIS — L20.89 OTHER ATOPIC DERMATITIS: ICD-10-CM

## 2020-08-12 DIAGNOSIS — Z79.899 ENCOUNTER FOR LONG-TERM (CURRENT) USE OF MEDICATIONS: ICD-10-CM

## 2020-08-12 RX ORDER — PREDNISONE 20 MG/1
TABLET ORAL
Qty: 42 TABLET | Refills: 0 | OUTPATIENT
Start: 2020-08-12

## 2020-08-13 ENCOUNTER — TELEPHONE (OUTPATIENT)
Dept: DERMATOLOGY | Facility: CLINIC | Age: 60
End: 2020-08-13

## 2020-08-13 NOTE — TELEPHONE ENCOUNTER
----- Message from Nia Collazo sent at 8/13/2020  8:59 AM CDT -----  Type:  Needs Medical Advice    Who Called: Surya Chavez   Symptoms (please be specific):   He is calling to give Dr good news//his skin condition has cleared up   How long has patient had these symptoms:     Pharmacy name and phone #:     Would the patient rather a call back or a response via [facility]?   Call back   Best Call Back Number:    [phone]  Additional Information:  PLease  call to discuss//thanks/lh

## 2020-08-19 ENCOUNTER — TELEPHONE (OUTPATIENT)
Dept: DERMATOLOGY | Facility: CLINIC | Age: 60
End: 2020-08-19

## 2020-08-19 NOTE — TELEPHONE ENCOUNTER
----- Message from Michelle Glover sent at 8/18/2020  1:31 PM CDT -----  Altagracia Chapin and Staff,    I faxed over the provider portion of the patient's Dupixent assistance application to [phone] on 8/13. Do you know if you received this? We have not received it back.    Thank you,  Michelle Glover  Patient Care Advocate  [facility]  Ph: [phone]

## 2020-08-20 ENCOUNTER — TELEPHONE (OUTPATIENT)
Dept: DERMATOLOGY | Facility: CLINIC | Age: 60
End: 2020-08-20

## 2020-08-20 ENCOUNTER — CLINICAL SUPPORT (OUTPATIENT)
Dept: INFECTIOUS DISEASES | Facility: CLINIC | Age: 60
End: 2020-08-20
Payer: MEDICARE

## 2020-08-20 DIAGNOSIS — Z23 NEED FOR HEPATITIS A AND B VACCINATION: ICD-10-CM

## 2020-08-20 DIAGNOSIS — L20.89 OTHER ATOPIC DERMATITIS: Primary | ICD-10-CM

## 2020-08-20 DIAGNOSIS — Z79.899 ENCOUNTER FOR LONG-TERM (CURRENT) USE OF MEDICATIONS: ICD-10-CM

## 2020-08-20 PROCEDURE — 90471 HEPATITIS A HEPATITIS B COMBINED VACCINE IM: ICD-10-PCS | Mod: S$GLB,,, | Performed by: INTERNAL MEDICINE

## 2020-08-20 PROCEDURE — 90636 HEP A/HEP B VACC ADULT IM: CPT | Mod: S$GLB,,, | Performed by: INTERNAL MEDICINE

## 2020-08-20 PROCEDURE — 90471 IMMUNIZATION ADMIN: CPT | Mod: S$GLB,,, | Performed by: INTERNAL MEDICINE

## 2020-08-20 PROCEDURE — 90636 HEPATITIS A HEPATITIS B COMBINED VACCINE IM: ICD-10-PCS | Mod: S$GLB,,, | Performed by: INTERNAL MEDICINE

## 2020-08-20 RX ORDER — PREDNISONE 20 MG/1
TABLET ORAL
Qty: 60 TABLET | Refills: 0 | Status: SHIPPED | OUTPATIENT
Start: 2020-08-20 | End: 2020-10-12

## 2020-08-20 NOTE — TELEPHONE ENCOUNTER
Called [facility] spoke with Alma Rosa regarding PA status. PA has been submitted, waiting on response 5-7 business days. Notified pt, pt verbalized understanding. Notified pt of low dose prednisone sent to pharmacy. Pt verbalized understanding.

## 2020-08-20 NOTE — TELEPHONE ENCOUNTER
----- Message from Becky Bella sent at 8/20/2020  8:35 AM CDT -----  Contact: GARO  Would like to consult with nurse about his fingers started back bursting again please call back [phone]

## 2020-08-20 NOTE — TELEPHONE ENCOUNTER
Pt states he just finished the prednisone and his hands have started swelling, splitting and hurting real bad. I informed pt that I see where our office is still working on the PA for Dupixent. Is there anything to do to give pt relief at this time?

## 2020-08-21 DIAGNOSIS — B36.9 FUNGAL DERMATITIS: ICD-10-CM

## 2020-08-21 DIAGNOSIS — E78.5 HYPERLIPIDEMIA, UNSPECIFIED HYPERLIPIDEMIA TYPE: ICD-10-CM

## 2020-08-21 RX ORDER — ETODOLAC 500 MG/1
TABLET, FILM COATED ORAL
Qty: 60 TABLET | Refills: 0 | Status: SHIPPED | OUTPATIENT
Start: 2020-08-21 | End: 2021-01-05

## 2020-08-21 RX ORDER — GEMFIBROZIL 600 MG/1
600 TABLET, FILM COATED ORAL
Qty: 180 TABLET | Refills: 0 | Status: SHIPPED | OUTPATIENT
Start: 2020-08-21 | End: 2020-11-16

## 2020-08-21 NOTE — TELEPHONE ENCOUNTER
----- Message from Rajinder Chang sent at 8/21/2020 11:11 AM CDT -----  Regarding: self  Type: RX Refill Request    Who Called:  self    Have you contacted your pharmacy: no     Refill or New Rx: refill     RX Name and Strength: gemfibroziL (LOPID) 600 MG tablet    Preferred Pharmacy with phone number: [facility]/pharmacy #[id number] - [place], [place] - [address] [phone] (Phone)  [phone] (Fax)      Local or Mail Order: local     Ordering Provider: Saloni    Would the patient rather a call back or a response via [facility]? Call   Best Call Back Number: [phone]

## 2020-08-25 ENCOUNTER — LAB VISIT (OUTPATIENT)
Dept: LAB | Facility: HOSPITAL | Age: 60
End: 2020-08-25
Attending: FAMILY MEDICINE
Payer: MEDICARE

## 2020-08-25 ENCOUNTER — OFFICE VISIT (OUTPATIENT)
Dept: FAMILY MEDICINE | Facility: CLINIC | Age: 60
End: 2020-08-25
Payer: MEDICARE

## 2020-08-25 VITALS
OXYGEN SATURATION: 97 % | HEART RATE: 80 BPM | WEIGHT: 240.31 LBS | TEMPERATURE: 97 F | BODY MASS INDEX: 35.59 KG/M2 | DIASTOLIC BLOOD PRESSURE: 82 MMHG | SYSTOLIC BLOOD PRESSURE: 114 MMHG | HEIGHT: 69 IN

## 2020-08-25 DIAGNOSIS — L03.90 MRSA CELLULITIS: Primary | ICD-10-CM

## 2020-08-25 DIAGNOSIS — E11.9 TYPE 2 DIABETES MELLITUS WITHOUT COMPLICATION, WITHOUT LONG-TERM CURRENT USE OF INSULIN: ICD-10-CM

## 2020-08-25 DIAGNOSIS — J44.9 CHRONIC OBSTRUCTIVE PULMONARY DISEASE, UNSPECIFIED COPD TYPE: ICD-10-CM

## 2020-08-25 DIAGNOSIS — E11.42 DIABETIC POLYNEUROPATHY ASSOCIATED WITH TYPE 2 DIABETES MELLITUS: ICD-10-CM

## 2020-08-25 DIAGNOSIS — B95.62 MRSA CELLULITIS: Primary | ICD-10-CM

## 2020-08-25 LAB
ESTIMATED AVG GLUCOSE: 128 MG/DL (ref 68–131)
HBA1C MFR BLD HPLC: 6.1 % (ref 4–5.6)

## 2020-08-25 PROCEDURE — 3079F PR MOST RECENT DIASTOLIC BLOOD PRESSURE 80-89 MM HG: ICD-10-PCS | Mod: CPTII,S$GLB,, | Performed by: FAMILY MEDICINE

## 2020-08-25 PROCEDURE — 83036 HEMOGLOBIN GLYCOSYLATED A1C: CPT

## 2020-08-25 PROCEDURE — 3008F BODY MASS INDEX DOCD: CPT | Mod: CPTII,S$GLB,, | Performed by: FAMILY MEDICINE

## 2020-08-25 PROCEDURE — 3044F PR MOST RECENT HEMOGLOBIN A1C LEVEL <7.0%: ICD-10-PCS | Mod: CPTII,S$GLB,, | Performed by: FAMILY MEDICINE

## 2020-08-25 PROCEDURE — 3074F PR MOST RECENT SYSTOLIC BLOOD PRESSURE < 130 MM HG: ICD-10-PCS | Mod: CPTII,S$GLB,, | Performed by: FAMILY MEDICINE

## 2020-08-25 PROCEDURE — 99999 PR PBB SHADOW E&M-EST. PATIENT-LVL III: ICD-10-PCS | Mod: PBBFAC,,, | Performed by: FAMILY MEDICINE

## 2020-08-25 PROCEDURE — 99999 PR PBB SHADOW E&M-EST. PATIENT-LVL III: CPT | Mod: PBBFAC,,, | Performed by: FAMILY MEDICINE

## 2020-08-25 PROCEDURE — 3044F HG A1C LEVEL LT 7.0%: CPT | Mod: CPTII,S$GLB,, | Performed by: FAMILY MEDICINE

## 2020-08-25 PROCEDURE — 99215 OFFICE O/P EST HI 40 MIN: CPT | Mod: S$GLB,,, | Performed by: FAMILY MEDICINE

## 2020-08-25 PROCEDURE — 3008F PR BODY MASS INDEX (BMI) DOCUMENTED: ICD-10-PCS | Mod: CPTII,S$GLB,, | Performed by: FAMILY MEDICINE

## 2020-08-25 PROCEDURE — 99215 PR OFFICE/OUTPT VISIT, EST, LEVL V, 40-54 MIN: ICD-10-PCS | Mod: S$GLB,,, | Performed by: FAMILY MEDICINE

## 2020-08-25 PROCEDURE — 3074F SYST BP LT 130 MM HG: CPT | Mod: CPTII,S$GLB,, | Performed by: FAMILY MEDICINE

## 2020-08-25 PROCEDURE — 3079F DIAST BP 80-89 MM HG: CPT | Mod: CPTII,S$GLB,, | Performed by: FAMILY MEDICINE

## 2020-08-25 PROCEDURE — 36415 COLL VENOUS BLD VENIPUNCTURE: CPT | Mod: PO

## 2020-08-25 RX ORDER — ALBUTEROL SULFATE 90 UG/1
AEROSOL, METERED RESPIRATORY (INHALATION)
Qty: 18 G | Refills: 3 | Status: SHIPPED | OUTPATIENT
Start: 2020-08-25 | End: 2021-12-15 | Stop reason: SDUPTHER

## 2020-08-25 RX ORDER — ZONISAMIDE 50 MG/1
50 CAPSULE ORAL 2 TIMES DAILY
COMMUNITY
Start: 2020-07-31 | End: 2021-01-20

## 2020-08-25 RX ORDER — GABAPENTIN 300 MG/1
300 CAPSULE ORAL 2 TIMES DAILY
COMMUNITY
Start: 2020-07-31 | End: 2021-01-11

## 2020-08-25 NOTE — PATIENT INSTRUCTIONS
Rebound Headache     Overuse of pain medications can lead to rebound headaches.   You use pain medicines called analgesics to treat your headaches. You are now having more frequent or intense headaches (rebound headaches). They are your bodys response to too much pain medicine. Prescription pain medicines can cause these headaches. But so can over-the-counter medicines like acetaminophen or ibuprofen. A drug that contains caffeine or butalbital is most likely to cause rebound headache.  Symptoms of rebound headache include:  · Mild to moderate headache for 15 or more days each month for 3 months or more  · Headache when you wake up that continues most of the day  · Headaches getting worse over time  · Need for more and more medicine to treat headaches  Rebound headaches are most often diagnosed by your symptoms and medicine history. You may need tests to rule out other causes of your headaches. In the emergency room, you may be given a non-analgesic pain medicine to treat the pain or stop future headaches.  Home care  Treatment involves stopping use of your pain medicines. Your healthcare provider can tell you how to safely do this. You may be able to stop right away. Or you may need to take less and less over time (taper off). This will depend on the medicines you have been taking. To do this, follow the schedule that your provider gives you. If you are taking pain medicines for other types of pain at the same time, your healthcare provider may need other specialists to participate in your care.  · For the first week or so after stopping, the headaches will likely get worse. You may also have withdrawal symptoms. These often include nausea, vomiting, and trouble sleeping. You may be given a medicine to help relieve pain and withdrawal symptoms. Take this exactly as you have been told. It is vital to avoid taking daily pain medicine. If you do so, rebound headaches will continue.  · Caffeine can make rebound  headaches worse. If you have caffeinated drinks every day, slowly cut your intake.  · Keep a written log of your headaches. This can help you and your healthcare provider track your progress.  · Be patient. It can take about 2 to 6 months to stop having rebound headaches.  · Once you have broken the headache cycle, be careful not to start it again. Work with your provider to make a treatment plan for headache pain that has low risk of causing rebound headaches.  · Relaxation can help lower tension and relieve pain. Try a massage, meditation, yoga, or other relaxation techniques. Or make time for a relaxing hobby that you enjoy.  Follow-up care  Follow up with your healthcare provider, or as advised.  When to seek medical advice  Call your healthcare provider right away if any of these occur:  · Fever of 100.4°F (38°C) or higher  · Headaches that wake you from sleep  · Repeated vomiting or visual problems that don't go away  · Headache with a stiff neck, rash, confusion, weakness, numbness, seizure (convulsion), or trouble talking  · Headache that starts after a head injury or fall  · A type of headache you have never had before  · Headache that gets worse despite rest and medicine  Date Last Reviewed: 11/20/2015  © 1686-2127 [employer]. [address] [place] [address], [place] [address]. All rights reserved. This information is not intended as a substitute for professional medical care. Always follow your healthcare professional's instructions.

## 2020-08-25 NOTE — PROGRESS NOTES
Eczema  - Started in February, had pneumonia, went to [facility]   - il and abscess on lower legs, medial aspect of calves  - Pimple on head, MRSA positive  - Bilateral hands  - IV vancomycin - 3 weeks Staph on legs, MRSA on scalp, Clinda- and doxy-resistant  - 3 weeks of prednisone, finished 4-5 days ago, rash was completely gone hand started to flare up again  - Sees Kera frederick, put on low dose prednisone  - Abdomen - small pustules began last night  - Hands have gotten worse despite l

## 2020-08-25 NOTE — PROGRESS NOTES
Assessment & Plan  Problem List Items Addressed This Visit        Neuro    Diabetic polyneuropathy associated with type 2 diabetes mellitus       Pulmonary    COPD (chronic obstructive pulmonary disease)    Overview     Currently on breo and albuterol.  Recommend routine usage of breo.  quit smoking since age 25.           Relevant Medications    albuterol (VENTOLIN HFA) 90 mcg/actuation inhaler       Endocrine    Type 2 diabetes mellitus without complication    Relevant Orders    Hemoglobin A1C      Other Visit Diagnoses     MRSA cellulitis    -  Primary       Prescription for vancomycin sent into compound pharmacy.  Will attempt to treat with topical vancomycin.  Patient is allergic to Bactrim.  Unfortunately unable to treat with Bactrim due to this allergy.      Health Maintenance reviewed.    Follow-up: No follow-ups on file.    ______________________________________________________________________    Chief Complaint  Chief Complaint   Patient presents with    Diabetes    Eczema    Headache       HPI  Scott Bansal is a 59 y.o. male with multiple medical diagnoses as listed in the medical history and problem list that presents for diabetes/headaches/eczema.  Pt is known to me with last appointment 6/4/2020.      Headache: worsening headache for the past 2 months.  Occurring after lunch and early evening. Daily lasting 1-2 hours.  Deep pounding headache.  Has used ibuprofen and tylenol.  Has used sumitriptan.  He has been using medication daily to help with his headaches.        Diabetes: well controlled.  He is tempted by candy.        Eczema: recent flare up of rash.  He was treated in the past for MRSA based rash.  He did become septic secondary to the rash.  His rash has returned.  Currently on low dose steroid.  Worsening hand symptoms with cracking in the skin.  New lesions on abdomen.     PAST MEDICAL HISTORY:  Past Medical History:   Diagnosis Date    Bipolar 1 disorder, mixed     BPH (benign  prostatic hypertrophy)     Colon polyp     Cyst, eyelid     Dr. Broussard    Diabetes mellitus     GERD (gastroesophageal reflux disease)     Hyperlipidemia     Hypertension     Lumbar degenerative disc disease 3/7/2019    Migraine headache     Obesity        PAST SURGICAL HISTORY:  Past Surgical History:   Procedure Laterality Date    CERVICAL SPINE SURGERY      COLONOSCOPY N/A 2017    Procedure: COLONOSCOPY;  Surgeon: Genaro Nix MD;  Location[facility];  Service: Endoscopy;  Laterality: N/A;    EYE SURGERY Left 2017    cyst removal on eyelid; Dr. Broussard    SHOULDER SURGERY      TONSILLECTOMY         SOCIAL HISTORY:  Social History     Socioeconomic History    Marital status:      Spouse name: Not on file    Number of children: Not on file    Years of education: Not on file    Highest education level: Not on file   Occupational History    Not on file   Social Needs    Financial resource strain: Not on file    Food insecurity     Worry: Not on file     Inability: Not on file    Transportation needs     Medical: Not on file     Non-medical: Not on file   Tobacco Use    Smoking status: Former Smoker     Packs/day: 2.00     Years: 15.00     Pack years: 30.00     Types: Cigarettes     Quit date: 1984     Years since quittin.3    Smokeless tobacco: Never Used    Tobacco comment: Patient quit smoking at the age of 26 yo.   Substance and Sexual Activity    Alcohol use: No    Drug use: No    Sexual activity: Yes     Partners: Female   Lifestyle    Physical activity     Days per week: Not on file     Minutes per session: Not on file    Stress: Only a little   Relationships    Social connections     Talks on phone: Not on file     Gets together: Not on file     Attends Confucianist service: Not on file     Active member of club or organization: Not on file     Attends meetings of clubs or organizations: Not on file     Relationship status: Not on file   Other  Topics Concern    Not on file   Social History Narrative    Not on file       FAMILY HISTORY:  Family History   Problem Relation Age of Onset    Cataracts Mother     Cancer Mother         throat    Hypertension Mother     Hypertension Father     Stroke Father         2 strokes    Hypertension Sister     Cancer Brother         spinal, kidney, spinal fluid    Hypertension Brother     Cancer Cousin         breast?       ALLERGIES AND MEDICATIONS: updated and reviewed.  Review of patient's allergies indicates:   Allergen Reactions    Sulfa (sulfonamide antibiotics) Rash     Current Outpatient Medications   Medication Sig Dispense Refill    albuterol (VENTOLIN HFA) 90 mcg/actuation inhaler INHALE 2 PUFFS EVERY 4 HOURS AS NEEDED 18 g 3    amLODIPine (NORVASC) 10 MG tablet TAKE 1 TABLET BY MOUTH EVERY DAY 90 tablet 3    azelastine (ASTELIN) 137 mcg (0.1 %) nasal spray 1 spray (137 mcg total) by Nasal route 2 (two) times daily. 30 mL 3    betamethasone dipropionate (DIPROLENE) 0.05 % ointment Apply topically 2 (two) times daily. 45 g 1    blood sugar diagnostic (TRUETEST TEST STRIPS) Strp 1 each by Misc.(Non-Drug; Combo Route) route 3 (three) times a week. 100 each 3    buPROPion (WELLBUTRIN XL) 300 MG 24 hr tablet Take 300 mg by mouth every morning.  2    busPIRone (BUSPAR) 10 MG tablet Take 10 mg by mouth 3 (three) times daily.      candesartan (ATACAND) 4 MG tablet Take 1 tablet (4 mg total) by mouth once daily. 90 tablet 3    CLOTRIMAZOLE-BETAMETH DIP-ZINC TOP       clotrimazole-betamethasone 1-0.05% (LOTRISONE) cream Apply topically 2 (two) times daily. 90 g 2    crisaborole (EUCRISA) 2 % Oint Use for hand eczema bid. 60 g 5    cyclobenzaprine (FLEXERIL) 10 MG tablet Take 1 tablet (10 mg total) by mouth 3 (three) times daily as needed for Muscle spasms. 270 tablet 1    DUPIXENT 300 mg/2 mL Syrg Inject 2 syringes (600mg) into the skin on day 0, then 1 syringe (300mg) into the skin on day 14 and  day 28 8 mL 0    DUPIXENT 300 mg/2 mL Syrg Inject 1 syringe (300mg) into the skin every other week 4 mL 2    etodolac (LODINE) 500 MG tablet TAKE 1 TABLET BY MOUTH TWICE A DAY 60 tablet 0    fluticasone furoate-vilanterol (BREO) 100-25 mcg/dose diskus inhaler Inhale 1 puff into the lungs once daily. Controller 90 each 1    fluticasone propionate (FLONASE) 50 mcg/actuation nasal spray TAKE 1 SPRAY BY EACH NARE ROUTE ONCE DAILY. 16 mL 5    folic acid (FOLVITE) 800 MCG Tab Take 800 mcg by mouth once daily.      gemfibroziL (LOPID) 600 MG tablet Take 1 tablet (600 mg total) by mouth 2 (two) times daily before meals. 180 tablet 0    hydrOXYzine (ATARAX) 50 MG tablet       hydrOXYzine (ATARAX) 50 MG tablet TAKE 1 TABLET (50 MG TOTAL) BY MOUTH 3 (THREE) TIMES DAILY AS NEEDED FOR ITCHING. 90 tablet 3    levocetirizine (XYZAL) 5 MG tablet Take 1 tablet each morning. 30 tablet 11    lidocaine (XYLOCAINE) 5 % Oint ointment AAA TID as needed for itching, pain 50 g 3    LIDOCAINE VISCOUS 2 % solution       metFORMIN (GLUCOPHAGE) 1000 MG tablet Take 1 tablet (1,000 mg total) by mouth 2 (two) times daily. 180 tablet 0    mometasone 0.1% (ELOCON) 0.1 % cream Apply topically once daily. AAA bid prn for hand eczema 45 g 1    mupirocin (BACTROBAN) 2 % ointment Apply topically 2 (two) times daily. Apply with Q-tip 30 g 3    mupirocin (BACTROBAN) 2 % ointment Apply to inside of nose as instructed 2 times daily 30 g 1    pantoprazole (PROTONIX) 40 MG tablet TAKE 1 TABLET BY MOUTH DAILY 90 tablet 3    predniSONE (DELTASONE) 20 MG tablet Take 1 pill q daily 60 tablet 0    quetiapine (SEROQUEL) 300 MG Tab Take by mouth.      simvastatin (ZOCOR) 80 MG tablet Take 1 tablet (80 mg total) by mouth once daily. 90 tablet 2    SPIRIVA WITH HANDIHALER 18 mcg inhalation capsule INHALE 1 CAPSULE (18 MCG TOTAL) INTO THE LUNGS ONCE DAILY. CONTROLLER 30 capsule 0    sumatriptan (IMITREX) 100 MG tablet TAKE 1 TABLET BY MOUTH EVERY 2  "HOURS AS NEEDED FOR MIGRAINE 12 tablet 1    tamsulosin (FLOMAX) 0.4 mg Cap TAKE 1 CAPSULE BY MOUTH EVERY DAY 30 capsule 0    tiZANidine (ZANAFLEX) 4 MG tablet Take 4 mg by mouth every 8 (eight) hours.      triamcinolone acetonide 0.1% (KENALOG) 0.1 % cream AAA bid prn for rash on chest and arms. Do not use on face, underarms or groin 454 g 1    triamcinolone acetonide 0.1% (KENALOG) 0.1 % ointment AAA bid 454 g 1    gabapentin (NEURONTIN) 300 MG capsule Take 300 mg by mouth 2 (two) times daily.      zonisamide (ZONEGRAN) 50 MG Cap Take 50 mg by mouth 2 (two) times daily.       No current facility-administered medications for this visit.          ROS  Review of Systems   Constitutional: Negative for activity change, appetite change, fatigue, fever and unexpected weight change.   HENT: Negative for congestion and facial swelling.    Eyes: Negative for visual disturbance.   Respiratory: Negative for chest tightness, shortness of breath, wheezing and stridor.    Cardiovascular: Negative for chest pain, palpitations and leg swelling.   Gastrointestinal: Negative for abdominal distention, abdominal pain, constipation, diarrhea, nausea and vomiting.   Endocrine: Negative for cold intolerance, heat intolerance, polydipsia and polyuria.   Skin: Positive for rash.   Allergic/Immunologic: Negative.    Neurological: Negative for dizziness, light-headedness, numbness and headaches.   Psychiatric/Behavioral: Negative for agitation and decreased concentration.         Physical Exam  Vitals:    08/25/20 0903   BP: 114/82   BP Location: Left arm   Patient Position: Sitting   BP Method: Large (Automatic)   Pulse: 80   Temp: 97.2 °F (36.2 °C)   TempSrc: Oral   SpO2: 97%   Weight: 109 kg (240 lb 4.8 oz)   Height: 5' 9" (1.753 m)    Body mass index is 35.49 kg/m².  Weight: 109 kg (240 lb 4.8 oz)   Height: 5' 9" (175.3 cm)   Physical Exam  Vitals signs reviewed.   Constitutional:       Appearance: He is well-developed.   HENT:      " Head: Normocephalic and atraumatic.      Right Ear: External ear normal.      Left Ear: External ear normal.      Nose: Nose normal.   Eyes:      Conjunctiva/sclera: Conjunctivae normal.      Pupils: Pupils are equal, round, and reactive to light.   Neck:      Musculoskeletal: Normal range of motion.   Cardiovascular:      Rate and Rhythm: Normal rate and regular rhythm.      Heart sounds: Normal heart sounds.   Pulmonary:      Effort: Pulmonary effort is normal.      Breath sounds: Normal breath sounds.   Skin:     General: Skin is warm and dry.   Neurological:      Mental Status: He is alert and oriented to person, place, and time.   Psychiatric:         Behavior: Behavior normal.           Health Maintenance       Date Due Completion Date    Foot Exam 10/08/2019 10/8/2018    Colorectal Cancer Screening 07/27/2020 7/27/2017    Eye Exam 08/27/2020 8/27/2019    Influenza Vaccine (1) 09/01/2020 ---    Shingles Vaccine (2 of 2) 09/15/2020 7/21/2020    Hemoglobin A1c 12/04/2020 6/4/2020    Lipid Panel 02/12/2021 2/12/2020    High Dose Statin 07/30/2021 7/30/2020    TETANUS VACCINE 07/07/2027 7/7/2017              Patient note was created using [employer].  Any errors in syntax or even information may not have been identified and edited on initial review prior to signing this note.

## 2020-08-26 ENCOUNTER — TELEPHONE (OUTPATIENT)
Dept: DERMATOLOGY | Facility: CLINIC | Age: 60
End: 2020-08-26

## 2020-08-26 NOTE — TELEPHONE ENCOUNTER
Pt needed advice on new sores that's popped up on his chest and hands. Pt scheduled for a physical visit with Dr Chapin on Monday.

## 2020-08-26 NOTE — TELEPHONE ENCOUNTER
----- Message from Lenka La sent at 8/26/2020  1:49 PM CDT -----  Regarding: Advice  Contact: Patient  Patient has swelling and his hand is splitting open, he has red dots all over his chest. Please call to advise at Ph .[phone] (home) Patient is coming in tomorrow for Dr Chapin to see it.

## 2020-08-30 ENCOUNTER — PATIENT OUTREACH (OUTPATIENT)
Dept: ADMINISTRATIVE | Facility: OTHER | Age: 60
End: 2020-08-30

## 2020-08-31 ENCOUNTER — OFFICE VISIT (OUTPATIENT)
Dept: DERMATOLOGY | Facility: CLINIC | Age: 60
End: 2020-08-31
Payer: MEDICARE

## 2020-08-31 DIAGNOSIS — L20.89 OTHER ATOPIC DERMATITIS: ICD-10-CM

## 2020-08-31 DIAGNOSIS — L02.92 FURUNCLE: Primary | ICD-10-CM

## 2020-08-31 PROCEDURE — 87077 CULTURE AEROBIC IDENTIFY: CPT

## 2020-08-31 PROCEDURE — 87186 SC STD MICRODIL/AGAR DIL: CPT

## 2020-08-31 PROCEDURE — 87070 CULTURE OTHR SPECIMN AEROBIC: CPT

## 2020-08-31 PROCEDURE — 99213 OFFICE O/P EST LOW 20 MIN: CPT | Mod: S$GLB,,, | Performed by: DERMATOLOGY

## 2020-08-31 PROCEDURE — 99999 PR PBB SHADOW E&M-EST. PATIENT-LVL IV: CPT | Mod: PBBFAC,,, | Performed by: DERMATOLOGY

## 2020-08-31 PROCEDURE — 99999 PR PBB SHADOW E&M-EST. PATIENT-LVL IV: ICD-10-PCS | Mod: PBBFAC,,, | Performed by: DERMATOLOGY

## 2020-08-31 PROCEDURE — 99213 PR OFFICE/OUTPT VISIT, EST, LEVL III, 20-29 MIN: ICD-10-PCS | Mod: S$GLB,,, | Performed by: DERMATOLOGY

## 2020-09-02 NOTE — TELEPHONE ENCOUNTER
FOR DOCUMENTATION ONLY:  Financial Assistance for Dupixent approved from 8/27/20 to 12/31/20  Source: Dupixent MyWay  Phone: [phone]  Fax: [phone]  [facility]

## 2020-09-02 NOTE — TELEPHONE ENCOUNTER
Patient notified of Dupixent assistance approval. He will call to schedule delivery and injection training if desired, and notify MD of start date so follow up labs may be scheduled. Made patient aware to call [facility] or MD if renewal assistance is needed. Patient verbalized understanding.

## 2020-09-03 LAB — BACTERIA SPEC AEROBE CULT: ABNORMAL

## 2020-09-03 RX ORDER — DOXYCYCLINE 100 MG/1
100 CAPSULE ORAL 2 TIMES DAILY
Qty: 14 CAPSULE | Refills: 0 | Status: SHIPPED | OUTPATIENT
Start: 2020-09-03 | End: 2020-09-10

## 2020-09-04 ENCOUNTER — PATIENT OUTREACH (OUTPATIENT)
Dept: ADMINISTRATIVE | Facility: HOSPITAL | Age: 60
End: 2020-09-04

## 2020-09-04 ENCOUNTER — TELEPHONE (OUTPATIENT)
Dept: FAMILY MEDICINE | Facility: CLINIC | Age: 60
End: 2020-09-04

## 2020-09-04 DIAGNOSIS — Z12.13 ENCOUNTER FOR SCREENING FOR MALIGNANT NEOPLASM OF SMALL INTESTINE: Primary | ICD-10-CM

## 2020-09-04 NOTE — LETTER
AUTHORIZATION FOR RELEASE OF   CONFIDENTIAL INFORMATION    Dear Dr. Warren Rondon,    We are seeing Scott Bansal, date of birth 1960, in the clinic at [facility]. Kaylie Chau MD is the patient's PCP. Scott Bansal has an outstanding lab/procedure at the time we reviewed his chart. In order to help keep his health information updated, he has authorized us to request the following medical record(s):        (  )  MAMMOGRAM                                      (  )  COLONOSCOPY      (  )  PAP SMEAR                                          (  )  OUTSIDE LAB RESULTS     (  )  DEXA SCAN                                          ( x )  EYE EXAM(diabetic) 2019-current            (  )  FOOT EXAM                                          (  )  ENTIRE RECORD     (  )  OUTSIDE IMMUNIZATIONS                 (  )  _______________         Please fax records to [facility], Nichole T Guillory, MD,  [phone].     If you have any questions, please contact Jessica Black LPN [facility] at   [phone].       Patient Name: Scott Bansal  : 1960  Patient Phone #: [phone]

## 2020-09-04 NOTE — TELEPHONE ENCOUNTER
----- Message from Coleen Watson sent at 9/4/2020 10:00 AM CDT -----  Type: Patient Call Back    Who called: Self     What is the request in detail:patient states he need orders for his colonoscopy because he is behind on getting it done. Please call     Can the clinic reply by [facility]: No       Would the patient rather a call back or a response via [facility]?  Call     Best call back number:.[phone] (home)

## 2020-09-07 NOTE — PROGRESS NOTES
Subjective:       Patient ID:  Scott Bansal is a 59 y.o. 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 Eczema, drug rash could not be ruled out).  . He is s/p IV vancomycin x 10 days via PICC line with Infectious Disease at [facility].  + improvement in scalp and lesions of the lower legs.      Prior treatments: PO prednisone, hydroxyzine 50 mg TID, clotrimazole-betamethasone, mometasone, TAC oint, TAC cream, mupirocin, betamethasone oint     Denies history of fever blisters.      He discontinued lasix, gabapentin, and zonisamide due to possiblity of drug rash.        At [facility] with Dr. Chapin 07/30/2020, patient had cleared bacterial infection and was flaring with atopic dermatitis (see photos). He was started on prednisone taper, which he has completed, and decision was made to start patient on Dupixent for severe atopic dermatitis. He has been approved and states he will start Dupixent tomorrow.     Today he c/o rash coming back on hands and tender bumps which he thinks is MRSA for one week. He states it started once he finished the prednisone. The bumps are on his trunk, arms and legs. They are painful and draining. He has been doing hibiclens washes once they started which has helped, but not using any other treatment. His hand eczema has also flared during this time.      Despite his skin lesions, he is feeling well. No fever, chills, N/V, malaise.       Review of Systems   Constitutional: Negative for fever, chills and malaise.   Skin: Positive for rash.        Objective:    Physical Exam   Constitutional: He appears well-developed and well-nourished. No distress.   Neurological: He is alert and oriented to person,  place, and time.   Psychiatric: He has a normal mood and affect.   Skin:   Areas Examined (abnormalities noted in diagram):   Head / Face Inspection Performed  Neck Inspection Performed  Chest / Axilla Inspection Performed  Abdomen Inspection Performed  Back Inspection Performed  RUE Inspected  LUE Inspection Performed  RLE Inspected  LLE Inspection Performed  Nails and Digits Inspection Performed                  Diagram Legend     Erythematous scaling macule/papule c/w actinic keratosis       Vascular papule c/w angioma      Pigmented verrucoid papule/plaque c/w seborrheic keratosis      Yellow umbilicated papule c/w sebaceous hyperplasia      Irregularly shaped tan macule c/w lentigo     1-2 mm smooth white papules consistent with Milia      Movable subcutaneous cyst with punctum c/w epidermal inclusion cyst      Subcutaneous movable cyst c/w pilar cyst      Firm pink to brown papule c/w dermatofibroma      Pedunculated fleshy papule(s) c/w skin tag(s)      Evenly pigmented macule c/w junctional nevus     Mildly variegated pigmented, slightly irregular-bordered macule c/w mildly atypical nevus      Flesh colored to evenly pigmented papule c/w intradermal nevus       Pink pearly papule/plaque c/w basal cell carcinoma      Erythematous hyperkeratotic cursted plaque c/w SCC      Surgical scar with no sign of skin cancer recurrence      Open and closed comedones      Inflammatory papules and pustules      Verrucoid papule consistent consistent with wart     Erythematous eczematous patches and plaques     Dystrophic onycholytic nail with subungual debris c/w onychomycosis     Umbilicated papule    Erythematous-base heme-crusted tan verrucoid plaque consistent with inflamed seborrheic keratosis     Erythematous Silvery Scaling Plaque c/w Psoriasis     See annotation      Assessment / Plan:        Furuncles  - Bacterial Culture, f/u result  - patient with H/O doxy and clindamycin resistant MRSA s/p IV Vanc treatment  -  recommend bleach baths and mupirocin to all lesions BID  - further treatment based on culture results    Other atopic dermatitis  - agree with Tommy per Dr. Chapin  - patient starts treatment tomorrow           Follow up in about 4 weeks (around 9/28/2020).

## 2020-09-08 ENCOUNTER — TELEPHONE (OUTPATIENT)
Dept: FAMILY MEDICINE | Facility: CLINIC | Age: 60
End: 2020-09-08

## 2020-09-08 NOTE — TELEPHONE ENCOUNTER
----- Message from Harish Rojo sent at 9/8/2020 10:32 AM CDT -----  Name of Who is Calling: GARO JUAREZ [[id number]]    What is the request in detail: Patient is requesting a call back regards to appt .... Please contact to further discuss and advise      Can the clinic reply by [facility]: No     What Number to Call Back if not in [facility]:  [phone] (home)   Can the clinic reply in [facility]:

## 2020-09-14 ENCOUNTER — OFFICE VISIT (OUTPATIENT)
Dept: DERMATOLOGY | Facility: CLINIC | Age: 60
End: 2020-09-14
Payer: MEDICARE

## 2020-09-14 ENCOUNTER — TELEPHONE (OUTPATIENT)
Dept: DERMATOLOGY | Facility: CLINIC | Age: 60
End: 2020-09-14

## 2020-09-14 DIAGNOSIS — L08.9 SKIN INFECTION: ICD-10-CM

## 2020-09-14 DIAGNOSIS — L20.89 OTHER ATOPIC DERMATITIS: Primary | ICD-10-CM

## 2020-09-14 PROCEDURE — 99999 PR PBB SHADOW E&M-EST. PATIENT-LVL III: ICD-10-PCS | Mod: PBBFAC,,, | Performed by: DERMATOLOGY

## 2020-09-14 PROCEDURE — 99213 OFFICE O/P EST LOW 20 MIN: CPT | Mod: 25,S$GLB,, | Performed by: DERMATOLOGY

## 2020-09-14 PROCEDURE — 99213 PR OFFICE/OUTPT VISIT, EST, LEVL III, 20-29 MIN: ICD-10-PCS | Mod: 25,S$GLB,, | Performed by: DERMATOLOGY

## 2020-09-14 PROCEDURE — 99999 PR PBB SHADOW E&M-EST. PATIENT-LVL III: CPT | Mod: PBBFAC,,, | Performed by: DERMATOLOGY

## 2020-09-14 PROCEDURE — 96372 THER/PROPH/DIAG INJ SC/IM: CPT | Mod: S$GLB,,, | Performed by: DERMATOLOGY

## 2020-09-14 PROCEDURE — 96372 PR INJECTION,THERAP/PROPH/DIAG2ST, IM OR SUBCUT: ICD-10-PCS | Mod: S$GLB,,, | Performed by: DERMATOLOGY

## 2020-09-14 RX ORDER — TRIAMCINOLONE ACETONIDE 40 MG/ML
60 INJECTION, SUSPENSION INTRA-ARTICULAR; INTRAMUSCULAR
Status: COMPLETED | OUTPATIENT
Start: 2020-09-14 | End: 2020-09-14

## 2020-09-14 RX ADMIN — TRIAMCINOLONE ACETONIDE 60 MG: 40 INJECTION, SUSPENSION INTRA-ARTICULAR; INTRAMUSCULAR at 12:09

## 2020-09-14 NOTE — TELEPHONE ENCOUNTER
----- Message from Nichelle Gruber sent at 9/14/2020  8:47 AM CDT -----  Would like to consult with nurse regarding his hands breaking out again, dried out, and also split. Please give a call back at [phone].

## 2020-09-14 NOTE — PROGRESS NOTES
Subjective:       Patient ID:  Scott Bansal is a 59 y.o. 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 Eczema, drug rash could not be ruled out).  . He is s/p IV vancomycin x 10 days via PICC line with Infectious Disease at [facility].  + improvement in scalp and lesions of the lower legs.      Prior treatments: PO prednisone, hydroxyzine 50 mg TID, clotrimazole-betamethasone, mometasone, TAC oint, TAC cream, mupirocin, betamethasone oint     Denies history of fever blisters.      He discontinued lasix, gabapentin, and zonisamide due to possiblity of drug rash.        At LOV with me on 08/31/2020, patient had new MRSA lesions on trunk and extremities. Culture showed sensitivity to Doxycycline though, so completed course of Doxy and lesions cleared. Today, he reports that his hand dermatitis is worse and flaring. It is associated with itch, redness and pain. He is s/p one dose of Dupixent.       Review of Systems   Constitutional: Negative for fever, chills and malaise.   Skin: Positive for rash.        Objective:    Physical Exam   Constitutional: He appears well-developed and well-nourished. No distress.   Neurological: He is alert and oriented to person, place, and time.   Psychiatric: He has a normal mood and affect.   Skin:   Areas Examined (abnormalities noted in diagram):   Head / Face Inspection Performed  Neck Inspection Performed  Chest / Axilla Inspection Performed  Abdomen Inspection Performed  Back Inspection Performed  RUE Inspected  LUE Inspection Performed  RLE Inspected  LLE Inspection Performed  Nails and Digits Inspection Performed                  Diagram Legend     Erythematous  scaling macule/papule c/w actinic keratosis       Vascular papule c/w angioma      Pigmented verrucoid papule/plaque c/w seborrheic keratosis      Yellow umbilicated papule c/w sebaceous hyperplasia      Irregularly shaped tan macule c/w lentigo     1-2 mm smooth white papules consistent with Milia      Movable subcutaneous cyst with punctum c/w epidermal inclusion cyst      Subcutaneous movable cyst c/w pilar cyst      Firm pink to brown papule c/w dermatofibroma      Pedunculated fleshy papule(s) c/w skin tag(s)      Evenly pigmented macule c/w junctional nevus     Mildly variegated pigmented, slightly irregular-bordered macule c/w mildly atypical nevus      Flesh colored to evenly pigmented papule c/w intradermal nevus       Pink pearly papule/plaque c/w basal cell carcinoma      Erythematous hyperkeratotic cursted plaque c/w SCC      Surgical scar with no sign of skin cancer recurrence      Open and closed comedones      Inflammatory papules and pustules      Verrucoid papule consistent consistent with wart     Erythematous eczematous patches and plaques     Dystrophic onycholytic nail with subungual debris c/w onychomycosis     Umbilicated papule    Erythematous-base heme-crusted tan verrucoid plaque consistent with inflamed seborrheic keratosis     Erythematous Silvery Scaling Plaque c/w Psoriasis     See annotation      Assessment / Plan:        MRSA infection of the skin, resolved  - s/p Doxycycline course  - monitor for now    Other atopic dermatitis, hands, flaring  - triamcinolone 60mg IMK today  - continue Dupixent per Dr. Chapin  - recommend bland emollients under cotton gloves at night       f/u with Dr. Yu as previously scheduled on 9/29/2020

## 2020-09-14 NOTE — TELEPHONE ENCOUNTER
Returned call to pt and he stated he needed to be seen today by Dr Levin for his dry crack hands.  Offered pt a virtual visit due to his driving distance but pt wanted to be seen in person today.  Pt was scheduled for an appt with Dr Levin per pt's request.

## 2020-09-25 ENCOUNTER — TELEPHONE (OUTPATIENT)
Dept: FAMILY MEDICINE | Facility: CLINIC | Age: 60
End: 2020-09-25

## 2020-09-25 NOTE — TELEPHONE ENCOUNTER
----- Message from Jada Thomas sent at 9/25/2020  1:20 PM CDT -----  Regarding: Hilary/ [employer]/ [phone]  Type: Patient Call Back    Who called:  Hilary    What is the request in detail:  [facility] and would like a call back please.  Thank you    Would the patient rather a call back or a response via [facility]?   Call back    Best call back number:  [phone]

## 2020-09-30 DIAGNOSIS — Z12.11 COLON CANCER SCREENING: Primary | ICD-10-CM

## 2020-09-30 RX ORDER — SODIUM, POTASSIUM,MAG SULFATES 17.5-3.13G
1 SOLUTION, RECONSTITUTED, ORAL ORAL DAILY
Qty: 1 KIT | Refills: 0 | Status: SHIPPED | OUTPATIENT
Start: 2020-10-29 | End: 2020-10-31

## 2020-10-20 ENCOUNTER — TELEPHONE (OUTPATIENT)
Dept: DERMATOLOGY | Facility: CLINIC | Age: 60
End: 2020-10-20

## 2020-10-20 NOTE — TELEPHONE ENCOUNTER
Spoke with patient and states that he will go to the ER to see a doctor for his cyst looked at. I inform the patient we do have Financial ass. Her at [facility], but patient still wanted to be see in the ER. Please advise       Rach Gurber MA

## 2020-10-21 ENCOUNTER — HOSPITAL ENCOUNTER (EMERGENCY)
Facility: HOSPITAL | Age: 60
Discharge: HOME | End: 2020-10-21
Attending: EMERGENCY MEDICINE
Payer: MEDICARE

## 2020-10-21 VITALS
SYSTOLIC BLOOD PRESSURE: 129 MMHG | RESPIRATION RATE: 18 BRPM | HEIGHT: 69 IN | TEMPERATURE: 98 F | DIASTOLIC BLOOD PRESSURE: 80 MMHG | OXYGEN SATURATION: 97 % | BODY MASS INDEX: 35.55 KG/M2 | HEART RATE: 90 BPM | WEIGHT: 240 LBS

## 2020-10-21 DIAGNOSIS — R21 RASH: ICD-10-CM

## 2020-10-21 DIAGNOSIS — L02.411 ABSCESS OF RIGHT AXILLA: Primary | ICD-10-CM

## 2020-10-21 PROCEDURE — 10061 I&D ABSCESS COMP/MULTIPLE: CPT

## 2020-10-21 PROCEDURE — 25000003 PHARM REV CODE 250: Performed by: PHYSICIAN ASSISTANT

## 2020-10-21 PROCEDURE — 99283 EMERGENCY DEPT VISIT LOW MDM: CPT | Mod: 25

## 2020-10-21 RX ORDER — CLINDAMYCIN HYDROCHLORIDE 150 MG/1
300 CAPSULE ORAL 4 TIMES DAILY
Qty: 80 CAPSULE | Refills: 0 | Status: SHIPPED | OUTPATIENT
Start: 2020-10-21 | End: 2020-10-31

## 2020-10-21 RX ORDER — LIDOCAINE HYDROCHLORIDE AND EPINEPHRINE 10; 10 MG/ML; UG/ML
10 INJECTION, SOLUTION INFILTRATION; PERINEURAL
Status: COMPLETED | OUTPATIENT
Start: 2020-10-21 | End: 2020-10-21

## 2020-10-21 RX ORDER — CLINDAMYCIN HYDROCHLORIDE 150 MG/1
300 CAPSULE ORAL
Status: COMPLETED | OUTPATIENT
Start: 2020-10-21 | End: 2020-10-21

## 2020-10-21 RX ADMIN — CLINDAMYCIN HYDROCHLORIDE 300 MG: 150 CAPSULE ORAL at 12:10

## 2020-10-21 RX ADMIN — LIDOCAINE HYDROCHLORIDE AND EPINEPHRINE 10 ML: 10; 10 INJECTION, SOLUTION INFILTRATION; PERINEURAL at 12:10

## 2020-10-21 NOTE — ED PROVIDER NOTES
"Encounter Date: 10/21/2020    SCRIBE #1 NOTE: I, Elrinda Duff, am scribing for, and in the presence of,  HAZEL Cook. I have scribed the following portions of the note - Other sections scribed: HPI, GIO.       History     Chief Complaint   Patient presents with    Abscess     "boil " to right axilla     Eye Problem     left eye red ,no drainage .started x 2 days     This is a 59 y.o. male who presents to the ED complaining of redness to the left periorbital area that started 3-4 days ago. He denies having any drainage, pain, or itch to his eye. He states that it just feels "scaly". He reports a history of styes and notes using Neomycin ointment on his eye. He states that he's also had an abscess to the right axillary region for 2 days. He notes that it started draining today. He reports a PMHx of Eczema, MRSA, and Staph infection and adds that he sees a dermatologist and an infectious disease physician. He states that he has allergies to Sulfa. He reports that he hasn't been taking his Metformin like he is supposed to (taking 500 mg twice daily instead of 1000 mg twice daily). He notes that his blood sugar has been fine and it was 139 today. Denies visual disturbance, photophobia, weakness, nausea, and vomiting. No other associated symptoms.     The history is provided by the patient. No  was used.     Review of patient's allergies indicates:   Allergen Reactions    Sulfa (sulfonamide antibiotics) Rash     Past Medical History:   Diagnosis Date    Bipolar 1 disorder, mixed     BPH (benign prostatic hypertrophy)     Colon polyp     Cyst, eyelid     Dr. Broussard    Diabetes mellitus     GERD (gastroesophageal reflux disease)     Hyperlipidemia     Hypertension     Lumbar degenerative disc disease 3/7/2019    Migraine headache     Obesity      Past Surgical History:   Procedure Laterality Date    CERVICAL SPINE SURGERY      COLONOSCOPY N/A 7/27/2017    Procedure: COLONOSCOPY; "  Surgeon: Genaro Nix MD;  Location: [facility];  Service: Endoscopy;  Laterality: N/A;    EYE SURGERY Left 2017    cyst removal on eyelid; Dr. Broussard    SHOULDER SURGERY      TONSILLECTOMY       Family History   Problem Relation Age of Onset    Cataracts Mother     Cancer Mother         throat    Hypertension Mother     Hypertension Father     Stroke Father         2 strokes    Hypertension Sister     Cancer Brother         spinal, kidney, spinal fluid    Hypertension Brother     Cancer Cousin         breast?     Social History     Tobacco Use    Smoking status: Former Smoker     Packs/day: 2.00     Years: 15.00     Pack years: 30.00     Types: Cigarettes     Quit date: 1984     Years since quittin.4    Smokeless tobacco: Never Used    Tobacco comment: Patient quit smoking at the age of 24 yo.   Substance Use Topics    Alcohol use: No    Drug use: No     Review of Systems   Constitutional: Negative for fever.   HENT: Negative for sore throat.    Eyes: Positive for redness (left periorbital area). Negative for photophobia, pain, discharge, itching and visual disturbance.   Respiratory: Negative for shortness of breath.    Cardiovascular: Negative for chest pain.   Gastrointestinal: Negative for nausea and vomiting.   Genitourinary: Negative for dysuria.   Musculoskeletal: Negative for back pain.   Skin: Negative for rash.        (+) Abscess   Neurological: Negative for weakness.   Hematological: Does not bruise/bleed easily.       Physical Exam     Initial Vitals [10/21/20 1212]   BP Pulse Resp Temp SpO2   129/80 90 18 98 °F (36.7 °C) 97 %      MAP       --         Physical Exam    Nursing note and vitals reviewed.  Constitutional: He appears well-developed and well-nourished. He is not diaphoretic. No distress.   HENT:   Head: Normocephalic and atraumatic.   Nose: Nose normal.   Eyes: Conjunctivae and EOM are normal. Right eye exhibits no discharge. Left eye exhibits no  discharge.   Neck: Normal range of motion. No tracheal deviation present. No JVD present.   Cardiovascular: Normal rate, regular rhythm and normal heart sounds. Exam reveals no friction rub.    No murmur heard.  Pulmonary/Chest: Breath sounds normal. No respiratory distress. He has no wheezes. He has no rhonchi. He has no rales. He exhibits no tenderness.   Abdominal: Soft. He exhibits no distension.   Musculoskeletal: Normal range of motion.   Neurological: He is alert and oriented to person, place, and time. He has normal strength.   Skin: Skin is warm and dry. No pallor.   1 cm by 0.5 cm tender fluctuant lightly erythematous mass to the right axilla that is draining a scant amount of purulent material.    Slightly red and scaling nontender rash to the left medial infraorbital area.  No ocular involvement.  No photophobia.  Full ROM of extraocular movements without pain.  No herpetic lesions.         ED Course   I & D - Incision and Drainage    Date/Time: 10/21/2020 1:46 PM  Location procedure was performed: [facility] EMERGENCY DEPARTMENT  Performed by: Kelvin Zazueta PA-C  Authorized by: Cornelius Roman MD   Consent Done: Yes  Consent: Verbal consent obtained.  Consent given by: patient  Type: abscess  Location: R axilla.  Anesthesia: local infiltration    Anesthesia:  Local Anesthetic: lidocaine 1% with epinephrine  Scalpel size: 11  Incision type: single straight  Complexity: complex  Drainage: pus  Drainage amount: scant  Wound treatment: incision,  wound packed and  expression of material  Packing material: 1/4 in gauze  Complications: No  Specimens: No  Implants: No  Patient tolerance: Patient tolerated the procedure well with no immediate complications        Labs Reviewed - No data to display       Imaging Results    None          Medical Decision Making:   History:   Old Medical Records: I decided to obtain old medical records.  ED Management:  Patient has multiple complaints.  Right axilla pain consistent  with mild abscess and associated cellulitis; drained in the ED without complication.  Started on antibiotics.  Facial rash seems eczematous in nature.  Patient does endorse a history of severe eczema.  Does not appear cellulitic.  There is no ocular involvement.  No herpetic lesions.  Advising follow-up with PCP.  Advised packing removal in 2 days.  Strict return precautions discussed with patient who is agreeable to the plan.            Scribe Attestation:   Scribe #1: I performed the above scribed service and the documentation accurately describes the services I performed. I attest to the accuracy of the note.                      Clinical Impression:       ICD-10-CM ICD-9-CM   1. Abscess of right axilla  L02.411 682.3   2. Rash  R21 782.1                          ED Disposition Condition    Discharge Stable        ED Prescriptions     Medication Sig Dispense Start Date End Date Auth. Provider    clindamycin (CLEOCIN) 150 MG capsule Take 2 capsules (300 mg total) by mouth 4 (four) times daily. for 10 days 80 capsule 10/21/2020 10/31/2020 Kelvin Zazueta PA-C        Follow-up Information     Follow up With Specialties Details Why Contact Info    Kaylie Chau MD Family Medicine, Wound Care Schedule an appointment as soon as possible for a visit in 2 days For re-evaluation, AND packing removal in 2 days [address] [facility] [address]  [phone]      [facility] Emergency Medicine Go to  If symptoms worsen [address]  [facility] [address] [phone]                    Scribe Attestation: I, Kelvin Zazueta PA-C, personally performed the services described in this documentation. All medical record entries made by the scribe were at my direction and in my presence.  I have reviewed the chart and agree that the record reflects my personal performance and is accurate and complete.                   Kelvin Zazueta PA-C  10/21/20 1165

## 2020-10-21 NOTE — ED TRIAGE NOTES
The patient reports redness to left periorbital area x 3-4 days. Denies vision changes, eye drainage, pain or itching. Patient reports using neomycin ointment on his eye. Patient also reports an abscess to right axillary x 1 week. Reports that abscess started to drain pus today. Denies fevers, chills.

## 2020-10-23 DIAGNOSIS — E11.9 TYPE 2 DIABETES MELLITUS WITHOUT COMPLICATION, UNSPECIFIED WHETHER LONG TERM INSULIN USE: ICD-10-CM

## 2020-10-26 NOTE — TELEPHONE ENCOUNTER
"Subjective:       Patient ID: Betsey Baum is a 33 y.o. female.    Vitals:  height is 5' 3" (1.6 m) and weight is 77.1 kg (170 lb). Her oral temperature is 97.6 °F (36.4 °C). Her blood pressure is 140/90 (abnormal) and her pulse is 75. Her respiration is 17 and oxygen saturation is 98%.     Chief Complaint: Fever and Sore Throat    Fever   This is a new problem. The current episode started yesterday. The problem occurs intermittently. The problem has been unchanged. Associated symptoms include headaches, sleepiness and a sore throat. Pertinent negatives include no chest pain, congestion, coughing, diarrhea, nausea, rash, urinary pain or vomiting. She has tried NSAIDs for the symptoms. The treatment provided mild relief.   Sore Throat   Associated symptoms include headaches. Pertinent negatives include no congestion, coughing, diarrhea, shortness of breath or vomiting.       Constitution: Positive for fever. Negative for chills and fatigue.   HENT: Positive for sore throat. Negative for congestion.    Neck: Negative for painful lymph nodes.   Cardiovascular: Negative for chest pain and leg swelling.   Eyes: Negative for double vision and blurred vision.   Respiratory: Negative for cough and shortness of breath.    Gastrointestinal: Negative for nausea, vomiting and diarrhea.   Genitourinary: Negative for dysuria, frequency, urgency and history of kidney stones.   Musculoskeletal: Negative for joint pain, joint swelling, muscle cramps and muscle ache.   Skin: Negative for color change, pale, rash and bruising.   Allergic/Immunologic: Negative for seasonal allergies.   Neurological: Positive for headaches. Negative for dizziness, history of vertigo, light-headedness and passing out.   Hematologic/Lymphatic: Negative for swollen lymph nodes.   Psychiatric/Behavioral: Negative for nervous/anxious, sleep disturbance and depression. The patient is not nervous/anxious.        Objective:      Physical Exam " Below information given to patient, verbalized   understanding     Constitutional: She is oriented to person, place, and time.  Non-toxic appearance. She does not appear ill. No distress. normal  HENT:   Head: Normocephalic.   Nose:      Comments: No sinus tenderness  Mouth/Throat: Mucous membranes are moist. Tonsils are 3+ on the right. Tonsils are 2+ on the left. Tonsillar exudate.   Eyes: Conjunctivae are normal.   Pulmonary/Chest: Effort normal. No respiratory distress.   Abdominal: Normal appearance.   Lymphadenopathy:     She has cervical adenopathy (anterior only).   Neurological: She is alert and oriented to person, place, and time. Coordination and gait normal. Psychiatric: Her behavior is normal. Mood and thought content normal.   Nursing note and vitals reviewed.        Assessment:       1. Exudative tonsillitis        Plan:         Exudative tonsillitis  -     POCT COVID-19 Rapid Screening  -     POCT Strep A, Molecular  -     amoxicillin (AMOXIL) 500 MG capsule; Take 2 capsules (1,000 mg total) by mouth once daily. for 10 days  Dispense: 20 capsule; Refill: 0              Vitals stable. No evidence of respiratory distress noted, able to speak in complete sentences without pause.    Requesting COVID-19 testing post exposure and given symptoms and risk factors.   Tested for COVID-19 today. Rapid test was Negative. Rapid molecular strep also negative. However, given exudate on tonsils and anterior cervical adenopathy, treating for presumed strep with amoxicillin.  Advised use of probiotics and/or eating yogurt for the duration of antibiotic therapy and a few days after to help prevent antibiotic associated diarrhea or vaginitis. Discussed supportive care and OTC meds for symptom relief. Advised on return/follow-up precautions. Answered all patient questions. Patient verbalized understanding and voiced agreement with current treatment plan.

## 2020-10-27 ENCOUNTER — LAB VISIT (OUTPATIENT)
Dept: FAMILY MEDICINE | Facility: CLINIC | Age: 60
End: 2020-10-27
Payer: MEDICARE

## 2020-10-27 DIAGNOSIS — Z12.11 COLON CANCER SCREENING: ICD-10-CM

## 2020-10-27 DIAGNOSIS — L30.1 DYSHIDROTIC ECZEMA: ICD-10-CM

## 2020-10-27 PROCEDURE — U0003 INFECTIOUS AGENT DETECTION BY NUCLEIC ACID (DNA OR RNA); SEVERE ACUTE RESPIRATORY SYNDROME CORONAVIRUS 2 (SARS-COV-2) (CORONAVIRUS DISEASE [COVID-19]), AMPLIFIED PROBE TECHNIQUE, MAKING USE OF HIGH THROUGHPUT TECHNOLOGIES AS DESCRIBED BY CMS-2020-01-R: HCPCS

## 2020-10-27 RX ORDER — HYDROXYZINE HYDROCHLORIDE 50 MG/1
50 TABLET, FILM COATED ORAL 3 TIMES DAILY PRN
Qty: 270 TABLET | Refills: 1 | Status: SHIPPED | OUTPATIENT
Start: 2020-10-27 | End: 2021-02-02 | Stop reason: SDUPTHER

## 2020-10-27 RX ORDER — HYDROXYZINE HYDROCHLORIDE 50 MG/1
50 TABLET, FILM COATED ORAL 3 TIMES DAILY PRN
Qty: 270 TABLET | Refills: 1 | Status: SHIPPED | OUTPATIENT
Start: 2020-10-27 | End: 2020-10-27 | Stop reason: SDUPTHER

## 2020-10-28 LAB — SARS-COV-2 RNA RESP QL NAA+PROBE: NOT DETECTED

## 2020-10-30 ENCOUNTER — ANESTHESIA EVENT (OUTPATIENT)
Dept: ENDOSCOPY | Facility: HOSPITAL | Age: 60
End: 2020-10-30
Payer: MEDICARE

## 2020-10-30 ENCOUNTER — ANESTHESIA (OUTPATIENT)
Dept: ENDOSCOPY | Facility: HOSPITAL | Age: 60
End: 2020-10-30
Payer: MEDICARE

## 2020-10-30 ENCOUNTER — HOSPITAL ENCOUNTER (OUTPATIENT)
Facility: HOSPITAL | Age: 60
Discharge: HOME | End: 2020-10-30
Attending: INTERNAL MEDICINE | Admitting: INTERNAL MEDICINE
Payer: MEDICARE

## 2020-10-30 DIAGNOSIS — Z12.11 COLON CANCER SCREENING: ICD-10-CM

## 2020-10-30 PROCEDURE — D9220A PRA ANESTHESIA: Mod: PT,ANES,, | Performed by: ANESTHESIOLOGY

## 2020-10-30 PROCEDURE — 88305 TISSUE EXAM BY PATHOLOGIST: CPT | Mod: 59 | Performed by: PATHOLOGY

## 2020-10-30 PROCEDURE — D9220A PRA ANESTHESIA: Mod: PT,CRNA,, | Performed by: REGISTERED NURSE

## 2020-10-30 PROCEDURE — 37000008 HC ANESTHESIA 1ST 15 MINUTES: Performed by: INTERNAL MEDICINE

## 2020-10-30 PROCEDURE — 45380 COLONOSCOPY AND BIOPSY: CPT | Performed by: INTERNAL MEDICINE

## 2020-10-30 PROCEDURE — 37000009 HC ANESTHESIA EA ADD 15 MINS: Performed by: INTERNAL MEDICINE

## 2020-10-30 PROCEDURE — 45385 COLONOSCOPY W/LESION REMOVAL: CPT | Performed by: INTERNAL MEDICINE

## 2020-10-30 PROCEDURE — 27201089 HC SNARE, DISP (ANY): Performed by: INTERNAL MEDICINE

## 2020-10-30 PROCEDURE — 25000003 PHARM REV CODE 250: Performed by: REGISTERED NURSE

## 2020-10-30 PROCEDURE — D9220A PRA ANESTHESIA: ICD-10-PCS | Mod: PT,CRNA,, | Performed by: REGISTERED NURSE

## 2020-10-30 PROCEDURE — 63600175 PHARM REV CODE 636 W HCPCS: Performed by: REGISTERED NURSE

## 2020-10-30 PROCEDURE — D9220A PRA ANESTHESIA: ICD-10-PCS | Mod: PT,ANES,, | Performed by: ANESTHESIOLOGY

## 2020-10-30 RX ORDER — PROPOFOL 10 MG/ML
INJECTION, EMULSION INTRAVENOUS
Status: DISCONTINUED
Start: 2020-10-30 | End: 2020-10-30 | Stop reason: HOSPADM

## 2020-10-30 RX ORDER — ATROPINE SULFATE 0.1 MG/ML
INJECTION INTRAVENOUS
Status: DISCONTINUED
Start: 2020-10-30 | End: 2020-10-30 | Stop reason: WASHOUT

## 2020-10-30 RX ORDER — LIDOCAINE HYDROCHLORIDE 20 MG/ML
INJECTION, SOLUTION EPIDURAL; INFILTRATION; INTRACAUDAL; PERINEURAL
Status: DISCONTINUED
Start: 2020-10-30 | End: 2020-10-30 | Stop reason: HOSPADM

## 2020-10-30 RX ORDER — SUCCINYLCHOLINE CHLORIDE 20 MG/ML
INJECTION INTRAMUSCULAR; INTRAVENOUS
Status: DISCONTINUED
Start: 2020-10-30 | End: 2020-10-30 | Stop reason: WASHOUT

## 2020-10-30 RX ADMIN — Medication 100 MG: at 10:10

## 2020-10-30 RX ADMIN — PROPOFOL 50 MG: 10 INJECTION, EMULSION INTRAVENOUS at 10:10

## 2020-10-30 RX ADMIN — SODIUM CHLORIDE: 9 INJECTION, SOLUTION INTRAVENOUS at 10:10

## 2020-10-30 RX ADMIN — PROPOFOL 100 MG: 10 INJECTION, EMULSION INTRAVENOUS at 10:10

## 2020-11-02 VITALS
BODY MASS INDEX: 35.55 KG/M2 | HEART RATE: 80 BPM | SYSTOLIC BLOOD PRESSURE: 163 MMHG | TEMPERATURE: 98 F | WEIGHT: 240 LBS | HEIGHT: 69 IN | RESPIRATION RATE: 18 BRPM | DIASTOLIC BLOOD PRESSURE: 88 MMHG | OXYGEN SATURATION: 97 %

## 2020-11-02 RX ORDER — LIDOCAINE HYDROCHLORIDE 20 MG/ML
INJECTION INTRAVENOUS
Status: DISCONTINUED | OUTPATIENT
Start: 2020-10-30 | End: 2020-11-02

## 2020-11-02 RX ORDER — PROPOFOL 10 MG/ML
VIAL (ML) INTRAVENOUS
Status: DISCONTINUED | OUTPATIENT
Start: 2020-10-30 | End: 2020-11-02

## 2020-11-02 RX ORDER — SODIUM CHLORIDE 9 MG/ML
INJECTION, SOLUTION INTRAVENOUS CONTINUOUS PRN
Status: DISCONTINUED | OUTPATIENT
Start: 2020-10-30 | End: 2020-11-02

## 2020-11-02 NOTE — H&P
Short Stay Endoscopy History and Physical    PCP - Kaylie Chau MD  Referring Physician - Kaylie Chau MD  [id number] [facility]  [place] [address]    Procedure - colonoscopy  ASA - per anesthesia  Mallampati - per anesthesia  History of Anesthesia problems - no  Family history Anesthesia problems -  no   Plan of anesthesia - General    HPI:  This is a 59 y.o. male here for evaluation of: screening    Reflux - no  Dysphagia - no  Abdominal pain - no  Diarrhea - no    ROS:  Constitutional: No fevers, chills, No weight loss  CV: No chest pain  Pulm: No cough, No shortness of breath  Ophtho: No vision changes  GI: see HPI  Derm: No rash    Medical History:  has a past medical history of Bipolar 1 disorder, mixed, BPH (benign prostatic hypertrophy), Colon polyp, Cyst, eyelid, Diabetes mellitus, GERD (gastroesophageal reflux disease), Hyperlipidemia, Hypertension, Lumbar degenerative disc disease (3/7/2019), Migraine headache, and Obesity.    Surgical History:  has a past surgical history that includes Cervical spine surgery; Tonsillectomy; Shoulder surgery; Colonoscopy (N/A, 7/27/2017); and Eye surgery (Left, 03/2017).    Family History: family history includes Cancer in his brother, cousin, and mother; Cataracts in his mother; Hypertension in his brother, father, mother, and sister; Stroke in his father..    Social History:  reports that he quit smoking about 36 years ago. His smoking use included cigarettes. He has a 30.00 pack-year smoking history. He has never used smokeless tobacco. He reports that he does not drink alcohol or use drugs.    Review of patient's allergies indicates:   Allergen Reactions    Sulfa (sulfonamide antibiotics) Rash       Medications:   Medications Prior to Admission   Medication Sig Dispense Refill Last Dose    amLODIPine (NORVASC) 10 MG tablet TAKE 1 TABLET BY MOUTH EVERY DAY 90 tablet 3 10/30/2020 at Unknown time    buPROPion (WELLBUTRIN XL) 300 MG 24 hr tablet Take  300 mg by mouth every morning.  2 10/29/2020 at Unknown time    busPIRone (BUSPAR) 10 MG tablet Take 10 mg by mouth 3 (three) times daily.   10/29/2020 at Unknown time    [] clindamycin (CLEOCIN) 150 MG capsule Take 2 capsules (300 mg total) by mouth 4 (four) times daily. for 10 days 80 capsule 0 10/30/2020 at Unknown time    gemfibroziL (LOPID) 600 MG tablet Take 1 tablet (600 mg total) by mouth 2 (two) times daily before meals. 180 tablet 0 10/30/2020 at Unknown time    metFORMIN (GLUCOPHAGE) 1000 MG tablet TAKE 1 TABLET BY MOUTH TWICE A  tablet 0 10/30/2020 at Unknown time    simvastatin (ZOCOR) 80 MG tablet Take 1 tablet (80 mg total) by mouth once daily. 90 tablet 2 10/29/2020 at Unknown time    tamsulosin (FLOMAX) 0.4 mg Cap TAKE 1 CAPSULE BY MOUTH EVERY DAY 30 capsule 0 10/29/2020 at Unknown time    albuterol (VENTOLIN HFA) 90 mcg/actuation inhaler INHALE 2 PUFFS EVERY 4 HOURS AS NEEDED 18 g 3     azelastine (ASTELIN) 137 mcg (0.1 %) nasal spray 1 spray (137 mcg total) by Nasal route 2 (two) times daily. 30 mL 3     betamethasone dipropionate (DIPROLENE) 0.05 % ointment Apply topically 2 (two) times daily. 45 g 1     blood sugar diagnostic (TRUETEST TEST STRIPS) Strp 1 each by Misc.(Non-Drug; Combo Route) route 3 (three) times a week. 100 each 3     candesartan (ATACAND) 4 MG tablet Take 1 tablet (4 mg total) by mouth once daily. 90 tablet 3     CLOTRIMAZOLE-BETAMETH DIP-ZINC TOP        clotrimazole-betamethasone 1-0.05% (LOTRISONE) cream Apply topically 2 (two) times daily. 90 g 2     crisaborole (EUCRISA) 2 % Oint Use for hand eczema bid. 60 g 5     cyclobenzaprine (FLEXERIL) 10 MG tablet Take 1 tablet (10 mg total) by mouth 3 (three) times daily as needed for Muscle spasms. 270 tablet 1     DUPIXENT 300 mg/2 mL Syrg Inject 2 syringes (600mg) into the skin on day 0, then 1 syringe (300mg) into the skin on day 14 and day 28 8 mL 0     DUPIXENT 300 mg/2 mL Syrg Inject 1 syringe  (300mg) into the skin every other week 4 mL 2     etodolac (LODINE) 500 MG tablet TAKE 1 TABLET BY MOUTH TWICE A DAY 60 tablet 0     fluticasone furoate-vilanterol (BREO) 100-25 mcg/dose diskus inhaler Inhale 1 puff into the lungs once daily. Controller 90 each 1     fluticasone propionate (FLONASE) 50 mcg/actuation nasal spray TAKE 1 SPRAY BY EACH NARE ROUTE ONCE DAILY. 16 mL 5     folic acid (FOLVITE) 800 MCG Tab Take 800 mcg by mouth once daily.       furosemide (LASIX) 40 MG tablet TAKE 1 TABLET BY MOUTH EVERY DAY 90 tablet 0     gabapentin (NEURONTIN) 300 MG capsule Take 300 mg by mouth 2 (two) times daily.       hydrOXYzine (ATARAX) 50 MG tablet Take 1 tablet (50 mg total) by mouth 3 (three) times daily as needed for Itching. 270 tablet 1     levocetirizine (XYZAL) 5 MG tablet TAKE 1 TABLET BY MOUTH EACH MORNING 90 tablet 3     lidocaine (XYLOCAINE) 5 % Oint ointment AAA TID as needed for itching, pain 50 g 3     LIDOCAINE VISCOUS 2 % solution        mometasone 0.1% (ELOCON) 0.1 % cream Apply topically once daily. AAA bid prn for hand eczema 45 g 1     mupirocin (BACTROBAN) 2 % ointment Apply topically 2 (two) times daily. Apply with Q-tip 30 g 3     mupirocin (BACTROBAN) 2 % ointment Apply to inside of nose as instructed 2 times daily 30 g 1     pantoprazole (PROTONIX) 40 MG tablet TAKE 1 TABLET BY MOUTH DAILY 90 tablet 3     predniSONE (DELTASONE) 20 MG tablet TAKE 1 TABLET BY MOUTH EVERY DAY 7 tablet 0     quetiapine (SEROQUEL) 300 MG Tab Take by mouth.       [] sodium,potassium,mag sulfates (SUPREP BOWEL PREP KIT) 17.5-3.13-1.6 gram SolR Take 177 mLs by mouth once daily. for 2 days 1 kit 0     SPIRIVA WITH HANDIHALER 18 mcg inhalation capsule INHALE 1 CAPSULE (18 MCG TOTAL) INTO THE LUNGS ONCE DAILY. CONTROLLER 30 capsule 0     sumatriptan (IMITREX) 100 MG tablet TAKE 1 TABLET BY MOUTH EVERY 2 HOURS AS NEEDED FOR MIGRAINE 12 tablet 1     tiZANidine (ZANAFLEX) 4 MG tablet Take 4  mg by mouth every 8 (eight) hours.       triamcinolone acetonide 0.1% (KENALOG) 0.1 % cream AAA bid prn for rash on chest and arms. Do not use on face, underarms or groin 454 g 1     triamcinolone acetonide 0.1% (KENALOG) 0.1 % ointment AAA bid 454 g 1     zonisamide (ZONEGRAN) 50 MG Cap Take 50 mg by mouth 2 (two) times daily.          Physical Exam:    Vital Signs:   Vitals:    10/30/20 0923   BP: (!) 141/81   Pulse: 83   Resp: 20   Temp: 98.4 °F (36.9 °C)       General Appearance: Well appearing in no acute distress    Labs:  Lab Results   Component Value Date    WBC 6.66 07/16/2020    HGB 12.7 (L) 07/16/2020    HCT 39.0 (L) 07/16/2020     07/16/2020    CHOL 123 02/12/2020    TRIG 161 (H) 02/12/2020    HDL 31 (L) 02/12/2020    ALT 32 07/16/2020    AST 21 07/16/2020     07/16/2020    K 3.8 07/16/2020     07/16/2020    CREATININE 0.9 07/16/2020    BUN 17 07/16/2020    CO2 25 07/16/2020    TSH 0.918 02/12/2020    HGBA1C 6.1 (H) 08/25/2020       I have explained the risks and benefits of this endoscopic procedure to the patient including but not limited to bleeding, inflammation, infection, perforation, and death.      Herb Martinez MD

## 2020-11-02 NOTE — TRANSFER OF CARE
"Anesthesia Transfer of Care Note    Patient: Scott Bansal    Procedure(s) Performed: Procedure(s) (LRB):  COLONOSCOPY (N/A)    Patient location: GI    Anesthesia Type: general    Transport from OR: Transported from OR on room air with adequate spontaneous ventilation    Post pain: adequate analgesia    Post assessment: no apparent anesthetic complications and tolerated procedure well    Post vital signs: stable    Level of consciousness: awake, alert and oriented    Nausea/Vomiting: no nausea/vomiting    Complications: none    Transfer of care protocol was followed      Last vitals:   Visit Vitals  BP (!) 163/88 (BP Location: Left arm, Patient Position: Lying)   Pulse 80   Temp 36.6 °C (97.9 °F) (Oral)   Resp 18   Ht 5' 9" (1.753 m)   Wt 108.9 kg (240 lb)   SpO2 97%   BMI 35.44 kg/m²     "

## 2020-11-03 NOTE — ANESTHESIA PREPROCEDURE EVALUATION
11/03/2020  Scott Bansal is a 59 y.o., male.    Anesthesia Evaluation     I have reviewed the Nursing Notes.       Review of Systems  Anesthesia Hx:  No problems with previous Anesthesia   Social:  Former Smoker    Cardiovascular:   Denies Pacemaker. Hypertension  Denies Valvular problems/Murmurs.  Denies MI.  Denies CAD.    Denies CABG/stent.  Denies Dysrhythmias.   Denies Angina.             denies PVD hyperlipidemia    Pulmonary:   COPD Sleep Apnea    Renal/:   Denies Chronic Renal Disease. BPH    Hepatic/GI:   Bowel Prep. Denies PUD. GERD Denies Liver Disease. Denies Hepatitis.    Musculoskeletal:   Arthritis     Neurological:   Headaches Denies Seizures.    Endocrine:   Diabetes, type 2 Denies Hypothyroidism. Denies Hyperthyroidism.    Psych:   Psychiatric History          Physical Exam  General:  Obesity    Airway/Jaw/Neck:  AIRWAY FINDINGS: Normal      Chest/Lungs:  Chest/Lungs Clear    Heart/Vascular:  Heart Findings: Normal       Mental Status:  Mental Status Findings: Normal        Anesthesia Plan  Type of Anesthesia, risks & benefits discussed:  Anesthesia Type:  general  Patient's Preference:   Intra-op Monitoring Plan: standard ASA monitors  Intra-op Monitoring Plan Comments:   Post Op Pain Control Plan:   Post Op Pain Control Plan Comments:   Induction:   IV  Beta Blocker:  Patient is not currently on a Beta-Blocker (No further documentation required).       Informed Consent: Patient understands risks and agrees with Anesthesia plan.  Questions answered. Anesthesia consent signed with patient.  ASA Score: 2     Day of Surgery Review of History & Physical:  There are no significant changes.  H&P update referred to the provider.         Ready For Surgery From Anesthesia Perspective.

## 2020-11-03 NOTE — ANESTHESIA POSTPROCEDURE EVALUATION
Anesthesia Post Evaluation    Patient: Scott Bansal    Procedure(s) Performed: Procedure(s) (LRB):  COLONOSCOPY (N/A)    Final Anesthesia Type: general    Patient location during evaluation: GI PACU  Patient participation: Yes- Able to Participate  Level of consciousness: awake and alert  Post-procedure vital signs: reviewed and stable  Pain management: adequate  Airway patency: patent    PONV status at discharge: No PONV  Anesthetic complications: no      Cardiovascular status: blood pressure returned to baseline and hemodynamically stable  Respiratory status: unassisted and spontaneous ventilation  Hydration status: euvolemic  Follow-up not needed.          Vitals Value Taken Time   /88 10/30/20 1100   Temp 36.6 °C (97.9 °F) 10/30/20 1100   Pulse 80 10/30/20 1100   Resp 18 10/30/20 1100   SpO2 97 % 10/30/20 1100         No case tracking events are documented in the log.      Pain/Fina Score: No data recorded

## 2020-11-04 ENCOUNTER — OFFICE VISIT (OUTPATIENT)
Dept: DERMATOLOGY | Facility: CLINIC | Age: 60
End: 2020-11-04
Payer: MEDICARE

## 2020-11-04 ENCOUNTER — TELEPHONE (OUTPATIENT)
Dept: DERMATOLOGY | Facility: CLINIC | Age: 60
End: 2020-11-04

## 2020-11-04 DIAGNOSIS — L25.9 CONTACT DERMATITIS, UNSPECIFIED CONTACT DERMATITIS TYPE, UNSPECIFIED TRIGGER: Primary | ICD-10-CM

## 2020-11-04 DIAGNOSIS — L20.9 ATOPIC DERMATITIS, UNSPECIFIED TYPE: ICD-10-CM

## 2020-11-04 PROCEDURE — 99214 OFFICE O/P EST MOD 30 MIN: CPT | Mod: S$GLB,,, | Performed by: STUDENT IN AN ORGANIZED HEALTH CARE EDUCATION/TRAINING PROGRAM

## 2020-11-04 PROCEDURE — 99999 PR PBB SHADOW E&M-EST. PATIENT-LVL III: ICD-10-PCS | Mod: PBBFAC,,, | Performed by: STUDENT IN AN ORGANIZED HEALTH CARE EDUCATION/TRAINING PROGRAM

## 2020-11-04 PROCEDURE — 99214 PR OFFICE/OUTPT VISIT, EST, LEVL IV, 30-39 MIN: ICD-10-PCS | Mod: S$GLB,,, | Performed by: STUDENT IN AN ORGANIZED HEALTH CARE EDUCATION/TRAINING PROGRAM

## 2020-11-04 PROCEDURE — 99999 PR PBB SHADOW E&M-EST. PATIENT-LVL III: CPT | Mod: PBBFAC,,, | Performed by: STUDENT IN AN ORGANIZED HEALTH CARE EDUCATION/TRAINING PROGRAM

## 2020-11-04 RX ORDER — HYDROCORTISONE 25 MG/G
OINTMENT TOPICAL 2 TIMES DAILY
Qty: 30 G | Refills: 1 | Status: SHIPPED | OUTPATIENT
Start: 2020-11-04 | End: 2022-10-04

## 2020-11-04 RX ORDER — VANCOMYCIN HYDROCHLORIDE 1 G/20ML
INJECTION, POWDER, LYOPHILIZED, FOR SOLUTION INTRAVENOUS
COMMUNITY
Start: 2020-09-04 | End: 2022-10-04

## 2020-11-04 NOTE — TELEPHONE ENCOUNTER
----- Message from Niya Bowman sent at 11/4/2020  9:41 AM CST -----  Pt would like schedule an appt. Pt is an prev pt of . Please call back at [phone]

## 2020-11-04 NOTE — PROGRESS NOTES
Subjective:       Patient ID:  Scott Bansal is a 59 y.o. male who presents for   Chief Complaint   Patient presents with    Rash     on eye and around mouth , x 5 days, tx aquaphor and soap      History of Present Illness: The patient presents with chief complaint of rash .  Location: around mouth and eye   Duration: 5 days   Signs/Symptoms: dry and scaly   Prior treatments: clindamycin, aquaphor    Also has a hx of atopic dermatitis. Currently well controlled with dupixent.       Review of Systems   Skin: Positive for itching and rash.   Hematologic/Lymphatic: Does not bruise/bleed easily.        Objective:    Physical Exam   Constitutional: He appears well-developed and well-nourished. No distress.   Neurological: He is alert and oriented to person, place, and time. He is not disoriented.   Psychiatric: He has a normal mood and affect.   Skin:   Areas Examined (abnormalities noted in diagram):   Head / Face Inspection Performed  Neck Inspection Performed  RUE Inspected  LUE Inspection Performed  RLE Inspected  LLE Inspection Performed  Nails and Digits Inspection Performed                   Diagram Legend     Erythematous scaling macule/papule c/w actinic keratosis       Vascular papule c/w angioma      Pigmented verrucoid papule/plaque c/w seborrheic keratosis      Yellow umbilicated papule c/w sebaceous hyperplasia      Irregularly shaped tan macule c/w lentigo     1-2 mm smooth white papules consistent with Milia      Movable subcutaneous cyst with punctum c/w epidermal inclusion cyst      Subcutaneous movable cyst c/w pilar cyst      Firm pink to brown papule c/w dermatofibroma      Pedunculated fleshy papule(s) c/w skin tag(s)      Evenly pigmented macule c/w junctional nevus     Mildly variegated pigmented, slightly irregular-bordered macule c/w mildly atypical nevus      Flesh colored to evenly pigmented papule c/w intradermal nevus       Pink pearly papule/plaque c/w basal cell carcinoma       Erythematous hyperkeratotic cursted plaque c/w SCC      Surgical scar with no sign of skin cancer recurrence      Open and closed comedones      Inflammatory papules and pustules      Verrucoid papule consistent consistent with wart     Erythematous eczematous patches and plaques     Dystrophic onycholytic nail with subungual debris c/w onychomycosis     Umbilicated papule    Erythematous-base heme-crusted tan verrucoid plaque consistent with inflamed seborrheic keratosis     Erythematous Silvery Scaling Plaque c/w Psoriasis     See annotation      Assessment / Plan:        Contact dermatitis, unspecified contact dermatitis type, unspecified trigger  Patient reports using new eyedrops. Suspect likely 2/2 to eyedrops  -     hydrocortisone 2.5 % ointment; Apply topically 2 (two) times daily. Apply twice daily to affected area on face.  Dispense: 30 g; Refill: 1  - Recommend ROAT (apply eyedrop to glabella region) to see if he develops a rash at site    Atopic dermatitis  - Controlled with Dupixent         Follow up in about 7 weeks (around 12/23/2020).

## 2020-11-04 NOTE — DISCHARGE INSTRUCTIONS
Medications/procucts to avoid that contain aspirin and/or extends bleeding time. This list IS NOT a complete list. Consult your pharmacist for medication questions.    4-WAY COLD TABLETS    CEPHALGESIC                 INDOCIN                                      PEPTO-BISMOL  ADPRIN-B                              CHERACOL                      INDOMETHACIN                          PERCODAN  ADVIL                                      CLINORIL                         ISOLLYL                                       PHENAPHEN  ALCOHOL                                CONGESPRIN                  KETOLAC                                     PIROXICAM  ALEVE                                      CORICIDIN                       KETOPROFEN                             PONSTEL  OG-SELTZER                       COUMADIN                      LANORINAL                                 PRESALIN  AMIGESIC                                COPE                               LODINE                                         RELAFEN  ANACIN                                    DARVON                          LORTAB                                       ROBAXISAL  ANAPROX                                DASIN                              EPHRAIM                                         RUFEN  ANODYNOS                             DAYPRO                          MAGNAPRIN                                S-A-C  ANSAID                                    DIFLUNISAL                     MAGSAL                                       SALETO  APC                                          DISALCID                         MARNAL                                       SALFAX  ARGESIC                                 POLO'S                            MARTHRITIC                                SALOCOL  ARTHA-G                                 DOLOBID                         MAXIMUM GALE ASPIRIN         SALSALATE   ARTHRALGEN                         ZAINA                          MEASURIN                                    SALSITABS  ARTHRITIS GALE                 DUOPIN-S SYRUP          MECLOFENAMATE                       SINE-AIDE  ARTHRITIS PAIN                     DURADYNE                     MCCLOMEN                                  SINE-OFF  ARTHRITIS BUFFERIN           EASPRIN                         MEDIPREN                                     SK-65  ARTHROPAN                          ECOTRIN                         MENADOL                                      SOMA  ARTHROTEC                          EMAGRIN                         MEPROGESIC                               SOMA CMD  ASCODEEN                            EMPIRIN                           MICRAININ                                    SULINDAC           ASCRIPTIN                             EMPRAZIL                        MIDOL                                            SUPAC  ASPERBUF                             ENDODAN                        MOBIDIN                                        SYNALGOS  ASPERGUM                            EPROMATE                     MOBIGESIC                                   SYNALGOS D-C  ASPIRIN                                  EQUAGESIC                    MOMENTUM                                  TALWIN  ASPRIMOX                             EQUAZINE                        NALFON                                        TOLECTIN  AXDONE                                 ETODOLAC                      NAPROSYN                                   TOLECTIN DS  AXOTAL                                  EXCEDRIN                       NAPROXEN                                   TORADOL  GALE                                    FELDENE                         NEOCYLATE                                  TRENDAR  BC                                           FENOPROFEN                 NORGESIC                                     TRICOSAL  BRUFEN                                 FIORGEN                           NORGESIC FORTE                       TRI-PAIN  BUF-TAB                                FIORINAL                          [place] EXTRA-STRENGTH    TRIGESIC  BUFF-A COMP                       FLURIPROFEN                 NUPRIN                                          TRILISATE  BUFFAPRIN                           GELPIRIN                          ORPHENGESIC                              VANQUISH  BUFFERIN                             GENSAN                            ORUDIS                                          VERIN  BUFFETS II                            GOODY'S                          ORUVAIL                                         VOLTAREN  BUFFEX                                 HALFPRIN         PABALATE                                      ZACTIN  BUFFINOL                             IBU-TAB         PABALATE-SF                                ZORPRIN  CAMA ARTHRITIS                IBUPROHM                        PAC  CATAFLAM                           INDOCHRON E-R              PAMPRIN-IB TABLETS             Understanding Colon and Rectal Polyps    The colon (also called the large intestine) is a muscular tube that forms the last part of the digestive tract. It absorbs water and stores food waste. The colon is about 4 to 6 feet long. The rectum is the last 6 inches of the colon. The colon and rectum have a smooth lining composed of millions of cells. Changes in these cells can lead to growths in the colon that can become cancerous and should be removed. Multiple tests are available to screen for colon cancer, but the colonoscopy is the most recommended test. During colonoscopy, these polyps can be removed. How often you need this test depends on many things including your condition, your family history, symptoms, and what the findings were at the previous colonoscopy.   When the colon lining changes  Changes that happen in the cells that line the colon or rectum can lead to growths called polyps. Over a  period of years, polyps can turn cancerous. Removing polyps early may prevent cancer from ever forming.  Polyps  Polyps are fleshy clumps of tissue that form on the lining of the colon or rectum. Small polyps are usually benign (not cancerous). However, over time, cells in a polyp can change and become cancerous. Certain types of polyps known as adenomatous polyps are premalignant. The risk for invasive cancer increases with the size of the polyp and certain cell and gene features. This means that they can become cancerous if they're not removed. Hyperplastic polyps are benign. They can grow quite large and not turn cancerous.   Cancer  Almost all colorectal cancers start when polyp cells begin growing abnormally. As a cancerous tumor grows, it may involve more and more of the colon or rectum. In time, cancer can also grow beyond the colon or rectum and spread to nearby organs or to glands called lymph nodes. The cells can also travel to other parts of the body. This is known as metastasis. The earlier a cancerous tumor is removed, the better the chance of preventing its spread.    Date Last Reviewed: 8/1/2016  © 8949-0208 [employer]. [address] [place] [address], [place] [address]. All rights reserved. This information is not intended as a substitute for professional medical care. Always follow your healthcare professional's instructions.

## 2020-11-04 NOTE — TELEPHONE ENCOUNTER
Returned call to pt and he wanted a same day appt for rash.  Pt was scheduled with Dr Johnson today @ 1 pm per his request.  Pt verbalized understanding to all information given and had no further questions.

## 2020-12-21 ENCOUNTER — PATIENT OUTREACH (OUTPATIENT)
Dept: ADMINISTRATIVE | Facility: OTHER | Age: 60
End: 2020-12-21

## 2020-12-22 ENCOUNTER — OFFICE VISIT (OUTPATIENT)
Dept: DERMATOLOGY | Facility: CLINIC | Age: 60
End: 2020-12-22
Payer: MEDICARE

## 2020-12-22 DIAGNOSIS — L85.3 XEROSIS CUTIS: ICD-10-CM

## 2020-12-22 DIAGNOSIS — L25.9 CONTACT DERMATITIS, UNSPECIFIED CONTACT DERMATITIS TYPE, UNSPECIFIED TRIGGER: ICD-10-CM

## 2020-12-22 DIAGNOSIS — L20.89 OTHER ATOPIC DERMATITIS: Primary | ICD-10-CM

## 2020-12-22 PROCEDURE — 99999 PR PBB SHADOW E&M-EST. PATIENT-LVL III: CPT | Mod: PBBFAC,,, | Performed by: STUDENT IN AN ORGANIZED HEALTH CARE EDUCATION/TRAINING PROGRAM

## 2020-12-22 PROCEDURE — 99214 PR OFFICE/OUTPT VISIT, EST, LEVL IV, 30-39 MIN: ICD-10-PCS | Mod: S$GLB,,, | Performed by: STUDENT IN AN ORGANIZED HEALTH CARE EDUCATION/TRAINING PROGRAM

## 2020-12-22 PROCEDURE — 99999 PR PBB SHADOW E&M-EST. PATIENT-LVL III: ICD-10-PCS | Mod: PBBFAC,,, | Performed by: STUDENT IN AN ORGANIZED HEALTH CARE EDUCATION/TRAINING PROGRAM

## 2020-12-22 PROCEDURE — 99214 OFFICE O/P EST MOD 30 MIN: CPT | Mod: S$GLB,,, | Performed by: STUDENT IN AN ORGANIZED HEALTH CARE EDUCATION/TRAINING PROGRAM

## 2020-12-22 RX ORDER — NEOMYCIN SULFATE, POLYMYXIN B SULFATE, AND DEXAMETHASONE 3.5; 10000; 1 MG/G; [USP'U]/G; MG/G
OINTMENT OPHTHALMIC
COMMUNITY
Start: 2020-12-18 | End: 2022-10-04

## 2020-12-22 NOTE — PROGRESS NOTES
Subjective:       Patient ID:  Scott Bansal is a 60 y.o. male who presents for   Chief Complaint   Patient presents with    Dry Skin     hands cracked     Rash     on leg x 1 week      61 yo M with hx of contact dermatitis here for follow up. At the last visit, felt to be 2/2 eye drops. He was given topical steroids. His eye doctor also gave him steroid eyedrops. He reports improvement in his facial rash.     Also hx of atopic dermatitis. Flaring on left calf today. Has TAC ointment at home. Has been applying neosporin on the area.    Patient with new complaint of rash  Location: palms  Duration: months  Symptoms: dry and fissure  Relieving factors/Previous treatments: none        Review of Systems   Skin: Positive for itching, rash and dry skin.   Hematologic/Lymphatic: Does not bruise/bleed easily.        Objective:    Physical Exam   Constitutional: He appears well-developed and well-nourished. No distress.   Neurological: He is alert and oriented to person, place, and time. He is not disoriented.   Psychiatric: He has a normal mood and affect.   Skin:   Areas Examined (abnormalities noted in diagram):   Head / Face Inspection Performed  Neck Inspection Performed  Chest / Axilla Inspection Performed  Back Inspection Performed  RUE Inspected  LUE Inspection Performed                   Diagram Legend     Erythematous scaling macule/papule c/w actinic keratosis       Vascular papule c/w angioma      Pigmented verrucoid papule/plaque c/w seborrheic keratosis      Yellow umbilicated papule c/w sebaceous hyperplasia      Irregularly shaped tan macule c/w lentigo     1-2 mm smooth white papules consistent with Milia      Movable subcutaneous cyst with punctum c/w epidermal inclusion cyst      Subcutaneous movable cyst c/w pilar cyst      Firm pink to brown papule c/w dermatofibroma      Pedunculated fleshy papule(s) c/w skin tag(s)      Evenly pigmented macule c/w junctional nevus     Mildly variegated  pigmented, slightly irregular-bordered macule c/w mildly atypical nevus      Flesh colored to evenly pigmented papule c/w intradermal nevus       Pink pearly papule/plaque c/w basal cell carcinoma      Erythematous hyperkeratotic cursted plaque c/w SCC      Surgical scar with no sign of skin cancer recurrence      Open and closed comedones      Inflammatory papules and pustules      Verrucoid papule consistent consistent with wart     Erythematous eczematous patches and plaques     Dystrophic onycholytic nail with subungual debris c/w onychomycosis     Umbilicated papule    Erythematous-base heme-crusted tan verrucoid plaque consistent with inflamed seborrheic keratosis     Erythematous Silvery Scaling Plaque c/w Psoriasis     See annotation      Assessment / Plan:        Other atopic dermatitis  - Dry skin care reiterated  - Continue applying TAC ointment to affected site    Xerosis cutis  - Dry skin care    Contact dermatitis, unspecified contact dermatitis type, unspecified trigger  - Continue hydrocortisone cream to affected area on face, up to 2 weeks at a time           Follow up in about 3 months (around 3/22/2021).

## 2020-12-29 ENCOUNTER — TELEPHONE (OUTPATIENT)
Dept: DERMATOLOGY | Facility: CLINIC | Age: 60
End: 2020-12-29

## 2020-12-29 NOTE — TELEPHONE ENCOUNTER
----- Message from Sanjuana Bella sent at 12/29/2020  9:41 AM CST -----  Contact: [facility]  States she's calling to get an updated PA/ insurance information and Rx on the pt / the current information expires on the 31st /fax [phone] and can be reached at [phone]//thanks/dbw

## 2021-01-05 ENCOUNTER — OFFICE VISIT (OUTPATIENT)
Dept: FAMILY MEDICINE | Facility: CLINIC | Age: 61
End: 2021-01-05
Payer: MEDICARE

## 2021-01-05 VITALS
WEIGHT: 253.19 LBS | TEMPERATURE: 99 F | BODY MASS INDEX: 37.5 KG/M2 | SYSTOLIC BLOOD PRESSURE: 140 MMHG | DIASTOLIC BLOOD PRESSURE: 78 MMHG | OXYGEN SATURATION: 97 % | HEART RATE: 93 BPM | HEIGHT: 69 IN

## 2021-01-05 DIAGNOSIS — R05.9 COUGH: ICD-10-CM

## 2021-01-05 DIAGNOSIS — M54.31 RIGHT SIDED SCIATICA: Primary | ICD-10-CM

## 2021-01-05 PROCEDURE — 99999 PR PBB SHADOW E&M-EST. PATIENT-LVL V: ICD-10-PCS | Mod: PBBFAC,,, | Performed by: FAMILY MEDICINE

## 2021-01-05 PROCEDURE — 3078F DIAST BP <80 MM HG: CPT | Mod: CPTII,S$GLB,, | Performed by: FAMILY MEDICINE

## 2021-01-05 PROCEDURE — 3078F PR MOST RECENT DIASTOLIC BLOOD PRESSURE < 80 MM HG: ICD-10-PCS | Mod: CPTII,S$GLB,, | Performed by: FAMILY MEDICINE

## 2021-01-05 PROCEDURE — 3077F SYST BP >= 140 MM HG: CPT | Mod: CPTII,S$GLB,, | Performed by: FAMILY MEDICINE

## 2021-01-05 PROCEDURE — 99214 PR OFFICE/OUTPT VISIT, EST, LEVL IV, 30-39 MIN: ICD-10-PCS | Mod: 25,S$GLB,, | Performed by: FAMILY MEDICINE

## 2021-01-05 PROCEDURE — 96372 PR INJECTION,THERAP/PROPH/DIAG2ST, IM OR SUBCUT: ICD-10-PCS | Mod: S$GLB,,, | Performed by: FAMILY MEDICINE

## 2021-01-05 PROCEDURE — 99999 PR PBB SHADOW E&M-EST. PATIENT-LVL V: CPT | Mod: PBBFAC,,, | Performed by: FAMILY MEDICINE

## 2021-01-05 PROCEDURE — 1125F PR PAIN SEVERITY QUANTIFIED, PAIN PRESENT: ICD-10-PCS | Mod: S$GLB,,, | Performed by: FAMILY MEDICINE

## 2021-01-05 PROCEDURE — 1125F AMNT PAIN NOTED PAIN PRSNT: CPT | Mod: S$GLB,,, | Performed by: FAMILY MEDICINE

## 2021-01-05 PROCEDURE — 3008F PR BODY MASS INDEX (BMI) DOCUMENTED: ICD-10-PCS | Mod: CPTII,S$GLB,, | Performed by: FAMILY MEDICINE

## 2021-01-05 PROCEDURE — 99214 OFFICE O/P EST MOD 30 MIN: CPT | Mod: 25,S$GLB,, | Performed by: FAMILY MEDICINE

## 2021-01-05 PROCEDURE — 3008F BODY MASS INDEX DOCD: CPT | Mod: CPTII,S$GLB,, | Performed by: FAMILY MEDICINE

## 2021-01-05 PROCEDURE — 96372 THER/PROPH/DIAG INJ SC/IM: CPT | Mod: S$GLB,,, | Performed by: FAMILY MEDICINE

## 2021-01-05 PROCEDURE — 3077F PR MOST RECENT SYSTOLIC BLOOD PRESSURE >= 140 MM HG: ICD-10-PCS | Mod: CPTII,S$GLB,, | Performed by: FAMILY MEDICINE

## 2021-01-05 RX ORDER — BENZONATATE 100 MG/1
100 CAPSULE ORAL 3 TIMES DAILY PRN
Qty: 30 CAPSULE | Refills: 0 | OUTPATIENT
Start: 2021-01-05 | End: 2021-01-31

## 2021-01-05 RX ORDER — KETOROLAC TROMETHAMINE 30 MG/ML
30 INJECTION, SOLUTION INTRAMUSCULAR; INTRAVENOUS ONCE
Status: COMPLETED | OUTPATIENT
Start: 2021-01-05 | End: 2021-01-05

## 2021-01-05 RX ORDER — NAPROXEN 500 MG/1
500 TABLET ORAL 2 TIMES DAILY PRN
Qty: 30 TABLET | Refills: 0 | Status: SHIPPED | OUTPATIENT
Start: 2021-01-05 | End: 2021-01-27

## 2021-01-05 RX ADMIN — KETOROLAC TROMETHAMINE 30 MG: 30 INJECTION, SOLUTION INTRAMUSCULAR; INTRAVENOUS at 03:01

## 2021-01-13 ENCOUNTER — TELEPHONE (OUTPATIENT)
Dept: FAMILY MEDICINE | Facility: CLINIC | Age: 61
End: 2021-01-13

## 2021-01-13 DIAGNOSIS — M54.31 RIGHT SIDED SCIATICA: Primary | ICD-10-CM

## 2021-01-15 ENCOUNTER — TELEPHONE (OUTPATIENT)
Dept: DERMATOLOGY | Facility: CLINIC | Age: 61
End: 2021-01-15

## 2021-01-21 ENCOUNTER — CLINICAL SUPPORT (OUTPATIENT)
Dept: INFECTIOUS DISEASES | Facility: CLINIC | Age: 61
End: 2021-01-21
Payer: MEDICARE

## 2021-01-21 DIAGNOSIS — D84.9 IMMUNOCOMPROMISED, ACQUIRED: ICD-10-CM

## 2021-01-21 DIAGNOSIS — Z23 VIRAL HEPATITIS VACCINATION: Primary | ICD-10-CM

## 2021-01-21 PROCEDURE — 90750 HZV VACC RECOMBINANT IM: CPT | Mod: S$GLB,,, | Performed by: INTERNAL MEDICINE

## 2021-01-21 PROCEDURE — 90472 PR IMMUNIZ,ADMIN,EACH ADDL: ICD-10-PCS | Mod: S$GLB,,, | Performed by: INTERNAL MEDICINE

## 2021-01-21 PROCEDURE — 90636 HEP A/HEP B VACC ADULT IM: CPT | Mod: S$GLB,,, | Performed by: INTERNAL MEDICINE

## 2021-01-21 PROCEDURE — 90472 IMMUNIZATION ADMIN EACH ADD: CPT | Mod: S$GLB,,, | Performed by: INTERNAL MEDICINE

## 2021-01-21 PROCEDURE — 90636 HEPATITIS A HEPATITIS B COMBINED VACCINE IM: ICD-10-PCS | Mod: S$GLB,,, | Performed by: INTERNAL MEDICINE

## 2021-01-21 PROCEDURE — 90471 HEPATITIS A HEPATITIS B COMBINED VACCINE IM: ICD-10-PCS | Mod: S$GLB,,, | Performed by: INTERNAL MEDICINE

## 2021-01-21 PROCEDURE — 90750 PR ZOSTER VACCINE; RECOMBINANT, IM: ICD-10-PCS | Mod: S$GLB,,, | Performed by: INTERNAL MEDICINE

## 2021-01-21 PROCEDURE — 90471 IMMUNIZATION ADMIN: CPT | Mod: S$GLB,,, | Performed by: INTERNAL MEDICINE

## 2021-01-26 ENCOUNTER — TELEPHONE (OUTPATIENT)
Dept: DERMATOLOGY | Facility: CLINIC | Age: 61
End: 2021-01-26

## 2021-01-26 DIAGNOSIS — M54.31 RIGHT SIDED SCIATICA: ICD-10-CM

## 2021-01-27 RX ORDER — NAPROXEN 500 MG/1
TABLET ORAL
Qty: 30 TABLET | Refills: 0 | OUTPATIENT
Start: 2021-01-27 | End: 2021-01-31

## 2021-01-31 ENCOUNTER — HOSPITAL ENCOUNTER (EMERGENCY)
Facility: HOSPITAL | Age: 61
Discharge: HOME | End: 2021-01-31
Attending: EMERGENCY MEDICINE
Payer: MEDICARE

## 2021-01-31 VITALS
HEIGHT: 69 IN | TEMPERATURE: 98 F | BODY MASS INDEX: 36.58 KG/M2 | RESPIRATION RATE: 18 BRPM | WEIGHT: 247 LBS | HEART RATE: 91 BPM | OXYGEN SATURATION: 95 % | DIASTOLIC BLOOD PRESSURE: 99 MMHG | SYSTOLIC BLOOD PRESSURE: 178 MMHG

## 2021-01-31 DIAGNOSIS — L73.9 FOLLICULITIS: Primary | ICD-10-CM

## 2021-01-31 PROCEDURE — 99284 EMERGENCY DEPT VISIT MOD MDM: CPT

## 2021-01-31 RX ORDER — HYDROCODONE BITARTRATE AND ACETAMINOPHEN 5; 325 MG/1; MG/1
1 TABLET ORAL
Status: DISCONTINUED | OUTPATIENT
Start: 2021-01-31 | End: 2021-01-31 | Stop reason: HOSPADM

## 2021-01-31 RX ORDER — CEPHALEXIN 500 MG/1
500 CAPSULE ORAL
Status: DISCONTINUED | OUTPATIENT
Start: 2021-01-31 | End: 2021-01-31 | Stop reason: HOSPADM

## 2021-01-31 RX ORDER — CEPHALEXIN 500 MG/1
500 CAPSULE ORAL 4 TIMES DAILY
Qty: 40 CAPSULE | Refills: 0 | Status: SHIPPED | OUTPATIENT
Start: 2021-01-31 | End: 2021-02-10

## 2021-01-31 RX ORDER — NAPROXEN 500 MG/1
500 TABLET ORAL 2 TIMES DAILY WITH MEALS
Qty: 14 TABLET | Refills: 0 | Status: SHIPPED | OUTPATIENT
Start: 2021-01-31 | End: 2021-02-07

## 2021-02-01 ENCOUNTER — OFFICE VISIT (OUTPATIENT)
Dept: FAMILY MEDICINE | Facility: CLINIC | Age: 61
End: 2021-02-01
Payer: MEDICARE

## 2021-02-01 VITALS
OXYGEN SATURATION: 98 % | HEIGHT: 69 IN | SYSTOLIC BLOOD PRESSURE: 130 MMHG | DIASTOLIC BLOOD PRESSURE: 70 MMHG | TEMPERATURE: 97 F | BODY MASS INDEX: 37.78 KG/M2 | HEART RATE: 97 BPM | WEIGHT: 255.06 LBS

## 2021-02-01 DIAGNOSIS — D84.9 IMMUNOCOMPROMISED, ACQUIRED: ICD-10-CM

## 2021-02-01 DIAGNOSIS — E66.01 SEVERE OBESITY (BMI 35.0-39.9) WITH COMORBIDITY: ICD-10-CM

## 2021-02-01 DIAGNOSIS — I70.0 ATHEROSCLEROSIS OF AORTA: ICD-10-CM

## 2021-02-01 DIAGNOSIS — I10 ESSENTIAL HYPERTENSION: ICD-10-CM

## 2021-02-01 DIAGNOSIS — F31.9 BIPOLAR 1 DISORDER: ICD-10-CM

## 2021-02-01 DIAGNOSIS — K12.2 CELLULITIS OF MOUTH: Primary | ICD-10-CM

## 2021-02-01 DIAGNOSIS — J44.9 CHRONIC OBSTRUCTIVE PULMONARY DISEASE, UNSPECIFIED COPD TYPE: ICD-10-CM

## 2021-02-01 DIAGNOSIS — E11.42 DIABETIC POLYNEUROPATHY ASSOCIATED WITH TYPE 2 DIABETES MELLITUS: ICD-10-CM

## 2021-02-01 DIAGNOSIS — J44.1 COPD EXACERBATION: ICD-10-CM

## 2021-02-01 DIAGNOSIS — E11.9 TYPE 2 DIABETES MELLITUS WITHOUT COMPLICATION, WITHOUT LONG-TERM CURRENT USE OF INSULIN: ICD-10-CM

## 2021-02-01 PROCEDURE — 99499 UNLISTED E&M SERVICE: CPT | Mod: S$GLB,,, | Performed by: FAMILY MEDICINE

## 2021-02-01 PROCEDURE — 3008F PR BODY MASS INDEX (BMI) DOCUMENTED: ICD-10-PCS | Mod: CPTII,S$GLB,, | Performed by: FAMILY MEDICINE

## 2021-02-01 PROCEDURE — 96372 PR INJECTION,THERAP/PROPH/DIAG2ST, IM OR SUBCUT: ICD-10-PCS | Mod: S$GLB,,, | Performed by: FAMILY MEDICINE

## 2021-02-01 PROCEDURE — 3075F SYST BP GE 130 - 139MM HG: CPT | Mod: CPTII,S$GLB,, | Performed by: FAMILY MEDICINE

## 2021-02-01 PROCEDURE — 99214 OFFICE O/P EST MOD 30 MIN: CPT | Mod: 25,S$GLB,, | Performed by: FAMILY MEDICINE

## 2021-02-01 PROCEDURE — 3008F BODY MASS INDEX DOCD: CPT | Mod: CPTII,S$GLB,, | Performed by: FAMILY MEDICINE

## 2021-02-01 PROCEDURE — 99999 PR PBB SHADOW E&M-EST. PATIENT-LVL V: CPT | Mod: PBBFAC,,, | Performed by: FAMILY MEDICINE

## 2021-02-01 PROCEDURE — 99214 PR OFFICE/OUTPT VISIT, EST, LEVL IV, 30-39 MIN: ICD-10-PCS | Mod: 25,S$GLB,, | Performed by: FAMILY MEDICINE

## 2021-02-01 PROCEDURE — 3075F PR MOST RECENT SYSTOLIC BLOOD PRESS GE 130-139MM HG: ICD-10-PCS | Mod: CPTII,S$GLB,, | Performed by: FAMILY MEDICINE

## 2021-02-01 PROCEDURE — 99999 PR PBB SHADOW E&M-EST. PATIENT-LVL V: ICD-10-PCS | Mod: PBBFAC,,, | Performed by: FAMILY MEDICINE

## 2021-02-01 PROCEDURE — 3078F PR MOST RECENT DIASTOLIC BLOOD PRESSURE < 80 MM HG: ICD-10-PCS | Mod: CPTII,S$GLB,, | Performed by: FAMILY MEDICINE

## 2021-02-01 PROCEDURE — 1125F PR PAIN SEVERITY QUANTIFIED, PAIN PRESENT: ICD-10-PCS | Mod: S$GLB,,, | Performed by: FAMILY MEDICINE

## 2021-02-01 PROCEDURE — 3044F HG A1C LEVEL LT 7.0%: CPT | Mod: CPTII,S$GLB,, | Performed by: FAMILY MEDICINE

## 2021-02-01 PROCEDURE — 1125F AMNT PAIN NOTED PAIN PRSNT: CPT | Mod: S$GLB,,, | Performed by: FAMILY MEDICINE

## 2021-02-01 PROCEDURE — 99499 RISK ADDL DX/OHS AUDIT: ICD-10-PCS | Mod: S$GLB,,, | Performed by: FAMILY MEDICINE

## 2021-02-01 PROCEDURE — 3044F PR MOST RECENT HEMOGLOBIN A1C LEVEL <7.0%: ICD-10-PCS | Mod: CPTII,S$GLB,, | Performed by: FAMILY MEDICINE

## 2021-02-01 PROCEDURE — 3078F DIAST BP <80 MM HG: CPT | Mod: CPTII,S$GLB,, | Performed by: FAMILY MEDICINE

## 2021-02-01 PROCEDURE — 96372 THER/PROPH/DIAG INJ SC/IM: CPT | Mod: S$GLB,,, | Performed by: FAMILY MEDICINE

## 2021-02-01 RX ORDER — CEFTRIAXONE 1 G/1
1 INJECTION, POWDER, FOR SOLUTION INTRAMUSCULAR; INTRAVENOUS
Status: COMPLETED | OUTPATIENT
Start: 2021-02-01 | End: 2021-02-01

## 2021-02-01 RX ORDER — HYDROCODONE BITARTRATE AND ACETAMINOPHEN 7.5; 325 MG/1; MG/1
1 TABLET ORAL EVERY 8 HOURS PRN
Qty: 20 TABLET | Refills: 0 | Status: SHIPPED | OUTPATIENT
Start: 2021-02-01 | End: 2021-02-08

## 2021-02-01 RX ADMIN — CEFTRIAXONE 1 G: 1 INJECTION, POWDER, FOR SOLUTION INTRAMUSCULAR; INTRAVENOUS at 08:02

## 2021-02-02 DIAGNOSIS — L30.1 DYSHIDROTIC ECZEMA: ICD-10-CM

## 2021-02-02 RX ORDER — HYDROXYZINE HYDROCHLORIDE 50 MG/1
50 TABLET, FILM COATED ORAL 3 TIMES DAILY PRN
Qty: 270 TABLET | Refills: 1 | Status: SHIPPED | OUTPATIENT
Start: 2021-02-02 | End: 2021-03-22 | Stop reason: SDUPTHER

## 2021-02-03 DIAGNOSIS — E11.9 TYPE 2 DIABETES MELLITUS WITHOUT COMPLICATION, UNSPECIFIED WHETHER LONG TERM INSULIN USE: ICD-10-CM

## 2021-02-08 ENCOUNTER — LAB VISIT (OUTPATIENT)
Dept: FAMILY MEDICINE | Facility: CLINIC | Age: 61
End: 2021-02-08
Payer: MEDICARE

## 2021-02-08 ENCOUNTER — OFFICE VISIT (OUTPATIENT)
Dept: FAMILY MEDICINE | Facility: CLINIC | Age: 61
End: 2021-02-08
Payer: MEDICARE

## 2021-02-08 ENCOUNTER — HOSPITAL ENCOUNTER (OUTPATIENT)
Dept: RADIOLOGY | Facility: HOSPITAL | Age: 61
Discharge: HOME | End: 2021-02-08
Attending: FAMILY MEDICINE
Payer: MEDICARE

## 2021-02-08 ENCOUNTER — TELEPHONE (OUTPATIENT)
Dept: FAMILY MEDICINE | Facility: CLINIC | Age: 61
End: 2021-02-08

## 2021-02-08 VITALS
DIASTOLIC BLOOD PRESSURE: 80 MMHG | SYSTOLIC BLOOD PRESSURE: 150 MMHG | HEART RATE: 92 BPM | TEMPERATURE: 98 F | HEIGHT: 69 IN | BODY MASS INDEX: 37.14 KG/M2 | OXYGEN SATURATION: 98 % | WEIGHT: 250.75 LBS

## 2021-02-08 DIAGNOSIS — Z59.9 FINANCIAL DIFFICULTIES: Primary | ICD-10-CM

## 2021-02-08 DIAGNOSIS — R05.9 COUGH: ICD-10-CM

## 2021-02-08 DIAGNOSIS — R06.2 WHEEZING: ICD-10-CM

## 2021-02-08 DIAGNOSIS — J44.1 CHRONIC OBSTRUCTIVE PULMONARY DISEASE WITH ACUTE EXACERBATION: Primary | ICD-10-CM

## 2021-02-08 PROCEDURE — 71046 XR CHEST PA AND LATERAL: ICD-10-PCS | Mod: 26,,, | Performed by: RADIOLOGY

## 2021-02-08 PROCEDURE — 99213 OFFICE O/P EST LOW 20 MIN: CPT | Mod: S$GLB,,, | Performed by: FAMILY MEDICINE

## 2021-02-08 PROCEDURE — 1125F PR PAIN SEVERITY QUANTIFIED, PAIN PRESENT: ICD-10-PCS | Mod: S$GLB,,, | Performed by: FAMILY MEDICINE

## 2021-02-08 PROCEDURE — 3077F SYST BP >= 140 MM HG: CPT | Mod: CPTII,S$GLB,, | Performed by: FAMILY MEDICINE

## 2021-02-08 PROCEDURE — 3079F PR MOST RECENT DIASTOLIC BLOOD PRESSURE 80-89 MM HG: ICD-10-PCS | Mod: CPTII,S$GLB,, | Performed by: FAMILY MEDICINE

## 2021-02-08 PROCEDURE — 99999 PR PBB SHADOW E&M-EST. PATIENT-LVL V: CPT | Mod: PBBFAC,,, | Performed by: FAMILY MEDICINE

## 2021-02-08 PROCEDURE — 99213 PR OFFICE/OUTPT VISIT, EST, LEVL III, 20-29 MIN: ICD-10-PCS | Mod: S$GLB,,, | Performed by: FAMILY MEDICINE

## 2021-02-08 PROCEDURE — U0005 INFEC AGEN DETEC AMPLI PROBE: HCPCS

## 2021-02-08 PROCEDURE — 3079F DIAST BP 80-89 MM HG: CPT | Mod: CPTII,S$GLB,, | Performed by: FAMILY MEDICINE

## 2021-02-08 PROCEDURE — 71046 X-RAY EXAM CHEST 2 VIEWS: CPT | Mod: TC,FY,PO

## 2021-02-08 PROCEDURE — 99999 PR PBB SHADOW E&M-EST. PATIENT-LVL V: ICD-10-PCS | Mod: PBBFAC,,, | Performed by: FAMILY MEDICINE

## 2021-02-08 PROCEDURE — 3077F PR MOST RECENT SYSTOLIC BLOOD PRESSURE >= 140 MM HG: ICD-10-PCS | Mod: CPTII,S$GLB,, | Performed by: FAMILY MEDICINE

## 2021-02-08 PROCEDURE — U0003 INFECTIOUS AGENT DETECTION BY NUCLEIC ACID (DNA OR RNA); SEVERE ACUTE RESPIRATORY SYNDROME CORONAVIRUS 2 (SARS-COV-2) (CORONAVIRUS DISEASE [COVID-19]), AMPLIFIED PROBE TECHNIQUE, MAKING USE OF HIGH THROUGHPUT TECHNOLOGIES AS DESCRIBED BY CMS-2020-01-R: HCPCS

## 2021-02-08 PROCEDURE — 3008F BODY MASS INDEX DOCD: CPT | Mod: CPTII,S$GLB,, | Performed by: FAMILY MEDICINE

## 2021-02-08 PROCEDURE — 71046 X-RAY EXAM CHEST 2 VIEWS: CPT | Mod: 26,,, | Performed by: RADIOLOGY

## 2021-02-08 PROCEDURE — 3008F PR BODY MASS INDEX (BMI) DOCUMENTED: ICD-10-PCS | Mod: CPTII,S$GLB,, | Performed by: FAMILY MEDICINE

## 2021-02-08 PROCEDURE — 1125F AMNT PAIN NOTED PAIN PRSNT: CPT | Mod: S$GLB,,, | Performed by: FAMILY MEDICINE

## 2021-02-08 RX ORDER — ALBUTEROL SULFATE 0.63 MG/3ML
0.63 SOLUTION RESPIRATORY (INHALATION) EVERY 6 HOURS PRN
Qty: 1 BOX | Refills: 0 | Status: SHIPPED | OUTPATIENT
Start: 2021-02-08 | End: 2022-02-08

## 2021-02-08 SDOH — SOCIAL DETERMINANTS OF HEALTH (SDOH): PROBLEM RELATED TO HOUSING AND ECONOMIC CIRCUMSTANCES, UNSPECIFIED: Z59.9

## 2021-02-09 LAB — SARS-COV-2 RNA RESP QL NAA+PROBE: NOT DETECTED

## 2021-02-10 DIAGNOSIS — E11.9 TYPE 2 DIABETES MELLITUS WITHOUT COMPLICATION, UNSPECIFIED WHETHER LONG TERM INSULIN USE: ICD-10-CM

## 2021-02-17 DIAGNOSIS — E11.9 TYPE 2 DIABETES MELLITUS WITHOUT COMPLICATION, UNSPECIFIED WHETHER LONG TERM INSULIN USE: ICD-10-CM

## 2021-02-17 DIAGNOSIS — E11.9 TYPE 2 DIABETES MELLITUS WITHOUT COMPLICATION: ICD-10-CM

## 2021-02-24 DIAGNOSIS — E11.9 TYPE 2 DIABETES MELLITUS WITHOUT COMPLICATION, UNSPECIFIED WHETHER LONG TERM INSULIN USE: ICD-10-CM

## 2021-02-24 DIAGNOSIS — E11.9 TYPE 2 DIABETES MELLITUS WITHOUT COMPLICATION: ICD-10-CM

## 2021-03-02 ENCOUNTER — HOSPITAL ENCOUNTER (OUTPATIENT)
Dept: RADIOLOGY | Facility: HOSPITAL | Age: 61
Discharge: HOME | End: 2021-03-02
Attending: FAMILY MEDICINE
Payer: MEDICARE

## 2021-03-02 ENCOUNTER — OFFICE VISIT (OUTPATIENT)
Dept: FAMILY MEDICINE | Facility: CLINIC | Age: 61
End: 2021-03-02
Payer: MEDICARE

## 2021-03-02 VITALS
SYSTOLIC BLOOD PRESSURE: 120 MMHG | HEART RATE: 87 BPM | TEMPERATURE: 98 F | HEIGHT: 69 IN | WEIGHT: 245.81 LBS | BODY MASS INDEX: 36.41 KG/M2 | DIASTOLIC BLOOD PRESSURE: 70 MMHG | OXYGEN SATURATION: 99 %
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
## 2021-03-02 DIAGNOSIS — S50.02XA CONTUSION OF LEFT ELBOW, INITIAL ENCOUNTER: ICD-10-CM

## 2021-03-02 DIAGNOSIS — M79.10 MYALGIA: Primary | ICD-10-CM

## 2021-03-02 PROCEDURE — 3074F PR MOST RECENT SYSTOLIC BLOOD PRESSURE < 130 MM HG: ICD-10-PCS | Mod: CPTII,S$GLB,, | Performed by: FAMILY MEDICINE

## 2021-03-02 PROCEDURE — 3078F DIAST BP <80 MM HG: CPT | Mod: CPTII,S$GLB,, | Performed by: FAMILY MEDICINE

## 2021-03-02 PROCEDURE — 99999 PR PBB SHADOW E&M-EST. PATIENT-LVL V: ICD-10-PCS | Mod: PBBFAC,,, | Performed by: FAMILY MEDICINE

## 2021-03-02 PROCEDURE — 1125F AMNT PAIN NOTED PAIN PRSNT: CPT | Mod: S$GLB,,, | Performed by: FAMILY MEDICINE

## 2021-03-02 PROCEDURE — 73080 XR ELBOW COMPLETE 3 VIEW LEFT: ICD-10-PCS | Mod: 26,LT,, | Performed by: RADIOLOGY

## 2021-03-02 PROCEDURE — 1125F PR PAIN SEVERITY QUANTIFIED, PAIN PRESENT: ICD-10-PCS | Mod: S$GLB,,, | Performed by: FAMILY MEDICINE

## 2021-03-02 PROCEDURE — 99999 PR PBB SHADOW E&M-EST. PATIENT-LVL V: CPT | Mod: PBBFAC,,, | Performed by: FAMILY MEDICINE

## 2021-03-02 PROCEDURE — 99214 OFFICE O/P EST MOD 30 MIN: CPT | Mod: S$GLB,,, | Performed by: FAMILY MEDICINE

## 2021-03-02 PROCEDURE — 3074F SYST BP LT 130 MM HG: CPT | Mod: CPTII,S$GLB,, | Performed by: FAMILY MEDICINE

## 2021-03-02 PROCEDURE — 99214 PR OFFICE/OUTPT VISIT, EST, LEVL IV, 30-39 MIN: ICD-10-PCS | Mod: S$GLB,,, | Performed by: FAMILY MEDICINE

## 2021-03-02 PROCEDURE — 73080 X-RAY EXAM OF ELBOW: CPT | Mod: TC,FY,PO,LT

## 2021-03-02 PROCEDURE — 3008F BODY MASS INDEX DOCD: CPT | Mod: CPTII,S$GLB,, | Performed by: FAMILY MEDICINE

## 2021-03-02 PROCEDURE — 3078F PR MOST RECENT DIASTOLIC BLOOD PRESSURE < 80 MM HG: ICD-10-PCS | Mod: CPTII,S$GLB,, | Performed by: FAMILY MEDICINE

## 2021-03-02 PROCEDURE — 73080 X-RAY EXAM OF ELBOW: CPT | Mod: 26,LT,, | Performed by: RADIOLOGY

## 2021-03-02 PROCEDURE — 3008F PR BODY MASS INDEX (BMI) DOCUMENTED: ICD-10-PCS | Mod: CPTII,S$GLB,, | Performed by: FAMILY MEDICINE

## 2021-03-02 RX ORDER — TIZANIDINE 4 MG/1
4 TABLET ORAL EVERY 8 HOURS
Qty: 30 TABLET | Refills: 1 | Status: SHIPPED | OUTPATIENT
Start: 2021-03-02 | End: 2021-10-13 | Stop reason: SDUPTHER

## 2021-03-02 RX ORDER — ETODOLAC 400 MG/1
400 TABLET, FILM COATED ORAL 2 TIMES DAILY PRN
Qty: 60 TABLET | Refills: 0 | Status: SHIPPED | OUTPATIENT
Start: 2021-03-02 | End: 2021-03-25

## 2021-03-03 ENCOUNTER — PATIENT MESSAGE (OUTPATIENT)
Dept: FAMILY MEDICINE | Facility: CLINIC | Age: 61
End: 2021-03-03

## 2021-03-03 DIAGNOSIS — E11.9 TYPE 2 DIABETES MELLITUS WITHOUT COMPLICATION: ICD-10-CM

## 2021-03-10 DIAGNOSIS — E11.9 TYPE 2 DIABETES MELLITUS WITHOUT COMPLICATION: ICD-10-CM

## 2021-03-17 DIAGNOSIS — E11.9 TYPE 2 DIABETES MELLITUS WITHOUT COMPLICATION: ICD-10-CM

## 2021-03-21 ENCOUNTER — PATIENT OUTREACH (OUTPATIENT)
Dept: ADMINISTRATIVE | Facility: OTHER | Age: 61
End: 2021-03-21

## 2021-03-21 DIAGNOSIS — E11.9 TYPE 2 DIABETES MELLITUS WITHOUT COMPLICATION, WITHOUT LONG-TERM CURRENT USE OF INSULIN: Primary | ICD-10-CM

## 2021-03-24 DIAGNOSIS — E11.9 TYPE 2 DIABETES MELLITUS WITHOUT COMPLICATION: ICD-10-CM

## 2021-03-30 DIAGNOSIS — L30.1 DYSHIDROTIC ECZEMA: ICD-10-CM

## 2021-03-30 RX ORDER — HYDROXYZINE HYDROCHLORIDE 50 MG/1
50 TABLET, FILM COATED ORAL 3 TIMES DAILY PRN
Qty: 270 TABLET | Refills: 1 | Status: SHIPPED | OUTPATIENT
Start: 2021-03-30 | End: 2021-05-17 | Stop reason: SDUPTHER

## 2021-03-31 DIAGNOSIS — E11.9 TYPE 2 DIABETES MELLITUS WITHOUT COMPLICATION: ICD-10-CM

## 2021-04-05 DIAGNOSIS — J20.9 ACUTE BRONCHITIS, UNSPECIFIED ORGANISM: ICD-10-CM

## 2021-04-05 DIAGNOSIS — J01.90 ACUTE RHINOSINUSITIS: ICD-10-CM

## 2021-04-05 RX ORDER — FLUTICASONE PROPIONATE 50 MCG
2 SPRAY, SUSPENSION (ML) NASAL DAILY
Qty: 16 ML | Refills: 5 | Status: SHIPPED | OUTPATIENT
Start: 2021-04-05 | End: 2022-10-25 | Stop reason: SDUPTHER

## 2021-04-07 DIAGNOSIS — E11.9 TYPE 2 DIABETES MELLITUS WITHOUT COMPLICATION: ICD-10-CM

## 2021-04-12 ENCOUNTER — OFFICE VISIT (OUTPATIENT)
Dept: FAMILY MEDICINE | Facility: CLINIC | Age: 61
End: 2021-04-12
Payer: MEDICARE

## 2021-04-12 VITALS
WEIGHT: 242.06 LBS | DIASTOLIC BLOOD PRESSURE: 80 MMHG | BODY MASS INDEX: 35.85 KG/M2 | HEIGHT: 69 IN | HEART RATE: 101 BPM | OXYGEN SATURATION: 97 % | TEMPERATURE: 98 F | SYSTOLIC BLOOD PRESSURE: 138 MMHG

## 2021-04-12 DIAGNOSIS — L30.9 ECZEMA, UNSPECIFIED TYPE: Primary | ICD-10-CM

## 2021-04-12 DIAGNOSIS — L30.9 HAND ECZEMA: ICD-10-CM

## 2021-04-12 DIAGNOSIS — Z12.11 SCREENING FOR COLON CANCER: ICD-10-CM

## 2021-04-12 PROCEDURE — 1125F AMNT PAIN NOTED PAIN PRSNT: CPT | Mod: S$GLB,,, | Performed by: FAMILY MEDICINE

## 2021-04-12 PROCEDURE — 99213 PR OFFICE/OUTPT VISIT, EST, LEVL III, 20-29 MIN: ICD-10-PCS | Mod: S$GLB,,, | Performed by: FAMILY MEDICINE

## 2021-04-12 PROCEDURE — 99213 OFFICE O/P EST LOW 20 MIN: CPT | Mod: S$GLB,,, | Performed by: FAMILY MEDICINE

## 2021-04-12 PROCEDURE — 1125F PR PAIN SEVERITY QUANTIFIED, PAIN PRESENT: ICD-10-PCS | Mod: S$GLB,,, | Performed by: FAMILY MEDICINE

## 2021-04-12 PROCEDURE — 3008F BODY MASS INDEX DOCD: CPT | Mod: CPTII,S$GLB,, | Performed by: FAMILY MEDICINE

## 2021-04-12 PROCEDURE — 99999 PR PBB SHADOW E&M-EST. PATIENT-LVL V: CPT | Mod: PBBFAC,,, | Performed by: FAMILY MEDICINE

## 2021-04-12 PROCEDURE — 3008F PR BODY MASS INDEX (BMI) DOCUMENTED: ICD-10-PCS | Mod: CPTII,S$GLB,, | Performed by: FAMILY MEDICINE

## 2021-04-12 PROCEDURE — 99999 PR PBB SHADOW E&M-EST. PATIENT-LVL V: ICD-10-PCS | Mod: PBBFAC,,, | Performed by: FAMILY MEDICINE

## 2021-04-12 RX ORDER — MOMETASONE FUROATE 1 MG/G
CREAM TOPICAL DAILY
Qty: 45 G | Refills: 1 | Status: SHIPPED | OUTPATIENT
Start: 2021-04-12 | End: 2022-10-04

## 2021-04-12 RX ORDER — PREDNISONE 20 MG/1
20 TABLET ORAL DAILY
Qty: 5 TABLET | Refills: 0 | Status: SHIPPED | OUTPATIENT
Start: 2021-04-12 | End: 2021-04-17

## 2021-04-14 DIAGNOSIS — E11.9 TYPE 2 DIABETES MELLITUS WITHOUT COMPLICATION: ICD-10-CM

## 2021-04-21 DIAGNOSIS — E11.9 TYPE 2 DIABETES MELLITUS WITHOUT COMPLICATION: ICD-10-CM

## 2021-04-27 ENCOUNTER — TELEPHONE (OUTPATIENT)
Dept: DERMATOLOGY | Facility: CLINIC | Age: 61
End: 2021-04-27

## 2021-04-28 DIAGNOSIS — E11.9 TYPE 2 DIABETES MELLITUS WITHOUT COMPLICATION: ICD-10-CM

## 2021-04-29 ENCOUNTER — OFFICE VISIT (OUTPATIENT)
Dept: DERMATOLOGY | Facility: CLINIC | Age: 61
End: 2021-04-29
Payer: MEDICARE

## 2021-04-29 ENCOUNTER — LAB VISIT (OUTPATIENT)
Dept: LAB | Facility: HOSPITAL | Age: 61
End: 2021-04-29
Attending: DERMATOLOGY
Payer: MEDICARE

## 2021-04-29 ENCOUNTER — SPECIALTY PHARMACY (OUTPATIENT)
Dept: PHARMACY | Facility: CLINIC | Age: 61
End: 2021-04-29

## 2021-04-29 DIAGNOSIS — Z79.899 ENCOUNTER FOR LONG-TERM (CURRENT) USE OF MEDICATIONS: ICD-10-CM

## 2021-04-29 DIAGNOSIS — L20.89 OTHER ATOPIC DERMATITIS: Primary | ICD-10-CM

## 2021-04-29 DIAGNOSIS — L20.89 OTHER ATOPIC DERMATITIS: ICD-10-CM

## 2021-04-29 LAB
ALBUMIN SERPL BCP-MCNC: 4.6 G/DL (ref 3.5–5.2)
ALP SERPL-CCNC: 96 U/L (ref 55–135)
ALT SERPL W/O P-5'-P-CCNC: 27 U/L (ref 10–44)
ANION GAP SERPL CALC-SCNC: 10 MMOL/L (ref 8–16)
AST SERPL-CCNC: 16 U/L (ref 10–40)
BASOPHILS # BLD AUTO: 0.08 K/UL (ref 0–0.2)
BASOPHILS NFR BLD: 0.5 % (ref 0–1.9)
BILIRUB SERPL-MCNC: 0.8 MG/DL (ref 0.1–1)
BUN SERPL-MCNC: 16 MG/DL (ref 8–23)
CALCIUM SERPL-MCNC: 10.1 MG/DL (ref 8.7–10.5)
CHLORIDE SERPL-SCNC: 107 MMOL/L (ref 95–110)
CO2 SERPL-SCNC: 23 MMOL/L (ref 23–29)
CREAT SERPL-MCNC: 1 MG/DL (ref 0.5–1.4)
DIFFERENTIAL METHOD: ABNORMAL
EOSINOPHIL # BLD AUTO: 0.3 K/UL (ref 0–0.5)
EOSINOPHIL NFR BLD: 2.1 % (ref 0–8)
ERYTHROCYTE [DISTWIDTH] IN BLOOD BY AUTOMATED COUNT: 14.9 % (ref 11.5–14.5)
EST. GFR  (AFRICAN AMERICAN): >60 ML/MIN/1.73 M^2
EST. GFR  (NON AFRICAN AMERICAN): >60 ML/MIN/1.73 M^2
GLUCOSE SERPL-MCNC: 129 MG/DL (ref 70–110)
HCT VFR BLD AUTO: 41.2 % (ref 40–54)
HGB BLD-MCNC: 13 G/DL (ref 14–18)
IMM GRANULOCYTES # BLD AUTO: 0.06 K/UL (ref 0–0.04)
IMM GRANULOCYTES NFR BLD AUTO: 0.4 % (ref 0–0.5)
LYMPHOCYTES # BLD AUTO: 2.3 K/UL (ref 1–4.8)
LYMPHOCYTES NFR BLD: 15 % (ref 18–48)
MCH RBC QN AUTO: 25.6 PG (ref 27–31)
MCHC RBC AUTO-ENTMCNC: 31.6 G/DL (ref 32–36)
MCV RBC AUTO: 81 FL (ref 82–98)
MONOCYTES # BLD AUTO: 1.4 K/UL (ref 0.3–1)
MONOCYTES NFR BLD: 9.1 % (ref 4–15)
NEUTROPHILS # BLD AUTO: 11.3 K/UL (ref 1.8–7.7)
NEUTROPHILS NFR BLD: 72.9 % (ref 38–73)
NRBC BLD-RTO: 0 /100 WBC
PLATELET # BLD AUTO: 340 K/UL (ref 150–450)
PMV BLD AUTO: 10.4 FL (ref 9.2–12.9)
POTASSIUM SERPL-SCNC: 4.6 MMOL/L (ref 3.5–5.1)
PROT SERPL-MCNC: 7 G/DL (ref 6–8.4)
RBC # BLD AUTO: 5.07 M/UL (ref 4.6–6.2)
SODIUM SERPL-SCNC: 140 MMOL/L (ref 136–145)
WBC # BLD AUTO: 15.55 K/UL (ref 3.9–12.7)

## 2021-04-29 PROCEDURE — 36415 COLL VENOUS BLD VENIPUNCTURE: CPT | Performed by: DERMATOLOGY

## 2021-04-29 PROCEDURE — 99214 OFFICE O/P EST MOD 30 MIN: CPT | Mod: S$GLB,,, | Performed by: DERMATOLOGY

## 2021-04-29 PROCEDURE — 99214 PR OFFICE/OUTPT VISIT, EST, LEVL IV, 30-39 MIN: ICD-10-PCS | Mod: S$GLB,,, | Performed by: DERMATOLOGY

## 2021-04-29 PROCEDURE — 85025 COMPLETE CBC W/AUTO DIFF WBC: CPT | Performed by: DERMATOLOGY

## 2021-04-29 PROCEDURE — 99999 PR PBB SHADOW E&M-EST. PATIENT-LVL IV: CPT | Mod: PBBFAC,,, | Performed by: DERMATOLOGY

## 2021-04-29 PROCEDURE — 99999 PR PBB SHADOW E&M-EST. PATIENT-LVL IV: ICD-10-PCS | Mod: PBBFAC,,, | Performed by: DERMATOLOGY

## 2021-04-29 PROCEDURE — 80053 COMPREHEN METABOLIC PANEL: CPT | Performed by: DERMATOLOGY

## 2021-04-29 RX ORDER — DUPILUMAB 300 MG/2ML
INJECTION, SOLUTION SUBCUTANEOUS
Qty: 8 ML | Refills: 0 | Status: SHIPPED | OUTPATIENT
Start: 2021-04-29 | End: 2021-08-05

## 2021-04-29 RX ORDER — DUPILUMAB 300 MG/2ML
INJECTION, SOLUTION SUBCUTANEOUS
Qty: 4 ML | Refills: 2 | Status: SHIPPED | OUTPATIENT
Start: 2021-04-29 | End: 2021-08-04 | Stop reason: SDUPTHER

## 2021-05-04 ENCOUNTER — PATIENT MESSAGE (OUTPATIENT)
Dept: DERMATOLOGY | Facility: CLINIC | Age: 61
End: 2021-05-04

## 2021-05-04 ENCOUNTER — TELEPHONE (OUTPATIENT)
Dept: DERMATOLOGY | Facility: CLINIC | Age: 61
End: 2021-05-04

## 2021-05-05 ENCOUNTER — PATIENT MESSAGE (OUTPATIENT)
Dept: DERMATOLOGY | Facility: CLINIC | Age: 61
End: 2021-05-05

## 2021-05-05 DIAGNOSIS — E11.9 TYPE 2 DIABETES MELLITUS WITHOUT COMPLICATION: ICD-10-CM

## 2021-05-06 ENCOUNTER — PATIENT MESSAGE (OUTPATIENT)
Dept: DERMATOLOGY | Facility: CLINIC | Age: 61
End: 2021-05-06

## 2021-05-12 DIAGNOSIS — E11.9 TYPE 2 DIABETES MELLITUS WITHOUT COMPLICATION: ICD-10-CM

## 2021-05-17 DIAGNOSIS — G43.711 INTRACTABLE CHRONIC MIGRAINE WITHOUT AURA AND WITH STATUS MIGRAINOSUS: ICD-10-CM

## 2021-05-17 DIAGNOSIS — L30.1 DYSHIDROTIC ECZEMA: ICD-10-CM

## 2021-05-17 RX ORDER — HYDROXYZINE HYDROCHLORIDE 50 MG/1
50 TABLET, FILM COATED ORAL 3 TIMES DAILY PRN
Qty: 270 TABLET | Refills: 1 | Status: SHIPPED | OUTPATIENT
Start: 2021-05-17 | End: 2021-06-21 | Stop reason: SDUPTHER

## 2021-05-17 RX ORDER — SUMATRIPTAN SUCCINATE 100 MG/1
TABLET ORAL
Qty: 12 TABLET | Refills: 1 | Status: SHIPPED | OUTPATIENT
Start: 2021-05-17 | End: 2021-09-13 | Stop reason: SDUPTHER

## 2021-05-19 DIAGNOSIS — E11.9 TYPE 2 DIABETES MELLITUS WITHOUT COMPLICATION: ICD-10-CM

## 2021-05-26 DIAGNOSIS — E11.9 TYPE 2 DIABETES MELLITUS WITHOUT COMPLICATION: ICD-10-CM

## 2021-06-21 DIAGNOSIS — L30.1 DYSHIDROTIC ECZEMA: ICD-10-CM

## 2021-06-21 RX ORDER — HYDROXYZINE HYDROCHLORIDE 50 MG/1
50 TABLET, FILM COATED ORAL 3 TIMES DAILY PRN
Qty: 270 TABLET | Refills: 1 | Status: SHIPPED | OUTPATIENT
Start: 2021-06-21 | End: 2021-10-22 | Stop reason: SDUPTHER

## 2021-07-01 ENCOUNTER — PATIENT OUTREACH (OUTPATIENT)
Dept: ADMINISTRATIVE | Facility: HOSPITAL | Age: 61
End: 2021-07-01

## 2021-07-06 ENCOUNTER — PATIENT OUTREACH (OUTPATIENT)
Dept: ADMINISTRATIVE | Facility: HOSPITAL | Age: 61
End: 2021-07-06

## 2021-08-04 ENCOUNTER — PATIENT MESSAGE (OUTPATIENT)
Dept: ADMINISTRATIVE | Facility: HOSPITAL | Age: 61
End: 2021-08-04

## 2021-08-04 DIAGNOSIS — Z79.899 ENCOUNTER FOR LONG-TERM (CURRENT) USE OF MEDICATIONS: ICD-10-CM

## 2021-08-04 DIAGNOSIS — L20.89 OTHER ATOPIC DERMATITIS: ICD-10-CM

## 2021-08-05 DIAGNOSIS — Z79.899 ENCOUNTER FOR LONG-TERM (CURRENT) USE OF MEDICATIONS: ICD-10-CM

## 2021-08-05 DIAGNOSIS — L20.89 OTHER ATOPIC DERMATITIS: ICD-10-CM

## 2021-08-05 RX ORDER — DUPILUMAB 300 MG/2ML
INJECTION, SOLUTION SUBCUTANEOUS
Qty: 4 ML | Refills: 2 | Status: SHIPPED | OUTPATIENT
Start: 2021-08-05 | End: 2021-08-19 | Stop reason: SDUPTHER

## 2021-08-05 RX ORDER — DUPILUMAB 300 MG/2ML
INJECTION, SOLUTION SUBCUTANEOUS
Qty: 4 ML | Refills: 2 | Status: SHIPPED | OUTPATIENT
Start: 2021-08-05 | End: 2021-08-05 | Stop reason: SDUPTHER

## 2021-08-06 ENCOUNTER — TELEPHONE (OUTPATIENT)
Dept: DERMATOLOGY | Facility: CLINIC | Age: 61
End: 2021-08-06

## 2021-08-09 ENCOUNTER — TELEPHONE (OUTPATIENT)
Dept: PHARMACY | Facility: CLINIC | Age: 61
End: 2021-08-09

## 2021-08-16 DIAGNOSIS — L20.89 OTHER ATOPIC DERMATITIS: ICD-10-CM

## 2021-08-16 DIAGNOSIS — Z79.899 ENCOUNTER FOR LONG-TERM (CURRENT) USE OF MEDICATIONS: ICD-10-CM

## 2021-08-16 RX ORDER — DUPILUMAB 300 MG/2ML
INJECTION, SOLUTION SUBCUTANEOUS
Qty: 4 ML | Refills: 2 | OUTPATIENT
Start: 2021-08-16

## 2021-08-18 ENCOUNTER — LAB VISIT (OUTPATIENT)
Dept: LAB | Facility: HOSPITAL | Age: 61
End: 2021-08-18
Attending: DERMATOLOGY
Payer: MEDICARE

## 2021-08-18 ENCOUNTER — OFFICE VISIT (OUTPATIENT)
Dept: DERMATOLOGY | Facility: CLINIC | Age: 61
End: 2021-08-18
Payer: MEDICARE

## 2021-08-18 DIAGNOSIS — Z79.899 ENCOUNTER FOR LONG-TERM (CURRENT) USE OF MEDICATIONS: ICD-10-CM

## 2021-08-18 DIAGNOSIS — L20.9 ATOPIC DERMATITIS, UNSPECIFIED TYPE: ICD-10-CM

## 2021-08-18 DIAGNOSIS — L08.9 SKIN INFECTION: ICD-10-CM

## 2021-08-18 DIAGNOSIS — L20.9 ATOPIC DERMATITIS, UNSPECIFIED TYPE: Primary | ICD-10-CM

## 2021-08-18 LAB
ALBUMIN SERPL BCP-MCNC: 4.2 G/DL (ref 3.5–5.2)
ALP SERPL-CCNC: 93 U/L (ref 55–135)
ALT SERPL W/O P-5'-P-CCNC: 30 U/L (ref 10–44)
ANION GAP SERPL CALC-SCNC: 9 MMOL/L (ref 8–16)
AST SERPL-CCNC: 16 U/L (ref 10–40)
BASOPHILS # BLD AUTO: 0.08 K/UL (ref 0–0.2)
BASOPHILS NFR BLD: 0.9 % (ref 0–1.9)
BILIRUB SERPL-MCNC: 0.4 MG/DL (ref 0.1–1)
BUN SERPL-MCNC: 17 MG/DL (ref 8–23)
CALCIUM SERPL-MCNC: 10.6 MG/DL (ref 8.7–10.5)
CHLORIDE SERPL-SCNC: 109 MMOL/L (ref 95–110)
CO2 SERPL-SCNC: 20 MMOL/L (ref 23–29)
CREAT SERPL-MCNC: 1 MG/DL (ref 0.5–1.4)
DIFFERENTIAL METHOD: ABNORMAL
EOSINOPHIL # BLD AUTO: 0.4 K/UL (ref 0–0.5)
EOSINOPHIL NFR BLD: 4.8 % (ref 0–8)
ERYTHROCYTE [DISTWIDTH] IN BLOOD BY AUTOMATED COUNT: 17.1 % (ref 11.5–14.5)
EST. GFR  (AFRICAN AMERICAN): >60 ML/MIN/1.73 M^2
EST. GFR  (NON AFRICAN AMERICAN): >60 ML/MIN/1.73 M^2
GLUCOSE SERPL-MCNC: 115 MG/DL (ref 70–110)
HCT VFR BLD AUTO: 36.5 % (ref 40–54)
HGB BLD-MCNC: 12 G/DL (ref 14–18)
IMM GRANULOCYTES # BLD AUTO: 0.05 K/UL (ref 0–0.04)
IMM GRANULOCYTES NFR BLD AUTO: 0.6 % (ref 0–0.5)
LYMPHOCYTES # BLD AUTO: 2.6 K/UL (ref 1–4.8)
LYMPHOCYTES NFR BLD: 28.9 % (ref 18–48)
MCH RBC QN AUTO: 27.2 PG (ref 27–31)
MCHC RBC AUTO-ENTMCNC: 32.9 G/DL (ref 32–36)
MCV RBC AUTO: 83 FL (ref 82–98)
MONOCYTES # BLD AUTO: 1.1 K/UL (ref 0.3–1)
MONOCYTES NFR BLD: 12.7 % (ref 4–15)
NEUTROPHILS # BLD AUTO: 4.7 K/UL (ref 1.8–7.7)
NEUTROPHILS NFR BLD: 52.1 % (ref 38–73)
NRBC BLD-RTO: 0 /100 WBC
PLATELET # BLD AUTO: 302 K/UL (ref 150–450)
PMV BLD AUTO: 10.9 FL (ref 9.2–12.9)
POTASSIUM SERPL-SCNC: 3.9 MMOL/L (ref 3.5–5.1)
PROT SERPL-MCNC: 6.5 G/DL (ref 6–8.4)
RBC # BLD AUTO: 4.41 M/UL (ref 4.6–6.2)
SODIUM SERPL-SCNC: 138 MMOL/L (ref 136–145)
WBC # BLD AUTO: 8.95 K/UL (ref 3.9–12.7)

## 2021-08-18 PROCEDURE — 1126F AMNT PAIN NOTED NONE PRSNT: CPT | Mod: CPTII,S$GLB,, | Performed by: DERMATOLOGY

## 2021-08-18 PROCEDURE — 36415 COLL VENOUS BLD VENIPUNCTURE: CPT | Performed by: DERMATOLOGY

## 2021-08-18 PROCEDURE — 1160F RVW MEDS BY RX/DR IN RCRD: CPT | Mod: CPTII,S$GLB,, | Performed by: DERMATOLOGY

## 2021-08-18 PROCEDURE — 99214 PR OFFICE/OUTPT VISIT, EST, LEVL IV, 30-39 MIN: ICD-10-PCS | Mod: S$GLB,,, | Performed by: DERMATOLOGY

## 2021-08-18 PROCEDURE — 85025 COMPLETE CBC W/AUTO DIFF WBC: CPT | Performed by: DERMATOLOGY

## 2021-08-18 PROCEDURE — 1126F PR PAIN SEVERITY QUANTIFIED, NO PAIN PRESENT: ICD-10-PCS | Mod: CPTII,S$GLB,, | Performed by: DERMATOLOGY

## 2021-08-18 PROCEDURE — 1159F PR MEDICATION LIST DOCUMENTED IN MEDICAL RECORD: ICD-10-PCS | Mod: CPTII,S$GLB,, | Performed by: DERMATOLOGY

## 2021-08-18 PROCEDURE — 1160F PR REVIEW ALL MEDS BY PRESCRIBER/CLIN PHARMACIST DOCUMENTED: ICD-10-PCS | Mod: CPTII,S$GLB,, | Performed by: DERMATOLOGY

## 2021-08-18 PROCEDURE — 1159F MED LIST DOCD IN RCRD: CPT | Mod: CPTII,S$GLB,, | Performed by: DERMATOLOGY

## 2021-08-18 PROCEDURE — 99999 PR PBB SHADOW E&M-EST. PATIENT-LVL III: CPT | Mod: PBBFAC,,, | Performed by: DERMATOLOGY

## 2021-08-18 PROCEDURE — 99214 OFFICE O/P EST MOD 30 MIN: CPT | Mod: S$GLB,,, | Performed by: DERMATOLOGY

## 2021-08-18 PROCEDURE — 80053 COMPREHEN METABOLIC PANEL: CPT | Performed by: DERMATOLOGY

## 2021-08-18 PROCEDURE — 99999 PR PBB SHADOW E&M-EST. PATIENT-LVL III: ICD-10-PCS | Mod: PBBFAC,,, | Performed by: DERMATOLOGY

## 2021-08-18 PROCEDURE — 87070 CULTURE OTHR SPECIMN AEROBIC: CPT | Performed by: DERMATOLOGY

## 2021-08-18 RX ORDER — CLINDAMYCIN PHOSPHATE 10 MG/ML
SOLUTION TOPICAL 2 TIMES DAILY
Qty: 60 EACH | Refills: 3 | Status: SHIPPED | OUTPATIENT
Start: 2021-08-18 | End: 2022-10-04

## 2021-08-18 RX ORDER — DOXYCYCLINE 100 MG/1
CAPSULE ORAL
Qty: 20 CAPSULE | Refills: 0 | Status: SHIPPED | OUTPATIENT
Start: 2021-08-18 | End: 2022-04-06

## 2021-08-18 RX ORDER — BENZOYL PEROXIDE 100 MG/ML
LIQUID TOPICAL
Qty: 227 G | Refills: 12 | Status: SHIPPED | OUTPATIENT
Start: 2021-08-18 | End: 2022-10-04

## 2021-08-18 RX ORDER — SIMVASTATIN 80 MG/1
80 TABLET, FILM COATED ORAL DAILY
COMMUNITY
Start: 2021-07-05 | End: 2021-10-25 | Stop reason: SDUPTHER

## 2021-08-19 DIAGNOSIS — L20.89 OTHER ATOPIC DERMATITIS: Primary | ICD-10-CM

## 2021-08-19 DIAGNOSIS — Z79.899 ENCOUNTER FOR LONG-TERM (CURRENT) USE OF MEDICATIONS: ICD-10-CM

## 2021-08-19 RX ORDER — DUPILUMAB 300 MG/2ML
INJECTION, SOLUTION SUBCUTANEOUS
Qty: 4 ML | Refills: 5 | Status: SHIPPED | OUTPATIENT
Start: 2021-08-19 | End: 2022-01-27 | Stop reason: SDUPTHER

## 2021-08-23 LAB — BACTERIA SPEC AEROBE CULT: NORMAL

## 2021-08-27 ENCOUNTER — PATIENT OUTREACH (OUTPATIENT)
Dept: ADMINISTRATIVE | Facility: HOSPITAL | Age: 61
End: 2021-08-27

## 2021-08-27 ENCOUNTER — PATIENT MESSAGE (OUTPATIENT)
Dept: ADMINISTRATIVE | Facility: HOSPITAL | Age: 61
End: 2021-08-27

## 2021-09-13 DIAGNOSIS — G43.711 INTRACTABLE CHRONIC MIGRAINE WITHOUT AURA AND WITH STATUS MIGRAINOSUS: ICD-10-CM

## 2021-09-13 RX ORDER — SUMATRIPTAN SUCCINATE 100 MG/1
TABLET ORAL
Qty: 12 TABLET | Refills: 0 | Status: SHIPPED | OUTPATIENT
Start: 2021-09-13 | End: 2021-10-07

## 2021-10-01 DIAGNOSIS — E11.9 DIABETES MELLITUS WITHOUT COMPLICATION: ICD-10-CM

## 2021-10-01 RX ORDER — METFORMIN HYDROCHLORIDE 1000 MG/1
1000 TABLET ORAL 2 TIMES DAILY
Qty: 180 TABLET | Refills: 0 | Status: SHIPPED | OUTPATIENT
Start: 2021-10-01 | End: 2021-10-14

## 2021-10-11 ENCOUNTER — PATIENT MESSAGE (OUTPATIENT)
Dept: FAMILY MEDICINE | Facility: CLINIC | Age: 61
End: 2021-10-11

## 2021-10-13 DIAGNOSIS — M79.10 MYALGIA: ICD-10-CM

## 2021-10-13 RX ORDER — TIZANIDINE 4 MG/1
4 TABLET ORAL EVERY 8 HOURS
Qty: 30 TABLET | Refills: 1 | Status: SHIPPED | OUTPATIENT
Start: 2021-10-13 | End: 2021-12-21

## 2021-10-18 DIAGNOSIS — I10 ESSENTIAL HYPERTENSION: ICD-10-CM

## 2021-10-18 RX ORDER — AMLODIPINE BESYLATE 10 MG/1
10 TABLET ORAL DAILY
Qty: 90 TABLET | Refills: 0 | Status: CANCELLED | OUTPATIENT
Start: 2021-10-18

## 2021-10-18 RX ORDER — BUPROPION HYDROCHLORIDE 300 MG/1
300 TABLET ORAL EVERY MORNING
Qty: 30 TABLET | Refills: 2 | OUTPATIENT
Start: 2021-10-18

## 2021-10-18 RX ORDER — AMLODIPINE BESYLATE 10 MG/1
10 TABLET ORAL DAILY
Qty: 90 TABLET | Refills: 0 | Status: SHIPPED | OUTPATIENT
Start: 2021-10-18 | End: 2021-12-01

## 2021-10-19 ENCOUNTER — PATIENT MESSAGE (OUTPATIENT)
Dept: ADMINISTRATIVE | Facility: HOSPITAL | Age: 61
End: 2021-10-19
Payer: MEDICARE

## 2021-10-20 ENCOUNTER — LAB VISIT (OUTPATIENT)
Dept: LAB | Facility: HOSPITAL | Age: 61
End: 2021-10-20
Attending: FAMILY MEDICINE
Payer: MEDICARE

## 2021-10-20 DIAGNOSIS — E11.9 TYPE 2 DIABETES MELLITUS WITHOUT COMPLICATION, WITHOUT LONG-TERM CURRENT USE OF INSULIN: ICD-10-CM

## 2021-10-20 DIAGNOSIS — E11.9 TYPE 2 DIABETES MELLITUS WITHOUT COMPLICATION: ICD-10-CM

## 2021-10-20 LAB
CHOLEST SERPL-MCNC: 97 MG/DL (ref 120–199)
CHOLEST/HDLC SERPL: 2.9 {RATIO} (ref 2–5)
ESTIMATED AVG GLUCOSE: 114 MG/DL (ref 68–131)
HBA1C MFR BLD: 5.6 % (ref 4–5.6)
HDLC SERPL-MCNC: 34 MG/DL (ref 40–75)
HDLC SERPL: 35.1 % (ref 20–50)
LDLC SERPL CALC-MCNC: 43.6 MG/DL (ref 63–159)
NONHDLC SERPL-MCNC: 63 MG/DL
TRIGL SERPL-MCNC: 97 MG/DL (ref 30–150)

## 2021-10-20 PROCEDURE — 83036 HEMOGLOBIN GLYCOSYLATED A1C: CPT | Performed by: FAMILY MEDICINE

## 2021-10-20 PROCEDURE — 80061 LIPID PANEL: CPT | Performed by: FAMILY MEDICINE

## 2021-10-20 PROCEDURE — 36415 COLL VENOUS BLD VENIPUNCTURE: CPT | Mod: PO | Performed by: FAMILY MEDICINE

## 2021-10-22 DIAGNOSIS — L30.1 DYSHIDROTIC ECZEMA: ICD-10-CM

## 2021-10-22 DIAGNOSIS — G44.229 CHRONIC TENSION-TYPE HEADACHE, NOT INTRACTABLE: ICD-10-CM

## 2021-10-22 RX ORDER — BUPROPION HYDROCHLORIDE 300 MG/1
300 TABLET ORAL EVERY MORNING
Qty: 30 TABLET | Refills: 0 | OUTPATIENT
Start: 2021-10-22

## 2021-10-22 RX ORDER — HYDROXYZINE HYDROCHLORIDE 50 MG/1
50 TABLET, FILM COATED ORAL 3 TIMES DAILY PRN
Qty: 270 TABLET | Refills: 0 | Status: SHIPPED | OUTPATIENT
Start: 2021-10-22 | End: 2021-11-29 | Stop reason: SDUPTHER

## 2021-10-22 RX ORDER — ZONISAMIDE 50 MG/1
50 CAPSULE ORAL 2 TIMES DAILY
Qty: 180 CAPSULE | Refills: 0 | Status: SHIPPED | OUTPATIENT
Start: 2021-10-22 | End: 2022-03-22

## 2021-10-25 ENCOUNTER — TELEPHONE (OUTPATIENT)
Dept: FAMILY MEDICINE | Facility: CLINIC | Age: 61
End: 2021-10-25
Payer: MEDICARE

## 2021-10-25 RX ORDER — SIMVASTATIN 80 MG/1
80 TABLET, FILM COATED ORAL DAILY
Qty: 90 TABLET | Refills: 0 | Status: SHIPPED | OUTPATIENT
Start: 2021-10-25 | End: 2021-12-01

## 2021-10-25 RX ORDER — SIMVASTATIN 80 MG/1
80 TABLET, FILM COATED ORAL DAILY
Qty: 90 TABLET | Refills: 0 | Status: SHIPPED | OUTPATIENT
Start: 2021-10-25 | End: 2021-10-25 | Stop reason: SDUPTHER

## 2021-11-05 DIAGNOSIS — E11.42 TYPE 2 DIABETES MELLITUS WITH DIABETIC POLYNEUROPATHY: ICD-10-CM

## 2021-11-05 RX ORDER — GABAPENTIN 300 MG/1
300 CAPSULE ORAL 2 TIMES DAILY
Qty: 180 CAPSULE | Refills: 3 | Status: SHIPPED | OUTPATIENT
Start: 2021-11-05 | End: 2022-06-17

## 2021-11-11 ENCOUNTER — PATIENT MESSAGE (OUTPATIENT)
Dept: FAMILY MEDICINE | Facility: CLINIC | Age: 61
End: 2021-11-11
Payer: MEDICARE

## 2021-11-24 ENCOUNTER — TELEPHONE (OUTPATIENT)
Dept: FAMILY MEDICINE | Facility: CLINIC | Age: 61
End: 2021-11-24
Payer: MEDICARE

## 2021-11-24 DIAGNOSIS — E11.42 TYPE 2 DIABETES MELLITUS WITH DIABETIC POLYNEUROPATHY: ICD-10-CM

## 2021-11-29 DIAGNOSIS — L30.1 DYSHIDROTIC ECZEMA: ICD-10-CM

## 2021-11-30 RX ORDER — HYDROXYZINE HYDROCHLORIDE 50 MG/1
50 TABLET, FILM COATED ORAL 3 TIMES DAILY PRN
Qty: 270 TABLET | Refills: 0 | Status: SHIPPED | OUTPATIENT
Start: 2021-11-30 | End: 2022-03-23

## 2021-12-01 ENCOUNTER — OFFICE VISIT (OUTPATIENT)
Dept: FAMILY MEDICINE | Facility: CLINIC | Age: 61
End: 2021-12-01
Payer: MEDICARE

## 2021-12-01 VITALS
SYSTOLIC BLOOD PRESSURE: 130 MMHG | WEIGHT: 238.31 LBS | BODY MASS INDEX: 35.3 KG/M2 | OXYGEN SATURATION: 99 % | DIASTOLIC BLOOD PRESSURE: 66 MMHG | HEIGHT: 69 IN | HEART RATE: 91 BPM | TEMPERATURE: 98 F

## 2021-12-01 DIAGNOSIS — E66.01 SEVERE OBESITY (BMI 35.0-39.9) WITH COMORBIDITY: ICD-10-CM

## 2021-12-01 DIAGNOSIS — I10 ESSENTIAL HYPERTENSION: ICD-10-CM

## 2021-12-01 DIAGNOSIS — S29.012A MUSCLE STRAIN OF RIGHT UPPER BACK, INITIAL ENCOUNTER: Primary | ICD-10-CM

## 2021-12-01 DIAGNOSIS — J44.1 CHRONIC OBSTRUCTIVE PULMONARY DISEASE WITH ACUTE EXACERBATION: ICD-10-CM

## 2021-12-01 DIAGNOSIS — E11.9 TYPE 2 DIABETES MELLITUS WITHOUT COMPLICATION, WITHOUT LONG-TERM CURRENT USE OF INSULIN: ICD-10-CM

## 2021-12-01 DIAGNOSIS — E78.5 HYPERLIPIDEMIA, UNSPECIFIED HYPERLIPIDEMIA TYPE: ICD-10-CM

## 2021-12-01 DIAGNOSIS — K21.9 GASTROESOPHAGEAL REFLUX DISEASE WITHOUT ESOPHAGITIS: ICD-10-CM

## 2021-12-01 PROCEDURE — 4010F PR ACE/ARB THEARPY RXD/TAKEN: ICD-10-PCS | Mod: CPTII,S$GLB,, | Performed by: NURSE PRACTITIONER

## 2021-12-01 PROCEDURE — 3061F PR NEG MICROALBUMINURIA RESULT DOCUMENTED/REVIEW: ICD-10-PCS | Mod: CPTII,S$GLB,, | Performed by: NURSE PRACTITIONER

## 2021-12-01 PROCEDURE — 99214 PR OFFICE/OUTPT VISIT, EST, LEVL IV, 30-39 MIN: ICD-10-PCS | Mod: S$GLB,,, | Performed by: NURSE PRACTITIONER

## 2021-12-01 PROCEDURE — 99999 PR PBB SHADOW E&M-EST. PATIENT-LVL V: CPT | Mod: PBBFAC,,, | Performed by: NURSE PRACTITIONER

## 2021-12-01 PROCEDURE — 3061F NEG MICROALBUMINURIA REV: CPT | Mod: CPTII,S$GLB,, | Performed by: NURSE PRACTITIONER

## 2021-12-01 PROCEDURE — 3066F PR DOCUMENTATION OF TREATMENT FOR NEPHROPATHY: ICD-10-PCS | Mod: CPTII,S$GLB,, | Performed by: NURSE PRACTITIONER

## 2021-12-01 PROCEDURE — 99999 PR PBB SHADOW E&M-EST. PATIENT-LVL V: ICD-10-PCS | Mod: PBBFAC,,, | Performed by: NURSE PRACTITIONER

## 2021-12-01 PROCEDURE — 4010F ACE/ARB THERAPY RXD/TAKEN: CPT | Mod: CPTII,S$GLB,, | Performed by: NURSE PRACTITIONER

## 2021-12-01 PROCEDURE — 3066F NEPHROPATHY DOC TX: CPT | Mod: CPTII,S$GLB,, | Performed by: NURSE PRACTITIONER

## 2021-12-01 PROCEDURE — 99214 OFFICE O/P EST MOD 30 MIN: CPT | Mod: S$GLB,,, | Performed by: NURSE PRACTITIONER

## 2021-12-01 RX ORDER — GEMFIBROZIL 600 MG/1
600 TABLET, FILM COATED ORAL
Qty: 180 TABLET | Refills: 0 | Status: SHIPPED | OUTPATIENT
Start: 2021-12-01 | End: 2022-01-21

## 2021-12-01 RX ORDER — PANTOPRAZOLE SODIUM 40 MG/1
40 TABLET, DELAYED RELEASE ORAL DAILY
Qty: 90 TABLET | Refills: 3 | Status: SHIPPED | OUTPATIENT
Start: 2021-12-01 | End: 2022-11-25

## 2021-12-01 RX ORDER — MELOXICAM 15 MG/1
15 TABLET ORAL DAILY
Qty: 30 TABLET | Refills: 0 | Status: SHIPPED | OUTPATIENT
Start: 2021-12-01 | End: 2022-06-15

## 2021-12-15 DIAGNOSIS — J44.9 CHRONIC OBSTRUCTIVE PULMONARY DISEASE, UNSPECIFIED COPD TYPE: ICD-10-CM

## 2021-12-16 RX ORDER — ALBUTEROL SULFATE 90 UG/1
AEROSOL, METERED RESPIRATORY (INHALATION)
Qty: 18 G | Refills: 3 | Status: SHIPPED | OUTPATIENT
Start: 2021-12-16 | End: 2023-02-02 | Stop reason: SDUPTHER

## 2021-12-17 DIAGNOSIS — G43.711 INTRACTABLE CHRONIC MIGRAINE WITHOUT AURA AND WITH STATUS MIGRAINOSUS: ICD-10-CM

## 2021-12-21 DIAGNOSIS — M79.10 MYALGIA: ICD-10-CM

## 2021-12-21 DIAGNOSIS — E11.9 DIABETES MELLITUS WITHOUT COMPLICATION: ICD-10-CM

## 2021-12-21 RX ORDER — TIZANIDINE 4 MG/1
TABLET ORAL
Qty: 30 TABLET | Refills: 1 | Status: SHIPPED | OUTPATIENT
Start: 2021-12-21 | End: 2022-10-04 | Stop reason: SDUPTHER

## 2021-12-21 RX ORDER — SUMATRIPTAN SUCCINATE 100 MG/1
TABLET ORAL
Qty: 27 TABLET | Refills: 0 | Status: SHIPPED | OUTPATIENT
Start: 2021-12-21 | End: 2022-02-10

## 2021-12-29 RX ORDER — METFORMIN HYDROCHLORIDE 1000 MG/1
1000 TABLET ORAL 2 TIMES DAILY
Qty: 180 TABLET | Refills: 0 | Status: SHIPPED | OUTPATIENT
Start: 2021-12-29 | End: 2022-05-25

## 2021-12-30 RX ORDER — TIZANIDINE 4 MG/1
4 TABLET ORAL EVERY 8 HOURS
Qty: 30 TABLET | Refills: 1 | Status: SHIPPED | OUTPATIENT
Start: 2021-12-30 | End: 2022-02-16 | Stop reason: SDUPTHER

## 2022-01-02 ENCOUNTER — HOSPITAL ENCOUNTER (EMERGENCY)
Facility: HOSPITAL | Age: 62
Discharge: HOME | End: 2022-01-02
Attending: EMERGENCY MEDICINE
Payer: MEDICARE

## 2022-01-02 VITALS
OXYGEN SATURATION: 96 % | HEIGHT: 69 IN | BODY MASS INDEX: 35.25 KG/M2 | SYSTOLIC BLOOD PRESSURE: 134 MMHG | RESPIRATION RATE: 20 BRPM | WEIGHT: 238 LBS | HEART RATE: 100 BPM | TEMPERATURE: 98 F | DIASTOLIC BLOOD PRESSURE: 75 MMHG

## 2022-01-02 DIAGNOSIS — R05.9 COUGH: ICD-10-CM

## 2022-01-02 DIAGNOSIS — R59.1 LYMPHADENOPATHY: Primary | ICD-10-CM

## 2022-01-02 LAB
CTP QC/QA: NORMAL
CTP QC/QA: YES
MOLECULAR STREP A: NEGATIVE
SARS-COV-2 RDRP RESP QL NAA+PROBE: NEGATIVE

## 2022-01-02 PROCEDURE — U0002 COVID-19 LAB TEST NON-CDC: HCPCS | Performed by: PHYSICIAN ASSISTANT

## 2022-01-02 PROCEDURE — 25000003 PHARM REV CODE 250: Performed by: PHYSICIAN ASSISTANT

## 2022-01-02 PROCEDURE — 99283 EMERGENCY DEPT VISIT LOW MDM: CPT | Mod: 25

## 2022-01-02 RX ORDER — HYDROCODONE BITARTRATE AND ACETAMINOPHEN 5; 325 MG/1; MG/1
1 TABLET ORAL
Status: COMPLETED | OUTPATIENT
Start: 2022-01-02 | End: 2022-01-02

## 2022-01-02 RX ORDER — BENZONATATE 100 MG/1
100 CAPSULE ORAL 3 TIMES DAILY PRN
Qty: 20 CAPSULE | Refills: 0 | Status: SHIPPED | OUTPATIENT
Start: 2022-01-02 | End: 2022-01-09

## 2022-01-02 RX ORDER — AMOXICILLIN AND CLAVULANATE POTASSIUM 875; 125 MG/1; MG/1
1 TABLET, FILM COATED ORAL 2 TIMES DAILY
Qty: 14 TABLET | Refills: 0 | Status: SHIPPED | OUTPATIENT
Start: 2022-01-02 | End: 2022-01-09

## 2022-01-02 RX ORDER — LIDOCAINE 50 MG/G
1 PATCH TOPICAL DAILY
Qty: 15 PATCH | Refills: 0 | Status: SHIPPED | OUTPATIENT
Start: 2022-01-02 | End: 2022-01-17

## 2022-01-02 RX ORDER — ACETAMINOPHEN 500 MG
500 TABLET ORAL EVERY 4 HOURS PRN
Qty: 20 TABLET | Refills: 0 | Status: SHIPPED | OUTPATIENT
Start: 2022-01-02 | End: 2022-01-07

## 2022-01-02 RX ORDER — PROMETHAZINE HYDROCHLORIDE AND DEXTROMETHORPHAN HYDROBROMIDE 6.25; 15 MG/5ML; MG/5ML
5 SYRUP ORAL EVERY 4 HOURS PRN
Qty: 80 ML | Refills: 0 | Status: SHIPPED | OUTPATIENT
Start: 2022-01-02 | End: 2022-01-05

## 2022-01-02 RX ORDER — IBUPROFEN 600 MG/1
600 TABLET ORAL EVERY 6 HOURS PRN
Qty: 20 TABLET | Refills: 0 | Status: SHIPPED | OUTPATIENT
Start: 2022-01-02 | End: 2022-01-07

## 2022-01-02 RX ADMIN — HYDROCODONE BITARTRATE AND ACETAMINOPHEN 1 TABLET: 5; 325 TABLET ORAL at 12:01

## 2022-01-02 NOTE — Clinical Note
"Sctot Bansal (Randy) was seen and treated in our emergency department on 1/2/2022.     COVID-19 is present in our communities across the state. There is limited testing for COVID at this time, so not all patients can be tested. In this situation, your employee meets the following criteria:    Scott Bansal has met the criteria for COVID-19 testing and has a NEGATIVE result. The employee can return to work once they are asymptomatic for 72 hours without the use of fever reducing medications (Tylenol, Motrin, etc).     If you have any questions or concerns, or if I can be of further assistance, please do not hesitate to contact me.    Sincerely,             Angie Hawkins PA-C"

## 2022-01-02 NOTE — ED PROVIDER NOTES
Encounter Date: 1/2/2022       History     Chief Complaint   Patient presents with    Lymphadenopathy     Patient with right throat gland swelling that started yesterday, denies difficulty breathing or swallowing, Denies fever cough or congestion.      CC: Lymphadenopathy    HPI:   60 y/o M with history of HTN, HLD, DM, COPD presenting for evaluation of 2 day history of R sided lymphadenopathy with associated constant pain. He reports he has had a dry cough for the past week. Denies CP, SOB. Denies sore throat, dental pain, difficulty breathing or swallowing.   He reports he has been feeling lightheaded with standing in the last couple of days. Denies nausea, vomiting, diarrhea. Attempted tx with mucinex and albuterol inhaler    The history is provided by the patient.     Review of patient's allergies indicates:   Allergen Reactions    Sulfa (sulfonamide antibiotics) Rash     Past Medical History:   Diagnosis Date    Bipolar 1 disorder, mixed     BPH (benign prostatic hypertrophy)     Colon polyp     Cyst, eyelid     Dr. Broussard    Diabetes mellitus     GERD (gastroesophageal reflux disease)     Hyperlipidemia     Hypertension     Lumbar degenerative disc disease 3/7/2019    Migraine headache     Obesity      Past Surgical History:   Procedure Laterality Date    CERVICAL SPINE SURGERY      COLONOSCOPY N/A 7/27/2017    Procedure: COLONOSCOPY;  Surgeon: Genaro Nix MD;  Location: [facility] ENDO;  Service: Endoscopy;  Laterality: N/A;    COLONOSCOPY N/A 10/30/2020    Procedure: COLONOSCOPY;  Surgeon: Herb Martinez MD;  Location: [facility] ENDO;  Service: Endoscopy;  Laterality: N/A;    EYE SURGERY Left 03/2017    cyst removal on eyelid; Dr. Broussard    SHOULDER SURGERY      TONSILLECTOMY       Family History   Problem Relation Age of Onset    Cataracts Mother     Cancer Mother         throat    Hypertension Mother     Hypertension Father     Stroke Father         2 strokes    Hypertension  Sister     Cancer Brother         spinal, kidney, spinal fluid    Hypertension Brother     Cancer Cousin         breast?     Social History     Tobacco Use    Smoking status: Former Smoker     Packs/day: 2.00     Years: 15.00     Pack years: 30.00     Types: Cigarettes     Quit date: 1984     Years since quittin.6    Smokeless tobacco: Never Used    Tobacco comment: Patient quit smoking at the age of 26 yo.   Substance Use Topics    Alcohol use: No    Drug use: No     Review of Systems   Constitutional: Negative for chills and fever.   HENT: Negative for congestion, ear pain, rhinorrhea and sore throat.    Eyes: Negative for redness.   Respiratory: Negative for shortness of breath and stridor.    Cardiovascular: Negative for chest pain.   Gastrointestinal: Negative for abdominal pain, constipation, diarrhea, nausea and vomiting.   Genitourinary: Negative for dysuria, frequency, hematuria and urgency.   Musculoskeletal: Negative for back pain and neck pain.   Skin: Negative for rash.   Neurological: Negative for dizziness, speech difficulty, weakness, light-headedness and numbness.   Hematological: Positive for adenopathy. Does not bruise/bleed easily.   Psychiatric/Behavioral: Negative for confusion.       Physical Exam     Initial Vitals [22 1110]   BP Pulse Resp Temp SpO2   134/75 100 18 98.3 °F (36.8 °C) 96 %      MAP       --         Physical Exam    Nursing note and vitals reviewed.  Constitutional: He appears well-developed and well-nourished. No distress.   HENT:   Head: Normocephalic.   Right Ear: External ear normal.   Left Ear: External ear normal.   Mouth/Throat: Uvula is midline and mucous membranes are normal. Posterior oropharyngeal erythema present. No oropharyngeal exudate or posterior oropharyngeal edema.   Eyes: Conjunctivae are normal.   Pulmonary/Chest: No respiratory distress.   Musculoskeletal:         General: Normal range of motion.     Lymphadenopathy:        Head  (right side): Submental adenopathy present.   Neurological: He is alert.   Skin: Skin is warm and dry. No rash noted.   Psychiatric: He has a normal mood and affect. His behavior is normal. Judgment and thought content normal.         ED Course   Procedures  Labs Reviewed   POCT STREP A MOLECULAR   SARS-COV-2 RDRP GENE    Narrative:     This test utilizes isothermal nucleic acid amplification   technology to detect the SARS-CoV-2 RdRp nucleic acid segment.   The analytical sensitivity (limit of detection) is 125 genome   equivalents/mL.   A POSITIVE result implies infection with the SARS-CoV-2 virus;   the patient is presumed to be contagious.     A NEGATIVE result means that SARS-CoV-2 nucleic acids are not   present above the limit of detection. A NEGATIVE result should be   treated as presumptive. It does not rule out the possibility of   COVID-19 and should not be the sole basis for treatment decisions.   If COVID-19 is strongly suspected based on clinical and exposure   history, re-testing using an alternate molecular assay should be   considered.   This test is only for use under the Food and Drug   Administration s Emergency Use Authorization (EUA).   Commercial kits are provided by [employer].   Performance characteristics of the EUA have been independently   verified by [facility] Department of   Pathology and Laboratory Medicine.   _________________________________________________________________   The authorized Fact Sheet for Healthcare Providers and the authorized Fact   Sheet for Patients of the ID NOW COVID-19 are available on the FDA   website:     https://www.fda.gov/media/484323/download  https://www.fda.gov/media/612403/download              Imaging Results          X-Ray Chest AP Portable (Final result)  Result time 01/02/22 11:55:48    Final result by Shaggy Esteves MD (01/02/22 11:55:48)                 Impression:      No acute abnormality.      Electronically signed  by: Shaggy Esteves MD  Date:    01/02/2022  Time:    11:55             Narrative:    EXAMINATION:  XR CHEST AP PORTABLE    CLINICAL HISTORY:  Cough, unspecified    TECHNIQUE:  Single frontal view of the chest was performed.    COMPARISON:  Prior exams dating back to 2013    FINDINGS:  Lungs are clear.  No effusion or pneumothorax.    Unremarkable cardiomediastinal silhouette.    No acute bone abnormality.                                 Medications   HYDROcodone-acetaminophen 5-325 mg per tablet 1 tablet (1 tablet Oral Given 1/2/22 1213)     Medical Decision Making:   ED Management:  62 y/o M presenting for lymphadenopathy. covid strep negative. History of COPD and has had a persisting cough. Exam above. CXR negative. Will treat for bronchitis with augmentin and meds for symptomatic treatment. PCP follow up in2  Days. Return to ER for worsening symptoms or as needed                          Clinical Impression:   Final diagnoses:  [R05.9] Cough  [R59.1] Lymphadenopathy (Primary)          ED Disposition Condition    Discharge Stable        ED Prescriptions     Medication Sig Dispense Start Date End Date Auth. Provider    amoxicillin-clavulanate 875-125mg (AUGMENTIN) 875-125 mg per tablet Take 1 tablet by mouth 2 (two) times daily. for 7 days 14 tablet 1/2/2022 1/9/2022 Angie Hawkins PA-C    ibuprofen (ADVIL,MOTRIN) 600 MG tablet Take 1 tablet (600 mg total) by mouth every 6 (six) hours as needed for Pain. 20 tablet 1/2/2022 1/7/2022 Angie Hawkins PA-C    acetaminophen (TYLENOL) 500 MG tablet Take 1 tablet (500 mg total) by mouth every 4 (four) hours as needed. 20 tablet 1/2/2022 1/7/2022 Angie Hawkins PA-C    benzonatate (TESSALON) 100 MG capsule Take 1 capsule (100 mg total) by mouth 3 (three) times daily as needed. 20 capsule 1/2/2022 1/9/2022 Angie Hawkins PA-C    LIDOcaine (LIDODERM) 5 % Place 1 patch onto the skin once daily. Remove & Discard patch within 12 hours or as  directed by MD. May use 4% over the counter if not covered by insurance for 15 days 15 patch 1/2/2022 1/17/2022 Angie Hawkins PA-C    promethazine-dextromethorphan (PROMETHAZINE-DM) 6.25-15 mg/5 mL Syrp Take 5 mLs by mouth every 4 (four) hours as needed (for cough and congestion). 80 mL 1/2/2022 1/5/2022 Angie Hawkins PA-C        Follow-up Information     Follow up With Specialties Details Why Contact Info    Kaylie Chau MD Family Medicine, Wound Care Schedule an appointment as soon as possible for a visit in 2 days for follow up [address] [facility] [address]  [phone]      [facility] Emergency Dept Emergency Medicine Go to  As needed, If symptoms worsen [address]  [facility] [phone]           Angie Hawkins PA-C  01/02/22 1527

## 2022-01-12 RX ORDER — CYCLOBENZAPRINE HCL 10 MG
10 TABLET ORAL 3 TIMES DAILY PRN
Qty: 270 TABLET | Refills: 1 | Status: SHIPPED | OUTPATIENT
Start: 2022-01-12 | End: 2022-06-09

## 2022-01-12 NOTE — TELEPHONE ENCOUNTER
----- Message from Windy Jane sent at 1/12/2022  9:05 AM CST -----  Regarding: Self [phone]  Type: RX Refill Request    Who Called: self    Have you contacted your pharmacy: yes    Refill or New Rx: refill    RX Name and Strength: cyclobenzaprine (FLEXERIL) 10 MG tablet    Preferred Pharmacy with phone number:    [facility]  [id number]  [phone]    Local or Mail Order: local    Would the patient rather a call back or a response via [facility]? Call back    Best Call Back Number: [phone]

## 2022-01-12 NOTE — TELEPHONE ENCOUNTER
No new care gaps identified.  Powered by [employer]. Reference number: [id number].   1/12/2022 9:10:53 AM CST

## 2022-01-21 DIAGNOSIS — I10 ESSENTIAL HYPERTENSION: ICD-10-CM

## 2022-01-21 DIAGNOSIS — E78.5 HYPERLIPIDEMIA, UNSPECIFIED HYPERLIPIDEMIA TYPE: ICD-10-CM

## 2022-01-21 RX ORDER — GEMFIBROZIL 600 MG/1
TABLET, FILM COATED ORAL
Qty: 60 TABLET | Refills: 2 | Status: SHIPPED | OUTPATIENT
Start: 2022-01-21 | End: 2022-04-23

## 2022-01-27 DIAGNOSIS — Z79.899 ENCOUNTER FOR LONG-TERM (CURRENT) USE OF MEDICATIONS: ICD-10-CM

## 2022-01-27 DIAGNOSIS — L20.89 OTHER ATOPIC DERMATITIS: ICD-10-CM

## 2022-01-27 RX ORDER — DUPILUMAB 300 MG/2ML
INJECTION, SOLUTION SUBCUTANEOUS
Qty: 4 ML | Refills: 5 | Status: SHIPPED | OUTPATIENT
Start: 2022-01-27 | End: 2022-01-31 | Stop reason: SDUPTHER

## 2022-01-28 NOTE — TELEPHONE ENCOUNTER
Care Due:                  Date            Visit Type   Department     Provider  --------------------------------------------------------------------------------                                             [facility]                                           MED/ INTERNAL  Last Visit: 04-      None         MED/ PEDS      Betsy Zamora                                           [facility]                                           MED/ INTERNAL  Next Visit: 03-      None         MED/ PEDS      Kaylie Chau                                                            Last  Test          Frequency    Reason                     Performed    Due Date  --------------------------------------------------------------------------------    HBA1C.......  6 months...  metFORMIN................  10-   04-    Powered by [employer]. Reference number: [id number].   1/28/2022 10:46:51 AM CST

## 2022-01-28 NOTE — TELEPHONE ENCOUNTER
----- Message from Vianney Watson sent at 1/27/2022  1:03 PM CST -----  Regarding: Refill  Contact: Patient  Type: Patient Call Back    Who called:Patient    What is the request in detail: Patient is requesting a refill: candesartan (ATACAND) 4 MG tablet    Can the clinic reply by [facility]? No    Would the patient rather a call back or a response via [facility]? Call    Best call back number: [phone]    Additional Information:   [facility]/pharmacy #[id number] - [place] - [address] [place] [address]  Phone: [phone] Fax: [phone]    Thanks

## 2022-01-28 NOTE — TELEPHONE ENCOUNTER
Encounter details require adjustment(s)/ updating by OR Staff  As of this time CDM: did not populate or display   Adjustment(s) made: Provider  CDM should display. Medication(s) delegated by the OR.  Will resend refill request encounter to P Centralized Refill Staff Pool.   [facility]   Note composed:10:45 AM 01/28/2022

## 2022-01-31 DIAGNOSIS — L20.89 OTHER ATOPIC DERMATITIS: ICD-10-CM

## 2022-01-31 DIAGNOSIS — Z79.899 ENCOUNTER FOR LONG-TERM (CURRENT) USE OF MEDICATIONS: ICD-10-CM

## 2022-01-31 RX ORDER — DUPILUMAB 300 MG/2ML
INJECTION, SOLUTION SUBCUTANEOUS
Qty: 4 ML | Refills: 5 | COMMUNITY
Start: 2022-01-31 | End: 2022-03-23 | Stop reason: SDUPTHER

## 2022-02-03 ENCOUNTER — TELEPHONE (OUTPATIENT)
Dept: FAMILY MEDICINE | Facility: CLINIC | Age: 62
End: 2022-02-03
Payer: MEDICARE

## 2022-02-03 NOTE — TELEPHONE ENCOUNTER
Spoke with the Patient and rescheduled office visit for 03/26/22 @ 8:20am.  Patient verbally understands.

## 2022-02-07 RX ORDER — CANDESARTAN 4 MG/1
4 TABLET ORAL DAILY
Qty: 90 TABLET | Refills: 0 | Status: SHIPPED | OUTPATIENT
Start: 2022-02-07 | End: 2022-06-15

## 2022-02-07 NOTE — TELEPHONE ENCOUNTER
Provider Staff:     Action is required for this patient.   Please see care gap opportunities below in Care Due Message.     Thanks!  [facility]     Appointments      Date Provider   Last Visit   3/2/2021 Kaylie Villalta MD   Next Visit   2/3/2022 Kaylie Villalta MD     Note composed:4:22 PM 02/07/2022

## 2022-02-07 NOTE — TELEPHONE ENCOUNTER
Refill Authorization Note   Scott Bansal  is requesting a refill authorization.  Brief Assessment and Rationale for Refill:  Approve    -Medication-Related Problems Identified: Requires labs  Medication Therapy Plan:       Medication Reconciliation Completed: No   Comments:   --->Care Gap information included below if applicable.   Orders Placed This Encounter    gemfibroziL (LOPID) 600 MG tablet    candesartan (ATACAND) 4 MG tablet      Requested Prescriptions   Signed Prescriptions Disp Refills    gemfibroziL (LOPID) 600 MG tablet 60 tablet 2     Sig: TAKE 1 TABLET BY MOUTH 2 TIMES DAILY BEFORE MEALS.       Cardiovascular:  Antilipid - Fibric Acid Derivatives Passed - 1/21/2022  8:30 AM        Passed - Patient is at least 18 years old        Passed - Valid encounter within last 15 months     Recent Visits  Date Type Provider Dept   03/02/21 Office Visit Kaylie Villalta MD [facility] Family Med/ Internal Med/ Peds   08/25/20 Office Visit Kaylie Villalta MD [facility] Family Med/ Internal Med/ Peds   06/04/20 Office Visit Kaylie Villalta MD [facility] Family Med/ Internal Med/ Peds   05/07/20 Office Visit Kaylie Villalta MD [facility] Family Med/ Internal Med/ Peds   Showing recent visits within past 720 days and meeting all other requirements  Future Appointments  No visits were found meeting these conditions.  Showing future appointments within next 150 days and meeting all other requirements      Future Appointments              In 1 month Kaylie Villalta MD [facility]                Passed - ALT is 119 or below and within 360 days     ALT   Date Value Ref Range Status   08/18/2021 30 10 - 44 U/L Final   04/29/2021 27 10 - 44 U/L Final   07/16/2020 32 10 - 44 U/L Final              Passed - AST is 131 or below and within 360 days     AST   Date Value Ref Range Status   08/18/2021 16 10 - 40 U/L Final   04/29/2021 16 10 - 40 U/L Final   07/16/2020 21 10 - 40 U/L Final              Passed - Cr  is 1.39 or below and within 360 days     Lab Results   Component Value Date    CREATININE 1.0 08/18/2021    CREATININE 1.0 04/29/2021    CREATININE 0.9 07/16/2020              Passed - eGFR is 30 or above and within 360 days     Lab Results   Component Value Date    EGFRNONAA >60.0 08/18/2021    EGFRNONAA >60.0 04/29/2021    EGFRNONAA >60.0 07/16/2020                Passed - WBC within 360 days     WBC   Date Value Ref Range Status   08/18/2021 8.95 3.90 - 12.70 K/uL Final   04/29/2021 15.55 (H) 3.90 - 12.70 K/uL Final   07/16/2020 6.66 3.90 - 12.70 K/uL Final              Passed - Total Cholesterol within 360 days     Lab Results   Component Value Date    CHOL 97 (L) 10/20/2021    CHOL 123 02/12/2020    CHOL 114 (L) 03/12/2019              Passed - LDL within 360 days     LDL Cholesterol   Date Value Ref Range Status   10/20/2021 43.6 (L) 63.0 - 159.0 mg/dL Final     Comment:     The National Cholesterol Education Program (NCEP) has set the  following guidelines (reference values) for LDL Cholesterol:  Optimal.......................<130 mg/dL  Borderline High...............130-159 mg/dL  High..........................160-189 mg/dL  Very High.....................>190 mg/dL              Passed - HDL within 360 days     HDL   Date Value Ref Range Status   10/20/2021 34 (L) 40 - 75 mg/dL Final     Comment:     The National Cholesterol Education Program (NCEP) has set the  following guidelines (reference values) for HDL Cholesterol:  Low...............<40 mg/dL  Optimal...........>60 mg/dL              Passed - Triglycerides within 360 days     Lab Results   Component Value Date    TRIG 97 10/20/2021    TRIG 161 (H) 02/12/2020    TRIG 120 03/12/2019                candesartan (ATACAND) 4 MG tablet 90 tablet 0     Sig: Take 1 tablet (4 mg total) by mouth once daily.       Cardiovascular:  Angiotensin Receptor Blockers Passed - 1/28/2022 10:45 AM        Passed - Patient is at least 18 years old        Passed - Last BP in  normal range within 360 days     BP Readings from Last 1 Encounters:   01/02/22 134/75               Passed - Valid encounter within last 15 months     Recent Visits  Date Type Provider Dept   03/02/21 Office Visit Kaylie Villalta MD [facility] Family Med/ Internal Med/ Peds   08/25/20 Office Visit Kaylie Villalta MD [facility] Family Med/ Internal Med/ Peds   06/04/20 Office Visit Kaylie Villalta MD [facility] Family Med/ Internal Med/ Peds   05/07/20 Office Visit Kaylie Villalta MD [facility] Family Med/ Internal Med/ Peds   Showing recent visits within past 720 days and meeting all other requirements  Future Appointments  No visits were found meeting these conditions.  Showing future appointments within next 150 days and meeting all other requirements      Future Appointments              In 1 month Kaylie Villalta MD [facility]                Passed - Cr is 1.39 or below and within 360 days     Lab Results   Component Value Date    CREATININE 1.0 08/18/2021    CREATININE 1.0 04/29/2021    CREATININE 0.9 07/16/2020              Passed - K is 5.2 or below and within 360 days     Potassium   Date Value Ref Range Status   08/18/2021 3.9 3.5 - 5.1 mmol/L Final   04/29/2021 4.6 3.5 - 5.1 mmol/L Final   07/16/2020 3.8 3.5 - 5.1 mmol/L Final              Passed - eGFR within 360 days     Lab Results   Component Value Date    EGFRNONAA >60.0 08/18/2021    EGFRNONAA >60.0 04/29/2021    EGFRNONAA >60.0 07/16/2020                    Appointments  past 12m or future 3m with PCP    Date Provider   Last Visit   3/2/2021 Kaylie Villalta MD   Next Visit   2/3/2022 Kaylie Villalta MD   ED visits in past 90 days: 1     Note composed:4:22 PM 02/07/2022

## 2022-02-11 ENCOUNTER — PES CALL (OUTPATIENT)
Dept: ADMINISTRATIVE | Facility: CLINIC | Age: 62
End: 2022-02-11
Payer: MEDICARE

## 2022-02-16 DIAGNOSIS — M79.10 MYALGIA: ICD-10-CM

## 2022-02-16 NOTE — TELEPHONE ENCOUNTER
----- Message from Hilary Mota sent at 2/16/2022 11:14 AM CST -----  Type: RX Refill Request    Who Called: self     Have you contacted your pharmacy: yes     Refill or New Rx: refill    RX Name and Strength: tiZANidine (ZANAFLEX) 4 MG tablet    Preferred Pharmacy with phone number:     [facility]/pharmacy #[id number] - [place] - [address]  [address] [place] [address]  Phone: [phone] Fax: [phone]    Local or Mail Order: local     Would the patient rather a call back or a response via [facility]? Call back     Best Call Back Number: [phone]

## 2022-02-16 NOTE — TELEPHONE ENCOUNTER
No new care gaps identified.  Powered by [employer]. Reference number: [id number].   2/16/2022 11:21:37 AM CST

## 2022-02-17 RX ORDER — TIZANIDINE 4 MG/1
4 TABLET ORAL EVERY 8 HOURS
Qty: 30 TABLET | Refills: 1 | Status: SHIPPED | OUTPATIENT
Start: 2022-02-17 | End: 2022-03-29 | Stop reason: SDUPTHER

## 2022-02-21 ENCOUNTER — SPECIALTY PHARMACY (OUTPATIENT)
Dept: PHARMACY | Facility: CLINIC | Age: 62
End: 2022-02-21
Payer: MEDICARE

## 2022-02-21 NOTE — TELEPHONE ENCOUNTER
Dupixent continuation of therapy. Pt needs PAP renewal for 2022. Forwarding to financial assistance team in order to work on PAP renewal    Re-auth submitted via [facility] - Key: [id number]   [id number]. Dupixent approved until 12/31/22.

## 2022-02-23 NOTE — TELEPHONE ENCOUNTER
BENEFIT INVESTIGATION:  Dupixent    Medicare- initial stage   Copay: $100    Forwarded to FA team for review.

## 2022-03-04 NOTE — TELEPHONE ENCOUNTER
Spoke to patient and informed of PAP renewal. Patient stated that he wishes to stop using Dupixent as it has not been effective for his condition. Messaged provider and informed. Declining patient enrollment from [facility].

## 2022-03-17 DIAGNOSIS — J44.9 CHRONIC OBSTRUCTIVE PULMONARY DISEASE, UNSPECIFIED COPD TYPE: ICD-10-CM

## 2022-03-17 RX ORDER — ALBUTEROL SULFATE 90 UG/1
AEROSOL, METERED RESPIRATORY (INHALATION)
Qty: 18 G | Refills: 3 | Status: CANCELLED | OUTPATIENT
Start: 2022-03-17

## 2022-03-17 NOTE — TELEPHONE ENCOUNTER
No new care gaps identified.  Powered by [employer]. Reference number: [id number].   3/17/2022 9:22:55 AM CDT

## 2022-03-18 NOTE — TELEPHONE ENCOUNTER
----- Message from Pat Rascon MA sent at 3/18/2022 11:50 AM CDT -----  Regarding: refill request  Type:  RX Refill Request    Who Called: GARO JUAREZ [[id number]]    Refill or New Rx:refill     RX Name and Strength:zonisamide (ZONEGRAN) 50 MG Cap    How is the patient currently taking it? (ex. 1XDay):: Take 1 capsule (50 mg total) by mouth 2 (two) times daily    Is this a 30 day or 90 day RX: 90 day     Preferred Pharmacy with phone number:[facility]/PHARMACY #[id number] - [place] - [address]    Local or Mail Order:local     Ordering Provider: Morgan     Would the patient rather a call back or a response via [facility]? Call     Best Call Back Number:[phone]    Additional Information:

## 2022-03-23 ENCOUNTER — OFFICE VISIT (OUTPATIENT)
Dept: FAMILY MEDICINE | Facility: CLINIC | Age: 62
End: 2022-03-23
Payer: MEDICARE

## 2022-03-23 VITALS
BODY MASS INDEX: 35.69 KG/M2 | DIASTOLIC BLOOD PRESSURE: 80 MMHG | HEIGHT: 69 IN | SYSTOLIC BLOOD PRESSURE: 138 MMHG | OXYGEN SATURATION: 98 % | HEART RATE: 90 BPM | WEIGHT: 240.94 LBS | TEMPERATURE: 98 F

## 2022-03-23 DIAGNOSIS — Z79.899 ENCOUNTER FOR LONG-TERM (CURRENT) USE OF MEDICATIONS: ICD-10-CM

## 2022-03-23 DIAGNOSIS — I70.0 ATHEROSCLEROSIS OF AORTA: ICD-10-CM

## 2022-03-23 DIAGNOSIS — D84.9 IMMUNODEFICIENCY, UNSPECIFIED: ICD-10-CM

## 2022-03-23 DIAGNOSIS — F20.89 OTHER SCHIZOPHRENIA: ICD-10-CM

## 2022-03-23 DIAGNOSIS — L20.89 OTHER ATOPIC DERMATITIS: ICD-10-CM

## 2022-03-23 DIAGNOSIS — E66.01 SEVERE OBESITY (BMI 35.0-39.9) WITH COMORBIDITY: ICD-10-CM

## 2022-03-23 DIAGNOSIS — E11.42 TYPE 2 DIABETES MELLITUS WITH DIABETIC POLYNEUROPATHY, WITHOUT LONG-TERM CURRENT USE OF INSULIN: ICD-10-CM

## 2022-03-23 DIAGNOSIS — R09.82 POST-NASAL DRIP: ICD-10-CM

## 2022-03-23 DIAGNOSIS — J06.9 UPPER RESPIRATORY TRACT INFECTION, UNSPECIFIED TYPE: Primary | ICD-10-CM

## 2022-03-23 DIAGNOSIS — J42 CHRONIC BRONCHITIS, UNSPECIFIED CHRONIC BRONCHITIS TYPE: ICD-10-CM

## 2022-03-23 PROCEDURE — 99499 UNLISTED E&M SERVICE: CPT | Mod: S$GLB,,, | Performed by: FAMILY MEDICINE

## 2022-03-23 PROCEDURE — 99499 RISK ADDL DX/OHS AUDIT: ICD-10-PCS | Mod: S$GLB,,, | Performed by: FAMILY MEDICINE

## 2022-03-23 PROCEDURE — 3075F SYST BP GE 130 - 139MM HG: CPT | Mod: CPTII,S$GLB,, | Performed by: FAMILY MEDICINE

## 2022-03-23 PROCEDURE — 3079F DIAST BP 80-89 MM HG: CPT | Mod: CPTII,S$GLB,, | Performed by: FAMILY MEDICINE

## 2022-03-23 PROCEDURE — 3008F BODY MASS INDEX DOCD: CPT | Mod: CPTII,S$GLB,, | Performed by: FAMILY MEDICINE

## 2022-03-23 PROCEDURE — 3075F PR MOST RECENT SYSTOLIC BLOOD PRESS GE 130-139MM HG: ICD-10-PCS | Mod: CPTII,S$GLB,, | Performed by: FAMILY MEDICINE

## 2022-03-23 PROCEDURE — 3079F PR MOST RECENT DIASTOLIC BLOOD PRESSURE 80-89 MM HG: ICD-10-PCS | Mod: CPTII,S$GLB,, | Performed by: FAMILY MEDICINE

## 2022-03-23 PROCEDURE — 99214 OFFICE O/P EST MOD 30 MIN: CPT | Mod: S$GLB,,, | Performed by: FAMILY MEDICINE

## 2022-03-23 PROCEDURE — 4010F ACE/ARB THERAPY RXD/TAKEN: CPT | Mod: CPTII,S$GLB,, | Performed by: FAMILY MEDICINE

## 2022-03-23 PROCEDURE — 1159F PR MEDICATION LIST DOCUMENTED IN MEDICAL RECORD: ICD-10-PCS | Mod: CPTII,S$GLB,, | Performed by: FAMILY MEDICINE

## 2022-03-23 PROCEDURE — 99999 PR PBB SHADOW E&M-EST. PATIENT-LVL V: ICD-10-PCS | Mod: PBBFAC,,, | Performed by: FAMILY MEDICINE

## 2022-03-23 PROCEDURE — 4010F PR ACE/ARB THEARPY RXD/TAKEN: ICD-10-PCS | Mod: CPTII,S$GLB,, | Performed by: FAMILY MEDICINE

## 2022-03-23 PROCEDURE — 1159F MED LIST DOCD IN RCRD: CPT | Mod: CPTII,S$GLB,, | Performed by: FAMILY MEDICINE

## 2022-03-23 PROCEDURE — 3008F PR BODY MASS INDEX (BMI) DOCUMENTED: ICD-10-PCS | Mod: CPTII,S$GLB,, | Performed by: FAMILY MEDICINE

## 2022-03-23 PROCEDURE — 99214 PR OFFICE/OUTPT VISIT, EST, LEVL IV, 30-39 MIN: ICD-10-PCS | Mod: S$GLB,,, | Performed by: FAMILY MEDICINE

## 2022-03-23 PROCEDURE — 1160F PR REVIEW ALL MEDS BY PRESCRIBER/CLIN PHARMACIST DOCUMENTED: ICD-10-PCS | Mod: CPTII,S$GLB,, | Performed by: FAMILY MEDICINE

## 2022-03-23 PROCEDURE — 99999 PR PBB SHADOW E&M-EST. PATIENT-LVL V: CPT | Mod: PBBFAC,,, | Performed by: FAMILY MEDICINE

## 2022-03-23 PROCEDURE — 1160F RVW MEDS BY RX/DR IN RCRD: CPT | Mod: CPTII,S$GLB,, | Performed by: FAMILY MEDICINE

## 2022-03-23 RX ORDER — ALBUTEROL SULFATE 1.25 MG/3ML
1.25 SOLUTION RESPIRATORY (INHALATION) EVERY 6 HOURS PRN
Qty: 75 ML | Refills: 11 | Status: SHIPPED | OUTPATIENT
Start: 2022-03-23 | End: 2023-03-23

## 2022-03-23 RX ORDER — DUPILUMAB 300 MG/2ML
INJECTION, SOLUTION SUBCUTANEOUS
Qty: 4 ML | Refills: 5 | Status: SHIPPED | OUTPATIENT
Start: 2022-03-23 | End: 2022-03-31 | Stop reason: SDUPTHER

## 2022-03-23 RX ORDER — ALBUTEROL SULFATE 1.25 MG/3ML
1.25 SOLUTION RESPIRATORY (INHALATION) EVERY 6 HOURS PRN
Qty: 75 ML | Refills: 11 | Status: SHIPPED | OUTPATIENT
Start: 2022-03-23 | End: 2022-03-23 | Stop reason: SDUPTHER

## 2022-03-23 NOTE — TELEPHONE ENCOUNTER
Spoke with patient, was able to get him schedule   ----- Message from Daniel Rodriguez sent at 3/23/2022 11:22 AM CDT -----  Patient stated that he need to get a refill on DUPIXENT SYRINGE 300 mg/2 mL Syrg  Also  he stated that he was told to walk in if he had problems with hands he also stated that his hands his raw and bleeding and wants someone to follow up with him [phone]

## 2022-03-23 NOTE — PROGRESS NOTES
"Physical Exam  /80   Pulse 90   Temp 98.3 °F (36.8 °C) (Oral)   Ht 5' 9" (1.753 m)   Wt 109.3 kg (240 lb 15.4 oz)   SpO2 96%   BMI 35.58 kg/m²      Office Visit    Patient Name: Scott Bansal    : 1960  MRN: [id number]      Assessment/Plan:  Scott Bansal is a 61 y.o. male who presents today for :    Upper respiratory tract infection, unspecified type  Post-nasal drip  Chronic bronchitis, unspecified chronic bronchitis type  -     albuterol (ACCUNEB) 1.25 mg/3 mL Nebu; Take 3 mLs (1.25 mg total) by nebulization every 6 (six) hours as needed (SOB). Rescue  Dispense: 75 mL; Refill: 11  -Mild symptoms, AFVSS in clinic today - appears mostly resolved. Counseled pt on natural progression of viral illness with reassurance provided - and based on clinical findings today, does not warrant Abx treatment at this time.  Tylenol every 4-6 hours as needed for fever, headaches, sore throat, ear pain, bodyaches, and/or nasal/sinus inflammation. PRN Claritin for drainage    -if pt develops fever, or if pain develops or general symptoms worsen.    Type 2 diabetes mellitus with diabetic polyneuropathy, without long-term current use of insulin  Severe obesity (BMI 35.0-39.9) with comorbidity  Atherosclerosis of aorta  -previous A1c reviewed:   Lab Results   Component Value Date    HGBA1C 5.6 10/20/2021     -continue current medication regimen  -reviewed with patient about routine diabetic care.    Other schizophrenia  -stable, continue current regimen         Follow up for worsening Sx. Urgent care/ED precautions provided.    This note was created by combination of typed  and MModal dictation.  Transcription errors may be present.  If there are any questions, please contact me.      ----------------------------------------------------------------------------------------------------------------------      HPI:  Patient Care Team:  Kaylie Villalta MD as PCP - General (Family Medicine)  Warren MARLEY" GABE Rondon as Consulting Physician (Optometry)  Jessica Black LPN as Licensed Practical Nurse  Ollie Palumbo II, MD as Consulting Physician (Gastroenterology)    Scott is a 61 y.o. male with      Patient Active Problem List   Diagnosis    History of fusion of cervical spine    Type 2 diabetes mellitus without complication    Hyperlipidemia    Essential hypertension    Chronic tension-type headache, not intractable    Bipolar 1 disorder    BPH (benign prostatic hyperplasia)    Gastroesophageal reflux disease without esophagitis    Sciatica of right side    History of pneumonia    COPD (chronic obstructive pulmonary disease)    Former smoker    Lumbar compression fracture    Diabetic polyneuropathy associated with type 2 diabetes mellitus    Lumbar degenerative disc disease    Severe obesity (BMI 35.0-39.9) with comorbidity    Atherosclerosis of aorta    Chronic bilateral low back pain without sciatica    Muscle spasm    Nasal obstruction    KRISS (obstructive sleep apnea)    Hand eczema    MRSA colonization    Recurrent boils    Colon cancer screening    Other schizophrenia       Patient with PMHx as above presents today for :  Chest Congestion    That started a week ago with the weather changing back to cold temps. He has been taking OTC medications as needed with relief of coughing, which was initially moderate but has mostly resolved. He does have a Hx COPD, for which he uses the nebulizer as needed, especially during cold weather but has run out this past week. He is otherwise doing well on daily Spiriva/Breo. No recent COPD exacerbation.  His diabetes is well controlled.  No CP/SOB/N/V/sore throat/ear pain.        Additional ROS      Negative review of system  except per HPI                   Patient Active Problem List   Diagnosis    History of fusion of cervical spine    Type 2 diabetes mellitus without complication    Hyperlipidemia    Essential hypertension     Chronic tension-type headache, not intractable    Bipolar 1 disorder    BPH (benign prostatic hyperplasia)    Gastroesophageal reflux disease without esophagitis    Sciatica of right side    History of pneumonia    COPD (chronic obstructive pulmonary disease)    Former smoker    Lumbar compression fracture    Diabetic polyneuropathy associated with type 2 diabetes mellitus    Lumbar degenerative disc disease    Severe obesity (BMI 35.0-39.9) with comorbidity    Atherosclerosis of aorta    Chronic bilateral low back pain without sciatica    Muscle spasm    Nasal obstruction    KRISS (obstructive sleep apnea)    Hand eczema    MRSA colonization    Recurrent boils    Colon cancer screening    Other schizophrenia       Current Medications  Medications reviewed/updated.     Current Outpatient Medications on File Prior to Visit   Medication Sig Dispense Refill    albuterol (VENTOLIN HFA) 90 mcg/actuation inhaler INHALE 2 PUFFS EVERY 4 HOURS AS NEEDED 18 g 3    amLODIPine (NORVASC) 10 MG tablet TAKE 1 TABLET BY MOUTH EVERY DAY 90 tablet 0    betamethasone dipropionate (DIPROLENE) 0.05 % ointment Apply topically 2 (two) times daily. 45 g 1    blood sugar diagnostic (TRUETEST TEST STRIPS) Strp 1 each by Misc.(Non-Drug; Combo Route) route 3 (three) times a week. 100 each 3    buPROPion (WELLBUTRIN XL) 300 MG 24 hr tablet Take 300 mg by mouth every morning.  2    busPIRone (BUSPAR) 10 MG tablet Take 10 mg by mouth 3 (three) times daily.      candesartan (ATACAND) 4 MG tablet Take 1 tablet (4 mg total) by mouth once daily. 90 tablet 0    CLOTRIMAZOLE-BETAMETH DIP-ZINC TOP       cyclobenzaprine (FLEXERIL) 10 MG tablet Take 1 tablet (10 mg total) by mouth 3 (three) times daily as needed for Muscle spasms. 270 tablet 1    fluticasone propionate (FLONASE) 50 mcg/actuation nasal spray 2 sprays (100 mcg total) by Each Nostril route once daily. 16 mL 5    gabapentin (NEURONTIN) 300 MG capsule Take 1  capsule (300 mg total) by mouth 2 (two) times daily. 180 capsule 3    gemfibroziL (LOPID) 600 MG tablet TAKE 1 TABLET BY MOUTH 2 TIMES DAILY BEFORE MEALS. 60 tablet 2    hydrOXYzine (ATARAX) 50 MG tablet Take 1 tablet (50 mg total) by mouth 3 (three) times daily as needed for Itching. 270 tablet 0    levocetirizine (XYZAL) 5 MG tablet TAKE 1 TABLET BY MOUTH EACH MORNING 90 tablet 3    metFORMIN (GLUCOPHAGE) 1000 MG tablet Take 1 tablet (1,000 mg total) by mouth 2 (two) times daily. 180 tablet 0    mometasone 0.1% (ELOCON) 0.1 % cream Apply topically once daily. AAA bid prn for hand eczema 45 g 1    pantoprazole (PROTONIX) 40 MG tablet Take 1 tablet (40 mg total) by mouth once daily. 90 tablet 3    quetiapine (SEROQUEL) 300 MG Tab Take by mouth.      simvastatin (ZOCOR) 80 MG tablet TAKE 1 TABLET BY MOUTH EVERY DAY 90 tablet 0    sumatriptan (IMITREX) 100 MG tablet TAKE 1 TABLET BY MOUTH EVERY 2 HOURS AS NEEDED FOR MIGRAINE 12 tablet 2    tamsulosin (FLOMAX) 0.4 mg Cap TAKE 2 CAPSULES BY MOUTH EVERY  capsule 2    tiZANidine (ZANAFLEX) 4 MG tablet Take 1 tablet (4 mg total) by mouth every 8 (eight) hours. 30 tablet 1    vancomycin (VANCOCIN) 1,000 mg injection MIX THE CONTENTS OF 1 VIAL WITH UP TO 10 GRAMS OF OINTMENT. APPLY TO INFECTION SITE ONCE DAILY.      zonisamide (ZONEGRAN) 50 MG Cap TAKE 1 CAPSULE BY MOUTH TWICE A  capsule 0    [DISCONTINUED] triamcinolone acetonide 0.1% (KENALOG) 0.1 % ointment AAA bid 454 g 1    benzoyl peroxide (BP WASH) 10 % external wash Use daily as wash to affected areas. Rinse completely.  May bleach clothing 227 g 12    clindamycin (CLEOCIN T) 1 % Swab Apply topically 2 (two) times daily. (Patient not taking: Reported on 3/23/2022) 60 each 3    crisaborole (EUCRISA) 2 % Oint Use for hand eczema bid. (Patient not taking: Reported on 3/23/2022) 60 g 5    doxycycline (MONODOX) 100 MG capsule Take twice a day with food. May cause upset stomach (Patient not  taking: No sig reported) 20 capsule 0    DUPIXENT SYRINGE 300 mg/2 mL Syrg Inject 1 syringe (300mg) into the skin every other week (Patient not taking: Reported on 3/23/2022) 4 mL 5    fluticasone furoate-vilanterol (BREO) 100-25 mcg/dose diskus inhaler Inhale 1 puff into the lungs once daily. Controller (Patient not taking: No sig reported) 90 each 1    hydrocortisone 2.5 % ointment Apply topically 2 (two) times daily. Apply twice daily to affected area on face. (Patient not taking: Reported on 3/23/2022) 30 g 1    meloxicam (MOBIC) 15 MG tablet Take 1 tablet (15 mg total) by mouth once daily. 30 tablet 0    neomycin-polymyxin-dexamethasone (DEXACINE) 3.5 mg/g-10,000 unit/g-0.1 % Oint       SPIRIVA WITH HANDIHALER 18 mcg inhalation capsule INHALE 1 CAPSULE (18 MCG TOTAL) INTO THE LUNGS ONCE DAILY. CONTROLLER (Patient not taking: Reported on 3/23/2022) 30 capsule 0    tiZANidine (ZANAFLEX) 4 MG tablet TAKE 1 TABLET BY MOUTH EVERY 8 HOURS. (Patient not taking: Reported on 3/23/2022) 30 tablet 1    [DISCONTINUED] azelastine (ASTELIN) 137 mcg (0.1 %) nasal spray SPRAY 1 SPRAY (137 MCG TOTAL) BY NASAL ROUTE 2 (TWO) TIMES DAILY. 30 mL 4    [DISCONTINUED] folic acid (FOLVITE) 800 MCG Tab Take 800 mcg by mouth once daily.      [DISCONTINUED] furosemide (LASIX) 40 MG tablet TAKE 1 TABLET BY MOUTH EVERY DAY 90 tablet 0     No current facility-administered medications on file prior to visit.           Past Surgical History:   Procedure Laterality Date    CERVICAL SPINE SURGERY      COLONOSCOPY N/A 7/27/2017    Procedure: COLONOSCOPY;  Surgeon: Genaro Nix MD;  Location: [facility] ENDO;  Service: Endoscopy;  Laterality: N/A;    COLONOSCOPY N/A 10/30/2020    Procedure: COLONOSCOPY;  Surgeon: Herb Martinez MD;  Location: [facility] ENDO;  Service: Endoscopy;  Laterality: N/A;    EYE SURGERY Left 03/2017    cyst removal on eyelid; Dr. Broussard    SHOULDER SURGERY      TONSILLECTOMY         Family History   Problem  "Relation Age of Onset    Cataracts Mother     Cancer Mother         throat    Hypertension Mother     Hypertension Father     Stroke Father         2 strokes    Hypertension Sister     Cancer Brother         spinal, kidney, spinal fluid    Hypertension Brother     Cancer Cousin         breast?       Social History     Socioeconomic History    Marital status:    Tobacco Use    Smoking status: Former Smoker     Packs/day: 2.00     Years: 15.00     Pack years: 30.00     Types: Cigarettes     Quit date: 1984     Years since quittin.8    Smokeless tobacco: Never Used    Tobacco comment: Patient quit smoking at the age of 24 yo.   Substance and Sexual Activity    Alcohol use: No    Drug use: No    Sexual activity: Yes     Partners: Female             Allergies   Review of patient's allergies indicates:   Allergen Reactions    Sulfa (sulfonamide antibiotics) Rash             Review of Systems  See HPI      [unfilled]  /80   Pulse 90   Temp 98.3 °F (36.8 °C) (Oral)   Ht 5' 9" (1.753 m)   Wt 109.3 kg (240 lb 15.4 oz)   SpO2 96%   BMI 35.58 kg/m²       GEN: NAD, pleasant  HEENT: NCAT, PERRLA, EOMI, sclera clear, anicteric, O/P clear, MMM with no lesions  NECK: normal, supple with midline trachea, no LAD, no thyromegaly  LUNGS: CTAB, no w/r/r, normal respiratory effort  HEART: RRR, normal S1 and S2, no m/r/g, no palpitations, no edema  ABD: s/nt/nd, NABS, no organomegaly  SKIN: warm and dry with normal turgor   PSYCH: AOx3, appropriate mood and affect.   MSK: extremities warm/well perfused, normal ROM in all 4 extremities, no c/c/e.   NEURO: normal without focal findings, CN II-XII are intact        "

## 2022-03-28 ENCOUNTER — OFFICE VISIT (OUTPATIENT)
Dept: DERMATOLOGY | Facility: CLINIC | Age: 62
End: 2022-03-28
Payer: MEDICARE

## 2022-03-28 ENCOUNTER — TELEPHONE (OUTPATIENT)
Dept: DERMATOLOGY | Facility: CLINIC | Age: 62
End: 2022-03-28

## 2022-03-28 ENCOUNTER — LAB VISIT (OUTPATIENT)
Dept: LAB | Facility: HOSPITAL | Age: 62
End: 2022-03-28
Attending: DERMATOLOGY
Payer: MEDICARE

## 2022-03-28 DIAGNOSIS — Z79.899 ENCOUNTER FOR LONG-TERM (CURRENT) USE OF MEDICATIONS: ICD-10-CM

## 2022-03-28 DIAGNOSIS — L20.89 OTHER ATOPIC DERMATITIS: ICD-10-CM

## 2022-03-28 DIAGNOSIS — L20.89 OTHER ATOPIC DERMATITIS: Primary | ICD-10-CM

## 2022-03-28 LAB
ALBUMIN SERPL BCP-MCNC: 4.4 G/DL (ref 3.5–5.2)
ALP SERPL-CCNC: 100 U/L (ref 55–135)
ALT SERPL W/O P-5'-P-CCNC: 27 U/L (ref 10–44)
ANION GAP SERPL CALC-SCNC: 12 MMOL/L (ref 8–16)
AST SERPL-CCNC: 17 U/L (ref 10–40)
BASOPHILS # BLD AUTO: 0.08 K/UL (ref 0–0.2)
BASOPHILS NFR BLD: 0.5 % (ref 0–1.9)
BILIRUB SERPL-MCNC: 0.5 MG/DL (ref 0.1–1)
BUN SERPL-MCNC: 12 MG/DL (ref 8–23)
CALCIUM SERPL-MCNC: 10.3 MG/DL (ref 8.7–10.5)
CHLORIDE SERPL-SCNC: 110 MMOL/L (ref 95–110)
CO2 SERPL-SCNC: 21 MMOL/L (ref 23–29)
CREAT SERPL-MCNC: 1 MG/DL (ref 0.5–1.4)
DIFFERENTIAL METHOD: ABNORMAL
EOSINOPHIL # BLD AUTO: 0.3 K/UL (ref 0–0.5)
EOSINOPHIL NFR BLD: 2.1 % (ref 0–8)
ERYTHROCYTE [DISTWIDTH] IN BLOOD BY AUTOMATED COUNT: 12.3 % (ref 11.5–14.5)
EST. GFR  (AFRICAN AMERICAN): >60 ML/MIN/1.73 M^2
EST. GFR  (NON AFRICAN AMERICAN): >60 ML/MIN/1.73 M^2
GLUCOSE SERPL-MCNC: 159 MG/DL (ref 70–110)
HCT VFR BLD AUTO: 40.2 % (ref 40–54)
HGB BLD-MCNC: 13.1 G/DL (ref 14–18)
IMM GRANULOCYTES # BLD AUTO: 0.2 K/UL (ref 0–0.04)
IMM GRANULOCYTES NFR BLD AUTO: 1.3 % (ref 0–0.5)
LYMPHOCYTES # BLD AUTO: 2 K/UL (ref 1–4.8)
LYMPHOCYTES NFR BLD: 12.9 % (ref 18–48)
MCH RBC QN AUTO: 28.4 PG (ref 27–31)
MCHC RBC AUTO-ENTMCNC: 32.6 G/DL (ref 32–36)
MCV RBC AUTO: 87 FL (ref 82–98)
MONOCYTES # BLD AUTO: 1 K/UL (ref 0.3–1)
MONOCYTES NFR BLD: 6.2 % (ref 4–15)
NEUTROPHILS # BLD AUTO: 12 K/UL (ref 1.8–7.7)
NEUTROPHILS NFR BLD: 77 % (ref 38–73)
NRBC BLD-RTO: 0 /100 WBC
PLATELET # BLD AUTO: 383 K/UL (ref 150–450)
PMV BLD AUTO: 10.4 FL (ref 9.2–12.9)
POTASSIUM SERPL-SCNC: 4.3 MMOL/L (ref 3.5–5.1)
PROT SERPL-MCNC: 7.1 G/DL (ref 6–8.4)
RBC # BLD AUTO: 4.62 M/UL (ref 4.6–6.2)
SODIUM SERPL-SCNC: 143 MMOL/L (ref 136–145)
WBC # BLD AUTO: 15.54 K/UL (ref 3.9–12.7)

## 2022-03-28 PROCEDURE — 1159F PR MEDICATION LIST DOCUMENTED IN MEDICAL RECORD: ICD-10-PCS | Mod: CPTII,S$GLB,, | Performed by: DERMATOLOGY

## 2022-03-28 PROCEDURE — 80053 COMPREHEN METABOLIC PANEL: CPT | Performed by: DERMATOLOGY

## 2022-03-28 PROCEDURE — 85025 COMPLETE CBC W/AUTO DIFF WBC: CPT | Performed by: DERMATOLOGY

## 2022-03-28 PROCEDURE — 99999 PR PBB SHADOW E&M-EST. PATIENT-LVL IV: CPT | Mod: PBBFAC,,, | Performed by: DERMATOLOGY

## 2022-03-28 PROCEDURE — 99214 OFFICE O/P EST MOD 30 MIN: CPT | Mod: S$GLB,,, | Performed by: DERMATOLOGY

## 2022-03-28 PROCEDURE — 36415 COLL VENOUS BLD VENIPUNCTURE: CPT | Mod: PO | Performed by: DERMATOLOGY

## 2022-03-28 PROCEDURE — 1160F PR REVIEW ALL MEDS BY PRESCRIBER/CLIN PHARMACIST DOCUMENTED: ICD-10-PCS | Mod: CPTII,S$GLB,, | Performed by: DERMATOLOGY

## 2022-03-28 PROCEDURE — 99999 PR PBB SHADOW E&M-EST. PATIENT-LVL IV: ICD-10-PCS | Mod: PBBFAC,,, | Performed by: DERMATOLOGY

## 2022-03-28 PROCEDURE — 99214 PR OFFICE/OUTPT VISIT, EST, LEVL IV, 30-39 MIN: ICD-10-PCS | Mod: S$GLB,,, | Performed by: DERMATOLOGY

## 2022-03-28 PROCEDURE — 4010F PR ACE/ARB THEARPY RXD/TAKEN: ICD-10-PCS | Mod: CPTII,S$GLB,, | Performed by: DERMATOLOGY

## 2022-03-28 PROCEDURE — 4010F ACE/ARB THERAPY RXD/TAKEN: CPT | Mod: CPTII,S$GLB,, | Performed by: DERMATOLOGY

## 2022-03-28 PROCEDURE — 1160F RVW MEDS BY RX/DR IN RCRD: CPT | Mod: CPTII,S$GLB,, | Performed by: DERMATOLOGY

## 2022-03-28 PROCEDURE — 1159F MED LIST DOCD IN RCRD: CPT | Mod: CPTII,S$GLB,, | Performed by: DERMATOLOGY

## 2022-03-28 RX ORDER — TRIAMCINOLONE ACETONIDE 1 MG/G
PASTE DENTAL 2 TIMES DAILY
COMMUNITY
End: 2022-10-04

## 2022-03-28 NOTE — PROGRESS NOTES
Subjective:       Patient ID:  Scott Bansal is a 61 y.o. 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 Eczema, drug rash could not be ruled out) on dupixent since 7/2020, last seen in clinic on 8/18/22.  He has been off dupixent since 1/2022. + flares of the bilateral hands.    he is currently on using TAC 0.1 oint, TAC cream, aveeno baby lotion, clotrimazole-betamethasone, mometasone cream, and mupirocin without improvement.     Prior treatments: PO prednisone, hydroxyzine 50 mg TID, clotrimazole-betamethasone, mometasone, TAC oint, TAC cream, mupirocin, betamethasone oint     Denies history of fever blisters.  Denies conjunctivitis, pain in the eyes or blisters near the eyes.        Review of Systems   Constitutional: Negative for fever and chills.   Gastrointestinal: Negative for nausea and vomiting.   Skin: Positive for activity-related sunscreen use. Negative for daily sunscreen use and recent sunburn.   Hematologic/Lymphatic: Does not bruise/bleed easily.        Objective:    Physical Exam   Constitutional: He appears well-developed and well-nourished. No distress.   Neurological: He is alert and oriented to person, place, and time. He is not disoriented.   Psychiatric: He has a normal mood and affect.   Skin:   Areas Examined (abnormalities noted in diagram):   Head / Face Inspection Performed  Neck Inspection Performed  RUE Inspected  LUE Inspection Performed  RLE Inspected  LLE Inspection Performed  Nails and Digits Inspection Performed                   Assessment / Plan:        Other atopic dermatitis  Encounter for long-term (current) use of medications  -     CBC Auto Differential; Future; Expected date: 03/28/2022  -     Comprehensive  Metabolic Panel; Future; Expected date: 03/28/2022  -     Refill sent to [facility] on 3/26/22.  Recommend mometasone cream bid and continue aveeno lotion.     Severe (EASI) atopic dermatitis with scarring (EASI = 17). Discussed start of dupixent.  Reviewed herpes reactivation and discussed pt should notify derm clinic if cold sores or other herpes infections while on dupixent.  Reviewed side effects of immunosuppression, conjunctivitis/keratitis and reactivation of the herpes virus. The patient acknowledged understanding and wishes to proceed with Dupixent therapy.            Follow up in about 3 months (around 6/28/2022).

## 2022-03-28 NOTE — TELEPHONE ENCOUNTER
Was able to get patient schedule for 3 month follow up in June     ----- Message from Brittany Sue sent at 3/28/2022  2:57 PM CDT -----  Call patient to schedule an appointment in 3 months. krish

## 2022-03-29 DIAGNOSIS — M79.10 MYALGIA: ICD-10-CM

## 2022-03-29 RX ORDER — TIZANIDINE 4 MG/1
4 TABLET ORAL EVERY 8 HOURS
Qty: 30 TABLET | Refills: 1 | Status: SHIPPED | OUTPATIENT
Start: 2022-03-29 | End: 2023-01-23 | Stop reason: SDUPTHER

## 2022-03-29 NOTE — TELEPHONE ENCOUNTER
Care Due:                  Date            Visit Type   Department     Provider  --------------------------------------------------------------------------------                                [facility]                              PRIMARY      MED/ INTERNAL  Last Visit: 03-      CARE ([facility])   MED/ PEDS      Per Kenney                              [facility]                              PRIMARY      MED/ INTERNAL  Next Visit: 04-      CARE ([facility])   MED/ PEDS      Kaylie Chau                                                            Last  Test          Frequency    Reason                     Performed    Due Date  --------------------------------------------------------------------------------    HBA1C.......  6 months...  metFORMIN................  10-   04-    Powered by [employer]: [employer] Reference number: [id number].   3/29/2022 12:08:03 PM CDT

## 2022-03-29 NOTE — TELEPHONE ENCOUNTER
----- Message from Brittni Grajeda sent at 3/29/2022 11:57 AM CDT -----  Type: RX Refill Request    Who Called:  self      Have you contacted your pharmacy: no     Refill or New Rx:refill     RX Name and Strength: tiZANidine (ZANAFLEX) 4 MG tablet      How is the patient currently taking it? (ex. 1XDay):    Is this a 30 day or 90 day RX:    Preferred Pharmacy with phone number:        [facility]/pharmacy #[id number] - [place]  [address] [address]  Phone: [phone] Fax: [phone]

## 2022-03-31 DIAGNOSIS — L20.89 OTHER ATOPIC DERMATITIS: ICD-10-CM

## 2022-03-31 DIAGNOSIS — Z79.899 ENCOUNTER FOR LONG-TERM (CURRENT) USE OF MEDICATIONS: ICD-10-CM

## 2022-04-04 ENCOUNTER — SPECIALTY PHARMACY (OUTPATIENT)
Dept: PHARMACY | Facility: CLINIC | Age: 62
End: 2022-04-04

## 2022-04-04 ENCOUNTER — PATIENT MESSAGE (OUTPATIENT)
Dept: DERMATOLOGY | Facility: CLINIC | Age: 62
End: 2022-04-04
Payer: MEDICARE

## 2022-04-05 ENCOUNTER — TELEPHONE (OUTPATIENT)
Dept: DERMATOLOGY | Facility: CLINIC | Age: 62
End: 2022-04-05
Payer: MEDICARE

## 2022-04-05 ENCOUNTER — PATIENT MESSAGE (OUTPATIENT)
Dept: DERMATOLOGY | Facility: CLINIC | Age: 62
End: 2022-04-05
Payer: MEDICARE

## 2022-04-05 RX ORDER — DUPILUMAB 300 MG/2ML
INJECTION, SOLUTION SUBCUTANEOUS
Qty: 4 ML | Refills: 5 | Status: SHIPPED | OUTPATIENT
Start: 2022-04-05 | End: 2023-06-28 | Stop reason: SDUPTHER

## 2022-04-05 NOTE — TELEPHONE ENCOUNTER
Spoke with Nathalie at [facility], was able to get medication reorder     ----- Message from Gwendolyn Tellez sent at 4/5/2022  3:49 PM CDT -----  Jamia from [facility] would like a call back in regards to the pt's medication DUPIXENT SYRINGE 300 mg/2 mL Syrg. Please call her at  [phone]

## 2022-04-06 DIAGNOSIS — L08.9 SKIN INFECTION: Primary | ICD-10-CM

## 2022-04-06 RX ORDER — DOXYCYCLINE 100 MG/1
CAPSULE ORAL
Qty: 20 CAPSULE | Refills: 0 | Status: SHIPPED | OUTPATIENT
Start: 2022-04-06 | End: 2022-08-29 | Stop reason: ALTCHOICE

## 2022-04-06 RX ORDER — MUPIROCIN 20 MG/G
OINTMENT TOPICAL
Qty: 30 G | Refills: 1 | Status: SHIPPED | OUTPATIENT
Start: 2022-04-06 | End: 2022-04-27 | Stop reason: SDUPTHER

## 2022-04-07 ENCOUNTER — PATIENT MESSAGE (OUTPATIENT)
Dept: DERMATOLOGY | Facility: CLINIC | Age: 62
End: 2022-04-07
Payer: MEDICARE

## 2022-04-14 ENCOUNTER — PATIENT OUTREACH (OUTPATIENT)
Dept: ADMINISTRATIVE | Facility: HOSPITAL | Age: 62
End: 2022-04-14
Payer: MEDICARE

## 2022-04-14 DIAGNOSIS — E11.9 TYPE 2 DIABETES MELLITUS WITHOUT COMPLICATION, WITHOUT LONG-TERM CURRENT USE OF INSULIN: Primary | ICD-10-CM

## 2022-04-14 NOTE — PROGRESS NOTES
PHN Diabetic Eye Exam gap report - no 2021 eye exam, patient will call to get scheduled.    Diabetes lab will be due soon - appointment scheduled.

## 2022-04-19 ENCOUNTER — LAB VISIT (OUTPATIENT)
Dept: LAB | Facility: HOSPITAL | Age: 62
End: 2022-04-19
Attending: FAMILY MEDICINE
Payer: MEDICARE

## 2022-04-19 DIAGNOSIS — E11.9 TYPE 2 DIABETES MELLITUS WITHOUT COMPLICATION, WITHOUT LONG-TERM CURRENT USE OF INSULIN: ICD-10-CM

## 2022-04-19 LAB
ESTIMATED AVG GLUCOSE: 137 MG/DL (ref 68–131)
HBA1C MFR BLD: 6.4 % (ref 4–5.6)

## 2022-04-19 PROCEDURE — 36415 COLL VENOUS BLD VENIPUNCTURE: CPT | Mod: PO | Performed by: FAMILY MEDICINE

## 2022-04-19 PROCEDURE — 83036 HEMOGLOBIN GLYCOSYLATED A1C: CPT | Performed by: FAMILY MEDICINE

## 2022-04-20 RX ORDER — SIMVASTATIN 80 MG/1
TABLET, FILM COATED ORAL
Qty: 90 TABLET | Refills: 0 | Status: SHIPPED | OUTPATIENT
Start: 2022-04-20 | End: 2022-06-09

## 2022-04-20 NOTE — TELEPHONE ENCOUNTER
No new care gaps identified.  Powered by [employer]. Reference number: [id number].   4/20/2022 1:18:58 PM CDT

## 2022-04-20 NOTE — TELEPHONE ENCOUNTER
Refill Routing Note   Medication(s) are not appropriate for processing by [facility] for the following reason(s):      - Patient has been seen in the ED/Hospital since the last PCP visit    [facility] action(s):  Defer          Medication reconciliation completed: No     Appointments  past 12m or future 3m with PCP    Date Provider   Last Visit   3/2/2021 Kaylie Villalta MD   Next Visit   4/27/2022 Kaylie Villalta MD   ED visits in past 90 days: 0        Note composed:1:46 PM 04/20/2022

## 2022-04-27 ENCOUNTER — OFFICE VISIT (OUTPATIENT)
Dept: FAMILY MEDICINE | Facility: CLINIC | Age: 62
End: 2022-04-27
Payer: MEDICARE

## 2022-04-27 ENCOUNTER — HOSPITAL ENCOUNTER (OUTPATIENT)
Dept: RADIOLOGY | Facility: HOSPITAL | Age: 62
Discharge: HOME | End: 2022-04-27
Attending: FAMILY MEDICINE
Payer: MEDICARE

## 2022-04-27 VITALS
TEMPERATURE: 100 F | OXYGEN SATURATION: 97 % | SYSTOLIC BLOOD PRESSURE: 140 MMHG | BODY MASS INDEX: 38.57 KG/M2 | DIASTOLIC BLOOD PRESSURE: 76 MMHG | HEART RATE: 112 BPM | WEIGHT: 260.38 LBS | HEIGHT: 69 IN

## 2022-04-27 DIAGNOSIS — G44.229 CHRONIC TENSION-TYPE HEADACHE, NOT INTRACTABLE: ICD-10-CM

## 2022-04-27 DIAGNOSIS — L08.9 SKIN INFECTION: ICD-10-CM

## 2022-04-27 DIAGNOSIS — I70.0 ATHEROSCLEROSIS OF AORTA: ICD-10-CM

## 2022-04-27 DIAGNOSIS — Z00.00 ANNUAL PHYSICAL EXAM: Primary | ICD-10-CM

## 2022-04-27 DIAGNOSIS — J34.89 NASAL OBSTRUCTION: ICD-10-CM

## 2022-04-27 DIAGNOSIS — E11.42 DIABETIC POLYNEUROPATHY ASSOCIATED WITH TYPE 2 DIABETES MELLITUS: ICD-10-CM

## 2022-04-27 DIAGNOSIS — J42 CHRONIC BRONCHITIS, UNSPECIFIED CHRONIC BRONCHITIS TYPE: ICD-10-CM

## 2022-04-27 DIAGNOSIS — Z22.322 MRSA COLONIZATION: ICD-10-CM

## 2022-04-27 DIAGNOSIS — R50.9 FEVER, UNSPECIFIED FEVER CAUSE: ICD-10-CM

## 2022-04-27 DIAGNOSIS — E11.9 TYPE 2 DIABETES MELLITUS WITHOUT COMPLICATION, WITHOUT LONG-TERM CURRENT USE OF INSULIN: ICD-10-CM

## 2022-04-27 PROCEDURE — 1159F PR MEDICATION LIST DOCUMENTED IN MEDICAL RECORD: ICD-10-PCS | Mod: CPTII,S$GLB,, | Performed by: FAMILY MEDICINE

## 2022-04-27 PROCEDURE — 1160F RVW MEDS BY RX/DR IN RCRD: CPT | Mod: CPTII,S$GLB,, | Performed by: FAMILY MEDICINE

## 2022-04-27 PROCEDURE — 71046 X-RAY EXAM CHEST 2 VIEWS: CPT | Mod: TC,FY,PO

## 2022-04-27 PROCEDURE — 71046 X-RAY EXAM CHEST 2 VIEWS: CPT | Mod: 26,,, | Performed by: INTERNAL MEDICINE

## 2022-04-27 PROCEDURE — 99214 OFFICE O/P EST MOD 30 MIN: CPT | Mod: S$GLB,,, | Performed by: FAMILY MEDICINE

## 2022-04-27 PROCEDURE — 3044F HG A1C LEVEL LT 7.0%: CPT | Mod: CPTII,S$GLB,, | Performed by: FAMILY MEDICINE

## 2022-04-27 PROCEDURE — 99999 PR PBB SHADOW E&M-EST. PATIENT-LVL V: CPT | Mod: PBBFAC,,, | Performed by: FAMILY MEDICINE

## 2022-04-27 PROCEDURE — 1159F MED LIST DOCD IN RCRD: CPT | Mod: CPTII,S$GLB,, | Performed by: FAMILY MEDICINE

## 2022-04-27 PROCEDURE — 3077F PR MOST RECENT SYSTOLIC BLOOD PRESSURE >= 140 MM HG: ICD-10-PCS | Mod: CPTII,S$GLB,, | Performed by: FAMILY MEDICINE

## 2022-04-27 PROCEDURE — 3044F PR MOST RECENT HEMOGLOBIN A1C LEVEL <7.0%: ICD-10-PCS | Mod: CPTII,S$GLB,, | Performed by: FAMILY MEDICINE

## 2022-04-27 PROCEDURE — 1160F PR REVIEW ALL MEDS BY PRESCRIBER/CLIN PHARMACIST DOCUMENTED: ICD-10-PCS | Mod: CPTII,S$GLB,, | Performed by: FAMILY MEDICINE

## 2022-04-27 PROCEDURE — 99499 RISK ADDL DX/OHS AUDIT: ICD-10-PCS | Mod: S$GLB,,, | Performed by: FAMILY MEDICINE

## 2022-04-27 PROCEDURE — 99214 PR OFFICE/OUTPT VISIT, EST, LEVL IV, 30-39 MIN: ICD-10-PCS | Mod: S$GLB,,, | Performed by: FAMILY MEDICINE

## 2022-04-27 PROCEDURE — 3008F BODY MASS INDEX DOCD: CPT | Mod: CPTII,S$GLB,, | Performed by: FAMILY MEDICINE

## 2022-04-27 PROCEDURE — 4010F ACE/ARB THERAPY RXD/TAKEN: CPT | Mod: CPTII,S$GLB,, | Performed by: FAMILY MEDICINE

## 2022-04-27 PROCEDURE — 71046 XR CHEST PA AND LATERAL: ICD-10-PCS | Mod: 26,,, | Performed by: INTERNAL MEDICINE

## 2022-04-27 PROCEDURE — 3078F DIAST BP <80 MM HG: CPT | Mod: CPTII,S$GLB,, | Performed by: FAMILY MEDICINE

## 2022-04-27 PROCEDURE — 99499 UNLISTED E&M SERVICE: CPT | Mod: S$GLB,,, | Performed by: FAMILY MEDICINE

## 2022-04-27 PROCEDURE — 3008F PR BODY MASS INDEX (BMI) DOCUMENTED: ICD-10-PCS | Mod: CPTII,S$GLB,, | Performed by: FAMILY MEDICINE

## 2022-04-27 PROCEDURE — 4010F PR ACE/ARB THEARPY RXD/TAKEN: ICD-10-PCS | Mod: CPTII,S$GLB,, | Performed by: FAMILY MEDICINE

## 2022-04-27 PROCEDURE — 3077F SYST BP >= 140 MM HG: CPT | Mod: CPTII,S$GLB,, | Performed by: FAMILY MEDICINE

## 2022-04-27 PROCEDURE — 99999 PR PBB SHADOW E&M-EST. PATIENT-LVL V: ICD-10-PCS | Mod: PBBFAC,,, | Performed by: FAMILY MEDICINE

## 2022-04-27 PROCEDURE — 3078F PR MOST RECENT DIASTOLIC BLOOD PRESSURE < 80 MM HG: ICD-10-PCS | Mod: CPTII,S$GLB,, | Performed by: FAMILY MEDICINE

## 2022-04-27 RX ORDER — MUPIROCIN 20 MG/G
OINTMENT TOPICAL
Qty: 30 G | Refills: 1 | Status: SHIPPED | OUTPATIENT
Start: 2022-04-27 | End: 2022-04-27 | Stop reason: SDUPTHER

## 2022-04-27 NOTE — PROGRESS NOTES
Assessment & Plan  Problem List Items Addressed This Visit        Neuro    Diabetic polyneuropathy associated with type 2 diabetes mellitus    Chronic tension-type headache, not intractable    Overview     Cannot tolerate propranolol because side effects (dizziness, decreased alertness)  No improvement with imitrex  Minimal improvement with fiorcet               ENT    Nasal obstruction    Overview     h/o nasal trauma.  S/p ent surgery in the past.                Pulmonary    COPD (chronic obstructive pulmonary disease)    Overview     Currently on breo and albuterol.  Recommend routine usage of breo.  quit smoking since age 25.                Cardiac/Vascular    Atherosclerosis of aorta    Overview     - noted on CXR 3/2018             Other Visit Diagnoses     Annual physical exam    -  Primary    Fever, unspecified fever cause        Relevant Orders    X-Ray Chest PA And Lateral    Skin infection        Relevant Medications    mupirocin (BACTROBAN) 2 % ointment      I addressed all major concerns as it related to health maintenance.  All were ordered and scheduled based on the patients wishes.  Any additional health maintenance will be readdressed at the next physical if declined or deferred by the patient.        Health Maintenance reviewed    Follow-up: No follow-ups on file.    ______________________________________________________________________    Chief Complaint  Chief Complaint   Patient presents with    Annual Exam       HPI  Scott Bansal is a 61 y.o. male with multiple medical diagnoses as listed in the medical history and problem list that presents for annual exam.  Pt is known to me with last appointment 3/2/2021.    Patient denies any new symptoms including chest pain, blurry vision, N/V, diarrhea.  Noted fever. +mild SOB with elevated heart rate       PAST MEDICAL HISTORY:  Past Medical History:   Diagnosis Date    Bipolar 1 disorder, mixed     BPH (benign prostatic hypertrophy)      Colon polyp     Cyst, eyelid     Dr. Broussard    Diabetes mellitus     GERD (gastroesophageal reflux disease)     Hyperlipidemia     Hypertension     Lumbar degenerative disc disease 3/7/2019    Migraine headache     Obesity        PAST SURGICAL HISTORY:  Past Surgical History:   Procedure Laterality Date    CERVICAL SPINE SURGERY      COLONOSCOPY N/A 2017    Procedure: COLONOSCOPY;  Surgeon: Genaro Nix MD;  Location: [facility] ENDO;  Service: Endoscopy;  Laterality: N/A;    COLONOSCOPY N/A 10/30/2020    Procedure: COLONOSCOPY;  Surgeon: Herb Martinez MD;  Location: [facility] ENDO;  Service: Endoscopy;  Laterality: N/A;    EYE SURGERY Left 2017    cyst removal on eyelid; Dr. Broussard    SHOULDER SURGERY      TONSILLECTOMY         SOCIAL HISTORY:  Social History     Socioeconomic History    Marital status:    Tobacco Use    Smoking status: Former Smoker     Packs/day: 2.00     Years: 15.00     Pack years: 30.00     Types: Cigarettes     Quit date: 1984     Years since quittin.9    Smokeless tobacco: Never Used    Tobacco comment: Patient quit smoking at the age of 24 yo.   Substance and Sexual Activity    Alcohol use: No    Drug use: No    Sexual activity: Yes     Partners: Female       FAMILY HISTORY:  Family History   Problem Relation Age of Onset    Cataracts Mother     Cancer Mother         throat    Hypertension Mother     Hypertension Father     Stroke Father         2 strokes    Hypertension Sister     Cancer Brother         spinal, kidney, spinal fluid    Hypertension Brother     Cancer Cousin         breast?       ALLERGIES AND MEDICATIONS: updated and reviewed.  Review of patient's allergies indicates:   Allergen Reactions    Sulfa (sulfonamide antibiotics) Rash     Current Outpatient Medications   Medication Sig Dispense Refill    albuterol (ACCUNEB) 1.25 mg/3 mL Nebu Take 3 mLs (1.25 mg total) by nebulization every 6 (six) hours as needed (SOB).  Rescue 75 mL 11    albuterol (VENTOLIN HFA) 90 mcg/actuation inhaler INHALE 2 PUFFS EVERY 4 HOURS AS NEEDED 18 g 3    amLODIPine (NORVASC) 10 MG tablet TAKE 1 TABLET BY MOUTH EVERY DAY 90 tablet 0    benzoyl peroxide (BP WASH) 10 % external wash Use daily as wash to affected areas. Rinse completely.  May bleach clothing 227 g 12    betamethasone dipropionate (DIPROLENE) 0.05 % ointment Apply topically 2 (two) times daily. 45 g 1    blood sugar diagnostic (TRUETEST TEST STRIPS) Strp 1 each by Misc.(Non-Drug; Combo Route) route 3 (three) times a week. 100 each 3    buPROPion (WELLBUTRIN XL) 300 MG 24 hr tablet Take 300 mg by mouth every morning.  2    busPIRone (BUSPAR) 10 MG tablet Take 10 mg by mouth 3 (three) times daily.      candesartan (ATACAND) 4 MG tablet Take 1 tablet (4 mg total) by mouth once daily. 90 tablet 0    clindamycin (CLEOCIN T) 1 % Swab Apply topically 2 (two) times daily. 60 each 3    CLOTRIMAZOLE-BETAMETH DIP-ZINC TOP       crisaborole (EUCRISA) 2 % Oint Use for hand eczema bid. 60 g 5    cyclobenzaprine (FLEXERIL) 10 MG tablet Take 1 tablet (10 mg total) by mouth 3 (three) times daily as needed for Muscle spasms. 270 tablet 1    doxycycline (MONODOX) 100 MG capsule Take twice a day with food. May cause upset stomach 20 capsule 0    DUPIXENT SYRINGE 300 mg/2 mL Syrg Inject 1 syringe (300mg) into the skin every other week 4 mL 5    fluticasone furoate-vilanterol (BREO) 100-25 mcg/dose diskus inhaler Inhale 1 puff into the lungs once daily. Controller 90 each 1    fluticasone propionate (FLONASE) 50 mcg/actuation nasal spray 2 sprays (100 mcg total) by Each Nostril route once daily. 16 mL 5    gabapentin (NEURONTIN) 300 MG capsule Take 1 capsule (300 mg total) by mouth 2 (two) times daily. 180 capsule 3    gemfibroziL (LOPID) 600 MG tablet TAKE 1 TABLET BY MOUTH TWICE A DAY BEFORE MEALS 180 tablet 3    hydrocortisone 2.5 % ointment Apply topically 2 (two) times daily. Apply  twice daily to affected area on face. 30 g 1    hydrOXYzine (ATARAX) 50 MG tablet TAKE 1 TABLET (50 MG TOTAL) BY MOUTH 3 (THREE) TIMES DAILY AS NEEDED FOR ITCHING. 270 tablet 0    levocetirizine (XYZAL) 5 MG tablet TAKE 1 TABLET BY MOUTH EACH MORNING 90 tablet 3    meloxicam (MOBIC) 15 MG tablet Take 1 tablet (15 mg total) by mouth once daily. 30 tablet 0    metFORMIN (GLUCOPHAGE) 1000 MG tablet Take 1 tablet (1,000 mg total) by mouth 2 (two) times daily. 180 tablet 0    mometasone 0.1% (ELOCON) 0.1 % cream Apply topically once daily. AAA bid prn for hand eczema 45 g 1    mupirocin (BACTROBAN) 2 % ointment AAA bid with Q-tip. Antibiotic ointment. 30 g 1    neomycin-polymyxin-dexamethasone (DEXACINE) 3.5 mg/g-10,000 unit/g-0.1 % Oint       pantoprazole (PROTONIX) 40 MG tablet Take 1 tablet (40 mg total) by mouth once daily. 90 tablet 3    quetiapine (SEROQUEL) 300 MG Tab Take by mouth.      simvastatin (ZOCOR) 80 MG tablet TAKE 1 TABLET BY MOUTH EVERY DAY 90 tablet 0    SPIRIVA WITH HANDIHALER 18 mcg inhalation capsule INHALE 1 CAPSULE (18 MCG TOTAL) INTO THE LUNGS ONCE DAILY. CONTROLLER 30 capsule 0    sumatriptan (IMITREX) 100 MG tablet TAKE 1 TABLET BY MOUTH EVERY 2 HOURS AS NEEDED FOR MIGRAINE 12 tablet 2    tamsulosin (FLOMAX) 0.4 mg Cap TAKE 2 CAPSULES BY MOUTH EVERY  capsule 2    tiZANidine (ZANAFLEX) 4 MG tablet TAKE 1 TABLET BY MOUTH EVERY 8 HOURS. 30 tablet 1    tiZANidine (ZANAFLEX) 4 MG tablet Take 1 tablet (4 mg total) by mouth every 8 (eight) hours. 30 tablet 1    triamcinolone acetonide 0.1% (KENALOG) 0.1 % paste Place onto teeth 2 (two) times daily.      vancomycin (VANCOCIN) 1,000 mg injection MIX THE CONTENTS OF 1 VIAL WITH UP TO 10 GRAMS OF OINTMENT. APPLY TO INFECTION SITE ONCE DAILY.      zonisamide (ZONEGRAN) 50 MG Cap TAKE 1 CAPSULE BY MOUTH TWICE A  capsule 0     No current facility-administered medications for this visit.         ROS  Review of Systems  "  Constitutional: Positive for fever (recent COVID booster ). Negative for activity change, appetite change, fatigue and unexpected weight change.   HENT: Negative for congestion and facial swelling.    Eyes: Negative for visual disturbance.   Respiratory: Negative for chest tightness, shortness of breath, wheezing and stridor.    Cardiovascular: Negative for chest pain, palpitations and leg swelling.   Gastrointestinal: Negative for abdominal distention, abdominal pain, constipation, diarrhea, nausea and vomiting.   Endocrine: Negative for cold intolerance, heat intolerance, polydipsia and polyuria.   Skin: Negative.    Allergic/Immunologic: Negative.    Neurological: Negative for dizziness, light-headedness, numbness and headaches.   Psychiatric/Behavioral: Negative for agitation and decreased concentration.         Physical Exam  Vitals:    04/27/22 0812   BP: (!) 140/76   Pulse: (!) 112   Temp: 100.3 °F (37.9 °C)   TempSrc: Oral   SpO2: 97%   Weight: 118.1 kg (260 lb 5.8 oz)   Height: 5' 9" (1.753 m)    Body mass index is 38.45 kg/m².  Weight: 118.1 kg (260 lb 5.8 oz)   Height: 5' 9" (175.3 cm)   Physical Exam  Vitals reviewed.   Constitutional:       Appearance: He is well-developed. He is diaphoretic.   HENT:      Head: Normocephalic and atraumatic.      Right Ear: External ear normal.      Left Ear: External ear normal.      Nose: Nose normal.   Eyes:      Conjunctiva/sclera: Conjunctivae normal.      Pupils: Pupils are equal, round, and reactive to light.   Cardiovascular:      Rate and Rhythm: Regular rhythm. Tachycardia present.      Heart sounds: Normal heart sounds.   Pulmonary:      Effort: Pulmonary effort is normal.      Breath sounds: Normal breath sounds.   Musculoskeletal:      Cervical back: Normal range of motion.   Skin:     General: Skin is warm.   Neurological:      Mental Status: He is alert and oriented to person, place, and time.   Psychiatric:         Behavior: Behavior normal. "         Health Maintenance       Date Due Completion Date    Influenza Vaccine (1) Never done ---    Foot Exam 10/26/2021 10/26/2020 (Done)    Override on 10/26/2020: Done (completed by insurance)    Eye Exam 12/17/2021 12/17/2020    Pneumococcal Vaccines (Age 0-64) (3 - PPSV23 or PCV20) 07/07/2022 7/21/2020    Hemoglobin A1c 10/19/2022 4/19/2022    Diabetes Urine Screening 10/20/2022 10/20/2021    Lipid Panel 10/20/2022 10/20/2021    High Dose Statin 04/20/2023 4/20/2022    Colorectal Cancer Screening 06/28/2024 6/28/2021    TETANUS VACCINE 07/07/2027 7/7/2017              Patient note was created using Mediamorph.  Any errors in syntax or even information may not have been identified and edited on initial review prior to signing this note.

## 2022-04-27 NOTE — TELEPHONE ENCOUNTER
----- Message from Kacey Menjivar sent at 4/27/2022  4:12 PM CDT -----  Type: RX Refill Request    Who Called: self    Have you contacted your pharmacy:yes    Refill or New Rx:refill    RX Name and Strength:mupirocin (BACTROBAN) 2 % ointment      Preferred Pharmacy with phone number:[facility]/pharmacy #[id number] - [place] [address]   Phone:  [phone]  Fax:  [phone]      Local or Mail Order:local    Ordering Provider:Dr. Villalta    Would the patient rather a call back or a response via My [employer] [facility]? call    Best Call Back Number:[phone] (M)     Additional Information: Pharmacy still has not received prescription.

## 2022-04-28 RX ORDER — MUPIROCIN 20 MG/G
OINTMENT TOPICAL
Qty: 30 G | Refills: 1 | Status: SHIPPED | OUTPATIENT
Start: 2022-04-28 | End: 2022-10-04

## 2022-04-30 DIAGNOSIS — G43.711 INTRACTABLE CHRONIC MIGRAINE WITHOUT AURA AND WITH STATUS MIGRAINOSUS: ICD-10-CM

## 2022-04-30 NOTE — TELEPHONE ENCOUNTER
No new care gaps identified.  Powered by [employer]. Reference number: [id number].   4/30/2022 7:11:36 AM CDT

## 2022-05-02 RX ORDER — SUMATRIPTAN SUCCINATE 100 MG/1
TABLET ORAL
Qty: 27 TABLET | Refills: 3 | Status: SHIPPED | OUTPATIENT
Start: 2022-05-02 | End: 2023-01-18 | Stop reason: SDUPTHER

## 2022-05-02 NOTE — TELEPHONE ENCOUNTER
Refill Routing Note   Medication(s) are not appropriate for processing by [facility] for the following reason(s):      - Drug-Disease Interaction (sumatriptan and Atherosclerosis of aorta; Hyperlipidemia; Essential hypertension; Type 2 diabetes mellitus without complication; Severe obesity (BMI 35.0-39.9) with comorbidity; Diabetic polyneuropathy associated with type 2 diabetes mellitus; Hyperlipidemia, unspecified hyperlipidemia type; Type 2 diabetes mellitus without complication, without long-term current use of insulin)    [facility] action(s):  Defer Medication-related problems identified: Drug-disease interaction        Medication reconciliation completed: No     Appointments  past 12m or future 3m with PCP    Date Provider   Last Visit   4/27/2022 Kaylie Villalta MD   Next Visit   Visit date not found Kaylie Villalta MD   ED visits in past 90 days: 0        Note composed:6:26 AM 05/02/2022

## 2022-05-06 ENCOUNTER — TELEPHONE (OUTPATIENT)
Dept: FAMILY MEDICINE | Facility: CLINIC | Age: 62
End: 2022-05-06
Payer: MEDICARE

## 2022-05-06 DIAGNOSIS — R07.9 CHEST PAIN, UNSPECIFIED TYPE: Primary | ICD-10-CM

## 2022-05-06 NOTE — TELEPHONE ENCOUNTER
Pt states that he is having chest pains and would like to be referred to a cardiologist. Pt was advised to go to the ER if chest pains continues. Pt was also advised that his message will be forwarded to his provider. Please advise further.

## 2022-05-06 NOTE — TELEPHONE ENCOUNTER
----- Message from Windy Jane sent at 5/6/2022  2:11 PM CDT -----  Regarding: self [phone]  Type: Patient Call Back    Who called: self    What is the request in detail:  patient needs to see a cardiologist anyone that the provider suggests.  He doesn't think the BP medication is working anymore he is having small chest pains.     Can the clinic reply by [facility]? no    Would the patient rather a call back or a response via [facility]?  Call back    Best call back number:[phone]

## 2022-05-09 ENCOUNTER — HOSPITAL ENCOUNTER (EMERGENCY)
Facility: HOSPITAL | Age: 62
Discharge: HOME | End: 2022-05-09
Attending: EMERGENCY MEDICINE
Payer: MEDICARE

## 2022-05-09 ENCOUNTER — TELEPHONE (OUTPATIENT)
Dept: FAMILY MEDICINE | Facility: CLINIC | Age: 62
End: 2022-05-09
Payer: MEDICARE

## 2022-05-09 VITALS
OXYGEN SATURATION: 97 % | WEIGHT: 240 LBS | HEIGHT: 69 IN | DIASTOLIC BLOOD PRESSURE: 81 MMHG | TEMPERATURE: 98 F | HEART RATE: 81 BPM | BODY MASS INDEX: 35.55 KG/M2 | RESPIRATION RATE: 18 BRPM | SYSTOLIC BLOOD PRESSURE: 155 MMHG

## 2022-05-09 DIAGNOSIS — R07.9 CHEST PAIN, UNSPECIFIED TYPE: Primary | ICD-10-CM

## 2022-05-09 DIAGNOSIS — R06.02 SHORTNESS OF BREATH: ICD-10-CM

## 2022-05-09 LAB
ALBUMIN SERPL BCP-MCNC: 4.2 G/DL (ref 3.5–5.2)
ALP SERPL-CCNC: 85 U/L (ref 55–135)
ALT SERPL W/O P-5'-P-CCNC: 27 U/L (ref 10–44)
ANION GAP SERPL CALC-SCNC: 8 MMOL/L (ref 8–16)
AST SERPL-CCNC: 13 U/L (ref 10–40)
BASOPHILS # BLD AUTO: 0.08 K/UL (ref 0–0.2)
BASOPHILS NFR BLD: 1 % (ref 0–1.9)
BILIRUB SERPL-MCNC: 0.4 MG/DL (ref 0.1–1)
BNP SERPL-MCNC: 13 PG/ML (ref 0–99)
BUN SERPL-MCNC: 22 MG/DL (ref 8–23)
CALCIUM SERPL-MCNC: 9.9 MG/DL (ref 8.7–10.5)
CHLORIDE SERPL-SCNC: 112 MMOL/L (ref 95–110)
CO2 SERPL-SCNC: 21 MMOL/L (ref 23–29)
CREAT SERPL-MCNC: 1.1 MG/DL (ref 0.5–1.4)
CTP QC/QA: YES
DIFFERENTIAL METHOD: ABNORMAL
EOSINOPHIL # BLD AUTO: 0.4 K/UL (ref 0–0.5)
EOSINOPHIL NFR BLD: 5 % (ref 0–8)
ERYTHROCYTE [DISTWIDTH] IN BLOOD BY AUTOMATED COUNT: 12.7 % (ref 11.5–14.5)
EST. GFR  (AFRICAN AMERICAN): >60 ML/MIN/1.73 M^2
EST. GFR  (NON AFRICAN AMERICAN): >60 ML/MIN/1.73 M^2
GLUCOSE SERPL-MCNC: 136 MG/DL (ref 70–110)
HCT VFR BLD AUTO: 36.7 % (ref 40–54)
HGB BLD-MCNC: 11.7 G/DL (ref 14–18)
IMM GRANULOCYTES # BLD AUTO: 0.15 K/UL (ref 0–0.04)
IMM GRANULOCYTES NFR BLD AUTO: 1.8 % (ref 0–0.5)
LYMPHOCYTES # BLD AUTO: 2.3 K/UL (ref 1–4.8)
LYMPHOCYTES NFR BLD: 27.9 % (ref 18–48)
MAGNESIUM SERPL-MCNC: 1.7 MG/DL (ref 1.6–2.6)
MCH RBC QN AUTO: 27.3 PG (ref 27–31)
MCHC RBC AUTO-ENTMCNC: 31.9 G/DL (ref 32–36)
MCV RBC AUTO: 86 FL (ref 82–98)
MONOCYTES # BLD AUTO: 0.7 K/UL (ref 0.3–1)
MONOCYTES NFR BLD: 8 % (ref 4–15)
NEUTROPHILS # BLD AUTO: 4.7 K/UL (ref 1.8–7.7)
NEUTROPHILS NFR BLD: 56.3 % (ref 38–73)
NRBC BLD-RTO: 0 /100 WBC
PHOSPHATE SERPL-MCNC: 2.9 MG/DL (ref 2.7–4.5)
PLATELET # BLD AUTO: 288 K/UL (ref 150–450)
PMV BLD AUTO: 9.8 FL (ref 9.2–12.9)
POTASSIUM SERPL-SCNC: 4.2 MMOL/L (ref 3.5–5.1)
PROT SERPL-MCNC: 6.9 G/DL (ref 6–8.4)
RBC # BLD AUTO: 4.29 M/UL (ref 4.6–6.2)
SARS-COV-2 RDRP RESP QL NAA+PROBE: NEGATIVE
SODIUM SERPL-SCNC: 141 MMOL/L (ref 136–145)
TROPONIN I SERPL DL<=0.01 NG/ML-MCNC: <0.006 NG/ML (ref 0–0.03)
WBC # BLD AUTO: 8.35 K/UL (ref 3.9–12.7)

## 2022-05-09 PROCEDURE — 84484 ASSAY OF TROPONIN QUANT: CPT | Performed by: STUDENT IN AN ORGANIZED HEALTH CARE EDUCATION/TRAINING PROGRAM

## 2022-05-09 PROCEDURE — 93005 ELECTROCARDIOGRAM TRACING: CPT

## 2022-05-09 PROCEDURE — 96374 THER/PROPH/DIAG INJ IV PUSH: CPT

## 2022-05-09 PROCEDURE — 80053 COMPREHEN METABOLIC PANEL: CPT | Performed by: STUDENT IN AN ORGANIZED HEALTH CARE EDUCATION/TRAINING PROGRAM

## 2022-05-09 PROCEDURE — 93010 ELECTROCARDIOGRAM REPORT: CPT | Mod: ,,, | Performed by: INTERNAL MEDICINE

## 2022-05-09 PROCEDURE — 84100 ASSAY OF PHOSPHORUS: CPT | Performed by: STUDENT IN AN ORGANIZED HEALTH CARE EDUCATION/TRAINING PROGRAM

## 2022-05-09 PROCEDURE — 83880 ASSAY OF NATRIURETIC PEPTIDE: CPT | Performed by: STUDENT IN AN ORGANIZED HEALTH CARE EDUCATION/TRAINING PROGRAM

## 2022-05-09 PROCEDURE — 85025 COMPLETE CBC W/AUTO DIFF WBC: CPT | Performed by: STUDENT IN AN ORGANIZED HEALTH CARE EDUCATION/TRAINING PROGRAM

## 2022-05-09 PROCEDURE — 99285 EMERGENCY DEPT VISIT HI MDM: CPT | Mod: 25

## 2022-05-09 PROCEDURE — 93010 EKG 12-LEAD: ICD-10-PCS | Mod: ,,, | Performed by: INTERNAL MEDICINE

## 2022-05-09 PROCEDURE — 83735 ASSAY OF MAGNESIUM: CPT | Performed by: STUDENT IN AN ORGANIZED HEALTH CARE EDUCATION/TRAINING PROGRAM

## 2022-05-09 PROCEDURE — U0002 COVID-19 LAB TEST NON-CDC: HCPCS | Performed by: STUDENT IN AN ORGANIZED HEALTH CARE EDUCATION/TRAINING PROGRAM

## 2022-05-09 PROCEDURE — 63600175 PHARM REV CODE 636 W HCPCS: Performed by: STUDENT IN AN ORGANIZED HEALTH CARE EDUCATION/TRAINING PROGRAM

## 2022-05-09 RX ORDER — MORPHINE SULFATE 4 MG/ML
4 INJECTION, SOLUTION INTRAMUSCULAR; INTRAVENOUS
Status: COMPLETED | OUTPATIENT
Start: 2022-05-09 | End: 2022-05-09

## 2022-05-09 RX ADMIN — MORPHINE SULFATE 4 MG: 4 INJECTION INTRAVENOUS at 03:05

## 2022-05-09 NOTE — TELEPHONE ENCOUNTER
----- Message from Britney Bella sent at 5/6/2022  4:49 PM CDT -----  Regarding: self  .Type:  Patient Returning Call    Who Called: self     Who Left Message for Patient: Yared     Does the patient know what this is regarding?: no     Would the patient rather a call back or a response via [facility]? Call     Best Call Back Number: [phone]

## 2022-05-09 NOTE — ED PROVIDER NOTES
"Encounter Date: 5/9/2022       History     Chief Complaint   Patient presents with    Shortness of Breath    Chest Pain     Pt reports chest tightness x 2 weeks and sharp chest pain which occurred today at 14:15, causing him to  become lightheaded. Patient states he had similar symptoms years ago when doctor's discovered a mitral valve prolapse. Patient denies n/v/d or headache. Patient took all prescribed home medications today.     Patient is a 61-year-old male with a past medical history of hypertension, type 2 non insulin dependent diabetes, hyperlipidemia, COPD and bipolar 1 disorder presenting to the emergency department for left-sided, non-radiating chest pain x3 weeks. It has also been associated with shortness of breath. The episodes last around 20 seconds and come on randomly, regardless of activity. It feels as though "an eagle claw is ripping out my heart", somewhat similarly to when he was treated for a mitral valve prolapse in his 20s. Despite triage note, patient denies lightheadedness. Further denies nausea/vomiting, diaphoresis, palpitations, leg swelling, fevers/chills or abdominal pain. Pain not currently present. Has taken Mucinex and nebulizer treatments with minimal relief.        Review of patient's allergies indicates:   Allergen Reactions    Sulfa (sulfonamide antibiotics) Rash     Past Medical History:   Diagnosis Date    Bipolar 1 disorder, mixed     BPH (benign prostatic hypertrophy)     Colon polyp     Cyst, eyelid     Dr. Broussard    Diabetes mellitus     GERD (gastroesophageal reflux disease)     Hyperlipidemia     Hypertension     Lumbar degenerative disc disease 3/7/2019    Migraine headache     Obesity      Past Surgical History:   Procedure Laterality Date    CERVICAL SPINE SURGERY      COLONOSCOPY N/A 7/27/2017    Procedure: COLONOSCOPY;  Surgeon: Genaro Nix MD;  Location: [facility];  Service: Endoscopy;  Laterality: N/A;    COLONOSCOPY N/A 10/30/2020    " Procedure: COLONOSCOPY;  Surgeon: Herb Martinez MD;  Location: [facility];  Service: Endoscopy;  Laterality: N/A;    EYE SURGERY Left 2017    cyst removal on eyelid; Dr. Broussard    SHOULDER SURGERY      TONSILLECTOMY       Family History   Problem Relation Age of Onset    Cataracts Mother     Cancer Mother         throat    Hypertension Mother     Hypertension Father     Stroke Father         2 strokes    Hypertension Sister     Cancer Brother         spinal, kidney, spinal fluid    Hypertension Brother     Cancer Cousin         breast?     Social History     Tobacco Use    Smoking status: Former Smoker     Packs/day: 2.00     Years: 15.00     Pack years: 30.00     Types: Cigarettes     Quit date: 1984     Years since quittin.0    Smokeless tobacco: Never Used    Tobacco comment: Patient quit smoking at the age of 24 yo.   Substance Use Topics    Alcohol use: No    Drug use: No     Review of Systems   Constitutional: Negative for activity change, chills and fever.   HENT: Negative for congestion, ear pain and sore throat.    Respiratory: Positive for shortness of breath. Negative for stridor.    Cardiovascular: Positive for chest pain. Negative for palpitations and leg swelling.   Gastrointestinal: Negative for abdominal pain, nausea and vomiting.   Genitourinary: Negative for dysuria.   Musculoskeletal: Negative for back pain.   Skin: Negative for rash.   Neurological: Negative for dizziness, syncope, weakness and headaches.   Hematological: Does not bruise/bleed easily.       Physical Exam     Initial Vitals [22 1450]   BP Pulse Resp Temp SpO2   (!) 166/101 90 20 98.1 °F (36.7 °C) 97 %      MAP       --         Physical Exam    Nursing note and vitals reviewed.  Constitutional: He appears well-developed. He is not diaphoretic. He is Obese . No distress.   Well-appearing. Speaking full sentences. No acute distress.   HENT:   Head: Normocephalic and atraumatic.   Right Ear:  External ear normal.   Left Ear: External ear normal.   Neck: Neck supple.   Cardiovascular: Normal rate, regular rhythm, normal heart sounds and intact distal pulses.   Pulmonary/Chest: Breath sounds normal. No respiratory distress. He has no wheezes. He has no rhonchi. He has no rales.   No reproducibility upon palpation   Abdominal: Abdomen is soft. He exhibits no distension. There is no abdominal tenderness. There is no rebound and no guarding.   Musculoskeletal:      Cervical back: Neck supple.     Neurological: He is alert and oriented to person, place, and time. GCS score is 15. GCS eye subscore is 4. GCS verbal subscore is 5. GCS motor subscore is 6.   Skin: Skin is warm. Capillary refill takes less than 2 seconds. No rash noted.   Psychiatric: He has a normal mood and affect.         ED Course   Procedures  Labs Reviewed   CBC W/ AUTO DIFFERENTIAL - Abnormal; Notable for the following components:       Result Value    RBC 4.29 (*)     Hemoglobin 11.7 (*)     Hematocrit 36.7 (*)     MCHC 31.9 (*)     Immature Granulocytes 1.8 (*)     Immature Grans (Abs) 0.15 (*)     All other components within normal limits   COMPREHENSIVE METABOLIC PANEL - Abnormal; Notable for the following components:    Chloride 112 (*)     CO2 21 (*)     Glucose 136 (*)     All other components within normal limits   B-TYPE NATRIURETIC PEPTIDE   MAGNESIUM   PHOSPHORUS   TROPONIN I   SARS-COV-2 RDRP GENE     EKG Readings: (Independently Interpreted)   Initial Reading: No STEMI. Previous EKG Date: Feb 2020. Rhythm: Normal Sinus Rhythm. Heart Rate: 93. Ectopy: No Ectopy. Conduction: Normal.       Imaging Results          X-Ray Chest AP Portable (Final result)  Result time 05/09/22 16:21:05    Final result by Lance Hilton MD (05/09/22 16:21:05)                 Impression:      No acute radiographic abnormality.      Electronically signed by: Lance Hilton  Date:    05/09/2022  Time:    16:21             Narrative:    EXAMINATION:  XR  "CHEST AP PORTABLE    CLINICAL HISTORY:  Shortness of breath;    TECHNIQUE:  Single frontal view of the chest was performed.    COMPARISON:  04/27/2022    FINDINGS:  Anterior fusion hardware in the cervical spine.  Remote rib fracture posteriorly on the right.  Suboptimal inspiration.    The lungs are clear, with normal appearance of pulmonary vasculature and no pleural effusion or pneumothorax.    The cardiac silhouette is normal in size. The hilar and mediastinal contours are unremarkable.    Bones are intact.                                 Medications   morphine injection 4 mg (4 mg Intravenous Given 5/9/22 5686)     Medical Decision Making:   History:   I obtained history from: someone other than patient.  Initial Assessment:   Emergent evaluation of chest pain. He is afebrile and hemodynamically stable.  Differential Diagnosis:   ACS, COPD exacerbation, valvular disease, covid vs bacterial pneumonia less likely, pneumothorax unlikely, musculoskeletal chest pain  Clinical Tests:   Lab Tests: Ordered and Reviewed  Radiological Study: Ordered and Reviewed  Medical Tests: Ordered and Reviewed  ED Management:  Nursing documentation notes "tearing" pain, however patient denies this. Further, there is no radiation of pain to back. On exam, he has symmetrical pulses in all 4 extremities and a normal neuro exam to the lower extremities bilaterally, lowering suspicion for aortic pathology. Furthermore, multiple intermittent episodes lasting seconds is atypical for other emergent disease processes considered, including PE (no tachycardia, tachypnea or hypoxia). Labs and EKG are unremarkable and eliminate suspicion for ACS. No murmurs on exam or syncopal events to suggest valvular disease as etiology. Discussed results with patient. Instructed to keep follow-up appointment with Cardiology, have his PCP place referral.  Further discussed ED return precautions, she expressed understanding.                      Clinical " Impression:   Final diagnoses:  [R06.02] Shortness of breath  [R07.9] Chest pain, unspecified type (Primary)          ED Disposition Condition    Discharge Stable        ED Prescriptions     None        Follow-up Information     Follow up With Specialties Details Why Contact Info    Kaylie Villalta MD Family Medicine, Wound Care Schedule an appointment as soon as possible for a visit   [address]  [place] [address]  [phone]      [facility] CARDIOLOGY Cardiology Schedule an appointment as soon as possible for a visit   [address] [facility] [address]  [phone]    [facility] Emergency Dept Emergency Medicine Go to  As needed [address] [facility] [id number] [phone]           Montana Espino MD  Resident  05/09/22 4972

## 2022-05-09 NOTE — TELEPHONE ENCOUNTER
Informed pt of findings he can not go to the emergency room now, she girlfriend just got release from same day surgery.  After he take her home get her situated then he'll try to go.  Informed pt to call back schedule his cardiology appointment with referral desk.

## 2022-05-09 NOTE — ED TRIAGE NOTES
Presents with increasing SOB and intermittent chest pain past' few weeks'. Pt states has h/o COPD and mitral repair. Pt states chest pain may last 'about 20 secs and it is like its tearing.' Denies fevers.

## 2022-05-09 NOTE — TELEPHONE ENCOUNTER
Spoke with patient he states that he has been having chest pains since his LOV, patient notes that it feels as if a eagle's claw is ripping his heart out. Patient says that he would rather see a cardiologist then go to the ER. Patient says that this happens about twice and a whole day about 20 seconds each time. Patient says that he also has flutters in his chest and this has happened before when he did get immune to his last bp medication after taking it for 5 years and it was changed. Patient says that it usually happens when he is watching tv or laying down. Patient says that he has been under a little stress with his gf's health condition and he may need a stress test. He says that last time he was told that he was too overweight to do a stress test so they gave him an injection in his arm while he was on an EKG and it sped up his heart beat and it was able to read what was going on.Patient says when he was 19 he had a Mitral valve prolapse and he says that that is what it kinds of feels like. Patient informed that referral was placed for Cardiology and verbalized good understanding. Asked patient would he like an appointment but he is at hospital with his gf right now so he will call back regarding that. Patient says that at LOV when he got home he was coughing up yellow phlegm, patient says that he cannot explain it any better than that.

## 2022-05-09 NOTE — DISCHARGE INSTRUCTIONS
Please return to the emergency department if you begin to have worsening or recurrent chest pain or shortness of breath

## 2022-05-10 ENCOUNTER — PATIENT OUTREACH (OUTPATIENT)
Dept: ADMINISTRATIVE | Facility: OTHER | Age: 62
End: 2022-05-10
Payer: MEDICARE

## 2022-05-10 NOTE — PROGRESS NOTES
Health Maintenance Due   Topic Date Due    Foot Exam  10/26/2021    Eye Exam  12/17/2021     Updates were requested from care everywhere.  Chart was reviewed for overdue Proactive [facility] Encounters ([facility]) topics (CRS, Breast Cancer Screening, Eye exam)  Health Maintenance has been updated.  LINKS immunization registry triggered.  Immunizations were reconciled.

## 2022-05-11 ENCOUNTER — OFFICE VISIT (OUTPATIENT)
Dept: CARDIOLOGY | Facility: CLINIC | Age: 62
End: 2022-05-11
Payer: MEDICARE

## 2022-05-11 VITALS
DIASTOLIC BLOOD PRESSURE: 88 MMHG | HEART RATE: 92 BPM | WEIGHT: 246.69 LBS | OXYGEN SATURATION: 96 % | HEIGHT: 69 IN | RESPIRATION RATE: 15 BRPM | SYSTOLIC BLOOD PRESSURE: 154 MMHG | BODY MASS INDEX: 36.54 KG/M2

## 2022-05-11 DIAGNOSIS — E78.5 HYPERLIPIDEMIA ASSOCIATED WITH TYPE 2 DIABETES MELLITUS: ICD-10-CM

## 2022-05-11 DIAGNOSIS — J44.9 CHRONIC OBSTRUCTIVE PULMONARY DISEASE, UNSPECIFIED COPD TYPE: ICD-10-CM

## 2022-05-11 DIAGNOSIS — E11.69 HYPERLIPIDEMIA ASSOCIATED WITH TYPE 2 DIABETES MELLITUS: ICD-10-CM

## 2022-05-11 DIAGNOSIS — E11.9 TYPE 2 DIABETES MELLITUS WITHOUT COMPLICATION, WITHOUT LONG-TERM CURRENT USE OF INSULIN: ICD-10-CM

## 2022-05-11 DIAGNOSIS — R07.89 OTHER CHEST PAIN: Primary | ICD-10-CM

## 2022-05-11 DIAGNOSIS — E66.01 CLASS 2 SEVERE OBESITY DUE TO EXCESS CALORIES WITH SERIOUS COMORBIDITY AND BODY MASS INDEX (BMI) OF 36.0 TO 36.9 IN ADULT: ICD-10-CM

## 2022-05-11 DIAGNOSIS — I70.0 ATHEROSCLEROSIS OF AORTA: ICD-10-CM

## 2022-05-11 DIAGNOSIS — R94.31 NONSPECIFIC ABNORMAL ELECTROCARDIOGRAM (ECG) (EKG): ICD-10-CM

## 2022-05-11 DIAGNOSIS — I10 ESSENTIAL HYPERTENSION: ICD-10-CM

## 2022-05-11 PROCEDURE — 99999 PR PBB SHADOW E&M-EST. PATIENT-LVL V: CPT | Mod: PBBFAC,,, | Performed by: INTERNAL MEDICINE

## 2022-05-11 PROCEDURE — 3044F PR MOST RECENT HEMOGLOBIN A1C LEVEL <7.0%: ICD-10-PCS | Mod: CPTII,S$GLB,, | Performed by: INTERNAL MEDICINE

## 2022-05-11 PROCEDURE — 3077F PR MOST RECENT SYSTOLIC BLOOD PRESSURE >= 140 MM HG: ICD-10-PCS | Mod: CPTII,S$GLB,, | Performed by: INTERNAL MEDICINE

## 2022-05-11 PROCEDURE — 99204 PR OFFICE/OUTPT VISIT, NEW, LEVL IV, 45-59 MIN: ICD-10-PCS | Mod: S$GLB,,, | Performed by: INTERNAL MEDICINE

## 2022-05-11 PROCEDURE — 1159F MED LIST DOCD IN RCRD: CPT | Mod: CPTII,S$GLB,, | Performed by: INTERNAL MEDICINE

## 2022-05-11 PROCEDURE — 4010F ACE/ARB THERAPY RXD/TAKEN: CPT | Mod: CPTII,S$GLB,, | Performed by: INTERNAL MEDICINE

## 2022-05-11 PROCEDURE — 1160F RVW MEDS BY RX/DR IN RCRD: CPT | Mod: CPTII,S$GLB,, | Performed by: INTERNAL MEDICINE

## 2022-05-11 PROCEDURE — 99999 PR PBB SHADOW E&M-EST. PATIENT-LVL V: ICD-10-PCS | Mod: PBBFAC,,, | Performed by: INTERNAL MEDICINE

## 2022-05-11 PROCEDURE — 3044F HG A1C LEVEL LT 7.0%: CPT | Mod: CPTII,S$GLB,, | Performed by: INTERNAL MEDICINE

## 2022-05-11 PROCEDURE — 99499 RISK ADDL DX/OHS AUDIT: ICD-10-PCS | Mod: S$GLB,,, | Performed by: INTERNAL MEDICINE

## 2022-05-11 PROCEDURE — 3079F PR MOST RECENT DIASTOLIC BLOOD PRESSURE 80-89 MM HG: ICD-10-PCS | Mod: CPTII,S$GLB,, | Performed by: INTERNAL MEDICINE

## 2022-05-11 PROCEDURE — 99204 OFFICE O/P NEW MOD 45 MIN: CPT | Mod: S$GLB,,, | Performed by: INTERNAL MEDICINE

## 2022-05-11 PROCEDURE — 1159F PR MEDICATION LIST DOCUMENTED IN MEDICAL RECORD: ICD-10-PCS | Mod: CPTII,S$GLB,, | Performed by: INTERNAL MEDICINE

## 2022-05-11 PROCEDURE — 3077F SYST BP >= 140 MM HG: CPT | Mod: CPTII,S$GLB,, | Performed by: INTERNAL MEDICINE

## 2022-05-11 PROCEDURE — 3079F DIAST BP 80-89 MM HG: CPT | Mod: CPTII,S$GLB,, | Performed by: INTERNAL MEDICINE

## 2022-05-11 PROCEDURE — 1160F PR REVIEW ALL MEDS BY PRESCRIBER/CLIN PHARMACIST DOCUMENTED: ICD-10-PCS | Mod: CPTII,S$GLB,, | Performed by: INTERNAL MEDICINE

## 2022-05-11 PROCEDURE — 3008F PR BODY MASS INDEX (BMI) DOCUMENTED: ICD-10-PCS | Mod: CPTII,S$GLB,, | Performed by: INTERNAL MEDICINE

## 2022-05-11 PROCEDURE — 99499 UNLISTED E&M SERVICE: CPT | Mod: S$GLB,,, | Performed by: INTERNAL MEDICINE

## 2022-05-11 PROCEDURE — 3008F BODY MASS INDEX DOCD: CPT | Mod: CPTII,S$GLB,, | Performed by: INTERNAL MEDICINE

## 2022-05-11 PROCEDURE — 4010F PR ACE/ARB THEARPY RXD/TAKEN: ICD-10-PCS | Mod: CPTII,S$GLB,, | Performed by: INTERNAL MEDICINE

## 2022-05-11 NOTE — PROGRESS NOTES
CARDIOLOGY CONSULTATION    REASON FOR CONSULT:   Scott Bansal is a 61 y.o. male who presents for evaluation of chest pain.      HISTORY OF PRESENT ILLNESS:     Scott Bansal presents for evaluation of chest pain.  Symptoms over the past few weeks.  Two to 3 times a day.  Left-sided, nonradiating.  Can occur at rest or with exertion.  Last roughly 20 seconds.  Denies any prior episodes.  Went to the emergency room the other day.  EKG showed sinus rhythm.  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  Decreased anterior forces.  Similar to EKG from February 2020. Former smoker.  COPD.  Cardiovascular risk factors male, hypertension, hyperlipidemia (LDL 43) and diabetes.     CARDIOVASCULAR HISTORY:     None    PAST MEDICAL HISTORY:     Past Medical History:   Diagnosis Date    Bipolar 1 disorder, mixed     BPH (benign prostatic hypertrophy)     Colon polyp     Cyst, eyelid     Dr. Broussard    Diabetes mellitus     GERD (gastroesophageal reflux disease)     Hyperlipidemia     Hypertension     Lumbar degenerative disc disease 3/7/2019    Migraine headache     Obesity        PAST SURGICAL HISTORY:     Past Surgical History:   Procedure Laterality Date    CERVICAL SPINE SURGERY      COLONOSCOPY N/A 7/27/2017    Procedure: COLONOSCOPY;  Surgeon: Genaro Nix MD;  Location: [facility] ENDO;  Service: Endoscopy;  Laterality: N/A;    COLONOSCOPY N/A 10/30/2020    Procedure: COLONOSCOPY;  Surgeon: Herb Martinez MD;  Location: [facility] ENDO;  Service: Endoscopy;  Laterality: N/A;    EYE SURGERY Left 03/2017    cyst removal on eyelid; Dr. Broussard    SHOULDER SURGERY      TONSILLECTOMY         ALLERGIES AND MEDICATION:     Review of patient's allergies indicates:   Allergen Reactions    Sulfa (sulfonamide antibiotics) Rash        Medication List          Accurate as of May 11, 2022  2:53 PM. If you have any questions, ask your nurse or doctor.            CONTINUE taking these medications    * albuterol 90 mcg/actuation  inhaler  Commonly known as: VENTOLIN HFA  INHALE 2 PUFFS EVERY 4 HOURS AS NEEDED     * albuterol 1.25 mg/3 mL Nebu  Commonly known as: ACCUNEB  Take 3 mLs (1.25 mg total) by nebulization every 6 (six) hours as needed (SOB). Rescue     amLODIPine 10 MG tablet  Commonly known as: NORVASC  TAKE 1 TABLET BY MOUTH EVERY DAY     benzoyl peroxide 10 % external wash  Commonly known as: BP WASH  Use daily as wash to affected areas. Rinse completely.  May bleach clothing     betamethasone dipropionate 0.05 % ointment  Commonly known as: DIPROLENE  Apply topically 2 (two) times daily.     blood sugar diagnostic Strp  Commonly known as: TRUETEST TEST STRIPS  1 each by Misc.(Non-Drug; Combo Route) route 3 (three) times a week.     buPROPion 300 MG 24 hr tablet  Commonly known as: WELLBUTRIN XL     busPIRone 10 MG tablet  Commonly known as: BUSPAR     candesartan 4 MG tablet  Commonly known as: ATACAND  Take 1 tablet (4 mg total) by mouth once daily.     clindamycin 1 % Swab  Commonly known as: CLEOCIN T  Apply topically 2 (two) times daily.     CLOTRIMAZOLE-BETAMETH DIP-ZINC TOP     crisaborole 2 % Oint  Commonly known as: EUCRISA  Use for hand eczema bid.     cyclobenzaprine 10 MG tablet  Commonly known as: FLEXERIL  Take 1 tablet (10 mg total) by mouth 3 (three) times daily as needed for Muscle spasms.     doxycycline 100 MG capsule  Commonly known as: MONODOX  Take twice a day with food. May cause upset stomach     DUPIXENT SYRINGE 300 mg/2 mL Syrg  Generic drug: dupilumab  Inject 1 syringe (300mg) into the skin every other week     fluticasone furoate-vilanteroL 100-25 mcg/dose diskus inhaler  Commonly known as: BREO  Inhale 1 puff into the lungs once daily. Controller     fluticasone propionate 50 mcg/actuation nasal spray  Commonly known as: FLONASE  2 sprays (100 mcg total) by Each Nostril route once daily.     gabapentin 300 MG capsule  Commonly known as: NEURONTIN  Take 1 capsule (300 mg total) by mouth 2 (two) times  daily.     gemfibroziL 600 MG tablet  Commonly known as: LOPID  TAKE 1 TABLET BY MOUTH TWICE A DAY BEFORE MEALS     hydrocortisone 2.5 % ointment  Apply topically 2 (two) times daily. Apply twice daily to affected area on face.     hydrOXYzine 50 MG tablet  Commonly known as: ATARAX  TAKE 1 TABLET (50 MG TOTAL) BY MOUTH 3 (THREE) TIMES DAILY AS NEEDED FOR ITCHING.     levocetirizine 5 MG tablet  Commonly known as: XYZAL  TAKE 1 TABLET BY MOUTH EACH MORNING     meloxicam 15 MG tablet  Commonly known as: MOBIC  Take 1 tablet (15 mg total) by mouth once daily.     metFORMIN 1000 MG tablet  Commonly known as: GLUCOPHAGE  Take 1 tablet (1,000 mg total) by mouth 2 (two) times daily.     mometasone 0.1% 0.1 % cream  Commonly known as: ELOCON  Apply topically once daily. AAA bid prn for hand eczema     mupirocin 2 % ointment  Commonly known as: BACTROBAN  AAA bid with Q-tip. Antibiotic ointment.     neomycin-polymyxin-dexamethasone 3.5 mg/g-10,000 unit/g-0.1 % Oint  Commonly known as: DEXACINE     pantoprazole 40 MG tablet  Commonly known as: PROTONIX  Take 1 tablet (40 mg total) by mouth once daily.     QUEtiapine 300 MG Tab  Commonly known as: SEROQUEL     simvastatin 80 MG tablet  Commonly known as: ZOCOR  TAKE 1 TABLET BY MOUTH EVERY DAY     SPIRIVA WITH HANDIHALER 18 mcg inhalation capsule  Generic drug: tiotropium  INHALE 1 CAPSULE (18 MCG TOTAL) INTO THE LUNGS ONCE DAILY. CONTROLLER     sumatriptan 100 MG tablet  Commonly known as: IMITREX  TAKE 1 TABLET BY MOUTH EVERY 2 HOURS AS NEEDED FOR MIGRAINE     tamsulosin 0.4 mg Cap  Commonly known as: FLOMAX  TAKE 2 CAPSULES BY MOUTH EVERY DAY     * tiZANidine 4 MG tablet  Commonly known as: ZANAFLEX  TAKE 1 TABLET BY MOUTH EVERY 8 HOURS.     * tiZANidine 4 MG tablet  Commonly known as: ZANAFLEX  Take 1 tablet (4 mg total) by mouth every 8 (eight) hours.     triamcinolone acetonide 0.1% 0.1 % paste  Commonly known as: KENALOG     vancomycin 1,000 mg injection  Commonly  known as: VANCOCIN     zonisamide 50 MG Cap  Commonly known as: ZONEGRAN  TAKE 1 CAPSULE BY MOUTH TWICE A DAY         * This list has 4 medication(s) that are the same as other medications prescribed for you. Read the directions carefully, and ask your doctor or other care provider to review them with you.                SOCIAL HISTORY:     Social History     Socioeconomic History    Marital status:    Tobacco Use    Smoking status: Former Smoker     Packs/day: 2.00     Years: 15.00     Pack years: 30.00     Types: Cigarettes     Quit date: 1984     Years since quittin.0    Smokeless tobacco: Never Used    Tobacco comment: Patient quit smoking at the age of 26 yo.   Substance and Sexual Activity    Alcohol use: No    Drug use: No    Sexual activity: Yes     Partners: Female       FAMILY HISTORY:     Family History   Problem Relation Age of Onset    Cataracts Mother     Cancer Mother         throat    Hypertension Mother     Hypertension Father     Stroke Father         2 strokes    Hypertension Sister     Cancer Brother         spinal, kidney, spinal fluid    Hypertension Brother     Cancer Cousin         breast?       REVIEW OF SYSTEMS:   Review of Systems   Constitutional: Negative for chills, diaphoresis, fever, malaise/fatigue and weight loss.   Eyes: Negative for blurred vision and pain.   Respiratory: Negative for sputum production, shortness of breath and wheezing.    Cardiovascular: Positive for chest pain. Negative for palpitations, orthopnea, claudication, leg swelling and PND.   Gastrointestinal: Negative for abdominal pain, heartburn, nausea and vomiting.   Musculoskeletal: Negative for back pain, falls, joint pain, myalgias and neck pain.   Neurological: Negative for dizziness, speech change, focal weakness, loss of consciousness, weakness and headaches.   Endo/Heme/Allergies: Does not bruise/bleed easily.   Psychiatric/Behavioral: Negative for depression, memory loss  "and substance abuse. The patient is not nervous/anxious.        PHYSICAL EXAM:     Vitals:    05/11/22 1423   BP: (!) 154/88   Pulse: 92   Resp: 15    Body mass index is 36.43 kg/m².  Weight: 111.9 kg (246 lb 11.1 oz)   Height: 5' 9" (175.3 cm)     Physical Exam  Vitals reviewed.   Constitutional:       General: He is not in acute distress.     Appearance: He is well-developed. He is obese. He is not diaphoretic.   Neck:      Vascular: No carotid bruit or JVD.   Cardiovascular:      Rate and Rhythm: Normal rate and regular rhythm.      Pulses: Normal pulses.      Heart sounds: Normal heart sounds.   Pulmonary:      Effort: Pulmonary effort is normal.      Breath sounds: Normal breath sounds.   Abdominal:      General: Bowel sounds are normal.      Palpations: Abdomen is soft.      Tenderness: There is no abdominal tenderness.   Musculoskeletal:      Right lower leg: No edema.      Left lower leg: No edema.   Neurological:      Mental Status: He is alert and oriented to person, place, and time.   Psychiatric:         Speech: Speech normal.         Behavior: Behavior normal.         DATA:   EKG: (personally reviewed tracing)  05/09/2022-normal sinus rhythm, old inferior infarction, decreased anterior forces  Laboratory:  CBC:  Recent Labs   Lab 08/18/21  0940 03/28/22  1449 05/09/22  1535   WBC 8.95 15.54 H 8.35   Hemoglobin 12.0 L 13.1 L 11.7 L   Hematocrit 36.5 L 40.2 36.7 L   Platelets 302 383 288       CHEMISTRIES:  Recent Labs   Lab 08/18/21  0940 03/28/22  1449 05/09/22  1535   Glucose 115 H 159 H 136 H   Sodium 138 143 141   Potassium 3.9 4.3 4.2   BUN 17 12 22   Creatinine 1.0 1.0 1.1   eGFR if African American >60.0 >60.0 >60   eGFR if non African American >60.0 >60.0 >60   Calcium 10.6 H 10.3 9.9   Magnesium  --   --  1.7       CARDIAC BIOMARKERS:  Recent Labs   Lab 02/09/20  0100 02/09/20  0648 05/09/22  1535   Troponin I <0.006 <0.006 <0.006       COAGS:        LIPIDS/LFTS:  Recent Labs   Lab " 20  0855 20  1043 21  0940 10/20/21  0720 22  1449 22  1535   Cholesterol 123  --   --  97 L  --   --    Triglycerides 161 H  --   --  97  --   --    HDL 31 L  --   --  34 L  --   --    LDL Cholesterol 59.8 L  --   --  43.6 L  --   --    Non-HDL Cholesterol 92  --   --  63  --   --    AST 24   < > 16  --  17 13   ALT 29   < > 30  --  27 27    < > = values in this interval not displayed.       Cardiovascular Testing:      ASSESSMENT:     1. Chest pain  2. Abnormal EKG  3. Hypertension  4. Hyperlipidemia:  LDL 43  5. Diabetes  6. Obesity  7. Former smoker    PLAN:     1. Chest pain/abnormal EKD echocardiogram to assess structure and function.  Nuclear myocardial perfusion study for ischemic evaluation.  2. Hypertension:  Continue current management.  Monitor.  3. Hyperlipidemia:  Continue current management.  4. Return to clinic 1 month.            Vince Stallings MD, MPH, FACC, [facility]

## 2022-05-24 DIAGNOSIS — N39.43 BENIGN PROSTATIC HYPERPLASIA WITH POST-VOID DRIBBLING: ICD-10-CM

## 2022-05-24 DIAGNOSIS — N40.1 BENIGN PROSTATIC HYPERPLASIA WITH POST-VOID DRIBBLING: ICD-10-CM

## 2022-05-24 NOTE — TELEPHONE ENCOUNTER
No new care gaps identified.  [facility] Embedded Care Gaps. Reference number: [id number]. 5/24/2022   12:36:20 AM CDT

## 2022-05-24 NOTE — TELEPHONE ENCOUNTER
Refill Routing Note   Medication(s) are not appropriate for processing by [facility] for the following reason(s):      - Patient has been seen in the ED/Hospital since the last PCP visit    [facility] action(s):  Route          Medication reconciliation completed: No     Appointments  past 12m or future 3m with PCP    Date Provider   Last Visit   4/27/2022 Kaylie Villalta MD   Next Visit   10/31/2022 Kaylie Villalta MD   ED visits in past 90 days: 1        Note composed:5:54 PM 05/24/2022

## 2022-05-25 DIAGNOSIS — E11.9 DIABETES MELLITUS WITHOUT COMPLICATION: ICD-10-CM

## 2022-05-25 RX ORDER — METFORMIN HYDROCHLORIDE 1000 MG/1
TABLET ORAL
Qty: 180 TABLET | Refills: 0 | Status: SHIPPED | OUTPATIENT
Start: 2022-05-25 | End: 2022-06-09

## 2022-05-25 RX ORDER — TAMSULOSIN HYDROCHLORIDE 0.4 MG/1
CAPSULE ORAL
Qty: 180 CAPSULE | Refills: 1 | Status: SHIPPED | OUTPATIENT
Start: 2022-05-25 | End: 2022-11-04

## 2022-05-25 NOTE — TELEPHONE ENCOUNTER
Refill Routing Note   Medication(s) are not appropriate for processing by [facility] for the following reason(s):      - Patient has been seen in the ED/Hospital since the last PCP visit    [facility] action(s):  Defer          Medication reconciliation completed: No     Appointments  past 12m or future 3m with PCP    Date Provider   Last Visit   4/27/2022 Kaylie Villalta MD   Next Visit   10/31/2022 Kaylie Villalta MD   ED visits in past 90 days: 1        Note composed:12:34 PM 05/25/2022

## 2022-05-25 NOTE — TELEPHONE ENCOUNTER
No new care gaps identified.  [facility] Embedded Care Gaps. Reference number: [id number]. 5/25/2022   12:34:45 AM CDT

## 2022-06-01 ENCOUNTER — PATIENT MESSAGE (OUTPATIENT)
Dept: ADMINISTRATIVE | Facility: HOSPITAL | Age: 62
End: 2022-06-01
Payer: MEDICARE

## 2022-06-01 ENCOUNTER — HOSPITAL ENCOUNTER (OUTPATIENT)
Dept: RADIOLOGY | Facility: HOSPITAL | Age: 62
Discharge: HOME | End: 2022-06-01
Attending: INTERNAL MEDICINE
Payer: MEDICARE

## 2022-06-01 ENCOUNTER — HOSPITAL ENCOUNTER (OUTPATIENT)
Dept: CARDIOLOGY | Facility: HOSPITAL | Age: 62
Discharge: HOME | End: 2022-06-01
Attending: INTERNAL MEDICINE
Payer: MEDICARE

## 2022-06-01 DIAGNOSIS — I70.0 ATHEROSCLEROSIS OF AORTA: ICD-10-CM

## 2022-06-01 DIAGNOSIS — I10 ESSENTIAL HYPERTENSION: ICD-10-CM

## 2022-06-01 DIAGNOSIS — E11.69 HYPERLIPIDEMIA ASSOCIATED WITH TYPE 2 DIABETES MELLITUS: ICD-10-CM

## 2022-06-01 DIAGNOSIS — E11.9 TYPE 2 DIABETES MELLITUS WITHOUT COMPLICATION, WITHOUT LONG-TERM CURRENT USE OF INSULIN: ICD-10-CM

## 2022-06-01 DIAGNOSIS — E78.5 HYPERLIPIDEMIA ASSOCIATED WITH TYPE 2 DIABETES MELLITUS: ICD-10-CM

## 2022-06-01 DIAGNOSIS — R07.89 OTHER CHEST PAIN: ICD-10-CM

## 2022-06-01 DIAGNOSIS — R94.31 NONSPECIFIC ABNORMAL ELECTROCARDIOGRAM (ECG) (EKG): ICD-10-CM

## 2022-06-01 LAB
AV INDEX (PROSTH): 0.98
AV MEAN GRADIENT: 3 MMHG
AV PEAK GRADIENT: 6 MMHG
AV VALVE AREA: 3.84 CM2
AV VELOCITY RATIO: 0.93
CV ECHO LV RWT: 0.52 CM
CV STRESS BASE HR: 80 BPM
DIASTOLIC BLOOD PRESSURE: 81 MMHG
DOP CALC AO PEAK VEL: 1.21 M/S
DOP CALC AO VTI: 23.03 CM
DOP CALC LVOT AREA: 3.9 CM2
DOP CALC LVOT DIAMETER: 2.23 CM
DOP CALC LVOT PEAK VEL: 1.12 M/S
DOP CALC LVOT STROKE VOLUME: 88.34 CM3
DOP CALCLVOT PEAK VEL VTI: 22.63 CM
E WAVE DECELERATION TIME: 206.05 MSEC
E/A RATIO: 0.58
E/E' RATIO: 8.17 M/S
ECHO LV POSTERIOR WALL: 1.17 CM (ref 0.6–1.1)
EJECTION FRACTION: 65 %
FRACTIONAL SHORTENING: 31 % (ref 28–44)
INTERVENTRICULAR SEPTUM: 1.23 CM (ref 0.6–1.1)
IVRT: 80.74 MSEC
LA MAJOR: 5.32 CM
LA MINOR: 5.45 CM
LA WIDTH: 3.97 CM
LEFT ATRIUM SIZE: 3.56 CM
LEFT ATRIUM VOLUME: 64.68 CM3
LEFT INTERNAL DIMENSION IN SYSTOLE: 3.09 CM (ref 2.1–4)
LEFT VENTRICLE DIASTOLIC VOLUME: 92.16 ML
LEFT VENTRICLE SYSTOLIC VOLUME: 37.67 ML
LEFT VENTRICULAR INTERNAL DIMENSION IN DIASTOLE: 4.49 CM (ref 3.5–6)
LEFT VENTRICULAR MASS: 197.42 G
LV LATERAL E/E' RATIO: 7 M/S
LV SEPTAL E/E' RATIO: 9.8 M/S
MV PEAK A VEL: 0.85 M/S
MV PEAK E VEL: 0.49 M/S
NUC REST EJECTION FRACTION: 73
OHS CV CPX 85 PERCENT MAX PREDICTED HEART RATE MALE: 134
OHS CV CPX MAX PREDICTED HEART RATE: 158
OHS CV CPX PATIENT IS FEMALE: 0
OHS CV CPX PATIENT IS MALE: 1
OHS CV CPX PEAK DIASTOLIC BLOOD PRESSURE: 63 MMHG
OHS CV CPX PEAK HEAR RATE: 94 BPM
OHS CV CPX PEAK RATE PRESSURE PRODUCT: NORMAL
OHS CV CPX PEAK SYSTOLIC BLOOD PRESSURE: 114 MMHG
OHS CV CPX PERCENT MAX PREDICTED HEART RATE ACHIEVED: 59
OHS CV CPX RATE PRESSURE PRODUCT PRESENTING: NORMAL
PISA TR MAX VEL: 1.93 M/S
PV PEAK VELOCITY: 1.03 CM/S
RA MAJOR: 4.67 CM
RA PRESSURE: 3 MMHG
RA WIDTH: 3.21 CM
RIGHT VENTRICULAR END-DIASTOLIC DIMENSION: 3.77 CM
RV TISSUE DOPPLER FREE WALL SYSTOLIC VELOCITY 1 (APICAL 4 CHAMBER VIEW): 16.96 CM/S
SINUS: 3.92 CM
STJ: 2.83 CM
SYSTOLIC BLOOD PRESSURE: 136 MMHG
TDI LATERAL: 0.07 M/S
TDI SEPTAL: 0.05 M/S
TDI: 0.06 M/S
TR MAX PG: 15 MMHG
TRICUSPID ANNULAR PLANE SYSTOLIC EXCURSION: 2.14 CM
TV REST PULMONARY ARTERY PRESSURE: 18 MMHG

## 2022-06-01 PROCEDURE — 93018 STRESS TEST WITH MYOCARDIAL PERFUSION (CUPID ONLY): ICD-10-PCS | Mod: ,,, | Performed by: INTERNAL MEDICINE

## 2022-06-01 PROCEDURE — 93306 ECHO (CUPID ONLY): ICD-10-PCS | Mod: 26,,, | Performed by: INTERNAL MEDICINE

## 2022-06-01 PROCEDURE — 93306 TTE W/DOPPLER COMPLETE: CPT | Mod: 26,,, | Performed by: INTERNAL MEDICINE

## 2022-06-01 PROCEDURE — 93306 TTE W/DOPPLER COMPLETE: CPT

## 2022-06-01 PROCEDURE — 63600175 PHARM REV CODE 636 W HCPCS: Performed by: INTERNAL MEDICINE

## 2022-06-01 PROCEDURE — A9502 TC99M TETROFOSMIN: HCPCS

## 2022-06-01 PROCEDURE — 78452 HT MUSCLE IMAGE SPECT MULT: CPT | Mod: 26,,, | Performed by: INTERNAL MEDICINE

## 2022-06-01 PROCEDURE — 93018 CV STRESS TEST I&R ONLY: CPT | Mod: ,,, | Performed by: INTERNAL MEDICINE

## 2022-06-01 PROCEDURE — 93016 CV STRESS TEST SUPVJ ONLY: CPT | Mod: ,,, | Performed by: INTERNAL MEDICINE

## 2022-06-01 PROCEDURE — 78452 HT MUSCLE IMAGE SPECT MULT: CPT

## 2022-06-01 PROCEDURE — 93016 STRESS TEST WITH MYOCARDIAL PERFUSION (CUPID ONLY): ICD-10-PCS | Mod: ,,, | Performed by: INTERNAL MEDICINE

## 2022-06-01 PROCEDURE — 78452 STRESS TEST WITH MYOCARDIAL PERFUSION (CUPID ONLY): ICD-10-PCS | Mod: 26,,, | Performed by: INTERNAL MEDICINE

## 2022-06-01 RX ORDER — REGADENOSON 0.08 MG/ML
0.4 INJECTION, SOLUTION INTRAVENOUS ONCE
Status: COMPLETED | OUTPATIENT
Start: 2022-06-01 | End: 2022-06-01

## 2022-06-01 RX ADMIN — REGADENOSON 0.4 MG: 0.08 INJECTION, SOLUTION INTRAVENOUS at 09:06

## 2022-06-02 LAB
LEFT EYE DM RETINOPATHY: NEGATIVE
RIGHT EYE DM RETINOPATHY: NEGATIVE

## 2022-06-07 DIAGNOSIS — Z12.11 COLON CANCER SCREENING: Primary | ICD-10-CM

## 2022-06-09 ENCOUNTER — PATIENT OUTREACH (OUTPATIENT)
Dept: ADMINISTRATIVE | Facility: HOSPITAL | Age: 62
End: 2022-06-09
Payer: MEDICARE

## 2022-06-13 ENCOUNTER — OFFICE VISIT (OUTPATIENT)
Dept: CARDIOLOGY | Facility: CLINIC | Age: 62
End: 2022-06-13
Payer: MEDICARE

## 2022-06-13 VITALS
HEIGHT: 69 IN | DIASTOLIC BLOOD PRESSURE: 78 MMHG | HEART RATE: 90 BPM | RESPIRATION RATE: 18 BRPM | OXYGEN SATURATION: 95 % | BODY MASS INDEX: 36.39 KG/M2 | WEIGHT: 245.69 LBS | SYSTOLIC BLOOD PRESSURE: 135 MMHG

## 2022-06-13 DIAGNOSIS — E11.9 TYPE 2 DIABETES MELLITUS WITHOUT COMPLICATION, WITHOUT LONG-TERM CURRENT USE OF INSULIN: ICD-10-CM

## 2022-06-13 DIAGNOSIS — R94.31 NONSPECIFIC ABNORMAL ELECTROCARDIOGRAM (ECG) (EKG): ICD-10-CM

## 2022-06-13 DIAGNOSIS — E78.5 HYPERLIPIDEMIA ASSOCIATED WITH TYPE 2 DIABETES MELLITUS: ICD-10-CM

## 2022-06-13 DIAGNOSIS — Z12.11 COLON CANCER SCREENING: Primary | ICD-10-CM

## 2022-06-13 DIAGNOSIS — I70.0 ATHEROSCLEROSIS OF AORTA: ICD-10-CM

## 2022-06-13 DIAGNOSIS — E11.69 HYPERLIPIDEMIA ASSOCIATED WITH TYPE 2 DIABETES MELLITUS: ICD-10-CM

## 2022-06-13 DIAGNOSIS — R07.89 OTHER CHEST PAIN: Primary | ICD-10-CM

## 2022-06-13 DIAGNOSIS — J44.9 CHRONIC OBSTRUCTIVE PULMONARY DISEASE, UNSPECIFIED COPD TYPE: ICD-10-CM

## 2022-06-13 DIAGNOSIS — I10 ESSENTIAL HYPERTENSION: ICD-10-CM

## 2022-06-13 DIAGNOSIS — G43.001 MIGRAINE WITHOUT AURA AND WITH STATUS MIGRAINOSUS, NOT INTRACTABLE: ICD-10-CM

## 2022-06-13 DIAGNOSIS — E66.01 CLASS 2 SEVERE OBESITY DUE TO EXCESS CALORIES WITH SERIOUS COMORBIDITY AND BODY MASS INDEX (BMI) OF 36.0 TO 36.9 IN ADULT: ICD-10-CM

## 2022-06-13 PROCEDURE — 99999 PR PBB SHADOW E&M-EST. PATIENT-LVL V: CPT | Mod: PBBFAC,,, | Performed by: INTERNAL MEDICINE

## 2022-06-13 PROCEDURE — 3075F SYST BP GE 130 - 139MM HG: CPT | Mod: CPTII,S$GLB,, | Performed by: INTERNAL MEDICINE

## 2022-06-13 PROCEDURE — 3078F PR MOST RECENT DIASTOLIC BLOOD PRESSURE < 80 MM HG: ICD-10-PCS | Mod: CPTII,S$GLB,, | Performed by: INTERNAL MEDICINE

## 2022-06-13 PROCEDURE — 3008F PR BODY MASS INDEX (BMI) DOCUMENTED: ICD-10-PCS | Mod: CPTII,S$GLB,, | Performed by: INTERNAL MEDICINE

## 2022-06-13 PROCEDURE — 3078F DIAST BP <80 MM HG: CPT | Mod: CPTII,S$GLB,, | Performed by: INTERNAL MEDICINE

## 2022-06-13 PROCEDURE — 1159F PR MEDICATION LIST DOCUMENTED IN MEDICAL RECORD: ICD-10-PCS | Mod: CPTII,S$GLB,, | Performed by: INTERNAL MEDICINE

## 2022-06-13 PROCEDURE — 3044F PR MOST RECENT HEMOGLOBIN A1C LEVEL <7.0%: ICD-10-PCS | Mod: CPTII,S$GLB,, | Performed by: INTERNAL MEDICINE

## 2022-06-13 PROCEDURE — 4010F ACE/ARB THERAPY RXD/TAKEN: CPT | Mod: CPTII,S$GLB,, | Performed by: INTERNAL MEDICINE

## 2022-06-13 PROCEDURE — 3008F BODY MASS INDEX DOCD: CPT | Mod: CPTII,S$GLB,, | Performed by: INTERNAL MEDICINE

## 2022-06-13 PROCEDURE — 4010F PR ACE/ARB THEARPY RXD/TAKEN: ICD-10-PCS | Mod: CPTII,S$GLB,, | Performed by: INTERNAL MEDICINE

## 2022-06-13 PROCEDURE — 99214 OFFICE O/P EST MOD 30 MIN: CPT | Mod: S$GLB,,, | Performed by: INTERNAL MEDICINE

## 2022-06-13 PROCEDURE — 3044F HG A1C LEVEL LT 7.0%: CPT | Mod: CPTII,S$GLB,, | Performed by: INTERNAL MEDICINE

## 2022-06-13 PROCEDURE — 1159F MED LIST DOCD IN RCRD: CPT | Mod: CPTII,S$GLB,, | Performed by: INTERNAL MEDICINE

## 2022-06-13 PROCEDURE — 3075F PR MOST RECENT SYSTOLIC BLOOD PRESS GE 130-139MM HG: ICD-10-PCS | Mod: CPTII,S$GLB,, | Performed by: INTERNAL MEDICINE

## 2022-06-13 PROCEDURE — 99214 PR OFFICE/OUTPT VISIT, EST, LEVL IV, 30-39 MIN: ICD-10-PCS | Mod: S$GLB,,, | Performed by: INTERNAL MEDICINE

## 2022-06-13 PROCEDURE — 99999 PR PBB SHADOW E&M-EST. PATIENT-LVL V: ICD-10-PCS | Mod: PBBFAC,,, | Performed by: INTERNAL MEDICINE

## 2022-06-13 NOTE — PROGRESS NOTES
CARDIOLOGY CLINIC VISIT        HISTORY OF PRESENT ILLNESS:     Scott Bansal presents for continued care. Seen 5/11/22 for evaluation of chest pain.  Symptoms over the past few weeks.  Two - 3 times a day.  Left-sided, nonradiating.  Can occur at rest or with exertion.  Last roughly 20 seconds.  Denies any prior episodes.  Went to the emergency room the other day.  EKG showed sinus rhythm.  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  Decreased anterior forces.  Similar to EKG from February 2020. Former smoker.  COPD.  Cardiovascular risk factors male, hypertension, hyperlipidemia (LDL 43) and diabetes.    06/13/2022:  Nuclear stress was negative for ischemia.  Echocardiogram showed normal left ventricular systolic function with estimated ejection fraction of 65%.  Mild concentric hypertrophy with grade 1 diastolic dysfunction.  Mild biatrial enlargement.  No pulmonary hypertension.  He has had no recurrent chest discomfort.  Continues to suffer with daily migraines.    CARDIOVASCULAR HISTORY:     None    PAST MEDICAL HISTORY:     Past Medical History:   Diagnosis Date    Bipolar 1 disorder, mixed     BPH (benign prostatic hypertrophy)     Colon polyp     Cyst, eyelid     Dr. Broussard    Diabetes mellitus     GERD (gastroesophageal reflux disease)     Hyperlipidemia     Hypertension     Lumbar degenerative disc disease 3/7/2019    Migraine headache     Obesity        PAST SURGICAL HISTORY:     Past Surgical History:   Procedure Laterality Date    CERVICAL SPINE SURGERY      COLONOSCOPY N/A 7/27/2017    Procedure: COLONOSCOPY;  Surgeon: Genaro Nix MD;  Location: [facility] ENDO;  Service: Endoscopy;  Laterality: N/A;    COLONOSCOPY N/A 10/30/2020    Procedure: COLONOSCOPY;  Surgeon: Herb Martinez MD;  Location: [facility] ENDO;  Service: Endoscopy;  Laterality: N/A;    EYE SURGERY Left 03/2017    cyst removal on eyelid; Dr. Broussard    SHOULDER SURGERY      TONSILLECTOMY         ALLERGIES AND MEDICATION:     Review of  patient's allergies indicates:   Allergen Reactions    Sulfa (sulfonamide antibiotics) Rash        Medication List          Accurate as of June 13, 2022  2:34 PM. If you have any questions, ask your nurse or doctor.            CONTINUE taking these medications    * albuterol 90 mcg/actuation inhaler  Commonly known as: VENTOLIN HFA  INHALE 2 PUFFS EVERY 4 HOURS AS NEEDED     * albuterol 1.25 mg/3 mL Nebu  Commonly known as: ACCUNEB  Take 3 mLs (1.25 mg total) by nebulization every 6 (six) hours as needed (SOB). Rescue     amLODIPine 10 MG tablet  Commonly known as: NORVASC  TAKE 1 TABLET BY MOUTH EVERY DAY     benzoyl peroxide 10 % external wash  Commonly known as: BP WASH  Use daily as wash to affected areas. Rinse completely.  May bleach clothing     betamethasone dipropionate 0.05 % ointment  Commonly known as: DIPROLENE  Apply topically 2 (two) times daily.     blood sugar diagnostic Strp  Commonly known as: TRUETEST TEST STRIPS  1 each by Misc.(Non-Drug; Combo Route) route 3 (three) times a week.     buPROPion 300 MG 24 hr tablet  Commonly known as: WELLBUTRIN XL     busPIRone 10 MG tablet  Commonly known as: BUSPAR     candesartan 4 MG tablet  Commonly known as: ATACAND  Take 1 tablet (4 mg total) by mouth once daily.     clindamycin 1 % Swab  Commonly known as: CLEOCIN T  Apply topically 2 (two) times daily.     CLOTRIMAZOLE-BETAMETH DIP-ZINC TOP     crisaborole 2 % Oint  Commonly known as: EUCRISA  Use for hand eczema bid.     cyclobenzaprine 10 MG tablet  Commonly known as: FLEXERIL  TAKE 1 TABLET BY MOUTH THREE TIMES A DAY AS NEEDED FOR MUSCLE SPASMS     doxycycline 100 MG capsule  Commonly known as: MONODOX  Take twice a day with food. May cause upset stomach     DUPIXENT SYRINGE 300 mg/2 mL Syrg  Generic drug: dupilumab  Inject 1 syringe (300mg) into the skin every other week     fluticasone furoate-vilanteroL 100-25 mcg/dose diskus inhaler  Commonly known as: BREO  Inhale 1 puff into the lungs once  daily. Controller     fluticasone propionate 50 mcg/actuation nasal spray  Commonly known as: FLONASE  2 sprays (100 mcg total) by Each Nostril route once daily.     gabapentin 300 MG capsule  Commonly known as: NEURONTIN  Take 1 capsule (300 mg total) by mouth 2 (two) times daily.     gemfibroziL 600 MG tablet  Commonly known as: LOPID  TAKE 1 TABLET BY MOUTH TWICE A DAY BEFORE MEALS     hydrocortisone 2.5 % ointment  Apply topically 2 (two) times daily. Apply twice daily to affected area on face.     hydrOXYzine 50 MG tablet  Commonly known as: ATARAX  TAKE 1 TABLET (50 MG TOTAL) BY MOUTH 3 (THREE) TIMES DAILY AS NEEDED FOR ITCHING.     levocetirizine 5 MG tablet  Commonly known as: XYZAL  TAKE 1 TABLET BY MOUTH EACH MORNING     meloxicam 15 MG tablet  Commonly known as: MOBIC  Take 1 tablet (15 mg total) by mouth once daily.     metFORMIN 1000 MG tablet  Commonly known as: GLUCOPHAGE  TAKE 1 TABLET BY MOUTH TWICE A DAY     mometasone 0.1% 0.1 % cream  Commonly known as: ELOCON  Apply topically once daily. AAA bid prn for hand eczema     mupirocin 2 % ointment  Commonly known as: BACTROBAN  AAA bid with Q-tip. Antibiotic ointment.     neomycin-polymyxin-dexamethasone 3.5 mg/g-10,000 unit/g-0.1 % Oint  Commonly known as: DEXACINE     pantoprazole 40 MG tablet  Commonly known as: PROTONIX  Take 1 tablet (40 mg total) by mouth once daily.     QUEtiapine 300 MG Tab  Commonly known as: SEROQUEL     sars-cov-2 (covid-19) 100 mcg/0.5 ml injection  Commonly known as: MODERNA COVID-19     simvastatin 80 MG tablet  Commonly known as: ZOCOR  TAKE 1 TABLET BY MOUTH EVERY DAY     SPIRIVA WITH HANDIHALER 18 mcg inhalation capsule  Generic drug: tiotropium  INHALE 1 CAPSULE (18 MCG TOTAL) INTO THE LUNGS ONCE DAILY. CONTROLLER     sumatriptan 100 MG tablet  Commonly known as: IMITREX  TAKE 1 TABLET BY MOUTH EVERY 2 HOURS AS NEEDED FOR MIGRAINE     tamsulosin 0.4 mg Cap  Commonly known as: FLOMAX  TAKE 2 CAPSULES BY MOUTH EVERY  DAY     * tiZANidine 4 MG tablet  Commonly known as: ZANAFLEX  TAKE 1 TABLET BY MOUTH EVERY 8 HOURS.     * tiZANidine 4 MG tablet  Commonly known as: ZANAFLEX  Take 1 tablet (4 mg total) by mouth every 8 (eight) hours.     triamcinolone acetonide 0.1% 0.1 % paste  Commonly known as: KENALOG     vancomycin 1,000 mg injection  Commonly known as: VANCOCIN     zonisamide 50 MG Cap  Commonly known as: ZONEGRAN  TAKE 1 CAPSULE BY MOUTH TWICE A DAY         * This list has 4 medication(s) that are the same as other medications prescribed for you. Read the directions carefully, and ask your doctor or other care provider to review them with you.                SOCIAL HISTORY:     Social History     Socioeconomic History    Marital status:    Tobacco Use    Smoking status: Former Smoker     Packs/day: 2.00     Years: 15.00     Pack years: 30.00     Types: Cigarettes     Quit date: 1984     Years since quittin.1    Smokeless tobacco: Never Used    Tobacco comment: Patient quit smoking at the age of 24 yo.   Substance and Sexual Activity    Alcohol use: No    Drug use: No    Sexual activity: Yes     Partners: Female       FAMILY HISTORY:     Family History   Problem Relation Age of Onset    Cataracts Mother     Cancer Mother         throat    Hypertension Mother     Hypertension Father     Stroke Father         2 strokes    Hypertension Sister     Cancer Brother         spinal, kidney, spinal fluid    Hypertension Brother     Cancer Cousin         breast?       REVIEW OF SYSTEMS:   Review of Systems   Constitutional: Negative for chills, diaphoresis, fever, malaise/fatigue and weight loss.   Eyes: Negative for blurred vision and pain.   Respiratory: Negative for sputum production, shortness of breath and wheezing.    Cardiovascular: Negative for chest pain, palpitations, orthopnea, claudication, leg swelling and PND.   Gastrointestinal: Negative for abdominal pain, heartburn, nausea and  "vomiting.   Musculoskeletal: Negative for back pain, falls, joint pain, myalgias and neck pain.   Neurological: Positive for headaches. Negative for dizziness, speech change, focal weakness, loss of consciousness and weakness.   Endo/Heme/Allergies: Does not bruise/bleed easily.   Psychiatric/Behavioral: Negative for depression, memory loss and substance abuse. The patient is not nervous/anxious.        PHYSICAL EXAM:     Vitals:    06/13/22 1409   BP: 135/78   Pulse: 90   Resp: 18    Body mass index is 36.28 kg/m².  Weight: 111.4 kg (245 lb 11.2 oz)   Height: 5' 9" (175.3 cm)     Physical Exam  Vitals reviewed.   Constitutional:       General: He is not in acute distress.     Appearance: He is well-developed. He is obese. He is not diaphoretic.   Neck:      Vascular: No carotid bruit or JVD.   Cardiovascular:      Rate and Rhythm: Normal rate and regular rhythm.      Pulses: Normal pulses.      Heart sounds: Normal heart sounds.   Pulmonary:      Effort: Pulmonary effort is normal.      Breath sounds: Normal breath sounds.   Abdominal:      General: Bowel sounds are normal.      Palpations: Abdomen is soft.      Tenderness: There is no abdominal tenderness.   Musculoskeletal:      Right lower leg: No edema.      Left lower leg: No edema.   Neurological:      Mental Status: He is alert and oriented to person, place, and time.   Psychiatric:         Speech: Speech normal.         Behavior: Behavior normal.         DATA:   EKG: (personally reviewed tracing)    05/09/2022-normal sinus rhythm, old inferior infarction, decreased anterior forces    Laboratory:  CBC:  Recent Labs   Lab 08/18/21  0940 03/28/22  1449 05/09/22  1535   WBC 8.95 15.54 H 8.35   Hemoglobin 12.0 L 13.1 L 11.7 L   Hematocrit 36.5 L 40.2 36.7 L   Platelets 302 383 288       CHEMISTRIES:  Recent Labs   Lab 08/18/21  0940 03/28/22  1449 05/09/22  1535   Glucose 115 H 159 H 136 H   Sodium 138 143 141   Potassium 3.9 4.3 4.2   BUN 17 12 22   Creatinine " 1.0 1.0 1.1   eGFR if African American >60.0 >60.0 >60   eGFR if non African American >60.0 >60.0 >60   Calcium 10.6 H 10.3 9.9   Magnesium  --   --  1.7       CARDIAC BIOMARKERS:  Recent Labs   Lab 20  0100 20  0648 22  1535   Troponin I <0.006 <0.006 <0.006       COAGS:        LIPIDS/LFTS:  Recent Labs   Lab 20  0855 20  1043 21  0940 10/20/21  0720 22  1449 22  1535   Cholesterol 123  --   --  97 L  --   --    Triglycerides 161 H  --   --  97  --   --    HDL 31 L  --   --  34 L  --   --    LDL Cholesterol 59.8 L  --   --  43.6 L  --   --    Non-HDL Cholesterol 92  --   --  63  --   --    AST 24   < > 16  --  17 13   ALT 29   < > 30  --  27 27    < > = values in this interval not displayed.       Cardiovascular Testing:    Nuclear stress 2022:      Normal myocardial perfusion scan. There is no evidence of myocardial ischemia or infarction.    The gated perfusion images showed an ejection fraction of 73% at rest.    There is normal wall motion at rest and post stress.    The EKG portion of this study is negative for ischemia.    The patient reported no chest pain during the stress test.    There were no arrhythmias during stress.    Echocardiogram 2022:    · The left ventricle is normal in size with mild concentric hypertrophy and normal systolic function.  · The estimated ejection fraction is 65%.  · Normal right ventricular size with normal right ventricular systolic function.  · Grade I left ventricular diastolic dysfunction.  · Mild left atrial enlargement.  · Mild right atrial enlargement.  · Normal central venous pressure (3 mmHg).  · The estimated PA systolic pressure is 18 mmHg.        ASSESSMENT:     1. Chest pain  2. Abnormal EKG  3. Hypertension  4. Hyperlipidemia:  LDL 43  5. Diabetes  6. Obesity  7. Former smoker  8. Migraine headaches    PLAN:     1. Chest pain/abnormal EKD echocardiogram showed normal function.  Nuclear stress  negative for ischemia.  2. Hypertension:  Continue current management.  Monitor.  3. Hyperlipidemia:  Continue current management.  4. Migraine headaches:  Neurology referral  5. Return to clinic 6 months.           Vince Stallings MD, MPH, FACC, [facility]

## 2022-06-15 DIAGNOSIS — G44.229 CHRONIC TENSION-TYPE HEADACHE, NOT INTRACTABLE: ICD-10-CM

## 2022-06-15 DIAGNOSIS — I10 ESSENTIAL HYPERTENSION: ICD-10-CM

## 2022-06-15 RX ORDER — ZONISAMIDE 50 MG/1
CAPSULE ORAL
Qty: 180 CAPSULE | Refills: 0 | Status: SHIPPED | OUTPATIENT
Start: 2022-06-15 | End: 2022-06-23 | Stop reason: ALTCHOICE

## 2022-06-15 RX ORDER — CANDESARTAN 4 MG/1
TABLET ORAL
Qty: 90 TABLET | Refills: 0 | Status: SHIPPED | OUTPATIENT
Start: 2022-06-15 | End: 2022-09-14

## 2022-06-15 NOTE — TELEPHONE ENCOUNTER
No new care gaps identified.  [facility] Embedded Care Gaps. Reference number: [id number]. 6/15/2022   9:10:42 AM ANANDT

## 2022-06-23 ENCOUNTER — OFFICE VISIT (OUTPATIENT)
Dept: NEUROLOGY | Facility: CLINIC | Age: 62
End: 2022-06-23
Payer: MEDICARE

## 2022-06-23 VITALS
HEART RATE: 83 BPM | HEIGHT: 69 IN | DIASTOLIC BLOOD PRESSURE: 76 MMHG | BODY MASS INDEX: 36.67 KG/M2 | WEIGHT: 247.56 LBS | SYSTOLIC BLOOD PRESSURE: 124 MMHG

## 2022-06-23 DIAGNOSIS — G44.229 CHRONIC TENSION-TYPE HEADACHE, NOT INTRACTABLE: Primary | ICD-10-CM

## 2022-06-23 DIAGNOSIS — R51.9 CHRONIC DAILY HEADACHE: ICD-10-CM

## 2022-06-23 PROCEDURE — 3078F PR MOST RECENT DIASTOLIC BLOOD PRESSURE < 80 MM HG: ICD-10-PCS | Mod: CPTII,S$GLB,,

## 2022-06-23 PROCEDURE — 3078F DIAST BP <80 MM HG: CPT | Mod: CPTII,S$GLB,,

## 2022-06-23 PROCEDURE — 1159F PR MEDICATION LIST DOCUMENTED IN MEDICAL RECORD: ICD-10-PCS | Mod: CPTII,S$GLB,,

## 2022-06-23 PROCEDURE — 3074F SYST BP LT 130 MM HG: CPT | Mod: CPTII,S$GLB,,

## 2022-06-23 PROCEDURE — 3044F HG A1C LEVEL LT 7.0%: CPT | Mod: CPTII,S$GLB,,

## 2022-06-23 PROCEDURE — 1159F MED LIST DOCD IN RCRD: CPT | Mod: CPTII,S$GLB,,

## 2022-06-23 PROCEDURE — 1160F RVW MEDS BY RX/DR IN RCRD: CPT | Mod: CPTII,S$GLB,,

## 2022-06-23 PROCEDURE — 4010F PR ACE/ARB THEARPY RXD/TAKEN: ICD-10-PCS | Mod: CPTII,S$GLB,,

## 2022-06-23 PROCEDURE — 3008F BODY MASS INDEX DOCD: CPT | Mod: CPTII,S$GLB,,

## 2022-06-23 PROCEDURE — 3044F PR MOST RECENT HEMOGLOBIN A1C LEVEL <7.0%: ICD-10-PCS | Mod: CPTII,S$GLB,,

## 2022-06-23 PROCEDURE — 99204 OFFICE O/P NEW MOD 45 MIN: CPT | Mod: FS,S$GLB,,

## 2022-06-23 PROCEDURE — 3008F PR BODY MASS INDEX (BMI) DOCUMENTED: ICD-10-PCS | Mod: CPTII,S$GLB,,

## 2022-06-23 PROCEDURE — 99204 PR OFFICE/OUTPT VISIT, NEW, LEVL IV, 45-59 MIN: ICD-10-PCS | Mod: FS,S$GLB,,

## 2022-06-23 PROCEDURE — 99999 PR PBB SHADOW E&M-EST. PATIENT-LVL V: CPT | Mod: PBBFAC,,,

## 2022-06-23 PROCEDURE — 1160F PR REVIEW ALL MEDS BY PRESCRIBER/CLIN PHARMACIST DOCUMENTED: ICD-10-PCS | Mod: CPTII,S$GLB,,

## 2022-06-23 PROCEDURE — 3074F PR MOST RECENT SYSTOLIC BLOOD PRESSURE < 130 MM HG: ICD-10-PCS | Mod: CPTII,S$GLB,,

## 2022-06-23 PROCEDURE — 4010F ACE/ARB THERAPY RXD/TAKEN: CPT | Mod: CPTII,S$GLB,,

## 2022-06-23 PROCEDURE — 99999 PR PBB SHADOW E&M-EST. PATIENT-LVL V: ICD-10-PCS | Mod: PBBFAC,,,

## 2022-06-23 RX ORDER — AMITRIPTYLINE HYDROCHLORIDE 25 MG/1
25 TABLET, FILM COATED ORAL NIGHTLY
Qty: 30 TABLET | Refills: 11 | Status: SHIPPED | OUTPATIENT
Start: 2022-06-23 | End: 2023-06-19 | Stop reason: SDUPTHER

## 2022-06-23 NOTE — PROGRESS NOTES
Subjective:       Patient ID: Scott Bansal is a 61 y.o. male.    Chief Complaint:  Migraine      History of Present Illness  61 year old male who presents for evaluation of daily headaches. Past medical history below. He has a significant history of migraines. He saw my colleague Dr. Ledbetter in the past for management. He now states his headaches are daily. He has been on Zonegran and Imitrex and feel that they are not working for him anymore. He describes his headaches as a pounding 8-10/10 pain that is location around his eyebrow, top of his head and above his ears. He notes that the headaches occur at least once a day and can last 5 minutes - 1 hour in duration. He feels the headache is worse with position changes. Staying in one spot such as sitting in a chair or laying in bed mildly relieves the pain. Headaches are occasionally associated with lightheadedness. He recently saw cardiology for chest pain.  He had a nuclear stress was negative for ischemia.  Echocardiogram showed normal left ventricular systolic function with estimated ejection fraction of 65%.  Mild concentric hypertrophy with grade 1 diastolic dysfunction.  Mild biatrial enlargement. He denies any chest pain, numbness/tingling, photophobia, or nausea. Reports lots of personal life stress. Admits he sleep 5-7 hours a night.     Past Medical History:   Diagnosis Date    Bipolar 1 disorder, mixed     BPH (benign prostatic hypertrophy)     Colon polyp     Cyst, eyelid     Dr. Broussard    Diabetes mellitus     GERD (gastroesophageal reflux disease)     Hyperlipidemia     Hypertension     Lumbar degenerative disc disease 3/7/2019    Migraine headache     Obesity        Past Surgical History:   Procedure Laterality Date    CERVICAL SPINE SURGERY      COLONOSCOPY N/A 7/27/2017    Procedure: COLONOSCOPY;  Surgeon: Genaro Nix MD;  Location: [facility];  Service: Endoscopy;  Laterality: N/A;    COLONOSCOPY N/A 10/30/2020     Procedure: COLONOSCOPY;  Surgeon: Herb Martinez MD;  Location: [facility];  Service: Endoscopy;  Laterality: N/A;    EYE SURGERY Left 2017    cyst removal on eyelid; Dr. Broussard    SHOULDER SURGERY      TONSILLECTOMY         Family History   Problem Relation Age of Onset    Cataracts Mother     Cancer Mother         throat    Hypertension Mother     Hypertension Father     Stroke Father         2 strokes    Hypertension Sister     Cancer Brother         spinal, kidney, spinal fluid    Hypertension Brother     Cancer Cousin         breast?       Social History     Socioeconomic History    Marital status:    Tobacco Use    Smoking status: Former Smoker     Packs/day: 2.00     Years: 15.00     Pack years: 30.00     Types: Cigarettes     Quit date: 1984     Years since quittin.1    Smokeless tobacco: Never Used    Tobacco comment: Patient quit smoking at the age of 26 yo.   Substance and Sexual Activity    Alcohol use: No    Drug use: No    Sexual activity: Yes     Partners: Female       Current Outpatient Medications   Medication Sig Dispense Refill    albuterol (ACCUNEB) 1.25 mg/3 mL Nebu Take 3 mLs (1.25 mg total) by nebulization every 6 (six) hours as needed (SOB). Rescue 75 mL 11    albuterol (VENTOLIN HFA) 90 mcg/actuation inhaler INHALE 2 PUFFS EVERY 4 HOURS AS NEEDED 18 g 3    amLODIPine (NORVASC) 10 MG tablet TAKE 1 TABLET BY MOUTH EVERY DAY 90 tablet 3    benzoyl peroxide (BP WASH) 10 % external wash Use daily as wash to affected areas. Rinse completely.  May bleach clothing 227 g 12    betamethasone dipropionate (DIPROLENE) 0.05 % ointment Apply topically 2 (two) times daily. 45 g 1    blood sugar diagnostic (TRUETEST TEST STRIPS) Strp 1 each by Misc.(Non-Drug; Combo Route) route 3 (three) times a week. 100 each 3    buPROPion (WELLBUTRIN XL) 300 MG 24 hr tablet Take 300 mg by mouth every morning.  2    busPIRone (BUSPAR) 10 MG tablet Take 10 mg by mouth 3  (three) times daily.      candesartan (ATACAND) 4 MG tablet TAKE 1 TABLET BY MOUTH ONCE DAILY. 90 tablet 0    clindamycin (CLEOCIN T) 1 % Swab Apply topically 2 (two) times daily. 60 each 3    CLOTRIMAZOLE-BETAMETH DIP-ZINC TOP       crisaborole (EUCRISA) 2 % Oint Use for hand eczema bid. 60 g 5    cyclobenzaprine (FLEXERIL) 10 MG tablet TAKE 1 TABLET BY MOUTH THREE TIMES A DAY AS NEEDED FOR MUSCLE SPASMS 270 tablet 1    doxycycline (MONODOX) 100 MG capsule Take twice a day with food. May cause upset stomach 20 capsule 0    DUPIXENT SYRINGE 300 mg/2 mL Syrg Inject 1 syringe (300mg) into the skin every other week 4 mL 5    fluticasone furoate-vilanterol (BREO) 100-25 mcg/dose diskus inhaler Inhale 1 puff into the lungs once daily. Controller 90 each 1    fluticasone propionate (FLONASE) 50 mcg/actuation nasal spray 2 sprays (100 mcg total) by Each Nostril route once daily. 16 mL 5    gabapentin (NEURONTIN) 300 MG capsule TAKE 1 CAPSULE BY MOUTH TWICE A  capsule 2    gemfibroziL (LOPID) 600 MG tablet TAKE 1 TABLET BY MOUTH TWICE A DAY BEFORE MEALS 180 tablet 3    hydrocortisone 2.5 % ointment Apply topically 2 (two) times daily. Apply twice daily to affected area on face. 30 g 1    hydrOXYzine (ATARAX) 50 MG tablet TAKE 1 TABLET (50 MG TOTAL) BY MOUTH 3 (THREE) TIMES DAILY AS NEEDED FOR ITCHING. 270 tablet 3    levocetirizine (XYZAL) 5 MG tablet TAKE 1 TABLET BY MOUTH EACH MORNING 90 tablet 3    meloxicam (MOBIC) 15 MG tablet TAKE 1 TABLET BY MOUTH EVERY DAY 30 tablet 0    metFORMIN (GLUCOPHAGE) 1000 MG tablet TAKE 1 TABLET BY MOUTH TWICE A  tablet 0    mometasone 0.1% (ELOCON) 0.1 % cream Apply topically once daily. AAA bid prn for hand eczema 45 g 1    mupirocin (BACTROBAN) 2 % ointment AAA bid with Q-tip. Antibiotic ointment. 30 g 1    neomycin-polymyxin-dexamethasone (DEXACINE) 3.5 mg/g-10,000 unit/g-0.1 % Oint       pantoprazole (PROTONIX) 40 MG tablet Take 1 tablet (40 mg total)  by mouth once daily. 90 tablet 3    quetiapine (SEROQUEL) 300 MG Tab Take by mouth.      sars-cov-2, covid-19, (MODERNA COVID-19) 100 mcg/0.5 ml injection       simvastatin (ZOCOR) 80 MG tablet TAKE 1 TABLET BY MOUTH EVERY DAY 90 tablet 0    SPIRIVA WITH HANDIHALER 18 mcg inhalation capsule INHALE 1 CAPSULE (18 MCG TOTAL) INTO THE LUNGS ONCE DAILY. CONTROLLER 30 capsule 0    sumatriptan (IMITREX) 100 MG tablet TAKE 1 TABLET BY MOUTH EVERY 2 HOURS AS NEEDED FOR MIGRAINE 27 tablet 3    tamsulosin (FLOMAX) 0.4 mg Cap TAKE 2 CAPSULES BY MOUTH EVERY  capsule 1    tiZANidine (ZANAFLEX) 4 MG tablet TAKE 1 TABLET BY MOUTH EVERY 8 HOURS. 30 tablet 1    tiZANidine (ZANAFLEX) 4 MG tablet Take 1 tablet (4 mg total) by mouth every 8 (eight) hours. 30 tablet 1    triamcinolone acetonide 0.1% (KENALOG) 0.1 % paste Place onto teeth 2 (two) times daily.      vancomycin (VANCOCIN) 1,000 mg injection MIX THE CONTENTS OF 1 VIAL WITH UP TO 10 GRAMS OF OINTMENT. APPLY TO INFECTION SITE ONCE DAILY.      amitriptyline (ELAVIL) 25 MG tablet Take 1 tablet (25 mg total) by mouth every evening. 30 tablet 11     No current facility-administered medications for this visit.       Review of patient's allergies indicates:   Allergen Reactions    Sulfa (sulfonamide antibiotics) Rash       Review of Systems  Review of Systems   Constitutional: Negative.    HENT: Negative.    Eyes: Negative.    Respiratory: Negative.    Cardiovascular: Negative.    Gastrointestinal: Negative.    Endocrine: Negative.    Genitourinary: Negative.    Musculoskeletal: Negative.    Skin: Negative.    Allergic/Immunologic: Negative.    Neurological: Positive for light-headedness and headaches.   Hematological: Negative.    Psychiatric/Behavioral: Negative.        Objective:      Neurologic Exam     Mental Status   Oriented to person, place, and time.   Attention: normal. Concentration: normal.   Speech: speech is normal   Level of consciousness:  alert  Knowledge: good.     Cranial Nerves   Cranial nerves II through XII intact.     CN II   Visual fields full to confrontation.     CN III, IV, VI   Pupils are equal, round, and reactive to light.  Extraocular motions are normal.   CN III: no CN III palsy  CN VI: no CN VI palsy  Nystagmus: none   Diplopia: none  Ophthalmoparesis: none    CN V   Facial sensation intact.     CN VII   Facial expression full, symmetric.     CN VIII   CN VIII normal.     CN IX, X   CN IX normal.   CN X normal.     CN XI   CN XI normal.     CN XII   CN XII normal.     Motor Exam   Muscle bulk: normal  Overall muscle tone: normal  Right arm pronator drift: absent  Left arm pronator drift: absent    Strength   Right biceps: 5/5  Left biceps: 5/5  Right triceps: 5/5  Left triceps: 5/5  Right quadriceps: 5/5  Left quadriceps: 5/5  Right anterior tibial: 5/5  Left anterior tibial: 5/5  Right posterior tibial: 5/5  Left posterior tibial: 5/5    Sensory Exam   Light touch normal.     Gait, Coordination, and Reflexes     Gait  Gait: normal    Coordination   Romberg: negative    Tremor   Resting tremor: absent  Intention tremor: absent  Action tremor: absent    Reflexes   Right brachioradialis: 2+  Left brachioradialis: 2+  Right biceps: 2+  Left biceps: 2+  Right triceps: 2+  Left triceps: 2+  Right patellar: 2+  Left patellar: 2+  Right achilles: 2+  Left achilles: 2+  Right : 2+  Left : 2+      Physical Exam  HENT:      Head: Normocephalic and atraumatic.   Eyes:      Extraocular Movements: EOM normal.      Pupils: Pupils are equal, round, and reactive to light.   Cardiovascular:      Rate and Rhythm: Normal rate.   Pulmonary:      Effort: Pulmonary effort is normal.   Skin:     General: Skin is warm and dry.   Neurological:      Mental Status: He is alert and oriented to person, place, and time.      Cranial Nerves: Cranial nerves 2-12 are intact.      Sensory: Sensation is intact.      Motor: Motor function is intact.       Coordination: Coordination is intact. Romberg Test normal.      Gait: Gait is intact.      Deep Tendon Reflexes:      Reflex Scores:       Tricep reflexes are 2+ on the right side and 2+ on the left side.       Bicep reflexes are 2+ on the right side and 2+ on the left side.       Brachioradialis reflexes are 2+ on the right side and 2+ on the left side.       Patellar reflexes are 2+ on the right side and 2+ on the left side.       Achilles reflexes are 2+ on the right side and 2+ on the left side.  Psychiatric:         Attention and Perception: Attention normal.         Mood and Affect: Mood is anxious.         Speech: Speech normal.         Behavior: Behavior is cooperative.           Assessment:       1. Chronic daily headache  - MRI Brain Without Contrast; Future    2. Chronic tension-type headache, not intractable  - amitriptyline (ELAVIL) 25 MG tablet; Take 1 tablet (25 mg total) by mouth every evening.  Dispense: 30 tablet; Refill: 11  -evaluation with Ubrelvy for possible underlying migraines

## 2022-06-27 ENCOUNTER — PATIENT OUTREACH (OUTPATIENT)
Dept: ADMINISTRATIVE | Facility: HOSPITAL | Age: 62
End: 2022-06-27
Payer: MEDICARE

## 2022-06-28 ENCOUNTER — OFFICE VISIT (OUTPATIENT)
Dept: DERMATOLOGY | Facility: CLINIC | Age: 62
End: 2022-06-28
Payer: MEDICARE

## 2022-06-28 DIAGNOSIS — L20.89 OTHER ATOPIC DERMATITIS: Primary | ICD-10-CM

## 2022-06-28 DIAGNOSIS — Z79.899 ENCOUNTER FOR LONG-TERM (CURRENT) USE OF MEDICATIONS: ICD-10-CM

## 2022-06-28 DIAGNOSIS — D23.9 HYDROCYSTOMA: ICD-10-CM

## 2022-06-28 PROCEDURE — 4010F ACE/ARB THERAPY RXD/TAKEN: CPT | Mod: CPTII,S$GLB,, | Performed by: DERMATOLOGY

## 2022-06-28 PROCEDURE — 3044F PR MOST RECENT HEMOGLOBIN A1C LEVEL <7.0%: ICD-10-PCS | Mod: CPTII,S$GLB,, | Performed by: DERMATOLOGY

## 2022-06-28 PROCEDURE — 4010F PR ACE/ARB THEARPY RXD/TAKEN: ICD-10-PCS | Mod: CPTII,S$GLB,, | Performed by: DERMATOLOGY

## 2022-06-28 PROCEDURE — 1160F PR REVIEW ALL MEDS BY PRESCRIBER/CLIN PHARMACIST DOCUMENTED: ICD-10-PCS | Mod: CPTII,S$GLB,, | Performed by: DERMATOLOGY

## 2022-06-28 PROCEDURE — 17110 PR DESTRUCTION BENIGN LESIONS UP TO 14: ICD-10-PCS | Mod: S$GLB,,, | Performed by: DERMATOLOGY

## 2022-06-28 PROCEDURE — 3044F HG A1C LEVEL LT 7.0%: CPT | Mod: CPTII,S$GLB,, | Performed by: DERMATOLOGY

## 2022-06-28 PROCEDURE — 99214 OFFICE O/P EST MOD 30 MIN: CPT | Mod: 25,S$GLB,, | Performed by: DERMATOLOGY

## 2022-06-28 PROCEDURE — 99214 PR OFFICE/OUTPT VISIT, EST, LEVL IV, 30-39 MIN: ICD-10-PCS | Mod: 25,S$GLB,, | Performed by: DERMATOLOGY

## 2022-06-28 PROCEDURE — 99999 PR PBB SHADOW E&M-EST. PATIENT-LVL IV: ICD-10-PCS | Mod: PBBFAC,,, | Performed by: DERMATOLOGY

## 2022-06-28 PROCEDURE — 1160F RVW MEDS BY RX/DR IN RCRD: CPT | Mod: CPTII,S$GLB,, | Performed by: DERMATOLOGY

## 2022-06-28 PROCEDURE — 17110 DESTRUCTION B9 LES UP TO 14: CPT | Mod: S$GLB,,, | Performed by: DERMATOLOGY

## 2022-06-28 PROCEDURE — 1159F MED LIST DOCD IN RCRD: CPT | Mod: CPTII,S$GLB,, | Performed by: DERMATOLOGY

## 2022-06-28 PROCEDURE — 87070 CULTURE OTHR SPECIMN AEROBIC: CPT | Performed by: DERMATOLOGY

## 2022-06-28 PROCEDURE — 99999 PR PBB SHADOW E&M-EST. PATIENT-LVL IV: CPT | Mod: PBBFAC,,, | Performed by: DERMATOLOGY

## 2022-06-28 PROCEDURE — 1159F PR MEDICATION LIST DOCUMENTED IN MEDICAL RECORD: ICD-10-PCS | Mod: CPTII,S$GLB,, | Performed by: DERMATOLOGY

## 2022-06-28 NOTE — PROGRESS NOTES
Subjective:       Patient ID:  Scott Bansal is a 61 y.o. 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  He had been off dupixent from 1 - 4/2022. + flares on the bilateral lower legs.  + pruritus.    Also c/o cyst of the left upper eyelid margin    Prior treatments: PO prednisone, hydroxyzine 50 mg TID, clotrimazole-betamethasone, mometasone, TAC oint, TAC cream, mupirocin, betamethasone oint     Denies history of fever blisters.  Denies conjunctivitis, pain in the eyes or blisters near the eyes.        Review of Systems   Constitutional: Negative for fever and chills.   Gastrointestinal: Negative for nausea and vomiting.   Skin: Positive for itching, rash and activity-related sunscreen use. Negative for daily sunscreen use and recent sunburn.   Hematologic/Lymphatic: Does not bruise/bleed easily.        Objective:    Physical Exam   Constitutional: He appears well-developed and well-nourished. No distress.   Neurological: He is alert and oriented to person, place, and time. He is not disoriented.   Psychiatric: He has a normal mood and affect.   Skin:   Areas Examined (abnormalities noted in diagram):   Head / Face Inspection Performed  Neck Inspection Performed  Chest / Axilla Inspection Performed  Abdomen Inspection Performed  Back Inspection Performed  RUE Inspected  LUE Inspection Performed  RLE Inspected  LLE Inspection Performed  Nails and Digits Inspection Performed                 Assessment / Plan:        Other atopic dermatitis  Encounter for long-term (current) use of medications  -     Aerobic culture  -     Reviewed CBC, CMP from 5/9/2022.  dsicussed worsening anemia and that pt should discuss w/u further with PCP. The patient  acknowledged understanding. States he his up to date with colonoscopy.  Will continue dupixent and topicals.    EASI = 12, atopic dermatitis with scarring. Discussed start of dupixent.  Reviewed herpes reactivation and discussed pt should notify derm clinic if cold sores or other herpes infections while on dupixent.  Reviewed side effects of immunosuppression, conjunctivitis/keratitis and reactivation of the herpes virus. The patient acknowledged understanding and wishes to proceed with Dupixent therapy.     Hidrocystoma  Left upper eyelid margin. After risks and benefits explained, verbal consent obtained, 0.2 cc of 1% lidocaine with epinephrine injected into the left upper eyelid margin. Lesion incised with #11 blade and drained on today's date, area they lightly hyfrecated with electrocautery as tolerated. Pt tolerated procedure well.            Follow up in about 6 months (around 12/28/2022).

## 2022-06-29 ENCOUNTER — PATIENT MESSAGE (OUTPATIENT)
Dept: DERMATOLOGY | Facility: CLINIC | Age: 62
End: 2022-06-29
Payer: MEDICARE

## 2022-07-01 ENCOUNTER — PATIENT MESSAGE (OUTPATIENT)
Dept: DERMATOLOGY | Facility: CLINIC | Age: 62
End: 2022-07-01
Payer: MEDICARE

## 2022-07-01 LAB — BACTERIA SPEC AEROBE CULT: NORMAL

## 2022-07-11 DIAGNOSIS — F41.9 ANXIETY: ICD-10-CM

## 2022-07-11 RX ORDER — ALPRAZOLAM 1 MG/1
1 TABLET ORAL ONCE
Qty: 2 TABLET | Refills: 0 | Status: SHIPPED | OUTPATIENT
Start: 2022-07-11 | End: 2022-10-04

## 2022-07-11 NOTE — TELEPHONE ENCOUNTER
No new care gaps identified.  [facility] Embedded Care Gaps. Reference number: [id number]. 7/11/2022   10:28:20 AM CDT

## 2022-07-11 NOTE — TELEPHONE ENCOUNTER
Patient states that he is having an MRI done Friday and is requesting xanax to be sent to pharmacy. Please advise.

## 2022-07-15 ENCOUNTER — HOSPITAL ENCOUNTER (OUTPATIENT)
Dept: RADIOLOGY | Facility: HOSPITAL | Age: 62
Discharge: HOME | End: 2022-07-15
Payer: MEDICARE

## 2022-07-15 DIAGNOSIS — R51.9 CHRONIC DAILY HEADACHE: ICD-10-CM

## 2022-07-15 PROCEDURE — 70551 MRI BRAIN STEM W/O DYE: CPT | Mod: TC

## 2022-07-15 PROCEDURE — 70551 MRI BRAIN STEM W/O DYE: CPT | Mod: 26,,, | Performed by: RADIOLOGY

## 2022-07-15 PROCEDURE — 70551 MRI BRAIN WITHOUT CONTRAST: ICD-10-PCS | Mod: 26,,, | Performed by: RADIOLOGY

## 2022-07-20 ENCOUNTER — PATIENT MESSAGE (OUTPATIENT)
Dept: NEUROLOGY | Facility: CLINIC | Age: 62
End: 2022-07-20
Payer: MEDICARE

## 2022-08-05 ENCOUNTER — PES CALL (OUTPATIENT)
Dept: ADMINISTRATIVE | Facility: CLINIC | Age: 62
End: 2022-08-05
Payer: MEDICARE

## 2022-08-18 DIAGNOSIS — G44.229 CHRONIC TENSION-TYPE HEADACHE, NOT INTRACTABLE: ICD-10-CM

## 2022-08-18 RX ORDER — ZONISAMIDE 50 MG/1
50 CAPSULE ORAL 2 TIMES DAILY
Qty: 180 CAPSULE | Refills: 3 | Status: SHIPPED | OUTPATIENT
Start: 2022-08-18 | End: 2023-06-19 | Stop reason: SDUPTHER

## 2022-08-18 NOTE — TELEPHONE ENCOUNTER
----- Message from Jada Thomas sent at 8/18/2022 11:06 AM CDT -----  Regarding: Self/  [phone]  Type: RX Refill Request    Who Called:  Patient    Refill or New Rx:  Refill    RX Name and Strength:  zonisamide (ZONEGRAN) 50 MG Cap     Preferred Pharmacy with phone number:  [facility]/PHARMACY #[id number] - [place] - [address]    Local or Mail Order:  Local    Ordering Provider:  MARTHA Villalta    Would the patient rather a call back or a response via My [facility]?   Call back    Best Call Back Number:  [phone]

## 2022-08-23 RX ORDER — ZONISAMIDE 50 MG/1
CAPSULE ORAL
Qty: 180 CAPSULE | Refills: 0 | OUTPATIENT
Start: 2022-08-23

## 2022-08-23 NOTE — TELEPHONE ENCOUNTER
Refill Decision Note   Scott Bansal  is requesting a refill authorization.  Brief Assessment and Rationale for Refill:  Quick Discontinue     Medication Therapy Plan:       Medication Reconciliation Completed: No   Comments:     No Care Gaps recommended.     Note composed:9:13 AM 08/23/2022

## 2022-08-28 ENCOUNTER — NURSE TRIAGE (OUTPATIENT)
Dept: ADMINISTRATIVE | Facility: CLINIC | Age: 62
End: 2022-08-28
Payer: MEDICARE

## 2022-08-28 NOTE — TELEPHONE ENCOUNTER
Pt stated he has a hx of bronchitis and is coughing up yellow mucus. Pt also stated that he is SOB at rest. Care advise to go to ED per protocol. Verbalized understanding. Encounter routed to provider.         Reason for Disposition   [1] MODERATE difficulty breathing (e.g., speaks in phrases, SOB even at rest, pulse 100-120) AND [2] still present when not coughing    Additional Information   Negative: SEVERE difficulty breathing (e.g., struggling for each breath, speaks in single words)   Negative: Bluish (or gray) lips or face now   Negative: [1] Difficulty breathing AND [2] exposure to flames, smoke, or fumes   Negative: [1] Stridor AND [2] difficulty breathing   Negative: Sounds like a life-threatening emergency to the triager    Protocols used: Cough - Acute Flhjzkjoee-O-FS

## 2022-08-29 ENCOUNTER — OFFICE VISIT (OUTPATIENT)
Dept: FAMILY MEDICINE | Facility: CLINIC | Age: 62
End: 2022-08-29
Payer: MEDICARE

## 2022-08-29 VITALS
HEART RATE: 90 BPM | DIASTOLIC BLOOD PRESSURE: 72 MMHG | OXYGEN SATURATION: 98 % | WEIGHT: 250.31 LBS | TEMPERATURE: 98 F | BODY MASS INDEX: 37.07 KG/M2 | SYSTOLIC BLOOD PRESSURE: 122 MMHG | HEIGHT: 69 IN

## 2022-08-29 DIAGNOSIS — Z23 NEED FOR PNEUMOCOCCAL VACCINE: Primary | ICD-10-CM

## 2022-08-29 DIAGNOSIS — J44.1 COPD WITH ACUTE EXACERBATION: ICD-10-CM

## 2022-08-29 DIAGNOSIS — I10 ESSENTIAL HYPERTENSION: ICD-10-CM

## 2022-08-29 PROCEDURE — 3074F PR MOST RECENT SYSTOLIC BLOOD PRESSURE < 130 MM HG: ICD-10-PCS | Mod: CPTII,S$GLB,, | Performed by: STUDENT IN AN ORGANIZED HEALTH CARE EDUCATION/TRAINING PROGRAM

## 2022-08-29 PROCEDURE — 3078F PR MOST RECENT DIASTOLIC BLOOD PRESSURE < 80 MM HG: ICD-10-PCS | Mod: CPTII,S$GLB,, | Performed by: STUDENT IN AN ORGANIZED HEALTH CARE EDUCATION/TRAINING PROGRAM

## 2022-08-29 PROCEDURE — 3008F BODY MASS INDEX DOCD: CPT | Mod: CPTII,S$GLB,, | Performed by: STUDENT IN AN ORGANIZED HEALTH CARE EDUCATION/TRAINING PROGRAM

## 2022-08-29 PROCEDURE — 1159F MED LIST DOCD IN RCRD: CPT | Mod: CPTII,S$GLB,, | Performed by: STUDENT IN AN ORGANIZED HEALTH CARE EDUCATION/TRAINING PROGRAM

## 2022-08-29 PROCEDURE — 4010F ACE/ARB THERAPY RXD/TAKEN: CPT | Mod: CPTII,S$GLB,, | Performed by: STUDENT IN AN ORGANIZED HEALTH CARE EDUCATION/TRAINING PROGRAM

## 2022-08-29 PROCEDURE — 3044F PR MOST RECENT HEMOGLOBIN A1C LEVEL <7.0%: ICD-10-PCS | Mod: CPTII,S$GLB,, | Performed by: STUDENT IN AN ORGANIZED HEALTH CARE EDUCATION/TRAINING PROGRAM

## 2022-08-29 PROCEDURE — 1159F PR MEDICATION LIST DOCUMENTED IN MEDICAL RECORD: ICD-10-PCS | Mod: CPTII,S$GLB,, | Performed by: STUDENT IN AN ORGANIZED HEALTH CARE EDUCATION/TRAINING PROGRAM

## 2022-08-29 PROCEDURE — 4010F PR ACE/ARB THEARPY RXD/TAKEN: ICD-10-PCS | Mod: CPTII,S$GLB,, | Performed by: STUDENT IN AN ORGANIZED HEALTH CARE EDUCATION/TRAINING PROGRAM

## 2022-08-29 PROCEDURE — 99999 PR PBB SHADOW E&M-EST. PATIENT-LVL V: CPT | Mod: PBBFAC,,, | Performed by: STUDENT IN AN ORGANIZED HEALTH CARE EDUCATION/TRAINING PROGRAM

## 2022-08-29 PROCEDURE — 99999 PR PBB SHADOW E&M-EST. PATIENT-LVL V: ICD-10-PCS | Mod: PBBFAC,,, | Performed by: STUDENT IN AN ORGANIZED HEALTH CARE EDUCATION/TRAINING PROGRAM

## 2022-08-29 PROCEDURE — 3008F PR BODY MASS INDEX (BMI) DOCUMENTED: ICD-10-PCS | Mod: CPTII,S$GLB,, | Performed by: STUDENT IN AN ORGANIZED HEALTH CARE EDUCATION/TRAINING PROGRAM

## 2022-08-29 PROCEDURE — 3078F DIAST BP <80 MM HG: CPT | Mod: CPTII,S$GLB,, | Performed by: STUDENT IN AN ORGANIZED HEALTH CARE EDUCATION/TRAINING PROGRAM

## 2022-08-29 PROCEDURE — 3074F SYST BP LT 130 MM HG: CPT | Mod: CPTII,S$GLB,, | Performed by: STUDENT IN AN ORGANIZED HEALTH CARE EDUCATION/TRAINING PROGRAM

## 2022-08-29 PROCEDURE — 99214 OFFICE O/P EST MOD 30 MIN: CPT | Mod: S$GLB,,, | Performed by: STUDENT IN AN ORGANIZED HEALTH CARE EDUCATION/TRAINING PROGRAM

## 2022-08-29 PROCEDURE — 3044F HG A1C LEVEL LT 7.0%: CPT | Mod: CPTII,S$GLB,, | Performed by: STUDENT IN AN ORGANIZED HEALTH CARE EDUCATION/TRAINING PROGRAM

## 2022-08-29 PROCEDURE — G0009 ADMIN PNEUMOCOCCAL VACCINE: HCPCS | Mod: S$GLB,,, | Performed by: STUDENT IN AN ORGANIZED HEALTH CARE EDUCATION/TRAINING PROGRAM

## 2022-08-29 PROCEDURE — 90677 PCV20 VACCINE IM: CPT | Mod: S$GLB,,, | Performed by: STUDENT IN AN ORGANIZED HEALTH CARE EDUCATION/TRAINING PROGRAM

## 2022-08-29 PROCEDURE — 90677 PNEUMOCOCCAL CONJUGATE VACCINE 20-VALENT: ICD-10-PCS | Mod: S$GLB,,, | Performed by: STUDENT IN AN ORGANIZED HEALTH CARE EDUCATION/TRAINING PROGRAM

## 2022-08-29 PROCEDURE — G0009 PNEUMOCOCCAL CONJUGATE VACCINE 20-VALENT: ICD-10-PCS | Mod: S$GLB,,, | Performed by: STUDENT IN AN ORGANIZED HEALTH CARE EDUCATION/TRAINING PROGRAM

## 2022-08-29 PROCEDURE — 99214 PR OFFICE/OUTPT VISIT, EST, LEVL IV, 30-39 MIN: ICD-10-PCS | Mod: S$GLB,,, | Performed by: STUDENT IN AN ORGANIZED HEALTH CARE EDUCATION/TRAINING PROGRAM

## 2022-08-29 RX ORDER — LEVOFLOXACIN 750 MG/1
750 TABLET ORAL DAILY
Qty: 5 TABLET | Refills: 0 | Status: SHIPPED | OUTPATIENT
Start: 2022-08-29 | End: 2022-09-03

## 2022-08-29 NOTE — PROGRESS NOTES
"2022    Scott Bansal  [id number]    Chief Complaint   Patient presents with    Bronchitis       HPI    Pt presents with a few days of increased sputum "production and rattle in chest". No fevers. Is using inhalers more and now having to use nebulizer. Last exacerbation 6 months ago.     Negative 10 point ROS outside of HPI    Social History     Socioeconomic History    Marital status:    Tobacco Use    Smoking status: Former     Packs/day: 2.00     Years: 15.00     Pack years: 30.00     Types: Cigarettes     Quit date: 1984     Years since quittin.3    Smokeless tobacco: Never    Tobacco comments:     Patient quit smoking at the age of 26 yo.   Substance and Sexual Activity    Alcohol use: No    Drug use: No    Sexual activity: Yes     Partners: Female           Current Outpatient Medications:     albuterol (ACCUNEB) 1.25 mg/3 mL Nebu, Take 3 mLs (1.25 mg total) by nebulization every 6 (six) hours as needed (SOB). Rescue, Disp: 75 mL, Rfl: 11    albuterol (VENTOLIN HFA) 90 mcg/actuation inhaler, INHALE 2 PUFFS EVERY 4 HOURS AS NEEDED, Disp: 18 g, Rfl: 3    amitriptyline (ELAVIL) 25 MG tablet, Take 1 tablet (25 mg total) by mouth every evening., Disp: 30 tablet, Rfl: 11    amLODIPine (NORVASC) 10 MG tablet, TAKE 1 TABLET BY MOUTH EVERY DAY, Disp: 90 tablet, Rfl: 3    betamethasone dipropionate (DIPROLENE) 0.05 % ointment, Apply topically 2 (two) times daily., Disp: 45 g, Rfl: 1    blood sugar diagnostic (TRUETEST TEST STRIPS) Strp, 1 each by Misc.(Non-Drug; Combo Route) route 3 (three) times a week., Disp: 100 each, Rfl: 3    buPROPion (WELLBUTRIN XL) 300 MG 24 hr tablet, Take 300 mg by mouth every morning., Disp: , Rfl: 2    busPIRone (BUSPAR) 10 MG tablet, Take 10 mg by mouth 3 (three) times daily., Disp: , Rfl:     candesartan (ATACAND) 4 MG tablet, TAKE 1 TABLET BY MOUTH ONCE DAILY., Disp: 90 tablet, Rfl: 0    CLOTRIMAZOLE-BETAMETH DIP-ZINC TOP, , Disp: , Rfl:     crisaborole (EUCRISA) 2 % " Oint, Use for hand eczema bid., Disp: 60 g, Rfl: 5    cyclobenzaprine (FLEXERIL) 10 MG tablet, TAKE 1 TABLET BY MOUTH THREE TIMES A DAY AS NEEDED FOR MUSCLE SPASMS, Disp: 270 tablet, Rfl: 1    DUPIXENT SYRINGE 300 mg/2 mL Syrg, Inject 1 syringe (300mg) into the skin every other week, Disp: 4 mL, Rfl: 5    fluticasone propionate (FLONASE) 50 mcg/actuation nasal spray, 2 sprays (100 mcg total) by Each Nostril route once daily., Disp: 16 mL, Rfl: 5    gabapentin (NEURONTIN) 300 MG capsule, TAKE 1 CAPSULE BY MOUTH TWICE A DAY, Disp: 180 capsule, Rfl: 2    gemfibroziL (LOPID) 600 MG tablet, TAKE 1 TABLET BY MOUTH TWICE A DAY BEFORE MEALS, Disp: 180 tablet, Rfl: 3    hydrocortisone 2.5 % ointment, Apply topically 2 (two) times daily. Apply twice daily to affected area on face., Disp: 30 g, Rfl: 1    hydrOXYzine (ATARAX) 50 MG tablet, TAKE 1 TABLET (50 MG TOTAL) BY MOUTH 3 (THREE) TIMES DAILY AS NEEDED FOR ITCHING., Disp: 270 tablet, Rfl: 3    levocetirizine (XYZAL) 5 MG tablet, TAKE 1 TABLET BY MOUTH EACH MORNING, Disp: 90 tablet, Rfl: 3    meloxicam (MOBIC) 15 MG tablet, TAKE 1 TABLET BY MOUTH EVERY DAY, Disp: 30 tablet, Rfl: 0    metFORMIN (GLUCOPHAGE) 1000 MG tablet, TAKE 1 TABLET BY MOUTH TWICE A DAY, Disp: 180 tablet, Rfl: 0    mometasone 0.1% (ELOCON) 0.1 % cream, Apply topically once daily. AAA bid prn for hand eczema, Disp: 45 g, Rfl: 1    mupirocin (BACTROBAN) 2 % ointment, AAA bid with Q-tip. Antibiotic ointment., Disp: 30 g, Rfl: 1    neomycin-polymyxin-dexamethasone (DEXACINE) 3.5 mg/g-10,000 unit/g-0.1 % Oint, , Disp: , Rfl:     pantoprazole (PROTONIX) 40 MG tablet, Take 1 tablet (40 mg total) by mouth once daily., Disp: 90 tablet, Rfl: 3    quetiapine (SEROQUEL) 300 MG Tab, Take by mouth., Disp: , Rfl:     sars-cov-2, covid-19, (MODERNA COVID-19) 100 mcg/0.5 ml injection, , Disp: , Rfl:     simvastatin (ZOCOR) 80 MG tablet, TAKE 1 TABLET BY MOUTH EVERY DAY, Disp: 90 tablet, Rfl: 0    SPIRIVA WITH HANDIHALER  18 mcg inhalation capsule, INHALE 1 CAPSULE (18 MCG TOTAL) INTO THE LUNGS ONCE DAILY. CONTROLLER, Disp: 30 capsule, Rfl: 0    sumatriptan (IMITREX) 100 MG tablet, TAKE 1 TABLET BY MOUTH EVERY 2 HOURS AS NEEDED FOR MIGRAINE, Disp: 27 tablet, Rfl: 3    tamsulosin (FLOMAX) 0.4 mg Cap, TAKE 2 CAPSULES BY MOUTH EVERY DAY, Disp: 180 capsule, Rfl: 1    tiZANidine (ZANAFLEX) 4 MG tablet, TAKE 1 TABLET BY MOUTH EVERY 8 HOURS., Disp: 30 tablet, Rfl: 1    tiZANidine (ZANAFLEX) 4 MG tablet, Take 1 tablet (4 mg total) by mouth every 8 (eight) hours., Disp: 30 tablet, Rfl: 1    triamcinolone acetonide 0.1% (KENALOG) 0.1 % paste, Place onto teeth 2 (two) times daily., Disp: , Rfl:     vancomycin (VANCOCIN) 1,000 mg injection, MIX THE CONTENTS OF 1 VIAL WITH UP TO 10 GRAMS OF OINTMENT. APPLY TO INFECTION SITE ONCE DAILY., Disp: , Rfl:     zonisamide (ZONEGRAN) 50 MG Cap, Take 1 capsule (50 mg total) by mouth 2 (two) times daily., Disp: 180 capsule, Rfl: 3    ALPRAZolam (XANAX) 1 MG tablet, Take 1 tablet (1 mg total) by mouth once. for 1 dose (Patient not taking: Reported on 8/29/2022), Disp: 2 tablet, Rfl: 0    benzoyl peroxide (BP WASH) 10 % external wash, Use daily as wash to affected areas. Rinse completely.  May bleach clothing (Patient not taking: Reported on 8/29/2022), Disp: 227 g, Rfl: 12    clindamycin (CLEOCIN T) 1 % Swab, Apply topically 2 (two) times daily., Disp: 60 each, Rfl: 3    doxycycline (MONODOX) 100 MG capsule, Take twice a day with food. May cause upset stomach (Patient not taking: No sig reported), Disp: 20 capsule, Rfl: 0    fluticasone furoate-vilanterol (BREO) 100-25 mcg/dose diskus inhaler, Inhale 1 puff into the lungs once daily. Controller (Patient not taking: No sig reported), Disp: 90 each, Rfl: 1      Physical Exam  Vitals:    08/29/22 0746   BP: 122/72   Pulse: 90   Temp: 98 °F (36.7 °C)     Gen: well appearing, NAD  Resp: non labored breathing, no crackles, coarse breath sounds bilaterally,  expiratory wheeze  CV: RRR no murmur, gallops, rubs, no LE edema    1. Need for pneumococcal vaccine  - (In Office Administered) Pneumococcal Conjugate Vaccine (20 Valent) (IM)    2. COPD with acute exacerbation  - START levoFLOXacin (LEVAQUIN) 750 MG tablet; Take 1 tablet (750 mg total) by mouth once daily. for 5 days  Dispense: 5 tablet; Refill: 0  -will avoid steroids    3. Essential hypertension  Well controlled continue current regimen     RTC for close follow in 5-7 days     Angie Jones MD  Family Medicine

## 2022-08-29 NOTE — PROGRESS NOTES
Patient given [id number] vaccine via injection. Tolerated well. VIS given & advised to wait in lobby 15 mins for monitoring. Verbalized understanding.

## 2022-09-06 ENCOUNTER — OFFICE VISIT (OUTPATIENT)
Dept: FAMILY MEDICINE | Facility: CLINIC | Age: 62
End: 2022-09-06
Payer: MEDICARE

## 2022-09-06 VITALS
BODY MASS INDEX: 36.8 KG/M2 | DIASTOLIC BLOOD PRESSURE: 80 MMHG | HEIGHT: 69 IN | TEMPERATURE: 98 F | WEIGHT: 248.44 LBS | HEART RATE: 89 BPM | OXYGEN SATURATION: 98 % | SYSTOLIC BLOOD PRESSURE: 126 MMHG

## 2022-09-06 DIAGNOSIS — J44.1 COPD WITH ACUTE EXACERBATION: Primary | ICD-10-CM

## 2022-09-06 PROCEDURE — 99213 OFFICE O/P EST LOW 20 MIN: CPT | Mod: S$GLB,,, | Performed by: STUDENT IN AN ORGANIZED HEALTH CARE EDUCATION/TRAINING PROGRAM

## 2022-09-06 PROCEDURE — 99999 PR PBB SHADOW E&M-EST. PATIENT-LVL IV: CPT | Mod: PBBFAC,,, | Performed by: STUDENT IN AN ORGANIZED HEALTH CARE EDUCATION/TRAINING PROGRAM

## 2022-09-06 PROCEDURE — 3079F PR MOST RECENT DIASTOLIC BLOOD PRESSURE 80-89 MM HG: ICD-10-PCS | Mod: CPTII,S$GLB,, | Performed by: STUDENT IN AN ORGANIZED HEALTH CARE EDUCATION/TRAINING PROGRAM

## 2022-09-06 PROCEDURE — 3079F DIAST BP 80-89 MM HG: CPT | Mod: CPTII,S$GLB,, | Performed by: STUDENT IN AN ORGANIZED HEALTH CARE EDUCATION/TRAINING PROGRAM

## 2022-09-06 PROCEDURE — 1159F PR MEDICATION LIST DOCUMENTED IN MEDICAL RECORD: ICD-10-PCS | Mod: CPTII,S$GLB,, | Performed by: STUDENT IN AN ORGANIZED HEALTH CARE EDUCATION/TRAINING PROGRAM

## 2022-09-06 PROCEDURE — 3044F PR MOST RECENT HEMOGLOBIN A1C LEVEL <7.0%: ICD-10-PCS | Mod: CPTII,S$GLB,, | Performed by: STUDENT IN AN ORGANIZED HEALTH CARE EDUCATION/TRAINING PROGRAM

## 2022-09-06 PROCEDURE — 3008F PR BODY MASS INDEX (BMI) DOCUMENTED: ICD-10-PCS | Mod: CPTII,S$GLB,, | Performed by: STUDENT IN AN ORGANIZED HEALTH CARE EDUCATION/TRAINING PROGRAM

## 2022-09-06 PROCEDURE — 4010F ACE/ARB THERAPY RXD/TAKEN: CPT | Mod: CPTII,S$GLB,, | Performed by: STUDENT IN AN ORGANIZED HEALTH CARE EDUCATION/TRAINING PROGRAM

## 2022-09-06 PROCEDURE — 3008F BODY MASS INDEX DOCD: CPT | Mod: CPTII,S$GLB,, | Performed by: STUDENT IN AN ORGANIZED HEALTH CARE EDUCATION/TRAINING PROGRAM

## 2022-09-06 PROCEDURE — 1159F MED LIST DOCD IN RCRD: CPT | Mod: CPTII,S$GLB,, | Performed by: STUDENT IN AN ORGANIZED HEALTH CARE EDUCATION/TRAINING PROGRAM

## 2022-09-06 PROCEDURE — 3044F HG A1C LEVEL LT 7.0%: CPT | Mod: CPTII,S$GLB,, | Performed by: STUDENT IN AN ORGANIZED HEALTH CARE EDUCATION/TRAINING PROGRAM

## 2022-09-06 PROCEDURE — 3074F SYST BP LT 130 MM HG: CPT | Mod: CPTII,S$GLB,, | Performed by: STUDENT IN AN ORGANIZED HEALTH CARE EDUCATION/TRAINING PROGRAM

## 2022-09-06 PROCEDURE — 3074F PR MOST RECENT SYSTOLIC BLOOD PRESSURE < 130 MM HG: ICD-10-PCS | Mod: CPTII,S$GLB,, | Performed by: STUDENT IN AN ORGANIZED HEALTH CARE EDUCATION/TRAINING PROGRAM

## 2022-09-06 PROCEDURE — 4010F PR ACE/ARB THEARPY RXD/TAKEN: ICD-10-PCS | Mod: CPTII,S$GLB,, | Performed by: STUDENT IN AN ORGANIZED HEALTH CARE EDUCATION/TRAINING PROGRAM

## 2022-09-06 PROCEDURE — 99999 PR PBB SHADOW E&M-EST. PATIENT-LVL IV: ICD-10-PCS | Mod: PBBFAC,,, | Performed by: STUDENT IN AN ORGANIZED HEALTH CARE EDUCATION/TRAINING PROGRAM

## 2022-09-06 PROCEDURE — 99213 PR OFFICE/OUTPT VISIT, EST, LEVL III, 20-29 MIN: ICD-10-PCS | Mod: S$GLB,,, | Performed by: STUDENT IN AN ORGANIZED HEALTH CARE EDUCATION/TRAINING PROGRAM

## 2022-09-06 NOTE — PROGRESS NOTES
09/06/2022    Scott Bansal  [id number]    CC: followup    HPI  Patient is a 61 year old male with a past medical history of COPD, HLD, HTN, DM2, BPH, GERD, lumbar degenerative disc disease, and bipolar I. He presented today for a 1 week follow up of acute COPD exacerbation. He stated that he completed his course of levofloxacin but with some side effects and discomfort and asked for the medication to be avoided for future episodes if possible. He otherwise feels as though his chest congestion is much better and added that his productive cough has also resolved. He had no additional inquiries.     Review of Systems  Review of Systems   Constitutional:  Negative for chills, diaphoresis, fever, malaise/fatigue and weight loss.   HENT:  Negative for congestion, ear pain, sinus pain and sore throat.    Eyes: Negative.    Respiratory:  Positive for wheezing. Negative for cough, hemoptysis, sputum production and shortness of breath.    Cardiovascular:  Negative for chest pain, palpitations, orthopnea, claudication, leg swelling and PND.   Gastrointestinal:  Negative for abdominal pain, blood in stool, constipation, diarrhea, heartburn, melena, nausea and vomiting.   Genitourinary:  Negative for dysuria, flank pain, frequency, hematuria and urgency.   Musculoskeletal:  Negative for back pain, joint pain, myalgias and neck pain.   Skin:  Negative for itching and rash.   Neurological:  Negative for dizziness, sensory change, weakness and headaches.   Endo/Heme/Allergies: Negative.    Psychiatric/Behavioral: Negative.        Past Medical History:   Diagnosis Date    Bipolar 1 disorder, mixed     BPH (benign prostatic hypertrophy)     Colon polyp     Cyst, eyelid     Dr. Broussard    Diabetes mellitus     GERD (gastroesophageal reflux disease)     Hyperlipidemia     Hypertension     Lumbar degenerative disc disease 3/7/2019    Migraine headache     Obesity        Past Surgical History:   Procedure Laterality Date    CERVICAL  SPINE SURGERY      COLONOSCOPY N/A 2017    Procedure: COLONOSCOPY;  Surgeon: Genaro Nix MD;  Location: [facility] ENDO;  Service: Endoscopy;  Laterality: N/A;    COLONOSCOPY N/A 10/30/2020    Procedure: COLONOSCOPY;  Surgeon: Herb Martinez MD;  Location: [facility] ENDO;  Service: Endoscopy;  Laterality: N/A;    EYE SURGERY Left 2017    cyst removal on eyelid; Dr. Broussard    SHOULDER SURGERY      TONSILLECTOMY         Family History   Problem Relation Age of Onset    Cataracts Mother     Cancer Mother         throat    Hypertension Mother     Hypertension Father     Stroke Father         2 strokes    Hypertension Sister     Cancer Brother         spinal, kidney, spinal fluid    Hypertension Brother     Cancer Cousin         breast?       Social History     Socioeconomic History    Marital status:    Tobacco Use    Smoking status: Former     Packs/day: 2.00     Years: 15.00     Pack years: 30.00     Types: Cigarettes     Quit date: 1984     Years since quittin.3    Smokeless tobacco: Never    Tobacco comments:     Patient quit smoking at the age of 24 yo.   Substance and Sexual Activity    Alcohol use: No    Drug use: No    Sexual activity: Yes     Partners: Female         Current Outpatient Medications:     albuterol (ACCUNEB) 1.25 mg/3 mL Nebu, Take 3 mLs (1.25 mg total) by nebulization every 6 (six) hours as needed (SOB). Rescue, Disp: 75 mL, Rfl: 11    albuterol (VENTOLIN HFA) 90 mcg/actuation inhaler, INHALE 2 PUFFS EVERY 4 HOURS AS NEEDED, Disp: 18 g, Rfl: 3    amitriptyline (ELAVIL) 25 MG tablet, Take 1 tablet (25 mg total) by mouth every evening., Disp: 30 tablet, Rfl: 11    amLODIPine (NORVASC) 10 MG tablet, TAKE 1 TABLET BY MOUTH EVERY DAY, Disp: 90 tablet, Rfl: 3    betamethasone dipropionate (DIPROLENE) 0.05 % ointment, Apply topically 2 (two) times daily., Disp: 45 g, Rfl: 1    blood sugar diagnostic (TRUETEST TEST STRIPS) Strp, 1 each by Misc.(Non-Drug; Combo Route) route 3  (three) times a week., Disp: 100 each, Rfl: 3    buPROPion (WELLBUTRIN XL) 300 MG 24 hr tablet, Take 300 mg by mouth every morning., Disp: , Rfl: 2    busPIRone (BUSPAR) 10 MG tablet, Take 10 mg by mouth 3 (three) times daily., Disp: , Rfl:     candesartan (ATACAND) 4 MG tablet, TAKE 1 TABLET BY MOUTH ONCE DAILY., Disp: 90 tablet, Rfl: 0    CLOTRIMAZOLE-BETAMETH DIP-ZINC TOP, , Disp: , Rfl:     crisaborole (EUCRISA) 2 % Oint, Use for hand eczema bid., Disp: 60 g, Rfl: 5    cyclobenzaprine (FLEXERIL) 10 MG tablet, TAKE 1 TABLET BY MOUTH THREE TIMES A DAY AS NEEDED FOR MUSCLE SPASMS, Disp: 270 tablet, Rfl: 1    DUPIXENT SYRINGE 300 mg/2 mL Syrg, Inject 1 syringe (300mg) into the skin every other week, Disp: 4 mL, Rfl: 5    fluticasone propionate (FLONASE) 50 mcg/actuation nasal spray, 2 sprays (100 mcg total) by Each Nostril route once daily., Disp: 16 mL, Rfl: 5    gabapentin (NEURONTIN) 300 MG capsule, TAKE 1 CAPSULE BY MOUTH TWICE A DAY, Disp: 180 capsule, Rfl: 2    gemfibroziL (LOPID) 600 MG tablet, TAKE 1 TABLET BY MOUTH TWICE A DAY BEFORE MEALS, Disp: 180 tablet, Rfl: 3    hydrocortisone 2.5 % ointment, Apply topically 2 (two) times daily. Apply twice daily to affected area on face., Disp: 30 g, Rfl: 1    hydrOXYzine (ATARAX) 50 MG tablet, TAKE 1 TABLET (50 MG TOTAL) BY MOUTH 3 (THREE) TIMES DAILY AS NEEDED FOR ITCHING., Disp: 270 tablet, Rfl: 3    levocetirizine (XYZAL) 5 MG tablet, TAKE 1 TABLET BY MOUTH EACH MORNING, Disp: 90 tablet, Rfl: 3    meloxicam (MOBIC) 15 MG tablet, TAKE 1 TABLET BY MOUTH EVERY DAY, Disp: 30 tablet, Rfl: 0    metFORMIN (GLUCOPHAGE) 1000 MG tablet, TAKE 1 TABLET BY MOUTH TWICE A DAY, Disp: 180 tablet, Rfl: 0    mometasone 0.1% (ELOCON) 0.1 % cream, Apply topically once daily. AAA bid prn for hand eczema, Disp: 45 g, Rfl: 1    mupirocin (BACTROBAN) 2 % ointment, AAA bid with Q-tip. Antibiotic ointment., Disp: 30 g, Rfl: 1    neomycin-polymyxin-dexamethasone (DEXACINE) 3.5 mg/g-10,000  unit/g-0.1 % Oint, , Disp: , Rfl:     pantoprazole (PROTONIX) 40 MG tablet, Take 1 tablet (40 mg total) by mouth once daily., Disp: 90 tablet, Rfl: 3    quetiapine (SEROQUEL) 300 MG Tab, Take by mouth., Disp: , Rfl:     sars-cov-2, covid-19, (MODERNA COVID-19) 100 mcg/0.5 ml injection, , Disp: , Rfl:     simvastatin (ZOCOR) 80 MG tablet, TAKE 1 TABLET BY MOUTH EVERY DAY, Disp: 90 tablet, Rfl: 0    SPIRIVA WITH HANDIHALER 18 mcg inhalation capsule, INHALE 1 CAPSULE (18 MCG TOTAL) INTO THE LUNGS ONCE DAILY. CONTROLLER, Disp: 30 capsule, Rfl: 0    sumatriptan (IMITREX) 100 MG tablet, TAKE 1 TABLET BY MOUTH EVERY 2 HOURS AS NEEDED FOR MIGRAINE, Disp: 27 tablet, Rfl: 3    tamsulosin (FLOMAX) 0.4 mg Cap, TAKE 2 CAPSULES BY MOUTH EVERY DAY, Disp: 180 capsule, Rfl: 1    tiZANidine (ZANAFLEX) 4 MG tablet, TAKE 1 TABLET BY MOUTH EVERY 8 HOURS., Disp: 30 tablet, Rfl: 1    tiZANidine (ZANAFLEX) 4 MG tablet, Take 1 tablet (4 mg total) by mouth every 8 (eight) hours., Disp: 30 tablet, Rfl: 1    triamcinolone acetonide 0.1% (KENALOG) 0.1 % paste, Place onto teeth 2 (two) times daily., Disp: , Rfl:     vancomycin (VANCOCIN) 1,000 mg injection, MIX THE CONTENTS OF 1 VIAL WITH UP TO 10 GRAMS OF OINTMENT. APPLY TO INFECTION SITE ONCE DAILY., Disp: , Rfl:     zonisamide (ZONEGRAN) 50 MG Cap, Take 1 capsule (50 mg total) by mouth 2 (two) times daily., Disp: 180 capsule, Rfl: 3    ALPRAZolam (XANAX) 1 MG tablet, Take 1 tablet (1 mg total) by mouth once. for 1 dose (Patient not taking: Reported on 8/29/2022), Disp: 2 tablet, Rfl: 0    benzoyl peroxide (BP WASH) 10 % external wash, Use daily as wash to affected areas. Rinse completely.  May bleach clothing (Patient not taking: No sig reported), Disp: 227 g, Rfl: 12    clindamycin (CLEOCIN T) 1 % Swab, Apply topically 2 (two) times daily., Disp: 60 each, Rfl: 3    fluticasone furoate-vilanterol (BREO) 100-25 mcg/dose diskus inhaler, Inhale 1 puff into the lungs once daily. Controller  (Patient not taking: No sig reported), Disp: 90 each, Rfl: 1        Vitals:    09/06/22 0836   BP: 126/80   Pulse: 89   Temp: 97.8 °F (36.6 °C)       PHYSICAL EXAM    GEN: NAD, AAox3, well nourished  HEENT: NCAT, EOMI, PEERL, no scleral injection, TM normal, moist mucous membranes, oropharynx clear, no erythema, no exudates  NECK: full rom, no cervical lymphadenopathy, no thyroidmegally  CV: RRR, no m/r/g, trace LE edema  LUNGS: CTAB, non-labored breathing, mild wheezing in right lower lobe on expiration, no crackles      1. COPD with acute exacerbation      Assessment and Plan     Acute COPD exacerbation (resolved)  Patient was seen roughly 1 week for exacerbation of COPD with chest fullness and productive cough. He was started on levofloxacin and is near asymptomatic during today's visit  -Continue pulmonary follow up    RTC as needed     I hereby acknowledge that I am relying upon documentation authored by a medical student working under my supervision and further I hereby attest that I have verified the student documentation or findings by personally performing the physical exam and medical decision making activities of the Evaluation and Management service to be billed.    Angie Jones MD

## 2022-09-15 ENCOUNTER — PES CALL (OUTPATIENT)
Dept: ADMINISTRATIVE | Facility: CLINIC | Age: 62
End: 2022-09-15
Payer: MEDICARE

## 2022-09-22 NOTE — TELEPHONE ENCOUNTER
No new care gaps identified.  [facility] Embedded Care Gaps. Reference number: [id number]. 9/22/2022   12:41:46 AM ANANDT

## 2022-09-23 RX ORDER — SIMVASTATIN 80 MG/1
TABLET, FILM COATED ORAL
Qty: 90 TABLET | Refills: 0 | Status: SHIPPED | OUTPATIENT
Start: 2022-09-23 | End: 2022-10-24

## 2022-09-30 ENCOUNTER — PES CALL (OUTPATIENT)
Dept: ADMINISTRATIVE | Facility: CLINIC | Age: 62
End: 2022-09-30
Payer: MEDICARE

## 2022-10-03 ENCOUNTER — TELEPHONE (OUTPATIENT)
Dept: ADMINISTRATIVE | Facility: CLINIC | Age: 62
End: 2022-10-03
Payer: MEDICARE

## 2022-10-03 NOTE — TELEPHONE ENCOUNTER
Called pt, informed pt I was calling to remind pt of his in office EAWV on 10/4/22; clinic location provided to patient; pt confirmed appointment

## 2022-10-04 ENCOUNTER — OFFICE VISIT (OUTPATIENT)
Dept: FAMILY MEDICINE | Facility: CLINIC | Age: 62
End: 2022-10-04
Payer: MEDICARE

## 2022-10-04 VITALS
RESPIRATION RATE: 16 BRPM | WEIGHT: 247.13 LBS | BODY MASS INDEX: 36.6 KG/M2 | OXYGEN SATURATION: 97 % | HEART RATE: 107 BPM | DIASTOLIC BLOOD PRESSURE: 82 MMHG | SYSTOLIC BLOOD PRESSURE: 120 MMHG | HEIGHT: 69 IN | TEMPERATURE: 98 F

## 2022-10-04 DIAGNOSIS — D84.9 IMMUNOSUPPRESSED STATUS: ICD-10-CM

## 2022-10-04 DIAGNOSIS — Z00.00 ENCOUNTER FOR PREVENTIVE HEALTH EXAMINATION: Primary | ICD-10-CM

## 2022-10-04 DIAGNOSIS — K21.9 GASTROESOPHAGEAL REFLUX DISEASE WITHOUT ESOPHAGITIS: ICD-10-CM

## 2022-10-04 DIAGNOSIS — N39.43 BENIGN PROSTATIC HYPERPLASIA WITH POST-VOID DRIBBLING: ICD-10-CM

## 2022-10-04 DIAGNOSIS — M54.50 CHRONIC BILATERAL LOW BACK PAIN WITHOUT SCIATICA: ICD-10-CM

## 2022-10-04 DIAGNOSIS — N40.1 BENIGN PROSTATIC HYPERPLASIA WITH POST-VOID DRIBBLING: ICD-10-CM

## 2022-10-04 DIAGNOSIS — I25.10 ATHEROSCLEROSIS OF NATIVE CORONARY ARTERY OF NATIVE HEART WITHOUT ANGINA PECTORIS: ICD-10-CM

## 2022-10-04 DIAGNOSIS — J41.0 SIMPLE CHRONIC BRONCHITIS: ICD-10-CM

## 2022-10-04 DIAGNOSIS — E66.01 SEVERE OBESITY (BMI 35.0-39.9) WITH COMORBIDITY: ICD-10-CM

## 2022-10-04 DIAGNOSIS — I70.0 ATHEROSCLEROSIS OF AORTA: ICD-10-CM

## 2022-10-04 DIAGNOSIS — E78.5 HYPERLIPIDEMIA ASSOCIATED WITH TYPE 2 DIABETES MELLITUS: ICD-10-CM

## 2022-10-04 DIAGNOSIS — F20.89 OTHER SCHIZOPHRENIA: ICD-10-CM

## 2022-10-04 DIAGNOSIS — G44.229 CHRONIC TENSION-TYPE HEADACHE, NOT INTRACTABLE: ICD-10-CM

## 2022-10-04 DIAGNOSIS — E11.69 HYPERLIPIDEMIA ASSOCIATED WITH TYPE 2 DIABETES MELLITUS: ICD-10-CM

## 2022-10-04 DIAGNOSIS — G89.29 CHRONIC BILATERAL LOW BACK PAIN WITHOUT SCIATICA: ICD-10-CM

## 2022-10-04 DIAGNOSIS — I10 ESSENTIAL HYPERTENSION: ICD-10-CM

## 2022-10-04 DIAGNOSIS — F31.9 BIPOLAR 1 DISORDER: ICD-10-CM

## 2022-10-04 DIAGNOSIS — E11.42 TYPE 2 DIABETES MELLITUS WITH DIABETIC POLYNEUROPATHY, WITHOUT LONG-TERM CURRENT USE OF INSULIN: ICD-10-CM

## 2022-10-04 PROBLEM — Z12.11 COLON CANCER SCREENING: Status: RESOLVED | Noted: 2020-10-30 | Resolved: 2022-10-04

## 2022-10-04 PROBLEM — M62.838 MUSCLE SPASM: Status: RESOLVED | Noted: 2019-12-04 | Resolved: 2022-10-04

## 2022-10-04 PROCEDURE — 3044F PR MOST RECENT HEMOGLOBIN A1C LEVEL <7.0%: ICD-10-PCS | Mod: CPTII,S$GLB,, | Performed by: NURSE PRACTITIONER

## 2022-10-04 PROCEDURE — 1159F PR MEDICATION LIST DOCUMENTED IN MEDICAL RECORD: ICD-10-PCS | Mod: CPTII,S$GLB,, | Performed by: NURSE PRACTITIONER

## 2022-10-04 PROCEDURE — 1160F PR REVIEW ALL MEDS BY PRESCRIBER/CLIN PHARMACIST DOCUMENTED: ICD-10-PCS | Mod: CPTII,S$GLB,, | Performed by: NURSE PRACTITIONER

## 2022-10-04 PROCEDURE — 99999 PR PBB SHADOW E&M-EST. PATIENT-LVL IV: ICD-10-PCS | Mod: PBBFAC,,, | Performed by: NURSE PRACTITIONER

## 2022-10-04 PROCEDURE — 3008F PR BODY MASS INDEX (BMI) DOCUMENTED: ICD-10-PCS | Mod: CPTII,S$GLB,, | Performed by: NURSE PRACTITIONER

## 2022-10-04 PROCEDURE — 3074F SYST BP LT 130 MM HG: CPT | Mod: CPTII,S$GLB,, | Performed by: NURSE PRACTITIONER

## 2022-10-04 PROCEDURE — 3079F PR MOST RECENT DIASTOLIC BLOOD PRESSURE 80-89 MM HG: ICD-10-PCS | Mod: CPTII,S$GLB,, | Performed by: NURSE PRACTITIONER

## 2022-10-04 PROCEDURE — 1160F RVW MEDS BY RX/DR IN RCRD: CPT | Mod: CPTII,S$GLB,, | Performed by: NURSE PRACTITIONER

## 2022-10-04 PROCEDURE — 99999 PR PBB SHADOW E&M-EST. PATIENT-LVL IV: CPT | Mod: PBBFAC,,, | Performed by: NURSE PRACTITIONER

## 2022-10-04 PROCEDURE — 3079F DIAST BP 80-89 MM HG: CPT | Mod: CPTII,S$GLB,, | Performed by: NURSE PRACTITIONER

## 2022-10-04 PROCEDURE — G0439 PR MEDICARE ANNUAL WELLNESS SUBSEQUENT VISIT: ICD-10-PCS | Mod: S$GLB,,, | Performed by: NURSE PRACTITIONER

## 2022-10-04 PROCEDURE — 99499 UNLISTED E&M SERVICE: CPT | Mod: S$GLB,,, | Performed by: NURSE PRACTITIONER

## 2022-10-04 PROCEDURE — 1159F MED LIST DOCD IN RCRD: CPT | Mod: CPTII,S$GLB,, | Performed by: NURSE PRACTITIONER

## 2022-10-04 PROCEDURE — 3044F HG A1C LEVEL LT 7.0%: CPT | Mod: CPTII,S$GLB,, | Performed by: NURSE PRACTITIONER

## 2022-10-04 PROCEDURE — G0439 PPPS, SUBSEQ VISIT: HCPCS | Mod: S$GLB,,, | Performed by: NURSE PRACTITIONER

## 2022-10-04 PROCEDURE — 3008F BODY MASS INDEX DOCD: CPT | Mod: CPTII,S$GLB,, | Performed by: NURSE PRACTITIONER

## 2022-10-04 PROCEDURE — 3074F PR MOST RECENT SYSTOLIC BLOOD PRESSURE < 130 MM HG: ICD-10-PCS | Mod: CPTII,S$GLB,, | Performed by: NURSE PRACTITIONER

## 2022-10-04 PROCEDURE — 4010F PR ACE/ARB THEARPY RXD/TAKEN: ICD-10-PCS | Mod: CPTII,S$GLB,, | Performed by: NURSE PRACTITIONER

## 2022-10-04 PROCEDURE — 4010F ACE/ARB THERAPY RXD/TAKEN: CPT | Mod: CPTII,S$GLB,, | Performed by: NURSE PRACTITIONER

## 2022-10-04 NOTE — PATIENT INSTRUCTIONS
Counseling and Referral of Other Preventative  (Italic type indicates deductible and co-insurance are waived)    Patient Name: Scott Bansal  Today's Date: 10/4/2022    Health Maintenance       Date Due Completion Date    Foot Exam 10/26/2021 10/26/2020 (Done)    Override on 10/26/2020: Done (completed by insurance)    Influenza Vaccine (1) Never done ---    Diabetes Urine Screening 10/20/2022 10/20/2021    Hemoglobin A1c 10/19/2022 4/19/2022    Lipid Panel 10/20/2022 10/20/2021    Eye Exam 06/02/2023 6/2/2022    High Dose Statin 10/04/2023 10/4/2022    Colorectal Cancer Screening 06/28/2024 6/28/2021    TETANUS VACCINE 07/07/2027 7/7/2017        No orders of the defined types were placed in this encounter.      The following information is provided to all patients.  This information is to help you find resources for any of the problems found today that may be affecting your health:                Living healthy guide: www.[facility].[place].gov      Understanding Diabetes: www.diabetes.org      Eating healthy: www.cdc.gov/healthyweight      CDC home safety checklist: www.cdc.gov/steadi/patient.html      Agency on Aging: www.goea.[place].gov      Alcoholics anonymous (AA): www.aa.org      Physical Activity: www.shavonne.nih.gov/nk7itsp      Tobacco use: www.quit[place].org

## 2022-10-04 NOTE — PROGRESS NOTES
"Scott Bansal presented for a  Medicare AWV and comprehensive Health Risk Assessment today. The following components were reviewed and updated:    Medical history  Family History  Social history  Allergies and Current Medications  Health Risk Assessment  Health Maintenance  Care Team         ** See Completed Assessments for Annual Wellness Visit within the encounter summary.**         The following assessments were completed:  Living Situation  CAGE  Depression Screening  Timed Get Up and Go  Whisper Test  Cognitive Function Screening  Nutrition Screening  ADL Screening  PAQ Screening        Vitals:    10/04/22 0911   BP: 120/82   Pulse: 107   Resp: 16   Temp: 97.9 °F (36.6 °C)   TempSrc: Oral   SpO2: 97%   Weight: 112.1 kg (247 lb 2.2 oz)   Height: 5' 9" (1.753 m)     Body mass index is 36.5 kg/m².  Physical Exam  Vitals and nursing note reviewed.   Constitutional:       General: He is not in acute distress.     Appearance: Normal appearance. He is well-developed and well-groomed. He is obese. He is not ill-appearing.   HENT:      Head: Normocephalic and atraumatic.      Right Ear: External ear normal.      Left Ear: External ear normal.      Nose: Nose normal.      Mouth/Throat:      Lips: Pink.      Mouth: Mucous membranes are moist.   Eyes:      General: Lids are normal. Vision grossly intact. Gaze aligned appropriately. No scleral icterus.        Right eye: No discharge.         Left eye: No discharge.      Conjunctiva/sclera: Conjunctivae normal.   Neck:      Trachea: Phonation normal.   Pulmonary:      Effort: Pulmonary effort is normal. No accessory muscle usage or respiratory distress.   Abdominal:      General: Abdomen is flat. There is no distension.      Palpations: Abdomen is soft.      Tenderness: There is no abdominal tenderness.   Musculoskeletal:      Cervical back: Neck supple.   Skin:     General: Skin is warm and dry.      Findings: No rash.   Neurological:      General: No focal deficit " present.      Mental Status: He is alert and oriented to person, place, and time. Mental status is at baseline.      Motor: No abnormal muscle tone.      Gait: Gait normal.   Psychiatric:         Attention and Perception: Attention and perception normal.         Mood and Affect: Mood and affect normal.         Speech: Speech normal.         Behavior: Behavior normal. Behavior is cooperative.         Thought Content: Thought content normal. Thought content does not include homicidal or suicidal ideation. Thought content does not include homicidal or suicidal plan.         Cognition and Memory: Cognition and memory normal.         Judgment: Judgment normal.         Diagnoses and health risks identified today and associated recommendations/orders:    1. Encounter for preventive health examination  Health maintenance reviewed and up to date, will f/u with PCP for routine preventative care  Review for Opioid Screening: Pt does not have Rx for Opioids   Review for Substance Use Disorders: Patient does not use substance     2. Type 2 diabetes mellitus with diabetic polyneuropathy, without long-term current use of insulin  DM controlled, last HgA1c 6.4 on metformin, neuropathy controlled with gabapentin and elavil, managed by PCP    3. Hyperlipidemia associated with type 2 diabetes mellitus  Controlled on statin and fibrate    4. Atherosclerosis of aorta  Asymptomatic, BP controlled and no anticoagulation    5. Simple chronic bronchitis  Controlled with episodic inhaled beta2 agonist no longer needing daily preventative therapy since being treated with immunomodulating injections     6. Other schizophrenia  Stable on current regimen, under close surveillance of psychiatry and     7. Bipolar 1 disorder  Stable on current regimen, under close surveillance of psychiatry and     8. Immunosuppressed status  On dupixent injections for eczema, also improving COPD, managed by dermatology      9. Severe  obesity (BMI 35.0-39.9) with comorbidity  The patient is asked to make an attempt to improve diet and exercise patterns to aid in medical management of this problem.    10. Essential hypertension  Controlled on current regimen, managed by PCP     11. Atherosclerosis of native coronary artery of native heart without angina pectoris  Without CV complaints, BP controlled, compliant with statin and no anticoagulation     12. Chronic tension-type headache, not intractable  Stable with current regimen, followed by neurology     13. Benign prostatic hyperplasia with post-void dribbling  Improved with flomax     14. Gastroesophageal reflux disease without esophagitis  Controlled on PPI     15. Chronic bilateral low back pain without sciatica  Chronic pain managed with narcotic therapy      Provided Scott with a 5-10 year written screening schedule and personal prevention plan. Recommendations were developed using the USPSTF age appropriate recommendations. Education, counseling, and referrals were provided as needed. After Visit Summary printed and given to patient which includes a list of additional screenings\tests needed.    Follow up in about 1 year (around 10/4/2023).    Ailyn Zelaya NP  I offered to discuss advanced care planning, including how to pick a person who would make decisions for you if you were unable to make them for yourself, called a health care power of , and what kind of decisions you might make such as use of life sustaining treatments such as ventilators and tube feeding when faced with a life limiting illness recorded on a living will that they will need to know. (How you want to be cared for as you near the end of your natural life)     X Patient is interested in learning more about how to make advanced directives.  I provided them paperwork and offered to discuss this with them.

## 2022-10-25 DIAGNOSIS — J01.90 ACUTE RHINOSINUSITIS: ICD-10-CM

## 2022-10-25 DIAGNOSIS — J20.9 ACUTE BRONCHITIS, UNSPECIFIED ORGANISM: ICD-10-CM

## 2022-10-25 RX ORDER — FLUTICASONE PROPIONATE 50 MCG
2 SPRAY, SUSPENSION (ML) NASAL DAILY
Qty: 16 ML | Refills: 5 | Status: SHIPPED | OUTPATIENT
Start: 2022-10-25 | End: 2022-10-25 | Stop reason: SDUPTHER

## 2022-10-25 NOTE — TELEPHONE ENCOUNTER
No new care gaps identified.  [facility] Embedded Care Gaps. Reference number: [id number]. 10/25/2022   2:52:37 PM CDT

## 2022-10-25 NOTE — TELEPHONE ENCOUNTER
----- Message from Noemi Dsouza sent at 10/25/2022  8:01 AM CDT -----  Regarding: Refill request  Type:  RX Refill Request    Who Called: GARO JUAREZ [[id number]]    Refill or New Rx: Refill     RX Name and Strength: fluticasone propionate (FLONASE) 50 mcg/actuation nasal spray    How is the patient currently taking it? (ex. 1XDay)    Is this a 30 day or 90 day RX:    Preferred Pharmacy with phone number: [facility]/PHARMACY #[id number] - [place] - [address]    Local or Mail Order: local     Ordering Provider: Pawan Yao Call Back Number: [phone]    Additional Information:

## 2022-10-25 NOTE — TELEPHONE ENCOUNTER
No new care gaps identified.  [facility] Embedded Care Gaps. Reference number: [id number]. 10/25/2022   8:48:50 AM CDT

## 2022-10-25 NOTE — TELEPHONE ENCOUNTER
----- Message from Hilary Mota sent at 10/25/2022  2:42 PM CDT -----  Type: Patient Call Back    Who called: self     What is the request in detail: Patient is requesting his fluticasone propionate (FLONASE) 50 mcg/actuation nasal spray  be sent to [facility] instead of [facility] [place].     [facility]/pharmacy #[id number] - [place] - [address]  [address] [address]  Phone: [phone] Fax: [phone]    Can the clinic reply by [facility]? No     Would the patient rather a call back or a response via [facility]? Call back     Best call back number: [phone]

## 2022-10-26 DIAGNOSIS — E11.9 TYPE 2 DIABETES MELLITUS WITHOUT COMPLICATION: ICD-10-CM

## 2022-10-26 DIAGNOSIS — J20.9 ACUTE BRONCHITIS, UNSPECIFIED ORGANISM: ICD-10-CM

## 2022-10-26 DIAGNOSIS — J01.90 ACUTE RHINOSINUSITIS: ICD-10-CM

## 2022-10-26 RX ORDER — FLUTICASONE PROPIONATE 50 MCG
2 SPRAY, SUSPENSION (ML) NASAL DAILY
Qty: 16 ML | Refills: 5 | Status: SHIPPED | OUTPATIENT
Start: 2022-10-26 | End: 2022-10-26 | Stop reason: SDUPTHER

## 2022-10-26 RX ORDER — FLUTICASONE PROPIONATE 50 MCG
2 SPRAY, SUSPENSION (ML) NASAL DAILY
Qty: 16 ML | Refills: 5 | Status: SHIPPED | OUTPATIENT
Start: 2022-10-26 | End: 2022-10-28 | Stop reason: SDUPTHER

## 2022-10-26 NOTE — TELEPHONE ENCOUNTER
No new care gaps identified.  [facility] Embedded Care Gaps. Reference number: [id number]. 10/26/2022   3:54:34 PM CDT

## 2022-10-28 DIAGNOSIS — J20.9 ACUTE BRONCHITIS, UNSPECIFIED ORGANISM: ICD-10-CM

## 2022-10-28 DIAGNOSIS — J01.90 ACUTE RHINOSINUSITIS: ICD-10-CM

## 2022-10-28 RX ORDER — FLUTICASONE PROPIONATE 50 MCG
2 SPRAY, SUSPENSION (ML) NASAL DAILY
Qty: 16 ML | Refills: 5 | Status: SHIPPED | OUTPATIENT
Start: 2022-10-28

## 2022-10-28 NOTE — TELEPHONE ENCOUNTER
No new care gaps identified.  [facility] Embedded Care Gaps. Reference number: [id number]. 10/28/2022   8:37:39 AM ANANDT

## 2022-10-31 ENCOUNTER — OFFICE VISIT (OUTPATIENT)
Dept: FAMILY MEDICINE | Facility: CLINIC | Age: 62
End: 2022-10-31
Payer: MEDICARE

## 2022-10-31 ENCOUNTER — LAB VISIT (OUTPATIENT)
Dept: LAB | Facility: HOSPITAL | Age: 62
End: 2022-10-31
Attending: FAMILY MEDICINE
Payer: MEDICARE

## 2022-10-31 ENCOUNTER — PATIENT MESSAGE (OUTPATIENT)
Dept: ADMINISTRATIVE | Facility: HOSPITAL | Age: 62
End: 2022-10-31
Payer: MEDICARE

## 2022-10-31 VITALS
WEIGHT: 244.69 LBS | HEART RATE: 95 BPM | SYSTOLIC BLOOD PRESSURE: 134 MMHG | OXYGEN SATURATION: 98 % | DIASTOLIC BLOOD PRESSURE: 78 MMHG | TEMPERATURE: 97 F | BODY MASS INDEX: 36.24 KG/M2 | HEIGHT: 69 IN

## 2022-10-31 DIAGNOSIS — I70.0 ATHEROSCLEROSIS OF AORTA: ICD-10-CM

## 2022-10-31 DIAGNOSIS — E11.42 TYPE 2 DIABETES MELLITUS WITH DIABETIC POLYNEUROPATHY, WITHOUT LONG-TERM CURRENT USE OF INSULIN: ICD-10-CM

## 2022-10-31 DIAGNOSIS — E11.42 TYPE 2 DIABETES MELLITUS WITH DIABETIC POLYNEUROPATHY, WITHOUT LONG-TERM CURRENT USE OF INSULIN: Primary | ICD-10-CM

## 2022-10-31 DIAGNOSIS — F31.9 BIPOLAR 1 DISORDER: ICD-10-CM

## 2022-10-31 LAB
CHOLEST SERPL-MCNC: 109 MG/DL (ref 120–199)
CHOLEST/HDLC SERPL: 3.4 {RATIO} (ref 2–5)
ESTIMATED AVG GLUCOSE: 140 MG/DL (ref 68–131)
HBA1C MFR BLD: 6.5 % (ref 4–5.6)
HDLC SERPL-MCNC: 32 MG/DL (ref 40–75)
HDLC SERPL: 29.4 % (ref 20–50)
LDLC SERPL CALC-MCNC: 56 MG/DL (ref 63–159)
NONHDLC SERPL-MCNC: 77 MG/DL
TRIGL SERPL-MCNC: 105 MG/DL (ref 30–150)
TSH SERPL DL<=0.005 MIU/L-ACNC: 1.88 UIU/ML (ref 0.4–4)

## 2022-10-31 PROCEDURE — 3075F PR MOST RECENT SYSTOLIC BLOOD PRESS GE 130-139MM HG: ICD-10-PCS | Mod: CPTII,S$GLB,, | Performed by: FAMILY MEDICINE

## 2022-10-31 PROCEDURE — 84443 ASSAY THYROID STIM HORMONE: CPT | Performed by: FAMILY MEDICINE

## 2022-10-31 PROCEDURE — 4010F PR ACE/ARB THEARPY RXD/TAKEN: ICD-10-PCS | Mod: CPTII,S$GLB,, | Performed by: FAMILY MEDICINE

## 2022-10-31 PROCEDURE — 1159F MED LIST DOCD IN RCRD: CPT | Mod: CPTII,S$GLB,, | Performed by: FAMILY MEDICINE

## 2022-10-31 PROCEDURE — 3066F NEPHROPATHY DOC TX: CPT | Mod: CPTII,S$GLB,, | Performed by: FAMILY MEDICINE

## 2022-10-31 PROCEDURE — 4010F ACE/ARB THERAPY RXD/TAKEN: CPT | Mod: CPTII,S$GLB,, | Performed by: FAMILY MEDICINE

## 2022-10-31 PROCEDURE — 3060F POS MICROALBUMINURIA REV: CPT | Mod: CPTII,S$GLB,, | Performed by: FAMILY MEDICINE

## 2022-10-31 PROCEDURE — 99499 RISK ADDL DX/OHS AUDIT: ICD-10-PCS | Mod: S$GLB,,, | Performed by: FAMILY MEDICINE

## 2022-10-31 PROCEDURE — 3078F PR MOST RECENT DIASTOLIC BLOOD PRESSURE < 80 MM HG: ICD-10-PCS | Mod: CPTII,S$GLB,, | Performed by: FAMILY MEDICINE

## 2022-10-31 PROCEDURE — 3044F PR MOST RECENT HEMOGLOBIN A1C LEVEL <7.0%: ICD-10-PCS | Mod: CPTII,S$GLB,, | Performed by: FAMILY MEDICINE

## 2022-10-31 PROCEDURE — 1160F PR REVIEW ALL MEDS BY PRESCRIBER/CLIN PHARMACIST DOCUMENTED: ICD-10-PCS | Mod: CPTII,S$GLB,, | Performed by: FAMILY MEDICINE

## 2022-10-31 PROCEDURE — 3060F PR POS MICROALBUMINURIA RESULT DOCUMENTED/REVIEW: ICD-10-PCS | Mod: CPTII,S$GLB,, | Performed by: FAMILY MEDICINE

## 2022-10-31 PROCEDURE — 3078F DIAST BP <80 MM HG: CPT | Mod: CPTII,S$GLB,, | Performed by: FAMILY MEDICINE

## 2022-10-31 PROCEDURE — 80061 LIPID PANEL: CPT | Performed by: FAMILY MEDICINE

## 2022-10-31 PROCEDURE — 83036 HEMOGLOBIN GLYCOSYLATED A1C: CPT | Performed by: FAMILY MEDICINE

## 2022-10-31 PROCEDURE — 1160F RVW MEDS BY RX/DR IN RCRD: CPT | Mod: CPTII,S$GLB,, | Performed by: FAMILY MEDICINE

## 2022-10-31 PROCEDURE — 3008F BODY MASS INDEX DOCD: CPT | Mod: CPTII,S$GLB,, | Performed by: FAMILY MEDICINE

## 2022-10-31 PROCEDURE — 36415 COLL VENOUS BLD VENIPUNCTURE: CPT | Mod: PO | Performed by: FAMILY MEDICINE

## 2022-10-31 PROCEDURE — 99999 PR PBB SHADOW E&M-EST. PATIENT-LVL III: CPT | Mod: PBBFAC,,, | Performed by: FAMILY MEDICINE

## 2022-10-31 PROCEDURE — 1159F PR MEDICATION LIST DOCUMENTED IN MEDICAL RECORD: ICD-10-PCS | Mod: CPTII,S$GLB,, | Performed by: FAMILY MEDICINE

## 2022-10-31 PROCEDURE — 99999 PR PBB SHADOW E&M-EST. PATIENT-LVL III: ICD-10-PCS | Mod: PBBFAC,,, | Performed by: FAMILY MEDICINE

## 2022-10-31 PROCEDURE — 99214 OFFICE O/P EST MOD 30 MIN: CPT | Mod: S$GLB,,, | Performed by: FAMILY MEDICINE

## 2022-10-31 PROCEDURE — 3044F HG A1C LEVEL LT 7.0%: CPT | Mod: CPTII,S$GLB,, | Performed by: FAMILY MEDICINE

## 2022-10-31 PROCEDURE — 3075F SYST BP GE 130 - 139MM HG: CPT | Mod: CPTII,S$GLB,, | Performed by: FAMILY MEDICINE

## 2022-10-31 PROCEDURE — 99499 UNLISTED E&M SERVICE: CPT | Mod: S$GLB,,, | Performed by: FAMILY MEDICINE

## 2022-10-31 PROCEDURE — 3008F PR BODY MASS INDEX (BMI) DOCUMENTED: ICD-10-PCS | Mod: CPTII,S$GLB,, | Performed by: FAMILY MEDICINE

## 2022-10-31 PROCEDURE — 3066F PR DOCUMENTATION OF TREATMENT FOR NEPHROPATHY: ICD-10-PCS | Mod: CPTII,S$GLB,, | Performed by: FAMILY MEDICINE

## 2022-10-31 PROCEDURE — 99214 PR OFFICE/OUTPT VISIT, EST, LEVL IV, 30-39 MIN: ICD-10-PCS | Mod: S$GLB,,, | Performed by: FAMILY MEDICINE

## 2022-10-31 NOTE — PROGRESS NOTES
Assessment & Plan  Problem List Items Addressed This Visit          Psychiatric    Bipolar 1 disorder    Overview     Currently on buspar, bupropion, seroquel  Dr. Cordova at [facility]          Relevant Orders    Hemoglobin A1C (Completed)    TSH (Completed)    Microalbumin/Creatinine Ratio, Urine (Completed)    Lipid Panel (Completed)       Cardiac/Vascular    Atherosclerosis of aorta    Overview     - noted on CXR 3/2018         Relevant Orders    Hemoglobin A1C (Completed)    TSH (Completed)    Microalbumin/Creatinine Ratio, Urine (Completed)    Lipid Panel (Completed)       Endocrine    Type 2 diabetes mellitus with diabetic polyneuropathy, without long-term current use of insulin - Primary    Relevant Orders    Hemoglobin A1C (Completed)    TSH (Completed)    Microalbumin/Creatinine Ratio, Urine (Completed)    Lipid Panel (Completed)         Health Maintenance reviewed,.    Follow-up: No follow-ups on file.    ______________________________________________________________________    Chief Complaint  Chief Complaint   Patient presents with    Diabetes       HPI  Scott Bansal is a 62 y.o. male with multiple medical diagnoses as listed in the medical history and problem list that presents for diabetes.  Pt is known to me with last appointment 4/27/2022.    Patient denies any new symptoms including chest pain, SOB, blurry vision, N/V, diarrhea.  102 was his blood glucose 3-4 days ago. He monitors infrequently for his blood glucose levels.  He has noted that he has increased forgetfulness.  He will forget when he goes into a room what he went into the room.        PAST MEDICAL HISTORY:  Past Medical History:   Diagnosis Date    Bipolar 1 disorder, mixed     BPH (benign prostatic hypertrophy)     Colon polyp     Cyst, eyelid     Dr. Broussard    Diabetes mellitus     GERD (gastroesophageal reflux disease)     Hyperlipidemia     Hypertension     Lumbar degenerative disc disease 3/7/2019     Migraine headache     Obesity        PAST SURGICAL HISTORY:  Past Surgical History:   Procedure Laterality Date    CERVICAL SPINE SURGERY      COLONOSCOPY N/A 2017    Procedure: COLONOSCOPY;  Surgeon: Genaro Nix MD;  Location: [facility] ENDO;  Service: Endoscopy;  Laterality: N/A;    COLONOSCOPY N/A 10/30/2020    Procedure: COLONOSCOPY;  Surgeon: Herb Martinez MD;  Location: [facility] ENDO;  Service: Endoscopy;  Laterality: N/A;    EYE SURGERY Left 2017    cyst removal on eyelid; Dr. Broussard    SHOULDER SURGERY      TONSILLECTOMY         SOCIAL HISTORY:  Social History     Socioeconomic History    Marital status:    Tobacco Use    Smoking status: Former     Packs/day: 2.00     Years: 15.00     Pack years: 30.00     Types: Cigarettes     Quit date: 1984     Years since quittin.5    Smokeless tobacco: Never    Tobacco comments:     Patient quit smoking at the age of 24 yo.   Substance and Sexual Activity    Alcohol use: No    Drug use: No    Sexual activity: Yes     Partners: Female     Social Determinants of Health     Financial Resource Strain: Medium Risk    Difficulty of Paying Living Expenses: Somewhat hard   Food Insecurity: No Food Insecurity    Worried About Running Out of Food in the Last Year: Never true    Ran Out of Food in the Last Year: Never true   Transportation Needs: No Transportation Needs    Lack of Transportation (Medical): No    Lack of Transportation (Non-Medical): No   Physical Activity: Inactive    Days of Exercise per Week: 0 days    Minutes of Exercise per Session: 0 min   Stress: Stress Concern Present    Feeling of Stress : To some extent   Social Connections: Moderately Integrated    Frequency of Communication with Friends and Family: More than three times a week    Frequency of Social Gatherings with Friends and Family: Never    Attends Nondenominational Services: More than 4 times per year    Active Member of Clubs or Organizations: No    Attends Club or Organization  Meetings: Never    Marital Status: Living with partner   Housing Stability: Low Risk     Unable to Pay for Housing in the Last Year: No    Number of Places Lived in the Last Year: 1    Unstable Housing in the Last Year: No       FAMILY HISTORY:  Family History   Problem Relation Age of Onset    Cataracts Mother     Cancer Mother         throat    Hypertension Mother     Hypertension Father     Stroke Father         2 strokes    Hypertension Sister     Cancer Brother         spinal, kidney, spinal fluid    Hypertension Brother     Cancer Cousin         breast?       ALLERGIES AND MEDICATIONS: updated and reviewed.  Review of patient's allergies indicates:   Allergen Reactions    Levofloxacin Hallucinations    Sulfa (sulfonamide antibiotics) Rash     Current Outpatient Medications   Medication Sig Dispense Refill    albuterol (ACCUNEB) 1.25 mg/3 mL Nebu Take 3 mLs (1.25 mg total) by nebulization every 6 (six) hours as needed (SOB). Rescue 75 mL 11    albuterol (VENTOLIN HFA) 90 mcg/actuation inhaler INHALE 2 PUFFS EVERY 4 HOURS AS NEEDED 18 g 3    amitriptyline (ELAVIL) 25 MG tablet Take 1 tablet (25 mg total) by mouth every evening. 30 tablet 11    amLODIPine (NORVASC) 10 MG tablet TAKE 1 TABLET BY MOUTH EVERY DAY 90 tablet 3    blood sugar diagnostic (TRUETEST TEST STRIPS) Strp 1 each by Misc.(Non-Drug; Combo Route) route 3 (three) times a week. 100 each 3    busPIRone (BUSPAR) 10 MG tablet Take 10 mg by mouth 3 (three) times daily.      CLOTRIMAZOLE-BETAMETH DIP-ZINC TOP       crisaborole (EUCRISA) 2 % Oint Use for hand eczema bid. 60 g 5    cyclobenzaprine (FLEXERIL) 10 MG tablet TAKE 1 TABLET BY MOUTH THREE TIMES A DAY AS NEEDED FOR MUSCLE SPASMS 270 tablet 1    DUPIXENT SYRINGE 300 mg/2 mL Syrg Inject 1 syringe (300mg) into the skin every other week 4 mL 5    fluticasone propionate (FLONASE) 50 mcg/actuation nasal spray 2 sprays (100 mcg total) by Each Nostril route once daily. 16 mL 5    gabapentin (NEURONTIN)  300 MG capsule TAKE 1 CAPSULE BY MOUTH TWICE A  capsule 2    gemfibroziL (LOPID) 600 MG tablet TAKE 1 TABLET BY MOUTH TWICE A DAY BEFORE MEALS 180 tablet 3    hydrOXYzine (ATARAX) 50 MG tablet TAKE 1 TABLET (50 MG TOTAL) BY MOUTH 3 (THREE) TIMES DAILY AS NEEDED FOR ITCHING. 270 tablet 3    levocetirizine (XYZAL) 5 MG tablet TAKE 1 TABLET BY MOUTH EACH MORNING 90 tablet 1    quetiapine (SEROQUEL) 300 MG Tab Take by mouth.      simvastatin (ZOCOR) 80 MG tablet TAKE 1 TABLET BY MOUTH EVERY DAY 90 tablet 0    sumatriptan (IMITREX) 100 MG tablet TAKE 1 TABLET BY MOUTH EVERY 2 HOURS AS NEEDED FOR MIGRAINE 27 tablet 3    tiZANidine (ZANAFLEX) 4 MG tablet Take 1 tablet (4 mg total) by mouth every 8 (eight) hours. 30 tablet 1    zonisamide (ZONEGRAN) 50 MG Cap Take 1 capsule (50 mg total) by mouth 2 (two) times daily. 180 capsule 3    candesartan (ATACAND) 4 MG tablet TAKE 1 TABLET BY MOUTH EVERY DAY 90 tablet 1    meloxicam (MOBIC) 15 MG tablet TAKE 1 TABLET BY MOUTH EVERY DAY 30 tablet 0    metFORMIN (GLUCOPHAGE) 1000 MG tablet TAKE 1 TABLET BY MOUTH TWICE A  tablet 0    pantoprazole (PROTONIX) 40 MG tablet TAKE 1 TABLET BY MOUTH EVERY DAY 90 tablet 3    tamsulosin (FLOMAX) 0.4 mg Cap TAKE 2 CAPSULES BY MOUTH EVERY  capsule 3     No current facility-administered medications for this visit.         ROS  Review of Systems   Constitutional:  Negative for activity change, appetite change, fatigue, fever and unexpected weight change.   HENT:  Negative for congestion and facial swelling.    Eyes:  Negative for visual disturbance.   Respiratory:  Negative for chest tightness, shortness of breath, wheezing and stridor.    Cardiovascular:  Negative for chest pain, palpitations and leg swelling.   Gastrointestinal:  Negative for abdominal distention, abdominal pain, constipation, diarrhea, nausea and vomiting.   Endocrine: Negative for cold intolerance, heat intolerance, polydipsia and polyuria.   Skin:  "Negative.    Allergic/Immunologic: Negative.    Neurological:  Negative for dizziness, light-headedness, numbness and headaches.   Psychiatric/Behavioral:  Negative for agitation and decreased concentration.          Physical Exam  Vitals:    10/31/22 0858   BP: 134/78   Pulse: 95   Temp: 97.3 °F (36.3 °C)   TempSrc: Oral   SpO2: 98%   Weight: 111 kg (244 lb 11.4 oz)   Height: 5' 9" (1.753 m)    Body mass index is 36.14 kg/m².  Weight: 111 kg (244 lb 11.4 oz)   Height: 5' 9" (175.3 cm)   Physical Exam  Vitals reviewed.   Constitutional:       Appearance: Normal appearance. He is well-developed.   HENT:      Head: Normocephalic and atraumatic.      Right Ear: External ear normal.      Left Ear: External ear normal.      Nose: Nose normal.   Eyes:      Extraocular Movements: Extraocular movements intact.      Conjunctiva/sclera: Conjunctivae normal.      Pupils: Pupils are equal, round, and reactive to light.   Cardiovascular:      Rate and Rhythm: Normal rate and regular rhythm.      Heart sounds: Normal heart sounds.   Pulmonary:      Effort: Pulmonary effort is normal.      Breath sounds: Normal breath sounds.   Musculoskeletal:      Cervical back: Normal range of motion.   Skin:     General: Skin is warm and dry.   Neurological:      Mental Status: He is alert and oriented to person, place, and time.   Psychiatric:         Behavior: Behavior normal.         Health Maintenance         Date Due Completion Date    Foot Exam 10/26/2021 10/26/2020 (Done)    Override on 10/26/2020: Done (completed by insurance)    Influenza Vaccine (1) Never done ---    Hemoglobin A1c 10/19/2022 4/19/2022    Lipid Panel 10/20/2022 10/20/2021    Diabetes Urine Screening 10/20/2022 10/20/2021    Eye Exam 06/02/2023 6/2/2022    High Dose Statin 10/31/2023 10/31/2022    Colorectal Cancer Screening 06/28/2024 6/28/2021    TETANUS VACCINE 07/07/2027 7/7/2017                Patient note was created using [employer].  Any errors in syntax or even " information may not have been identified and edited on initial review prior to signing this note.

## 2022-11-14 ENCOUNTER — PATIENT OUTREACH (OUTPATIENT)
Dept: ADMINISTRATIVE | Facility: HOSPITAL | Age: 62
End: 2022-11-14
Payer: MEDICARE

## 2022-12-02 DIAGNOSIS — I10 ESSENTIAL HYPERTENSION: ICD-10-CM

## 2022-12-02 DIAGNOSIS — E11.9 DIABETES MELLITUS WITHOUT COMPLICATION: ICD-10-CM

## 2022-12-02 RX ORDER — METFORMIN HYDROCHLORIDE 1000 MG/1
TABLET ORAL
Qty: 180 TABLET | Refills: 0 | Status: SHIPPED | OUTPATIENT
Start: 2022-12-02 | End: 2023-06-13 | Stop reason: SDUPTHER

## 2022-12-02 RX ORDER — CANDESARTAN 4 MG/1
TABLET ORAL
Qty: 90 TABLET | Refills: 1 | Status: SHIPPED | OUTPATIENT
Start: 2022-12-02 | End: 2023-06-19 | Stop reason: SDUPTHER

## 2022-12-02 NOTE — TELEPHONE ENCOUNTER
No new care gaps identified.  [facility] Embedded Care Gaps. Reference number: [id number]. 12/02/2022   10:06:03 AM CST

## 2022-12-03 NOTE — TELEPHONE ENCOUNTER
Refill Decision Note   Scott Bansal  is requesting a refill authorization.  Brief Assessment and Rationale for Refill:  Approve     Medication Therapy Plan:       Medication Reconciliation Completed: No   Comments:     No Care Gaps recommended.     Note composed:6:01 PM 12/02/2022

## 2023-01-17 DIAGNOSIS — L98.9 DERMATOSIS: ICD-10-CM

## 2023-01-17 RX ORDER — LEVOCETIRIZINE DIHYDROCHLORIDE 5 MG/1
5 TABLET, FILM COATED ORAL EVERY MORNING
Qty: 90 TABLET | Refills: 1 | Status: SHIPPED | OUTPATIENT
Start: 2023-01-17 | End: 2023-02-17

## 2023-01-20 ENCOUNTER — TELEPHONE (OUTPATIENT)
Dept: FAMILY MEDICINE | Facility: CLINIC | Age: 63
End: 2023-01-20
Payer: MEDICARE

## 2023-01-20 NOTE — TELEPHONE ENCOUNTER
----- Message from Jeannie Lazar sent at 1/20/2023  1:50 PM CST -----  Regarding: self [phone]  Type: Patient Call Back    Who called:self     What is the request in detail:sooner appt -virtual visit was canceled due to technical issue no virtual appt available for the remainder of the day     Can the clinic reply by [facility]?no     Would the patient rather a call back or a response via [facility]?call back     Best call back number: [phone]

## 2023-01-23 DIAGNOSIS — M79.10 MYALGIA: ICD-10-CM

## 2023-01-23 RX ORDER — TIZANIDINE 4 MG/1
4 TABLET ORAL EVERY 8 HOURS
Qty: 30 TABLET | Refills: 1 | Status: SHIPPED | OUTPATIENT
Start: 2023-01-23 | End: 2023-09-10

## 2023-01-23 NOTE — TELEPHONE ENCOUNTER
----- Message from Norma Rodriguez sent at 1/23/2023  9:39 AM CST -----  Who Called:        Refill or New Rx: refill         RX Name and Strength:  tiZANidine (ZANAFLEX) 4 MG tablet          Is this a 30 day or 90 day RX        Preferred Pharmacy with phone number:  [facility]/PHARMACY #[id number] - [place], [place] - [address]        Local or Mail Order: local           Would the patient rather a call back or a response via [facility]? Call back        Best Call Back Number:        Additional Information:

## 2023-01-23 NOTE — TELEPHONE ENCOUNTER
No new care gaps identified.  [facility] Embedded Care Gaps. Reference number: [id number]. 1/23/2023   9:50:34 AM CST

## 2023-01-23 NOTE — TELEPHONE ENCOUNTER
Refill Routing Note   Medication(s) are not appropriate for processing by [facility] for the following reason(s):      - Outside of protocol    [facility] action(s):  Route          Medication reconciliation completed: No     Appointments  past 12m or future 3m with PCP    Date Provider   Last Visit   10/31/2022 Kaylie Villalta MD   Next Visit   Visit date not found Kaylie Villalta MD   ED visits in past 90 days: 0        Note composed:10:08 AM 01/23/2023

## 2023-02-02 DIAGNOSIS — J44.9 CHRONIC OBSTRUCTIVE PULMONARY DISEASE, UNSPECIFIED COPD TYPE: ICD-10-CM

## 2023-02-02 RX ORDER — ALBUTEROL SULFATE 90 UG/1
AEROSOL, METERED RESPIRATORY (INHALATION)
Qty: 18 G | Refills: 3 | OUTPATIENT
Start: 2023-02-02 | End: 2023-05-15

## 2023-02-02 NOTE — TELEPHONE ENCOUNTER
Care Due:                  Date            Visit Type   Department     Provider  --------------------------------------------------------------------------------                                EP [facility] FAMILY                              PRIMARY      MED/ INTERNAL  Last Visit: 10-      CARE ([facility])   MED/ PEDS      Kaylie Chau  Next Visit: None Scheduled  None         None Found                                                            Last  Test          Frequency    Reason                     Performed    Due Date  --------------------------------------------------------------------------------    HBA1C.......  6 months...  metFORMIN................  10-   04-    [facility] Embedded Care Gaps. Reference number: [id number]. 2/02/2023   11:39:54 AM CST

## 2023-02-02 NOTE — TELEPHONE ENCOUNTER
----- Message from Nohemi Strong sent at 2/2/2023 11:34 AM CST -----  Regarding: Refill request  .Type: RX Refill Request    Who Called: Self     Have you contacted your pharmacy: No     Refill or New Rx: Refill    RX Name and Strength:albuterol (VENTOLIN HFA) 90 mcg/actuation inhaler    Preferred Pharmacy with phone number:.    [facility]/pharmacy #[id number] - [place] - [address]  [address] [address]  Phone: [phone] Fax: [phone]      Local or Mail Order: local     Ordering Provider: MANI Villalta     Would the patient rather a call back or a response via [facility]? Call     Best Call Back Number: .[phone]      Additional Information:

## 2023-02-08 ENCOUNTER — E-VISIT (OUTPATIENT)
Dept: FAMILY MEDICINE | Facility: CLINIC | Age: 63
End: 2023-02-08
Payer: MEDICARE

## 2023-02-08 DIAGNOSIS — L01.00 IMPETIGO: Primary | ICD-10-CM

## 2023-02-08 PROCEDURE — 99421 PR E&M, ONLINE DIGIT, EST, < 7 DAYS, 5-10 MINS: ICD-10-PCS | Mod: S$GLB,,, | Performed by: FAMILY MEDICINE

## 2023-02-08 PROCEDURE — 99421 OL DIG E/M SVC 5-10 MIN: CPT | Mod: S$GLB,,, | Performed by: FAMILY MEDICINE

## 2023-02-08 RX ORDER — MUPIROCIN 20 MG/G
OINTMENT TOPICAL 3 TIMES DAILY
Qty: 30 G | Refills: 0 | Status: SHIPPED | OUTPATIENT
Start: 2023-02-08 | End: 2023-06-08

## 2023-02-08 NOTE — PROGRESS NOTES
Patient ID: Scott Bansal is a 62 y.o. male.    Chief Complaint: Rash    The patient initiated a request through [employer] on 2/8/2023 for evaluation and management with a chief complaint of Rash     I evaluated the questionnaire submission on 2/8/23.    Ohs Peq Evisit Rash    2/8/2023 10:52 AM CST - Filed by Patient   Do you agree to participate in an E-Visit? Yes   If you have any of the following symptoms, please present to your local ER or call 911:  I acknowledge   What is the main issue that you would like for your doctor to address today? RASH ON BOTH FORARMS   Are you able to take your vital signs? Yes   Systolic Blood Pressure:    Diastolic Blood Pressure:    Weight: 244.4   Height: 69   Pulse:    Temp:    Resp:    Pulse Ox:    How would you describe your skin problem? Rash   When did your symptoms first appear? 1/28/2023   Where is it located?  Arm(s)   Does it itch? Yes   Does it hurt? Yes   Where is the pain located? Radiates to other areas   The pain came on: Suddenly   The pain has the character of: Sharp   Frequency of the pain (How often does it appear)? Daily   Rate your pain with 1 being no pain and 10 being the worst pain of your life. (range: 1 - 10) 9   Is there discharge or drainage? No   Is there bleeding? No   Describe the character Spots   Describe the color Red   Has it changed over time? Spread to other locations   Frequency of skin problem Daily   Duration of the skin problem (how long does it stay when it is present) Never goes away   I have had a new exposure to Creams or lotions;  Perfumes   What have you used to treat the skin problem?  lotion, EZCAMA lotion...   If you have used anything for treatment, has it helped the symptoms? No   Other generalized symptoms that you associate with the rash No other symptoms   At least one photo is required for treatment to be provided. You can upload a maximum of three photos of the affected area.            Active Problem List with  Overview Notes    Diagnosis Date Noted    Immunosuppressed status 10/04/2022    Atherosclerosis of native coronary artery of native heart without angina pectoris 10/04/2022     CT 5/21/19      Hyperlipidemia associated with type 2 diabetes mellitus 06/13/2022    Other schizophrenia 03/23/2022    MRSA colonization 07/10/2020    Recurrent boils 07/10/2020    Hand eczema 06/10/2020    Nasal obstruction 06/09/2020     h/o nasal trauma.  S/p ent surgery in the past.        KRISS (obstructive sleep apnea) 06/09/2020     snoring, restless sleep, htn, elevated bmi warrants sleep study.  Per patient, he underwent sleep study at [facility] but unable to obtain record.  Suspect nasal obstruction may be a barrier to cpap tolerance.  Patient declined cpap therapy.  Not interested in cpap or sleep study at this time.          Chronic bilateral low back pain without sciatica 12/04/2019    Severe obesity (BMI 35.0-39.9) with comorbidity 05/20/2019    Atherosclerosis of aorta 05/20/2019     - noted on CXR 3/2018      Lumbar degenerative disc disease 03/07/2019    History of fusion of cervical spine 01/20/2016     1998 titanium screws placed.  Xray of fusion patient has a copy      Type 2 diabetes mellitus with diabetic polyneuropathy, without long-term current use of insulin 01/20/2016    Hyperlipidemia 01/20/2016    Essential hypertension 01/20/2016     Cannot tolerate sulfa  Currently on amlodipine, candesartan, furosemide  Previously took irbesartan and clonidine       Chronic tension-type headache, not intractable 01/20/2016     Cannot tolerate propranolol because side effects (dizziness, decreased alertness)  No improvement with imitrex  Minimal improvement with fiorcet       Bipolar 1 disorder 01/20/2016     Currently on buspar, bupropion, seroquel  Dr. Cordova at [facility]       BPH (benign prostatic hyperplasia) 01/20/2016    Gastroesophageal reflux disease without esophagitis 01/20/2016    Sciatica of right side  01/20/2016     Uses baclofen       History of pneumonia 01/20/2016     History of double pneumonia        COPD (chronic obstructive pulmonary disease) 01/20/2016     Currently on breo and albuterol.  Recommend routine usage of breo.  quit smoking since age 25.        Former smoker 01/20/2016    History of compression fracture of spine 01/20/2016      Recent Labs Obtained:  No visits with results within 7 Day(s) from this visit.   Latest known visit with results is:   Lab Visit on 10/31/2022   Component Date Value Ref Range Status    Hemoglobin A1C 10/31/2022 6.5 (H)  4.0 - 5.6 % Final    Comment: ADA Screening Guidelines:  5.7-6.4%  Consistent with prediabetes  >or=6.5%  Consistent with diabetes    High levels of fetal hemoglobin interfere with the HbA1C  assay. Heterozygous hemoglobin variants (HbS, HgC, etc)do  not significantly interfere with this assay.   However, presence of multiple variants may affect accuracy.      Estimated Avg Glucose 10/31/2022 140 (H)  68 - 131 mg/dL Final    TSH 10/31/2022 1.884  0.400 - 4.000 uIU/mL Final    Cholesterol 10/31/2022 109 (L)  120 - 199 mg/dL Final    Comment: The National Cholesterol Education Program (NCEP) has set the  following guidelines (reference ranges) for Cholesterol:  Optimal.....................<200 mg/dL  Borderline High.............200-239 mg/dL  High........................> or = 240 mg/dL      Triglycerides 10/31/2022 105  30 - 150 mg/dL Final    Comment: The National Cholesterol Education Program (NCEP) has set the  following guidelines (reference values) for triglycerides:  Normal......................<150 mg/dL  Borderline High.............150-199 mg/dL  High........................200-499 mg/dL      HDL 10/31/2022 32 (L)  40 - 75 mg/dL Final    Comment: The National Cholesterol Education Program (NCEP) has set the  following guidelines (reference values) for HDL Cholesterol:  Low...............<40 mg/dL  Optimal...........>60 mg/dL      LDL Cholesterol  10/31/2022 56.0 (L)  63.0 - 159.0 mg/dL Final    Comment: The National Cholesterol Education Program (NCEP) has set the  following guidelines (reference values) for LDL Cholesterol:  Optimal.......................<130 mg/dL  Borderline High...............130-159 mg/dL  High..........................160-189 mg/dL  Very High.....................>190 mg/dL      HDL/Cholesterol Ratio 10/31/2022 29.4  20.0 - 50.0 % Final    Total Cholesterol/HDL Ratio 10/31/2022 3.4  2.0 - 5.0 Final    Non-HDL Cholesterol 10/31/2022 77  mg/dL Final    Comment: Risk category and Non-HDL cholesterol goals:  Coronary heart disease (CHD)or equivalent (10-year risk of CHD >20%):  Non-HDL cholesterol goal     <130 mg/dL  Two or more CHD risk factors and 10-year risk of CHD <= 20%:  Non-HDL cholesterol goal     <160 mg/dL  0 to 1 CHD risk factor:  Non-HDL cholesterol goal     <190 mg/dL         Encounter Diagnosis   Name Primary?    Impetigo Yes        No orders of the defined types were placed in this encounter.     Medications Ordered This Encounter   Medications    mupirocin (BACTROBAN) 2 % ointment     Sig: Apply topically 3 (three) times daily.     Dispense:  30 g     Refill:  0        E-Visit Time Tracking:    Day 1 Time (in minutes): 5     Total Time (in minutes): 5

## 2023-02-19 ENCOUNTER — PATIENT MESSAGE (OUTPATIENT)
Dept: FAMILY MEDICINE | Facility: CLINIC | Age: 63
End: 2023-02-19
Payer: MEDICARE

## 2023-02-28 DIAGNOSIS — S29.012A MUSCLE STRAIN OF RIGHT UPPER BACK, INITIAL ENCOUNTER: ICD-10-CM

## 2023-02-28 RX ORDER — MELOXICAM 15 MG/1
15 TABLET ORAL DAILY
Qty: 30 TABLET | Refills: 0 | Status: SHIPPED | OUTPATIENT
Start: 2023-02-28 | End: 2023-03-01 | Stop reason: SDUPTHER

## 2023-02-28 NOTE — TELEPHONE ENCOUNTER
----- Message from Jada Thomas sent at 2/28/2023 10:09 AM CST -----  Type: RX Refill Request    Who Called:  Patient    Refill or New Rx:  Refill    RX Name and Strength:  meloxicam (MOBIC) 15 MG tablet    Preferred Pharmacy with phone number:  [facility]/PHARMACY #[id number] - [place], [place] - [address]    Local or Mail Order:  Local    Ordering Provider:  LITZY Reyes    Would the patient rather a call back or a response via [facility]?  Call back    Best Call Back Number:  [phone]

## 2023-03-01 RX ORDER — MELOXICAM 15 MG/1
15 TABLET ORAL DAILY
Qty: 30 TABLET | Refills: 0 | Status: SHIPPED | OUTPATIENT
Start: 2023-03-01 | End: 2023-05-03 | Stop reason: SDUPTHER

## 2023-03-01 NOTE — TELEPHONE ENCOUNTER
----- Message from Teri Amaya sent at 3/1/2023  8:57 AM CST -----  Type: Patient Call Back    Who called:Rx Drug Plan    What is the request in detail:meloxicam (MOBIC) 15 MG tablet does not need prior auth and can be sent straight to pharmacy.    Can the clinic reply by [facility]?    Would the patient rather a call back or a response via [facility]? call    Best call back number:

## 2023-03-09 ENCOUNTER — PES CALL (OUTPATIENT)
Dept: ADMINISTRATIVE | Facility: CLINIC | Age: 63
End: 2023-03-09
Payer: MEDICARE

## 2023-03-13 ENCOUNTER — PES CALL (OUTPATIENT)
Dept: ADMINISTRATIVE | Facility: CLINIC | Age: 63
End: 2023-03-13
Payer: MEDICARE

## 2023-03-13 ENCOUNTER — TELEPHONE (OUTPATIENT)
Dept: ADMINISTRATIVE | Facility: CLINIC | Age: 63
End: 2023-03-13
Payer: MEDICARE

## 2023-03-13 NOTE — TELEPHONE ENCOUNTER
Called pt, informed pt I was calling to remind pt of his in office EAWV on 3/14/23; clinic location provided to patient; pt confirmed appointment

## 2023-03-14 ENCOUNTER — OFFICE VISIT (OUTPATIENT)
Dept: FAMILY MEDICINE | Facility: CLINIC | Age: 63
End: 2023-03-14
Payer: MEDICARE

## 2023-03-14 DIAGNOSIS — J42 CHRONIC BRONCHITIS, UNSPECIFIED CHRONIC BRONCHITIS TYPE: ICD-10-CM

## 2023-03-14 DIAGNOSIS — E11.69 HYPERLIPIDEMIA ASSOCIATED WITH TYPE 2 DIABETES MELLITUS: ICD-10-CM

## 2023-03-14 DIAGNOSIS — D84.9 IMMUNOSUPPRESSED STATUS: ICD-10-CM

## 2023-03-14 DIAGNOSIS — J44.9 CHRONIC OBSTRUCTIVE PULMONARY DISEASE, UNSPECIFIED COPD TYPE: ICD-10-CM

## 2023-03-14 DIAGNOSIS — K21.9 GASTROESOPHAGEAL REFLUX DISEASE WITHOUT ESOPHAGITIS: ICD-10-CM

## 2023-03-14 DIAGNOSIS — F31.9 BIPOLAR 1 DISORDER: ICD-10-CM

## 2023-03-14 DIAGNOSIS — N40.0 BENIGN PROSTATIC HYPERPLASIA, UNSPECIFIED WHETHER LOWER URINARY TRACT SYMPTOMS PRESENT: ICD-10-CM

## 2023-03-14 DIAGNOSIS — E66.01 SEVERE OBESITY (BMI 35.0-39.9) WITH COMORBIDITY: ICD-10-CM

## 2023-03-14 DIAGNOSIS — E11.42 TYPE 2 DIABETES MELLITUS WITH DIABETIC POLYNEUROPATHY, WITHOUT LONG-TERM CURRENT USE OF INSULIN: ICD-10-CM

## 2023-03-14 DIAGNOSIS — E78.5 HYPERLIPIDEMIA ASSOCIATED WITH TYPE 2 DIABETES MELLITUS: ICD-10-CM

## 2023-03-14 DIAGNOSIS — F20.89 OTHER SCHIZOPHRENIA: ICD-10-CM

## 2023-03-14 DIAGNOSIS — Z00.00 ENCOUNTER FOR PREVENTIVE HEALTH EXAMINATION: Primary | ICD-10-CM

## 2023-03-14 DIAGNOSIS — I70.0 ATHEROSCLEROSIS OF AORTA: ICD-10-CM

## 2023-03-14 DIAGNOSIS — I10 ESSENTIAL HYPERTENSION: ICD-10-CM

## 2023-03-14 PROCEDURE — 4010F PR ACE/ARB THEARPY RXD/TAKEN: ICD-10-PCS | Mod: CPTII,S$GLB,, | Performed by: NURSE PRACTITIONER

## 2023-03-14 PROCEDURE — 3008F BODY MASS INDEX DOCD: CPT | Mod: CPTII,S$GLB,, | Performed by: NURSE PRACTITIONER

## 2023-03-14 PROCEDURE — 1159F MED LIST DOCD IN RCRD: CPT | Mod: CPTII,S$GLB,, | Performed by: NURSE PRACTITIONER

## 2023-03-14 PROCEDURE — 4010F ACE/ARB THERAPY RXD/TAKEN: CPT | Mod: CPTII,S$GLB,, | Performed by: NURSE PRACTITIONER

## 2023-03-14 PROCEDURE — 1160F PR REVIEW ALL MEDS BY PRESCRIBER/CLIN PHARMACIST DOCUMENTED: ICD-10-PCS | Mod: CPTII,S$GLB,, | Performed by: NURSE PRACTITIONER

## 2023-03-14 PROCEDURE — G0439 PPPS, SUBSEQ VISIT: HCPCS | Mod: S$GLB,,, | Performed by: NURSE PRACTITIONER

## 2023-03-14 PROCEDURE — 3079F DIAST BP 80-89 MM HG: CPT | Mod: CPTII,S$GLB,, | Performed by: NURSE PRACTITIONER

## 2023-03-14 PROCEDURE — G0439 PR MEDICARE ANNUAL WELLNESS SUBSEQUENT VISIT: ICD-10-PCS | Mod: S$GLB,,, | Performed by: NURSE PRACTITIONER

## 2023-03-14 PROCEDURE — 99999 PR PBB SHADOW E&M-EST. PATIENT-LVL V: CPT | Mod: PBBFAC,,, | Performed by: NURSE PRACTITIONER

## 2023-03-14 PROCEDURE — 3079F PR MOST RECENT DIASTOLIC BLOOD PRESSURE 80-89 MM HG: ICD-10-PCS | Mod: CPTII,S$GLB,, | Performed by: NURSE PRACTITIONER

## 2023-03-14 PROCEDURE — 3008F PR BODY MASS INDEX (BMI) DOCUMENTED: ICD-10-PCS | Mod: CPTII,S$GLB,, | Performed by: NURSE PRACTITIONER

## 2023-03-14 PROCEDURE — 1159F PR MEDICATION LIST DOCUMENTED IN MEDICAL RECORD: ICD-10-PCS | Mod: CPTII,S$GLB,, | Performed by: NURSE PRACTITIONER

## 2023-03-14 PROCEDURE — 3075F PR MOST RECENT SYSTOLIC BLOOD PRESS GE 130-139MM HG: ICD-10-PCS | Mod: CPTII,S$GLB,, | Performed by: NURSE PRACTITIONER

## 2023-03-14 PROCEDURE — 99999 PR PBB SHADOW E&M-EST. PATIENT-LVL V: ICD-10-PCS | Mod: PBBFAC,,, | Performed by: NURSE PRACTITIONER

## 2023-03-14 PROCEDURE — 1160F RVW MEDS BY RX/DR IN RCRD: CPT | Mod: CPTII,S$GLB,, | Performed by: NURSE PRACTITIONER

## 2023-03-14 PROCEDURE — 3075F SYST BP GE 130 - 139MM HG: CPT | Mod: CPTII,S$GLB,, | Performed by: NURSE PRACTITIONER

## 2023-03-14 NOTE — PATIENT INSTRUCTIONS
Counseling and Referral of Other Preventative  (Italic type indicates deductible and co-insurance are waived)    Patient Name: Scott Bansal  Today's Date: 3/14/2023    Health Maintenance         Date Due Completion Date    Foot Exam 10/26/2021 10/26/2020 (Done)    Override on 10/26/2020: Done (completed by insurance)    Influenza Vaccine (1) Never done ---    Hemoglobin A1c 04/30/2023 10/31/2022    Eye Exam 06/02/2023 6/2/2022    High Dose Statin 10/31/2023 10/31/2022    Diabetes Urine Screening 10/31/2023 10/31/2022    Lipid Panel 10/31/2023 10/31/2022    Colorectal Cancer Screening 06/28/2024 6/28/2021    TETANUS VACCINE 07/07/2027 7/7/2017          No orders of the defined types were placed in this encounter.      The following information is provided to all patients.  This information is to help you find resources for any of the problems found today that may be affecting your health:                Living healthy guide: www.[facility].[place].gov      Understanding Diabetes: www.diabetes.org      Eating healthy: www.cdc.gov/healthyweight      CDC home safety checklist: www.cdc.gov/steadi/patient.html      Agency on Aging: www.goea.[place].gov      Alcoholics anonymous (AA): www.aa.org      Physical Activity: www.shavonne.nih.gov/kp4xqtt      Tobacco use: www.quitwithusla.org       Please call [employer] at [phone] to receive a rewards card for completing your Annual Wellness Visit. Thanks

## 2023-03-14 NOTE — PROGRESS NOTES
"Scott Bansal presented for a  Medicare AWV and comprehensive Health Risk Assessment today. The following components were reviewed and updated:    Medical history  Family History  Social history  Allergies and Current Medications  Health Risk Assessment  Health Maintenance  Care Team       ** See Completed Assessments for Annual Wellness Visit within the encounter summary.**       The following assessments were completed:  Living Situation  CAGE  Depression Screening  Timed Get Up and Go  Whisper Test  Cognitive Function Screening  Nutrition Screening  ADL Screening  PAQ Screening          Vitals:    03/14/23 1044 03/14/23 1100   BP: 134/86    Pulse: 92    Temp: 98.3 °F (36.8 °C)    TempSrc: Oral    SpO2: (!) 93% 96%   Weight: 113.3 kg (249 lb 12.5 oz)    Height: 5' 9" (1.753 m)      Body mass index is 36.89 kg/m².  Physical Exam  Vitals and nursing note reviewed.   Constitutional:       Appearance: Normal appearance.   Cardiovascular:      Rate and Rhythm: Normal rate.      Pulses: Normal pulses.      Heart sounds: Normal heart sounds.   Pulmonary:      Effort: Pulmonary effort is normal.      Breath sounds: Normal breath sounds.   Musculoskeletal:         General: Normal range of motion.   Neurological:      Mental Status: He is alert and oriented to person, place, and time.   Psychiatric:         Mood and Affect: Mood normal.         Behavior: Behavior normal.           Diagnoses and health risks identified today and associated recommendations/orders:    1. Encounter for preventive health examination  Pt was seen today for an Annual Wellness visit. Healthcare maintenance and screening recommendations were discussed and updated as indicated. Return in one year for AWV.    Review current opioid prescriptions:n/a  Screen for potential Substance Use Disorders:n/a    2. Bipolar 1 disorder  The current medical regimen is effective;  continue present plan and medications.    3. Other schizophrenia  The current medical " regimen is effective;  continue present plan and medications.    4. Type 2 diabetes mellitus with diabetic polyneuropathy, without long-term current use of insulin  The current medical regimen is effective;  continue present plan and medications.  Hemoglobin A1C   Date Value Ref Range Status   10/31/2022 6.5 (H) 4.0 - 5.6 % Final             04/19/2022 6.4 (H) 4.0 - 5.6 % Final             10/20/2021 5.6 4.0 - 5.6 % Final             5. Atherosclerosis of aorta  The current medical regimen is effective;  continue present plan and medications.    6. Chronic obstructive pulmonary disease, unspecified COPD type  The current medical regimen is effective;  continue present plan and medications.    7. Hyperlipidemia associated with type 2 diabetes mellitus  The current medical regimen is effective;  continue present plan and medications.    8. Immunosuppressed status  The current medical regimen is effective;  continue present plan and medications.    9. Chronic bronchitis, unspecified chronic bronchitis type  The current medical regimen is effective;  continue present plan and medications.    10. Severe obesity (BMI 35.0-39.9) with comorbidity  The patient is asked to make an attempt to improve diet and exercise patterns to aid in medical management of this problem.    11. Essential hypertension  The current medical regimen is effective;  continue present plan and medications.    12. Benign prostatic hyperplasia, unspecified whether lower urinary tract symptoms present  The current medical regimen is effective;  continue present plan and medications.    13. Gastroesophageal reflux disease without esophagitis  The current medical regimen is effective;  continue present plan and medications.        Provided Scott with a 5-10 year written screening schedule and personal prevention plan. Recommendations were developed using the USPSTF age appropriate recommendations. Education, counseling, and referrals were provided as  needed. After Visit Summary printed and given to patient which includes a list of additional screenings\tests needed.    Follow up in about 2 months (around 5/26/2023) with provider.    DAVIAN Oakes  I offered to discuss advanced care planning, including how to pick a person who would make decisions for you if you were unable to make them for yourself, called a health care power of , and what kind of decisions you might make such as use of life sustaining treatments such as ventilators and tube feeding when faced with a life limiting illness recorded on a living will that they will need to know. (How you want to be cared for as you near the end of your natural life)     X Patient is interested in learning more about how to make advanced directives.  I provided them paperwork and offered to discuss this with them.

## 2023-03-15 VITALS
HEART RATE: 92 BPM | SYSTOLIC BLOOD PRESSURE: 134 MMHG | DIASTOLIC BLOOD PRESSURE: 86 MMHG | BODY MASS INDEX: 37 KG/M2 | TEMPERATURE: 98 F | HEIGHT: 69 IN | WEIGHT: 249.81 LBS | OXYGEN SATURATION: 96 %

## 2023-03-31 ENCOUNTER — PATIENT MESSAGE (OUTPATIENT)
Dept: DERMATOLOGY | Facility: CLINIC | Age: 63
End: 2023-03-31
Payer: MEDICARE

## 2023-04-10 RX ORDER — CYCLOBENZAPRINE HCL 10 MG
TABLET ORAL
Qty: 270 TABLET | Refills: 1 | Status: SHIPPED | OUTPATIENT
Start: 2023-04-10 | End: 2023-04-11 | Stop reason: SDUPTHER

## 2023-04-10 NOTE — TELEPHONE ENCOUNTER
----- Message from Bennett Jason MA sent at 4/10/2023 10:41 AM CDT -----  Type: RX Refill Request    Who Called: self    Have you contacted your pharmacy:no    Refill or New Rx: refill     RX Name and Strength:cyclobenzaprine (FLEXERIL) 10 MG tablet 270 tablet       Preferred Pharmacy with phone number:[facility]/pharmacy #[id number] - [place], [place] - [address]   Phone:  [phone]  Fax:  [phone]          Local or Mail Order:local    Ordering Provider:MARTHA Villalta    Would the patient rather a call back or a response via [facility]?     Best Call Back Number:[phone]

## 2023-04-10 NOTE — TELEPHONE ENCOUNTER
Care Due:                  Date            Visit Type   Department     Provider  --------------------------------------------------------------------------------                                [facility]                              PRIMARY      MED/ INTERNAL  Last Visit: 10-      CARE ([facility])   MED/ PEDS      Kaylie Chau                              [facility]                              PRIMARY      MED/ INTERNAL  Next Visit: 05-      CARE ([facility])   MED/ PEDS      Kaylie Chau                                                            Last  Test          Frequency    Reason                     Performed    Due Date  --------------------------------------------------------------------------------    CBC.........  12 months..  gemfibroziL..............  05- 05-    CMP.........  12 months..  candesartan, gemfibroziL,   05- 05-                             metFORMIN, simvastatin...    HBA1C.......  6 months...  metFORMIN................  10-   04-    [facility] Embedded Care Gaps. Reference number: [id number]. 4/10/2023   10:47:50 AM CDT

## 2023-04-10 NOTE — TELEPHONE ENCOUNTER
----- Message from Bennett Jason MA sent at 4/10/2023 10:41 AM CDT -----  Type: RX Refill Request    Who Called: self    Have you contacted your pharmacy:no    Refill or New Rx: refill     RX Name and Strength:cyclobenzaprine (FLEXERIL) 10 MG tablet 270 tablet       Preferred Pharmacy with phone number:[facility]/pharmacy #[id number] - [place], [place] - [address]   Phone:  [phone]  Fax:  [phone]          Local or Mail Order:local    Ordering Provider:MARTHA Villalta    Would the patient rather a call back or a response via My [facility]?     Best Call Back Number:[phone]

## 2023-04-11 NOTE — TELEPHONE ENCOUNTER
No new care gaps identified.  [facility] Embedded Care Gaps. Reference number: [id number]. 4/11/2023   3:05:51 PM CDT

## 2023-04-11 NOTE — TELEPHONE ENCOUNTER
----- Message from Brittni Grajeda sent at 4/11/2023  3:03 PM CDT -----  Type: RX Refill Request     Who Called: self     Have you contacted your pharmacy: YES, SENT TO THE WRONG PHARMACY SUPPOSED TO BE SENT TO [facility]!!!!      Refill or New Rx: refill      RX Name and Strength:cyclobenzaprine (FLEXERIL) 10 MG tablet 270 tablet         Preferred Pharmacy with phone number:\      [facility]/pharmacy #[id number] - [place] - [address]       Phone:             [phone]  Fax:     [phone]              Local or Mail Order:local     Ordering Provider:MARTHA Villalta     Would the patient rather a call back or a response via [facility]?      Best Call Back Number:[phone]

## 2023-04-12 RX ORDER — CYCLOBENZAPRINE HCL 10 MG
TABLET ORAL
Qty: 270 TABLET | Refills: 1 | Status: ON HOLD | OUTPATIENT
Start: 2023-04-12 | End: 2023-06-12 | Stop reason: HOSPADM

## 2023-05-03 DIAGNOSIS — S29.012A MUSCLE STRAIN OF RIGHT UPPER BACK, INITIAL ENCOUNTER: ICD-10-CM

## 2023-05-03 NOTE — TELEPHONE ENCOUNTER
----- Message from Nohemi Strong sent at 5/3/2023 12:26 PM CDT -----  Regarding: Refill request  .Type: RX Refill Request    Who Called:self     Have you contacted your pharmacy:no     Refill or New Rx: Refill    RX Name and Strength:meloxicam (MOBIC) 15 MG tablet    Preferred Pharmacy with phone number: .    [facility]/pharmacy #[id number] - [place] - [address]  [address] [place] [address]  Phone: [phone] Fax: [phone]    Local or Mail Order:local    Ordering Provider:MANI Villalta    Would the patient rather a call back or a response via My [facility]? call    Best Call Back Number: .[phone]      Additional Information:

## 2023-05-05 RX ORDER — MELOXICAM 15 MG/1
15 TABLET ORAL DAILY
Qty: 30 TABLET | Refills: 0 | Status: SHIPPED | OUTPATIENT
Start: 2023-05-05 | End: 2023-06-01

## 2023-05-10 NOTE — TELEPHONE ENCOUNTER
----- Message from Arnold Red sent at 5/10/2023  3:40 PM CDT -----  Regarding: Scott  Type: RX Refill Request     Who Called: Scott      Have you contacted your pharmacy: Yes     Refill or New Rx: Refill     RX Name and Strength: simvastatin (ZOCOR) 80 MG tablet    Preferred Pharmacy with phone number: .  [facility]/pharmacy #[id number] - [place], [place] - [address] [facility]  [address] [facility] [address]  Phone: [phone] Fax: [phone]    Local or Mail Order: Local     Ordering Provider: Dr. Villalta     Would the patient rather a call back or a response via My [facility]? Callback      Best Call Back Number: [phone]      Additional Information: Pt stated the he currently out of his medication

## 2023-05-10 NOTE — TELEPHONE ENCOUNTER
No care due was identified.  [facility] Embedded Care Due Messages. Reference number: [id number].   5/10/2023 4:03:02 PM CDT

## 2023-05-11 RX ORDER — SIMVASTATIN 80 MG/1
80 TABLET, FILM COATED ORAL DAILY
Qty: 90 TABLET | Refills: 0 | Status: SHIPPED | OUTPATIENT
Start: 2023-05-11 | End: 2023-08-07 | Stop reason: SDUPTHER

## 2023-05-15 ENCOUNTER — HOSPITAL ENCOUNTER (EMERGENCY)
Facility: HOSPITAL | Age: 63
Discharge: HOME | End: 2023-05-15
Attending: EMERGENCY MEDICINE
Payer: MEDICARE

## 2023-05-15 VITALS
RESPIRATION RATE: 20 BRPM | SYSTOLIC BLOOD PRESSURE: 148 MMHG | BODY MASS INDEX: 36.58 KG/M2 | HEART RATE: 95 BPM | OXYGEN SATURATION: 97 % | DIASTOLIC BLOOD PRESSURE: 78 MMHG | WEIGHT: 247 LBS | HEIGHT: 69 IN | TEMPERATURE: 98 F

## 2023-05-15 DIAGNOSIS — J40 BRONCHITIS: Primary | ICD-10-CM

## 2023-05-15 DIAGNOSIS — R05.9 COUGH: ICD-10-CM

## 2023-05-15 LAB
ALBUMIN SERPL BCP-MCNC: 4.3 G/DL (ref 3.5–5.2)
ALP SERPL-CCNC: 95 U/L (ref 55–135)
ALT SERPL W/O P-5'-P-CCNC: 56 U/L (ref 10–44)
ANION GAP SERPL CALC-SCNC: 12 MMOL/L (ref 8–16)
AST SERPL-CCNC: 22 U/L (ref 10–40)
BASOPHILS # BLD AUTO: 0.04 K/UL (ref 0–0.2)
BASOPHILS NFR BLD: 0.5 % (ref 0–1.9)
BILIRUB SERPL-MCNC: 0.5 MG/DL (ref 0.1–1)
BUN SERPL-MCNC: 16 MG/DL (ref 8–23)
CALCIUM SERPL-MCNC: 10 MG/DL (ref 8.7–10.5)
CHLORIDE SERPL-SCNC: 107 MMOL/L (ref 95–110)
CO2 SERPL-SCNC: 18 MMOL/L (ref 23–29)
CREAT SERPL-MCNC: 1.2 MG/DL (ref 0.5–1.4)
CTP QC/QA: YES
DIFFERENTIAL METHOD: ABNORMAL
EOSINOPHIL # BLD AUTO: 0.2 K/UL (ref 0–0.5)
EOSINOPHIL NFR BLD: 2.1 % (ref 0–8)
ERYTHROCYTE [DISTWIDTH] IN BLOOD BY AUTOMATED COUNT: 12.9 % (ref 11.5–14.5)
EST. GFR  (NO RACE VARIABLE): >60 ML/MIN/1.73 M^2
GLUCOSE SERPL-MCNC: 264 MG/DL (ref 70–110)
HCT VFR BLD AUTO: 38.4 % (ref 40–54)
HGB BLD-MCNC: 12.7 G/DL (ref 14–18)
IMM GRANULOCYTES # BLD AUTO: 0.06 K/UL (ref 0–0.04)
IMM GRANULOCYTES NFR BLD AUTO: 0.7 % (ref 0–0.5)
LACTATE SERPL-SCNC: 2 MMOL/L (ref 0.5–2.2)
LYMPHOCYTES # BLD AUTO: 1.8 K/UL (ref 1–4.8)
LYMPHOCYTES NFR BLD: 20.3 % (ref 18–48)
MCH RBC QN AUTO: 27.4 PG (ref 27–31)
MCHC RBC AUTO-ENTMCNC: 33.1 G/DL (ref 32–36)
MCV RBC AUTO: 83 FL (ref 82–98)
MOLECULAR STREP A: NEGATIVE
MONOCYTES # BLD AUTO: 1.5 K/UL (ref 0.3–1)
MONOCYTES NFR BLD: 16.8 % (ref 4–15)
NEUTROPHILS # BLD AUTO: 5.3 K/UL (ref 1.8–7.7)
NEUTROPHILS NFR BLD: 59.6 % (ref 38–73)
NRBC BLD-RTO: 0 /100 WBC
PLATELET # BLD AUTO: 229 K/UL (ref 150–450)
PMV BLD AUTO: 10.5 FL (ref 9.2–12.9)
POC MOLECULAR INFLUENZA A AGN: NEGATIVE
POC MOLECULAR INFLUENZA B AGN: NEGATIVE
POCT GLUCOSE: 250 MG/DL (ref 70–110)
POTASSIUM SERPL-SCNC: 4.1 MMOL/L (ref 3.5–5.1)
PROT SERPL-MCNC: 7.2 G/DL (ref 6–8.4)
RBC # BLD AUTO: 4.63 M/UL (ref 4.6–6.2)
SARS-COV-2 RDRP RESP QL NAA+PROBE: NEGATIVE
SODIUM SERPL-SCNC: 137 MMOL/L (ref 136–145)
WBC # BLD AUTO: 8.85 K/UL (ref 3.9–12.7)

## 2023-05-15 PROCEDURE — 87651 STREP A DNA AMP PROBE: CPT

## 2023-05-15 PROCEDURE — 94760 N-INVAS EAR/PLS OXIMETRY 1: CPT

## 2023-05-15 PROCEDURE — 25000242 PHARM REV CODE 250 ALT 637 W/ HCPCS: Performed by: PHYSICIAN ASSISTANT

## 2023-05-15 PROCEDURE — 82962 GLUCOSE BLOOD TEST: CPT

## 2023-05-15 PROCEDURE — 96365 THER/PROPH/DIAG IV INF INIT: CPT

## 2023-05-15 PROCEDURE — 83605 ASSAY OF LACTIC ACID: CPT | Performed by: PHYSICIAN ASSISTANT

## 2023-05-15 PROCEDURE — 94640 AIRWAY INHALATION TREATMENT: CPT

## 2023-05-15 PROCEDURE — 25000003 PHARM REV CODE 250: Performed by: PHYSICIAN ASSISTANT

## 2023-05-15 PROCEDURE — 96375 TX/PRO/DX INJ NEW DRUG ADDON: CPT

## 2023-05-15 PROCEDURE — 87502 INFLUENZA DNA AMP PROBE: CPT

## 2023-05-15 PROCEDURE — 85025 COMPLETE CBC W/AUTO DIFF WBC: CPT | Performed by: PHYSICIAN ASSISTANT

## 2023-05-15 PROCEDURE — 87040 BLOOD CULTURE FOR BACTERIA: CPT | Performed by: PHYSICIAN ASSISTANT

## 2023-05-15 PROCEDURE — 63600175 PHARM REV CODE 636 W HCPCS: Performed by: PHYSICIAN ASSISTANT

## 2023-05-15 PROCEDURE — 80053 COMPREHEN METABOLIC PANEL: CPT | Performed by: PHYSICIAN ASSISTANT

## 2023-05-15 PROCEDURE — 99284 EMERGENCY DEPT VISIT MOD MDM: CPT | Mod: 25

## 2023-05-15 RX ORDER — ALBUTEROL SULFATE 90 UG/1
1-2 AEROSOL, METERED RESPIRATORY (INHALATION) EVERY 6 HOURS PRN
Qty: 18 G | Refills: 0 | Status: SHIPPED | OUTPATIENT
Start: 2023-05-15 | End: 2023-05-25 | Stop reason: SDUPTHER

## 2023-05-15 RX ORDER — KETOROLAC TROMETHAMINE 30 MG/ML
15 INJECTION, SOLUTION INTRAMUSCULAR; INTRAVENOUS
Status: COMPLETED | OUTPATIENT
Start: 2023-05-15 | End: 2023-05-15

## 2023-05-15 RX ORDER — DOXYCYCLINE 100 MG/1
100 CAPSULE ORAL EVERY 12 HOURS
Qty: 14 CAPSULE | Refills: 0 | Status: SHIPPED | OUTPATIENT
Start: 2023-05-15 | End: 2023-05-22

## 2023-05-15 RX ORDER — PROMETHAZINE HYDROCHLORIDE AND DEXTROMETHORPHAN HYDROBROMIDE 6.25; 15 MG/5ML; MG/5ML
5 SYRUP ORAL EVERY 4 HOURS PRN
Qty: 100 ML | Refills: 0 | Status: SHIPPED | OUTPATIENT
Start: 2023-05-15 | End: 2023-05-25 | Stop reason: SDUPTHER

## 2023-05-15 RX ORDER — AMOXICILLIN 500 MG/1
1000 CAPSULE ORAL 3 TIMES DAILY
Qty: 42 CAPSULE | Refills: 0 | Status: SHIPPED | OUTPATIENT
Start: 2023-05-15 | End: 2023-05-22

## 2023-05-15 RX ORDER — IPRATROPIUM BROMIDE AND ALBUTEROL SULFATE 2.5; .5 MG/3ML; MG/3ML
3 SOLUTION RESPIRATORY (INHALATION)
Status: COMPLETED | OUTPATIENT
Start: 2023-05-15 | End: 2023-05-15

## 2023-05-15 RX ORDER — BENZONATATE 100 MG/1
100 CAPSULE ORAL
Status: COMPLETED | OUTPATIENT
Start: 2023-05-15 | End: 2023-05-15

## 2023-05-15 RX ORDER — BENZONATATE 100 MG/1
100 CAPSULE ORAL 3 TIMES DAILY PRN
Qty: 20 CAPSULE | Refills: 0 | Status: SHIPPED | OUTPATIENT
Start: 2023-05-15 | End: 2023-05-22

## 2023-05-15 RX ADMIN — IPRATROPIUM BROMIDE AND ALBUTEROL SULFATE 3 ML: .5; 3 SOLUTION RESPIRATORY (INHALATION) at 05:05

## 2023-05-15 RX ADMIN — KETOROLAC TROMETHAMINE 15 MG: 30 INJECTION, SOLUTION INTRAMUSCULAR; INTRAVENOUS at 04:05

## 2023-05-15 RX ADMIN — BENZONATATE 100 MG: 100 CAPSULE ORAL at 04:05

## 2023-05-15 RX ADMIN — CEFTRIAXONE 1 G: 1 INJECTION, SOLUTION INTRAVENOUS at 04:05

## 2023-05-15 NOTE — ED PROVIDER NOTES
"Encounter Date: 5/15/2023    SCRIBE #1 NOTE: Brandy CAMPBELL am scribing for, and in the presence of,  Angie Hawkins PA-C. I have scribed the following portions of the note - Other sections scribed: HPI, ROS, PE.     History     Chief Complaint   Patient presents with    Cough     Pt reports productive green cough x 3 weeks, accompanied bychills and body aches and sore throat       CC: Cough    HPI: Scott Bansal 62 y.o. male with PMHx of DM (type 2), HLD, HTN, COPD, and pneumonia, presents to the ED with productive cough, SOB, wheezing, "cold sweats", and rhinorrhea for 3 weeks. Patient endorses mild improvement following the use of a nebulizer and breathing mask. Patient also endorses loose stool and sore throat 1-2 days ago. Patient endorses eating prior to arrival. Patient reports taking a muscle relaxer medication and NSAIDs 1-2 days ago. Patient denies any history of CHF. Denies headache, leg swelling, or other associated symptoms.       The history is provided by the patient. No  was used.   Review of patient's allergies indicates:   Allergen Reactions    Levofloxacin Hallucinations    Sulfa (sulfonamide antibiotics) Rash     Past Medical History:   Diagnosis Date    Bipolar 1 disorder, mixed     BPH (benign prostatic hypertrophy)     Colon polyp     Cyst, eyelid     Dr. Broussard    Diabetes mellitus     GERD (gastroesophageal reflux disease)     Hyperlipidemia     Hypertension     Lumbar degenerative disc disease 3/7/2019    Migraine headache     Obesity      Past Surgical History:   Procedure Laterality Date    CERVICAL SPINE SURGERY      COLONOSCOPY N/A 7/27/2017    Procedure: COLONOSCOPY;  Surgeon: Genaro Nix MD;  Location: [facility] ENDO;  Service: Endoscopy;  Laterality: N/A;    COLONOSCOPY N/A 10/30/2020    Procedure: COLONOSCOPY;  Surgeon: Herb Martinez MD;  Location: [facility] ENDO;  Service: Endoscopy;  Laterality: N/A;    EYE SURGERY Left 03/2017    cyst removal on " eyelid; Dr. Broussard    SHOULDER SURGERY      TONSILLECTOMY       Family History   Problem Relation Age of Onset    Cataracts Mother     Cancer Mother         throat    Hypertension Mother     Hypertension Father     Stroke Father         2 strokes    Hypertension Sister     Cancer Brother         spinal, kidney, spinal fluid    Hypertension Brother     Cancer Cousin         breast?     Social History     Tobacco Use    Smoking status: Former     Packs/day: 2.00     Years: 15.00     Pack years: 30.00     Types: Cigarettes     Quit date: 1984     Years since quittin.0     Passive exposure: Past    Smokeless tobacco: Never    Tobacco comments:     Patient quit smoking at the age of 26 yo.   Substance Use Topics    Alcohol use: No    Drug use: No     Review of Systems   Constitutional:  Positive for chills. Negative for fever.   HENT:  Positive for rhinorrhea and sore throat. Negative for congestion and ear pain.    Eyes:  Negative for redness.   Respiratory:  Positive for cough, shortness of breath and wheezing. Negative for stridor.    Cardiovascular:  Negative for chest pain and leg swelling.   Gastrointestinal:  Negative for abdominal pain, constipation, diarrhea, nausea and vomiting.   Genitourinary:  Negative for dysuria, frequency, hematuria and urgency.   Musculoskeletal:  Negative for back pain and neck pain.   Skin:  Negative for rash.   Neurological:  Negative for dizziness, speech difficulty, weakness, light-headedness and numbness.   Hematological:  Does not bruise/bleed easily.   Psychiatric/Behavioral:  Negative for confusion.      Physical Exam     Initial Vitals [05/15/23 1537]   BP Pulse Resp Temp SpO2   138/84 103 (!) 22 98.4 °F (36.9 °C) 98 %      MAP       --         Physical Exam    Nursing note and vitals reviewed.  Constitutional: He appears well-developed and well-nourished. No distress.   HENT:   Head: Normocephalic.   Right Ear: Hearing, tympanic membrane, external ear and ear  canal normal.   Left Ear: Hearing, tympanic membrane, external ear and ear canal normal.   Nose: Nose normal.   Mouth/Throat: Oropharynx is clear and moist. No oropharyngeal exudate, posterior oropharyngeal edema or posterior oropharyngeal erythema.   Eyes: Conjunctivae are normal.   Neck: Neck supple.   Cardiovascular:  Normal rate and regular rhythm.     Exam reveals no gallop and no friction rub.       No murmur heard.  Pulmonary/Chest: No tachypnea. No respiratory distress. He has wheezes (Expiratory). He has no rhonchi. He has no rales.   SpO2 97% on room air at rest   Abdominal: Abdomen is soft. Bowel sounds are normal. He exhibits no distension. There is no abdominal tenderness. There is no rebound and no guarding.   Musculoskeletal:         General: No edema (Lower extremity).      Cervical back: Neck supple.     Lymphadenopathy:     He has no cervical adenopathy.   Neurological: He is alert.   Skin: Skin is warm and dry. No rash noted.   Psychiatric: He has a normal mood and affect.       ED Course   Procedures  Labs Reviewed   POCT GLUCOSE - Abnormal; Notable for the following components:       Result Value    POCT Glucose 250 (*)     All other components within normal limits   CULTURE, BLOOD   CULTURE, BLOOD   CBC W/ AUTO DIFFERENTIAL   COMPREHENSIVE METABOLIC PANEL   LACTIC ACID, PLASMA   POCT INFLUENZA A/B MOLECULAR   SARS-COV-2 RDRP GENE   POCT STREP A MOLECULAR          Imaging Results              X-Ray Chest AP Portable (In process)                      Medications   albuterol-ipratropium 2.5 mg-0.5 mg/3 mL nebulizer solution 3 mL (has no administration in time range)   cefTRIAXone (ROCEPHIN) 1 g/50 mL D5W IVPB (has no administration in time range)   benzonatate capsule 100 mg (has no administration in time range)   ketorolac injection 15 mg (has no administration in time range)     Medical Decision Making:   History:   Old Medical Records: I decided to obtain old medical records.  Independently  Interpreted Test(s):   I have ordered and independently interpreted X-rays - see prior notes.  Clinical Tests:   Lab Tests: Ordered and Reviewed  Radiological Study: Ordered and Reviewed  ED Management:  62-year-old male with history of hypertension, hyperlipidemia, diabetes, KRISS COPD presenting for evaluation of productive cough with purulent sputum for the past 3 weeks.  He endorses intermittent shortness of breath.  Patient is afebrile nontoxic appearing in no distress.  Not hypoxic.  Expiratory wheezing throughout.  He is not respiratory distress.  DuoNeb x1 given in the ED with improvement of wheezing.  CBC with no leukocytosis or significant anemia.  CMP with no renal insufficiency or significant electrolyte abnormality.  COVID and flu strep negative negative.  Lactic normal.  Considered doubt sepsis.  He is afebrile.  Vital stable throughout ER visit.  Blood cultures drawn.  Rocephin IV given in the emergency department prior to blood cultures.    Toradol and Tessalon given the ED.  Chest x-ray negative for pneumonia, pneumothorax acute abnormality.  Will treat for bronchitis with Amoxil and doxycycline and medications for symptomatic treatment.  Follow up with primary care in 2 days.  Return ER for worsening or as needed.        Scribe Attestation:   Scribe #1: I performed the above scribed service and the documentation accurately describes the services I performed. I attest to the accuracy of the note.                 I, NICANOR Desir , personally performed the services described in this documentation. All medical record entries made by the scribe were at my direction and in my presence. I have reviewed the chart and agree that the record reflects my personal performance and is accurate and complete.   Clinical Impression:   Final diagnoses:  [R05.9] Cough               Angie Hawkins PA-C  05/16/23 8173

## 2023-05-15 NOTE — Clinical Note
"Scott Bansal (Randy) was seen and treated in our emergency department on 5/15/2023.     COVID-19 is present in our communities across the state. There is limited testing for COVID at this time, so not all patients can be tested. In this situation, your employee meets the following criteria:    Scott Bansal has met the criteria for COVID-19 testing and has a NEGATIVE result. The employee can return to work once they are asymptomatic for 24 hours without the use of fever reducing medications (Tylenol, Motrin, etc).     If the employee is not fully vaccinated and had a close contact:  · Retest at 5 to 7 days post-exposure  · If possible, it is recommended that they quarantine for 5 days from the time of contact regardless of their test status.  · A mask should be worn post quarantine for 5 days.    If you have any questions or concerns, or if I can be of further assistance, please do not hesitate to contact me.    Sincerely,             Angie Hawkins PA-C"

## 2023-05-15 NOTE — DISCHARGE INSTRUCTIONS

## 2023-05-17 DIAGNOSIS — E11.9 TYPE 2 DIABETES MELLITUS WITHOUT COMPLICATION: ICD-10-CM

## 2023-05-19 LAB
BACTERIA BLD CULT: NORMAL
BACTERIA BLD CULT: NORMAL

## 2023-05-24 ENCOUNTER — TELEPHONE (OUTPATIENT)
Dept: DERMATOLOGY | Facility: CLINIC | Age: 63
End: 2023-05-24
Payer: MEDICARE

## 2023-05-24 NOTE — TELEPHONE ENCOUNTER
Spoke with patient was able to get him schedule. Informed him he will need to be seen before his medication is refilled      ----- Message from Kathie Lr sent at 5/24/2023  1:58 PM CDT -----  Contact: mirtha  Patient stated that when he went to ER on 05/15 they canceled his appointment. Please call him back at [phone] to discuss and reschedule.         Thanks  DD

## 2023-05-25 ENCOUNTER — OFFICE VISIT (OUTPATIENT)
Dept: FAMILY MEDICINE | Facility: CLINIC | Age: 63
End: 2023-05-25
Payer: MEDICARE

## 2023-05-25 VITALS
HEART RATE: 90 BPM | SYSTOLIC BLOOD PRESSURE: 137 MMHG | TEMPERATURE: 98 F | HEIGHT: 69 IN | OXYGEN SATURATION: 98 % | BODY MASS INDEX: 36.83 KG/M2 | WEIGHT: 248.69 LBS | DIASTOLIC BLOOD PRESSURE: 81 MMHG

## 2023-05-25 DIAGNOSIS — Z09 HOSPITAL DISCHARGE FOLLOW-UP: Primary | ICD-10-CM

## 2023-05-25 DIAGNOSIS — G43.009 MIGRAINE WITHOUT AURA AND WITHOUT STATUS MIGRAINOSUS, NOT INTRACTABLE: ICD-10-CM

## 2023-05-25 DIAGNOSIS — J44.9 CHRONIC OBSTRUCTIVE PULMONARY DISEASE, UNSPECIFIED COPD TYPE: ICD-10-CM

## 2023-05-25 DIAGNOSIS — J40 BRONCHITIS: ICD-10-CM

## 2023-05-25 PROCEDURE — 99999 PR PBB SHADOW E&M-EST. PATIENT-LVL V: ICD-10-PCS | Mod: PBBFAC,,, | Performed by: FAMILY MEDICINE

## 2023-05-25 PROCEDURE — 99214 PR OFFICE/OUTPT VISIT, EST, LEVL IV, 30-39 MIN: ICD-10-PCS | Mod: S$GLB,,, | Performed by: FAMILY MEDICINE

## 2023-05-25 PROCEDURE — 3079F DIAST BP 80-89 MM HG: CPT | Mod: CPTII,S$GLB,, | Performed by: FAMILY MEDICINE

## 2023-05-25 PROCEDURE — 4010F PR ACE/ARB THEARPY RXD/TAKEN: ICD-10-PCS | Mod: CPTII,S$GLB,, | Performed by: FAMILY MEDICINE

## 2023-05-25 PROCEDURE — 1160F RVW MEDS BY RX/DR IN RCRD: CPT | Mod: CPTII,S$GLB,, | Performed by: FAMILY MEDICINE

## 2023-05-25 PROCEDURE — 99999 PR PBB SHADOW E&M-EST. PATIENT-LVL V: CPT | Mod: PBBFAC,,, | Performed by: FAMILY MEDICINE

## 2023-05-25 PROCEDURE — 99214 OFFICE O/P EST MOD 30 MIN: CPT | Mod: S$GLB,,, | Performed by: FAMILY MEDICINE

## 2023-05-25 PROCEDURE — 1159F PR MEDICATION LIST DOCUMENTED IN MEDICAL RECORD: ICD-10-PCS | Mod: CPTII,S$GLB,, | Performed by: FAMILY MEDICINE

## 2023-05-25 PROCEDURE — 1159F MED LIST DOCD IN RCRD: CPT | Mod: CPTII,S$GLB,, | Performed by: FAMILY MEDICINE

## 2023-05-25 PROCEDURE — 1160F PR REVIEW ALL MEDS BY PRESCRIBER/CLIN PHARMACIST DOCUMENTED: ICD-10-PCS | Mod: CPTII,S$GLB,, | Performed by: FAMILY MEDICINE

## 2023-05-25 PROCEDURE — 3008F PR BODY MASS INDEX (BMI) DOCUMENTED: ICD-10-PCS | Mod: CPTII,S$GLB,, | Performed by: FAMILY MEDICINE

## 2023-05-25 PROCEDURE — 3075F PR MOST RECENT SYSTOLIC BLOOD PRESS GE 130-139MM HG: ICD-10-PCS | Mod: CPTII,S$GLB,, | Performed by: FAMILY MEDICINE

## 2023-05-25 PROCEDURE — 3075F SYST BP GE 130 - 139MM HG: CPT | Mod: CPTII,S$GLB,, | Performed by: FAMILY MEDICINE

## 2023-05-25 PROCEDURE — 3008F BODY MASS INDEX DOCD: CPT | Mod: CPTII,S$GLB,, | Performed by: FAMILY MEDICINE

## 2023-05-25 PROCEDURE — 4010F ACE/ARB THERAPY RXD/TAKEN: CPT | Mod: CPTII,S$GLB,, | Performed by: FAMILY MEDICINE

## 2023-05-25 PROCEDURE — 3079F PR MOST RECENT DIASTOLIC BLOOD PRESSURE 80-89 MM HG: ICD-10-PCS | Mod: CPTII,S$GLB,, | Performed by: FAMILY MEDICINE

## 2023-05-25 RX ORDER — HYDROXYZINE HYDROCHLORIDE 50 MG/1
50 TABLET, FILM COATED ORAL 3 TIMES DAILY PRN
Qty: 270 TABLET | Refills: 3 | Status: CANCELLED | OUTPATIENT
Start: 2023-05-25

## 2023-05-25 RX ORDER — PROMETHAZINE HYDROCHLORIDE AND DEXTROMETHORPHAN HYDROBROMIDE 6.25; 15 MG/5ML; MG/5ML
5 SYRUP ORAL EVERY 4 HOURS PRN
Qty: 118 ML | Refills: 1 | Status: SHIPPED | OUTPATIENT
Start: 2023-05-25 | End: 2024-02-26

## 2023-05-25 RX ORDER — PROMETHAZINE HYDROCHLORIDE AND DEXTROMETHORPHAN HYDROBROMIDE 6.25; 15 MG/5ML; MG/5ML
5 SYRUP ORAL EVERY 4 HOURS PRN
Qty: 100 ML | Refills: 0 | Status: CANCELLED | OUTPATIENT
Start: 2023-05-25 | End: 2023-05-28

## 2023-05-25 RX ORDER — SUMATRIPTAN SUCCINATE 100 MG/1
100 TABLET ORAL DAILY PRN
Qty: 30 TABLET | Refills: 3 | Status: SHIPPED | OUTPATIENT
Start: 2023-05-25 | End: 2023-11-16

## 2023-05-25 RX ORDER — ALBUTEROL SULFATE 90 UG/1
1-2 AEROSOL, METERED RESPIRATORY (INHALATION) EVERY 6 HOURS PRN
Qty: 18 G | Refills: 5 | Status: SHIPPED | OUTPATIENT
Start: 2023-05-25

## 2023-05-25 RX ORDER — ALBUTEROL SULFATE 90 UG/1
1-2 AEROSOL, METERED RESPIRATORY (INHALATION) EVERY 6 HOURS PRN
Qty: 18 G | Refills: 5 | Status: SHIPPED | OUTPATIENT
Start: 2023-05-25 | End: 2023-05-25 | Stop reason: SDUPTHER

## 2023-05-25 RX ORDER — SUMATRIPTAN SUCCINATE 100 MG/1
100 TABLET ORAL
Qty: 27 TABLET | Refills: 3 | Status: CANCELLED | OUTPATIENT
Start: 2023-05-25

## 2023-05-25 RX ORDER — DOXYCYCLINE 100 MG/1
100 CAPSULE ORAL EVERY 12 HOURS
Qty: 14 CAPSULE | Refills: 0 | Status: CANCELLED | OUTPATIENT
Start: 2023-05-25 | End: 2023-06-01

## 2023-05-25 NOTE — PROGRESS NOTES
"  Physical Exam  /81   Pulse 90   Temp 98.3 °F (36.8 °C) (Oral)   Ht 5' 9" (1.753 m)   Wt 112.8 kg (248 lb 10.9 oz)   SpO2 98%   BMI 36.72 kg/m²      Office Visit    Patient Name: Scott Bansal    : 1960  MRN: [id number]      Assessment/Plan:  Scott Bansal is a 62 y.o. male who presents today for :    ER discharge follow-up  Bronchitis  -     promethazine-dextromethorphan (PROMETHAZINE-DM) 6.25-15 mg/5 mL Syrp; Take 5 mLs by mouth every 4 (four) hours as needed (for cough, congestion).  Dispense: 118 mL; Refill: 1  -afebrile/VSS, exam unremarkable  - doing well post-discharge  -continue current medications  -call clinic back with any concerns      Migraine without aura and without status migrainosus, not intractable  -     sumatriptan (IMITREX) 100 MG tablet; Take 1 tablet (100 mg total) by mouth daily as needed for Migraine.  Dispense: 30 tablet; Refill: 3  -stable, continue current regimen       Follow up for worsening Sx. Urgent care/ED precautions provided.      This note was created by combination of typed  and MModal dictation.  Transcription errors may be present.  If there are any questions, please contact me.        ----------------------------------------------------------------------------------------------------------------------      HPI:  Patient Care Team:  Kaylie Villalta MD as PCP - General (Family Medicine)  Warren Rondon OD as Consulting Physician (Optometry)  Jessica Black LPN as Care Coordinator  Ollie Palumbo II, MD as Consulting Physician (Gastroenterology)    Scott is a 62 y.o. male with      Patient Active Problem List   Diagnosis    History of fusion of cervical spine    Type 2 diabetes mellitus with diabetic polyneuropathy, without long-term current use of insulin    Hyperlipidemia    Essential hypertension    Chronic tension-type headache, not intractable    Bipolar 1 disorder    BPH (benign prostatic hyperplasia)    " Gastroesophageal reflux disease without esophagitis    Sciatica of right side    History of pneumonia    COPD (chronic obstructive pulmonary disease)    Former smoker    History of compression fracture of spine    Lumbar degenerative disc disease    Severe obesity (BMI 35.0-39.9) with comorbidity    Atherosclerosis of aorta    Chronic bilateral low back pain without sciatica    Nasal obstruction    KRISS (obstructive sleep apnea)    Hand eczema    MRSA colonization    Recurrent boils    Other schizophrenia    Hyperlipidemia associated with type 2 diabetes mellitus    Immunosuppressed status    Atherosclerosis of native coronary artery of native heart without angina pectoris    Chronic bronchitis       Scott presents today for f/u of recent ED visit for bronchitis and wheezing. He was given Toradol and Tessalon in the ED, and his CXR showed no acute abnormalities. He was subsequently discharged on Amoxil and doxycycline, which have mostly resolved his Sx. He still has minimal cough but overall feels better. No wheezing/F/C/N/V/SOB/LEON/CP. Otherwise, no other acute issues during this visit.      Additional ROS    CONST: no fever, no activity change  EYES: no vision change.   ENT: no sore throat. No dysphagia.   CV: no CP with exertion  RESP: see HPI  GI: no N/V/diarrhea/constipation  : no urinary concerns  MSK: no new myalgias or arthralgias.   SKIN: no new rashes  NEURO: no focal deficits.   PSYCH: no new issues.         Patient Active Problem List   Diagnosis    History of fusion of cervical spine    Type 2 diabetes mellitus with diabetic polyneuropathy, without long-term current use of insulin    Hyperlipidemia    Essential hypertension    Chronic tension-type headache, not intractable    Bipolar 1 disorder    BPH (benign prostatic hyperplasia)    Gastroesophageal reflux disease without esophagitis    Sciatica of right side    History of pneumonia    COPD (chronic obstructive pulmonary disease)    Former smoker     History of compression fracture of spine    Lumbar degenerative disc disease    Severe obesity (BMI 35.0-39.9) with comorbidity    Atherosclerosis of aorta    Chronic bilateral low back pain without sciatica    Nasal obstruction    KRISS (obstructive sleep apnea)    Hand eczema    MRSA colonization    Recurrent boils    Other schizophrenia    Hyperlipidemia associated with type 2 diabetes mellitus    Immunosuppressed status    Atherosclerosis of native coronary artery of native heart without angina pectoris    Chronic bronchitis       Current Medications  Medications reviewed and updated.       Current Outpatient Medications:     amitriptyline (ELAVIL) 25 MG tablet, Take 1 tablet (25 mg total) by mouth every evening., Disp: 30 tablet, Rfl: 11    amLODIPine (NORVASC) 10 MG tablet, TAKE 1 TABLET BY MOUTH EVERY DAY, Disp: 90 tablet, Rfl: 3    blood sugar diagnostic (TRUETEST TEST STRIPS) Strp, 1 each by Misc.(Non-Drug; Combo Route) route 3 (three) times a week., Disp: 100 each, Rfl: 3    busPIRone (BUSPAR) 10 MG tablet, Take 10 mg by mouth 3 (three) times daily., Disp: , Rfl:     candesartan (ATACAND) 4 MG tablet, TAKE 1 TABLET BY MOUTH EVERY DAY, Disp: 90 tablet, Rfl: 1    CLOTRIMAZOLE-BETAMETH DIP-ZINC TOP, , Disp: , Rfl:     cyclobenzaprine (FLEXERIL) 10 MG tablet, TAKE 1 TABLET BY MOUTH THREE TIMES A DAY AS NEEDED FOR MUSCLE SPASMS, Disp: 270 tablet, Rfl: 1    DUPIXENT SYRINGE 300 mg/2 mL Syrg, Inject 1 syringe (300mg) into the skin every other week, Disp: 4 mL, Rfl: 5    gabapentin (NEURONTIN) 300 MG capsule, Take 1 capsule (300 mg total) by mouth 2 (two) times daily., Disp: 180 capsule, Rfl: 2    gemfibroziL (LOPID) 600 MG tablet, TAKE 1 TABLET BY MOUTH TWICE A DAY BEFORE MEALS, Disp: 180 tablet, Rfl: 3    hydrOXYzine (ATARAX) 50 MG tablet, TAKE 1 TABLET (50 MG TOTAL) BY MOUTH 3 (THREE) TIMES DAILY AS NEEDED FOR ITCHING., Disp: 270 tablet, Rfl: 3    levocetirizine (XYZAL) 5 MG tablet, TAKE 1 TABLET BY MOUTH EVERY  MORNING, Disp: 90 tablet, Rfl: 3    metFORMIN (GLUCOPHAGE) 1000 MG tablet, TAKE 1 TABLET BY MOUTH TWICE A DAY, Disp: 180 tablet, Rfl: 0    multivitamin capsule, Take 1 capsule by mouth once daily., Disp: , Rfl:     pantoprazole (PROTONIX) 40 MG tablet, TAKE 1 TABLET BY MOUTH EVERY DAY, Disp: 90 tablet, Rfl: 3    quetiapine (SEROQUEL) 300 MG Tab, Take by mouth., Disp: , Rfl:     simvastatin (ZOCOR) 80 MG tablet, Take 1 tablet (80 mg total) by mouth once daily., Disp: 90 tablet, Rfl: 0    tamsulosin (FLOMAX) 0.4 mg Cap, TAKE 2 CAPSULES BY MOUTH EVERY DAY, Disp: 180 capsule, Rfl: 3    tiZANidine (ZANAFLEX) 4 MG tablet, Take 1 tablet (4 mg total) by mouth every 8 (eight) hours., Disp: 30 tablet, Rfl: 1    zonisamide (ZONEGRAN) 50 MG Cap, Take 1 capsule (50 mg total) by mouth 2 (two) times daily., Disp: 180 capsule, Rfl: 3    albuterol (PROVENTIL/VENTOLIN HFA) 90 mcg/actuation inhaler, Inhale 1-2 puffs into the lungs every 6 (six) hours as needed for Wheezing or Shortness of Breath. Rescue, Disp: 18 g, Rfl: 5    crisaborole (EUCRISA) 2 % Oint, Use for hand eczema bid. (Patient not taking: Reported on 3/14/2023), Disp: 60 g, Rfl: 5    fluticasone propionate (FLONASE) 50 mcg/actuation nasal spray, 2 sprays (100 mcg total) by Each Nostril route once daily. (Patient not taking: Reported on 5/25/2023), Disp: 16 mL, Rfl: 5    meloxicam (MOBIC) 15 MG tablet, TAKE 1 TABLET BY MOUTH EVERY DAY, Disp: 30 tablet, Rfl: 0    mupirocin (BACTROBAN) 2 % ointment, Apply topically 3 (three) times daily. (Patient not taking: Reported on 3/14/2023), Disp: 30 g, Rfl: 0    promethazine-dextromethorphan (PROMETHAZINE-DM) 6.25-15 mg/5 mL Syrp, Take 5 mLs by mouth every 4 (four) hours as needed (for cough, congestion)., Disp: 118 mL, Rfl: 1    sumatriptan (IMITREX) 100 MG tablet, Take 1 tablet (100 mg total) by mouth daily as needed for Migraine., Disp: 30 tablet, Rfl: 3    Past Surgical History:   Procedure Laterality Date    CERVICAL SPINE  SURGERY      COLONOSCOPY N/A 2017    Procedure: COLONOSCOPY;  Surgeon: Genaro Nix MD;  Location: [facility] ENDO;  Service: Endoscopy;  Laterality: N/A;    COLONOSCOPY N/A 10/30/2020    Procedure: COLONOSCOPY;  Surgeon: Herb Martinez MD;  Location: [facility] ENDO;  Service: Endoscopy;  Laterality: N/A;    EYE SURGERY Left 2017    cyst removal on eyelid; Dr. Broussard    SHOULDER SURGERY      TONSILLECTOMY         Family History   Problem Relation Age of Onset    Cataracts Mother     Cancer Mother         throat    Hypertension Mother     Hypertension Father     Stroke Father         2 strokes    Hypertension Sister     Cancer Brother         spinal, kidney, spinal fluid    Hypertension Brother     Cancer Cousin         breast?       Social History     Socioeconomic History    Marital status:    Tobacco Use    Smoking status: Former     Packs/day: 2.00     Years: 15.00     Pack years: 30.00     Types: Cigarettes     Quit date: 1984     Years since quittin.0     Passive exposure: Past    Smokeless tobacco: Never    Tobacco comments:     Patient quit smoking at the age of 26 yo.   Substance and Sexual Activity    Alcohol use: No    Drug use: No    Sexual activity: Yes     Partners: Female     Social Determinants of Health     Financial Resource Strain: Medium Risk    Difficulty of Paying Living Expenses: Somewhat hard   Food Insecurity: No Food Insecurity    Worried About Running Out of Food in the Last Year: Never true    Ran Out of Food in the Last Year: Never true   Transportation Needs: No Transportation Needs    Lack of Transportation (Medical): No    Lack of Transportation (Non-Medical): No   Physical Activity: Inactive    Days of Exercise per Week: 0 days    Minutes of Exercise per Session: 0 min   Stress: Stress Concern Present    Feeling of Stress : To some extent   Social Connections: Moderately Integrated    Frequency of Communication with Friends and Family: More than three times a  "week    Frequency of Social Gatherings with Friends and Family: Never    Attends Islam Services: More than 4 times per year    Active Member of Clubs or Organizations: No    Attends Club or Organization Meetings: Never    Marital Status: Living with partner   Housing Stability: Low Risk     Unable to Pay for Housing in the Last Year: No    Number of Places Lived in the Last Year: 1    Unstable Housing in the Last Year: No           Allergies   Review of patient's allergies indicates:   Allergen Reactions    Levofloxacin Hallucinations    Sulfa (sulfonamide antibiotics) Rash             Review of Systems  See HPI        [unfilled]  /81   Pulse 90   Temp 98.3 °F (36.8 °C) (Oral)   Ht 5' 9" (1.753 m)   Wt 112.8 kg (248 lb 10.9 oz)   SpO2 98%   BMI 36.72 kg/m²     GEN: NAD, well developed, pleasant, well nourished  HEENT: NCAT, PERRLA, EOMI, sclera clear, anicteric  NECK: normal, supple with midline trachea, no LAD, no thyromegaly  LUNGS: CTAB, no w/r/r, normal effort, no increased work of breathing,  HEART: RRR, normal S1 and S2, no m/r/g, no palpitations, no edema, normal pulses  ABD: s/nt/nd, NABS, no organomegaly, no masses  SKIN: warm and dry with normal turgor, no rashes,  PSYCH: AOx3, appropriate mood and affect.   MSK: extremities warm/well perfused, normal ROM in all 4 extremities, no c/c/e.  NEURO: normal without focal findings, CN II-XII are intact.  Sensation grossly normal, gait and station normal.                 "

## 2023-05-31 NOTE — DISCHARGE INSTRUCTIONS

## 2023-06-01 DIAGNOSIS — S29.012A MUSCLE STRAIN OF RIGHT UPPER BACK, INITIAL ENCOUNTER: ICD-10-CM

## 2023-06-01 RX ORDER — MELOXICAM 15 MG/1
TABLET ORAL
Qty: 30 TABLET | Refills: 0 | Status: ON HOLD | OUTPATIENT
Start: 2023-06-01 | End: 2023-06-12 | Stop reason: HOSPADM

## 2023-06-03 ENCOUNTER — HOSPITAL ENCOUNTER (EMERGENCY)
Facility: HOSPITAL | Age: 63
Discharge: HOME | End: 2023-06-03
Attending: EMERGENCY MEDICINE
Payer: MEDICARE

## 2023-06-03 VITALS
SYSTOLIC BLOOD PRESSURE: 170 MMHG | TEMPERATURE: 98 F | RESPIRATION RATE: 18 BRPM | WEIGHT: 240 LBS | BODY MASS INDEX: 35.44 KG/M2 | OXYGEN SATURATION: 94 % | DIASTOLIC BLOOD PRESSURE: 91 MMHG | HEART RATE: 98 BPM

## 2023-06-03 DIAGNOSIS — R06.2 WHEEZING: ICD-10-CM

## 2023-06-03 DIAGNOSIS — R05.9 COUGH, UNSPECIFIED TYPE: Primary | ICD-10-CM

## 2023-06-03 DIAGNOSIS — R07.9 CHEST PAIN: ICD-10-CM

## 2023-06-03 LAB
ALBUMIN SERPL BCP-MCNC: 3.8 G/DL (ref 3.5–5.2)
ALP SERPL-CCNC: 100 U/L (ref 55–135)
ALT SERPL W/O P-5'-P-CCNC: 31 U/L (ref 10–44)
ANION GAP SERPL CALC-SCNC: 11 MMOL/L (ref 8–16)
AST SERPL-CCNC: 22 U/L (ref 10–40)
BASOPHILS # BLD AUTO: 0.07 K/UL (ref 0–0.2)
BASOPHILS NFR BLD: 0.6 % (ref 0–1.9)
BILIRUB SERPL-MCNC: 0.3 MG/DL (ref 0.1–1)
BUN SERPL-MCNC: 13 MG/DL (ref 8–23)
CALCIUM SERPL-MCNC: 10.3 MG/DL (ref 8.7–10.5)
CHLORIDE SERPL-SCNC: 102 MMOL/L (ref 95–110)
CO2 SERPL-SCNC: 22 MMOL/L (ref 23–29)
CREAT SERPL-MCNC: 1.1 MG/DL (ref 0.5–1.4)
DIFFERENTIAL METHOD: ABNORMAL
EOSINOPHIL # BLD AUTO: 0.5 K/UL (ref 0–0.5)
EOSINOPHIL NFR BLD: 4 % (ref 0–8)
ERYTHROCYTE [DISTWIDTH] IN BLOOD BY AUTOMATED COUNT: 12.9 % (ref 11.5–14.5)
EST. GFR  (NO RACE VARIABLE): >60 ML/MIN/1.73 M^2
GLUCOSE SERPL-MCNC: 201 MG/DL (ref 70–110)
HCT VFR BLD AUTO: 35.2 % (ref 40–54)
HGB BLD-MCNC: 11.6 G/DL (ref 14–18)
IMM GRANULOCYTES # BLD AUTO: 0.42 K/UL (ref 0–0.04)
IMM GRANULOCYTES NFR BLD AUTO: 3.6 % (ref 0–0.5)
LYMPHOCYTES # BLD AUTO: 1.7 K/UL (ref 1–4.8)
LYMPHOCYTES NFR BLD: 14.4 % (ref 18–48)
MCH RBC QN AUTO: 27.2 PG (ref 27–31)
MCHC RBC AUTO-ENTMCNC: 33 G/DL (ref 32–36)
MCV RBC AUTO: 82 FL (ref 82–98)
MONOCYTES # BLD AUTO: 1.4 K/UL (ref 0.3–1)
MONOCYTES NFR BLD: 12.3 % (ref 4–15)
NEUTROPHILS # BLD AUTO: 7.5 K/UL (ref 1.8–7.7)
NEUTROPHILS NFR BLD: 65.1 % (ref 38–73)
NRBC BLD-RTO: 0 /100 WBC
PLATELET # BLD AUTO: 330 K/UL (ref 150–450)
PMV BLD AUTO: 10.2 FL (ref 9.2–12.9)
POTASSIUM SERPL-SCNC: 4 MMOL/L (ref 3.5–5.1)
PROT SERPL-MCNC: 7.6 G/DL (ref 6–8.4)
RBC # BLD AUTO: 4.27 M/UL (ref 4.6–6.2)
SODIUM SERPL-SCNC: 135 MMOL/L (ref 136–145)
TROPONIN I SERPL DL<=0.01 NG/ML-MCNC: <0.006 NG/ML (ref 0–0.03)
WBC # BLD AUTO: 11.51 K/UL (ref 3.9–12.7)

## 2023-06-03 PROCEDURE — 25000242 PHARM REV CODE 250 ALT 637 W/ HCPCS: Performed by: PHYSICIAN ASSISTANT

## 2023-06-03 PROCEDURE — 96372 THER/PROPH/DIAG INJ SC/IM: CPT | Performed by: PHYSICIAN ASSISTANT

## 2023-06-03 PROCEDURE — 84484 ASSAY OF TROPONIN QUANT: CPT | Performed by: PHYSICIAN ASSISTANT

## 2023-06-03 PROCEDURE — 93010 EKG 12-LEAD: ICD-10-PCS | Mod: ,,, | Performed by: INTERNAL MEDICINE

## 2023-06-03 PROCEDURE — 63600175 PHARM REV CODE 636 W HCPCS: Performed by: PHYSICIAN ASSISTANT

## 2023-06-03 PROCEDURE — 93010 ELECTROCARDIOGRAM REPORT: CPT | Mod: ,,, | Performed by: INTERNAL MEDICINE

## 2023-06-03 PROCEDURE — 85025 COMPLETE CBC W/AUTO DIFF WBC: CPT | Performed by: PHYSICIAN ASSISTANT

## 2023-06-03 PROCEDURE — 83880 ASSAY OF NATRIURETIC PEPTIDE: CPT | Performed by: PHYSICIAN ASSISTANT

## 2023-06-03 PROCEDURE — 93005 ELECTROCARDIOGRAM TRACING: CPT

## 2023-06-03 PROCEDURE — 80053 COMPREHEN METABOLIC PANEL: CPT | Performed by: PHYSICIAN ASSISTANT

## 2023-06-03 PROCEDURE — 99285 EMERGENCY DEPT VISIT HI MDM: CPT | Mod: 25

## 2023-06-03 PROCEDURE — 25000003 PHARM REV CODE 250: Performed by: PHYSICIAN ASSISTANT

## 2023-06-03 PROCEDURE — 94640 AIRWAY INHALATION TREATMENT: CPT

## 2023-06-03 PROCEDURE — 25500020 PHARM REV CODE 255: Performed by: EMERGENCY MEDICINE

## 2023-06-03 RX ORDER — DOXYCYCLINE 100 MG/1
100 CAPSULE ORAL 2 TIMES DAILY
Qty: 20 CAPSULE | Refills: 0 | Status: ON HOLD | OUTPATIENT
Start: 2023-06-03 | End: 2023-06-12 | Stop reason: HOSPADM

## 2023-06-03 RX ORDER — DEXAMETHASONE SODIUM PHOSPHATE 4 MG/ML
10 INJECTION, SOLUTION INTRA-ARTICULAR; INTRALESIONAL; INTRAMUSCULAR; INTRAVENOUS; SOFT TISSUE
Status: COMPLETED | OUTPATIENT
Start: 2023-06-03 | End: 2023-06-03

## 2023-06-03 RX ORDER — DOXYCYCLINE HYCLATE 100 MG
100 TABLET ORAL
Status: COMPLETED | OUTPATIENT
Start: 2023-06-03 | End: 2023-06-03

## 2023-06-03 RX ORDER — BUPROPION HYDROCHLORIDE 300 MG/1
300 TABLET ORAL DAILY
COMMUNITY
End: 2023-06-08 | Stop reason: ALTCHOICE

## 2023-06-03 RX ORDER — IPRATROPIUM BROMIDE AND ALBUTEROL SULFATE 2.5; .5 MG/3ML; MG/3ML
3 SOLUTION RESPIRATORY (INHALATION)
Status: COMPLETED | OUTPATIENT
Start: 2023-06-03 | End: 2023-06-03

## 2023-06-03 RX ORDER — PROMETHAZINE HYDROCHLORIDE AND DEXTROMETHORPHAN HYDROBROMIDE 6.25; 15 MG/5ML; MG/5ML
10 SYRUP ORAL EVERY 8 HOURS PRN
Qty: 100 ML | Refills: 0 | Status: ON HOLD | OUTPATIENT
Start: 2023-06-03 | End: 2023-06-12 | Stop reason: HOSPADM

## 2023-06-03 RX ORDER — DEXAMETHASONE SODIUM PHOSPHATE 4 MG/ML
10 INJECTION, SOLUTION INTRA-ARTICULAR; INTRALESIONAL; INTRAMUSCULAR; INTRAVENOUS; SOFT TISSUE
Status: DISCONTINUED | OUTPATIENT
Start: 2023-06-03 | End: 2023-06-03

## 2023-06-03 RX ADMIN — DEXAMETHASONE SODIUM PHOSPHATE 10 MG: 4 INJECTION, SOLUTION INTRA-ARTICULAR; INTRALESIONAL; INTRAMUSCULAR; INTRAVENOUS; SOFT TISSUE at 08:06

## 2023-06-03 RX ADMIN — IPRATROPIUM BROMIDE AND ALBUTEROL SULFATE 3 ML: .5; 3 SOLUTION RESPIRATORY (INHALATION) at 08:06

## 2023-06-03 RX ADMIN — DOXYCYCLINE HYCLATE 100 MG: 100 TABLET, COATED ORAL at 11:06

## 2023-06-03 RX ADMIN — IOHEXOL 80 ML: 350 INJECTION, SOLUTION INTRAVENOUS at 10:06

## 2023-06-04 LAB — BNP SERPL-MCNC: <10 PG/ML (ref 0–99)

## 2023-06-04 NOTE — DISCHARGE INSTRUCTIONS
Complete full course of antibiotics, use cough medication as needed  Follow-up with your primary doctor and return to the ED as needed    Thank you for coming to our Emergency Department today. It is important to remember that some problems are difficult to diagnose and may not be found during your Emergency Department visit. Be sure to follow up with your primary care doctor and review all labs/imaging/tests that were performed during this visit with them. Some labs/tests may be outside of the normal range and require non-emergent follow-up and further investigation to help diagnose/exclude/prevent complications or other medical conditions.    If you do not have a primary care doctor, you may contact the one listed on your discharge paperwork or you may also call the [facility] Appointment Desk at [phone] to schedule an appointment and establish care with one. It is important to your health that you have a primary care doctor.    Please take all medications as directed. All medications may potentially have side-effects and it is impossible to predict which medications may give you side-effects or what side-effects (if any) they will give you.. If you feel that you are having a negative effect or side-effect of any medication you should immediately stop taking them and seek medical attention. If you feel that you are having a life-threatening reaction call 911.    Return to the ER with any questions/concerns, new/concerning symptoms, worsening or failure to improve.     Do not drive, swim, climb to height, take a bath or make any important decisions for 24 hours if you have received any pain medications, sedatives or mood altering drugs during your ER visit.

## 2023-06-04 NOTE — ED PROVIDER NOTES
Encounter Date: 6/3/2023       History     Chief Complaint   Patient presents with    Heartburn     Burning with breathing, X 3 months, here 5/15 for the same     62-year-old male arrives to the emergency department with a chief complaint shortness of breath and cough.  Symptoms ongoing for couple of months.  Seen in the emergency department on the 15th of May with similar symptoms.  At that time he had a workup which included blood work and chest x-ray.  He states he was prescribed antibiotics and felt a little bit better but symptoms came back.  He was seen in follow-up by his primary care doctor and given cough medication.  He states that his primary complaint is a cough that is productive at times, the cough keeps him up at night.  He feels short of breath with this.  He is a former smoker, quit 40 years ago.  Endorses chronic wheezing and possible COPD diagnosis.  He appears well on exam and nontoxic.  His vital signs are normal.  He does have a persistent nonproductive cough during our assessment.    Review of patient's allergies indicates:   Allergen Reactions    Levofloxacin Hallucinations    Sulfa (sulfonamide antibiotics) Rash     Past Medical History:   Diagnosis Date    Bipolar 1 disorder, mixed     BPH (benign prostatic hypertrophy)     Colon polyp     Cyst, eyelid     Dr. Broussard    Diabetes mellitus     GERD (gastroesophageal reflux disease)     Hyperlipidemia     Hypertension     Lumbar degenerative disc disease 3/7/2019    Migraine headache     Obesity      Past Surgical History:   Procedure Laterality Date    CERVICAL SPINE SURGERY      COLONOSCOPY N/A 7/27/2017    Procedure: COLONOSCOPY;  Surgeon: Genaro Nix MD;  Location: [facility];  Service: Endoscopy;  Laterality: N/A;    COLONOSCOPY N/A 10/30/2020    Procedure: COLONOSCOPY;  Surgeon: Herb Martinez MD;  Location: [facility];  Service: Endoscopy;  Laterality: N/A;    EYE SURGERY Left 03/2017    cyst removal on eyelid; Dr. Broussard     SHOULDER SURGERY      TONSILLECTOMY       Family History   Problem Relation Age of Onset    Cataracts Mother     Cancer Mother         throat    Hypertension Mother     Hypertension Father     Stroke Father         2 strokes    Hypertension Sister     Cancer Brother         spinal, kidney, spinal fluid    Hypertension Brother     Cancer Cousin         breast?     Social History     Tobacco Use    Smoking status: Former     Packs/day: 2.00     Years: 15.00     Pack years: 30.00     Types: Cigarettes     Quit date: 1984     Years since quittin.0     Passive exposure: Past    Smokeless tobacco: Never    Tobacco comments:     Patient quit smoking at the age of 24 yo.   Substance Use Topics    Alcohol use: No    Drug use: No     Review of Systems   Constitutional:  Negative for fever.   HENT:  Negative for sore throat.    Respiratory:  Positive for cough and shortness of breath.    Cardiovascular:  Negative for chest pain.   Gastrointestinal:  Negative for nausea.   Genitourinary:  Negative for dysuria.   Musculoskeletal:  Negative for back pain.   Skin:  Negative for rash.   Neurological:  Negative for weakness.   Hematological:  Does not bruise/bleed easily.     Physical Exam     Initial Vitals [23]   BP Pulse Resp Temp SpO2   (!) 145/85 90 18 98.2 °F (36.8 °C) 98 %      MAP       --         Physical Exam    Constitutional: Vital signs are normal. He appears well-developed and well-nourished.   HENT:   Head: Normocephalic and atraumatic.   Right Ear: Hearing normal.   Left Ear: Hearing normal.   No acute findings on exam   Eyes: Conjunctivae are normal.   Cardiovascular:  Normal rate and regular rhythm.           No lower extremity edema   Pulmonary/Chest:   Inspiratory and expiratory wheezing noted   Abdominal: Abdomen is soft. Bowel sounds are normal.   Soft and nontender abdomen   Musculoskeletal:         General: Normal range of motion.     Neurological: He is alert and oriented to person,  place, and time.   Skin: Skin is warm and intact.   Psychiatric: He has a normal mood and affect. His speech is normal and behavior is normal. Cognition and memory are normal.       ED Course   Procedures  Labs Reviewed   COMPREHENSIVE METABOLIC PANEL - Abnormal; Notable for the following components:       Result Value    Sodium 135 (*)     CO2 22 (*)     Glucose 201 (*)     All other components within normal limits   CBC W/ AUTO DIFFERENTIAL - Abnormal; Notable for the following components:    RBC 4.27 (*)     Hemoglobin 11.6 (*)     Hematocrit 35.2 (*)     Immature Granulocytes 3.6 (*)     Immature Grans (Abs) 0.42 (*)     Mono # 1.4 (*)     Lymph % 14.4 (*)     All other components within normal limits   TROPONIN I   B-TYPE NATRIURETIC PEPTIDE          Imaging Results               CTA Chest Non-Coronary (PE Studies) (Final result)  Result time 06/03/23 22:57:49      Final result by Shayan Girard MD (06/03/23 22:57:49)                   Impression:      There is no large central saddle type pulmonary embolus, no additional evidence for pulmonary embolus on this exam.    Bilateral pulmonary opacities, including patchy and nodular pulmonary opacities, with more prominent confluent infiltrate of the left upper lobe and a focal area of suspected dense confluent infiltrate or consolidation.  These findings may relate to infectious/inflammatory etiology however follow-up to document resolution to exclude malignancy is recommended.    The aforementioned area of suspected focal dense confluent infiltrate/consolidation demonstrates foci of air, this may relate to infection, the possibly of developing abscess is a consideration, necrosis is a consideration, follow-up is recommended.    Additional findings as above.    This report was flagged in Epic as abnormal.      Electronically signed by: Shayan Girard  Date:    06/03/2023  Time:    22:57               Narrative:    EXAMINATION:  CTA CHEST NON CORONARY (PE  STUDIES)    CLINICAL HISTORY:  Pulmonary embolism (PE) suspected, high prob;    TECHNIQUE:  Low dose axial images, sagittal and coronal reformations were obtained from the thoracic inlet to the lung bases following the IV administration of 80 mL of Omnipaque 350.  Contrast timing was optimized to evaluate the pulmonary arteries.  MIP images were performed.    COMPARISON:  Chest radiograph May 15, 2023, chest CT May 22, 2019    FINDINGS:  There is no large central saddle type pulmonary embolus.  The pulmonary arterial vascular demonstrate appropriate opacification, when accounting for artifact and abnormal pattern of pulmonary arterial filling defects specific for pulmonary emboli is not seen.    There are small mediastinal and hilar lymph nodes without enlarged adenopathy.  Mild pericardial thickening is noted without significant pericardial effusion.  The thoracic aorta demonstrates appropriate opacification.  Coronary arterial atherosclerotic change noted.    There are patchy and nodular pulmonary opacities bilaterally.  Somewhat more confluent pulmonary infiltrates/airspace disease is seen involving the left upper lobe, and there is an area of prominent opacity seen involving the left upper lobe that is pleural based along the major fissure, measuring up to approximately 2.3 x 4.3 cm on axial imaging.  This has an appearance suggesting focal confluent infiltrate/consolidation.  There are some tiny foci of air within this, that cannot be definitively determined is bronchial, the possibility of developing pulmonary abscess is to be considered although significant fluid density is not appreciated.    There is no evidence for pleural effusion and there is no evidence for pneumothorax.  There is a tiny hiatal hernia noted.  Limited imaging of the upper abdomen demonstrates no evidence for acute upper abdominal process.    The osseous structures demonstrate chronic change.  Postoperative change of the cervical spine is  noted.                                       Medications   doxycycline tablet 100 mg (has no administration in time range)   albuterol-ipratropium 2.5 mg-0.5 mg/3 mL nebulizer solution 3 mL (3 mLs Nebulization Given 6/3/23 2046)   dexAMETHasone injection 10 mg (10 mg Intramuscular Given 6/3/23 2055)   iohexoL (OMNIPAQUE 350) injection 80 mL (80 mLs Intravenous Given 6/3/23 2235)     Medical Decision Making:   History:   Old Medical Records: I decided to obtain old medical records.  Old Records Summarized: records from clinic visits.  Initial Assessment:   62-year-old presenting with a cough  Differential Diagnosis:   Pneumonia, COPD, bronchitis, pulmonary embolism, CHF  Independently Interpreted Test(s):   I have ordered and independently interpreted EKG Reading(s) - see summary below       <> Summary of EKG Reading(s): Sinus rhythm, rate 97, no STEMI  Clinical Tests:   Lab Tests: Ordered and Reviewed  Radiological Study: Ordered and Reviewed  Medical Tests: Ordered and Reviewed  ED Management:  62-year-old with a cough, I will get routine labs and a PE study  patient treated in the ED with dexamethasone as we have a Solu-Medrol shortage and a DuoNeb treatment.    Coughing somewhat improved after treatment in the ED with supportive care.  No acute findings on labs, CT scan of the chest was negative for a pulmonary embolism but did show findings of possible developing infectious process.  I will start the patient on doxycycline and cough syrup.  He was advised to call his PCP for follow-up, return precautions given.                        Clinical Impression:   Final diagnoses:  [R07.9] Chest pain  [R06.2] Wheezing  [R05.9] Cough, unspecified type (Primary)        ED Disposition Condition    Discharge Stable          ED Prescriptions       Medication Sig Dispense Start Date End Date Auth. Provider    promethazine-dextromethorphan (PROMETHAZINE-DM) 6.25-15 mg/5 mL Syrp Take 10 mLs by mouth every 8 (eight) hours as  needed. 100 mL 6/3/2023 6/13/2023 Jerad Perez PA-C    doxycycline (VIBRAMYCIN) 100 MG Cap Take 1 capsule (100 mg total) by mouth 2 (two) times daily. for 10 days 20 capsule 6/3/2023 6/13/2023 Jerad Perez PA-C          Follow-up Information       Follow up With Specialties Details Why Contact Info    Kaylie Villalta MD Family Medicine, Wound Care   [address] [place] [id number] [phone]               Jerad Perez PA-C  06/03/23 7207

## 2023-06-04 NOTE — ED TRIAGE NOTES
"63 yo male presents to the ED with c/o cough with "green" mucoid production accompanied with a "burning sensation" to mid chest. Pt reports that he has been experiencing these symptoms for 3 months. Pt explains that he was seen here month ago and was told that it " may be bronchitis". Pt denies CP, SOB, N/V/D, chills/fever; endorses PMH of HTN and diabetes.   "

## 2023-06-08 ENCOUNTER — HOSPITAL ENCOUNTER (INPATIENT)
Facility: HOSPITAL | Age: 63
LOS: 4 days | Discharge: HOME | DRG: 194 | End: 2023-06-12
Attending: EMERGENCY MEDICINE | Admitting: FAMILY MEDICINE
Payer: MEDICARE

## 2023-06-08 DIAGNOSIS — R06.02 SOB (SHORTNESS OF BREATH): ICD-10-CM

## 2023-06-08 DIAGNOSIS — J18.9 PNEUMONIA DUE TO INFECTIOUS ORGANISM: ICD-10-CM

## 2023-06-08 DIAGNOSIS — D72.829 LEUKOCYTOSIS, UNSPECIFIED TYPE: ICD-10-CM

## 2023-06-08 DIAGNOSIS — J18.9 PNEUMONIA OF LEFT LOWER LOBE DUE TO INFECTIOUS ORGANISM: ICD-10-CM

## 2023-06-08 DIAGNOSIS — R05.9 COUGHING: Primary | ICD-10-CM

## 2023-06-08 PROBLEM — E83.52 HYPERCALCEMIA: Status: ACTIVE | Noted: 2023-06-08

## 2023-06-08 LAB
ALBUMIN SERPL BCP-MCNC: 3.3 G/DL (ref 3.5–5.2)
ALP SERPL-CCNC: 135 U/L (ref 55–135)
ALT SERPL W/O P-5'-P-CCNC: 34 U/L (ref 10–44)
ANION GAP SERPL CALC-SCNC: 12 MMOL/L (ref 8–16)
AST SERPL-CCNC: 21 U/L (ref 10–40)
BACTERIA #/AREA URNS HPF: ABNORMAL /HPF
BASOPHILS NFR BLD: 0 % (ref 0–1.9)
BILIRUB SERPL-MCNC: 0.6 MG/DL (ref 0.1–1)
BILIRUB UR QL STRIP: NEGATIVE
BNP SERPL-MCNC: <10 PG/ML (ref 0–99)
BUN SERPL-MCNC: 18 MG/DL (ref 8–23)
CALCIUM SERPL-MCNC: 11 MG/DL (ref 8.7–10.5)
CHLORIDE SERPL-SCNC: 105 MMOL/L (ref 95–110)
CLARITY UR: CLEAR
CO2 SERPL-SCNC: 18 MMOL/L (ref 23–29)
COLOR UR: YELLOW
CREAT SERPL-MCNC: 1.2 MG/DL (ref 0.5–1.4)
DIFFERENTIAL METHOD: ABNORMAL
EOSINOPHIL NFR BLD: 1 % (ref 0–8)
ERYTHROCYTE [DISTWIDTH] IN BLOOD BY AUTOMATED COUNT: 13 % (ref 11.5–14.5)
EST. GFR  (NO RACE VARIABLE): >60 ML/MIN/1.73 M^2
GIANT PLATELETS BLD QL SMEAR: PRESENT
GLUCOSE SERPL-MCNC: 262 MG/DL (ref 70–110)
GLUCOSE UR QL STRIP: NEGATIVE
HCT VFR BLD AUTO: 36 % (ref 40–54)
HGB BLD-MCNC: 11.5 G/DL (ref 14–18)
HGB UR QL STRIP: ABNORMAL
HYALINE CASTS #/AREA URNS LPF: 1 /LPF
IMM GRANULOCYTES # BLD AUTO: ABNORMAL K/UL (ref 0–0.04)
IMM GRANULOCYTES NFR BLD AUTO: ABNORMAL % (ref 0–0.5)
KETONES UR QL STRIP: NEGATIVE
LACTATE SERPL-SCNC: 1.5 MMOL/L (ref 0.5–2.2)
LEUKOCYTE ESTERASE UR QL STRIP: NEGATIVE
LYMPHOCYTES NFR BLD: 9 % (ref 18–48)
MCH RBC QN AUTO: 26.4 PG (ref 27–31)
MCHC RBC AUTO-ENTMCNC: 31.9 G/DL (ref 32–36)
MCV RBC AUTO: 83 FL (ref 82–98)
METAMYELOCYTES NFR BLD MANUAL: 2 %
MICROSCOPIC COMMENT: ABNORMAL
MONOCYTES NFR BLD: 4 % (ref 4–15)
MYELOCYTES NFR BLD MANUAL: 2 %
NEUTROPHILS NFR BLD: 67 % (ref 38–73)
NEUTS BAND NFR BLD MANUAL: 15 %
NITRITE UR QL STRIP: NEGATIVE
NRBC BLD-RTO: 0 /100 WBC
PH UR STRIP: 6 [PH] (ref 5–8)
PLATELET # BLD AUTO: 399 K/UL (ref 150–450)
PMV BLD AUTO: 9.6 FL (ref 9.2–12.9)
POCT GLUCOSE: 196 MG/DL (ref 70–110)
POCT GLUCOSE: 279 MG/DL (ref 70–110)
POCT GLUCOSE: 328 MG/DL (ref 70–110)
POTASSIUM SERPL-SCNC: 4.4 MMOL/L (ref 3.5–5.1)
PROCALCITONIN SERPL IA-MCNC: 0.34 NG/ML
PROT SERPL-MCNC: 7.5 G/DL (ref 6–8.4)
PROT UR QL STRIP: ABNORMAL
RBC # BLD AUTO: 4.36 M/UL (ref 4.6–6.2)
RBC #/AREA URNS HPF: 0 /HPF (ref 0–4)
SODIUM SERPL-SCNC: 135 MMOL/L (ref 136–145)
SP GR UR STRIP: >1.03 (ref 1–1.03)
UNIDENT CRYS URNS QL MICRO: ABNORMAL
URN SPEC COLLECT METH UR: ABNORMAL
UROBILINOGEN UR STRIP-ACNC: NEGATIVE EU/DL
WBC # BLD AUTO: 34.81 K/UL (ref 3.9–12.7)
WBC #/AREA URNS HPF: 2 /HPF (ref 0–5)
WBC CLUMPS URNS QL MICRO: ABNORMAL

## 2023-06-08 PROCEDURE — 27000646 HC AEROBIKA DEVICE

## 2023-06-08 PROCEDURE — 27000221 HC OXYGEN, UP TO 24 HOURS

## 2023-06-08 PROCEDURE — 80053 COMPREHEN METABOLIC PANEL: CPT | Performed by: NURSE PRACTITIONER

## 2023-06-08 PROCEDURE — 25000003 PHARM REV CODE 250: Performed by: FAMILY MEDICINE

## 2023-06-08 PROCEDURE — G0378 HOSPITAL OBSERVATION PER HR: HCPCS

## 2023-06-08 PROCEDURE — 83036 HEMOGLOBIN GLYCOSYLATED A1C: CPT | Performed by: FAMILY MEDICINE

## 2023-06-08 PROCEDURE — 81000 URINALYSIS NONAUTO W/SCOPE: CPT | Performed by: NURSE PRACTITIONER

## 2023-06-08 PROCEDURE — 87040 BLOOD CULTURE FOR BACTERIA: CPT | Performed by: NURSE PRACTITIONER

## 2023-06-08 PROCEDURE — 96365 THER/PROPH/DIAG IV INF INIT: CPT

## 2023-06-08 PROCEDURE — 25000003 PHARM REV CODE 250: Performed by: EMERGENCY MEDICINE

## 2023-06-08 PROCEDURE — 99900035 HC TECH TIME PER 15 MIN (STAT)

## 2023-06-08 PROCEDURE — 63600175 PHARM REV CODE 636 W HCPCS: Performed by: FAMILY MEDICINE

## 2023-06-08 PROCEDURE — 96366 THER/PROPH/DIAG IV INF ADDON: CPT

## 2023-06-08 PROCEDURE — 85027 COMPLETE CBC AUTOMATED: CPT | Performed by: NURSE PRACTITIONER

## 2023-06-08 PROCEDURE — 93010 EKG 12-LEAD: ICD-10-PCS | Mod: ,,, | Performed by: INTERNAL MEDICINE

## 2023-06-08 PROCEDURE — 85007 BL SMEAR W/DIFF WBC COUNT: CPT | Performed by: NURSE PRACTITIONER

## 2023-06-08 PROCEDURE — 83605 ASSAY OF LACTIC ACID: CPT | Performed by: NURSE PRACTITIONER

## 2023-06-08 PROCEDURE — 96361 HYDRATE IV INFUSION ADD-ON: CPT

## 2023-06-08 PROCEDURE — 63600175 PHARM REV CODE 636 W HCPCS: Performed by: EMERGENCY MEDICINE

## 2023-06-08 PROCEDURE — 94664 DEMO&/EVAL PT USE INHALER: CPT

## 2023-06-08 PROCEDURE — 94761 N-INVAS EAR/PLS OXIMETRY MLT: CPT

## 2023-06-08 PROCEDURE — 84145 PROCALCITONIN (PCT): CPT | Performed by: NURSE PRACTITIONER

## 2023-06-08 PROCEDURE — 94799 UNLISTED PULMONARY SVC/PX: CPT

## 2023-06-08 PROCEDURE — 93010 ELECTROCARDIOGRAM REPORT: CPT | Mod: ,,, | Performed by: INTERNAL MEDICINE

## 2023-06-08 PROCEDURE — 96368 THER/DIAG CONCURRENT INF: CPT

## 2023-06-08 PROCEDURE — 99285 EMERGENCY DEPT VISIT HI MDM: CPT | Mod: 25

## 2023-06-08 PROCEDURE — 83880 ASSAY OF NATRIURETIC PEPTIDE: CPT | Performed by: NURSE PRACTITIONER

## 2023-06-08 PROCEDURE — 93005 ELECTROCARDIOGRAM TRACING: CPT

## 2023-06-08 RX ORDER — INSULIN ASPART 100 [IU]/ML
0-5 INJECTION, SOLUTION INTRAVENOUS; SUBCUTANEOUS
Status: DISCONTINUED | OUTPATIENT
Start: 2023-06-08 | End: 2023-06-09

## 2023-06-08 RX ORDER — BUSPIRONE HYDROCHLORIDE 10 MG/1
10 TABLET ORAL 3 TIMES DAILY
Status: DISCONTINUED | OUTPATIENT
Start: 2023-06-08 | End: 2023-06-12 | Stop reason: HOSPADM

## 2023-06-08 RX ORDER — GUAIFENESIN 100 MG/5ML
200 SOLUTION ORAL EVERY 4 HOURS PRN
Status: DISCONTINUED | OUTPATIENT
Start: 2023-06-08 | End: 2023-06-09

## 2023-06-08 RX ORDER — GLUCAGON 1 MG
1 KIT INJECTION
Status: DISCONTINUED | OUTPATIENT
Start: 2023-06-08 | End: 2023-06-09

## 2023-06-08 RX ORDER — GEMFIBROZIL 600 MG/1
600 TABLET, FILM COATED ORAL
Status: DISCONTINUED | OUTPATIENT
Start: 2023-06-08 | End: 2023-06-12 | Stop reason: HOSPADM

## 2023-06-08 RX ORDER — GABAPENTIN 300 MG/1
300 CAPSULE ORAL 2 TIMES DAILY
Status: DISCONTINUED | OUTPATIENT
Start: 2023-06-08 | End: 2023-06-12 | Stop reason: HOSPADM

## 2023-06-08 RX ORDER — ONDANSETRON 8 MG/1
8 TABLET, ORALLY DISINTEGRATING ORAL EVERY 8 HOURS PRN
Status: DISCONTINUED | OUTPATIENT
Start: 2023-06-08 | End: 2023-06-12 | Stop reason: HOSPADM

## 2023-06-08 RX ORDER — AMITRIPTYLINE HYDROCHLORIDE 25 MG/1
25 TABLET, FILM COATED ORAL NIGHTLY
Status: DISCONTINUED | OUTPATIENT
Start: 2023-06-08 | End: 2023-06-12 | Stop reason: HOSPADM

## 2023-06-08 RX ORDER — TALC
6 POWDER (GRAM) TOPICAL NIGHTLY PRN
Status: DISCONTINUED | OUTPATIENT
Start: 2023-06-08 | End: 2023-06-12 | Stop reason: HOSPADM

## 2023-06-08 RX ORDER — ATORVASTATIN CALCIUM 40 MG/1
40 TABLET, FILM COATED ORAL NIGHTLY
Status: DISCONTINUED | OUTPATIENT
Start: 2023-06-08 | End: 2023-06-12 | Stop reason: HOSPADM

## 2023-06-08 RX ORDER — ALBUTEROL SULFATE 0.83 MG/ML
2.5 SOLUTION RESPIRATORY (INHALATION) EVERY 4 HOURS PRN
Status: DISCONTINUED | OUTPATIENT
Start: 2023-06-08 | End: 2023-06-12 | Stop reason: HOSPADM

## 2023-06-08 RX ORDER — AMLODIPINE BESYLATE 10 MG/1
10 TABLET ORAL DAILY
Status: DISCONTINUED | OUTPATIENT
Start: 2023-06-09 | End: 2023-06-12 | Stop reason: HOSPADM

## 2023-06-08 RX ORDER — ACETAMINOPHEN 325 MG/1
650 TABLET ORAL EVERY 8 HOURS PRN
Status: DISCONTINUED | OUTPATIENT
Start: 2023-06-08 | End: 2023-06-12 | Stop reason: HOSPADM

## 2023-06-08 RX ORDER — SODIUM CHLORIDE 0.9 % (FLUSH) 0.9 %
10 SYRINGE (ML) INJECTION EVERY 12 HOURS PRN
Status: DISCONTINUED | OUTPATIENT
Start: 2023-06-08 | End: 2023-06-12 | Stop reason: HOSPADM

## 2023-06-08 RX ORDER — SODIUM CHLORIDE 9 MG/ML
INJECTION, SOLUTION INTRAVENOUS CONTINUOUS
Status: DISCONTINUED | OUTPATIENT
Start: 2023-06-08 | End: 2023-06-10

## 2023-06-08 RX ORDER — QUETIAPINE FUMARATE 100 MG/1
300 TABLET, FILM COATED ORAL NIGHTLY
Status: DISCONTINUED | OUTPATIENT
Start: 2023-06-08 | End: 2023-06-12 | Stop reason: HOSPADM

## 2023-06-08 RX ORDER — PREDNISONE 20 MG/1
40 TABLET ORAL 2 TIMES DAILY
Status: DISCONTINUED | OUTPATIENT
Start: 2023-06-08 | End: 2023-06-11

## 2023-06-08 RX ORDER — DEXTROSE 40 %
15 GEL (GRAM) ORAL
Status: DISCONTINUED | OUTPATIENT
Start: 2023-06-08 | End: 2023-06-12 | Stop reason: HOSPADM

## 2023-06-08 RX ORDER — TAMSULOSIN HYDROCHLORIDE 0.4 MG/1
2 CAPSULE ORAL DAILY
Status: DISCONTINUED | OUTPATIENT
Start: 2023-06-09 | End: 2023-06-12 | Stop reason: HOSPADM

## 2023-06-08 RX ORDER — DEXTROSE 40 %
30 GEL (GRAM) ORAL
Status: DISCONTINUED | OUTPATIENT
Start: 2023-06-08 | End: 2023-06-12 | Stop reason: HOSPADM

## 2023-06-08 RX ADMIN — ATORVASTATIN CALCIUM 40 MG: 40 TABLET, FILM COATED ORAL at 09:06

## 2023-06-08 RX ADMIN — PREDNISONE 40 MG: 20 TABLET ORAL at 09:06

## 2023-06-08 RX ADMIN — AZITHROMYCIN MONOHYDRATE 500 MG: 500 INJECTION, POWDER, LYOPHILIZED, FOR SOLUTION INTRAVENOUS at 02:06

## 2023-06-08 RX ADMIN — SODIUM CHLORIDE: 9 INJECTION, SOLUTION INTRAVENOUS at 03:06

## 2023-06-08 RX ADMIN — GABAPENTIN 300 MG: 300 CAPSULE ORAL at 09:06

## 2023-06-08 RX ADMIN — BUSPIRONE HYDROCHLORIDE 10 MG: 10 TABLET ORAL at 09:06

## 2023-06-08 RX ADMIN — CEFTRIAXONE 1 G: 1 INJECTION, POWDER, FOR SOLUTION INTRAMUSCULAR; INTRAVENOUS at 01:06

## 2023-06-08 RX ADMIN — BUSPIRONE HYDROCHLORIDE 10 MG: 10 TABLET ORAL at 03:06

## 2023-06-08 RX ADMIN — QUETIAPINE FUMARATE 300 MG: 100 TABLET ORAL at 09:06

## 2023-06-08 RX ADMIN — AMITRIPTYLINE HYDROCHLORIDE 25 MG: 25 TABLET, FILM COATED ORAL at 09:06

## 2023-06-08 NOTE — SUBJECTIVE & OBJECTIVE
Past Medical History:   Diagnosis Date    Bipolar 1 disorder, mixed     BPH (benign prostatic hypertrophy)     Colon polyp     Cyst, eyelid     Dr. Broussard    Diabetes mellitus     GERD (gastroesophageal reflux disease)     Hyperlipidemia     Hypertension     Lumbar degenerative disc disease 3/7/2019    Migraine headache     Obesity        Past Surgical History:   Procedure Laterality Date    CERVICAL SPINE SURGERY      COLONOSCOPY N/A 7/27/2017    Procedure: COLONOSCOPY;  Surgeon: Genaro Nix MD;  Location: [facility];  Service: Endoscopy;  Laterality: N/A;    COLONOSCOPY N/A 10/30/2020    Procedure: COLONOSCOPY;  Surgeon: Herb Martinez MD;  Location: [facility] END[facility];  Service: Endoscopy;  Laterality: N/A;    EYE SURGERY Left 03/2017    cyst removal on eyelid; Dr. Broussard    SHOULDER SURGERY      TONSILLECTOMY         Review of patient's allergies indicates:   Allergen Reactions    Levofloxacin Hallucinations    Sulfa (sulfonamide antibiotics) Rash       No current facility-administered medications on file prior to encounter.     Current Outpatient Medications on File Prior to Encounter   Medication Sig    amitriptyline (ELAVIL) 25 MG tablet Take 1 tablet (25 mg total) by mouth every evening.    amLODIPine (NORVASC) 10 MG tablet TAKE 1 TABLET BY MOUTH EVERY DAY    busPIRone (BUSPAR) 10 MG tablet Take 10 mg by mouth 3 (three) times daily.    candesartan (ATACAND) 4 MG tablet TAKE 1 TABLET BY MOUTH EVERY DAY    cyclobenzaprine (FLEXERIL) 10 MG tablet TAKE 1 TABLET BY MOUTH THREE TIMES A DAY AS NEEDED FOR MUSCLE SPASMS    doxycycline (VIBRAMYCIN) 100 MG Cap Take 1 capsule (100 mg total) by mouth 2 (two) times daily. for 10 days    DUPIXENT SYRINGE 300 mg/2 mL Syrg Inject 1 syringe (300mg) into the skin every other week    fluticasone propionate (FLONASE) 50 mcg/actuation nasal spray 2 sprays (100 mcg total) by Each Nostril route once daily.    gabapentin (NEURONTIN) 300 MG capsule Take 1 capsule (300 mg total) by  mouth 2 (two) times daily.    gemfibroziL (LOPID) 600 MG tablet TAKE 1 TABLET BY MOUTH TWICE A DAY BEFORE MEALS    hydrOXYzine (ATARAX) 50 MG tablet TAKE 1 TABLET (50 MG TOTAL) BY MOUTH 3 (THREE) TIMES DAILY AS NEEDED FOR ITCHING.    levocetirizine (XYZAL) 5 MG tablet TAKE 1 TABLET BY MOUTH EVERY MORNING    meloxicam (MOBIC) 15 MG tablet TAKE 1 TABLET BY MOUTH EVERY DAY    metFORMIN (GLUCOPHAGE) 1000 MG tablet TAKE 1 TABLET BY MOUTH TWICE A DAY    multivitamin capsule Take 1 capsule by mouth once daily.    pantoprazole (PROTONIX) 40 MG tablet TAKE 1 TABLET BY MOUTH EVERY DAY    promethazine-dextromethorphan (PROMETHAZINE-DM) 6.25-15 mg/5 mL Syrp Take 10 mLs by mouth every 8 (eight) hours as needed.    quetiapine (SEROQUEL) 300 MG Tab Take 300 mg by mouth every evening.    simvastatin (ZOCOR) 80 MG tablet Take 1 tablet (80 mg total) by mouth once daily. (Patient taking differently: Take 80 mg by mouth nightly.)    tamsulosin (FLOMAX) 0.4 mg Cap TAKE 2 CAPSULES BY MOUTH EVERY DAY    tiZANidine (ZANAFLEX) 4 MG tablet Take 1 tablet (4 mg total) by mouth every 8 (eight) hours.    zonisamide (ZONEGRAN) 50 MG Cap Take 1 capsule (50 mg total) by mouth 2 (two) times daily.    albuterol (PROVENTIL/VENTOLIN HFA) 90 mcg/actuation inhaler Inhale 1-2 puffs into the lungs every 6 (six) hours as needed for Wheezing or Shortness of Breath. Rescue    promethazine-dextromethorphan (PROMETHAZINE-DM) 6.25-15 mg/5 mL Syrp Take 5 mLs by mouth every 4 (four) hours as needed (for cough, congestion).    sumatriptan (IMITREX) 100 MG tablet Take 1 tablet (100 mg total) by mouth daily as needed for Migraine.    [DISCONTINUED] azelastine (ASTELIN) 137 mcg (0.1 %) nasal spray SPRAY 1 SPRAY (137 MCG TOTAL) BY NASAL ROUTE 2 (TWO) TIMES DAILY.    [DISCONTINUED] blood sugar diagnostic (TRUETEST TEST STRIPS) Strp 1 each by Misc.(Non-Drug; Combo Route) route 3 (three) times a week.    [DISCONTINUED] buPROPion (WELLBUTRIN XL) 300 MG 24 hr tablet Take  300 mg by mouth once daily.    [DISCONTINUED] CLOTRIMAZOLE-BETAMETH DIP-ZINC TOP     [DISCONTINUED] crisaborole (EUCRISA) 2 % Oint Use for hand eczema bid. (Patient not taking: Reported on 3/14/2023)    [DISCONTINUED] folic acid (FOLVITE) 800 MCG Tab Take 800 mcg by mouth once daily.    [DISCONTINUED] furosemide (LASIX) 40 MG tablet TAKE 1 TABLET BY MOUTH EVERY DAY    [DISCONTINUED] mupirocin (BACTROBAN) 2 % ointment Apply topically 3 (three) times daily. (Patient not taking: Reported on 3/14/2023)     Family History       Problem Relation (Age of Onset)    Cancer Mother, Brother, Cousin    Cataracts Mother    Hypertension Mother, Father, Sister, Brother    Stroke Father          Tobacco Use    Smoking status: Former     Packs/day: 2.00     Years: 15.00     Pack years: 30.00     Types: Cigarettes     Quit date: 1984     Years since quittin.1     Passive exposure: Past    Smokeless tobacco: Never    Tobacco comments:     Patient quit smoking at the age of 24 yo.   Substance and Sexual Activity    Alcohol use: No    Drug use: No    Sexual activity: Yes     Partners: Female     Review of Systems   Constitutional:  Positive for activity change, chills and fatigue. Negative for appetite change and fever.   HENT:  Positive for congestion.    Respiratory:  Positive for cough. Negative for shortness of breath.    Cardiovascular:  Negative for chest pain and leg swelling.   Gastrointestinal:  Negative for abdominal pain, diarrhea, nausea and vomiting.   Endocrine: Positive for polydipsia.   Neurological:  Positive for weakness.   Psychiatric/Behavioral:  Negative for agitation, behavioral problems, confusion, decreased concentration and dysphoric mood. The patient is nervous/anxious.    Objective:     Vital Signs (Most Recent):  Temp: 99.5 °F (37.5 °C) (23 1431)  Pulse: 109 (23 1500)  Resp: (!) 24 (23 1500)  BP: 120/69 (23 1500)  SpO2: 95 % (23 1500) Vital Signs (24h Range):  Temp:   [98.2 °F (36.8 °C)-99.5 °F (37.5 °C)] 99.5 °F (37.5 °C)  Pulse:  [109-120] 109  Resp:  [20-24] 24  SpO2:  [95 %] 95 %  BP: (120-145)/(69-81) 120/69     Weight: 108.8 kg (239 lb 13.8 oz)  Body mass index is 35.42 kg/m².     Physical Exam  Vitals and nursing note reviewed.   Constitutional:       General: He is not in acute distress.     Appearance: He is ill-appearing. He is not toxic-appearing.      Interventions: He is not intubated.  HENT:      Head: Normocephalic and atraumatic.   Cardiovascular:      Rate and Rhythm: Tachycardia present.   Pulmonary:      Effort: Pulmonary effort is normal. Tachypnea present. No respiratory distress. He is not intubated.      Breath sounds: No decreased air movement. Wheezing present. No rhonchi or rales.   Abdominal:      Palpations: Abdomen is soft.      Tenderness: There is no abdominal tenderness.   Musculoskeletal:      Right lower leg: No edema.      Left lower leg: No edema.   Skin:     General: Skin is warm.      Capillary Refill: Capillary refill takes 2 to 3 seconds.      Coloration: Skin is pale.   Neurological:      Mental Status: He is alert.      Motor: Weakness present.   Psychiatric:         Speech: Speech normal.         Behavior: Behavior normal. Behavior is cooperative.              Significant Labs: All pertinent labs within the past 24 hours have been reviewed.    Blood Culture: pending  CBC:   Recent Labs   Lab 06/08/23  1213   WBC 34.81*   HGB 11.5*   HCT 36.0*        CMP:   Recent Labs   Lab 06/08/23  1213   *   K 4.4      CO2 18*   *   BUN 18   CREATININE 1.2   CALCIUM 11.0*   PROT 7.5   ALBUMIN 3.3*   BILITOT 0.6   ALKPHOS 135   AST 21   ALT 34   ANIONGAP 12     Procalcitonin 0.34    Significant Imaging: I have reviewed all pertinent imaging results/findings within the past 24 hours.  CXR: I have reviewed all pertinent results/findings within the past 24 hours and my personal findings are:  haziness c/with pneumonia

## 2023-06-08 NOTE — PHARMACY MED REC
"Admission Medication History     The home medication history was taken by Faheem Garcia.    You may go to "Admission" then "Reconcile Home Medications" tabs to review and/or act upon these items.     The home medication list has been updated by the Pharmacy department.   Please read ALL comments highlighted in yellow.   Please address this information as you see fit.    Feel free to contact us if you have any questions or require assistance.      The medications listed below were removed from the home medication list. Please reorder if appropriate:  Patient reports no longer taking the following medication(s):  WELLBUTRIN XL 300MG    Medications listed below were obtained from: Patient/family and Analytic software- [employer]  (Not in a hospital admission)    Faheem Garcia  [id number]    Current Outpatient Medications on File Prior to Encounter   Medication Sig Dispense Refill Last Dose    amitriptyline (ELAVIL) 25 MG tablet Take 1 tablet (25 mg total) by mouth every evening. 30 tablet 11 6/7/2023    amLODIPine (NORVASC) 10 MG tablet TAKE 1 TABLET BY MOUTH EVERY DAY 90 tablet 3 6/8/2023    busPIRone (BUSPAR) 10 MG tablet Take 10 mg by mouth 3 (three) times daily.   6/7/2023    candesartan (ATACAND) 4 MG tablet TAKE 1 TABLET BY MOUTH EVERY DAY 90 tablet 1 6/8/2023    cyclobenzaprine (FLEXERIL) 10 MG tablet TAKE 1 TABLET BY MOUTH THREE TIMES A DAY AS NEEDED FOR MUSCLE SPASMS 270 tablet 1 6/7/2023    doxycycline (VIBRAMYCIN) 100 MG Cap Take 1 capsule (100 mg total) by mouth 2 (two) times daily. for 10 days 20 capsule 0 6/8/2023    DUPIXENT SYRINGE 300 mg/2 mL Syrg Inject 1 syringe (300mg) into the skin every other week 4 mL 5 Past Week    fluticasone propionate (FLONASE) 50 mcg/actuation nasal spray 2 sprays (100 mcg total) by Each Nostril route once daily. 16 mL 5 Past Week    gabapentin (NEURONTIN) 300 MG capsule Take 1 capsule (300 mg total) by mouth 2 (two) times daily. 180 capsule 2 6/8/2023    gemfibroziL " (LOPID) 600 MG tablet TAKE 1 TABLET BY MOUTH TWICE A DAY BEFORE MEALS 180 tablet 3 6/8/2023    hydrOXYzine (ATARAX) 50 MG tablet TAKE 1 TABLET (50 MG TOTAL) BY MOUTH 3 (THREE) TIMES DAILY AS NEEDED FOR ITCHING. 270 tablet 3 6/7/2023    levocetirizine (XYZAL) 5 MG tablet TAKE 1 TABLET BY MOUTH EVERY MORNING 90 tablet 3 6/8/2023    meloxicam (MOBIC) 15 MG tablet TAKE 1 TABLET BY MOUTH EVERY DAY 30 tablet 0 6/8/2023    metFORMIN (GLUCOPHAGE) 1000 MG tablet TAKE 1 TABLET BY MOUTH TWICE A  tablet 0 6/8/2023    multivitamin capsule Take 1 capsule by mouth once daily.   6/8/2023    pantoprazole (PROTONIX) 40 MG tablet TAKE 1 TABLET BY MOUTH EVERY DAY 90 tablet 3 6/8/2023    promethazine-dextromethorphan (PROMETHAZINE-DM) 6.25-15 mg/5 mL Syrp Take 10 mLs by mouth every 8 (eight) hours as needed. 100 mL 0 6/8/2023    quetiapine (SEROQUEL) 300 MG Tab Take 300 mg by mouth every evening.   6/7/2023    simvastatin (ZOCOR) 80 MG tablet Take 1 tablet (80 mg total) by mouth once daily. (Patient taking differently: Take 80 mg by mouth nightly.) 90 tablet 0 6/7/2023    tamsulosin (FLOMAX) 0.4 mg Cap TAKE 2 CAPSULES BY MOUTH EVERY  capsule 3 6/8/2023    tiZANidine (ZANAFLEX) 4 MG tablet Take 1 tablet (4 mg total) by mouth every 8 (eight) hours. 30 tablet 1 Past Week    zonisamide (ZONEGRAN) 50 MG Cap Take 1 capsule (50 mg total) by mouth 2 (two) times daily. 180 capsule 3 6/8/2023    albuterol (PROVENTIL/VENTOLIN HFA) 90 mcg/actuation inhaler Inhale 1-2 puffs into the lungs every 6 (six) hours as needed for Wheezing or Shortness of Breath. Rescue 18 g 5     promethazine-dextromethorphan (PROMETHAZINE-DM) 6.25-15 mg/5 mL Syrp Take 5 mLs by mouth every 4 (four) hours as needed (for cough, congestion). 118 mL 1     sumatriptan (IMITREX) 100 MG tablet Take 1 tablet (100 mg total) by mouth daily as needed for Migraine. 30 tablet 3                          .

## 2023-06-08 NOTE — ASSESSMENT & PLAN NOTE
Patient's FSGs are controlled on current medication regimen.  Last A1c reviewed-   Lab Results   Component Value Date    HGBA1C 6.5 (H) 10/31/2022     Most recent fingerstick glucose reviewed-   Recent Labs   Lab 06/08/23  1436 06/08/23  1617   POCTGLUCOSE 279* 328*     Current correctional scale  Low  Maintain anti-hyperglycemic dose as follows-   Antihyperglycemics (From admission, onward)    Start     Stop Route Frequency Ordered    06/08/23 1518  insulin aspart U-100 pen 0-5 Units         -- SubQ Before meals & nightly PRN 06/08/23 1418        Hold Oral hypoglycemics while patient is in the hospital.  Sliding scale as received systemic steroids

## 2023-06-08 NOTE — HPI
62M  has a past medical history of Bipolar 1 disorder, mixed, BPH (benign prostatic hypertrophy), Colon polyp, Cyst, eyelid, Diabetes mellitus, GERD (gastroesophageal reflux disease), Hyperlipidemia, Hypertension, Lumbar degenerative disc disease, Migraine headache, and Obesity.    Admitted for pneumonia. Was previously seen in hospital and treated for the same in [place] on 6/5/23. States receiving IM steroid injection and po doxycycline. Cta at that time was negative for pulmonary embolism but with bilateral pulmonary opacities. Was discharged but not on supplemental oxygen. Complains of continued cough, chills and dyspnea with exertion. Denies fever, nausea/vomiting, diarrhea. States very dark urine despite polydipsia.    Initial workup in emergency department revealed tachycardia. Afebrile. Leukocytosis. Mild hyponatremia, hyperglycemia, and hypecalcemia. Acidosis. Bnp within normal limits. Lactate within normal limits. Procalcitonin mildly elevated. Urinalysis suggesting dehydration. Chest x-ray with haziness consistent with pneumonia.     Hospital medicine consulted for admission under observation.

## 2023-06-08 NOTE — ASSESSMENT & PLAN NOTE
Given history of smoking, coughing with sputum production will treat as copdx  Breathing treatment while awake  systemic steroids  acapella   Incentive spirometer   mucinex prn for congestion

## 2023-06-08 NOTE — ED PROVIDER NOTES
SCRIBE #1 NOTE: I, Bayron Dhaliwal, am scribing for, and in the presence of, Feliciano Mac MD. I have scribed the entire note.       History     Chief Complaint   Patient presents with    Shortness of Breath     Pt reports being diagnosed with pneumonia 4 months ago and hasn't gotten better since. Pt reports coughing up green phlegm       Review of patient's allergies indicates:   Allergen Reactions    Levofloxacin Hallucinations    Sulfa (sulfonamide antibiotics) Rash         History of Present Illness     HPI    6/8/2023, 1:39 PM  History obtained from the patient      History of Present Illness: Scott Bansal is a 62 y.o. male patient with a PMHx of DM, HLD, HTN, COPD who presents to the Emergency Department for evaluation of shortness of breath which onset gradually 5 days ago. Symptoms are constant and moderate in severity. No mitigating or exacerbating factors reported. Associated sxs include cough. Patient denies any fever, chills, CP, abdominal pain, n/v, and all other sxs at this time. Prior Tx includes antibiotics for pneumonia. No further complaints or concerns at this time.       Arrival mode: Personal vehicle     PCP: Kaylie Villalta MD        Past Medical History:  Past Medical History:   Diagnosis Date    Bipolar 1 disorder, mixed     BPH (benign prostatic hypertrophy)     Colon polyp     Cyst, eyelid     Dr. Broussard    Diabetes mellitus     GERD (gastroesophageal reflux disease)     Hyperlipidemia     Hypertension     Lumbar degenerative disc disease 3/7/2019    Migraine headache     Obesity        Past Surgical History:  Past Surgical History:   Procedure Laterality Date    CERVICAL SPINE SURGERY      COLONOSCOPY N/A 7/27/2017    Procedure: COLONOSCOPY;  Surgeon: Genaro Nix MD;  Location: [facility] ENDO;  Service: Endoscopy;  Laterality: N/A;    COLONOSCOPY N/A 10/30/2020    Procedure: COLONOSCOPY;  Surgeon: Herb Martinez MD;  Location: [facility];  Service: Endoscopy;  Laterality:  N/A;    EYE SURGERY Left 2017    cyst removal on eyelid; Dr. Broussard    SHOULDER SURGERY      TONSILLECTOMY           Family History:  Family History   Problem Relation Age of Onset    Cataracts Mother     Cancer Mother         throat    Hypertension Mother     Hypertension Father     Stroke Father         2 strokes    Hypertension Sister     Cancer Brother         spinal, kidney, spinal fluid    Hypertension Brother     Cancer Cousin         breast?       Social History:  Social History     Tobacco Use    Smoking status: Former     Packs/day: 2.00     Years: 15.00     Pack years: 30.00     Types: Cigarettes     Quit date: 1984     Years since quittin.1     Passive exposure: Past    Smokeless tobacco: Never    Tobacco comments:     Patient quit smoking at the age of 24 yo.   Substance and Sexual Activity    Alcohol use: No    Drug use: No    Sexual activity: Yes     Partners: Female        Review of Systems     Review of Systems   Constitutional:  Negative for chills and fever.   HENT:  Negative for sore throat.    Respiratory:  Positive for cough and shortness of breath.    Cardiovascular:  Negative for chest pain.   Gastrointestinal:  Negative for abdominal pain, nausea and vomiting.   Genitourinary:  Negative for dysuria.   Musculoskeletal:  Negative for back pain.   Skin:  Negative for rash.   Neurological:  Negative for weakness.   Hematological:  Does not bruise/bleed easily.   All other systems reviewed and are negative.   Physical Exam     Initial Vitals [23 1121]   BP Pulse Resp Temp SpO2   (!) 145/81 (!) 120 20 98.2 °F (36.8 °C) 95 %      MAP       --          Physical Exam  Nursing Notes and Vital Signs Reviewed.  Constitutional: Patient is in moderate distress. Well-developed and well-nourished.  Head: Atraumatic. Normocephalic.  Eyes: PERRL. EOM intact. Conjunctivae are not pale. No scleral icterus.  ENT: Mucous membranes are moist. Oropharynx is clear and symmetric.    Neck:  Supple. Full ROM. No lymphadenopathy.  Cardiovascular: Tachycardic. Regular rhythm. No murmurs, rubs, or gallops. Distal pulses are 2+ and symmetric.  Pulmonary/Chest: No respiratory distress. Clear to auscultation bilaterally. Rales on R side.  Abdominal: Soft and non-distended.  There is no tenderness.  No rebound, guarding, or rigidity.   Musculoskeletal: Moves all extremities. No obvious deformities. No edema. No calf tenderness.  Skin: Warm and dry.  Neurological:  Alert, awake, and appropriate.  Normal speech.  No acute focal neurological deficits are appreciated.  Psychiatric: Normal affect. Good eye contact. Appropriate in content.     ED Course   Critical Care    Date/Time: 6/8/2023 1:51 PM  Performed by: Feliciano Mac MD  Authorized by: Feliciano Mac MD   Direct patient critical care time: 16 minutes  Additional history critical care time: 15 minutes  Ordering / reviewing critical care time: 12 minutes  Documentation critical care time: 8 minutes  Consulting other physicians critical care time: 9 minutes  Total critical care time (exclusive of procedural time) : 60 minutes  Critical care time was exclusive of separately billable procedures and treating other patients and teaching time.  Critical care was necessary to treat or prevent imminent or life-threatening deterioration of the following conditions: Leukocytosis, hypoxia, pneumonia.  Critical care was time spent personally by me on the following activities: blood draw for specimens, development of treatment plan with patient or surrogate, discussions with consultants, interpretation of cardiac output measurements, evaluation of patient's response to treatment, examination of patient, obtaining history from patient or surrogate, ordering and performing treatments and interventions, ordering and review of laboratory studies, ordering and review of radiographic studies, pulse oximetry, re-evaluation of patient's condition and review of old  charts.      ED Vital Signs:  Vitals:    06/08/23 1121 06/08/23 1343   BP: (!) 145/81    Pulse: (!) 120 (!) 118   Resp: 20    Temp: 98.2 °F (36.8 °C)    TempSrc: Oral    SpO2: 95%    Weight: 108.8 kg (239 lb 13.8 oz)        Abnormal Lab Results:  Labs Reviewed   CBC W/ AUTO DIFFERENTIAL - Abnormal; Notable for the following components:       Result Value    WBC 34.81 (*)     RBC 4.36 (*)     Hemoglobin 11.5 (*)     Hematocrit 36.0 (*)     MCH 26.4 (*)     MCHC 31.9 (*)     Lymph % 9.0 (*)     All other components within normal limits   COMPREHENSIVE METABOLIC PANEL - Abnormal; Notable for the following components:    Sodium 135 (*)     CO2 18 (*)     Glucose 262 (*)     Calcium 11.0 (*)     Albumin 3.3 (*)     All other components within normal limits   URINALYSIS, REFLEX TO URINE CULTURE - Abnormal; Notable for the following components:    Specific Gravity, UA >1.030 (*)     Protein, UA 2+ (*)     Occult Blood UA Trace (*)     All other components within normal limits    Narrative:     Specimen Source->Urine   PROCALCITONIN - Abnormal; Notable for the following components:    Procalcitonin 0.34 (*)     All other components within normal limits   URINALYSIS MICROSCOPIC - Abnormal; Notable for the following components:    WBC Clumps, UA Occasional (*)     All other components within normal limits    Narrative:     Specimen Source->Urine   CULTURE, BLOOD   CULTURE, BLOOD   B-TYPE NATRIURETIC PEPTIDE   LACTIC ACID, PLASMA        All Lab Results:  Results for orders placed or performed during the hospital encounter of 06/08/23   CBC auto differential   Result Value Ref Range    WBC 34.81 (H) 3.90 - 12.70 K/uL    RBC 4.36 (L) 4.60 - 6.20 M/uL    Hemoglobin 11.5 (L) 14.0 - 18.0 g/dL    Hematocrit 36.0 (L) 40.0 - 54.0 %    MCV 83 82 - 98 fL    MCH 26.4 (L) 27.0 - 31.0 pg    MCHC 31.9 (L) 32.0 - 36.0 g/dL    RDW 13.0 11.5 - 14.5 %    Platelets 399 150 - 450 K/uL    MPV 9.6 9.2 - 12.9 fL    Immature Granulocytes CANCELED 0.0  - 0.5 %    Immature Grans (Abs) CANCELED 0.00 - 0.04 K/uL    nRBC 0 0 /100 WBC    Gran % 67.0 38.0 - 73.0 %    Lymph % 9.0 (L) 18.0 - 48.0 %    Mono % 4.0 4.0 - 15.0 %    Eosinophil % 1.0 0.0 - 8.0 %    Basophil % 0.0 0.0 - 1.9 %    Bands 15.0 %    Metamyelocytes 2.0 %    Myelocytes 2.0 %    Large/Giant Platelets Present     Differential Method Manual    Brain natriuretic peptide   Result Value Ref Range    BNP <10 0 - 99 pg/mL   Comprehensive metabolic panel   Result Value Ref Range    Sodium 135 (L) 136 - 145 mmol/L    Potassium 4.4 3.5 - 5.1 mmol/L    Chloride 105 95 - 110 mmol/L    CO2 18 (L) 23 - 29 mmol/L    Glucose 262 (H) 70 - 110 mg/dL    BUN 18 8 - 23 mg/dL    Creatinine 1.2 0.5 - 1.4 mg/dL    Calcium 11.0 (H) 8.7 - 10.5 mg/dL    Total Protein 7.5 6.0 - 8.4 g/dL    Albumin 3.3 (L) 3.5 - 5.2 g/dL    Total Bilirubin 0.6 0.1 - 1.0 mg/dL    Alkaline Phosphatase 135 55 - 135 U/L    AST 21 10 - 40 U/L    ALT 34 10 - 44 U/L    Anion Gap 12 8 - 16 mmol/L    eGFR >60 >60 mL/min/1.73 m^2   Lactic acid, plasma   Result Value Ref Range    Lactate (Lactic Acid) 1.5 0.5 - 2.2 mmol/L   Urinalysis, Reflex to Urine Culture Urine, Clean Catch    Specimen: Urine   Result Value Ref Range    Specimen UA Urine, Clean Catch     Color, UA Yellow Yellow, Straw, Estefany    Appearance, UA Clear Clear    pH, UA 6.0 5.0 - 8.0    Specific Gravity, UA >1.030 (A) 1.005 - 1.030    Protein, UA 2+ (A) Negative    Glucose, UA Negative Negative    Ketones, UA Negative Negative    Bilirubin (UA) Negative Negative    Occult Blood UA Trace (A) Negative    Nitrite, UA Negative Negative    Urobilinogen, UA Negative <2.0 EU/dL    Leukocytes, UA Negative Negative   Procalcitonin   Result Value Ref Range    Procalcitonin 0.34 (H) <0.25 ng/mL   Urinalysis Microscopic   Result Value Ref Range    RBC, UA 0 0 - 4 /hpf    WBC, UA 2 0 - 5 /hpf    WBC Clumps, UA Occasional (A) None-Rare    Bacteria None None-Occ /hpf    Hyaline Casts, UA 1 0-1/lpf /lpf     Unclass Kita UA Rare None-Moderate    Microscopic Comment SEE COMMENT        Imaging Results:  Imaging Results              X-Ray Chest PA And Lateral (Final result)  Result time 06/08/23 11:55:12      Final result by LOIS Alfred Sr., MD (06/08/23 11:55:12)                   Impression:      1. There is a mild amount of haziness in the posteromedial aspect of the left lower lobe.  This is consistent with the patient's history and characteristic of pneumonia.  2. There is anterior spinal fusion hardware in the cervical spine.  .      Electronically signed by: Dileep Alfred MD  Date:    06/08/2023  Time:    11:55               Narrative:    EXAMINATION:  XR CHEST PA AND LATERAL    CLINICAL HISTORY:  Cough, unspecified    COMPARISON:  05/15/2023    FINDINGS:  The size and contour of the heart are normal.  There is a mild amount of haziness in the posteromedial aspect of the left lower lobe.  The right lung is clear.  There is no pneumothorax or pleural effusion.  There is anterior spinal fusion hardware in the cervical spine.                                       The EKG was ordered, reviewed, and independently interpreted by the ED provider.  Interpretation time: 13:41  Rate: 119 BPM  Rhythm: sinus tachycardia  Interpretation: Cannot rule out anterior infarct, age undetermined. No STEMI.         The Emergency Provider reviewed the vital signs and test results, which are outlined above.     ED Discussion     1:49 PM: Discussed case with Dr Higgins ([facility]). Dr. Higgins agrees with current care and management of pt and accepts admission.   Admitting Service: Hospital Medicine  Admitting Physician: Dr. Higgins  Admit to: Inpatient     1:50 PM: Re-evaluated pt. I have discussed test results, shared treatment plan, and the need for admission with patient and family at bedside. Pt and family express understanding at this time and agree with all information. All questions answered. Pt and family have no further  questions or concerns at this time. Pt is ready for admit.      Medical Decision Making:   Initial Assessment:   Cough sob worsening since being diagnosed with pneumonia  Differential Diagnosis:   Cough, hypoxia, pneumonia  Clinical Tests:   Lab Tests: Ordered and Reviewed  Radiological Study: Ordered and Reviewed  Medical Tests: Ordered and Reviewed  ED Management:  Labs and imaging reviewed by me.  WBC 34. Procalcitonin 0.34.  Considered admission, patient is agreeable.    Other:   I have discussed this case with another health care provider.       <> Summary of the Discussion: Case discussed with Dr. Perez () Dr. Higgins will admit to tele         ED Medication(s):  Medications   cefTRIAXone (ROCEPHIN) 1 g in dextrose 5 % in water (D5W) 5 % 100 mL IVPB (MB+) (1 g Intravenous New Bag 6/8/23 1345)   azithromycin (ZITHROMAX) 500 mg in dextrose 5 % (D5W) 250 mL IVPB (Vial-Mate) (has no administration in time range)       New Prescriptions    No medications on file               Scribe Attestation:   Scribe #1: I performed the above scribed service and the documentation accurately describes the services I performed. I attest to the accuracy of the note.     Attending:   Physician Attestation Statement for Scribe #1: I, Feliciano Mac MD, personally performed the services described in this documentation, as scribed by Bayron Dhaliwal, in my presence, and it is both accurate and complete.           Clinical Impression       ICD-10-CM ICD-9-CM   1. Coughing  R05.9 786.2   2. Pneumonia of left lower lobe due to infectious organism  J18.9 486   3. Leukocytosis, unspecified type  D72.829 288.60   4. SOB (shortness of breath)  R06.02 786.05       Disposition:   Disposition: Admitted  Condition: Fair       Feliciano Mac MD  06/08/23 [id number]

## 2023-06-08 NOTE — FIRST PROVIDER EVALUATION
Medical screening examination initiated.  I have conducted a focused provider triage encounter, findings are as follows:    Brief history of present illness:  Patient complains of shortness of breath and coughing states he was diagnosed with pneumonia 4 months ago but is not improving    Vitals:    06/08/23 1121   BP: (!) 145/81   BP Location: Right arm   Patient Position: Sitting   Pulse: (!) 120   Resp: 20   Temp: 98.2 °F (36.8 °C)   TempSrc: Oral   SpO2: 95%   Weight: 108.8 kg (239 lb 13.8 oz)       Pertinent physical exam:  Patient appears short of breath when he speaks    Brief workup plan:  Pneumonia sepsis workup    Preliminary workup initiated; this workup will be continued and followed by the physician or advanced practice provider that is assigned to the patient when roomed.

## 2023-06-08 NOTE — ASSESSMENT & PLAN NOTE
Received po doxy as outpatient   Complains of continued cough  Procalcitonin mildly elevated  Leukocytosis  Continue cap therapy  Intravenous fluids   ddx copdx

## 2023-06-08 NOTE — Clinical Note
Diagnosis: Pneumonia due to infectious organism [[id number]]   Admitting Provider:: NATY WANG [[id number]]   Future Attending Provider: NATY WANG [[id number]]   Reason for IP Medical Treatment  (Clinical interventions that can only be accomplished in the IP setting? ) :: failed outpatient treatment pna   I certify that Inpatient services for greater than or equal to 2 midnights are medically necessary:: Yes   Plans for Post-Acute care--if anticipated (pick the single best option):: A. No post acute care anticipated at this time

## 2023-06-08 NOTE — H&P
[facility] - Emergency Dept.  [facility] Medicine  History & Physical    Patient Name: Scott Bansal  MRN: [id number]  Patient Class: OP- Observation  Admission Date: 6/8/2023  Attending Physician: Arturo Higgins MD   Primary Care Provider: Kaylie Villalta MD         Patient information was obtained from patient and ER records.     Subjective:     Principal Problem:Pneumonia    Chief Complaint:   Chief Complaint   Patient presents with    Shortness of Breath     Pt reports being diagnosed with pneumonia 4 months ago and hasn't gotten better since. Pt reports coughing up green phlegm          HPI: 62M  has a past medical history of Bipolar 1 disorder, mixed, BPH (benign prostatic hypertrophy), Colon polyp, Cyst, eyelid, Diabetes mellitus, GERD (gastroesophageal reflux disease), Hyperlipidemia, Hypertension, Lumbar degenerative disc disease, Migraine headache, and Obesity.    Admitted for pneumonia. Was previously seen in hospital and treated for the same in [place] on 6/5/23. States receiving IM steroid injection and po doxycycline. Cta at that time was negative for pulmonary embolism but with bilateral pulmonary opacities. Was discharged but not on supplemental oxygen. Complains of continued cough, chills and dyspnea with exertion. Denies fever, nausea/vomiting, diarrhea. States very dark urine despite polydipsia.    Initial workup in emergency department revealed tachycardia. Afebrile. Leukocytosis. Mild hyponatremia, hyperglycemia, and hypecalcemia. Acidosis. Bnp within normal limits. Lactate within normal limits. Procalcitonin mildly elevated. Urinalysis suggesting dehydration. Chest x-ray with haziness consistent with pneumonia.     Hospital medicine consulted for admission under observation.         Past Medical History:   Diagnosis Date    Bipolar 1 disorder, mixed     BPH (benign prostatic hypertrophy)     Colon polyp     Cyst, eyelid     Dr. Broussard    Diabetes mellitus     GERD (gastroesophageal  reflux disease)     Hyperlipidemia     Hypertension     Lumbar degenerative disc disease 3/7/2019    Migraine headache     Obesity        Past Surgical History:   Procedure Laterality Date    CERVICAL SPINE SURGERY      COLONOSCOPY N/A 7/27/2017    Procedure: COLONOSCOPY;  Surgeon: Genaro Nix MD;  Location: [facility] ENDO;  Service: Endoscopy;  Laterality: N/A;    COLONOSCOPY N/A 10/30/2020    Procedure: COLONOSCOPY;  Surgeon: Herb Martinez MD;  Location: [facility] ENDO;  Service: Endoscopy;  Laterality: N/A;    EYE SURGERY Left 03/2017    cyst removal on eyelid; Dr. Broussard    SHOULDER SURGERY      TONSILLECTOMY         Review of patient's allergies indicates:   Allergen Reactions    Levofloxacin Hallucinations    Sulfa (sulfonamide antibiotics) Rash       No current facility-administered medications on file prior to encounter.     Current Outpatient Medications on File Prior to Encounter   Medication Sig    amitriptyline (ELAVIL) 25 MG tablet Take 1 tablet (25 mg total) by mouth every evening.    amLODIPine (NORVASC) 10 MG tablet TAKE 1 TABLET BY MOUTH EVERY DAY    busPIRone (BUSPAR) 10 MG tablet Take 10 mg by mouth 3 (three) times daily.    candesartan (ATACAND) 4 MG tablet TAKE 1 TABLET BY MOUTH EVERY DAY    cyclobenzaprine (FLEXERIL) 10 MG tablet TAKE 1 TABLET BY MOUTH THREE TIMES A DAY AS NEEDED FOR MUSCLE SPASMS    doxycycline (VIBRAMYCIN) 100 MG Cap Take 1 capsule (100 mg total) by mouth 2 (two) times daily. for 10 days    DUPIXENT SYRINGE 300 mg/2 mL Syrg Inject 1 syringe (300mg) into the skin every other week    fluticasone propionate (FLONASE) 50 mcg/actuation nasal spray 2 sprays (100 mcg total) by Each Nostril route once daily.    gabapentin (NEURONTIN) 300 MG capsule Take 1 capsule (300 mg total) by mouth 2 (two) times daily.    gemfibroziL (LOPID) 600 MG tablet TAKE 1 TABLET BY MOUTH TWICE A DAY BEFORE MEALS    hydrOXYzine (ATARAX) 50 MG tablet TAKE 1 TABLET (50 MG TOTAL) BY  MOUTH 3 (THREE) TIMES DAILY AS NEEDED FOR ITCHING.    levocetirizine (XYZAL) 5 MG tablet TAKE 1 TABLET BY MOUTH EVERY MORNING    meloxicam (MOBIC) 15 MG tablet TAKE 1 TABLET BY MOUTH EVERY DAY    metFORMIN (GLUCOPHAGE) 1000 MG tablet TAKE 1 TABLET BY MOUTH TWICE A DAY    multivitamin capsule Take 1 capsule by mouth once daily.    pantoprazole (PROTONIX) 40 MG tablet TAKE 1 TABLET BY MOUTH EVERY DAY    promethazine-dextromethorphan (PROMETHAZINE-DM) 6.25-15 mg/5 mL Syrp Take 10 mLs by mouth every 8 (eight) hours as needed.    quetiapine (SEROQUEL) 300 MG Tab Take 300 mg by mouth every evening.    simvastatin (ZOCOR) 80 MG tablet Take 1 tablet (80 mg total) by mouth once daily. (Patient taking differently: Take 80 mg by mouth nightly.)    tamsulosin (FLOMAX) 0.4 mg Cap TAKE 2 CAPSULES BY MOUTH EVERY DAY    tiZANidine (ZANAFLEX) 4 MG tablet Take 1 tablet (4 mg total) by mouth every 8 (eight) hours.    zonisamide (ZONEGRAN) 50 MG Cap Take 1 capsule (50 mg total) by mouth 2 (two) times daily.    albuterol (PROVENTIL/VENTOLIN HFA) 90 mcg/actuation inhaler Inhale 1-2 puffs into the lungs every 6 (six) hours as needed for Wheezing or Shortness of Breath. Rescue    promethazine-dextromethorphan (PROMETHAZINE-DM) 6.25-15 mg/5 mL Syrp Take 5 mLs by mouth every 4 (four) hours as needed (for cough, congestion).    sumatriptan (IMITREX) 100 MG tablet Take 1 tablet (100 mg total) by mouth daily as needed for Migraine.    [DISCONTINUED] azelastine (ASTELIN) 137 mcg (0.1 %) nasal spray SPRAY 1 SPRAY (137 MCG TOTAL) BY NASAL ROUTE 2 (TWO) TIMES DAILY.    [DISCONTINUED] blood sugar diagnostic (TRUETEST TEST STRIPS) Strp 1 each by Misc.(Non-Drug; Combo Route) route 3 (three) times a week.    [DISCONTINUED] buPROPion (WELLBUTRIN XL) 300 MG 24 hr tablet Take 300 mg by mouth once daily.    [DISCONTINUED] CLOTRIMAZOLE-BETAMETH DIP-ZINC TOP     [DISCONTINUED] crisaborole (EUCRISA) 2 % Oint Use for hand eczema bid.  (Patient not taking: Reported on 3/14/2023)    [DISCONTINUED] folic acid (FOLVITE) 800 MCG Tab Take 800 mcg by mouth once daily.    [DISCONTINUED] furosemide (LASIX) 40 MG tablet TAKE 1 TABLET BY MOUTH EVERY DAY    [DISCONTINUED] mupirocin (BACTROBAN) 2 % ointment Apply topically 3 (three) times daily. (Patient not taking: Reported on 3/14/2023)     Family History       Problem Relation (Age of Onset)    Cancer Mother, Brother, Cousin    Cataracts Mother    Hypertension Mother, Father, Sister, Brother    Stroke Father          Tobacco Use    Smoking status: Former     Packs/day: 2.00     Years: 15.00     Pack years: 30.00     Types: Cigarettes     Quit date: 1984     Years since quittin.1     Passive exposure: Past    Smokeless tobacco: Never    Tobacco comments:     Patient quit smoking at the age of 26 yo.   Substance and Sexual Activity    Alcohol use: No    Drug use: No    Sexual activity: Yes     Partners: Female     Review of Systems   Constitutional:  Positive for activity change, chills and fatigue. Negative for appetite change and fever.   HENT:  Positive for congestion.    Respiratory:  Positive for cough. Negative for shortness of breath.    Cardiovascular:  Negative for chest pain and leg swelling.   Gastrointestinal:  Negative for abdominal pain, diarrhea, nausea and vomiting.   Endocrine: Positive for polydipsia.   Neurological:  Positive for weakness.   Psychiatric/Behavioral:  Negative for agitation, behavioral problems, confusion, decreased concentration and dysphoric mood. The patient is nervous/anxious.    Objective:     Vital Signs (Most Recent):  Temp: 99.5 °F (37.5 °C) (23 1431)  Pulse: 109 (23 1500)  Resp: (!) 24 (23 1500)  BP: 120/69 (23 1500)  SpO2: 95 % (23 1500) Vital Signs (24h Range):  Temp:  [98.2 °F (36.8 °C)-99.5 °F (37.5 °C)] 99.5 °F (37.5 °C)  Pulse:  [109-120] 109  Resp:  [20-24] 24  SpO2:  [95 %] 95 %  BP: (120-145)/(69-81) 120/69      Weight: 108.8 kg (239 lb 13.8 oz)  Body mass index is 35.42 kg/m².     Physical Exam  Vitals and nursing note reviewed.   Constitutional:       General: He is not in acute distress.     Appearance: He is ill-appearing. He is not toxic-appearing.      Interventions: He is not intubated.  HENT:      Head: Normocephalic and atraumatic.   Cardiovascular:      Rate and Rhythm: Tachycardia present.   Pulmonary:      Effort: Pulmonary effort is normal. Tachypnea present. No respiratory distress. He is not intubated.      Breath sounds: No decreased air movement. Wheezing present. No rhonchi or rales.   Abdominal:      Palpations: Abdomen is soft.      Tenderness: There is no abdominal tenderness.   Musculoskeletal:      Right lower leg: No edema.      Left lower leg: No edema.   Skin:     General: Skin is warm.      Capillary Refill: Capillary refill takes 2 to 3 seconds.      Coloration: Skin is pale.   Neurological:      Mental Status: He is alert.      Motor: Weakness present.   Psychiatric:         Speech: Speech normal.         Behavior: Behavior normal. Behavior is cooperative.              Significant Labs: All pertinent labs within the past 24 hours have been reviewed.    Blood Culture: pending  CBC:   Recent Labs   Lab 06/08/23  1213   WBC 34.81*   HGB 11.5*   HCT 36.0*        CMP:   Recent Labs   Lab 06/08/23  1213   *   K 4.4      CO2 18*   *   BUN 18   CREATININE 1.2   CALCIUM 11.0*   PROT 7.5   ALBUMIN 3.3*   BILITOT 0.6   ALKPHOS 135   AST 21   ALT 34   ANIONGAP 12     Procalcitonin 0.34    Significant Imaging: I have reviewed all pertinent imaging results/findings within the past 24 hours.  CXR: I have reviewed all pertinent results/findings within the past 24 hours and my personal findings are:  haziness c/with pneumonia     Assessment/Plan:     * Pneumonia  Received po doxy as outpatient   Complains of continued cough  Procalcitonin mildly elevated  Leukocytosis  Continue cap  therapy  Intravenous fluids   ddx copdx     Hypercalcemia  Normal in recent past  Corrected 11.6  Pth and ionized calcium pending  Fluids     Leukocytosis  No left shift  Received im steroid injection  Monitor  Procalcitonin mildly elevated      COPD (chronic obstructive pulmonary disease)  Given history of smoking, coughing with sputum production will treat as copdx  Breathing treatment while awake  systemic steroids  acapella   Incentive spirometer   mucinex prn for congestion    Gastroesophageal reflux disease without esophagitis  Resume proton pump inhibitor       BPH (benign prostatic hyperplasia)  Home medication(s) resumed       Bipolar 1 disorder  Home medication(s) resumed        Essential hypertension  Controlled on home regimen      Type 2 diabetes mellitus with diabetic polyneuropathy, without long-term current use of insulin  Patient's FSGs are controlled on current medication regimen.  Last A1c reviewed-   Lab Results   Component Value Date    HGBA1C 6.5 (H) 10/31/2022     Most recent fingerstick glucose reviewed-   Recent Labs   Lab 06/08/23  1436 06/08/23  1617   POCTGLUCOSE 279* 328*     Current correctional scale  Low  Maintain anti-hyperglycemic dose as follows-   Antihyperglycemics (From admission, onward)    Start     Stop Route Frequency Ordered    06/08/23 1518  insulin aspart U-100 pen 0-5 Units         -- SubQ Before meals & nightly PRN 06/08/23 1418        Hold Oral hypoglycemics while patient is in the hospital.  Sliding scale as received systemic steroids      VTE Risk Mitigation (From admission, onward)         Ordered     IP VTE HIGH RISK PATIENT  Once         06/08/23 1418     Place sequential compression device  Until discontinued         06/08/23 1418                   On 06/08/2023, patient should be placed in hospital observation services under my care.        Arturo Higgins MD  Department of Hospital Medicine  '[facility] - Emergency Dept.

## 2023-06-09 LAB
ANION GAP SERPL CALC-SCNC: 8 MMOL/L (ref 8–16)
BASOPHILS NFR BLD: 0 % (ref 0–1.9)
BUN SERPL-MCNC: 21 MG/DL (ref 8–23)
CALCIUM SERPL-MCNC: 9.6 MG/DL (ref 8.7–10.5)
CHLORIDE SERPL-SCNC: 103 MMOL/L (ref 95–110)
CO2 SERPL-SCNC: 18 MMOL/L (ref 23–29)
CREAT SERPL-MCNC: 1 MG/DL (ref 0.5–1.4)
DIFFERENTIAL METHOD: ABNORMAL
EOSINOPHIL NFR BLD: 0 % (ref 0–8)
ERYTHROCYTE [DISTWIDTH] IN BLOOD BY AUTOMATED COUNT: 13 % (ref 11.5–14.5)
EST. GFR  (NO RACE VARIABLE): >60 ML/MIN/1.73 M^2
ESTIMATED AVG GLUCOSE: 169 MG/DL (ref 68–131)
GIANT PLATELETS BLD QL SMEAR: PRESENT
GLUCOSE SERPL-MCNC: 323 MG/DL (ref 70–110)
HBA1C MFR BLD: 7.5 % (ref 4–5.6)
HCT VFR BLD AUTO: 30.3 % (ref 40–54)
HGB BLD-MCNC: 9.6 G/DL (ref 14–18)
IMM GRANULOCYTES # BLD AUTO: ABNORMAL K/UL (ref 0–0.04)
IMM GRANULOCYTES NFR BLD AUTO: ABNORMAL % (ref 0–0.5)
LYMPHOCYTES NFR BLD: 9 % (ref 18–48)
MAGNESIUM SERPL-MCNC: 1.7 MG/DL (ref 1.6–2.6)
MCH RBC QN AUTO: 26.2 PG (ref 27–31)
MCHC RBC AUTO-ENTMCNC: 31.7 G/DL (ref 32–36)
MCV RBC AUTO: 83 FL (ref 82–98)
METAMYELOCYTES NFR BLD MANUAL: 4 %
MONOCYTES NFR BLD: 4 % (ref 4–15)
MYELOCYTES NFR BLD MANUAL: 2 %
NEUTROPHILS NFR BLD: 63 % (ref 38–73)
NEUTS BAND NFR BLD MANUAL: 18 %
NRBC BLD-RTO: 0 /100 WBC
PHOSPHATE SERPL-MCNC: 2.9 MG/DL (ref 2.7–4.5)
PLATELET # BLD AUTO: 353 K/UL (ref 150–450)
PLATELET BLD QL SMEAR: ABNORMAL
PMV BLD AUTO: 9.8 FL (ref 9.2–12.9)
POCT GLUCOSE: 269 MG/DL (ref 70–110)
POCT GLUCOSE: 284 MG/DL (ref 70–110)
POCT GLUCOSE: 291 MG/DL (ref 70–110)
POCT GLUCOSE: 370 MG/DL (ref 70–110)
POCT GLUCOSE: 382 MG/DL (ref 70–110)
POTASSIUM SERPL-SCNC: 4.1 MMOL/L (ref 3.5–5.1)
PTH-INTACT SERPL-MCNC: 94.4 PG/ML (ref 9–77)
RBC # BLD AUTO: 3.66 M/UL (ref 4.6–6.2)
SODIUM SERPL-SCNC: 129 MMOL/L (ref 136–145)
WBC # BLD AUTO: 30.32 K/UL (ref 3.9–12.7)

## 2023-06-09 PROCEDURE — 96372 THER/PROPH/DIAG INJ SC/IM: CPT | Performed by: FAMILY MEDICINE

## 2023-06-09 PROCEDURE — 94640 AIRWAY INHALATION TREATMENT: CPT

## 2023-06-09 PROCEDURE — 96366 THER/PROPH/DIAG IV INF ADDON: CPT

## 2023-06-09 PROCEDURE — 27000221 HC OXYGEN, UP TO 24 HOURS

## 2023-06-09 PROCEDURE — 85007 BL SMEAR W/DIFF WBC COUNT: CPT | Performed by: FAMILY MEDICINE

## 2023-06-09 PROCEDURE — 94761 N-INVAS EAR/PLS OXIMETRY MLT: CPT

## 2023-06-09 PROCEDURE — 85027 COMPLETE CBC AUTOMATED: CPT | Performed by: FAMILY MEDICINE

## 2023-06-09 PROCEDURE — 96361 HYDRATE IV INFUSION ADD-ON: CPT

## 2023-06-09 PROCEDURE — 84100 ASSAY OF PHOSPHORUS: CPT | Performed by: FAMILY MEDICINE

## 2023-06-09 PROCEDURE — 25000003 PHARM REV CODE 250: Performed by: NURSE PRACTITIONER

## 2023-06-09 PROCEDURE — 36415 COLL VENOUS BLD VENIPUNCTURE: CPT | Performed by: FAMILY MEDICINE

## 2023-06-09 PROCEDURE — 99900035 HC TECH TIME PER 15 MIN (STAT)

## 2023-06-09 PROCEDURE — 94664 DEMO&/EVAL PT USE INHALER: CPT | Mod: XB

## 2023-06-09 PROCEDURE — 63600175 PHARM REV CODE 636 W HCPCS: Performed by: NURSE PRACTITIONER

## 2023-06-09 PROCEDURE — 83735 ASSAY OF MAGNESIUM: CPT | Performed by: FAMILY MEDICINE

## 2023-06-09 PROCEDURE — 94799 UNLISTED PULMONARY SVC/PX: CPT

## 2023-06-09 PROCEDURE — 63600175 PHARM REV CODE 636 W HCPCS: Performed by: FAMILY MEDICINE

## 2023-06-09 PROCEDURE — 83970 ASSAY OF PARATHORMONE: CPT | Performed by: FAMILY MEDICINE

## 2023-06-09 PROCEDURE — 11000001 HC ACUTE MED/SURG PRIVATE ROOM

## 2023-06-09 PROCEDURE — 25000242 PHARM REV CODE 250 ALT 637 W/ HCPCS: Performed by: FAMILY MEDICINE

## 2023-06-09 PROCEDURE — 80048 BASIC METABOLIC PNL TOTAL CA: CPT | Performed by: FAMILY MEDICINE

## 2023-06-09 PROCEDURE — 25000003 PHARM REV CODE 250: Performed by: FAMILY MEDICINE

## 2023-06-09 RX ORDER — GLUCAGON 1 MG
1 KIT INJECTION
Status: DISCONTINUED | OUTPATIENT
Start: 2023-06-09 | End: 2023-06-12 | Stop reason: HOSPADM

## 2023-06-09 RX ORDER — INSULIN ASPART 100 [IU]/ML
1-10 INJECTION, SOLUTION INTRAVENOUS; SUBCUTANEOUS
Status: DISCONTINUED | OUTPATIENT
Start: 2023-06-09 | End: 2023-06-12 | Stop reason: HOSPADM

## 2023-06-09 RX ORDER — GUAIFENESIN/DEXTROMETHORPHAN 100-10MG/5
10 SYRUP ORAL EVERY 4 HOURS PRN
Status: DISCONTINUED | OUTPATIENT
Start: 2023-06-09 | End: 2023-06-12 | Stop reason: HOSPADM

## 2023-06-09 RX ADMIN — GABAPENTIN 300 MG: 300 CAPSULE ORAL at 09:06

## 2023-06-09 RX ADMIN — BUSPIRONE HYDROCHLORIDE 10 MG: 10 TABLET ORAL at 09:06

## 2023-06-09 RX ADMIN — ACETAMINOPHEN 650 MG: 325 TABLET ORAL at 04:06

## 2023-06-09 RX ADMIN — ATORVASTATIN CALCIUM 40 MG: 40 TABLET, FILM COATED ORAL at 09:06

## 2023-06-09 RX ADMIN — AZITHROMYCIN MONOHYDRATE 500 MG: 500 INJECTION, POWDER, LYOPHILIZED, FOR SOLUTION INTRAVENOUS at 02:06

## 2023-06-09 RX ADMIN — INSULIN ASPART 3 UNITS: 100 INJECTION, SOLUTION INTRAVENOUS; SUBCUTANEOUS at 06:06

## 2023-06-09 RX ADMIN — TAMSULOSIN HYDROCHLORIDE 0.8 MG: 0.4 CAPSULE ORAL at 09:06

## 2023-06-09 RX ADMIN — PREDNISONE 40 MG: 20 TABLET ORAL at 09:06

## 2023-06-09 RX ADMIN — SODIUM CHLORIDE: 9 INJECTION, SOLUTION INTRAVENOUS at 04:06

## 2023-06-09 RX ADMIN — BUSPIRONE HYDROCHLORIDE 10 MG: 10 TABLET ORAL at 04:06

## 2023-06-09 RX ADMIN — INSULIN DETEMIR 10 UNITS: 100 INJECTION, SOLUTION SUBCUTANEOUS at 09:06

## 2023-06-09 RX ADMIN — SODIUM CHLORIDE: 9 INJECTION, SOLUTION INTRAVENOUS at 02:06

## 2023-06-09 RX ADMIN — GEMFIBROZIL 600 MG: 600 TABLET ORAL at 06:06

## 2023-06-09 RX ADMIN — INSULIN ASPART 3 UNITS: 100 INJECTION, SOLUTION INTRAVENOUS; SUBCUTANEOUS at 12:06

## 2023-06-09 RX ADMIN — ALBUTEROL SULFATE 2.5 MG: 2.5 SOLUTION RESPIRATORY (INHALATION) at 09:06

## 2023-06-09 RX ADMIN — GEMFIBROZIL 600 MG: 600 TABLET ORAL at 04:06

## 2023-06-09 RX ADMIN — AMITRIPTYLINE HYDROCHLORIDE 25 MG: 25 TABLET, FILM COATED ORAL at 09:06

## 2023-06-09 RX ADMIN — QUETIAPINE FUMARATE 300 MG: 100 TABLET ORAL at 09:06

## 2023-06-09 RX ADMIN — INSULIN ASPART 3 UNITS: 100 INJECTION, SOLUTION INTRAVENOUS; SUBCUTANEOUS at 09:06

## 2023-06-09 RX ADMIN — CEFTRIAXONE 1 G: 1 INJECTION, POWDER, FOR SOLUTION INTRAMUSCULAR; INTRAVENOUS at 12:06

## 2023-06-09 RX ADMIN — INSULIN ASPART 10 UNITS: 100 INJECTION, SOLUTION INTRAVENOUS; SUBCUTANEOUS at 04:06

## 2023-06-09 RX ADMIN — AMLODIPINE BESYLATE 10 MG: 10 TABLET ORAL at 09:06

## 2023-06-09 NOTE — PROGRESS NOTES
[facility] Medicine  Progress Note    Patient Name: Scott Bansal  MRN: [id number]  Patient Class: IP- Inpatient   Admission Date: 6/8/2023  Length of Stay: 1 days  Attending Physician: Jovanni Lopez MD  Primary Care Provider: Kaylie Villalta MD        Subjective:     Principal Problem:Pneumonia        HPI:  62M  has a past medical history of Bipolar 1 disorder, mixed, BPH (benign prostatic hypertrophy), Colon polyp, Cyst, eyelid, Diabetes mellitus, GERD (gastroesophageal reflux disease), Hyperlipidemia, Hypertension, Lumbar degenerative disc disease, Migraine headache, and Obesity.    Admitted for pneumonia. Was previously seen in hospital and treated for the same in [place] on 6/5/23. States receiving IM steroid injection and po doxycycline. Cta at that time was negative for pulmonary embolism but with bilateral pulmonary opacities. Was discharged but not on supplemental oxygen. Complains of continued cough, chills and dyspnea with exertion. Denies fever, nausea/vomiting, diarrhea. States very dark urine despite polydipsia.    Initial workup in emergency department revealed tachycardia. Afebrile. Leukocytosis. Mild hyponatremia, hyperglycemia, and hypecalcemia. Acidosis. Bnp within normal limits. Lactate within normal limits. Procalcitonin mildly elevated. Urinalysis suggesting dehydration. Chest x-ray with haziness consistent with pneumonia.     Hospital medicine consulted for admission under observation.         Overview/Hospital Course:  Patient admitted to medical-surgical unit for further evaluation of multi lobar pneumonia.  Patient treated with IV antibiotics, antitussives as needed, DuoNebs as needed, and steroids.  Leukocytosis and hyperglycemia noted in the setting of steroid use.  Patient verbalized mild symptom improvement with productive cough (green sputum) and culture sent for analysis.  Hyponatremia noted IV hydration continued.       Interval History:  Patient in bed upon exam  with productive cough noted and wheezing and rhonchi auscultated upon exam.  Patient verbalized mild improvement.  IV antibiotics continued.  Sputum culture sent for analysis.  Leukocytosis improved.     Review of Systems   Constitutional:  Positive for activity change, chills and fatigue. Negative for appetite change and fever.   HENT:  Negative for postnasal drip, sinus pressure, sore throat and trouble swallowing.    Respiratory:  Positive for cough (productive). Negative for shortness of breath.    Cardiovascular:  Negative for chest pain and leg swelling.   Gastrointestinal:  Negative for abdominal pain, diarrhea, nausea and vomiting.   Endocrine: Positive for polydipsia.   Genitourinary:  Negative for difficulty urinating, flank pain, frequency and urgency.   Neurological:  Positive for weakness. Negative for dizziness and headaches.   Psychiatric/Behavioral:  Negative for agitation, behavioral problems, confusion, decreased concentration and dysphoric mood. The patient is nervous/anxious.    Objective:     Vital Signs (Most Recent):  Temp: 98 °F (36.7 °C) (06/09/23 1217)  Pulse: 90 (06/09/23 1217)  Resp: 20 (06/09/23 1217)  BP: 135/62 (06/09/23 1217)  SpO2: (!) 92 % (06/09/23 1217) Vital Signs (24h Range):  Temp:  [97.3 °F (36.3 °C)-98.5 °F (36.9 °C)] 98 °F (36.7 °C)  Pulse:  [89-98] 90  Resp:  [18-22] 20  SpO2:  [92 %-97 %] 92 %  BP: (113-135)/(58-68) 135/62     Weight: 108.8 kg (239 lb 13.8 oz)  Body mass index is 35.42 kg/m².    Intake/Output Summary (Last 24 hours) at 6/9/2023 1609  Last data filed at 6/9/2023 1416  Gross per 24 hour   Intake 350 ml   Output 1500 ml   Net -1150 ml         Physical Exam  Vitals and nursing note reviewed.   Constitutional:       General: He is not in acute distress.     Appearance: He is ill-appearing. He is not toxic-appearing.      Interventions: He is not intubated.  HENT:      Head: Normocephalic and atraumatic.   Cardiovascular:      Rate and Rhythm: Normal rate.    Pulmonary:      Effort: Pulmonary effort is normal. Tachypnea present. No respiratory distress. He is not intubated.      Breath sounds: No decreased air movement. Wheezing present. No rhonchi or rales.   Abdominal:      Palpations: Abdomen is soft.      Tenderness: There is no abdominal tenderness.   Musculoskeletal:      Right lower leg: No edema.      Left lower leg: No edema.   Skin:     General: Skin is warm.      Capillary Refill: Capillary refill takes 2 to 3 seconds.      Coloration: Skin is pale.   Neurological:      Mental Status: He is alert.      Motor: Weakness present.   Psychiatric:         Speech: Speech normal.         Behavior: Behavior normal. Behavior is cooperative.           Significant Labs: All pertinent labs within the past 24 hours have been reviewed.  CBC:   Recent Labs   Lab 06/08/23  1213 06/09/23  0627   WBC 34.81* 30.32*   HGB 11.5* 9.6*   HCT 36.0* 30.3*    353     CMP:   Recent Labs   Lab 06/08/23  1213 06/09/23  0627   * 129*   K 4.4 4.1    103   CO2 18* 18*   * 323*   BUN 18 21   CREATININE 1.2 1.0   CALCIUM 11.0* 9.6   PROT 7.5  --    ALBUMIN 3.3*  --    BILITOT 0.6  --    ALKPHOS 135  --    AST 21  --    ALT 34  --    ANIONGAP 12 8     POCT Glucose:   Recent Labs   Lab 06/08/23 2038 06/09/23  0525 06/09/23  1214   POCTGLUCOSE 196* 284* 291*       Significant Imaging: I have reviewed all pertinent imaging results/findings within the past 24 hours.      Assessment/Plan:      * Pneumonia  Received po doxy as outpatient   Complains of continued cough  Procalcitonin mildly elevated  Leukocytosis persists with downward trend  IV Rocephin and IV azithromycin initiated  Intravenous fluids-   ddx copdx   -DuoNebs as needed  -supplemental oxygen as needed  -status spirometry    Hypercalcemia  Normal in recent past  Corrected 11.6>9.6- with normalized 6/9/23  Pth 94.4 and ionized calcium pending  Fluids     Leukocytosis  -in the setting of pneumonia and steroid  use  No left shift- persists with downward trend  Received im steroid injection  Monitor  Procalcitonin mildly elevated      COPD (chronic obstructive pulmonary disease)  Given history of smoking, coughing with sputum production will treat as copdx  Breathing treatment while awake  systemic steroids  acapella   Incentive spirometer   mucinex prn for congestion  -sputum culture symptom analysis pending    Gastroesophageal reflux disease without esophagitis  Resume proton pump inhibitor       BPH (benign prostatic hyperplasia)  Home medication(s) resumed       Bipolar 1 disorder  Home medication(s) resumed        Essential hypertension  Controlled on home regimen      Type 2 diabetes mellitus with diabetic polyneuropathy, without long-term current use of insulin  Patient's FSGs are controlled on current medication regimen.  Last A1c reviewed-   Lab Results   Component Value Date    HGBA1C 7.5 (H) 06/08/2023     Most recent fingerstick glucose reviewed-   Recent Labs   Lab 06/08/23 2038 06/09/23  0525 06/09/23  1214   POCTGLUCOSE 196* 284* 291*     Current correctional scale  Low  Maintain anti-hyperglycemic dose as follows-   Antihyperglycemics (From admission, onward)    Start     Stop Route Frequency Ordered    06/09/23 2100  insulin detemir U-100 (Levemir) pen 10 Units         -- SubQ Nightly 06/09/23 1622    06/09/23 1720  insulin aspart U-100 pen 1-10 Units         -- SubQ Before meals & nightly PRN 06/09/23 1621        Hold Oral hypoglycemics while patient is in the hospital.  Sliding scale as received systemic steroids  -liver bed initiated moderate sliding scale initiated in the setting of uncontrolled hyperglycemia secondary to steroid use      VTE Risk Mitigation (From admission, onward)         Ordered     IP VTE HIGH RISK PATIENT  Once         06/08/23 1418     Place sequential compression device  Until discontinued         06/08/23 1418                Discharge Planning   ELSA:      Code Status: Full Code    Is the patient medically ready for discharge?:     Reason for patient still in hospital (select all that apply): Patient trending condition, Laboratory test and Treatment  Discharge Plan A: Home, Home with family                  Екатерина Rodriguez NP  Department of Hospital Medicine   '[facility] Surg

## 2023-06-09 NOTE — SUBJECTIVE & OBJECTIVE
Interval History:  Patient in bed upon exam with productive cough noted and wheezing and rhonchi auscultated upon exam.  Patient verbalized mild improvement.  IV antibiotics continued.  Sputum culture sent for analysis.  Leukocytosis improved.     Review of Systems   Constitutional:  Positive for activity change, chills and fatigue. Negative for appetite change and fever.   HENT:  Negative for postnasal drip, sinus pressure, sore throat and trouble swallowing.    Respiratory:  Positive for cough (productive). Negative for shortness of breath.    Cardiovascular:  Negative for chest pain and leg swelling.   Gastrointestinal:  Negative for abdominal pain, diarrhea, nausea and vomiting.   Endocrine: Positive for polydipsia.   Genitourinary:  Negative for difficulty urinating, flank pain, frequency and urgency.   Neurological:  Positive for weakness. Negative for dizziness and headaches.   Psychiatric/Behavioral:  Negative for agitation, behavioral problems, confusion, decreased concentration and dysphoric mood. The patient is nervous/anxious.    Objective:     Vital Signs (Most Recent):  Temp: 98 °F (36.7 °C) (06/09/23 1217)  Pulse: 90 (06/09/23 1217)  Resp: 20 (06/09/23 1217)  BP: 135/62 (06/09/23 1217)  SpO2: (!) 92 % (06/09/23 1217) Vital Signs (24h Range):  Temp:  [97.3 °F (36.3 °C)-98.5 °F (36.9 °C)] 98 °F (36.7 °C)  Pulse:  [89-98] 90  Resp:  [18-22] 20  SpO2:  [92 %-97 %] 92 %  BP: (113-135)/(58-68) 135/62     Weight: 108.8 kg (239 lb 13.8 oz)  Body mass index is 35.42 kg/m².    Intake/Output Summary (Last 24 hours) at 6/9/2023 1609  Last data filed at 6/9/2023 1416  Gross per 24 hour   Intake 350 ml   Output 1500 ml   Net -1150 ml         Physical Exam  Vitals and nursing note reviewed.   Constitutional:       General: He is not in acute distress.     Appearance: He is ill-appearing. He is not toxic-appearing.      Interventions: He is not intubated.  HENT:      Head: Normocephalic and atraumatic.    Cardiovascular:      Rate and Rhythm: Normal rate.   Pulmonary:      Effort: Pulmonary effort is normal. Tachypnea present. No respiratory distress. He is not intubated.      Breath sounds: No decreased air movement. Wheezing present. No rhonchi or rales.   Abdominal:      Palpations: Abdomen is soft.      Tenderness: There is no abdominal tenderness.   Musculoskeletal:      Right lower leg: No edema.      Left lower leg: No edema.   Skin:     General: Skin is warm.      Capillary Refill: Capillary refill takes 2 to 3 seconds.      Coloration: Skin is pale.   Neurological:      Mental Status: He is alert.      Motor: Weakness present.   Psychiatric:         Speech: Speech normal.         Behavior: Behavior normal. Behavior is cooperative.           Significant Labs: All pertinent labs within the past 24 hours have been reviewed.  CBC:   Recent Labs   Lab 06/08/23 1213 06/09/23  0627   WBC 34.81* 30.32*   HGB 11.5* 9.6*   HCT 36.0* 30.3*    353     CMP:   Recent Labs   Lab 06/08/23  1213 06/09/23  0627   * 129*   K 4.4 4.1    103   CO2 18* 18*   * 323*   BUN 18 21   CREATININE 1.2 1.0   CALCIUM 11.0* 9.6   PROT 7.5  --    ALBUMIN 3.3*  --    BILITOT 0.6  --    ALKPHOS 135  --    AST 21  --    ALT 34  --    ANIONGAP 12 8     POCT Glucose:   Recent Labs   Lab 06/08/23 2038 06/09/23  0525 06/09/23  1214   POCTGLUCOSE 196* 284* 291*       Significant Imaging: I have reviewed all pertinent imaging results/findings within the past 24 hours.

## 2023-06-09 NOTE — PLAN OF CARE
[facility] - [facility] Surg  Initial Discharge Assessment       Primary Care Provider: Kaylie Villalta MD    Admission Diagnosis: SOB (shortness of breath) [R06.02]  Coughing [R05.9]  Pneumonia of left lower lobe due to infectious organism [J18.9]  Leukocytosis, unspecified type [D72.829]  Pneumonia due to infectious organism [J18.9]    Admission Date: 6/8/2023  Expected Discharge Date:     Transition of Care Barriers: None    Payor: sfilatino MEDICARE / Plan: [employer] 65 / Product Type: Medicare Advantage /     Extended Emergency Contact Information  Primary Emergency Contact: Kamila Bush  Address: [address] [place] [address] [employer] [address]  Home Phone: [phone]  Mobile Phone: [phone]  Relation: Significant other  Secondary Emergency Contact: KAMILA PRECIADO  Address: [address] [place] [address] [employer] [address]  Home Phone: [phone]  Mobile Phone: [phone]  Relation: Significant other    Discharge Plan A: Home, Home with family         [id number] - [place] - [address]  [address]  [place] [address]  Phone: [phone] Fax: [phone]    [facility] - [place] - [address]  [address]  [address]  [facility] [place] [address]  Phone: [phone] Fax: [phone]    [facility]  [address]  [place] [address]  Phone: [phone] Fax: [phone]    [facility] [place] - [address]  [address]  [place] [address]  Phone: [phone] Fax: [phone]    [id number] - [place] - [address]  [address]  [place] [address]  Phone: [phone] Fax: [phone]    [id number] [place] - [address]  [address] [place] [address]  Phone: [phone] Fax: [phone]      Initial Assessment (most recent)       Adult Discharge Assessment -  06/09/23 1152          Discharge Assessment    Assessment Type Discharge Planning Assessment     Confirmed/corrected address, phone number and insurance Yes     Confirmed Demographics Correct on Facesheet     Source of Information patient     Communicated ELSA with patient/caregiver Date not available/Unable to determine     Reason For Admission PNA     People in Home significant other     Facility Arrived From: home     Do you expect to return to your current living situation? Yes     Do you have help at home or someone to help you manage your care at home? No     Prior to hospitilization cognitive status: Alert/Oriented     Current cognitive status: Alert/Oriented     Equipment Currently Used at Home nebulizer     Readmission within 30 days? No     Patient currently being followed by outpatient case management? No     Do you currently have service(s) that help you manage your care at home? No     Do you take prescription medications? Yes     Do you have prescription coverage? Yes     Do you have any problems affording any of your prescribed medications? No     Is the patient taking medications as prescribed? yes     Who is going to help you get home at discharge? daughterMini     How do you get to doctors appointments? car, drives self;family or friend will provide     Are you on dialysis? No     Discharge Plan A Home;Home with family     DME Needed Upon Discharge  none     Discharge Plan discussed with: Patient     Transition of Care Barriers None                   Anticipated DC Dispo: home with girlfriendKamila  Prior Level of Function: independent  PCP: Dr Villalta  Comments:  CM met with patient at bedside to introduce role and discuss d/c planning. Patient is independent, daughter will provide transportation home. CM following for needs.

## 2023-06-09 NOTE — ASSESSMENT & PLAN NOTE
Normal in recent past  Corrected 11.6>9.6- with normalized 6/9/23  Pth 94.4 and ionized calcium pending  Fluids

## 2023-06-09 NOTE — ASSESSMENT & PLAN NOTE
Received po doxy as outpatient   Complains of continued cough  Procalcitonin mildly elevated  Leukocytosis persists with downward trend  IV Rocephin and IV azithromycin initiated  Intravenous fluids-   ddx copdx   -DuoNebs as needed  -supplemental oxygen as needed  -status spirometry

## 2023-06-09 NOTE — ASSESSMENT & PLAN NOTE
Given history of smoking, coughing with sputum production will treat as copdx  Breathing treatment while awake  systemic steroids  acapella   Incentive spirometer   mucinex prn for congestion  -sputum culture symptom analysis pending

## 2023-06-09 NOTE — ASSESSMENT & PLAN NOTE
-in the setting of pneumonia and steroid use  No left shift- persists with downward trend  Received im steroid injection  Monitor  Procalcitonin mildly elevated

## 2023-06-09 NOTE — HOSPITAL COURSE
Patient admitted to medical-surgical unit for further evaluation of multi lobar pneumonia.  Patient treated with IV antibiotics, antitussives as needed, DuoNebs as needed, and steroids.  Leukocytosis and hyperglycemia noted in the setting of steroid use.  Patient verbalized mild symptom improvement with productive cough (green sputum) and culture sent for analysis.  Hyponatremia noted IV hydration continued.   6/10: WBC trending down. Pt reports improvement in symptoms. Sputum culture pending. Cont rocephin and azithro. Levemir increased to 10 units bid. Anticipate dc in the next 24-48 hours.   6/12  Examination done at bedside, patient appeared alert and oriented x4, denied acute issues overnight.    Hemodynamically stable   Saturating about 92 on room air   Stated significant improvement in shortness of breath, wheezing, chest tightness, cough.   Remained afebrile, leukocytosis trending down, leukocytosis likely reactive from steroid use.    Labs reviewed.    Blood glucose improving.    Considering clinical and hemodynamic stability, planning to discharge patient today, recommended to complete course of antibiotics, compliance with medications, strict diabetic diet, monitor blood glucose.    Patient agreed to the plan, discharge accordingly, medications to be delivered bedside, ordered NP at home program;

## 2023-06-09 NOTE — ASSESSMENT & PLAN NOTE
Patient's FSGs are controlled on current medication regimen.  Last A1c reviewed-   Lab Results   Component Value Date    HGBA1C 7.5 (H) 06/08/2023     Most recent fingerstick glucose reviewed-   Recent Labs   Lab 06/08/23 2038 06/09/23  0525 06/09/23  1214   POCTGLUCOSE 196* 284* 291*     Current correctional scale  Low  Maintain anti-hyperglycemic dose as follows-   Antihyperglycemics (From admission, onward)    Start     Stop Route Frequency Ordered    06/09/23 2100  insulin detemir U-100 (Levemir) pen 10 Units         -- SubQ Nightly 06/09/23 1622    06/09/23 1720  insulin aspart U-100 pen 1-10 Units         -- SubQ Before meals & nightly PRN 06/09/23 1621        Hold Oral hypoglycemics while patient is in the hospital.  Sliding scale as received systemic steroids  -liver bed initiated moderate sliding scale initiated in the setting of uncontrolled hyperglycemia secondary to steroid use

## 2023-06-09 NOTE — PLAN OF CARE
Discussed poc with pt, pt verbalized understanding    Purposeful rounding every 2hours    VS wnl  Blood glucose monitoring   Fall precautions in place, remains injury free  Accurate I&Os  Abx given as prescribed  Bed locked at lowest position  Call light within reach    Chart check complete  Will cont with POC     Problem: Adult Inpatient Plan of Care  Goal: Plan of Care Review  Outcome: Ongoing, Progressing  Goal: Patient-Specific Goal (Individualized)  Outcome: Ongoing, Progressing  Goal: Absence of Hospital-Acquired Illness or Injury  Outcome: Ongoing, Progressing  Goal: Optimal Comfort and Wellbeing  Outcome: Ongoing, Progressing  Goal: Readiness for Transition of Care  Outcome: Ongoing, Progressing     Problem: Diabetes Comorbidity  Goal: Blood Glucose Level Within Targeted Range  Outcome: Ongoing, Progressing     Problem: Fluid Imbalance (Pneumonia)  Goal: Fluid Balance  Outcome: Ongoing, Progressing     Problem: Infection (Pneumonia)  Goal: Resolution of Infection Signs and Symptoms  Outcome: Ongoing, Progressing     Problem: Respiratory Compromise (Pneumonia)  Goal: Effective Oxygenation and Ventilation  Outcome: Ongoing, Progressing     Problem: Skin Injury Risk Increased  Goal: Skin Health and Integrity  Outcome: Ongoing, Progressing

## 2023-06-10 PROBLEM — E83.52 HYPERCALCEMIA: Status: RESOLVED | Noted: 2023-06-08 | Resolved: 2023-06-10

## 2023-06-10 LAB
ANION GAP SERPL CALC-SCNC: 8 MMOL/L (ref 8–16)
BASOPHILS NFR BLD: 0 % (ref 0–1.9)
BUN SERPL-MCNC: 25 MG/DL (ref 8–23)
CALCIUM SERPL-MCNC: 9.8 MG/DL (ref 8.7–10.5)
CHLORIDE SERPL-SCNC: 107 MMOL/L (ref 95–110)
CO2 SERPL-SCNC: 20 MMOL/L (ref 23–29)
CREAT SERPL-MCNC: 0.9 MG/DL (ref 0.5–1.4)
DIFFERENTIAL METHOD: ABNORMAL
EOSINOPHIL NFR BLD: 0 % (ref 0–8)
ERYTHROCYTE [DISTWIDTH] IN BLOOD BY AUTOMATED COUNT: 13 % (ref 11.5–14.5)
EST. GFR  (NO RACE VARIABLE): >60 ML/MIN/1.73 M^2
GLUCOSE SERPL-MCNC: 347 MG/DL (ref 70–110)
HCT VFR BLD AUTO: 29.8 % (ref 40–54)
HGB BLD-MCNC: 9.6 G/DL (ref 14–18)
IMM GRANULOCYTES # BLD AUTO: ABNORMAL K/UL (ref 0–0.04)
IMM GRANULOCYTES NFR BLD AUTO: ABNORMAL % (ref 0–0.5)
LYMPHOCYTES NFR BLD: 7 % (ref 18–48)
MAGNESIUM SERPL-MCNC: 2 MG/DL (ref 1.6–2.6)
MCH RBC QN AUTO: 26.8 PG (ref 27–31)
MCHC RBC AUTO-ENTMCNC: 32.2 G/DL (ref 32–36)
MCV RBC AUTO: 83 FL (ref 82–98)
MONOCYTES NFR BLD: 3 % (ref 4–15)
MYELOCYTES NFR BLD MANUAL: 7 %
NEUTROPHILS NFR BLD: 83 % (ref 38–73)
NRBC BLD-RTO: 0 /100 WBC
PHOSPHATE SERPL-MCNC: 2.6 MG/DL (ref 2.7–4.5)
PLATELET # BLD AUTO: 390 K/UL (ref 150–450)
PMV BLD AUTO: 10 FL (ref 9.2–12.9)
POCT GLUCOSE: 284 MG/DL (ref 70–110)
POCT GLUCOSE: 337 MG/DL (ref 70–110)
POCT GLUCOSE: 339 MG/DL (ref 70–110)
POCT GLUCOSE: 376 MG/DL (ref 70–110)
POTASSIUM SERPL-SCNC: 4.8 MMOL/L (ref 3.5–5.1)
RBC # BLD AUTO: 3.58 M/UL (ref 4.6–6.2)
SODIUM SERPL-SCNC: 135 MMOL/L (ref 136–145)
WBC # BLD AUTO: 25.92 K/UL (ref 3.9–12.7)

## 2023-06-10 PROCEDURE — 94664 DEMO&/EVAL PT USE INHALER: CPT

## 2023-06-10 PROCEDURE — 85007 BL SMEAR W/DIFF WBC COUNT: CPT | Performed by: FAMILY MEDICINE

## 2023-06-10 PROCEDURE — 94761 N-INVAS EAR/PLS OXIMETRY MLT: CPT

## 2023-06-10 PROCEDURE — 25000003 PHARM REV CODE 250: Performed by: NURSE PRACTITIONER

## 2023-06-10 PROCEDURE — 94799 UNLISTED PULMONARY SVC/PX: CPT

## 2023-06-10 PROCEDURE — 83735 ASSAY OF MAGNESIUM: CPT | Performed by: FAMILY MEDICINE

## 2023-06-10 PROCEDURE — 85027 COMPLETE CBC AUTOMATED: CPT | Performed by: FAMILY MEDICINE

## 2023-06-10 PROCEDURE — 87070 CULTURE OTHR SPECIMN AEROBIC: CPT | Performed by: NURSE PRACTITIONER

## 2023-06-10 PROCEDURE — 36415 COLL VENOUS BLD VENIPUNCTURE: CPT | Performed by: FAMILY MEDICINE

## 2023-06-10 PROCEDURE — 25000003 PHARM REV CODE 250: Performed by: FAMILY MEDICINE

## 2023-06-10 PROCEDURE — 63600175 PHARM REV CODE 636 W HCPCS: Performed by: FAMILY MEDICINE

## 2023-06-10 PROCEDURE — 11000001 HC ACUTE MED/SURG PRIVATE ROOM

## 2023-06-10 PROCEDURE — 80048 BASIC METABOLIC PNL TOTAL CA: CPT | Performed by: FAMILY MEDICINE

## 2023-06-10 PROCEDURE — 27000221 HC OXYGEN, UP TO 24 HOURS

## 2023-06-10 PROCEDURE — 94618 PULMONARY STRESS TESTING: CPT

## 2023-06-10 PROCEDURE — 87186 SC STD MICRODIL/AGAR DIL: CPT | Performed by: NURSE PRACTITIONER

## 2023-06-10 PROCEDURE — 87205 SMEAR GRAM STAIN: CPT | Performed by: NURSE PRACTITIONER

## 2023-06-10 PROCEDURE — 87106 FUNGI IDENTIFICATION YEAST: CPT | Performed by: NURSE PRACTITIONER

## 2023-06-10 PROCEDURE — 87077 CULTURE AEROBIC IDENTIFY: CPT | Performed by: NURSE PRACTITIONER

## 2023-06-10 PROCEDURE — 25000242 PHARM REV CODE 250 ALT 637 W/ HCPCS: Performed by: FAMILY MEDICINE

## 2023-06-10 PROCEDURE — 99900035 HC TECH TIME PER 15 MIN (STAT)

## 2023-06-10 PROCEDURE — 84100 ASSAY OF PHOSPHORUS: CPT | Performed by: FAMILY MEDICINE

## 2023-06-10 PROCEDURE — 94640 AIRWAY INHALATION TREATMENT: CPT

## 2023-06-10 RX ORDER — SODIUM CHLORIDE FOR INHALATION 3 %
4 VIAL, NEBULIZER (ML) INHALATION ONCE
Status: COMPLETED | OUTPATIENT
Start: 2023-06-10 | End: 2023-06-10

## 2023-06-10 RX ADMIN — AZITHROMYCIN MONOHYDRATE 500 MG: 500 INJECTION, POWDER, LYOPHILIZED, FOR SOLUTION INTRAVENOUS at 03:06

## 2023-06-10 RX ADMIN — GEMFIBROZIL 600 MG: 600 TABLET ORAL at 05:06

## 2023-06-10 RX ADMIN — TAMSULOSIN HYDROCHLORIDE 0.8 MG: 0.4 CAPSULE ORAL at 10:06

## 2023-06-10 RX ADMIN — GABAPENTIN 300 MG: 300 CAPSULE ORAL at 08:06

## 2023-06-10 RX ADMIN — GEMFIBROZIL 600 MG: 600 TABLET ORAL at 06:06

## 2023-06-10 RX ADMIN — GUAIFENESIN AND DEXTROMETHORPHAN 10 ML: 100; 10 SYRUP ORAL at 09:06

## 2023-06-10 RX ADMIN — ACETAMINOPHEN 650 MG: 325 TABLET ORAL at 09:06

## 2023-06-10 RX ADMIN — INSULIN DETEMIR 10 UNITS: 100 INJECTION, SOLUTION SUBCUTANEOUS at 08:06

## 2023-06-10 RX ADMIN — CEFTRIAXONE 1 G: 1 INJECTION, POWDER, FOR SOLUTION INTRAMUSCULAR; INTRAVENOUS at 12:06

## 2023-06-10 RX ADMIN — BUSPIRONE HYDROCHLORIDE 10 MG: 10 TABLET ORAL at 10:06

## 2023-06-10 RX ADMIN — BUSPIRONE HYDROCHLORIDE 10 MG: 10 TABLET ORAL at 03:06

## 2023-06-10 RX ADMIN — INSULIN ASPART 8 UNITS: 100 INJECTION, SOLUTION INTRAVENOUS; SUBCUTANEOUS at 05:06

## 2023-06-10 RX ADMIN — QUETIAPINE FUMARATE 300 MG: 100 TABLET ORAL at 08:06

## 2023-06-10 RX ADMIN — INSULIN ASPART 10 UNITS: 100 INJECTION, SOLUTION INTRAVENOUS; SUBCUTANEOUS at 11:06

## 2023-06-10 RX ADMIN — INSULIN ASPART 3 UNITS: 100 INJECTION, SOLUTION INTRAVENOUS; SUBCUTANEOUS at 09:06

## 2023-06-10 RX ADMIN — AMLODIPINE BESYLATE 10 MG: 10 TABLET ORAL at 10:06

## 2023-06-10 RX ADMIN — PREDNISONE 40 MG: 20 TABLET ORAL at 08:06

## 2023-06-10 RX ADMIN — INSULIN ASPART 8 UNITS: 100 INJECTION, SOLUTION INTRAVENOUS; SUBCUTANEOUS at 06:06

## 2023-06-10 RX ADMIN — PREDNISONE 40 MG: 20 TABLET ORAL at 10:06

## 2023-06-10 RX ADMIN — ATORVASTATIN CALCIUM 40 MG: 40 TABLET, FILM COATED ORAL at 08:06

## 2023-06-10 RX ADMIN — BUSPIRONE HYDROCHLORIDE 10 MG: 10 TABLET ORAL at 08:06

## 2023-06-10 RX ADMIN — SODIUM CHLORIDE 30 MG/ML INHALATION SOLUTION 4 ML: 30 SOLUTION INHALANT at 09:06

## 2023-06-10 RX ADMIN — INSULIN DETEMIR 10 UNITS: 100 INJECTION, SOLUTION SUBCUTANEOUS at 10:06

## 2023-06-10 RX ADMIN — GABAPENTIN 300 MG: 300 CAPSULE ORAL at 10:06

## 2023-06-10 RX ADMIN — AMITRIPTYLINE HYDROCHLORIDE 25 MG: 25 TABLET, FILM COATED ORAL at 08:06

## 2023-06-10 NOTE — PLAN OF CARE
Discussed poc with pt, pt verbalized understanding    Purposeful rounding every 2hours    VS wnl- O2 at 2L  Blood glucose monitoring   Fall precautions in place, remains injury free      IVFs  Accurate I&Os  Abx given as prescribed  Bed locked at lowest position  Call light within reach    Chart check complete  Will cont with POC

## 2023-06-10 NOTE — ASSESSMENT & PLAN NOTE
Patient's FSGs are controlled on current medication regimen.  Last A1c reviewed-   Lab Results   Component Value Date    HGBA1C 7.5 (H) 06/08/2023     Most recent fingerstick glucose reviewed-   Recent Labs   Lab 06/09/23  1654 06/09/23  1801 06/09/23  2030 06/10/23  0538   POCTGLUCOSE 382* 370* 269* 337*     Current correctional scale  Low  Maintain anti-hyperglycemic dose as follows-   Antihyperglycemics (From admission, onward)    Start     Stop Route Frequency Ordered    06/10/23 0900  insulin detemir U-100 (Levemir) pen 10 Units         -- SubQ 2 times daily 06/10/23 0819    06/09/23 1720  insulin aspart U-100 pen 1-10 Units         -- SubQ Before meals & nightly PRN 06/09/23 1621        Hold Oral hypoglycemics while patient is in the hospital.    Increase levemir to 10 units bid

## 2023-06-10 NOTE — ASSESSMENT & PLAN NOTE
Received po doxy as outpatient   Complains of continued cough  Procalcitonin mildly elevated  Leukocytosis persists with downward trend  IV Rocephin and IV azithromycin initiated  Intravenous fluids-   ddx copdx   -DuoNebs as needed  -supplemental oxygen as needed  -status spirometry    Cont rocephin and azithro  Sputum culture pending

## 2023-06-10 NOTE — SUBJECTIVE & OBJECTIVE
Interval History: still reports cough and sob however improving.     Review of Systems   Constitutional:  Negative for fatigue and fever.   HENT:  Negative for sinus pressure.    Eyes:  Negative for visual disturbance.   Respiratory:  Positive for cough and shortness of breath.    Cardiovascular:  Negative for chest pain.   Gastrointestinal:  Negative for nausea and vomiting.   Genitourinary:  Negative for difficulty urinating.   Musculoskeletal:  Negative for back pain.   Skin:  Negative for rash.   Neurological:  Negative for headaches.   Psychiatric/Behavioral:  Negative for confusion.    Objective:     Vital Signs (Most Recent):  Temp: 97.8 °F (36.6 °C) (06/10/23 0756)  Pulse: 106 (06/10/23 0953)  Resp: 20 (06/10/23 0953)  BP: (!) 156/77 (06/10/23 0756)  SpO2: 95 % (06/10/23 0953) Vital Signs (24h Range):  Temp:  [97.8 °F (36.6 °C)-98.8 °F (37.1 °C)] 97.8 °F (36.6 °C)  Pulse:  [] 106  Resp:  [17-20] 20  SpO2:  [92 %-98 %] 95 %  BP: (125-156)/(62-77) 156/77     Weight: 108.6 kg (239 lb 6.7 oz)  Body mass index is 35.36 kg/m².    Intake/Output Summary (Last 24 hours) at 6/10/2023 1004  Last data filed at 6/10/2023 0802  Gross per 24 hour   Intake 240 ml   Output 1200 ml   Net -960 ml         Physical Exam  Constitutional:       General: He is not in acute distress.     Appearance: He is well-developed. He is ill-appearing. He is not diaphoretic.   HENT:      Head: Normocephalic and atraumatic.   Eyes:      Pupils: Pupils are equal, round, and reactive to light.   Cardiovascular:      Rate and Rhythm: Normal rate and regular rhythm.      Heart sounds: Normal heart sounds. No murmur heard.    No friction rub. No gallop.   Pulmonary:      Effort: Pulmonary effort is normal. No respiratory distress.      Breath sounds: No stridor. Rhonchi present. No wheezing or rales.      Comments: On nasal cannula  Abdominal:      General: Bowel sounds are normal. There is no distension.      Palpations: Abdomen is soft.  There is no mass.      Tenderness: There is no abdominal tenderness. There is no guarding.   Skin:     General: Skin is warm.      Findings: No erythema.   Neurological:      Mental Status: He is alert and oriented to person, place, and time.           Significant Labs: All pertinent labs within the past 24 hours have been reviewed.    Significant Imaging: I have reviewed all pertinent imaging results/findings within the past 24 hours.

## 2023-06-10 NOTE — PROGRESS NOTES
Home Oxygen Evaluation - [facility] - Cardiopulmonary Department      Date Performed: 6/10/2023      1) Patient's Home O2 Sat on room air, while at rest: Room Air SpO2 At Rest: 94 %        If O2 sats on room air at rest are 88% or below, patient qualifies.  Document O2 liter flow needed in Step 2.  If O2 sats are 89% or above, complete Step 3.        2)  If patient is not ambulated and O2 sats are <88%, what is the O2 liter flow required to meet ordered saturation?      If O2 sats on room air while exercising remain 89% or above patient does not qualify, no further testing needed Document N/A in step 3. If O2 sats on room air while exercising are 88% or below, continue to Step 4.    3) Patient's O2 Sat on room air while exercising: Room Air SpO2 During Ambulation: 96 %        4) Patient's O2 Sat while exercising on O2:   at           (Must show improvement from #4 for patients to qualify)

## 2023-06-10 NOTE — PROGRESS NOTES
[facility] Medicine  Progress Note    Patient Name: Scott Bansal  MRN: [id number]  Patient Class: IP- Inpatient   Admission Date: 6/8/2023  Length of Stay: 2 days  Attending Physician: Jovanni Lopez MD  Primary Care Provider: Kaylie Villalta MD        Subjective:     Principal Problem:Pneumonia        HPI:  62M  has a past medical history of Bipolar 1 disorder, mixed, BPH (benign prostatic hypertrophy), Colon polyp, Cyst, eyelid, Diabetes mellitus, GERD (gastroesophageal reflux disease), Hyperlipidemia, Hypertension, Lumbar degenerative disc disease, Migraine headache, and Obesity.    Admitted for pneumonia. Was previously seen in hospital and treated for the same in [place] on 6/5/23. States receiving IM steroid injection and po doxycycline. Cta at that time was negative for pulmonary embolism but with bilateral pulmonary opacities. Was discharged but not on supplemental oxygen. Complains of continued cough, chills and dyspnea with exertion. Denies fever, nausea/vomiting, diarrhea. States very dark urine despite polydipsia.    Initial workup in emergency department revealed tachycardia. Afebrile. Leukocytosis. Mild hyponatremia, hyperglycemia, and hypecalcemia. Acidosis. Bnp within normal limits. Lactate within normal limits. Procalcitonin mildly elevated. Urinalysis suggesting dehydration. Chest x-ray with haziness consistent with pneumonia.     Hospital medicine consulted for admission under observation.         Overview/Hospital Course:  Patient admitted to medical-surgical unit for further evaluation of multi lobar pneumonia.  Patient treated with IV antibiotics, antitussives as needed, DuoNebs as needed, and steroids.  Leukocytosis and hyperglycemia noted in the setting of steroid use.  Patient verbalized mild symptom improvement with productive cough (green sputum) and culture sent for analysis.  Hyponatremia noted IV hydration continued.   6/10: WBC trending down. Pt reports improvement in  symptoms. Sputum culture pending. Cont rocephin and azithro. Levemir increased to 10 units bid. Anticipate dc in the next 24-48 hours.       Interval History: still reports cough and sob however improving.     Review of Systems   Constitutional:  Negative for fatigue and fever.   HENT:  Negative for sinus pressure.    Eyes:  Negative for visual disturbance.   Respiratory:  Positive for cough and shortness of breath.    Cardiovascular:  Negative for chest pain.   Gastrointestinal:  Negative for nausea and vomiting.   Genitourinary:  Negative for difficulty urinating.   Musculoskeletal:  Negative for back pain.   Skin:  Negative for rash.   Neurological:  Negative for headaches.   Psychiatric/Behavioral:  Negative for confusion.    Objective:     Vital Signs (Most Recent):  Temp: 97.8 °F (36.6 °C) (06/10/23 0756)  Pulse: 106 (06/10/23 0953)  Resp: 20 (06/10/23 0953)  BP: (!) 156/77 (06/10/23 0756)  SpO2: 95 % (06/10/23 0953) Vital Signs (24h Range):  Temp:  [97.8 °F (36.6 °C)-98.8 °F (37.1 °C)] 97.8 °F (36.6 °C)  Pulse:  [] 106  Resp:  [17-20] 20  SpO2:  [92 %-98 %] 95 %  BP: (125-156)/(62-77) 156/77     Weight: 108.6 kg (239 lb 6.7 oz)  Body mass index is 35.36 kg/m².    Intake/Output Summary (Last 24 hours) at 6/10/2023 1004  Last data filed at 6/10/2023 0802  Gross per 24 hour   Intake 240 ml   Output 1200 ml   Net -960 ml         Physical Exam  Constitutional:       General: He is not in acute distress.     Appearance: He is well-developed. He is ill-appearing. He is not diaphoretic.   HENT:      Head: Normocephalic and atraumatic.   Eyes:      Pupils: Pupils are equal, round, and reactive to light.   Cardiovascular:      Rate and Rhythm: Normal rate and regular rhythm.      Heart sounds: Normal heart sounds. No murmur heard.    No friction rub. No gallop.   Pulmonary:      Effort: Pulmonary effort is normal. No respiratory distress.      Breath sounds: No stridor. Rhonchi present. No wheezing or rales.       Comments: On nasal cannula  Abdominal:      General: Bowel sounds are normal. There is no distension.      Palpations: Abdomen is soft. There is no mass.      Tenderness: There is no abdominal tenderness. There is no guarding.   Skin:     General: Skin is warm.      Findings: No erythema.   Neurological:      Mental Status: He is alert and oriented to person, place, and time.           Significant Labs: All pertinent labs within the past 24 hours have been reviewed.    Significant Imaging: I have reviewed all pertinent imaging results/findings within the past 24 hours.          Assessment/Plan:      * Pneumonia  Received po doxy as outpatient   Complains of continued cough  Procalcitonin mildly elevated  Leukocytosis persists with downward trend  IV Rocephin and IV azithromycin initiated  Intravenous fluids-   ddx copdx   -DuoNebs as needed  -supplemental oxygen as needed  -status spirometry    Cont rocephin and azithro  Sputum culture pending     Leukocytosis  -in the setting of pneumonia and steroid use  No left shift- persists with downward trend  Received im steroid injection  Monitor  Procalcitonin mildly elevated      COPD (chronic obstructive pulmonary disease)  Given history of smoking, coughing with sputum production will treat as copdx  Breathing treatment while awake  systemic steroids  acapella   Incentive spirometer   mucinex prn for congestion  -sputum culture symptom analysis pending    Gastroesophageal reflux disease without esophagitis  Resume proton pump inhibitor       BPH (benign prostatic hyperplasia)  Home medication(s) resumed       Bipolar 1 disorder  Home medication(s) resumed        Essential hypertension  Controlled on home regimen      Type 2 diabetes mellitus with diabetic polyneuropathy, without long-term current use of insulin  Patient's FSGs are controlled on current medication regimen.  Last A1c reviewed-   Lab Results   Component Value Date    HGBA1C 7.5 (H) 06/08/2023     Most  recent fingerstick glucose reviewed-   Recent Labs   Lab 06/09/23  1654 06/09/23  1801 06/09/23  2030 06/10/23  0538   POCTGLUCOSE 382* 370* 269* 337*     Current correctional scale  Low  Maintain anti-hyperglycemic dose as follows-   Antihyperglycemics (From admission, onward)    Start     Stop Route Frequency Ordered    06/10/23 0900  insulin detemir U-100 (Levemir) pen 10 Units         -- SubQ 2 times daily 06/10/23 0819    06/09/23 1720  insulin aspart U-100 pen 1-10 Units         -- SubQ Before meals & nightly PRN 06/09/23 1621        Hold Oral hypoglycemics while patient is in the hospital.    Increase levemir to 10 units bid       VTE Risk Mitigation (From admission, onward)         Ordered     IP VTE HIGH RISK PATIENT  Once         06/08/23 1418     Place sequential compression device  Until discontinued         06/08/23 1418                Discharge Planning   ELSA:      Code Status: Full Code   Is the patient medically ready for discharge?:     Reason for patient still in hospital (select all that apply): Patient trending condition  Discharge Plan A: Home, Home with family                  Jovanin Lopez MD  Department of Hospital Medicine   [facility] - Med Surg

## 2023-06-10 NOTE — PLAN OF CARE
Call button within reach. No complaints of pain during shift. Blood sugar monitored per MD order.  Problem: Adult Inpatient Plan of Care  Goal: Plan of Care Review  Outcome: Ongoing, Progressing  Goal: Patient-Specific Goal (Individualized)  Outcome: Ongoing, Progressing  Goal: Absence of Hospital-Acquired Illness or Injury  Outcome: Ongoing, Progressing  Goal: Optimal Comfort and Wellbeing  Outcome: Ongoing, Progressing  Goal: Readiness for Transition of Care  Outcome: Ongoing, Progressing     Problem: Diabetes Comorbidity  Goal: Blood Glucose Level Within Targeted Range  Outcome: Ongoing, Progressing     Problem: Fluid Imbalance (Pneumonia)  Goal: Fluid Balance  Outcome: Ongoing, Progressing     Problem: Infection (Pneumonia)  Goal: Resolution of Infection Signs and Symptoms  Outcome: Ongoing, Progressing     Problem: Respiratory Compromise (Pneumonia)  Goal: Effective Oxygenation and Ventilation  Outcome: Ongoing, Progressing     Problem: Skin Injury Risk Increased  Goal: Skin Health and Integrity  Outcome: Ongoing, Progressing

## 2023-06-11 LAB
ANION GAP SERPL CALC-SCNC: 11 MMOL/L (ref 8–16)
BASOPHILS NFR BLD: 0 % (ref 0–1.9)
BUN SERPL-MCNC: 26 MG/DL (ref 8–23)
CALCIUM SERPL-MCNC: 10 MG/DL (ref 8.7–10.5)
CHLORIDE SERPL-SCNC: 105 MMOL/L (ref 95–110)
CO2 SERPL-SCNC: 20 MMOL/L (ref 23–29)
CREAT SERPL-MCNC: 1 MG/DL (ref 0.5–1.4)
DIFFERENTIAL METHOD: ABNORMAL
EOSINOPHIL NFR BLD: 0 % (ref 0–8)
ERYTHROCYTE [DISTWIDTH] IN BLOOD BY AUTOMATED COUNT: 13.1 % (ref 11.5–14.5)
EST. GFR  (NO RACE VARIABLE): >60 ML/MIN/1.73 M^2
GLUCOSE SERPL-MCNC: 352 MG/DL (ref 70–110)
HCT VFR BLD AUTO: 33.5 % (ref 40–54)
HGB BLD-MCNC: 10.7 G/DL (ref 14–18)
IMM GRANULOCYTES # BLD AUTO: ABNORMAL K/UL (ref 0–0.04)
IMM GRANULOCYTES NFR BLD AUTO: ABNORMAL % (ref 0–0.5)
LYMPHOCYTES NFR BLD: 5 % (ref 18–48)
MAGNESIUM SERPL-MCNC: 2.1 MG/DL (ref 1.6–2.6)
MCH RBC QN AUTO: 26.5 PG (ref 27–31)
MCHC RBC AUTO-ENTMCNC: 31.9 G/DL (ref 32–36)
MCV RBC AUTO: 83 FL (ref 82–98)
MONOCYTES NFR BLD: 3 % (ref 4–15)
MYELOCYTES NFR BLD MANUAL: 8 %
NEUTROPHILS NFR BLD: 84 % (ref 38–73)
NRBC BLD-RTO: 0 /100 WBC
PHOSPHATE SERPL-MCNC: 3.3 MG/DL (ref 2.7–4.5)
PLATELET # BLD AUTO: 433 K/UL (ref 150–450)
PMV BLD AUTO: 9.6 FL (ref 9.2–12.9)
POCT GLUCOSE: 286 MG/DL (ref 70–110)
POCT GLUCOSE: 308 MG/DL (ref 70–110)
POCT GLUCOSE: 318 MG/DL (ref 70–110)
POCT GLUCOSE: 357 MG/DL (ref 70–110)
POTASSIUM SERPL-SCNC: 4.5 MMOL/L (ref 3.5–5.1)
RBC # BLD AUTO: 4.04 M/UL (ref 4.6–6.2)
SODIUM SERPL-SCNC: 136 MMOL/L (ref 136–145)
WBC # BLD AUTO: 23.6 K/UL (ref 3.9–12.7)

## 2023-06-11 PROCEDURE — 83735 ASSAY OF MAGNESIUM: CPT | Performed by: FAMILY MEDICINE

## 2023-06-11 PROCEDURE — 25000003 PHARM REV CODE 250: Performed by: FAMILY MEDICINE

## 2023-06-11 PROCEDURE — 99900035 HC TECH TIME PER 15 MIN (STAT)

## 2023-06-11 PROCEDURE — 36415 COLL VENOUS BLD VENIPUNCTURE: CPT | Performed by: FAMILY MEDICINE

## 2023-06-11 PROCEDURE — 80048 BASIC METABOLIC PNL TOTAL CA: CPT | Performed by: FAMILY MEDICINE

## 2023-06-11 PROCEDURE — 94640 AIRWAY INHALATION TREATMENT: CPT

## 2023-06-11 PROCEDURE — 25000003 PHARM REV CODE 250: Performed by: NURSE PRACTITIONER

## 2023-06-11 PROCEDURE — 94799 UNLISTED PULMONARY SVC/PX: CPT

## 2023-06-11 PROCEDURE — 84100 ASSAY OF PHOSPHORUS: CPT | Performed by: FAMILY MEDICINE

## 2023-06-11 PROCEDURE — 94664 DEMO&/EVAL PT USE INHALER: CPT

## 2023-06-11 PROCEDURE — 85007 BL SMEAR W/DIFF WBC COUNT: CPT | Performed by: FAMILY MEDICINE

## 2023-06-11 PROCEDURE — 11000001 HC ACUTE MED/SURG PRIVATE ROOM

## 2023-06-11 PROCEDURE — 94761 N-INVAS EAR/PLS OXIMETRY MLT: CPT

## 2023-06-11 PROCEDURE — 25000242 PHARM REV CODE 250 ALT 637 W/ HCPCS: Performed by: FAMILY MEDICINE

## 2023-06-11 PROCEDURE — 25000003 PHARM REV CODE 250: Performed by: INTERNAL MEDICINE

## 2023-06-11 PROCEDURE — 63600175 PHARM REV CODE 636 W HCPCS: Performed by: FAMILY MEDICINE

## 2023-06-11 PROCEDURE — 85027 COMPLETE CBC AUTOMATED: CPT | Performed by: FAMILY MEDICINE

## 2023-06-11 RX ORDER — SUMATRIPTAN 50 MG/1
100 TABLET, FILM COATED ORAL DAILY PRN
Status: DISCONTINUED | OUTPATIENT
Start: 2023-06-11 | End: 2023-06-12 | Stop reason: HOSPADM

## 2023-06-11 RX ORDER — AZITHROMYCIN 250 MG/1
500 TABLET, FILM COATED ORAL DAILY
Status: COMPLETED | OUTPATIENT
Start: 2023-06-12 | End: 2023-06-12

## 2023-06-11 RX ORDER — PANTOPRAZOLE SODIUM 40 MG/1
40 TABLET, DELAYED RELEASE ORAL DAILY
Status: DISCONTINUED | OUTPATIENT
Start: 2023-06-11 | End: 2023-06-12 | Stop reason: HOSPADM

## 2023-06-11 RX ORDER — PREDNISONE 20 MG/1
40 TABLET ORAL DAILY
Status: DISCONTINUED | OUTPATIENT
Start: 2023-06-12 | End: 2023-06-12 | Stop reason: HOSPADM

## 2023-06-11 RX ADMIN — ALBUTEROL SULFATE 2.5 MG: 2.5 SOLUTION RESPIRATORY (INHALATION) at 07:06

## 2023-06-11 RX ADMIN — AMLODIPINE BESYLATE 10 MG: 10 TABLET ORAL at 08:06

## 2023-06-11 RX ADMIN — QUETIAPINE FUMARATE 300 MG: 100 TABLET ORAL at 08:06

## 2023-06-11 RX ADMIN — INSULIN ASPART 10 UNITS: 100 INJECTION, SOLUTION INTRAVENOUS; SUBCUTANEOUS at 05:06

## 2023-06-11 RX ADMIN — PANTOPRAZOLE SODIUM 40 MG: 40 TABLET, DELAYED RELEASE ORAL at 12:06

## 2023-06-11 RX ADMIN — BUSPIRONE HYDROCHLORIDE 10 MG: 10 TABLET ORAL at 03:06

## 2023-06-11 RX ADMIN — AMITRIPTYLINE HYDROCHLORIDE 25 MG: 25 TABLET, FILM COATED ORAL at 08:06

## 2023-06-11 RX ADMIN — INSULIN DETEMIR 10 UNITS: 100 INJECTION, SOLUTION SUBCUTANEOUS at 08:06

## 2023-06-11 RX ADMIN — SUMATRIPTAN SUCCINATE 100 MG: 50 TABLET ORAL at 09:06

## 2023-06-11 RX ADMIN — INSULIN DETEMIR 20 UNITS: 100 INJECTION, SOLUTION SUBCUTANEOUS at 08:06

## 2023-06-11 RX ADMIN — PREDNISONE 40 MG: 20 TABLET ORAL at 08:06

## 2023-06-11 RX ADMIN — GUAIFENESIN AND DEXTROMETHORPHAN 10 ML: 100; 10 SYRUP ORAL at 08:06

## 2023-06-11 RX ADMIN — GEMFIBROZIL 600 MG: 600 TABLET ORAL at 03:06

## 2023-06-11 RX ADMIN — GEMFIBROZIL 600 MG: 600 TABLET ORAL at 06:06

## 2023-06-11 RX ADMIN — CEFTRIAXONE 1 G: 1 INJECTION, POWDER, FOR SOLUTION INTRAMUSCULAR; INTRAVENOUS at 12:06

## 2023-06-11 RX ADMIN — GABAPENTIN 300 MG: 300 CAPSULE ORAL at 08:06

## 2023-06-11 RX ADMIN — AZITHROMYCIN MONOHYDRATE 500 MG: 500 INJECTION, POWDER, LYOPHILIZED, FOR SOLUTION INTRAVENOUS at 03:06

## 2023-06-11 RX ADMIN — BUSPIRONE HYDROCHLORIDE 10 MG: 10 TABLET ORAL at 08:06

## 2023-06-11 RX ADMIN — INSULIN ASPART 8 UNITS: 100 INJECTION, SOLUTION INTRAVENOUS; SUBCUTANEOUS at 06:06

## 2023-06-11 RX ADMIN — INSULIN ASPART 3 UNITS: 100 INJECTION, SOLUTION INTRAVENOUS; SUBCUTANEOUS at 08:06

## 2023-06-11 RX ADMIN — ATORVASTATIN CALCIUM 40 MG: 40 TABLET, FILM COATED ORAL at 08:06

## 2023-06-11 RX ADMIN — TAMSULOSIN HYDROCHLORIDE 0.8 MG: 0.4 CAPSULE ORAL at 08:06

## 2023-06-11 NOTE — ASSESSMENT & PLAN NOTE
Patient's FSGs are uncontrolled due to hyperglycemia on current medication regimen.  Last A1c reviewed-   Lab Results   Component Value Date    HGBA1C 7.5 (H) 06/08/2023     Most recent fingerstick glucose reviewed-   Recent Labs   Lab 06/10/23  1114 06/10/23  1549 06/10/23  2131 06/11/23  0552   POCTGLUCOSE 376* 339* 284* 318*     Current correctional scale  Low  Increase anti-hyperglycemic dose as follows-   Antihyperglycemics (From admission, onward)    Start     Stop Route Frequency Ordered    06/11/23 2100  insulin detemir U-100 (Levemir) pen 20 Units         -- SubQ 2 times daily 06/11/23 1059    06/09/23 1720  insulin aspart U-100 pen 1-10 Units         -- SubQ Before meals & nightly PRN 06/09/23 1621        Hold Oral hypoglycemics while patient is in the hospital.

## 2023-06-11 NOTE — PLAN OF CARE
Discussed poc with pt, pt verbalized understanding    Purposeful rounding every 2hours    VS wnl  Blood glucose monitoring   Fall precautions in place, remains injury free    IVFs  Accurate I&Os  Abx given as prescribed  Bed locked at lowest position  Call light within reach    Chart check complete  Will cont with POC

## 2023-06-11 NOTE — PLAN OF CARE
Problem: Adult Inpatient Plan of Care  Goal: Plan of Care Review  Outcome: Ongoing, Progressing  Flowsheets (Taken 6/11/2023 0436)  Plan of Care Reviewed With: patient     Problem: Diabetes Comorbidity  Goal: Blood Glucose Level Within Targeted Range  Outcome: Ongoing, Progressing  Intervention: Monitor and Manage Glycemia  Flowsheets (Taken 6/11/2023 0436)  Glycemic Management:   blood glucose monitored   supplemental insulin given

## 2023-06-11 NOTE — SUBJECTIVE & OBJECTIVE
Interval History: patient with significant cough. Short of breath after coughing spell.     Review of Systems  Objective:     Vital Signs (Most Recent):  Temp: 98.6 °F (37 °C) (06/11/23 0714)  Pulse: 85 (06/11/23 0732)  Resp: 18 (06/11/23 0732)  BP: (!) 154/73 (06/11/23 0714)  SpO2: 97 % (06/11/23 0732) Vital Signs (24h Range):  Temp:  [96.4 °F (35.8 °C)-99.6 °F (37.6 °C)] 98.6 °F (37 °C)  Pulse:  [85-95] 85  Resp:  [18] 18  SpO2:  [92 %-97 %] 97 %  BP: (123-154)/(73-98) 154/73     Weight: 108.6 kg (239 lb 6.7 oz)  Body mass index is 35.36 kg/m².    Intake/Output Summary (Last 24 hours) at 6/11/2023 1057  Last data filed at 6/10/2023 1758  Gross per 24 hour   Intake 2803.5 ml   Output --   Net 2803.5 ml         Physical Exam  HENT:      Head: Normocephalic and atraumatic.   Cardiovascular:      Rate and Rhythm: Normal rate and regular rhythm.      Heart sounds: No murmur heard.  Pulmonary:      Effort: Pulmonary effort is normal. No respiratory distress.      Breath sounds: Decreased breath sounds present. No wheezing.   Abdominal:      General: Bowel sounds are normal. There is no distension.      Palpations: Abdomen is soft.      Tenderness: There is no abdominal tenderness.   Musculoskeletal:         General: No swelling.   Skin:     General: Skin is warm and dry.   Neurological:      Mental Status: He is alert and oriented to person, place, and time. Mental status is at baseline.           Significant Labs: All pertinent labs within the past 24 hours have been reviewed.  CBC:   Recent Labs   Lab 06/10/23  0557 06/11/23  0538   WBC 25.92* 23.60*   HGB 9.6* 10.7*   HCT 29.8* 33.5*    433     CMP:   Recent Labs   Lab 06/10/23  0557 06/11/23  0538   * 136   K 4.8 4.5    105   CO2 20* 20*   * 352*   BUN 25* 26*   CREATININE 0.9 1.0   CALCIUM 9.8 10.0   ANIONGAP 8 11       Significant Imaging: I have reviewed all pertinent imaging results/findings within the past 24 hours.

## 2023-06-11 NOTE — ASSESSMENT & PLAN NOTE
-continue current medications  -monitor blood pressure     Group Note    Date: 05/01/23  Start Time: 7:00 PM   End Time:7:30 PM     Number of Participants: 17    Type of Group: Wrap-Up     Patient's Goal:      Notes:      Status After Intervention:  Unchanged    Participation Level: Interactive    Participation Quality: Attentive    Speech:  normal    Thought Process/Content: Logical    Mood:  appropriate for group    Level of consciousness:  Alert    Response to Learning: Progressing to goal    Modes of Intervention: Education and Support    Discipline Responsible: Behavioral Health Technician     Signature:  Lela Beth

## 2023-06-12 VITALS
RESPIRATION RATE: 18 BRPM | HEIGHT: 69 IN | HEART RATE: 97 BPM | BODY MASS INDEX: 35.46 KG/M2 | SYSTOLIC BLOOD PRESSURE: 164 MMHG | WEIGHT: 239.44 LBS | TEMPERATURE: 99 F | DIASTOLIC BLOOD PRESSURE: 90 MMHG | OXYGEN SATURATION: 91 %

## 2023-06-12 LAB
ANION GAP SERPL CALC-SCNC: 9 MMOL/L (ref 8–16)
ANISOCYTOSIS BLD QL SMEAR: SLIGHT
BASOPHILS # BLD AUTO: ABNORMAL K/UL (ref 0–0.2)
BASOPHILS NFR BLD: 0 % (ref 0–1.9)
BUN SERPL-MCNC: 22 MG/DL (ref 8–23)
CALCIUM SERPL-MCNC: 9.4 MG/DL (ref 8.7–10.5)
CHLORIDE SERPL-SCNC: 106 MMOL/L (ref 95–110)
CO2 SERPL-SCNC: 22 MMOL/L (ref 23–29)
CREAT SERPL-MCNC: 0.8 MG/DL (ref 0.5–1.4)
DACRYOCYTES BLD QL SMEAR: ABNORMAL
DIFFERENTIAL METHOD: ABNORMAL
EOSINOPHIL # BLD AUTO: ABNORMAL K/UL (ref 0–0.5)
EOSINOPHIL NFR BLD: 0 % (ref 0–8)
ERYTHROCYTE [DISTWIDTH] IN BLOOD BY AUTOMATED COUNT: 13 % (ref 11.5–14.5)
EST. GFR  (NO RACE VARIABLE): >60 ML/MIN/1.73 M^2
GLUCOSE SERPL-MCNC: 163 MG/DL (ref 70–110)
HCT VFR BLD AUTO: 35.1 % (ref 40–54)
HGB BLD-MCNC: 11.2 G/DL (ref 14–18)
IMM GRANULOCYTES # BLD AUTO: ABNORMAL K/UL (ref 0–0.04)
IMM GRANULOCYTES NFR BLD AUTO: ABNORMAL % (ref 0–0.5)
LYMPHOCYTES # BLD AUTO: ABNORMAL K/UL (ref 1–4.8)
LYMPHOCYTES NFR BLD: 25 % (ref 18–48)
MAGNESIUM SERPL-MCNC: 2 MG/DL (ref 1.6–2.6)
MCH RBC QN AUTO: 26.2 PG (ref 27–31)
MCHC RBC AUTO-ENTMCNC: 31.9 G/DL (ref 32–36)
MCV RBC AUTO: 82 FL (ref 82–98)
MONOCYTES # BLD AUTO: ABNORMAL K/UL (ref 0.3–1)
MONOCYTES NFR BLD: 8 % (ref 4–15)
NEUTROPHILS NFR BLD: 67 % (ref 38–73)
NRBC BLD-RTO: 0 /100 WBC
PHOSPHATE SERPL-MCNC: 2.4 MG/DL (ref 2.7–4.5)
PLATELET # BLD AUTO: 437 K/UL (ref 150–450)
PLATELET BLD QL SMEAR: ABNORMAL
PMV BLD AUTO: 9.3 FL (ref 9.2–12.9)
POCT GLUCOSE: 158 MG/DL (ref 70–110)
POCT GLUCOSE: 178 MG/DL (ref 70–110)
POTASSIUM SERPL-SCNC: 4 MMOL/L (ref 3.5–5.1)
RBC # BLD AUTO: 4.28 M/UL (ref 4.6–6.2)
SODIUM SERPL-SCNC: 137 MMOL/L (ref 136–145)
WBC # BLD AUTO: 21.86 K/UL (ref 3.9–12.7)

## 2023-06-12 PROCEDURE — 25000003 PHARM REV CODE 250: Performed by: FAMILY MEDICINE

## 2023-06-12 PROCEDURE — 63600175 PHARM REV CODE 636 W HCPCS: Performed by: INTERNAL MEDICINE

## 2023-06-12 PROCEDURE — 83735 ASSAY OF MAGNESIUM: CPT | Performed by: FAMILY MEDICINE

## 2023-06-12 PROCEDURE — 94664 DEMO&/EVAL PT USE INHALER: CPT

## 2023-06-12 PROCEDURE — 25000003 PHARM REV CODE 250: Performed by: INTERNAL MEDICINE

## 2023-06-12 PROCEDURE — 80048 BASIC METABOLIC PNL TOTAL CA: CPT | Performed by: FAMILY MEDICINE

## 2023-06-12 PROCEDURE — 63600175 PHARM REV CODE 636 W HCPCS: Performed by: FAMILY MEDICINE

## 2023-06-12 PROCEDURE — 36415 COLL VENOUS BLD VENIPUNCTURE: CPT | Performed by: FAMILY MEDICINE

## 2023-06-12 PROCEDURE — 63700000 PHARM REV CODE 250 ALT 637 W/O HCPCS: Performed by: INTERNAL MEDICINE

## 2023-06-12 PROCEDURE — 99900035 HC TECH TIME PER 15 MIN (STAT)

## 2023-06-12 PROCEDURE — 85007 BL SMEAR W/DIFF WBC COUNT: CPT | Performed by: FAMILY MEDICINE

## 2023-06-12 PROCEDURE — 84100 ASSAY OF PHOSPHORUS: CPT | Performed by: FAMILY MEDICINE

## 2023-06-12 PROCEDURE — 85027 COMPLETE CBC AUTOMATED: CPT | Performed by: FAMILY MEDICINE

## 2023-06-12 RX ORDER — AMOXICILLIN AND CLAVULANATE POTASSIUM 875; 125 MG/1; MG/1
1 TABLET, FILM COATED ORAL EVERY 12 HOURS
Qty: 6 TABLET | Refills: 0 | Status: SHIPPED | OUTPATIENT
Start: 2023-06-13 | End: 2023-06-16

## 2023-06-12 RX ORDER — AZITHROMYCIN 250 MG/1
250 TABLET, FILM COATED ORAL DAILY
Qty: 3 TABLET | Refills: 0 | Status: SHIPPED | OUTPATIENT
Start: 2023-06-13 | End: 2023-06-16

## 2023-06-12 RX ADMIN — INSULIN ASPART 2 UNITS: 100 INJECTION, SOLUTION INTRAVENOUS; SUBCUTANEOUS at 12:06

## 2023-06-12 RX ADMIN — GEMFIBROZIL 600 MG: 600 TABLET ORAL at 09:06

## 2023-06-12 RX ADMIN — TAMSULOSIN HYDROCHLORIDE 0.8 MG: 0.4 CAPSULE ORAL at 09:06

## 2023-06-12 RX ADMIN — INSULIN ASPART 2 UNITS: 100 INJECTION, SOLUTION INTRAVENOUS; SUBCUTANEOUS at 06:06

## 2023-06-12 RX ADMIN — BUSPIRONE HYDROCHLORIDE 10 MG: 10 TABLET ORAL at 09:06

## 2023-06-12 RX ADMIN — AMLODIPINE BESYLATE 10 MG: 10 TABLET ORAL at 09:06

## 2023-06-12 RX ADMIN — INSULIN DETEMIR 20 UNITS: 100 INJECTION, SOLUTION SUBCUTANEOUS at 09:06

## 2023-06-12 RX ADMIN — PREDNISONE 40 MG: 20 TABLET ORAL at 09:06

## 2023-06-12 RX ADMIN — GABAPENTIN 300 MG: 300 CAPSULE ORAL at 09:06

## 2023-06-12 RX ADMIN — CEFTRIAXONE 1 G: 1 INJECTION, POWDER, FOR SOLUTION INTRAMUSCULAR; INTRAVENOUS at 12:06

## 2023-06-12 RX ADMIN — AZITHROMYCIN MONOHYDRATE 500 MG: 250 TABLET ORAL at 01:06

## 2023-06-12 RX ADMIN — PANTOPRAZOLE SODIUM 40 MG: 40 TABLET, DELAYED RELEASE ORAL at 09:06

## 2023-06-12 NOTE — PLAN OF CARE
[facility] - Med Surg  Discharge Final Note    Primary Care Provider: Kaylie Villalta MD    Expected Discharge Date: 6/12/2023    Final Discharge Note (most recent)       Final Note - 06/12/23 1220          Final Note    Assessment Type Final Discharge Note     Anticipated Discharge Disposition Home or Self Care        Post-Acute Status    Discharge Delays None known at this time                     Important Message from Medicare             Contact Info       Kaylie Villalta MD   Specialty: Family Medicine, Wound Care   Relationship: PCP - General    [facility] [place] [address]   Phone: [phone]       Next Steps: Follow up in 1 week(s)          Discharge home, no home health or dme orders noted.

## 2023-06-12 NOTE — PLAN OF CARE
Patient is in stable condition, no acute distress, remained free from injuries, headache managed with PRN, cough managed with PRN, blood glucose checks performed as ordered, VSS, and all active orders reviewed. 24 hr chart check performed.

## 2023-06-12 NOTE — PLAN OF CARE
Pt discharged per dr orders. Iv removed. Tolerated by pt well. DC instructions reviewed with pt and family. All belongings taken home with pt. Wheeled to car by transport.

## 2023-06-12 NOTE — DISCHARGE SUMMARY
[facility] Medicine  Discharge Summary      Patient Name: Scott Bansal  MRN: [id number]  ADONIS: [id number]  Patient Class: IP- Inpatient  Admission Date: 6/8/2023  Hospital Length of Stay: 4 days  Discharge Date and Time:6/12/2023  Attending Physician: Todd El, *   Discharging Provider: Todd El MD  Primary Care Provider: Kaylie Villalta MD    Primary Care Team: Networked reference to record PCT     HPI:   62M  has a past medical history of Bipolar 1 disorder, mixed, BPH (benign prostatic hypertrophy), Colon polyp, Cyst, eyelid, Diabetes mellitus, GERD (gastroesophageal reflux disease), Hyperlipidemia, Hypertension, Lumbar degenerative disc disease, Migraine headache, and Obesity.    Admitted for pneumonia. Was previously seen in hospital and treated for the same in [place] on 6/5/23. States receiving IM steroid injection and po doxycycline. Cta at that time was negative for pulmonary embolism but with bilateral pulmonary opacities. Was discharged but not on supplemental oxygen. Complains of continued cough, chills and dyspnea with exertion. Denies fever, nausea/vomiting, diarrhea. States very dark urine despite polydipsia.    Initial workup in emergency department revealed tachycardia. Afebrile. Leukocytosis. Mild hyponatremia, hyperglycemia, and hypecalcemia. Acidosis. Bnp within normal limits. Lactate within normal limits. Procalcitonin mildly elevated. Urinalysis suggesting dehydration. Chest x-ray with haziness consistent with pneumonia.     Hospital medicine consulted for admission under observation.         * No surgery found *      Hospital Course:   Patient admitted to medical-surgical unit for further evaluation of multi lobar pneumonia.  Patient treated with IV antibiotics, antitussives as needed, DuoNebs as needed, and steroids.  Leukocytosis and hyperglycemia noted in the setting of steroid use.  Patient verbalized mild symptom improvement with  productive cough (green sputum) and culture sent for analysis.  Hyponatremia noted IV hydration continued.   6/10: WBC trending down. Pt reports improvement in symptoms. Sputum culture pending. Cont rocephin and azithro. Levemir increased to 10 units bid. Anticipate dc in the next 24-48 hours.   6/12  Examination done at bedside, patient appeared alert and oriented x4, denied acute issues overnight.    Hemodynamically stable   Saturating about 92 on room air   Stated significant improvement in shortness of breath, wheezing, chest tightness, cough.   Remained afebrile, leukocytosis trending down, leukocytosis likely reactive from steroid use.    Labs reviewed.    Blood glucose improving.    Considering clinical and hemodynamic stability, planning to discharge patient today, recommended to complete course of antibiotics, compliance with medications, strict diabetic diet, monitor blood glucose.    Patient agreed to the plan, discharge accordingly, medications to be delivered bedside, ordered NP at home program;          ROS: -ve except as mentioned above    Physical Exam  HENT:      Head: Normocephalic and atraumatic.   Cardiovascular:      Rate and Rhythm: Normal rate and regular rhythm.      Heart sounds: No murmur heard.  Pulmonary:      Effort: Pulmonary effort is normal. No respiratory distress.      Breath sounds:. No wheezing.   Abdominal:      General: Bowel sounds are normal. There is no distension.      Palpations: Abdomen is soft.      Tenderness: There is no abdominal tenderness.   Musculoskeletal:         General: No swelling.   Skin:     General: Skin is warm and dry.   Neurological:      Mental Status: He is alert and oriented to person, place, and time. Mental status is at baseline.     Goals of Care Treatment Preferences:  Code Status: Full Code      Consults:     No new Assessment & Plan notes have been filed under this hospital service since the last note was generated.  Service: [facility]  Medicine    Final Active Diagnoses:    Diagnosis Date Noted POA    PRINCIPAL PROBLEM:  Pneumonia [J18.9] 06/08/2023 Yes    Leukocytosis [D72.829] 06/08/2023 Yes    Bipolar 1 disorder [F31.9] 01/20/2016 Yes    BPH (benign prostatic hyperplasia) [N40.0] 01/20/2016 Yes    COPD (chronic obstructive pulmonary disease) [J44.9] 01/20/2016 Yes    Essential hypertension [I10] 01/20/2016 Yes    Gastroesophageal reflux disease without esophagitis [K21.9] 01/20/2016 Yes    Type 2 diabetes mellitus with diabetic polyneuropathy, without long-term current use of insulin [E11.42] 01/20/2016 Yes      Problems Resolved During this Admission:    Diagnosis Date Noted Date Resolved POA    Hypercalcemia [E83.52] 06/08/2023 06/10/2023 Yes       Discharged Condition: stable    Disposition:     Follow Up:   Follow-up Information     Kaylie Villalta MD Follow up in 1 week(s).    Specialties: Family Medicine, Wound Care  Contact information:  [address] [place], [place] [address] [address] [phone]                       Patient Instructions:   No discharge procedures on file.    Significant Diagnostic Studies:    Results for orders placed or performed during the hospital encounter of 06/08/23   Blood culture #1 **CANNOT BE ORDERED STAT**    Specimen: Peripheral, Antecubital, Right; Blood   Result Value Ref Range    Blood Culture, Routine No Growth to date     Blood Culture, Routine No Growth to date     Blood Culture, Routine No Growth to date     Blood Culture, Routine No Growth to date    Blood culture #2 **CANNOT BE ORDERED STAT**    Specimen: Peripheral, Antecubital, Left; Blood   Result Value Ref Range    Blood Culture, Routine No Growth to date     Blood Culture, Routine No Growth to date     Blood Culture, Routine No Growth to date     Blood Culture, Routine No Growth to date    Culture, Respiratory with Gram Stain    Specimen: Sputum; Respiratory   Result Value Ref Range    Respiratory Culture (A)      STAPHYLOCOCCUS  AUREUS  Moderate  Susceptibility pending  Normal respiratory xenia also present      Gram Stain (Respiratory) <10 epithelial cells per low power field.     Gram Stain (Respiratory) Rare WBC's     Gram Stain (Respiratory) Rare Gram positive cocci     Gram Stain (Respiratory) Rare yeast    CBC auto differential   Result Value Ref Range    WBC 34.81 (H) 3.90 - 12.70 K/uL    RBC 4.36 (L) 4.60 - 6.20 M/uL    Hemoglobin 11.5 (L) 14.0 - 18.0 g/dL    Hematocrit 36.0 (L) 40.0 - 54.0 %    MCV 83 82 - 98 fL    MCH 26.4 (L) 27.0 - 31.0 pg    MCHC 31.9 (L) 32.0 - 36.0 g/dL    RDW 13.0 11.5 - 14.5 %    Platelets 399 150 - 450 K/uL    MPV 9.6 9.2 - 12.9 fL    Immature Granulocytes CANCELED 0.0 - 0.5 %    Immature Grans (Abs) CANCELED 0.00 - 0.04 K/uL    nRBC 0 0 /100 WBC    Gran % 67.0 38.0 - 73.0 %    Lymph % 9.0 (L) 18.0 - 48.0 %    Mono % 4.0 4.0 - 15.0 %    Eosinophil % 1.0 0.0 - 8.0 %    Basophil % 0.0 0.0 - 1.9 %    Bands 15.0 %    Metamyelocytes 2.0 %    Myelocytes 2.0 %    Large/Giant Platelets Present     Differential Method Manual    Brain natriuretic peptide   Result Value Ref Range    BNP <10 0 - 99 pg/mL   Comprehensive metabolic panel   Result Value Ref Range    Sodium 135 (L) 136 - 145 mmol/L    Potassium 4.4 3.5 - 5.1 mmol/L    Chloride 105 95 - 110 mmol/L    CO2 18 (L) 23 - 29 mmol/L    Glucose 262 (H) 70 - 110 mg/dL    BUN 18 8 - 23 mg/dL    Creatinine 1.2 0.5 - 1.4 mg/dL    Calcium 11.0 (H) 8.7 - 10.5 mg/dL    Total Protein 7.5 6.0 - 8.4 g/dL    Albumin 3.3 (L) 3.5 - 5.2 g/dL    Total Bilirubin 0.6 0.1 - 1.0 mg/dL    Alkaline Phosphatase 135 55 - 135 U/L    AST 21 10 - 40 U/L    ALT 34 10 - 44 U/L    Anion Gap 12 8 - 16 mmol/L    eGFR >60 >60 mL/min/1.73 m^2   Lactic acid, plasma   Result Value Ref Range    Lactate (Lactic Acid) 1.5 0.5 - 2.2 mmol/L   Urinalysis, Reflex to Urine Culture Urine, Clean Catch    Specimen: Urine   Result Value Ref Range    Specimen UA Urine, Clean Catch     Color, UA Yellow Yellow,  Straw, Estefany    Appearance, UA Clear Clear    pH, UA 6.0 5.0 - 8.0    Specific Gravity, UA >1.030 (A) 1.005 - 1.030    Protein, UA 2+ (A) Negative    Glucose, UA Negative Negative    Ketones, UA Negative Negative    Bilirubin (UA) Negative Negative    Occult Blood UA Trace (A) Negative    Nitrite, UA Negative Negative    Urobilinogen, UA Negative <2.0 EU/dL    Leukocytes, UA Negative Negative   Procalcitonin   Result Value Ref Range    Procalcitonin 0.34 (H) <0.25 ng/mL   Urinalysis Microscopic   Result Value Ref Range    RBC, UA 0 0 - 4 /hpf    WBC, UA 2 0 - 5 /hpf    WBC Clumps, UA Occasional (A) None-Rare    Bacteria None None-Occ /hpf    Hyaline Casts, UA 1 0-1/lpf /lpf    Unclass Kita UA Rare None-Moderate    Microscopic Comment SEE COMMENT    Hemoglobin A1c   Result Value Ref Range    Hemoglobin A1C 7.5 (H) 4.0 - 5.6 %    Estimated Avg Glucose 169 (H) 68 - 131 mg/dL   Basic Metabolic Panel (BMP)   Result Value Ref Range    Sodium 129 (L) 136 - 145 mmol/L    Potassium 4.1 3.5 - 5.1 mmol/L    Chloride 103 95 - 110 mmol/L    CO2 18 (L) 23 - 29 mmol/L    Glucose 323 (H) 70 - 110 mg/dL    BUN 21 8 - 23 mg/dL    Creatinine 1.0 0.5 - 1.4 mg/dL    Calcium 9.6 8.7 - 10.5 mg/dL    Anion Gap 8 8 - 16 mmol/L    eGFR >60 >60 mL/min/1.73 m^2   Magnesium   Result Value Ref Range    Magnesium 1.7 1.6 - 2.6 mg/dL   Phosphorus   Result Value Ref Range    Phosphorus 2.9 2.7 - 4.5 mg/dL   CBC auto differential   Result Value Ref Range    WBC 30.32 (H) 3.90 - 12.70 K/uL    RBC 3.66 (L) 4.60 - 6.20 M/uL    Hemoglobin 9.6 (L) 14.0 - 18.0 g/dL    Hematocrit 30.3 (L) 40.0 - 54.0 %    MCV 83 82 - 98 fL    MCH 26.2 (L) 27.0 - 31.0 pg    MCHC 31.7 (L) 32.0 - 36.0 g/dL    RDW 13.0 11.5 - 14.5 %    Platelets 353 150 - 450 K/uL    MPV 9.8 9.2 - 12.9 fL    Immature Granulocytes CANCELED 0.0 - 0.5 %    Immature Grans (Abs) CANCELED 0.00 - 0.04 K/uL    nRBC 0 0 /100 WBC    Gran % 63.0 38.0 - 73.0 %    Lymph % 9.0 (L) 18.0 - 48.0 %    Mono %  4.0 4.0 - 15.0 %    Eosinophil % 0.0 0.0 - 8.0 %    Basophil % 0.0 0.0 - 1.9 %    Bands 18.0 %    Metamyelocytes 4.0 %    Myelocytes 2.0 %    Platelet Estimate Clumped (A)     Large/Giant Platelets Present     Differential Method Manual    PTH, intact   Result Value Ref Range    PTH, Intact 94.4 (H) 9.0 - 77.0 pg/mL   Basic Metabolic Panel (BMP)   Result Value Ref Range    Sodium 135 (L) 136 - 145 mmol/L    Potassium 4.8 3.5 - 5.1 mmol/L    Chloride 107 95 - 110 mmol/L    CO2 20 (L) 23 - 29 mmol/L    Glucose 347 (H) 70 - 110 mg/dL    BUN 25 (H) 8 - 23 mg/dL    Creatinine 0.9 0.5 - 1.4 mg/dL    Calcium 9.8 8.7 - 10.5 mg/dL    Anion Gap 8 8 - 16 mmol/L    eGFR >60 >60 mL/min/1.73 m^2   Magnesium   Result Value Ref Range    Magnesium 2.0 1.6 - 2.6 mg/dL   Phosphorus   Result Value Ref Range    Phosphorus 2.6 (L) 2.7 - 4.5 mg/dL   CBC auto differential   Result Value Ref Range    WBC 25.92 (H) 3.90 - 12.70 K/uL    RBC 3.58 (L) 4.60 - 6.20 M/uL    Hemoglobin 9.6 (L) 14.0 - 18.0 g/dL    Hematocrit 29.8 (L) 40.0 - 54.0 %    MCV 83 82 - 98 fL    MCH 26.8 (L) 27.0 - 31.0 pg    MCHC 32.2 32.0 - 36.0 g/dL    RDW 13.0 11.5 - 14.5 %    Platelets 390 150 - 450 K/uL    MPV 10.0 9.2 - 12.9 fL    Immature Granulocytes CANCELED 0.0 - 0.5 %    Immature Grans (Abs) CANCELED 0.00 - 0.04 K/uL    nRBC 0 0 /100 WBC    Gran % 83.0 (H) 38.0 - 73.0 %    Lymph % 7.0 (L) 18.0 - 48.0 %    Mono % 3.0 (L) 4.0 - 15.0 %    Eosinophil % 0.0 0.0 - 8.0 %    Basophil % 0.0 0.0 - 1.9 %    Myelocytes 7.0 %    Differential Method Manual    Basic Metabolic Panel (BMP)   Result Value Ref Range    Sodium 136 136 - 145 mmol/L    Potassium 4.5 3.5 - 5.1 mmol/L    Chloride 105 95 - 110 mmol/L    CO2 20 (L) 23 - 29 mmol/L    Glucose 352 (H) 70 - 110 mg/dL    BUN 26 (H) 8 - 23 mg/dL    Creatinine 1.0 0.5 - 1.4 mg/dL    Calcium 10.0 8.7 - 10.5 mg/dL    Anion Gap 11 8 - 16 mmol/L    eGFR >60 >60 mL/min/1.73 m^2   Magnesium   Result Value Ref Range    Magnesium 2.1  1.6 - 2.6 mg/dL   Phosphorus   Result Value Ref Range    Phosphorus 3.3 2.7 - 4.5 mg/dL   CBC auto differential   Result Value Ref Range    WBC 23.60 (H) 3.90 - 12.70 K/uL    RBC 4.04 (L) 4.60 - 6.20 M/uL    Hemoglobin 10.7 (L) 14.0 - 18.0 g/dL    Hematocrit 33.5 (L) 40.0 - 54.0 %    MCV 83 82 - 98 fL    MCH 26.5 (L) 27.0 - 31.0 pg    MCHC 31.9 (L) 32.0 - 36.0 g/dL    RDW 13.1 11.5 - 14.5 %    Platelets 433 150 - 450 K/uL    MPV 9.6 9.2 - 12.9 fL    Immature Granulocytes CANCELED 0.0 - 0.5 %    Immature Grans (Abs) CANCELED 0.00 - 0.04 K/uL    nRBC 0 0 /100 WBC    Gran % 84.0 (H) 38.0 - 73.0 %    Lymph % 5.0 (L) 18.0 - 48.0 %    Mono % 3.0 (L) 4.0 - 15.0 %    Eosinophil % 0.0 0.0 - 8.0 %    Basophil % 0.0 0.0 - 1.9 %    Myelocytes 8.0 %    Differential Method Manual    Basic Metabolic Panel (BMP)   Result Value Ref Range    Sodium 137 136 - 145 mmol/L    Potassium 4.0 3.5 - 5.1 mmol/L    Chloride 106 95 - 110 mmol/L    CO2 22 (L) 23 - 29 mmol/L    Glucose 163 (H) 70 - 110 mg/dL    BUN 22 8 - 23 mg/dL    Creatinine 0.8 0.5 - 1.4 mg/dL    Calcium 9.4 8.7 - 10.5 mg/dL    Anion Gap 9 8 - 16 mmol/L    eGFR >60 >60 mL/min/1.73 m^2   Magnesium   Result Value Ref Range    Magnesium 2.0 1.6 - 2.6 mg/dL   Phosphorus   Result Value Ref Range    Phosphorus 2.4 (L) 2.7 - 4.5 mg/dL   CBC auto differential   Result Value Ref Range    WBC 21.86 (H) 3.90 - 12.70 K/uL    RBC 4.28 (L) 4.60 - 6.20 M/uL    Hemoglobin 11.2 (L) 14.0 - 18.0 g/dL    Hematocrit 35.1 (L) 40.0 - 54.0 %    MCV 82 82 - 98 fL    MCH 26.2 (L) 27.0 - 31.0 pg    MCHC 31.9 (L) 32.0 - 36.0 g/dL    RDW 13.0 11.5 - 14.5 %    Platelets 437 150 - 450 K/uL    MPV 9.3 9.2 - 12.9 fL    Immature Granulocytes CANCELED 0.0 - 0.5 %    Immature Grans (Abs) CANCELED 0.00 - 0.04 K/uL    Lymph # CANCELED 1.0 - 4.8 K/uL    Mono # CANCELED 0.3 - 1.0 K/uL    Eos # CANCELED 0.0 - 0.5 K/uL    Baso # CANCELED 0.00 - 0.20 K/uL    nRBC 0 0 /100 WBC    Gran % 67.0 38.0 - 73.0 %    Lymph %  25.0 18.0 - 48.0 %    Mono % 8.0 4.0 - 15.0 %    Eosinophil % 0.0 0.0 - 8.0 %    Basophil % 0.0 0.0 - 1.9 %    Platelet Estimate Appears normal     Aniso Slight     Tear Drop Cells Occasional     Differential Method Manual    POCT glucose   Result Value Ref Range    POCT Glucose 279 (H) 70 - 110 mg/dL   POCT glucose   Result Value Ref Range    POCT Glucose 328 (H) 70 - 110 mg/dL   POCT glucose   Result Value Ref Range    POCT Glucose 196 (H) 70 - 110 mg/dL   POCT glucose   Result Value Ref Range    POCT Glucose 284 (H) 70 - 110 mg/dL   POCT glucose   Result Value Ref Range    POCT Glucose 291 (H) 70 - 110 mg/dL   POCT glucose   Result Value Ref Range    POCT Glucose 382 (H) 70 - 110 mg/dL   POCT glucose   Result Value Ref Range    POCT Glucose 370 (H) 70 - 110 mg/dL   POCT glucose   Result Value Ref Range    POCT Glucose 269 (H) 70 - 110 mg/dL   POCT glucose   Result Value Ref Range    POCT Glucose 337 (H) 70 - 110 mg/dL   POCT glucose   Result Value Ref Range    POCT Glucose 376 (H) 70 - 110 mg/dL   POCT glucose   Result Value Ref Range    POCT Glucose 339 (H) 70 - 110 mg/dL   POCT glucose   Result Value Ref Range    POCT Glucose 284 (H) 70 - 110 mg/dL   POCT glucose   Result Value Ref Range    POCT Glucose 318 (H) 70 - 110 mg/dL   POCT glucose   Result Value Ref Range    POCT Glucose 308 (H) 70 - 110 mg/dL   POCT glucose   Result Value Ref Range    POCT Glucose 357 (H) 70 - 110 mg/dL   POCT glucose   Result Value Ref Range    POCT Glucose 286 (H) 70 - 110 mg/dL   POCT glucose   Result Value Ref Range    POCT Glucose 158 (H) 70 - 110 mg/dL   POCT glucose   Result Value Ref Range    POCT Glucose 178 (H) 70 - 110 mg/dL       Imaging Results          X-Ray Chest PA And Lateral (Final result)  Result time 06/08/23 11:55:12    Final result by LOIS Alfred Sr., MD (06/08/23 11:55:12)                 Impression:      1. There is a mild amount of haziness in the posteromedial aspect of the left lower lobe.  This is  consistent with the patient's history and characteristic of pneumonia.  2. There is anterior spinal fusion hardware in the cervical spine.  .      Electronically signed by: Dileep Alfred MD  Date:    06/08/2023  Time:    11:55             Narrative:    EXAMINATION:  XR CHEST PA AND LATERAL    CLINICAL HISTORY:  Cough, unspecified    COMPARISON:  05/15/2023    FINDINGS:  The size and contour of the heart are normal.  There is a mild amount of haziness in the posteromedial aspect of the left lower lobe.  The right lung is clear.  There is no pneumothorax or pleural effusion.  There is anterior spinal fusion hardware in the cervical spine.                                Pending Diagnostic Studies:     None         Medications:         Medication List      START taking these medications    amoxicillin-clavulanate 875-125mg 875-125 mg per tablet  Commonly known as: AUGMENTIN  Take 1 tablet by mouth every 12 (twelve) hours. for 3 days  Start taking on: June 13, 2023     azithromycin 250 MG tablet  Commonly known as: Z-DORIAN  Take 1 tablet (250 mg total) by mouth once daily. for 3 days  Start taking on: June 13, 2023        CHANGE how you take these medications    promethazine-dextromethorphan 6.25-15 mg/5 mL Syrp  Commonly known as: PROMETHAZINE-DM  Take 5 mLs by mouth every 4 (four) hours as needed (for cough, congestion).  What changed: Another medication with the same name was removed. Continue taking this medication, and follow the directions you see here.     simvastatin 80 MG tablet  Commonly known as: ZOCOR  Take 1 tablet (80 mg total) by mouth once daily.  What changed: when to take this        CONTINUE taking these medications    albuterol 90 mcg/actuation inhaler  Commonly known as: PROVENTIL/VENTOLIN HFA  Inhale 1-2 puffs into the lungs every 6 (six) hours as needed for Wheezing or Shortness of Breath. Rescue     amitriptyline 25 MG tablet  Commonly known as: ELAVIL  Take 1 tablet (25 mg total) by mouth every  evening.     amLODIPine 10 MG tablet  Commonly known as: NORVASC  TAKE 1 TABLET BY MOUTH EVERY DAY     busPIRone 10 MG tablet  Commonly known as: BUSPAR     candesartan 4 MG tablet  Commonly known as: ATACAND  TAKE 1 TABLET BY MOUTH EVERY DAY     DUPIXENT SYRINGE 300 mg/2 mL Syrg  Generic drug: dupilumab  Inject 1 syringe (300mg) into the skin every other week     fluticasone propionate 50 mcg/actuation nasal spray  Commonly known as: FLONASE  2 sprays (100 mcg total) by Each Nostril route once daily.     gabapentin 300 MG capsule  Commonly known as: NEURONTIN  Take 1 capsule (300 mg total) by mouth 2 (two) times daily.     gemfibroziL 600 MG tablet  Commonly known as: LOPID  TAKE 1 TABLET BY MOUTH TWICE A DAY BEFORE MEALS     hydrOXYzine 50 MG tablet  Commonly known as: ATARAX  TAKE 1 TABLET (50 MG TOTAL) BY MOUTH 3 (THREE) TIMES DAILY AS NEEDED FOR ITCHING.     metFORMIN 1000 MG tablet  Commonly known as: GLUCOPHAGE  TAKE 1 TABLET BY MOUTH TWICE A DAY     multivitamin capsule     pantoprazole 40 MG tablet  Commonly known as: PROTONIX  TAKE 1 TABLET BY MOUTH EVERY DAY     QUEtiapine 300 MG Tab  Commonly known as: SEROQUEL     sumatriptan 100 MG tablet  Commonly known as: IMITREX  Take 1 tablet (100 mg total) by mouth daily as needed for Migraine.     tamsulosin 0.4 mg Cap  Commonly known as: FLOMAX  TAKE 2 CAPSULES BY MOUTH EVERY DAY     tiZANidine 4 MG tablet  Commonly known as: ZANAFLEX  Take 1 tablet (4 mg total) by mouth every 8 (eight) hours.     zonisamide 50 MG Cap  Commonly known as: ZONEGRAN  Take 1 capsule (50 mg total) by mouth 2 (two) times daily.        STOP taking these medications    cyclobenzaprine 10 MG tablet  Commonly known as: FLEXERIL     doxycycline 100 MG Cap  Commonly known as: VIBRAMYCIN     levocetirizine 5 MG tablet  Commonly known as: XYZAL     meloxicam 15 MG tablet  Commonly known as: MOBIC           Where to Get Your Medications      These medications were sent to [facility]  [facility]  [address], [place] [address]    Hours: Mon-Fri, 8a-5:30p Phone: [phone]   · amoxicillin-clavulanate 875-125mg 875-125 mg per tablet  · azithromycin 250 MG tablet         Indwelling Lines/Drains at time of discharge:   Lines/Drains/Airways     None                 Time spent on the discharge of patient: 81 minutes         Todd El MD  Department of Hospital Medicine  [facility]

## 2023-06-12 NOTE — DISCHARGE INSTRUCTIONS
Recommended patient to be compliant with medications, follow-up visits with primary care within 1 week upon discharge, strict diabetic diet, hydration, compliance with antibiotics to complete the course;

## 2023-06-13 ENCOUNTER — PATIENT OUTREACH (OUTPATIENT)
Dept: ADMINISTRATIVE | Facility: CLINIC | Age: 63
End: 2023-06-13
Payer: MEDICARE

## 2023-06-13 ENCOUNTER — TELEPHONE (OUTPATIENT)
Dept: DERMATOLOGY | Facility: CLINIC | Age: 63
End: 2023-06-13
Payer: MEDICARE

## 2023-06-13 DIAGNOSIS — E11.9 DIABETES MELLITUS WITHOUT COMPLICATION: ICD-10-CM

## 2023-06-13 LAB
BACTERIA BLD CULT: NORMAL
BACTERIA BLD CULT: NORMAL
BACTERIA SPEC AEROBE CULT: ABNORMAL
GRAM STN SPEC: ABNORMAL

## 2023-06-13 RX ORDER — METFORMIN HYDROCHLORIDE 1000 MG/1
1000 TABLET ORAL 2 TIMES DAILY
Qty: 180 TABLET | Refills: 3 | OUTPATIENT
Start: 2023-06-13 | End: 2023-06-15 | Stop reason: SDUPTHER

## 2023-06-13 NOTE — TELEPHONE ENCOUNTER
No care due was identified.  [facility] Embedded Care Due Messages. Reference number: [id number].   6/13/2023 1:02:17 PM CDT

## 2023-06-13 NOTE — TELEPHONE ENCOUNTER
Returned call. Pt scheduled to see dr. hCapin tomorrow at the [facility] for 1:15. Pt reports he will be there.   ----- Message from Luz Elena Cha sent at 6/13/2023 10:38 AM CDT -----  Contact: Scott  Patient is calling to speak with the nurse regarding appt. Reports having a eczema flair up. Please give patient a call back at .[phone]

## 2023-06-13 NOTE — PROGRESS NOTES
C3 nurse attempted to contact Scott Bansal for a TCC post hospital discharge follow up call. No answer. Left voicemail with callback information. The patient does not have a scheduled HOSFU appointment. Message sent to PCP staff for assistance with scheduling visit with patient.

## 2023-06-13 NOTE — TELEPHONE ENCOUNTER
----- Message from Arnold Red sent at 6/13/2023 12:48 PM CDT -----  Regarding: Scott  Type: RX Refill Request     Who Called:Scott      Have you contacted your pharmacy: Yes     Refill or New Rx: Refill      RX Name and Strength: metFORMIN (GLUCOPHAGE) 1000 MG tablet, amitriptyline tablet 25 mg     Preferred Pharmacy with phone number: .  [facility]/pharmacy #[id number] - [place] - [address]  [address] [place] [address]  Phone: [phone] Fax: [phone]    Local or Mail Order: Local     Ordering Provider: Dr. Villalta     Would the patient rather a call back or a response via [facility]? Callback      Best Call Back Number: [phone]      Additional Information: Pt is currently out of these medications refilled as soon as possible      Cardiac Rehab    Reviewed discharge paperwork including home exercise outline-copy provided to patient. Patient given post-test to complete at home and return next visit, confidential survey given and patient was given a tshirt.      Electronically signed by Lauren Malin RN on 7/25/2022 at 8:59 AM

## 2023-06-14 ENCOUNTER — OFFICE VISIT (OUTPATIENT)
Dept: DERMATOLOGY | Facility: CLINIC | Age: 63
End: 2023-06-14
Payer: MEDICARE

## 2023-06-14 DIAGNOSIS — E11.9 TYPE 2 DIABETES MELLITUS WITHOUT COMPLICATION, UNSPECIFIED WHETHER LONG TERM INSULIN USE: ICD-10-CM

## 2023-06-14 DIAGNOSIS — Z79.899 ENCOUNTER FOR LONG-TERM (CURRENT) USE OF MEDICATIONS: ICD-10-CM

## 2023-06-14 DIAGNOSIS — L20.89 OTHER ATOPIC DERMATITIS: Primary | ICD-10-CM

## 2023-06-14 PROCEDURE — 1160F PR REVIEW ALL MEDS BY PRESCRIBER/CLIN PHARMACIST DOCUMENTED: ICD-10-PCS | Mod: CPTII,S$GLB,, | Performed by: DERMATOLOGY

## 2023-06-14 PROCEDURE — 3051F PR MOST RECENT HEMOGLOBIN A1C LEVEL 7.0 - < 8.0%: ICD-10-PCS | Mod: CPTII,S$GLB,, | Performed by: DERMATOLOGY

## 2023-06-14 PROCEDURE — 99999 PR PBB SHADOW E&M-EST. PATIENT-LVL III: ICD-10-PCS | Mod: PBBFAC,,, | Performed by: DERMATOLOGY

## 2023-06-14 PROCEDURE — 1111F DSCHRG MED/CURRENT MED MERGE: CPT | Mod: CPTII,S$GLB,, | Performed by: DERMATOLOGY

## 2023-06-14 PROCEDURE — 3051F HG A1C>EQUAL 7.0%<8.0%: CPT | Mod: CPTII,S$GLB,, | Performed by: DERMATOLOGY

## 2023-06-14 PROCEDURE — 1159F PR MEDICATION LIST DOCUMENTED IN MEDICAL RECORD: ICD-10-PCS | Mod: CPTII,S$GLB,, | Performed by: DERMATOLOGY

## 2023-06-14 PROCEDURE — 99999 PR PBB SHADOW E&M-EST. PATIENT-LVL III: CPT | Mod: PBBFAC,,, | Performed by: DERMATOLOGY

## 2023-06-14 PROCEDURE — 4010F PR ACE/ARB THEARPY RXD/TAKEN: ICD-10-PCS | Mod: CPTII,S$GLB,, | Performed by: DERMATOLOGY

## 2023-06-14 PROCEDURE — 99213 PR OFFICE/OUTPT VISIT, EST, LEVL III, 20-29 MIN: ICD-10-PCS | Mod: S$GLB,,, | Performed by: DERMATOLOGY

## 2023-06-14 PROCEDURE — 99213 OFFICE O/P EST LOW 20 MIN: CPT | Mod: S$GLB,,, | Performed by: DERMATOLOGY

## 2023-06-14 PROCEDURE — 1111F PR DISCHARGE MEDS RECONCILED W/ CURRENT OUTPATIENT MED LIST: ICD-10-PCS | Mod: CPTII,S$GLB,, | Performed by: DERMATOLOGY

## 2023-06-14 PROCEDURE — 1159F MED LIST DOCD IN RCRD: CPT | Mod: CPTII,S$GLB,, | Performed by: DERMATOLOGY

## 2023-06-14 PROCEDURE — 1160F RVW MEDS BY RX/DR IN RCRD: CPT | Mod: CPTII,S$GLB,, | Performed by: DERMATOLOGY

## 2023-06-14 PROCEDURE — 4010F ACE/ARB THERAPY RXD/TAKEN: CPT | Mod: CPTII,S$GLB,, | Performed by: DERMATOLOGY

## 2023-06-14 RX ORDER — TRIAMCINOLONE ACETONIDE 1 MG/G
CREAM TOPICAL
Qty: 454 G | Refills: 5 | Status: SHIPPED | OUTPATIENT
Start: 2023-06-14

## 2023-06-14 NOTE — PROGRESS NOTES
Subjective:      Patient ID:  Scott Bansal is a 62 y.o. male who presents for   Chief Complaint   Patient presents with    Eczema     Pt reports it has healed up.      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  He had been off dupixent for several weeks.  Denies current flares of atopic dermatitis. Denies recent staph infection.    Prior treatments: dupixent (intermittent 7/2020), PO prednisone, hydroxyzine 50 mg TID, clotrimazole-betamethasone, mometasone, TAC oint, TAC cream, mupirocin, betamethasone oint     Denies history of fever blisters.  Denies conjunctivitis, pain in the eyes or blisters near the eyes.        Review of Systems   Constitutional:  Negative for fever and chills.   Gastrointestinal:  Negative for nausea and vomiting.   Skin:  Positive for activity-related sunscreen use. Negative for itching, rash, dry skin, daily sunscreen use and recent sunburn.   Hematologic/Lymphatic: Does not bruise/bleed easily.     Objective:   Physical Exam   Constitutional: He appears well-developed and well-nourished. No distress.   Neurological: He is alert and oriented to person, place, and time. He is not disoriented.   Psychiatric: He has a normal mood and affect.   Skin:   Areas Examined (abnormalities noted in diagram):   Head / Face Inspection Performed  Neck Inspection Performed  Chest / Axilla Inspection Performed  RUE Inspected  LUE Inspection Performed  RLE Inspected  LLE Inspection Performed            Assessment / Plan:        Other atopic dermatitis  Encounter for long-term (current) use of medications  -     triamcinolone acetonide 0.1% (KENALOG) 0.1 % cream; AAA bid prn eczema. Do not use on face.  Dispense: 454 g; Refill: 5  -     reviewed prior photos of flares from 2020 with patient and encouraged to remain on dupixent but wean to lowest effective dose.  Pt wishes to discontinue dupixent  completely.  Will add above med and consider restart if flares while off medication. The patient acknowledged understanding. Plan to restart dupixent if flares in the next 6 months.            Follow up in about 1 year (around 6/14/2024).

## 2023-06-14 NOTE — PROGRESS NOTES
C3 nurse 2nd attempt to contact Scott Bansal for a TCC post hospital discharge follow up call. No answer. Left voicemail with callback information. The patient does not have a scheduled HOSFU appointment. Message sent to PCP staff for assistance with scheduling visit with patient.

## 2023-06-14 NOTE — PROGRESS NOTES
C3 nurse spoke with Scott Bansal for a TCC post hospital discharge follow up call. The patient has a scheduled HOSFU appointment with Gray Watson NP on 6/21/23@1000.

## 2023-06-15 DIAGNOSIS — E11.9 DIABETES MELLITUS WITHOUT COMPLICATION: ICD-10-CM

## 2023-06-15 RX ORDER — METFORMIN HYDROCHLORIDE 1000 MG/1
1000 TABLET ORAL 2 TIMES DAILY
Qty: 180 TABLET | Refills: 3 | Status: CANCELLED | OUTPATIENT
Start: 2023-06-15

## 2023-06-15 NOTE — TELEPHONE ENCOUNTER
Patient is asking for his metformin to be sent to [facility] in [facility]. Patient pharmacy has been changed.

## 2023-06-15 NOTE — TELEPHONE ENCOUNTER
Patient is asking for medication to be sent to [facility]. He sts his metformin was sent to the wrong pharmacy.

## 2023-06-15 NOTE — TELEPHONE ENCOUNTER
No care due was identified.  [facility] Embedded Care Due Messages. Reference number: [id number].   6/15/2023 3:56:03 PM CDT

## 2023-06-15 NOTE — TELEPHONE ENCOUNTER
No care due was identified.  [facility] Embedded Care Due Messages. Reference number: [id number].   6/15/2023 5:37:29 PM CDT

## 2023-06-15 NOTE — TELEPHONE ENCOUNTER
----- Message from Niya Bella sent at 6/15/2023 11:07 AM CDT -----  Regarding: self : .[phone]  .Type: Patient Call Back    Who called: self    What is the request in detail: PT stated that he needs his rx for metFORMIN (GLUCOPHAGE) 1000 MG tablet sent to .  [facility]/pharmacy #[id number] - [place] - [address]  [address] [address]  Phone: [phone] Fax: [phone]    Can the clinic reply by [facility]?    Would the patient rather a call back or a response via [facility]?  Call back     Best call back number: .[phone]

## 2023-06-15 NOTE — TELEPHONE ENCOUNTER
No care due was identified.  [facility] Embedded Care Due Messages. Reference number: [id number].   6/15/2023 11:23:38 AM CDT

## 2023-06-15 NOTE — TELEPHONE ENCOUNTER
----- Message from Windy Jane sent at 6/15/2023  3:48 PM CDT -----  Regarding: self [phone]  Type: RX Refill Request    Who Called: self     Have you contacted your pharmacy: yes    Refill or New Rx: refill     RX Name and Strength: metFORMIN (GLUCOPHAGE) 1000 MG tablet    Preferred Pharmacy with phone number:   [facility]/pharmacy #[id number] - [place] - [address]  [address]  [place] [address]  Phone: [phone] Fax: [phone]    Local or Mail Order: local     Would the patient rather a call back or a response via [facility]? Call back     Best Call Back Number: [phone]      Pt stated the medication was sent to the wrong pharmacy, he was told to go to [facility] in [place] but [facility] is down the street from him. Pt is almost out of medication.

## 2023-06-15 NOTE — TELEPHONE ENCOUNTER
Spoke with pt about his meds has been approved and was sent to [facility] pt states that his meds go to [facility] I explain that  sent it to that pharmacy therefore he can call them to see if the medication can be delivered vs

## 2023-06-16 RX ORDER — METFORMIN HYDROCHLORIDE 1000 MG/1
1000 TABLET ORAL 2 TIMES DAILY
Qty: 180 TABLET | Refills: 3 | Status: SHIPPED | OUTPATIENT
Start: 2023-06-16

## 2023-06-16 NOTE — TELEPHONE ENCOUNTER
Patient in lobby concerned about his medication refill for Metformin. Nurse called [facility] no refill on hand. Informed patient that provider will address as soon as she can & he will be notified once prescription has been approved.

## 2023-06-19 DIAGNOSIS — E11.42 TYPE 2 DIABETES MELLITUS WITH DIABETIC POLYNEUROPATHY: ICD-10-CM

## 2023-06-19 DIAGNOSIS — I10 ESSENTIAL HYPERTENSION: ICD-10-CM

## 2023-06-19 DIAGNOSIS — G44.229 CHRONIC TENSION-TYPE HEADACHE, NOT INTRACTABLE: ICD-10-CM

## 2023-06-19 DIAGNOSIS — L30.1 DYSHIDROTIC ECZEMA: ICD-10-CM

## 2023-06-19 RX ORDER — HYDROXYZINE HYDROCHLORIDE 50 MG/1
50 TABLET, FILM COATED ORAL 3 TIMES DAILY PRN
Qty: 270 TABLET | Refills: 3 | Status: SHIPPED | OUTPATIENT
Start: 2023-06-19

## 2023-06-19 RX ORDER — AMLODIPINE BESYLATE 10 MG/1
10 TABLET ORAL DAILY
Qty: 90 TABLET | Refills: 3 | Status: SHIPPED | OUTPATIENT
Start: 2023-06-19

## 2023-06-19 RX ORDER — ZONISAMIDE 50 MG/1
50 CAPSULE ORAL 2 TIMES DAILY
Qty: 180 CAPSULE | Refills: 3 | Status: SHIPPED | OUTPATIENT
Start: 2023-06-19

## 2023-06-19 RX ORDER — GABAPENTIN 300 MG/1
300 CAPSULE ORAL 2 TIMES DAILY
Qty: 180 CAPSULE | Refills: 2 | Status: SHIPPED | OUTPATIENT
Start: 2023-06-19 | End: 2024-03-11 | Stop reason: SDUPTHER

## 2023-06-19 RX ORDER — CANDESARTAN 4 MG/1
4 TABLET ORAL DAILY
Qty: 90 TABLET | Refills: 1 | Status: SHIPPED | OUTPATIENT
Start: 2023-06-19 | End: 2023-11-28 | Stop reason: SDUPTHER

## 2023-06-19 RX ORDER — AMITRIPTYLINE HYDROCHLORIDE 25 MG/1
25 TABLET, FILM COATED ORAL NIGHTLY
Qty: 30 TABLET | Refills: 11 | Status: SHIPPED | OUTPATIENT
Start: 2023-06-19 | End: 2023-11-29 | Stop reason: SDUPTHER

## 2023-06-19 NOTE — TELEPHONE ENCOUNTER
----- Message from Nohemi Strong sent at 6/19/2023 10:35 AM CDT -----  Regarding: Refill request  .Type: RX Refill Request    Who Called:self     Have you contacted your pharmacy:no     Refill or New Rx: Refill    RX Name and Strength:hydrOXYzine (ATARAX) 50 MG tablet, amitriptyline (ELAVIL) 25 MG tablet, amLODIPine (NORVASC) 10 MG tablet, simvastatin (ZOCOR) 80 MG tablet, candesartan (ATACAND) 4 MG tablet,  zonisamide (ZONEGRAN) 50 MG Cap and gabapentin (NEURONTIN) 300 MG capsule    Preferred Pharmacy with phone number:.  [facility]/pharmacy #[id number] - [place] - [address]  [address] [address]  Phone: [phone] Fax: [phone]    Local or Mail Order:local     Ordering Provider:MANI Villalta     Would the patient rather a call back or a response via [facility]? call    Best Call Back Number: .[phone]      Additional Information:

## 2023-06-19 NOTE — TELEPHONE ENCOUNTER
No care due was identified.  [facility] Embedded Care Due Messages. Reference number: [id number].   6/19/2023 11:03:51 AM CDT

## 2023-06-21 ENCOUNTER — OFFICE VISIT (OUTPATIENT)
Dept: FAMILY MEDICINE | Facility: CLINIC | Age: 63
End: 2023-06-21
Payer: MEDICARE

## 2023-06-21 VITALS
SYSTOLIC BLOOD PRESSURE: 120 MMHG | TEMPERATURE: 98 F | HEART RATE: 94 BPM | WEIGHT: 237.63 LBS | DIASTOLIC BLOOD PRESSURE: 70 MMHG | BODY MASS INDEX: 35.1 KG/M2 | OXYGEN SATURATION: 96 %

## 2023-06-21 DIAGNOSIS — I10 ESSENTIAL HYPERTENSION: ICD-10-CM

## 2023-06-21 DIAGNOSIS — Z09 HOSPITAL DISCHARGE FOLLOW-UP: Primary | ICD-10-CM

## 2023-06-21 DIAGNOSIS — E78.5 HYPERLIPIDEMIA, UNSPECIFIED HYPERLIPIDEMIA TYPE: ICD-10-CM

## 2023-06-21 DIAGNOSIS — J18.9 PNEUMONIA DUE TO INFECTIOUS ORGANISM, UNSPECIFIED LATERALITY, UNSPECIFIED PART OF LUNG: ICD-10-CM

## 2023-06-21 DIAGNOSIS — E11.42 TYPE 2 DIABETES MELLITUS WITH DIABETIC POLYNEUROPATHY, WITHOUT LONG-TERM CURRENT USE OF INSULIN: ICD-10-CM

## 2023-06-21 DIAGNOSIS — J44.9 CHRONIC OBSTRUCTIVE PULMONARY DISEASE, UNSPECIFIED COPD TYPE: ICD-10-CM

## 2023-06-21 PROCEDURE — 3051F HG A1C>EQUAL 7.0%<8.0%: CPT | Mod: CPTII,S$GLB,, | Performed by: NURSE PRACTITIONER

## 2023-06-21 PROCEDURE — 1159F MED LIST DOCD IN RCRD: CPT | Mod: CPTII,S$GLB,, | Performed by: NURSE PRACTITIONER

## 2023-06-21 PROCEDURE — 3074F SYST BP LT 130 MM HG: CPT | Mod: CPTII,S$GLB,, | Performed by: NURSE PRACTITIONER

## 2023-06-21 PROCEDURE — 1159F PR MEDICATION LIST DOCUMENTED IN MEDICAL RECORD: ICD-10-PCS | Mod: CPTII,S$GLB,, | Performed by: NURSE PRACTITIONER

## 2023-06-21 PROCEDURE — 3008F PR BODY MASS INDEX (BMI) DOCUMENTED: ICD-10-PCS | Mod: CPTII,S$GLB,, | Performed by: NURSE PRACTITIONER

## 2023-06-21 PROCEDURE — 1111F DSCHRG MED/CURRENT MED MERGE: CPT | Mod: CPTII,S$GLB,, | Performed by: NURSE PRACTITIONER

## 2023-06-21 PROCEDURE — 4010F PR ACE/ARB THEARPY RXD/TAKEN: ICD-10-PCS | Mod: CPTII,S$GLB,, | Performed by: NURSE PRACTITIONER

## 2023-06-21 PROCEDURE — 99495 TCM SERVICES (MODERATE COMPLEXITY): ICD-10-PCS | Mod: S$GLB,,, | Performed by: NURSE PRACTITIONER

## 2023-06-21 PROCEDURE — 3078F DIAST BP <80 MM HG: CPT | Mod: CPTII,S$GLB,, | Performed by: NURSE PRACTITIONER

## 2023-06-21 PROCEDURE — 99999 PR PBB SHADOW E&M-EST. PATIENT-LVL V: CPT | Mod: PBBFAC,,, | Performed by: NURSE PRACTITIONER

## 2023-06-21 PROCEDURE — 99999 PR PBB SHADOW E&M-EST. PATIENT-LVL V: ICD-10-PCS | Mod: PBBFAC,,, | Performed by: NURSE PRACTITIONER

## 2023-06-21 PROCEDURE — 1111F PR DISCHARGE MEDS RECONCILED W/ CURRENT OUTPATIENT MED LIST: ICD-10-PCS | Mod: CPTII,S$GLB,, | Performed by: NURSE PRACTITIONER

## 2023-06-21 PROCEDURE — 99495 TRANSJ CARE MGMT MOD F2F 14D: CPT | Mod: S$GLB,,, | Performed by: NURSE PRACTITIONER

## 2023-06-21 PROCEDURE — 3078F PR MOST RECENT DIASTOLIC BLOOD PRESSURE < 80 MM HG: ICD-10-PCS | Mod: CPTII,S$GLB,, | Performed by: NURSE PRACTITIONER

## 2023-06-21 PROCEDURE — 3008F BODY MASS INDEX DOCD: CPT | Mod: CPTII,S$GLB,, | Performed by: NURSE PRACTITIONER

## 2023-06-21 PROCEDURE — 4010F ACE/ARB THERAPY RXD/TAKEN: CPT | Mod: CPTII,S$GLB,, | Performed by: NURSE PRACTITIONER

## 2023-06-21 PROCEDURE — 3074F PR MOST RECENT SYSTOLIC BLOOD PRESSURE < 130 MM HG: ICD-10-PCS | Mod: CPTII,S$GLB,, | Performed by: NURSE PRACTITIONER

## 2023-06-21 PROCEDURE — 3051F PR MOST RECENT HEMOGLOBIN A1C LEVEL 7.0 - < 8.0%: ICD-10-PCS | Mod: CPTII,S$GLB,, | Performed by: NURSE PRACTITIONER

## 2023-06-21 NOTE — PATIENT INSTRUCTIONS
"Medical Fitness--[phone]  Imaging, Xray, CT, MRI, Ultrasound---[phone]  Bariatrics---[phone]  Breast Surgery---[phone]  Case Management---[phone]  Colonoscopy---[phone]  DME---[phone]  Infectious Disease---[phone]  Interventional Radiology---[phone]  Medical Records---[phone]  [facility]---[phone]  Optometry/Ophthalmology---[phone]  O Bar---[phone]  Physical Therapy---[phone]  Psychiatry---[phone] or [phone]  Plastic Surgery---[phone]  Recovery--[phone] option 2, or [phone].  Sleep Study---[phone]  Smoking Cessation---[phone]  Wound Care---[phone]  Referral Desk---[phone]    Patient Education       Weight Loss Diet   About this topic   There are many "trendy" weight loss diets that are popular today. Many of these diets can end up being more harmful than helpful. The healthiest way to lose weight is to burn more calories than you eat.  A weight loss diet should help you have a healthy view of eating. It is NOT healthy to stop eating to try and lose weight. A good diet plan will help you cut down your food intake and make healthy choices.  A healthy weight loss goal is 1 to 2 pounds (0.5 to 1 kg) per week. Reducing calories in your diet, burning calories through exercise, or both can help you lose weight. Combining a healthy diet with regular physical activity can help you get the best results.  To cut calories in your diet you can:  Switch from whole milk to 1% or skim milk.  Switch from regular cheese to low-fat or fat-free cheese.  Use healthier condiment choices:  Fat-free or low-fat sour cream or salad dressings  Spray butter  Diet syrups or jellies over regular  Try frozen yogurt as a dessert rather than eating ice cream.  Skip the chips. Snack on carrots, vegetables, or fruit. If chips are a favorite of yours, try the baked style and watch portion size.  Eat grilled, roasted, boiled, broiled, " or baked meats. Avoid deep-frying. Choose skinless poultry, lean red meat, lean cuts of pork, and fish for good protein sources.  Try flavored no-calorie tijerina. Do not drink soda and juices that have many calories.  Choose fruit instead of sweets.  General   Eating smaller meals more often may be helpful. This will keep you from overeating at your next meal. Also, eating meals slowly helps you feel full faster.  If eating 3 meals is a part of your lifestyle, choose more lean proteins and higher fiber foods to fill you up at each meal.  Do not skip meals. Most often if you skip a meal, you eat too much at the next meal.  Eat smaller portions. Use a smaller plate or bowl for meals, and when you are eating out, eat half and take the rest home.  Plan ahead. Plan your meals and grocery list before going to the store. Planning will keep you from getting meals from restaurants.  Do not go to the grocery store hungry. You are more likely to buy snacks that are not good for you.  Portion out snacks. When you are having a snack, instead of grabbing the whole bag, portion a small amount out to give yourself a stopping point.  Drink water before and after your meals to help fill you up without the calories.  When eating starchy foods, choose whole-grain products. These have a lot of fiber which will make you feel full. Fiber also helps lower cholesterol and helps with bowel function.  If you need a helpful start, ask your doctor to send you to a dietitian for weight loss help.         What will the results be?   Losing excess weight will make your whole body healthier. You will have more energy for your daily activities and lower your risk for health problems.  What lifestyle changes are needed?   Stay active. Eating healthy is not always enough to lose weight. Burning calories by exercising is a big part of weight loss.  What foods are good to eat?   The key is to watch your portion sizes. It is best to choose foods that are  lower in fat and calories.  Choose lean meats:  Boneless, skinless chicken breast  Pork loin  90% lean beef  Lean turkey meat  Fresh fish (not fried)  Choose low-fat dairy products:  1% or skim milk  Spray butter or margarine  Low-fat or fat-free cheese  Frozen yogurt or low-calorie ice cream  Choose fresh fruits, vegetables, beans and lentils, and whole wheat products more often.  Choose water to drink more often. Drink diet or no-calorie beverages when you want something other than water. Aim to get your calories from the foods you eat.  Choose smart snacks:  Fruits  Vegetables  Low-fat or nonfat yogurt  Low-fat or no-fat cheese, such as cottage cheese  Unsalted nuts  Hard-boiled egg  Hummus  Guacamole  Natural peanut butter  Popcorn with no butter ? use pepper, garlic, or another spice to taste  Whole grain crackers  What foods should be limited or avoided?   Limit high-fat, high-sodium, and high-calorie foods like:  Fried foods  Processed meats  Whole-fat dairy products  Candy, cookies, chips, pastries  Sausage, mandujano, any full-fat meats  Soda, juice  Beer, wine, and mixed drinks (alcohol)  Will there be any other care needed?   What do I do first before trying to lose weight?  Talk to your doctor and dietitian to see if you need to lose weight. Work with them to set your weight loss goals.  If you have a chronic illness, such as high blood sugar or high blood pressure, ask a doctor or dietitian what diet and exercise is right for you.  Ask your doctor about how much you are able to exercise and what type of exercise is good for you.  Helpful tips   Keep a food journal to help keep you on track.  Join a support group.  Tips for burning calories:  If your workplace is near your house, choose to walk or bike to work instead of driving.  Take 20-minute walks each day. Walk around during your lunch break. You will not only burn calories, but will raise your energy for the rest of the day.  Take the stairs over the  elevator.  Join a gym or exercise class with a friend.  Try to exercise 30 minutes a day for overall health. Three 10-minute sessions work too. Aim for 60 to 90 minutes a day to lose weight.  Drink lots of water before, during, and after exercise.  Where can I learn more?   Academy of Nutrition and Dietetics  https://www.eatright.org/health/weight-loss/your-health-and-your-weight/back-to-basics-for-healthy-weight-loss   Centers for Disease Control and Prevention  https://www.cdc.gov/healthyweight/healthy_eating/index.html   Familydoctor.org  https://familydoctor.org/nutrition-weight-loss-need-know-fad-diets/   NHS  https://www.nhs.uk/live-well/healthy-weight/12-tips-to-help-you-lose-weight/?tabname=you-and-your-weight   Last Reviewed Date   2021-06-24  Consumer Information Use and Disclaimer   This information is not specific medical advice and does not replace information you receive from your health care provider. This is only a brief summary of general information. It does NOT include all information about conditions, illnesses, injuries, tests, procedures, treatments, therapies, discharge instructions or life-style choices that may apply to you. You must talk with your health care provider for complete information about your health and treatment options. This information should not be used to decide whether or not to accept your health care providers advice, instructions or recommendations. Only your health care provider has the knowledge and training to provide advice that is right for you.  Copyright   Copyright © 2021 UpToDate, Inc. and its affiliates and/or licensors. All rights reserved.  Patient Education       Obesity Discharge Instructions, Adult   About this topic   Obesity is a health problem where your weight is higher than it should be. Doctors use a method called body mass index or BMI to measure whether you are at a healthy weight for your height. The doctor uses your weight and your height to  determine your BMI. A high BMI can be an indicator of high body fat. Obesity is different from being overweight. Being overweight means your BMI is 25 or higher. Being obese means your BMI is 30 or higher. If your BMI is 40 or higher, you are considered severely or morbidly obese. Being obese may lead to many health problems. It may make it hard for you to breathe and move easily. It may raise your risk for illnesses like high blood sugar and heart disease.  What care is needed at home?   Ask your doctor what you need to do when you go home. Make sure you understand everything the doctor says. This way you will know what you need to do.  Change your diet. Lower the calories in your diet. Ask your dietitian to help you set an eating plan that is right for you.  Get enough exercise. Talk to your doctor about the right amount of exercise for you.  Do not smoke.  Limit the amount of beer, wine, and mixed drinks (alcohol) you drink.  Keep a record of your weight. Write down what you eat and drink each day. This may help you be more aware of the choices you are making.  What drugs may be needed?   The doctor may order drugs to:  Treat health problems that may cause weight gain  Lower your appetite  Help you lose weight  Will physical activity be limited?   Talk to your doctor about the right amount of exercise for you. This is especially important if you have heart problems, other illnesses, or if you recently had surgery.  Start slowly by doing more everyday activities like yard work or household chores.  Choose activities that you like to do.  What changes to diet are needed?   Whole grains are a good source of carbohydrates and fiber. Try to eat 3 to 5 servings of whole grain, high fiber foods each day. These are things like whole grain bread, bran cereals, brown rice, and whole wheat pasta.  Fruits and vegetables are good sources of fiber, vitamins, and minerals. Try to pick many kinds and colors. This will help you  get different nutrients in your diet. Choose fresh, frozen, or canned fruits and vegetables. Buy plain, frozen fruits without added sugar. Buy plain, frozen vegetables without added salt and fat. Buy canned fruit in 100% juice or water. Avoid canned fruit in syrups. Buy canned vegetables with no salt added.  Milk is a good source of protein and some vitamins and minerals. Choose low-fat (1%) or fat-free milk. Eat nonfat or low-fat cheeses, ice creams, yogurt, and other dairy products.  Meats and beans are good sources of protein and iron. Eat more low-fat or lean meats like chicken without the skin, turkey without the skin, and fish. Eggs and peanut butter are good sources of protein as well. Dried peas, beans, and lentils are also good and contain fiber. Fatty fish, like salmon, tuna, mackerel, herring, and trout, are good to eat and have healthy omega-3 fats.  Good fats can give you long-term energy. These are found in fish, nuts, seeds, and avocados. Try using olive oil, safflower oil, and low-sodium, low-fat salad dressing as a topping on foods. Use canola, olive, or peanut oil for cooking. Other healthy oils include corn, sunflower, and soybean oils.  Limit sweets such as candy and sugary drinks. Try to drink water instead. Drink diet or no calorie beverages when you want something other than water.  Cut back on solid fats, like butter, lard, and coconut oil.  Limit fatty foods such as desserts, fried foods, and chips.  Trans fats should be avoided. Most trans fats are found in processed foods, commercially baked goods, and fried foods and are very unhealthy. Saturated fat, which is different from trans fat, should be watched and limited if portions are too large. Saturated fat is found mainly in animal sources, such as meat and dairy products. Saturated fat is also found in coconut and palm oils.  Limit processed meats and most processed foods.  Limit eating out. If you choose to visit a restaurant, ask for  the nutritional facts. You may also be able to find nutritional facts online. Then, you can make a plan and choose healthy items. Watch the portion size. Have large portions split and take part home for some other meal.  What problems could happen?   Low mood or self-esteem  Anxiety  Muscle pain, joint pain, or arthritis  Sleep apnea  Diabetes  High blood pressure  High cholesterol  Heart, lung, or breathing problems  Kidney or liver problems  Cancer  Gallstones  Increased sweating  Reduced fertility  Pregnancy complications  Gastroesophageal reflux disease  When do I need to call the doctor?   Signs of high or low blood sugar  Thoughts of hurting yourself  Teach Back: Helping You Understand   The Teach Back Method helps you understand the information we are giving you. After you talk with the staff, tell them in your own words what you learned. This helps to make sure the staff has described each thing clearly. It also helps to explain things that may have been confusing. Before going home, make sure you can do these:  I can tell you about my condition.  I can tell you what changes I need to make with my diet or drugs.  I can tell you what I will do if I have signs of a heart attack or stroke.  Where can I learn more?   Centers for Disease Control and Prevention  http://www.cdc.gov/obesity/adult/defining.html   NHS  http://www.nhs.uk/Conditions/Obesity/Pages/obesityprevention.aspx   Last Reviewed Date   2021-06-23  Consumer Information Use and Disclaimer   This information is not specific medical advice and does not replace information you receive from your health care provider. This is only a brief summary of general information. It does NOT include all information about conditions, illnesses, injuries, tests, procedures, treatments, therapies, discharge instructions or life-style choices that may apply to you. You must talk with your health care provider for complete information about your health and treatment  options. This information should not be used to decide whether or not to accept your health care providers advice, instructions or recommendations. Only your health care provider has the knowledge and training to provide advice that is right for you.  Copyright   Copyright © 2021 UpToDate, Inc. and its affiliates and/or licensors. All rights reserved.

## 2023-06-21 NOTE — PROGRESS NOTES
HPI     Chief Complaint:  Hospital follow up    Scott Bansal is a 62 y.o. male with multiple medical diagnoses as listed in the medical history and problem list that presents for hospital follow up.  Pt is new to me with his his last appointment 5/25/2023.      Recent hospital encounter. See below encounter note from 6/12/2023.    HPI:   62M  has a past medical history of Bipolar 1 disorder, mixed, BPH (benign prostatic hypertrophy), Colon polyp, Cyst, eyelid, Diabetes mellitus, GERD (gastroesophageal reflux disease), Hyperlipidemia, Hypertension, Lumbar degenerative disc disease, Migraine headache, and Obesity.     Admitted for pneumonia. Was previously seen in hospital and treated for the same in [place] on 6/5/23. States receiving IM steroid injection and po doxycycline. Cta at that time was negative for pulmonary embolism but with bilateral pulmonary opacities. Was discharged but not on supplemental oxygen. Complains of continued cough, chills and dyspnea with exertion. Denies fever, nausea/vomiting, diarrhea. States very dark urine despite polydipsia.     Initial workup in emergency department revealed tachycardia. Afebrile. Leukocytosis. Mild hyponatremia, hyperglycemia, and hypecalcemia. Acidosis. Bnp within normal limits. Lactate within normal limits. Procalcitonin mildly elevated. Urinalysis suggesting dehydration. Chest x-ray with haziness consistent with pneumonia.      Hospital medicine consulted for admission under observation.            * No surgery found *       Hospital Course:   Patient admitted to medical-surgical unit for further evaluation of multi lobar pneumonia.  Patient treated with IV antibiotics, antitussives as needed, DuoNebs as needed, and steroids.  Leukocytosis and hyperglycemia noted in the setting of steroid use.  Patient verbalized mild symptom improvement with productive cough (green sputum) and culture sent for analysis.  Hyponatremia noted IV hydration continued.    6/10: WBC trending down. Pt reports improvement in symptoms. Sputum culture pending. Cont rocephin and azithro. Levemir increased to 10 units bid. Anticipate dc in the next 24-48 hours.   6/12  Examination done at bedside, patient appeared alert and oriented x4, denied acute issues overnight.    Hemodynamically stable   Saturating about 92 on room air   Stated significant improvement in shortness of breath, wheezing, chest tightness, cough.   Remained afebrile, leukocytosis trending down, leukocytosis likely reactive from steroid use.    Labs reviewed.    Blood glucose improving.    Considering clinical and hemodynamic stability, planning to discharge patient today, recommended to complete course of antibiotics, compliance with medications, strict diabetic diet, monitor blood glucose.    Patient agreed to the plan, discharge accordingly, medications to be delivered bedside, ordered NP at home program;     Assessment & Plan     Problem List Items Addressed This Visit          Pulmonary    Pneumonia    Denies fever, dyspnea and cough have improved. Compliant with abx.     As below    COPD (chronic obstructive pulmonary disease)    The current medical regimen is effective;  continue present plan      Overview     Currently on breo and albuterol.  Recommend routine usage of breo.  quit smoking since age 25.              Cardiac/Vascular    Hyperlipidemia    discussed ways to lower triglycerides such as cutting simple sugars out of diet (white breads, candies, cookies, cakes, etc.) and reducing/eliminating intake of highly processed trans fatty acids.   Exercise 30 minutes a day for 4-5 days a week.   Eat more fiber.      Essential hypertension    BP Readings from Last 3 Encounters:   06/21/23 120/70   06/12/23 (!) 164/90   06/03/23 (!) 170/91       -continue current medication regimen  -DASH diet, regular cardiovascular exercises, portion control  - ?weight loss  -f/u with BP logs in 2 weeks       Overview     Cannot  tolerate sulfa  Currently on amlodipine, candesartan, furosemide  Previously took irbesartan and clonidine             Endocrine    Type 2 diabetes mellitus with diabetic polyneuropathy, without long-term current use of insulin    -discussed with patient about routine diabetic care that includes but are not limited to regular eye exams, skin care, daily foot exam, proper nutrition, regular BG monitoring at home (fasting and mealtime) and medication compliance in a diabetic.  Target morning BS  and meal-time BS <180      Relevant Orders    Ambulatory referral/consult to Podiatry     Other Visit Diagnoses       Hospital discharge follow-up    -  Primary    Hospital encounter notes, objective/subjective data, diagnostics, and plan of care from 6/12 reviewed.    Since discharge patient reports significant improvement in shortness of breath, wheezing, chest tightness, cough. Denies fever or chills. Reports mild dyspnea on exertion associated with intermittent cough with productive clear sputum.     Afebrile today. VSS. Spo2 96% on RA.     He has been compliant with abx, and has nearly completed course.    Discussed condition, and treatment.   Education sent to patient portal/included in after visit summary.  ED precautions given.   Notify provider if symptoms do not resolve or increase in severity.   Patient verbalizes understanding and agrees with plan of care.    Transitional Care Note    Family and/or Caretaker present at visit?  No.  Diagnostic tests reviewed/disposition: I have reviewed all completed as well as pending diagnostic tests at the time of discharge.  Disease/illness education: completed  Home health/community services discussion/referrals: Patient does not have home health established from hospital visit.  They do not need home health.  If needed, we will set up home health for the patient.   Establishment or re-establishment of referral orders for community resources: No other necessary community  resources.   Discussion with other health care providers: No discussion with other health care providers necessary.                 --------------------------------------------      Health Maintenance:  Health Maintenance         Date Due Completion Date    Foot Exam 10/26/2021 10/26/2020 (Done)    Override on 10/26/2020: Done (completed by insurance)    Eye Exam 06/02/2023 6/2/2022    Influenza Vaccine (Season Ended) 09/01/2023 ---    Diabetes Urine Screening 10/31/2023 10/31/2022    Lipid Panel 10/31/2023 10/31/2022    Hemoglobin A1c 12/08/2023 6/8/2023    High Dose Statin 06/14/2024 6/14/2023    Colorectal Cancer Screening 06/28/2024 6/28/2021    TETANUS VACCINE 07/07/2027 7/7/2017            Diabetic foot screening ordered    Follow Up:  Follow up in about 2 weeks (around 7/5/2023), or if symptoms worsen or fail to improve.    Exam     Review of Systems:  (as noted above)  Review of Systems   Constitutional:  Negative for fever.   HENT:  Negative for trouble swallowing.    Eyes:  Negative for visual disturbance.   Respiratory:  Positive for cough (mild, improved), shortness of breath (with exertion, improved.) and wheezing (intermittent). Negative for chest tightness.    Cardiovascular:  Negative for chest pain.   Gastrointestinal:  Negative for blood in stool.     Physical Exam:   Physical Exam  Constitutional:       General: He is not in acute distress.     Appearance: He is obese. He is not ill-appearing or diaphoretic.   HENT:      Head: Normocephalic and atraumatic.   Eyes:      General: No scleral icterus.  Cardiovascular:      Rate and Rhythm: Normal rate and regular rhythm.      Pulses: Normal pulses.      Heart sounds: No murmur heard.    No friction rub. No gallop.   Pulmonary:      Effort: No respiratory distress.      Breath sounds: Wheezing (expiratory, bilateral) present.   Chest:      Chest wall: No tenderness.   Musculoskeletal:      Cervical back: No rigidity.   Skin:     Capillary Refill:  Capillary refill takes 2 to 3 seconds.   Neurological:      General: No focal deficit present.      Mental Status: He is alert and oriented to person, place, and time.     Vitals:    23 0937   BP: 120/70   BP Location: Left arm   Patient Position: Sitting   BP Method: Large (Manual)   Pulse: 94   Temp: 98.1 °F (36.7 °C)   TempSrc: Oral   SpO2: 96%   Weight: 107.8 kg (237 lb 10.5 oz)      Body mass index is 35.1 kg/m².        History     Past Medical History:  Past Medical History:   Diagnosis Date    Bipolar 1 disorder, mixed     BPH (benign prostatic hypertrophy)     Colon polyp     Cyst, eyelid     Dr. Broussard    Diabetes mellitus     GERD (gastroesophageal reflux disease)     Hyperlipidemia     Hypertension     Lumbar degenerative disc disease 3/7/2019    Migraine headache     Obesity        Past Surgical History:  Past Surgical History:   Procedure Laterality Date    CERVICAL SPINE SURGERY      COLONOSCOPY N/A 2017    Procedure: COLONOSCOPY;  Surgeon: Genaro Nix MD;  Location: [facility] ENDO;  Service: Endoscopy;  Laterality: N/A;    COLONOSCOPY N/A 10/30/2020    Procedure: COLONOSCOPY;  Surgeon: Herb Martinez MD;  Location: [facility] ENDO;  Service: Endoscopy;  Laterality: N/A;    EYE SURGERY Left 2017    cyst removal on eyelid; Dr. Broussard    SHOULDER SURGERY      TONSILLECTOMY         Social History:  Social History     Socioeconomic History    Marital status:    Tobacco Use    Smoking status: Former     Packs/day: 2.00     Years: 15.00     Pack years: 30.00     Types: Cigarettes     Quit date: 1984     Years since quittin.1     Passive exposure: Past    Smokeless tobacco: Never    Tobacco comments:     Patient quit smoking at the age of 26 yo.   Substance and Sexual Activity    Alcohol use: No    Drug use: No    Sexual activity: Yes     Partners: Female     Social Determinants of Health     Financial Resource Strain: Medium Risk    Difficulty of Paying Living Expenses:  Somewhat hard   Food Insecurity: No Food Insecurity    Worried About Running Out of Food in the Last Year: Never true    Ran Out of Food in the Last Year: Never true   Transportation Needs: No Transportation Needs    Lack of Transportation (Medical): No    Lack of Transportation (Non-Medical): No   Physical Activity: Inactive    Days of Exercise per Week: 0 days    Minutes of Exercise per Session: 0 min   Stress: Stress Concern Present    Feeling of Stress : To some extent   Social Connections: Moderately Integrated    Frequency of Communication with Friends and Family: More than three times a week    Frequency of Social Gatherings with Friends and Family: Never    Attends L[employer] day Services: More than 4 times per year    Active Member of Clubs or Organizations: No    Attends Club or Organization Meetings: Never    Marital Status: Living with partner   Housing Stability: Low Risk     Unable to Pay for Housing in the Last Year: No    Number of Places Lived in the Last Year: 1    Unstable Housing in the Last Year: No       Family History:  Family History   Problem Relation Age of Onset    Cataracts Mother     Cancer Mother         throat    Hypertension Mother     Hypertension Father     Stroke Father         2 strokes    Hypertension Sister     Cancer Brother         spinal, kidney, spinal fluid    Hypertension Brother     Cancer Cousin         breast?       Allergies and Medications: (updated and reviewed)  Review of patient's allergies indicates:   Allergen Reactions    Levofloxacin Hallucinations    Sulfa (sulfonamide antibiotics) Rash     Current Outpatient Medications   Medication Sig Dispense Refill    albuterol (PROVENTIL/VENTOLIN HFA) 90 mcg/actuation inhaler Inhale 1-2 puffs into the lungs every 6 (six) hours as needed for Wheezing or Shortness of Breath. Rescue 18 g 5    amitriptyline (ELAVIL) 25 MG tablet Take 1 tablet (25 mg total) by mouth every evening. 30 tablet 11    amLODIPine (NORVASC) 10 MG tablet  Take 1 tablet (10 mg total) by mouth once daily. 90 tablet 3    busPIRone (BUSPAR) 10 MG tablet Take 10 mg by mouth 3 (three) times daily.      candesartan (ATACAND) 4 MG tablet Take 1 tablet (4 mg total) by mouth once daily. 90 tablet 1    fluticasone propionate (FLONASE) 50 mcg/actuation nasal spray 2 sprays (100 mcg total) by Each Nostril route once daily. 16 mL 5    gabapentin (NEURONTIN) 300 MG capsule Take 1 capsule (300 mg total) by mouth 2 (two) times daily. 180 capsule 2    gemfibroziL (LOPID) 600 MG tablet TAKE 1 TABLET BY MOUTH TWICE A DAY BEFORE MEALS 180 tablet 3    hydrOXYzine (ATARAX) 50 MG tablet Take 1 tablet (50 mg total) by mouth 3 (three) times daily as needed for Itching. 270 tablet 3    metFORMIN (GLUCOPHAGE) 1000 MG tablet Take 1 tablet (1,000 mg total) by mouth 2 (two) times daily. 180 tablet 3    metFORMIN (GLUCOPHAGE) 1000 MG tablet Take 1 tablet (1,000 mg total) by mouth 2 (two) times daily. 180 tablet 3    multivitamin capsule Take 1 capsule by mouth once daily.      pantoprazole (PROTONIX) 40 MG tablet TAKE 1 TABLET BY MOUTH EVERY DAY 90 tablet 3    promethazine-dextromethorphan (PROMETHAZINE-DM) 6.25-15 mg/5 mL Syrp Take 5 mLs by mouth every 4 (four) hours as needed (for cough, congestion). 118 mL 1    quetiapine (SEROQUEL) 300 MG Tab Take 300 mg by mouth every evening.      simvastatin (ZOCOR) 80 MG tablet Take 1 tablet (80 mg total) by mouth once daily. (Patient taking differently: Take 80 mg by mouth nightly.) 90 tablet 0    sumatriptan (IMITREX) 100 MG tablet Take 1 tablet (100 mg total) by mouth daily as needed for Migraine. 30 tablet 3    tamsulosin (FLOMAX) 0.4 mg Cap TAKE 2 CAPSULES BY MOUTH EVERY  capsule 3    tiZANidine (ZANAFLEX) 4 MG tablet Take 1 tablet (4 mg total) by mouth every 8 (eight) hours. 30 tablet 1    triamcinolone acetonide 0.1% (KENALOG) 0.1 % cream AAA bid prn eczema. Do not use on face. 454 g 5    zonisamide (ZONEGRAN) 50 MG Cap Take 1 capsule (50 mg  total) by mouth 2 (two) times daily. 180 capsule 3    DUPIXENT SYRINGE 300 mg/2 mL Syrg Inject 1 syringe (300mg) into the skin every other week (Patient not taking: Reported on 6/14/2023) 4 mL 5     No current facility-administered medications for this visit.       Patient Care Team:  Kaylie Villalta MD as PCP - General (Family Medicine)  Warren Rondon OD as Consulting Physician (Optometry)  Jessica Black LPN as Care Coordinator  Ollie Palumbo II, MD as Consulting Physician (Gastroenterology)         - The patient is given an After Visit Summary that lists all medications with directions, allergies, education, orders placed during this encounter and follow-up instructions.      - I have reviewed the patient's medical information including past medical, family, and social history sections including the medications and allergies.      - We discussed the patient's current medications.     This note was created by combination of typed  and MModal dictation.  Transcription errors may be present.  If there are any questions, please contact me.       Gray Watson NP

## 2023-06-26 ENCOUNTER — TELEPHONE (OUTPATIENT)
Dept: DERMATOLOGY | Facility: CLINIC | Age: 63
End: 2023-06-26
Payer: MEDICARE

## 2023-06-26 NOTE — TELEPHONE ENCOUNTER
Attempted to return call. No answer. Message left.   ----- Message from Coral Mcgowan sent at 6/26/2023  9:17 AM CDT -----  Contact: Scott Chavez is needing a call back in regards to getting back on his injections for his eczema. Please give him a call back at [phone]    
Spine appears normal, range of motion is not limited, + TTP of R shoulder, obvious deformity/ dislocation of joint, DP intact

## 2023-06-26 NOTE — TELEPHONE ENCOUNTER
Returned call. Pt requesting to get back on dupixent. Pt informed I would send message to MD  ----- Message from Coral Mcgowan sent at 6/26/2023 10:52 AM CDT -----  Contact: Scott  Type:  Patient Returning Call    Who Called:Scott  Who Left Message for Patient:nurse  Does the patient know what this is regarding?:missed cll  Would the patient rather a call back or a response via [facility]? call  Best Call Back Number:[phone]  Additional Information:          grossly assessed due to/BUE 3/5, BLE 3/5

## 2023-06-27 NOTE — TELEPHONE ENCOUNTER
Attempted to return call. No answer. Message left.   ----- Message from Irene Whitfield sent at 6/27/2023  8:29 AM CDT -----  Contact: mirtha  .Type:  Patient Returning Call    Who Called:mirtha   Who Left Message for Patient:nurse  Does the patient know what this is regarding?:unknown  Would the patient rather a call back or a response via [facility]? Call back  Best Call Back Number:.[phone]   Additional Information: patient returning call

## 2023-06-28 ENCOUNTER — TELEPHONE (OUTPATIENT)
Dept: PHARMACY | Facility: CLINIC | Age: 63
End: 2023-06-28
Payer: MEDICARE

## 2023-06-28 ENCOUNTER — SPECIALTY PHARMACY (OUTPATIENT)
Dept: PHARMACY | Facility: CLINIC | Age: 63
End: 2023-06-28
Payer: MEDICARE

## 2023-06-28 ENCOUNTER — TELEPHONE (OUTPATIENT)
Dept: DERMATOLOGY | Facility: CLINIC | Age: 63
End: 2023-06-28
Payer: MEDICARE

## 2023-06-28 DIAGNOSIS — L20.89 OTHER ATOPIC DERMATITIS: Primary | ICD-10-CM

## 2023-06-28 DIAGNOSIS — Z79.899 ENCOUNTER FOR LONG-TERM (CURRENT) USE OF MEDICATIONS: ICD-10-CM

## 2023-06-28 RX ORDER — DUPILUMAB 300 MG/2ML
INJECTION, SOLUTION SUBCUTANEOUS
Qty: 4 ML | Refills: 11 | Status: ACTIVE | OUTPATIENT
Start: 2023-06-28 | End: 2023-06-29 | Stop reason: SDUPTHER

## 2023-06-28 NOTE — TELEPHONE ENCOUNTER
Patient notified that his medication was sent to [facility] due to medication may needing a PA

## 2023-06-28 NOTE — TELEPHONE ENCOUNTER
Altagracia, this is ZEINAB BOUCHER, clinical pharmacist with [facility] that is part of your care team.  We have begun working on your prescription that your doctor has sent us. Our next steps include:     Working with your insurance company to obtain approval for your medication  Working with you to ensure your medication is affordable     We will be calling you along the way with updates on your medication but if you have any concerns or receive information that you would like to discuss please reach us at [phone].    Welcome call outcome: Patient/caregiver reached    Patient called to request an urgent workup of his dupixent. Routing to assigned team

## 2023-06-29 DIAGNOSIS — L20.89 OTHER ATOPIC DERMATITIS: ICD-10-CM

## 2023-06-29 DIAGNOSIS — Z79.899 ENCOUNTER FOR LONG-TERM (CURRENT) USE OF MEDICATIONS: ICD-10-CM

## 2023-06-29 RX ORDER — DUPILUMAB 300 MG/2ML
INJECTION, SOLUTION SUBCUTANEOUS
Qty: 4 ML | Refills: 11 | Status: SHIPPED | OUTPATIENT
Start: 2023-06-29 | End: 2023-08-10 | Stop reason: SDUPTHER

## 2023-06-29 NOTE — TELEPHONE ENCOUNTER
Returned call. Patient informed I would let dr. Chapin know he would like [facility]  ----- Message from Dagmar Mercer RN sent at 6/28/2023  4:08 PM CDT -----  Contact: Scott    ----- Message -----  From: Michelle Mcgowan  Sent: 6/28/2023   3:38 PM CDT  To: Tavares Vicente Staff    Pt is calling in regards to his DUPIXENT SYRINGE 300 mg/2 mL Syrg went to the wrong pharmacy and needs to be sent to [facility] it can be verbally called in.please call back at [phone]        [facility] - [place] - [address]  [address] [facility] [address] [place] [address]  Phone: [phone] Fax: [phone]        Thanks  JOHANNY

## 2023-07-03 ENCOUNTER — TELEPHONE (OUTPATIENT)
Dept: FAMILY MEDICINE | Facility: CLINIC | Age: 63
End: 2023-07-03
Payer: MEDICARE

## 2023-07-03 ENCOUNTER — TELEPHONE (OUTPATIENT)
Dept: DERMATOLOGY | Facility: CLINIC | Age: 63
End: 2023-07-03
Payer: MEDICARE

## 2023-07-03 DIAGNOSIS — J44.9 CHRONIC OBSTRUCTIVE PULMONARY DISEASE, UNSPECIFIED COPD TYPE: ICD-10-CM

## 2023-07-03 DIAGNOSIS — L20.89 OTHER ATOPIC DERMATITIS: Primary | ICD-10-CM

## 2023-07-03 RX ORDER — METHYLPREDNISOLONE 4 MG/1
TABLET ORAL
Qty: 21 EACH | Refills: 0 | Status: SHIPPED | OUTPATIENT
Start: 2023-07-03 | End: 2023-07-24

## 2023-07-03 RX ORDER — ALBUTEROL SULFATE 0.83 MG/ML
2.5 SOLUTION RESPIRATORY (INHALATION) EVERY 6 HOURS PRN
Qty: 1 EACH | Refills: 1 | Status: CANCELLED | OUTPATIENT
Start: 2023-07-03 | End: 2024-07-02

## 2023-07-03 NOTE — TELEPHONE ENCOUNTER
Returned call. Pt requesting to get some steroids to get him through til he gets his shots 7/6. Message sent to MD  ----- Message from Jeffrey Fuller sent at 7/3/2023  8:11 AM CDT -----  Contact: self  Pt is asking for an return call in reference to his hands ,states hands are spilt open, bleeding , and has some dryness and is needing medication called into pharmacy for him states medication is a steroid pt did not know name of medication ; pt states he has been soaking hands and also using ointment that was prescribed ,please call back at .[phone] Thx      [id number] - [place] - [address]  [address] [address]  Phone: [phone] Fax: [phone]

## 2023-07-03 NOTE — TELEPHONE ENCOUNTER
Patient was told that the medication that he requested is not on profile that it will be another brand, patient verbally understands.

## 2023-07-03 NOTE — TELEPHONE ENCOUNTER
----- Message from Kenneth Hernandez sent at 7/3/2023  7:59 AM CDT -----  Type: RX Refill Request    Who Called: self     Have you contacted your pharmacy:yes     Refill or New Rx:refill     RX Name and Strength:albuterol-ipratropium 2.5 mg-0.5 mg/3 mL nebulizer solution 3 mL     Preferred Pharmacy with phone number:[facility]/pharmacy #[id number] - [place] - [address]   Phone: [phone]  Fax:  [phone]    Local or Mail Order:local     Would the patient rather a call back or a response via [facility]? CALL     Best Call Back Number: [phone] (home)

## 2023-07-03 NOTE — TELEPHONE ENCOUNTER
----- Message from Kenneth Hernandez sent at 7/3/2023  7:59 AM CDT -----  Type: RX Refill Request    Who Called: self     Have you contacted your pharmacy:yes     Refill or New Rx:refill     RX Name and Strength:albuterol-ipratropium 2.5 mg-0.5 mg/3 mL nebulizer solution 3 mL     Preferred Pharmacy with phone number:[facility]/pharmacy #[id number] - [place] - [address]   Phone: [phone]  Fax:  [phone]    Local or Mail Order:local     Would the patient rather a call back or a response via [facility]? CALL     Best Call Back Number: [phone] (home)

## 2023-07-06 DIAGNOSIS — J44.9 CHRONIC OBSTRUCTIVE PULMONARY DISEASE, UNSPECIFIED COPD TYPE: ICD-10-CM

## 2023-07-06 RX ORDER — ALBUTEROL SULFATE 0.83 MG/ML
2.5 SOLUTION RESPIRATORY (INHALATION) EVERY 6 HOURS PRN
Qty: 1 EACH | Refills: 1 | Status: SHIPPED | OUTPATIENT
Start: 2023-07-06 | End: 2023-07-06 | Stop reason: SDUPTHER

## 2023-07-06 RX ORDER — ALBUTEROL SULFATE 0.83 MG/ML
2.5 SOLUTION RESPIRATORY (INHALATION) EVERY 6 HOURS PRN
Qty: 9 ML | Refills: 1 | Status: SHIPPED | OUTPATIENT
Start: 2023-07-06 | End: 2024-07-05

## 2023-07-06 NOTE — TELEPHONE ENCOUNTER
No care due was identified.  [facility] Embedded Care Due Messages. Reference number: [id number].   7/06/2023 1:21:33 PM CDT

## 2023-07-06 NOTE — TELEPHONE ENCOUNTER
----- Message from Debra Barnes sent at 7/6/2023 11:23 AM CDT -----  Name of Caller pt   Reason for Visit/Symptoms pt requesting to be seen asap for coughing and albuterol solution. Nothing available until 7/12/23. Call pt   Best Contact Number or Confirm if Mychart Preferred [phone] (home) [phone] (work)    Preferred Date/Time of Appointment asap   Interested in Virtual Visit (yes/no)  Additional Information

## 2023-07-11 ENCOUNTER — TELEPHONE (OUTPATIENT)
Dept: FAMILY MEDICINE | Facility: CLINIC | Age: 63
End: 2023-07-11
Payer: MEDICARE

## 2023-07-11 NOTE — TELEPHONE ENCOUNTER
----- Message from Arnulfo Meza sent at 7/11/2023  3:06 PM CDT -----  Regarding: Self [phone]   Type: Patient Returning Call    Who Called: Self     Who Left Message for Patient: unknown    Does the patient know what this is regarding?: Asthma    Would the patient rather a call back or a response via [facility]?  Call back     Best Call Back Number: .[phone]      Additional Information: Pt is returning a call but no message was left in chart pt would like a call back with information about this call     Thank you.

## 2023-07-12 ENCOUNTER — OFFICE VISIT (OUTPATIENT)
Dept: FAMILY MEDICINE | Facility: CLINIC | Age: 63
End: 2023-07-12
Payer: MEDICARE

## 2023-07-12 VITALS
OXYGEN SATURATION: 96 % | HEART RATE: 97 BPM | DIASTOLIC BLOOD PRESSURE: 76 MMHG | SYSTOLIC BLOOD PRESSURE: 128 MMHG | BODY MASS INDEX: 35.23 KG/M2 | HEIGHT: 69 IN | TEMPERATURE: 99 F | WEIGHT: 237.88 LBS

## 2023-07-12 DIAGNOSIS — J40 BRONCHITIS: ICD-10-CM

## 2023-07-12 DIAGNOSIS — N52.9 ERECTILE DYSFUNCTION, UNSPECIFIED ERECTILE DYSFUNCTION TYPE: Primary | ICD-10-CM

## 2023-07-12 DIAGNOSIS — N52.9 ERECTILE DYSFUNCTION, UNSPECIFIED ERECTILE DYSFUNCTION TYPE: ICD-10-CM

## 2023-07-12 DIAGNOSIS — I10 ESSENTIAL HYPERTENSION: ICD-10-CM

## 2023-07-12 PROCEDURE — 3078F PR MOST RECENT DIASTOLIC BLOOD PRESSURE < 80 MM HG: ICD-10-PCS | Mod: CPTII,S$GLB,,

## 2023-07-12 PROCEDURE — 3051F HG A1C>EQUAL 7.0%<8.0%: CPT | Mod: CPTII,S$GLB,,

## 2023-07-12 PROCEDURE — 3078F DIAST BP <80 MM HG: CPT | Mod: CPTII,S$GLB,,

## 2023-07-12 PROCEDURE — 1111F DSCHRG MED/CURRENT MED MERGE: CPT | Mod: CPTII,S$GLB,,

## 2023-07-12 PROCEDURE — 1111F PR DISCHARGE MEDS RECONCILED W/ CURRENT OUTPATIENT MED LIST: ICD-10-PCS | Mod: CPTII,S$GLB,,

## 2023-07-12 PROCEDURE — 3074F PR MOST RECENT SYSTOLIC BLOOD PRESSURE < 130 MM HG: ICD-10-PCS | Mod: CPTII,S$GLB,,

## 2023-07-12 PROCEDURE — 3051F PR MOST RECENT HEMOGLOBIN A1C LEVEL 7.0 - < 8.0%: ICD-10-PCS | Mod: CPTII,S$GLB,,

## 2023-07-12 PROCEDURE — 4010F PR ACE/ARB THEARPY RXD/TAKEN: ICD-10-PCS | Mod: CPTII,S$GLB,,

## 2023-07-12 PROCEDURE — 99999 PR PBB SHADOW E&M-EST. PATIENT-LVL IV: CPT | Mod: PBBFAC,,,

## 2023-07-12 PROCEDURE — 4010F ACE/ARB THERAPY RXD/TAKEN: CPT | Mod: CPTII,S$GLB,,

## 2023-07-12 PROCEDURE — 3008F PR BODY MASS INDEX (BMI) DOCUMENTED: ICD-10-PCS | Mod: CPTII,S$GLB,,

## 2023-07-12 PROCEDURE — 99214 OFFICE O/P EST MOD 30 MIN: CPT | Mod: S$GLB,,,

## 2023-07-12 PROCEDURE — 1159F MED LIST DOCD IN RCRD: CPT | Mod: CPTII,S$GLB,,

## 2023-07-12 PROCEDURE — 3074F SYST BP LT 130 MM HG: CPT | Mod: CPTII,S$GLB,,

## 2023-07-12 PROCEDURE — 1159F PR MEDICATION LIST DOCUMENTED IN MEDICAL RECORD: ICD-10-PCS | Mod: CPTII,S$GLB,,

## 2023-07-12 PROCEDURE — 99999 PR PBB SHADOW E&M-EST. PATIENT-LVL IV: ICD-10-PCS | Mod: PBBFAC,,,

## 2023-07-12 PROCEDURE — 3008F BODY MASS INDEX DOCD: CPT | Mod: CPTII,S$GLB,,

## 2023-07-12 PROCEDURE — 99214 PR OFFICE/OUTPT VISIT, EST, LEVL IV, 30-39 MIN: ICD-10-PCS | Mod: S$GLB,,,

## 2023-07-12 RX ORDER — TADALAFIL 10 MG/1
10 TABLET ORAL DAILY PRN
Qty: 30 TABLET | Refills: 0 | Status: SHIPPED | OUTPATIENT
Start: 2023-07-12 | End: 2023-07-13

## 2023-07-12 NOTE — PROGRESS NOTES
HPI     Chief Complaint:  Chief Complaint   Patient presents with    bronchitis       Scott Bansal is a 62 y.o. male with multiple medical diagnoses as listed in the medical history and problem list that presents for ED.  Pt is not known to me with his last appointment 6/21/2023.      HPI  Had pneumonia 2 months ago, seen in ED for this. On 6/26 did ct scan of chest and treated with cough syrup. Went to [facility] was admitted for 5 days, treated with IV abx. Pt feeling much better now.   Requesting cialis for ED.    Assessment & Plan     Problem List Items Addressed This Visit          Cardiac/Vascular    Essential hypertension  BP in clinic today 128/76. Stable. The current medical regimen is effective;  continue present plan and medications.    Overview             Other Visit Diagnoses       Erectile dysfunction, unspecified erectile dysfunction type    -  Primary  Issues with ED for the past 3 months. Requesting Cialis for ED, has tolerated it well in the past. Will order cialis.    Relevant Medications    tadalafiL (CIALIS) 10 MG tablet    Bronchitis      Admitted to hospital on 6/8 for bronchitis and pneumonia. On physical exam, lung sounds clear, in no distress. Pt symptoms have resolved. The current medical regimen is effective;  continue present plan and medications.              --------------------------------------------      Health Maintenance:  Health Maintenance         Date Due Completion Date    Foot Exam 10/26/2021 10/26/2020 (Done)    Override on 10/26/2020: Done (completed by insurance)    Eye Exam 06/02/2023 6/2/2022    Influenza Vaccine (1) 09/01/2023 ---    Diabetes Urine Screening 10/31/2023 10/31/2022    Lipid Panel 10/31/2023 10/31/2022    Hemoglobin A1c 12/08/2023 6/8/2023    Colorectal Cancer Screening 06/28/2024 6/28/2021    High Dose Statin 07/12/2024 7/12/2023    TETANUS VACCINE 07/07/2027 7/7/2017            Health maintenance reviewed    Follow Up:  No follow-ups  "on file.    Exam     Review of Systems:  (as noted above)  Review of Systems   Constitutional:  Negative for fever.   HENT:  Negative for trouble swallowing.    Eyes:  Negative for visual disturbance.   Respiratory:  Negative for chest tightness and shortness of breath.    Cardiovascular:  Negative for chest pain.   Gastrointestinal:  Negative for blood in stool.   Genitourinary:         Erectile dysfunction     Physical Exam:   Physical Exam  Constitutional:       General: He is not in acute distress.     Appearance: He is obese. He is not ill-appearing or diaphoretic.   HENT:      Head: Normocephalic and atraumatic.   Eyes:      General: No scleral icterus.  Cardiovascular:      Rate and Rhythm: Normal rate and regular rhythm.      Pulses: Normal pulses.      Heart sounds: No murmur heard.    No friction rub. No gallop.   Pulmonary:      Effort: Pulmonary effort is normal. No tachypnea, bradypnea or respiratory distress.      Breath sounds: Normal breath sounds. No decreased breath sounds, wheezing, rhonchi or rales.   Chest:      Chest wall: No tenderness.   Musculoskeletal:      Cervical back: No rigidity.   Neurological:      Mental Status: He is alert.     Vitals:    07/12/23 1632   BP: 128/76   Pulse: 97   Temp: 98.7 °F (37.1 °C)   TempSrc: Oral   SpO2: 96%   Weight: 107.9 kg (237 lb 14 oz)   Height: 5' 9" (1.753 m)      Body mass index is 35.13 kg/m².        History     Past Medical History:  Past Medical History:   Diagnosis Date    Bipolar 1 disorder, mixed     BPH (benign prostatic hypertrophy)     Colon polyp     Cyst, eyelid     Dr. Broussard    Diabetes mellitus     GERD (gastroesophageal reflux disease)     Hyperlipidemia     Hypertension     Lumbar degenerative disc disease 3/7/2019    Migraine headache     Obesity        Past Surgical History:  Past Surgical History:   Procedure Laterality Date    CERVICAL SPINE SURGERY      COLONOSCOPY N/A 7/27/2017    Procedure: COLONOSCOPY;  Surgeon: Genaro SALAZAR" MD Dinah;  Location: [facility] ENDO;  Service: Endoscopy;  Laterality: N/A;    COLONOSCOPY N/A 10/30/2020    Procedure: COLONOSCOPY;  Surgeon: Herb Martinez MD;  Location: [facility] ENDO;  Service: Endoscopy;  Laterality: N/A;    EYE SURGERY Left 2017    cyst removal on eyelid; Dr. Broussard    SHOULDER SURGERY      TONSILLECTOMY         Social History:  Social History     Socioeconomic History    Marital status:    Tobacco Use    Smoking status: Former     Packs/day: 2.00     Years: 15.00     Pack years: 30.00     Types: Cigarettes     Quit date: 1984     Years since quittin.1     Passive exposure: Past    Smokeless tobacco: Never    Tobacco comments:     Patient quit smoking at the age of 24 yo.   Substance and Sexual Activity    Alcohol use: No    Drug use: No    Sexual activity: Yes     Partners: Female     Social Determinants of Health     Financial Resource Strain: Medium Risk    Difficulty of Paying Living Expenses: Somewhat hard   Food Insecurity: No Food Insecurity    Worried About Running Out of Food in the Last Year: Never true    Ran Out of Food in the Last Year: Never true   Transportation Needs: No Transportation Needs    Lack of Transportation (Medical): No    Lack of Transportation (Non-Medical): No   Physical Activity: Inactive    Days of Exercise per Week: 0 days    Minutes of Exercise per Session: 0 min   Stress: Stress Concern Present    Feeling of Stress : To some extent   Social Connections: Moderately Integrated    Frequency of Communication with Friends and Family: More than three times a week    Frequency of Social Gatherings with Friends and Family: Never    Attends Druze Services: More than 4 times per year    Active Member of Clubs or Organizations: No    Attends Club or Organization Meetings: Never    Marital Status: Living with partner   Housing Stability: Low Risk     Unable to Pay for Housing in the Last Year: No    Number of Places Lived in the Last Year: 1     Unstable Housing in the Last Year: No       Family History:  Family History   Problem Relation Age of Onset    Cataracts Mother     Cancer Mother         throat    Hypertension Mother     Hypertension Father     Stroke Father         2 strokes    Hypertension Sister     Cancer Brother         spinal, kidney, spinal fluid    Hypertension Brother     Cancer Cousin         breast?       Allergies and Medications: (updated and reviewed)  Review of patient's allergies indicates:   Allergen Reactions    Levofloxacin Hallucinations    Sulfa (sulfonamide antibiotics) Rash     Current Outpatient Medications   Medication Sig Dispense Refill    albuterol (PROVENTIL) 2.5 mg /3 mL (0.083 %) nebulizer solution Take 3 mLs (2.5 mg total) by nebulization every 6 (six) hours as needed for Wheezing or Shortness of Breath. 9 mL 1    albuterol (PROVENTIL/VENTOLIN HFA) 90 mcg/actuation inhaler Inhale 1-2 puffs into the lungs every 6 (six) hours as needed for Wheezing or Shortness of Breath. Rescue 18 g 5    amitriptyline (ELAVIL) 25 MG tablet Take 1 tablet (25 mg total) by mouth every evening. 30 tablet 11    amLODIPine (NORVASC) 10 MG tablet Take 1 tablet (10 mg total) by mouth once daily. 90 tablet 3    busPIRone (BUSPAR) 10 MG tablet Take 10 mg by mouth 3 (three) times daily.      candesartan (ATACAND) 4 MG tablet Take 1 tablet (4 mg total) by mouth once daily. 90 tablet 1    DUPIXENT SYRINGE 300 mg/2 mL Syrg Inject 1 syringe (300mg) into the skin every other week 4 mL 11    fluticasone propionate (FLONASE) 50 mcg/actuation nasal spray 2 sprays (100 mcg total) by Each Nostril route once daily. 16 mL 5    gabapentin (NEURONTIN) 300 MG capsule Take 1 capsule (300 mg total) by mouth 2 (two) times daily. 180 capsule 2    gemfibroziL (LOPID) 600 MG tablet TAKE 1 TABLET BY MOUTH TWICE A DAY BEFORE MEALS 180 tablet 3    hydrOXYzine (ATARAX) 50 MG tablet Take 1 tablet (50 mg total) by mouth 3 (three) times daily as needed for Itching. 270  tablet 3    metFORMIN (GLUCOPHAGE) 1000 MG tablet Take 1 tablet (1,000 mg total) by mouth 2 (two) times daily. 180 tablet 3    metFORMIN (GLUCOPHAGE) 1000 MG tablet Take 1 tablet (1,000 mg total) by mouth 2 (two) times daily. 180 tablet 3    methylPREDNISolone (MEDROL DOSEPACK) 4 mg tablet use as directed 21 each 0    multivitamin capsule Take 1 capsule by mouth once daily.      pantoprazole (PROTONIX) 40 MG tablet TAKE 1 TABLET BY MOUTH EVERY DAY 90 tablet 3    promethazine-dextromethorphan (PROMETHAZINE-DM) 6.25-15 mg/5 mL Syrp Take 5 mLs by mouth every 4 (four) hours as needed (for cough, congestion). 118 mL 1    quetiapine (SEROQUEL) 300 MG Tab Take 300 mg by mouth every evening.      simvastatin (ZOCOR) 80 MG tablet Take 1 tablet (80 mg total) by mouth once daily. (Patient taking differently: Take 80 mg by mouth nightly.) 90 tablet 0    sumatriptan (IMITREX) 100 MG tablet Take 1 tablet (100 mg total) by mouth daily as needed for Migraine. 30 tablet 3    tamsulosin (FLOMAX) 0.4 mg Cap TAKE 2 CAPSULES BY MOUTH EVERY  capsule 3    tiZANidine (ZANAFLEX) 4 MG tablet Take 1 tablet (4 mg total) by mouth every 8 (eight) hours. 30 tablet 1    triamcinolone acetonide 0.1% (KENALOG) 0.1 % cream AAA bid prn eczema. Do not use on face. 454 g 5    zonisamide (ZONEGRAN) 50 MG Cap Take 1 capsule (50 mg total) by mouth 2 (two) times daily. 180 capsule 3    tadalafiL (CIALIS) 10 MG tablet Take 1 tablet (10 mg total) by mouth daily as needed for Erectile Dysfunction. 30 tablet 0     No current facility-administered medications for this visit.       Patient Care Team:  Kaylie Villalta MD as PCP - General (Family Medicine)  Warren Rondon OD as Consulting Physician (Optometry)  Jessica Black LPN as Care Coordinator  Ollie Palumbo II, MD as Consulting Physician (Gastroenterology)         - The patient is given an After Visit Summary that lists all medications with directions, allergies, education, orders  placed during this encounter and follow-up instructions.      - I have reviewed the patient's medical information including past medical, family, and social history sections including the medications and allergies.      - We discussed the patient's current medications.     This note was created by combination of typed  and MModal dictation.  Transcription errors may be present.  If there are any questions, please contact me.        Shell Edwards NP

## 2023-07-12 NOTE — PATIENT INSTRUCTIONS
Medical Fitness--[phone]  Imaging, Xray, CT, MRI, Ultrasound---[phone]  Bariatrics---[phone]  Breast Surgery---[phone]  Case Management---[phone]  Colonoscopy---[phone]  DME---[phone]  Infectious Disease---[phone]  Interventional Radiology---[phone]  Medical Records---[phone]  [facility]---[phone]  Optometry/Ophthalmology---[phone]  [facility]---[phone]  Physical Therapy---[phone]  Psychiatry---[phone] or [phone]  Plastic Surgery---[phone]  Recovery--[phone] option 2, or [phone].  Sleep Study---[phone]  Smoking Cessation---[phone]  Wound Care---[phone]  Referral Desk---[phone]

## 2023-07-13 DIAGNOSIS — N52.9 ERECTILE DYSFUNCTION, UNSPECIFIED ERECTILE DYSFUNCTION TYPE: Primary | ICD-10-CM

## 2023-07-13 RX ORDER — TADALAFIL 10 MG/1
TABLET ORAL
Qty: 30 TABLET | Refills: 0 | Status: SHIPPED | OUTPATIENT
Start: 2023-07-13 | End: 2023-07-13 | Stop reason: ALTCHOICE

## 2023-07-13 RX ORDER — SILDENAFIL 25 MG/1
50 TABLET, FILM COATED ORAL DAILY PRN
Qty: 30 TABLET | Refills: 0 | OUTPATIENT
Start: 2023-07-13 | End: 2024-02-26

## 2023-07-18 LAB
LEFT EYE DM RETINOPATHY: NEGATIVE
RIGHT EYE DM RETINOPATHY: NEGATIVE

## 2023-07-19 ENCOUNTER — OFFICE VISIT (OUTPATIENT)
Dept: PODIATRY | Facility: CLINIC | Age: 63
End: 2023-07-19
Payer: MEDICARE

## 2023-07-19 VITALS — HEIGHT: 69 IN | BODY MASS INDEX: 35.23 KG/M2 | WEIGHT: 237.88 LBS

## 2023-07-19 DIAGNOSIS — E11.42 TYPE 2 DIABETES MELLITUS WITH DIABETIC POLYNEUROPATHY, WITHOUT LONG-TERM CURRENT USE OF INSULIN: ICD-10-CM

## 2023-07-19 PROCEDURE — 3051F HG A1C>EQUAL 7.0%<8.0%: CPT | Mod: CPTII,S$GLB,, | Performed by: PODIATRIST

## 2023-07-19 PROCEDURE — 1159F PR MEDICATION LIST DOCUMENTED IN MEDICAL RECORD: ICD-10-PCS | Mod: CPTII,S$GLB,, | Performed by: PODIATRIST

## 2023-07-19 PROCEDURE — 99203 PR OFFICE/OUTPT VISIT, NEW, LEVL III, 30-44 MIN: ICD-10-PCS | Mod: S$GLB,,, | Performed by: PODIATRIST

## 2023-07-19 PROCEDURE — 1159F MED LIST DOCD IN RCRD: CPT | Mod: CPTII,S$GLB,, | Performed by: PODIATRIST

## 2023-07-19 PROCEDURE — 3051F PR MOST RECENT HEMOGLOBIN A1C LEVEL 7.0 - < 8.0%: ICD-10-PCS | Mod: CPTII,S$GLB,, | Performed by: PODIATRIST

## 2023-07-19 PROCEDURE — 3008F BODY MASS INDEX DOCD: CPT | Mod: CPTII,S$GLB,, | Performed by: PODIATRIST

## 2023-07-19 PROCEDURE — 3008F PR BODY MASS INDEX (BMI) DOCUMENTED: ICD-10-PCS | Mod: CPTII,S$GLB,, | Performed by: PODIATRIST

## 2023-07-19 PROCEDURE — 99999 PR PBB SHADOW E&M-EST. PATIENT-LVL III: ICD-10-PCS | Mod: PBBFAC,,, | Performed by: PODIATRIST

## 2023-07-19 PROCEDURE — 99203 OFFICE O/P NEW LOW 30 MIN: CPT | Mod: S$GLB,,, | Performed by: PODIATRIST

## 2023-07-19 PROCEDURE — 4010F ACE/ARB THERAPY RXD/TAKEN: CPT | Mod: CPTII,S$GLB,, | Performed by: PODIATRIST

## 2023-07-19 PROCEDURE — 4010F PR ACE/ARB THEARPY RXD/TAKEN: ICD-10-PCS | Mod: CPTII,S$GLB,, | Performed by: PODIATRIST

## 2023-07-19 PROCEDURE — 99999 PR PBB SHADOW E&M-EST. PATIENT-LVL III: CPT | Mod: PBBFAC,,, | Performed by: PODIATRIST

## 2023-07-19 NOTE — PATIENT INSTRUCTIONS
Recommend lotions: eucerin, eucerin for diabetics, aquaphor, A&D ointment, gold bond for diabetics, sween, Viola's Bees all purpose baby ointment,  urea 40 with aloe (found on amazon.com)    Shoe recommendations: (try 6pm.com, zappos.com , nordstromrack.YoQueVos, or shoes.YoQueVos for discounted prices) you can visit [employer] shoes in [place]  or [employer] in the [facility] (there are also several shoe brand outlets in the [facility])    Asics (GT 2000 or gel foundations), new balance stability type shoes, saucony (stabil c3),  Cortez (GTS or Beast or transcend), propet (tennis shoe)    Sofft Brand or axxiom (women) Jatinder&Aguilar (men), clarks, crocs, aerosoles, naturalizers, SAS, ecco, born, lilli meek, rockports (dress shoes)    Vionic, burkenstocks, fitflops, propet (sandals)  Nike comfort thong sandals, crocs, propet (house shoes)    Nail Home remedy:  Vicks Vapor rub to nails for easier manageability    Diabetes: Inspecting Your Feet  Diabetes increases your chances of developing foot problems. So inspect your feet every day. This helps you find small skin irritations before they become serious infections. If you have trouble seeing the bottoms of your feet, use a mirror or ask a family member or friend to help.     Pressure spots on the bottom of the foot are common areas where problems develop.   How to check your feet  Below are tips to help you look for foot problems. Try to check your feet at the same time each day, such as when you get out of bed in the morning:  Check the top of each foot. The tops of toes, back of the heel, and outer edge of the foot can get a lot of rubbing from poor-fitting shoes.  Check the bottom of each foot. Daily wear and tear often leads to problems at pressure spots.  Check the toes and nails. Fungal infections often occur between toes. Toenail problems can also be a sign of fungal infections or lead to breaks in the skin.  Check your shoes, too. Loose objects inside a shoe can injure the  foot. Use your hand to feel inside your shoes for things like vin, loose stitching, or rough areas that could irritate your skin.  Warning signs  Look for any color changes in the foot. Redness with streaks can signal a severe infection, which needs immediate medical attention. Tell your doctor right away if you have any of these problems:  Swelling, sometimes with color changes, may be a sign of poor blood flow or infection. Symptoms include tenderness and an increase in the size of your foot.  Warm or hot areas on your feet may be signs of infection. A foot that is cold may not be getting enough blood.  Sensations such as burning, tingling, or pins and needles can be signs of a problem. Also check for areas that may be numb.  Hot spots are caused by friction or pressure. Look for hot spots in areas that get a lot of rubbing. Hot spots can turn into blisters, calluses, or sores.  Cracks and sores are caused by dry or irritated skin. They are a sign that the skin is breaking down, which can lead to infection.  Toenail problems to watch for include nails growing into the skin (ingrown toenail) and causing redness or pain. Thick, yellow, or discolored nails can signal a fungal infection.  Drainage and odor can develop from untreated sores and ulcers. Call your doctor right away if you notice white or yellow drainage, bleeding, or unpleasant odor.   © 9728-3214 [employer]. [address] [place] [address]. All rights reserved. This information is not intended as a substitute for professional medical care. Always follow your healthcare professional's instructions.        Step-by-Step:  Inspecting Your Feet (Diabetes)    Date Last Reviewed: 10/1/2016  © 3304-9537 [employer]. [address] [place] [address]. All rights reserved. This information is not intended as a substitute for professional medical care. Always follow your healthcare professional's  instructions.

## 2023-07-19 NOTE — PROGRESS NOTES
Subjective:     Patient ID: Scott Bansal is a 62 y.o. male.    Chief Complaint: Diabetes Mellitus and Diabetic Foot Exam (6/21/23 Aldo Watson)    Scott is a 62 y.o. male who presents to the clinic upon referral from Dr. Watson  for evaluation and treatment of diabetic feet. Scott has a past medical history of Bipolar 1 disorder, mixed, BPH (benign prostatic hypertrophy), Colon polyp, Cyst, eyelid, Diabetes mellitus, GERD (gastroesophageal reflux disease), Hyperlipidemia, Hypertension, Lumbar degenerative disc disease (3/7/2019), Migraine headache, and Obesity. Presents for diabetic foot risk assessment.       PCP: Kaylie Villalta MD    Date Last Seen by PCP: per above    Current shoe gear: Tennis shoes    Hemoglobin A1C   Date Value Ref Range Status   06/08/2023 7.5 (H) 4.0 - 5.6 % Final     Comment:     ADA Screening Guidelines:  5.7-6.4%  Consistent with prediabetes  >or=6.5%  Consistent with diabetes    High levels of fetal hemoglobin interfere with the HbA1C  assay. Heterozygous hemoglobin variants (HbS, HgC, etc)do  not significantly interfere with this assay.   However, presence of multiple variants may affect accuracy.     10/31/2022 6.5 (H) 4.0 - 5.6 % Final     Comment:     ADA Screening Guidelines:  5.7-6.4%  Consistent with prediabetes  >or=6.5%  Consistent with diabetes    High levels of fetal hemoglobin interfere with the HbA1C  assay. Heterozygous hemoglobin variants (HbS, HgC, etc)do  not significantly interfere with this assay.   However, presence of multiple variants may affect accuracy.     04/19/2022 6.4 (H) 4.0 - 5.6 % Final     Comment:     ADA Screening Guidelines:  5.7-6.4%  Consistent with prediabetes  >or=6.5%  Consistent with diabetes    High levels of fetal hemoglobin interfere with the HbA1C  assay. Heterozygous hemoglobin variants (HbS, HgC, etc)do  not significantly interfere with this assay.   However, presence of multiple variants may affect accuracy.           Review of Systems    Constitutional: Negative for chills.   Cardiovascular:  Negative for chest pain and claudication.   Respiratory:  Negative for cough.    Skin:  Positive for color change, dry skin and nail changes.   Musculoskeletal:  Positive for joint pain.   Gastrointestinal:  Negative for nausea.   Neurological:  Positive for paresthesias. Negative for numbness.   Psychiatric/Behavioral:  The patient is not nervous/anxious.       Objective:     Physical Exam  Constitutional:       Appearance: He is well-developed.      Comments: Oriented to time, place, and person.   Cardiovascular:      Comments: DP and PT pulses are palpable bilaterally. 3 sec capillary refill time and toes and feet are warm to touch proximally .  There is  hair growth on the feet and toes b/l. There is no edema b/l. No spider veins or varicosities present b/l.     Musculoskeletal:      Comments: Equinus noted b/l ankles with < 10 deg DF noted. MMT 5/5 in DF/PF/Inv/Ev resistance with no reproduction of pain in any direction. Passive range of motion of ankle and pedal joints is painless b/l.     Feet:      Right foot:      Skin integrity: No callus or dry skin.      Left foot:      Skin integrity: No callus or dry skin.   Lymphadenopathy:      Comments: Negative lymphadenopathy bilateral popliteal fossa and tarsal tunnel.   Skin:     Comments: No open lesions, lacerations or wounds noted.Interdigital spaces clean, dry and intact b/l. No erythema noted to b/l foot.  Nails normal color and trophic qualities.     Neurological:      Mental Status: He is alert.      Comments: Light touch, proprioception, and sharp/dull sensation are all intact bilaterally. Protective threshold with the Riverside-Wienstein monofilament is intact bilaterally.    Psychiatric:         Behavior: Behavior is cooperative.         Assessment:      Encounter Diagnosis   Name Primary?    Type 2 diabetes mellitus with diabetic polyneuropathy, without long-term current use of insulin      Plan:      Scott was seen today for diabetes mellitus and diabetic foot exam.    Diagnoses and all orders for this visit:    Type 2 diabetes mellitus with diabetic polyneuropathy, without long-term current use of insulin  -     Ambulatory referral/consult to Podiatry      I counseled the patient on his conditions, their implications and medical management.        - Shoe inspection. Diabetic Foot Education. Patient reminded of the importance of good nutrition and blood sugar control to help prevent podiatric complications of diabetes. Patient instructed on proper foot hygeine. We discussed wearing proper shoe gear, daily foot inspections, never walking without protective shoe gear, caution putting sharp instruments to feet     - Discussed DM foot care:  Wear comfortable, proper fitting shoes. Wash feet daily. Dry well. After drying, apply moisturizer to feet (no lotion to webspaces). Inspect feet daily for skin breaks, blisters, swelling, or redness. Wear cotton socks (preferably white)  Change socks every day. Do NOT walk barefoot. Do NOT use heating pads or warm/hot water soaks     F/u one year DM foot exam sooner PRN.

## 2023-08-07 DIAGNOSIS — E78.5 HYPERLIPIDEMIA, UNSPECIFIED HYPERLIPIDEMIA TYPE: Primary | ICD-10-CM

## 2023-08-07 RX ORDER — SIMVASTATIN 80 MG/1
80 TABLET, FILM COATED ORAL NIGHTLY
Qty: 90 TABLET | Refills: 3 | Status: SHIPPED | OUTPATIENT
Start: 2023-08-07

## 2023-08-07 NOTE — TELEPHONE ENCOUNTER
----- Message from Brandy Brown sent at 8/7/2023 10:27 AM CDT -----  Regarding: Refill Request  .Type: RX Refill Request    Who Called: self  Have you contacted your pharmacy:    Refill or New Rx: Refill    RX Name and Strength:simvastatin (ZOCOR) 80 MG tablet    Preferred Pharmacy with phone number: .  [facility]/pharmacy #[id number] - [place] - [address]  [address] [place] [address]  Phone: [phone] Fax: [phone]       Local or Mail Order: local    Ordering Provider: Pawan    Would the patient rather a call back or a response via My [facility]? call    Best Call Back Number: [phone]     Additional Information:

## 2023-08-07 NOTE — TELEPHONE ENCOUNTER
Care Due:                  Date            Visit Type   Department     Provider  --------------------------------------------------------------------------------                                [facility]                              PRIMARY      MED/ INTERNAL  Last Visit: 05-      CARE ([facility])   MED/ PEDS      Per Kenney                              [facility]                              PRIMARY      MED/ INTERNAL  Next Visit: 11-      CARE ([facility])   MED/ PEDS      Kaylie Chau                                                            Last  Test          Frequency    Reason                     Performed    Due Date  --------------------------------------------------------------------------------    Lipid Panel.  12 months..  gemfibroziL, simvastatin.  10-   10-    [facility] Embedded Care Due Messages. Reference number: [id number].   8/07/2023 10:59:54 AM CDT

## 2023-08-10 ENCOUNTER — PATIENT OUTREACH (OUTPATIENT)
Dept: ADMINISTRATIVE | Facility: HOSPITAL | Age: 63
End: 2023-08-10
Payer: MEDICARE

## 2023-08-10 ENCOUNTER — OFFICE VISIT (OUTPATIENT)
Dept: DERMATOLOGY | Facility: CLINIC | Age: 63
End: 2023-08-10
Payer: MEDICARE

## 2023-08-10 DIAGNOSIS — B35.4 TINEA CORPORIS: ICD-10-CM

## 2023-08-10 DIAGNOSIS — Z79.899 ENCOUNTER FOR LONG-TERM (CURRENT) USE OF MEDICATIONS: ICD-10-CM

## 2023-08-10 DIAGNOSIS — L20.89 OTHER ATOPIC DERMATITIS: Primary | ICD-10-CM

## 2023-08-10 DIAGNOSIS — L20.89 OTHER ATOPIC DERMATITIS: ICD-10-CM

## 2023-08-10 DIAGNOSIS — L91.8 ACROCHORDON: ICD-10-CM

## 2023-08-10 PROCEDURE — 99999 PR PBB SHADOW E&M-EST. PATIENT-LVL II: ICD-10-PCS | Mod: PBBFAC,,, | Performed by: DERMATOLOGY

## 2023-08-10 PROCEDURE — 99214 OFFICE O/P EST MOD 30 MIN: CPT | Mod: S$GLB,,, | Performed by: DERMATOLOGY

## 2023-08-10 PROCEDURE — 3051F HG A1C>EQUAL 7.0%<8.0%: CPT | Mod: CPTII,S$GLB,, | Performed by: DERMATOLOGY

## 2023-08-10 PROCEDURE — 3051F PR MOST RECENT HEMOGLOBIN A1C LEVEL 7.0 - < 8.0%: ICD-10-PCS | Mod: CPTII,S$GLB,, | Performed by: DERMATOLOGY

## 2023-08-10 PROCEDURE — 1160F PR REVIEW ALL MEDS BY PRESCRIBER/CLIN PHARMACIST DOCUMENTED: ICD-10-PCS | Mod: CPTII,S$GLB,, | Performed by: DERMATOLOGY

## 2023-08-10 PROCEDURE — 99999 PR PBB SHADOW E&M-EST. PATIENT-LVL II: CPT | Mod: PBBFAC,,, | Performed by: DERMATOLOGY

## 2023-08-10 PROCEDURE — 1159F PR MEDICATION LIST DOCUMENTED IN MEDICAL RECORD: ICD-10-PCS | Mod: CPTII,S$GLB,, | Performed by: DERMATOLOGY

## 2023-08-10 PROCEDURE — 99214 PR OFFICE/OUTPT VISIT, EST, LEVL IV, 30-39 MIN: ICD-10-PCS | Mod: S$GLB,,, | Performed by: DERMATOLOGY

## 2023-08-10 PROCEDURE — 4010F ACE/ARB THERAPY RXD/TAKEN: CPT | Mod: CPTII,S$GLB,, | Performed by: DERMATOLOGY

## 2023-08-10 PROCEDURE — 1159F MED LIST DOCD IN RCRD: CPT | Mod: CPTII,S$GLB,, | Performed by: DERMATOLOGY

## 2023-08-10 PROCEDURE — 4010F PR ACE/ARB THEARPY RXD/TAKEN: ICD-10-PCS | Mod: CPTII,S$GLB,, | Performed by: DERMATOLOGY

## 2023-08-10 PROCEDURE — 1160F RVW MEDS BY RX/DR IN RCRD: CPT | Mod: CPTII,S$GLB,, | Performed by: DERMATOLOGY

## 2023-08-10 RX ORDER — DOXYCYCLINE 100 MG/1
CAPSULE ORAL
COMMUNITY
Start: 2023-06-13 | End: 2023-10-27 | Stop reason: SDUPTHER

## 2023-08-10 RX ORDER — AMOXICILLIN 500 MG/1
TABLET, FILM COATED ORAL 3 TIMES DAILY
COMMUNITY
Start: 2023-05-15 | End: 2023-11-10

## 2023-08-10 RX ORDER — ECONAZOLE NITRATE 10 MG/G
CREAM TOPICAL
Qty: 30 G | Refills: 3 | Status: SHIPPED | OUTPATIENT
Start: 2023-08-10

## 2023-08-10 RX ORDER — TADALAFIL 10 MG/1
10 TABLET ORAL DAILY PRN
COMMUNITY
Start: 2023-07-17

## 2023-08-10 NOTE — PROGRESS NOTES
Subjective:      Patient ID:  Scott Bansal is a 62 y.o. male who presents for   Chief Complaint   Patient presents with    Rash     On dupixent. Pt would like a 3 month supply.     Spot     Red spot in clavicle area.       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  He restarted dupixent x 1 month due to flares of the bilateral hands after last visit.       Prior treatments: dupixent (intermittent 7/2020), PO prednisone, hydroxyzine 50 mg TID, clotrimazole-betamethasone, mometasone, TAC oint, TAC cream, mupirocin, betamethasone oint     Denies history of fever blisters.  Denies conjunctivitis, pain in the eyes or blisters near the eyes.          Review of Systems   Constitutional:  Negative for fever and chills.   Gastrointestinal:  Negative for nausea and vomiting.   Skin:  Positive for itching, rash and activity-related sunscreen use. Negative for daily sunscreen use and recent sunburn.   Hematologic/Lymphatic: Does not bruise/bleed easily.       Objective:   Physical Exam   Constitutional: He appears well-developed and well-nourished. No distress.   Neurological: He is alert and oriented to person, place, and time. He is not disoriented.   Psychiatric: He has a normal mood and affect.   Skin:   Areas Examined (abnormalities noted in diagram):   Head / Face Inspection Performed  Neck Inspection Performed  Chest / Axilla Inspection Performed  Back Inspection Performed  RUE Inspected  LUE Inspection Performed  Nails and Digits Inspection Performed                Diagram Legend     Erythematous scaling macule/papule c/w actinic keratosis       Vascular papule c/w angioma      Pigmented verrucoid papule/plaque c/w seborrheic keratosis      Yellow umbilicated papule c/w sebaceous hyperplasia      Irregularly shaped tan macule c/w lentigo     1-2 mm smooth white papules consistent with Milia      Movable  subcutaneous cyst with punctum c/w epidermal inclusion cyst      Subcutaneous movable cyst c/w pilar cyst      Firm pink to brown papule c/w dermatofibroma      Pedunculated fleshy papule(s) c/w skin tag(s)      Evenly pigmented macule c/w junctional nevus     Mildly variegated pigmented, slightly irregular-bordered macule c/w mildly atypical nevus      Flesh colored to evenly pigmented papule c/w intradermal nevus       Pink pearly papule/plaque c/w basal cell carcinoma      Erythematous hyperkeratotic cursted plaque c/w SCC      Surgical scar with no sign of skin cancer recurrence      Open and closed comedones      Inflammatory papules and pustules      Verrucoid papule consistent consistent with wart     Erythematous eczematous patches and plaques     Dystrophic onycholytic nail with subungual debris c/w onychomycosis     Umbilicated papule    Erythematous-base heme-crusted tan verrucoid plaque consistent with inflamed seborrheic keratosis     Erythematous Silvery Scaling Plaque c/w Psoriasis     See annotation      Assessment / Plan:        Other atopic dermatitis  Encounter for long-term (current) use of medications  CBC reviewed from 6/12/23 and CMP from 6/8/23. Discussed elevation in blood glucose on CMP and pt should f/u with PCP. Continue dupixent. + improvement in hands EASI = 0.  F/u in 6 months to 1 year.    Reviewed side effects of immunosuppression, conjunctivitis/keratitis and reactivation of the herpes virus. The patient acknowledged understanding and wishes to proceed with Dupixent therapy.     Tinea corporis  -     econazole nitrate 1 % cream; AAA bid for rash on shoulders  Dispense: 30 g; Refill: 3  -     Vs. Mild seb derm. Will start econazole cream bid.    Acrochordon  Reassurance given.  Lesions are benign.           Follow up in about 6 months (around 2/10/2024).

## 2023-08-11 RX ORDER — DUPILUMAB 300 MG/2ML
INJECTION, SOLUTION SUBCUTANEOUS
Qty: 4 ML | Refills: 11 | OUTPATIENT
Start: 2023-08-11 | End: 2024-02-26 | Stop reason: SDUPTHER

## 2023-08-14 ENCOUNTER — OFFICE VISIT (OUTPATIENT)
Dept: FAMILY MEDICINE | Facility: CLINIC | Age: 63
End: 2023-08-14
Payer: MEDICARE

## 2023-08-14 VITALS
BODY MASS INDEX: 37.22 KG/M2 | WEIGHT: 251.31 LBS | TEMPERATURE: 98 F | HEIGHT: 69 IN | SYSTOLIC BLOOD PRESSURE: 142 MMHG | DIASTOLIC BLOOD PRESSURE: 79 MMHG | HEART RATE: 101 BPM | OXYGEN SATURATION: 97 %

## 2023-08-14 DIAGNOSIS — G44.229 CHRONIC TENSION-TYPE HEADACHE, NOT INTRACTABLE: ICD-10-CM

## 2023-08-14 DIAGNOSIS — F31.9 BIPOLAR 1 DISORDER: ICD-10-CM

## 2023-08-14 DIAGNOSIS — M54.50 CHRONIC BILATERAL LOW BACK PAIN WITHOUT SCIATICA: ICD-10-CM

## 2023-08-14 DIAGNOSIS — I10 ESSENTIAL HYPERTENSION: ICD-10-CM

## 2023-08-14 DIAGNOSIS — M54.50 ACUTE MIDLINE LOW BACK PAIN WITHOUT SCIATICA: ICD-10-CM

## 2023-08-14 DIAGNOSIS — N40.0 BENIGN PROSTATIC HYPERPLASIA, UNSPECIFIED WHETHER LOWER URINARY TRACT SYMPTOMS PRESENT: ICD-10-CM

## 2023-08-14 DIAGNOSIS — G89.29 CHRONIC BILATERAL LOW BACK PAIN WITHOUT SCIATICA: ICD-10-CM

## 2023-08-14 DIAGNOSIS — E11.42 TYPE 2 DIABETES MELLITUS WITH DIABETIC POLYNEUROPATHY, WITHOUT LONG-TERM CURRENT USE OF INSULIN: Primary | ICD-10-CM

## 2023-08-14 LAB — GLUCOSE SERPL-MCNC: 177 MG/DL (ref 70–110)

## 2023-08-14 PROCEDURE — 3008F BODY MASS INDEX DOCD: CPT | Mod: CPTII,S$GLB,,

## 2023-08-14 PROCEDURE — 1159F PR MEDICATION LIST DOCUMENTED IN MEDICAL RECORD: ICD-10-PCS | Mod: CPTII,S$GLB,,

## 2023-08-14 PROCEDURE — 3051F HG A1C>EQUAL 7.0%<8.0%: CPT | Mod: CPTII,S$GLB,,

## 2023-08-14 PROCEDURE — 82962 POCT GLUCOSE, HAND-HELD DEVICE: ICD-10-PCS | Mod: S$GLB,,,

## 2023-08-14 PROCEDURE — 1159F MED LIST DOCD IN RCRD: CPT | Mod: CPTII,S$GLB,,

## 2023-08-14 PROCEDURE — 99999 PR PBB SHADOW E&M-EST. PATIENT-LVL V: ICD-10-PCS | Mod: PBBFAC,,,

## 2023-08-14 PROCEDURE — 2023F DILAT RTA XM W/O RTNOPTHY: CPT | Mod: CPTII,S$GLB,,

## 2023-08-14 PROCEDURE — 99214 PR OFFICE/OUTPT VISIT, EST, LEVL IV, 30-39 MIN: ICD-10-PCS | Mod: S$GLB,,,

## 2023-08-14 PROCEDURE — 82962 GLUCOSE BLOOD TEST: CPT | Mod: S$GLB,,,

## 2023-08-14 PROCEDURE — 3077F SYST BP >= 140 MM HG: CPT | Mod: CPTII,S$GLB,,

## 2023-08-14 PROCEDURE — 99999 PR PBB SHADOW E&M-EST. PATIENT-LVL V: CPT | Mod: PBBFAC,,,

## 2023-08-14 PROCEDURE — 3051F PR MOST RECENT HEMOGLOBIN A1C LEVEL 7.0 - < 8.0%: ICD-10-PCS | Mod: CPTII,S$GLB,,

## 2023-08-14 PROCEDURE — 3078F DIAST BP <80 MM HG: CPT | Mod: CPTII,S$GLB,,

## 2023-08-14 PROCEDURE — 3078F PR MOST RECENT DIASTOLIC BLOOD PRESSURE < 80 MM HG: ICD-10-PCS | Mod: CPTII,S$GLB,,

## 2023-08-14 PROCEDURE — 3077F PR MOST RECENT SYSTOLIC BLOOD PRESSURE >= 140 MM HG: ICD-10-PCS | Mod: CPTII,S$GLB,,

## 2023-08-14 PROCEDURE — 3008F PR BODY MASS INDEX (BMI) DOCUMENTED: ICD-10-PCS | Mod: CPTII,S$GLB,,

## 2023-08-14 PROCEDURE — 4010F PR ACE/ARB THEARPY RXD/TAKEN: ICD-10-PCS | Mod: CPTII,S$GLB,,

## 2023-08-14 PROCEDURE — 4010F ACE/ARB THERAPY RXD/TAKEN: CPT | Mod: CPTII,S$GLB,,

## 2023-08-14 PROCEDURE — 99214 OFFICE O/P EST MOD 30 MIN: CPT | Mod: S$GLB,,,

## 2023-08-14 PROCEDURE — 2023F PR DILATED RETINAL EXAM W/O EVID OF RETINOPATHY: ICD-10-PCS | Mod: CPTII,S$GLB,,

## 2023-08-14 NOTE — PROGRESS NOTES
"    HPI     Chief Complaint:  Chief Complaint   Patient presents with    Tailbone Pain    Blood Sugar Problem     222 before eating sugar was 915 it was 174, 1145 bp was I 144/73       Scott Bansal is a 62 y.o. male with multiple medical diagnoses as listed in the medical history and problem list that presents for multiple concerns.  Pt is new to me but seen in clinic with last appointment 7/12/2023.      HPI      HTN: Pt concerned for elevated BP trend. /90 with HR of 102 prior to coming today. Pt admits BP always above 130/80 at home readings. Compliant with meds. Does not follow diet or exercise...  BG: preprandial  this morning, pt was very worried. Ate fried shrimp and baked potato last night at 6pm. Denies alcohol intake, no late night snacks. Checked BG prior to eating/drinking anything and it was already elevated. Denies polyuria/polydipsia/polyphagia. Compliant with metformin bid 1g.   Chest pain, once per week or biweekly will develop sharp pain for a few seconds and then will resolve. Denies associated chest pain or SOB. Describes pain as "claw" that will stop pt in tracks and then resolve. Will occur when watching tv, Not with exertion. Denies reflux. Has been going on for years, seen by cardiology in the past. No worsening changes in chest pain.   Back pain: endorses lower back pain after picking up heavy stuff in garage x 1 day. History of lower back pain.     Other concerns below  Assessment & Plan       Problem List Items Addressed This Visit          Neuro    Chronic tension-type headache, not intractable    Overview     Cannot tolerate propranolol because side effects (dizziness, decreased alertness)  No improvement with imitrex  Minimal improvement with fiorcet          Current Assessment & Plan     Zone[place]    The current medical regimen is effective;  continue present plan and medications.              Psychiatric    Bipolar 1 disorder    Overview     Currently on buspar, " bupropion seroquel  Dr. Cordova at [facility]          Current Assessment & Plan     eren Benjamin    The current medical regimen is effective;  continue present plan and medications.              Cardiac/Vascular    Essential hypertension    Overview     Cannot tolerate sulfa  Currently on amlodipine, candesartan, furosemide  Previously took irbesartan and clonidine          Current Assessment & Plan     Amlodipine 10mg (morning), candasartan (lunch), flomax    Does not monitor diet intake for low sodium. Will f/u in 2 weeks with BP log and diet changes. Could be r/t poor diet adherence. No acute changes in CP. No SOB, dizziness, etc.     TLC: Therapeutic Lifestyle Changes    Weight reduction: maintain a normal body weight (BMI 19-25)  DASH diet: Consume diet rich in fruits, vegetables, and low-fat dairy products with a reduced content of saturated fat and total fat.   Low-sodium diet: consume <2400mg of sodium per day  Increase physical activity: regular aerobic physical activity (150 min per week)  Limit alcohol consumption: Less than 2 drinks/day in men and less than 1 drink/day in women.   QUIT smoking              Renal/    BPH (benign prostatic hyperplasia)    Current Assessment & Plan     Flomax. The current medical regimen is effective;  continue present plan and medications.              Endocrine    Type 2 diabetes mellitus with diabetic polyneuropathy, without long-term current use of insulin - Primary    Current Assessment & Plan     Metformin 1g bid.     Monitor BG daily and bring back log in 2 weeks. Poor diet adherence.     Lab Results   Component Value Date    HGBA1C 7.5 (H) 06/08/2023       Will recheck labs and a1c in 1 month         Relevant Orders    POCT Glucose, Hand-Held Device (Completed)    Hemoglobin A1C    CBC Auto Differential    Comprehensive Metabolic Panel       Orthopedic    Chronic bilateral low back pain without sciatica  650mg tylenol every 6 hours as needed.  Heat/Ice, exercises.   RTC if no improvement in 2 weeks for xray. Consider tizanidine and PT     Other Visit Diagnoses       Acute midline low back pain without sciatica    Denies urinary s/s. No flank pain. Denies hematuria. No acute concerns given acute on chronic pain from heavy lifting. See above.               --------------------------------------------      Health Maintenance:  Health Maintenance         Date Due Completion Date    Influenza Vaccine (1) 09/01/2023 ---    Diabetes Urine Screening 10/31/2023 10/31/2022    Lipid Panel 10/31/2023 10/31/2022    Hemoglobin A1c 12/08/2023 6/8/2023    Colorectal Cancer Screening 06/28/2024 6/28/2021    Eye Exam 07/18/2024 7/18/2023    Foot Exam 07/19/2024 7/19/2023 (Done)    Override on 7/19/2023: Done    Override on 10/26/2020: Done (completed by insurance)    High Dose Statin 08/14/2024 8/14/2023    TETANUS VACCINE 07/07/2027 7/7/2017            Health maintenance reviewed    Follow Up:  Follow up if symptoms worsen or fail to improve.    2 weeks nurse visit for BP check and BG check with log  Labs in 1 month  Med/diet compliance.   Back pain: consider xray or adding in tizanidine if tylenol/exercises do not improve in 1-2 weeks  Discussed DDx, condition, and treatment.   Education sent to patient portal/included in after visit summary.  ED precautions given.   Notify provider if symptoms do not resolve or increase in severity.   Patient verbalizes understanding and agrees with plan of care.      Exam     Review of Systems:  (as noted above)  Review of Systems   Constitutional:  Negative for activity change, appetite change, diaphoresis, fatigue, fever and unexpected weight change.   Respiratory:  Positive for chest tightness. Negative for shortness of breath and wheezing.    Cardiovascular:  Positive for chest pain. Negative for palpitations and leg swelling.   Gastrointestinal:  Negative for abdominal pain, constipation and diarrhea.   Endocrine: Negative for  polydipsia, polyphagia and polyuria.   Genitourinary:  Negative for dysuria, flank pain, frequency, hematuria and urgency.   Musculoskeletal:  Positive for arthralgias and back pain.   Neurological:  Negative for dizziness, tremors, weakness and numbness.   Hematological:  Negative for adenopathy. Does not bruise/bleed easily.       Physical Exam:   Physical Exam  Vitals reviewed.   Constitutional:       General: He is not in acute distress.     Appearance: Normal appearance. He is obese. He is not toxic-appearing.   Neck:      Vascular: No carotid bruit.   Cardiovascular:      Rate and Rhythm: Normal rate and regular rhythm.      Pulses: Normal pulses.      Heart sounds: Normal heart sounds. No murmur heard.  Pulmonary:      Effort: Pulmonary effort is normal. No respiratory distress.      Breath sounds: Normal breath sounds. No wheezing.   Abdominal:      General: Bowel sounds are normal.      Palpations: Abdomen is soft.   Musculoskeletal:      Cervical back: Normal range of motion. No rigidity.      Lumbar back: Spasms and tenderness present. No deformity or signs of trauma. Positive right straight leg raise test and positive left straight leg raise test.        Back:       Right lower leg: No edema.      Left lower leg: No edema.      Comments: Pain will radiate down to tailbone; Denies radiation to groin/legs. No incontinence   Skin:     General: Skin is warm.      Capillary Refill: Capillary refill takes less than 2 seconds.   Neurological:      General: No focal deficit present.      Mental Status: He is alert and oriented to person, place, and time. Mental status is at baseline.   Psychiatric:         Attention and Perception: Attention normal.         Mood and Affect: Mood normal. Affect is blunt and flat.         Speech: Speech is delayed.         Behavior: Behavior normal. Behavior is cooperative.         Thought Content: Thought content normal.         Cognition and Memory: Cognition normal.          "Judgment: Judgment normal.       Vitals:    23 1239   BP: (!) 142/79   Pulse: 101   Temp: 97.8 °F (36.6 °C)   TempSrc: Oral   SpO2: 97%   Weight: 114 kg (251 lb 5.2 oz)   Height: 5' 9" (1.753 m)      Body mass index is 37.11 kg/m².        History     Past Medical History:  Past Medical History:   Diagnosis Date    Bipolar 1 disorder, mixed     BPH (benign prostatic hypertrophy)     Colon polyp     Cyst, eyelid     Dr. Broussard    Diabetes mellitus     GERD (gastroesophageal reflux disease)     Hyperlipidemia     Hypertension     Lumbar degenerative disc disease 3/7/2019    Migraine headache     Obesity        Past Surgical History:  Past Surgical History:   Procedure Laterality Date    CERVICAL SPINE SURGERY      COLONOSCOPY N/A 2017    Procedure: COLONOSCOPY;  Surgeon: Genaro Nix MD;  Location: [facility] ENDO;  Service: Endoscopy;  Laterality: N/A;    COLONOSCOPY N/A 10/30/2020    Procedure: COLONOSCOPY;  Surgeon: Herb Martinez MD;  Location: [facility] ENDO;  Service: Endoscopy;  Laterality: N/A;    EYE SURGERY Left 2017    cyst removal on eyelid; Dr. Broussard    SHOULDER SURGERY      TONSILLECTOMY         Social History:  Social History     Socioeconomic History    Marital status:    Tobacco Use    Smoking status: Former     Current packs/day: 0.00     Average packs/day: 2.0 packs/day for 15.0 years (30.0 ttl pk-yrs)     Types: Cigarettes     Start date: 1969     Quit date: 1984     Years since quittin.2     Passive exposure: Past    Smokeless tobacco: Never    Tobacco comments:     Patient quit smoking at the age of 26 yo.   Substance and Sexual Activity    Alcohol use: No    Drug use: No    Sexual activity: Yes     Partners: Female     Social Determinants of Health     Financial Resource Strain: Medium Risk (10/4/2022)    Overall Financial Resource Strain (CARDIA)     Difficulty of Paying Living Expenses: Somewhat hard   Food Insecurity: No Food Insecurity (10/4/2022)    " Hunger Vital Sign     Worried About Running Out of Food in the Last Year: Never true     Ran Out of Food in the Last Year: Never true   Transportation Needs: No Transportation Needs (10/4/2022)    PRAPARE - Transportation     Lack of Transportation (Medical): No     Lack of Transportation (Non-Medical): No   Physical Activity: Inactive (10/4/2022)    Exercise Vital Sign     Days of Exercise per Week: 0 days     Minutes of Exercise per Session: 0 min   Stress: Stress Concern Present (10/4/2022)    [employer] - Occupational Stress Questionnaire     Feeling of Stress : To some extent   Social Connections: Moderately Integrated (10/4/2022)    Social Connection and Isolation Panel [NHANES]     Frequency of Communication with Friends and Family: More than three times a week     Frequency of Social Gatherings with Friends and Family: Never     Attends Anabaptist Services: More than 4 times per year     Active Member of Clubs or Organizations: No     Attends Club or Organization Meetings: Never     Marital Status: Living with partner   Housing Stability: Low Risk  (10/4/2022)    Housing Stability Vital Sign     Unable to Pay for Housing in the Last Year: No     Number of Places Lived in the Last Year: 1     Unstable Housing in the Last Year: No       Family History:  Family History   Problem Relation Age of Onset    Cataracts Mother     Cancer Mother         throat    Hypertension Mother     Hypertension Father     Stroke Father         2 strokes    Hypertension Sister     Cancer Brother         spinal, kidney, spinal fluid    Hypertension Brother     Cancer Cousin         breast?       Allergies and Medications: (updated and reviewed)  Review of patient's allergies indicates:   Allergen Reactions    Levofloxacin Hallucinations    Sulfa (sulfonamide antibiotics) Rash     Current Outpatient Medications   Medication Sig Dispense Refill    albuterol (PROVENTIL) 2.5 mg /3 mL (0.083 %) nebulizer  solution Take 3 mLs (2.5 mg total) by nebulization every 6 (six) hours as needed for Wheezing or Shortness of Breath. 9 mL 1    albuterol (PROVENTIL/VENTOLIN HFA) 90 mcg/actuation inhaler Inhale 1-2 puffs into the lungs every 6 (six) hours as needed for Wheezing or Shortness of Breath. Rescue 18 g 5    amitriptyline (ELAVIL) 25 MG tablet Take 1 tablet (25 mg total) by mouth every evening. 30 tablet 11    amLODIPine (NORVASC) 10 MG tablet Take 1 tablet (10 mg total) by mouth once daily. 90 tablet 3    amoxicillin (AMOXIL) 500 MG Tab Take by mouth 3 (three) times daily.      busPIRone (BUSPAR) 10 MG tablet Take 10 mg by mouth 3 (three) times daily.      candesartan (ATACAND) 4 MG tablet Take 1 tablet (4 mg total) by mouth once daily. 90 tablet 1    doxycycline (VIBRAMYCIN) 100 MG Cap TAKE 1 CAPSULE BY MOUTH TWICE A DAY FOR 5 DAYS      DUPIXENT SYRINGE 300 mg/2 mL Syrg Inject 1 syringe (300mg) into the skin every other week 4 mL 11    econazole nitrate 1 % cream AAA bid for rash on shoulders 30 g 3    fluticasone propionate (FLONASE) 50 mcg/actuation nasal spray 2 sprays (100 mcg total) by Each Nostril route once daily. 16 mL 5    gabapentin (NEURONTIN) 300 MG capsule Take 1 capsule (300 mg total) by mouth 2 (two) times daily. 180 capsule 2    gemfibroziL (LOPID) 600 MG tablet TAKE 1 TABLET BY MOUTH TWICE A DAY BEFORE MEALS 180 tablet 3    hydrOXYzine (ATARAX) 50 MG tablet Take 1 tablet (50 mg total) by mouth 3 (three) times daily as needed for Itching. 270 tablet 3    metFORMIN (GLUCOPHAGE) 1000 MG tablet Take 1 tablet (1,000 mg total) by mouth 2 (two) times daily. 180 tablet 3    metFORMIN (GLUCOPHAGE) 1000 MG tablet Take 1 tablet (1,000 mg total) by mouth 2 (two) times daily. 180 tablet 3    multivitamin capsule Take 1 capsule by mouth once daily.      pantoprazole (PROTONIX) 40 MG tablet TAKE 1 TABLET BY MOUTH EVERY DAY 90 tablet 3    promethazine-dextromethorphan (PROMETHAZINE-DM) 6.25-15 mg/5 mL Syrp Take 5 mLs  by mouth every 4 (four) hours as needed (for cough, congestion). 118 mL 1    quetiapine (SEROQUEL) 300 MG Tab Take 300 mg by mouth every evening.      sildenafiL (VIAGRA) 25 MG tablet Take 2 tablets (50 mg total) by mouth daily as needed for Erectile Dysfunction. 30 tablet 0    simvastatin (ZOCOR) 80 MG tablet Take 1 tablet (80 mg total) by mouth nightly. 90 tablet 3    sumatriptan (IMITREX) 100 MG tablet Take 1 tablet (100 mg total) by mouth daily as needed for Migraine. 30 tablet 3    tadalafiL (CIALIS) 10 MG tablet Take 10 mg by mouth daily as needed.      tamsulosin (FLOMAX) 0.4 mg Cap TAKE 2 CAPSULES BY MOUTH EVERY  capsule 3    tiZANidine (ZANAFLEX) 4 MG tablet Take 1 tablet (4 mg total) by mouth every 8 (eight) hours. 30 tablet 1    triamcinolone acetonide 0.1% (KENALOG) 0.1 % cream AAA bid prn eczema. Do not use on face. 454 g 5    zonisamide (ZONEGRAN) 50 MG Cap Take 1 capsule (50 mg total) by mouth 2 (two) times daily. 180 capsule 3     No current facility-administered medications for this visit.       Patient Care Team:  Kaylie Villalta MD as PCP - General (Family Medicine)  Warren Rondon OD as Consulting Physician (Optometry)  Jessica Black LPN as Care Coordinator  Ollie Palumbo II, MD as Consulting Physician (Gastroenterology)         - The patient is given an After Visit Summary that lists all medications with directions, allergies, education, orders placed during this encounter and follow-up instructions.      - I have reviewed the patient's medical information including past medical, family, and social history sections including the medications and allergies.      - We discussed the patient's current medications.     This note was created by combination of typed  and MModal dictation.  Transcription errors may be present.  If there are any questions, please contact me.

## 2023-08-14 NOTE — ASSESSMENT & PLAN NOTE
Metformin 1g bid.     Monitor BG daily and bring back log in 2 weeks. Poor diet adherence.     Lab Results   Component Value Date    HGBA1C 7.5 (H) 06/08/2023       Will recheck labs and a1c in 1 month

## 2023-08-14 NOTE — ASSESSMENT & PLAN NOTE
eren Benjamin    The current medical regimen is effective;  continue present plan and medications.

## 2023-08-14 NOTE — ASSESSMENT & PLAN NOTE
Amlodipine 10mg (morning), candasartan (lunch), flomax    Does not monitor diet intake for low sodium. Will f/u in 2 weeks with BP log and diet changes. Could be r/t poor diet adherence. No acute changes in CP. No SOB, dizziness, etc.     TLC: Therapeutic Lifestyle Changes    Weight reduction: maintain a normal body weight (BMI 19-25)  DASH diet: Consume diet rich in fruits, vegetables, and low-fat dairy products with a reduced content of saturated fat and total fat.   Low-sodium diet: consume <2400mg of sodium per day  Increase physical activity: regular aerobic physical activity (150 min per week)  Limit alcohol consumption: Less than 2 drinks/day in men and less than 1 drink/day in women.   QUIT smoking

## 2023-08-14 NOTE — PATIENT INSTRUCTIONS
Diabetes: Check Blood Glucose before you eat daily. Keep log. Target morning BS  and meal-time BS <180. Follow diet.    Check Blood Pressure daily and bring in log to nurse visit. Keep log. Follow low sodium diet. DASH DIET    Back pain: Take tylenol 650 mg every 6 hours as needed for lower back pain. Return if no resolution in 2 weeks, will consider xray then. Apply heat intermittently (avoid sleeping on heating pad).     F/u in 2 weeks for nurse visit, bring log with blood pressure and blood sugar logs.     Labs in 1 month to check A1C and labs.       Dash diet: eat vegetables, fruits, and whole grains. Including fat-free or low-fat dairy products, fish, poultry, beans, nuts, and vegetable oils. Limit foods that are high in saturated fat, such as fatty meats, full-fat dairy products, and tropical oils such as coconut, palm kernel, and palm oils. Limit sugar-sweetened beverages and sweets. Eat less than 2.3g sodium in 24 hour period. Choose foods low saturated and trans fats; rich in potassium, calcium, magnesium, fiber and protein; and low in sodium.

## 2023-08-17 ENCOUNTER — HOSPITAL ENCOUNTER (OUTPATIENT)
Dept: RADIOLOGY | Facility: HOSPITAL | Age: 63
Discharge: HOME | End: 2023-08-17
Payer: MEDICARE

## 2023-08-17 ENCOUNTER — OFFICE VISIT (OUTPATIENT)
Dept: FAMILY MEDICINE | Facility: CLINIC | Age: 63
End: 2023-08-17
Payer: MEDICARE

## 2023-08-17 ENCOUNTER — TELEPHONE (OUTPATIENT)
Dept: FAMILY MEDICINE | Facility: CLINIC | Age: 63
End: 2023-08-17

## 2023-08-17 VITALS
HEIGHT: 69 IN | OXYGEN SATURATION: 96 % | TEMPERATURE: 98 F | BODY MASS INDEX: 36.85 KG/M2 | DIASTOLIC BLOOD PRESSURE: 71 MMHG | HEART RATE: 97 BPM | SYSTOLIC BLOOD PRESSURE: 121 MMHG | WEIGHT: 248.81 LBS

## 2023-08-17 DIAGNOSIS — M54.50 ACUTE MIDLINE LOW BACK PAIN WITHOUT SCIATICA: ICD-10-CM

## 2023-08-17 DIAGNOSIS — M54.50 ACUTE MIDLINE LOW BACK PAIN WITHOUT SCIATICA: Primary | ICD-10-CM

## 2023-08-17 DIAGNOSIS — M54.9 DORSALGIA, UNSPECIFIED: ICD-10-CM

## 2023-08-17 PROCEDURE — 2023F PR DILATED RETINAL EXAM W/O EVID OF RETINOPATHY: ICD-10-PCS | Mod: CPTII,S$GLB,,

## 2023-08-17 PROCEDURE — 3074F SYST BP LT 130 MM HG: CPT | Mod: CPTII,S$GLB,,

## 2023-08-17 PROCEDURE — 3078F DIAST BP <80 MM HG: CPT | Mod: CPTII,S$GLB,,

## 2023-08-17 PROCEDURE — 3008F PR BODY MASS INDEX (BMI) DOCUMENTED: ICD-10-PCS | Mod: CPTII,S$GLB,,

## 2023-08-17 PROCEDURE — 99214 PR OFFICE/OUTPT VISIT, EST, LEVL IV, 30-39 MIN: ICD-10-PCS | Mod: S$GLB,,,

## 2023-08-17 PROCEDURE — 3008F BODY MASS INDEX DOCD: CPT | Mod: CPTII,S$GLB,,

## 2023-08-17 PROCEDURE — 72100 X-RAY EXAM L-S SPINE 2/3 VWS: CPT | Mod: TC,FY,PO

## 2023-08-17 PROCEDURE — 99999 PR PBB SHADOW E&M-EST. PATIENT-LVL V: CPT | Mod: PBBFAC,,,

## 2023-08-17 PROCEDURE — 72100 XR LUMBAR SPINE AP AND LATERAL: ICD-10-PCS | Mod: 26,,, | Performed by: STUDENT IN AN ORGANIZED HEALTH CARE EDUCATION/TRAINING PROGRAM

## 2023-08-17 PROCEDURE — 3051F PR MOST RECENT HEMOGLOBIN A1C LEVEL 7.0 - < 8.0%: ICD-10-PCS | Mod: CPTII,S$GLB,,

## 2023-08-17 PROCEDURE — 4010F ACE/ARB THERAPY RXD/TAKEN: CPT | Mod: CPTII,S$GLB,,

## 2023-08-17 PROCEDURE — 72100 X-RAY EXAM L-S SPINE 2/3 VWS: CPT | Mod: 26,,, | Performed by: STUDENT IN AN ORGANIZED HEALTH CARE EDUCATION/TRAINING PROGRAM

## 2023-08-17 PROCEDURE — 3074F PR MOST RECENT SYSTOLIC BLOOD PRESSURE < 130 MM HG: ICD-10-PCS | Mod: CPTII,S$GLB,,

## 2023-08-17 PROCEDURE — 1159F PR MEDICATION LIST DOCUMENTED IN MEDICAL RECORD: ICD-10-PCS | Mod: CPTII,S$GLB,,

## 2023-08-17 PROCEDURE — 4010F PR ACE/ARB THEARPY RXD/TAKEN: ICD-10-PCS | Mod: CPTII,S$GLB,,

## 2023-08-17 PROCEDURE — 3051F HG A1C>EQUAL 7.0%<8.0%: CPT | Mod: CPTII,S$GLB,,

## 2023-08-17 PROCEDURE — 99214 OFFICE O/P EST MOD 30 MIN: CPT | Mod: S$GLB,,,

## 2023-08-17 PROCEDURE — 3078F PR MOST RECENT DIASTOLIC BLOOD PRESSURE < 80 MM HG: ICD-10-PCS | Mod: CPTII,S$GLB,,

## 2023-08-17 PROCEDURE — 1159F MED LIST DOCD IN RCRD: CPT | Mod: CPTII,S$GLB,,

## 2023-08-17 PROCEDURE — 2023F DILAT RTA XM W/O RTNOPTHY: CPT | Mod: CPTII,S$GLB,,

## 2023-08-17 PROCEDURE — 99999 PR PBB SHADOW E&M-EST. PATIENT-LVL V: ICD-10-PCS | Mod: PBBFAC,,,

## 2023-08-17 NOTE — TELEPHONE ENCOUNTER
----- Message from Chelle Rodriguez sent at 8/17/2023  1:00 PM CDT -----  Regarding: self [phone]  .Type: Patient Call Back    Who called: self    What is the request in detail: pt called stating he wanted to notify the office that his MRI order is scheduled  for 9/6/23 @ 1:15pm    Can the clinic reply by [facility]? no    Would the patient rather a call back or a response via [facility]? Call back    Best call back number [phone]

## 2023-08-17 NOTE — PATIENT INSTRUCTIONS
Continue tylenol 650 mg every 6 hours as needed along with either tizanidine or flexeril as prescribed. Continue with heat/ice.     MRI ordered, call to schedule. Let me know when it is ordered and we can send in some medication to help with claustrophobia.     Ortho referral placed.     //////////PHONE NUMBERS//////////    [facility]--[phone]  Imaging, Xray, CT, MRI, Ultrasound---[phone]  Bariatrics---[phone]  Breast Surgery---[phone]  Case Management---[phone]  Colonoscopy---[phone]  DME---[phone]  Infectious Disease---[phone]  Interventional Radiology---[phone]  Medical Records---[phone]  [facility]---[phone]  Optometry/Ophthalmology---[phone]  [facility]---[phone]  Physical Therapy---[phone]  Psychiatry---[phone] or [phone]  Plastic Surgery---[phone]  Recovery--[phone] option 2, or [phone].  Sleep Study---[phone]  Smoking Cessation---[phone]  Wound Care---[phone]  Referral Desk---[phone]    /////////////////////////////////////////////////

## 2023-08-17 NOTE — PROGRESS NOTES
HPI     Chief Complaint:  Chief Complaint   Patient presents with    Back Pain     Lower area. Started about a week ago    Hip Pain     Started about a week ago       Scott Bansal is a 62 y.o. male with multiple medical diagnoses as listed in the medical history and problem list that presents for lower back pain.  Pt is new to me but seen in clinic with last appointment 8/14/2023.      HPI      Lower back/hip pain after lifting heavy boxes last week. Tried tylenol/ibuprofen, hot showers, heating pad with some relief. But still present and pt requesting MRI. Xray from 2018 showed DDD at L5-S1. When bending over pain worse or change of position from sitting to standing, rolling in bed, etc. feels like burning hot/fire to lower back. Denies numbness/tingling down leg but will radiate down to bilateral buttocks/pyriformis. Again no fall or trauma. Denies urinary s/s.   Tried flexeril past few days with minimal relief.   Will need xanax for MRI image.     Assessment & Plan       Problem List Items Addressed This Visit    None  Visit Diagnoses       Acute midline low back pain without sciatica    -  Primary  Continue tylenol 650 mg every 6 hours as needed along with either tizanidine or flexeril as prescribed. Continue with heat/ice.     Xray today. MRI ordered, call to schedule. Let me know when it is ordered and we can send in some medication to help with claustrophobia.     Ortho/back and spine referral placed.     Relevant Orders    X-Ray Lumbar Spine AP And Lateral (Completed)    Ambulatory referral/consult to [facility] Sp[facility]    Ambulatory referral/consult to Orthopedics    Dorsalgia, unspecified        Relevant Orders    MRI Lumbar Spine Without Contrast            --------------------------------------------      Health Maintenance:  Health Maintenance         Date Due Completion Date    Influenza Vaccine (1) 09/01/2023 ---    Diabetes Urine Screening 10/31/2023 10/31/2022    Lipid Panel 10/31/2023  10/31/2022    Hemoglobin A1c 12/08/2023 6/8/2023    Colorectal Cancer Screening 06/28/2024 6/28/2021    Eye Exam 07/18/2024 7/18/2023    Foot Exam 07/19/2024 7/19/2023 (Done)    Override on 7/19/2023: Done    Override on 10/26/2020: Done (completed by insurance)    High Dose Statin 08/17/2024 8/17/2023    TETANUS VACCINE 07/07/2027 7/7/2017          Follow Up:  No follow-ups on file.    Discussed DDx, condition, and treatment.   Education sent to patient portal/included in after visit summary.  ED precautions given.   Notify provider if symptoms do not resolve or increase in severity.   Patient verbalizes understanding and agrees with plan of care.      Exam     Review of Systems:  (as noted above)  Review of Systems   Constitutional:  Negative for activity change, fatigue, fever and unexpected weight change.   Respiratory:  Negative for chest tightness and shortness of breath.    Genitourinary:  Negative for difficulty urinating, dysuria, frequency and urgency.   Musculoskeletal:  Positive for arthralgias, back pain and gait problem. Negative for neck pain and neck stiffness.   Neurological:  Negative for dizziness, light-headedness and numbness.       Physical Exam:   Physical Exam  Constitutional:       General: He is not in acute distress.     Appearance: Normal appearance. He is obese. He is not toxic-appearing.   Abdominal:      General: Bowel sounds are normal. There is no distension.      Palpations: Abdomen is soft.   Musculoskeletal:         General: Tenderness present.      Lumbar back: Spasms, tenderness and bony tenderness present. No swelling. Decreased range of motion. Positive right straight leg raise test and positive left straight leg raise test. No scoliosis.   Skin:     General: Skin is warm and dry.      Capillary Refill: Capillary refill takes less than 2 seconds.      Findings: No rash.   Neurological:      General: No focal deficit present.      Mental Status: He is alert and oriented to  "person, place, and time.   Psychiatric:         Mood and Affect: Mood normal.       Vitals:    23 1031   BP: 121/71   Pulse: 97   Temp: 98 °F (36.7 °C)   TempSrc: Oral   SpO2: 96%   Weight: 112.9 kg (248 lb 12.6 oz)   Height: 5' 9" (1.753 m)      Body mass index is 36.74 kg/m².        History     Past Medical History:  Past Medical History:   Diagnosis Date    Bipolar 1 disorder, mixed     BPH (benign prostatic hypertrophy)     Colon polyp     Cyst, eyelid     Dr. Broussard    Diabetes mellitus     GERD (gastroesophageal reflux disease)     Hyperlipidemia     Hypertension     Lumbar degenerative disc disease 3/7/2019    Migraine headache     Obesity        Past Surgical History:  Past Surgical History:   Procedure Laterality Date    CERVICAL SPINE SURGERY      COLONOSCOPY N/A 2017    Procedure: COLONOSCOPY;  Surgeon: Genaro Nix MD;  Location: [facility] ENDO;  Service: Endoscopy;  Laterality: N/A;    COLONOSCOPY N/A 10/30/2020    Procedure: COLONOSCOPY;  Surgeon: Herb Martinez MD;  Location: [facility] ENDO;  Service: Endoscopy;  Laterality: N/A;    EYE SURGERY Left 2017    cyst removal on eyelid; Dr. Broussard    SHOULDER SURGERY      TONSILLECTOMY         Social History:  Social History     Socioeconomic History    Marital status:    Tobacco Use    Smoking status: Former     Current packs/day: 0.00     Average packs/day: 2.0 packs/day for 15.0 years (30.0 ttl pk-yrs)     Types: Cigarettes     Start date: 1969     Quit date: 1984     Years since quittin.2     Passive exposure: Past    Smokeless tobacco: Never    Tobacco comments:     Patient quit smoking at the age of 24 yo.   Substance and Sexual Activity    Alcohol use: No    Drug use: No    Sexual activity: Yes     Partners: Female     Social Determinants of Health     Financial Resource Strain: Medium Risk (10/4/2022)    Overall Financial Resource Strain (CARDIA)     Difficulty of Paying Living Expenses: Somewhat hard   Food " Insecurity: No Food Insecurity (10/4/2022)    Hunger Vital Sign     Worried About Running Out of Food in the Last Year: Never true     Ran Out of Food in the Last Year: Never true   Transportation Needs: No Transportation Needs (10/4/2022)    PRAPARE - Transportation     Lack of Transportation (Medical): No     Lack of Transportation (Non-Medical): No   Physical Activity: Inactive (10/4/2022)    Exercise Vital Sign     Days of Exercise per Week: 0 days     Minutes of Exercise per Session: 0 min   Stress: Stress Concern Present (10/4/2022)    [employer] - Occupational Stress Questionnaire     Feeling of Stress : To some extent   Social Connections: Moderately Integrated (10/4/2022)    Social Connection and Isolation Panel [NHANES]     Frequency of Communication with Friends and Family: More than three times a week     Frequency of Social Gatherings with Friends and Family: Never     Attends [employer] Services: More than 4 times per year     Active Member of Clubs or Organizations: No     Attends Club or Organization Meetings: Never     Marital Status: Living with partner   Housing Stability: Low Risk  (10/4/2022)    Housing Stability Vital Sign     Unable to Pay for Housing in the Last Year: No     Number of Places Lived in the Last Year: 1     Unstable Housing in the Last Year: No       Family History:  Family History   Problem Relation Age of Onset    Cataracts Mother     Cancer Mother         throat    Hypertension Mother     Hypertension Father     Stroke Father         2 strokes    Hypertension Sister     Cancer Brother         spinal, kidney, spinal fluid    Hypertension Brother     Cancer Cousin         breast?       Allergies and Medications: (updated and reviewed)  Review of patient's allergies indicates:   Allergen Reactions    Levofloxacin Hallucinations    Sulfa (sulfonamide antibiotics) Rash     Current Outpatient Medications   Medication Sig Dispense Refill    albuterol  (PROVENTIL) 2.5 mg /3 mL (0.083 %) nebulizer solution Take 3 mLs (2.5 mg total) by nebulization every 6 (six) hours as needed for Wheezing or Shortness of Breath. 9 mL 1    albuterol (PROVENTIL/VENTOLIN HFA) 90 mcg/actuation inhaler Inhale 1-2 puffs into the lungs every 6 (six) hours as needed for Wheezing or Shortness of Breath. Rescue 18 g 5    amitriptyline (ELAVIL) 25 MG tablet Take 1 tablet (25 mg total) by mouth every evening. 30 tablet 11    amLODIPine (NORVASC) 10 MG tablet Take 1 tablet (10 mg total) by mouth once daily. 90 tablet 3    amoxicillin (AMOXIL) 500 MG Tab Take by mouth 3 (three) times daily.      busPIRone (BUSPAR) 10 MG tablet Take 10 mg by mouth 3 (three) times daily.      candesartan (ATACAND) 4 MG tablet Take 1 tablet (4 mg total) by mouth once daily. 90 tablet 1    doxycycline (VIBRAMYCIN) 100 MG Cap TAKE 1 CAPSULE BY MOUTH TWICE A DAY FOR 5 DAYS      DUPIXENT SYRINGE 300 mg/2 mL Syrg Inject 1 syringe (300mg) into the skin every other week 4 mL 11    econazole nitrate 1 % cream AAA bid for rash on shoulders 30 g 3    fluticasone propionate (FLONASE) 50 mcg/actuation nasal spray 2 sprays (100 mcg total) by Each Nostril route once daily. 16 mL 5    gabapentin (NEURONTIN) 300 MG capsule Take 1 capsule (300 mg total) by mouth 2 (two) times daily. 180 capsule 2    gemfibroziL (LOPID) 600 MG tablet TAKE 1 TABLET BY MOUTH TWICE A DAY BEFORE MEALS 180 tablet 3    hydrOXYzine (ATARAX) 50 MG tablet Take 1 tablet (50 mg total) by mouth 3 (three) times daily as needed for Itching. 270 tablet 3    metFORMIN (GLUCOPHAGE) 1000 MG tablet Take 1 tablet (1,000 mg total) by mouth 2 (two) times daily. 180 tablet 3    metFORMIN (GLUCOPHAGE) 1000 MG tablet Take 1 tablet (1,000 mg total) by mouth 2 (two) times daily. 180 tablet 3    multivitamin capsule Take 1 capsule by mouth once daily.      pantoprazole (PROTONIX) 40 MG tablet TAKE 1 TABLET BY MOUTH EVERY DAY 90 tablet 3    promethazine-dextromethorphan  (PROMETHAZINE-DM) 6.25-15 mg/5 mL Syrp Take 5 mLs by mouth every 4 (four) hours as needed (for cough, congestion). 118 mL 1    quetiapine (SEROQUEL) 300 MG Tab Take 300 mg by mouth every evening.      sildenafiL (VIAGRA) 25 MG tablet Take 2 tablets (50 mg total) by mouth daily as needed for Erectile Dysfunction. 30 tablet 0    simvastatin (ZOCOR) 80 MG tablet Take 1 tablet (80 mg total) by mouth nightly. 90 tablet 3    sumatriptan (IMITREX) 100 MG tablet Take 1 tablet (100 mg total) by mouth daily as needed for Migraine. 30 tablet 3    tadalafiL (CIALIS) 10 MG tablet Take 10 mg by mouth daily as needed.      tamsulosin (FLOMAX) 0.4 mg Cap TAKE 2 CAPSULES BY MOUTH EVERY  capsule 3    tiZANidine (ZANAFLEX) 4 MG tablet Take 1 tablet (4 mg total) by mouth every 8 (eight) hours. 30 tablet 1    triamcinolone acetonide 0.1% (KENALOG) 0.1 % cream AAA bid prn eczema. Do not use on face. 454 g 5    zonisamide (ZONEGRAN) 50 MG Cap Take 1 capsule (50 mg total) by mouth 2 (two) times daily. 180 capsule 3     No current facility-administered medications for this visit.       Patient Care Team:  Kaylie Villalta MD as PCP - General (Family Medicine)  Warren Rondon OD as Consulting Physician (Optometry)  Jessica Black LPN as Care Coordinator  Ollie Palumbo II, MD as Consulting Physician (Gastroenterology)         - The patient is given an After Visit Summary that lists all medications with directions, allergies, education, orders placed during this encounter and follow-up instructions.      - I have reviewed the patient's medical information including past medical, family, and social history sections including the medications and allergies.      - We discussed the patient's current medications.     This note was created by combination of typed  and MModal dictation.  Transcription errors may be present.  If there are any questions, please contact me.             Methotrexate Pregnancy And Lactation Text: This medication is Pregnancy Category X and is known to cause fetal harm. This medication is excreted in breast milk.

## 2023-08-18 ENCOUNTER — TELEPHONE (OUTPATIENT)
Dept: FAMILY MEDICINE | Facility: CLINIC | Age: 63
End: 2023-08-18
Payer: MEDICARE

## 2023-08-18 DIAGNOSIS — F41.9 ANXIETY: ICD-10-CM

## 2023-08-18 RX ORDER — ALPRAZOLAM 1 MG/1
TABLET ORAL
Qty: 1 TABLET | Refills: 0 | Status: SHIPPED | OUTPATIENT
Start: 2023-09-01

## 2023-08-18 NOTE — TELEPHONE ENCOUNTER
----- Message from Olive Kyle sent at 8/18/2023 11:20 AM CDT -----  Regarding: patient call back  Type: Patient Call Back    Who called: Self     What is the request in detail: Asked for a call back from the nurse to clarify his referrals and if he needs to be seen with an orthopedic or a spine specialist.     Can the clinic reply by [facility]? No     Would the patient rather a call back or a response via [facility]? Call     Best call back number: .[phone]

## 2023-08-18 NOTE — TELEPHONE ENCOUNTER
Informed patient of previous message regarding results. Patient states he would like his medication for his MRI sent to [facility] in [place].  Please advise

## 2023-08-18 NOTE — TELEPHONE ENCOUNTER
----- Message from Debra Barnes sent at 8/18/2023  9:17 AM CDT -----  Type:  Test Results    Who Called: pt     Name of Test (Lab/Mammo/Etc): xray    Date of Test: 8/17/23    Ordering Provider: jem    Where the test was performed:   Would the patient rather a call back or a response via [facility]? call    Best Call Back Number: [phone] (home) [phone] (work)    Additional Information:      For Clinical Team:Has the provider reviewed the results?

## 2023-08-18 NOTE — TELEPHONE ENCOUNTER
Please let pt know no acute changes noted in xray. Still shows the degenerative changes to L5-S1 as previous xray. I saw your MRI is scheduled for 9/6. Which pharmacy do you want your medication sent to prior to your MRI?     Maximilian,   Ruth Beach, NP-C

## 2023-08-21 ENCOUNTER — OFFICE VISIT (OUTPATIENT)
Dept: ORTHOPEDICS | Facility: CLINIC | Age: 63
End: 2023-08-21
Payer: MEDICARE

## 2023-08-21 VITALS — BODY MASS INDEX: 35.84 KG/M2 | WEIGHT: 242 LBS | HEIGHT: 69 IN

## 2023-08-21 DIAGNOSIS — M54.50 ACUTE MIDLINE LOW BACK PAIN WITHOUT SCIATICA: ICD-10-CM

## 2023-08-21 PROCEDURE — 3051F HG A1C>EQUAL 7.0%<8.0%: CPT | Mod: CPTII,S$GLB,, | Performed by: PHYSICIAN ASSISTANT

## 2023-08-21 PROCEDURE — 99999 PR PBB SHADOW E&M-EST. PATIENT-LVL IV: ICD-10-PCS | Mod: PBBFAC,,, | Performed by: PHYSICIAN ASSISTANT

## 2023-08-21 PROCEDURE — 3051F PR MOST RECENT HEMOGLOBIN A1C LEVEL 7.0 - < 8.0%: ICD-10-PCS | Mod: CPTII,S$GLB,, | Performed by: PHYSICIAN ASSISTANT

## 2023-08-21 PROCEDURE — 99999 PR PBB SHADOW E&M-EST. PATIENT-LVL IV: CPT | Mod: PBBFAC,,, | Performed by: PHYSICIAN ASSISTANT

## 2023-08-21 PROCEDURE — 3008F BODY MASS INDEX DOCD: CPT | Mod: CPTII,S$GLB,, | Performed by: PHYSICIAN ASSISTANT

## 2023-08-21 PROCEDURE — 1159F PR MEDICATION LIST DOCUMENTED IN MEDICAL RECORD: ICD-10-PCS | Mod: CPTII,S$GLB,, | Performed by: PHYSICIAN ASSISTANT

## 2023-08-21 PROCEDURE — 1159F MED LIST DOCD IN RCRD: CPT | Mod: CPTII,S$GLB,, | Performed by: PHYSICIAN ASSISTANT

## 2023-08-21 PROCEDURE — 3008F PR BODY MASS INDEX (BMI) DOCUMENTED: ICD-10-PCS | Mod: CPTII,S$GLB,, | Performed by: PHYSICIAN ASSISTANT

## 2023-08-21 PROCEDURE — 4010F ACE/ARB THERAPY RXD/TAKEN: CPT | Mod: CPTII,S$GLB,, | Performed by: PHYSICIAN ASSISTANT

## 2023-08-21 PROCEDURE — 4010F PR ACE/ARB THEARPY RXD/TAKEN: ICD-10-PCS | Mod: CPTII,S$GLB,, | Performed by: PHYSICIAN ASSISTANT

## 2023-08-21 PROCEDURE — 99204 OFFICE O/P NEW MOD 45 MIN: CPT | Mod: S$GLB,,, | Performed by: PHYSICIAN ASSISTANT

## 2023-08-21 PROCEDURE — 99204 PR OFFICE/OUTPT VISIT, NEW, LEVL IV, 45-59 MIN: ICD-10-PCS | Mod: S$GLB,,, | Performed by: PHYSICIAN ASSISTANT

## 2023-08-21 NOTE — PROGRESS NOTES
DATE: 8/21/2023  PATIENT: Scott Bansal    Supervising Physician: Ren Simons M.D.    CHIEF COMPLAINT: back pain    HISTORY:  Scott Bansal is a 62 y.o. male with PMH of ACDF in 1998 here for initial evaluation of R>L low back pain (Back - 7). The pain has been present for years but has progressively worsened over the last year. The patient describes the pain as sharp and dull.  The pain is worse with sitting, standing and walking and improved by lying down and with heat. There is no associated numbness and tingling. There is no subjective weakness. Prior treatments have included a brace, heat, physical therapy in the 80s and ESIs in 2000, but no recent PT, ESIs or surgery.    The patient denies myelopathic symptoms such as handwriting changes or difficulty with buttons/coins/keys. Denies perineal paresthesias, bowel/bladder dysfunction.    PAST MEDICAL/SURGICAL HISTORY:  Past Medical History:   Diagnosis Date    Bipolar 1 disorder, mixed     BPH (benign prostatic hypertrophy)     Colon polyp     Cyst, eyelid     Dr. Broussard    Diabetes mellitus     GERD (gastroesophageal reflux disease)     Hyperlipidemia     Hypertension     Lumbar degenerative disc disease 3/7/2019    Migraine headache     Obesity      Past Surgical History:   Procedure Laterality Date    CERVICAL SPINE SURGERY      COLONOSCOPY N/A 7/27/2017    Procedure: COLONOSCOPY;  Surgeon: Genaro Nix MD;  Location: [facility];  Service: Endoscopy;  Laterality: N/A;    COLONOSCOPY N/A 10/30/2020    Procedure: COLONOSCOPY;  Surgeon: Herb Martinez MD;  Location: [facility] ENDO;  Service: Endoscopy;  Laterality: N/A;    EYE SURGERY Left 03/2017    cyst removal on eyelid; Dr. Broussard    SHOULDER SURGERY      TONSILLECTOMY         Current Medications:   Current Outpatient Medications:     albuterol (PROVENTIL) 2.5 mg /3 mL (0.083 %) nebulizer solution, Take 3 mLs (2.5 mg total) by nebulization every 6 (six) hours as needed for Wheezing or  Shortness of Breath., Disp: 9 mL, Rfl: 1    albuterol (PROVENTIL/VENTOLIN HFA) 90 mcg/actuation inhaler, Inhale 1-2 puffs into the lungs every 6 (six) hours as needed for Wheezing or Shortness of Breath. Rescue, Disp: 18 g, Rfl: 5    [START ON 9/1/2023] ALPRAZolam (XANAX) 1 MG tablet, Prior to MRI, Disp: 1 tablet, Rfl: 0    amitriptyline (ELAVIL) 25 MG tablet, Take 1 tablet (25 mg total) by mouth every evening., Disp: 30 tablet, Rfl: 11    amLODIPine (NORVASC) 10 MG tablet, Take 1 tablet (10 mg total) by mouth once daily., Disp: 90 tablet, Rfl: 3    amoxicillin (AMOXIL) 500 MG Tab, Take by mouth 3 (three) times daily., Disp: , Rfl:     busPIRone (BUSPAR) 10 MG tablet, Take 10 mg by mouth 3 (three) times daily., Disp: , Rfl:     candesartan (ATACAND) 4 MG tablet, Take 1 tablet (4 mg total) by mouth once daily., Disp: 90 tablet, Rfl: 1    doxycycline (VIBRAMYCIN) 100 MG Cap, TAKE 1 CAPSULE BY MOUTH TWICE A DAY FOR 5 DAYS, Disp: , Rfl:     DUPIXENT SYRINGE 300 mg/2 mL Syrg, Inject 1 syringe (300mg) into the skin every other week, Disp: 4 mL, Rfl: 11    econazole nitrate 1 % cream, AAA bid for rash on shoulders, Disp: 30 g, Rfl: 3    fluticasone propionate (FLONASE) 50 mcg/actuation nasal spray, 2 sprays (100 mcg total) by Each Nostril route once daily., Disp: 16 mL, Rfl: 5    gabapentin (NEURONTIN) 300 MG capsule, Take 1 capsule (300 mg total) by mouth 2 (two) times daily., Disp: 180 capsule, Rfl: 2    gemfibroziL (LOPID) 600 MG tablet, TAKE 1 TABLET BY MOUTH TWICE A DAY BEFORE MEALS, Disp: 180 tablet, Rfl: 3    hydrOXYzine (ATARAX) 50 MG tablet, Take 1 tablet (50 mg total) by mouth 3 (three) times daily as needed for Itching., Disp: 270 tablet, Rfl: 3    metFORMIN (GLUCOPHAGE) 1000 MG tablet, Take 1 tablet (1,000 mg total) by mouth 2 (two) times daily., Disp: 180 tablet, Rfl: 3    metFORMIN (GLUCOPHAGE) 1000 MG tablet, Take 1 tablet (1,000 mg total) by mouth 2 (two) times daily., Disp: 180 tablet, Rfl: 3     multivitamin capsule, Take 1 capsule by mouth once daily., Disp: , Rfl:     pantoprazole (PROTONIX) 40 MG tablet, TAKE 1 TABLET BY MOUTH EVERY DAY, Disp: 90 tablet, Rfl: 3    promethazine-dextromethorphan (PROMETHAZINE-DM) 6.25-15 mg/5 mL Syrp, Take 5 mLs by mouth every 4 (four) hours as needed (for cough, congestion)., Disp: 118 mL, Rfl: 1    quetiapine (SEROQUEL) 300 MG Tab, Take 300 mg by mouth every evening., Disp: , Rfl:     sildenafiL (VIAGRA) 25 MG tablet, Take 2 tablets (50 mg total) by mouth daily as needed for Erectile Dysfunction., Disp: 30 tablet, Rfl: 0    simvastatin (ZOCOR) 80 MG tablet, Take 1 tablet (80 mg total) by mouth nightly., Disp: 90 tablet, Rfl: 3    sumatriptan (IMITREX) 100 MG tablet, Take 1 tablet (100 mg total) by mouth daily as needed for Migraine., Disp: 30 tablet, Rfl: 3    tadalafiL (CIALIS) 10 MG tablet, Take 10 mg by mouth daily as needed., Disp: , Rfl:     tamsulosin (FLOMAX) 0.4 mg Cap, TAKE 2 CAPSULES BY MOUTH EVERY DAY, Disp: 180 capsule, Rfl: 3    tiZANidine (ZANAFLEX) 4 MG tablet, Take 1 tablet (4 mg total) by mouth every 8 (eight) hours., Disp: 30 tablet, Rfl: 1    triamcinolone acetonide 0.1% (KENALOG) 0.1 % cream, AAA bid prn eczema. Do not use on face., Disp: 454 g, Rfl: 5    zonisamide (ZONEGRAN) 50 MG Cap, Take 1 capsule (50 mg total) by mouth 2 (two) times daily., Disp: 180 capsule, Rfl: 3    Social History:   Social History     Socioeconomic History    Marital status:    Tobacco Use    Smoking status: Former     Current packs/day: 0.00     Average packs/day: 2.0 packs/day for 15.0 years (30.0 ttl pk-yrs)     Types: Cigarettes     Start date: 1969     Quit date: 1984     Years since quittin.3     Passive exposure: Past    Smokeless tobacco: Never    Tobacco comments:     Patient quit smoking at the age of 26 yo.   Substance and Sexual Activity    Alcohol use: No    Drug use: No    Sexual activity: Yes     Partners: Female     Social Determinants  of Health     Financial Resource Strain: Medium Risk (10/4/2022)    Overall Financial Resource Strain (CARDIA)     Difficulty of Paying Living Expenses: Somewhat hard   Food Insecurity: No Food Insecurity (10/4/2022)    Hunger Vital Sign     Worried About Running Out of Food in the Last Year: Never true     Ran Out of Food in the Last Year: Never true   Transportation Needs: No Transportation Needs (10/4/2022)    PRAPARE - Transportation     Lack of Transportation (Medical): No     Lack of Transportation (Non-Medical): No   Physical Activity: Inactive (10/4/2022)    Exercise Vital Sign     Days of Exercise per Week: 0 days     Minutes of Exercise per Session: 0 min   Stress: Stress Concern Present (10/4/2022)    [place] [employer] - Occupational Stress Questionnaire     Feeling of Stress : To some extent   Social Connections: Moderately Integrated (10/4/2022)    Social Connection and Isolation Panel [NHANES]     Frequency of Communication with Friends and Family: More than three times a week     Frequency of Social Gatherings with Friends and Family: Never     Attends Buddhism Services: More than 4 times per year     Active Member of Clubs or Organizations: No     Attends Club or Organization Meetings: Never     Marital Status: Living with partner   Housing Stability: Low Risk  (10/4/2022)    Housing Stability Vital Sign     Unable to Pay for Housing in the Last Year: No     Number of Places Lived in the Last Year: 1     Unstable Housing in the Last Year: No       REVIEW OF SYSTEMS:  Constitution: Negative. Negative for chills, fever and night sweats.   Cardiovascular: Negative for chest pain and syncope.   Respiratory: Negative for cough and shortness of breath.   Gastrointestinal: See HPI. Negative for nausea/vomiting. Negative for abdominal pain.  Genitourinary: See HPI. Negative for discoloration or dysuria.  Skin: Negative for dry skin, itching and rash.   Hematologic/Lymphatic: Negative  "for bleeding problem. Does not bruise/bleed easily.   Musculoskeletal: Negative for falls and muscle weakness.   Neurological: See HPI. No seizures.   Endocrine: Negative for polydipsia, polyphagia and polyuria.   Allergic/Immunologic: Negative for hives and persistent infections.    PHYSICAL EXAMINATION:    Ht 5' 9" (1.753 m)   Wt 109.8 kg (242 lb)   BMI 35.74 kg/m²     General: The patient is a very pleasant 62 y.o. male in no apparent distress, the patient is oriented to person, place and time.   Psych: Normal mood and affect  HEENT: Vision grossly intact, hearing intact to the spoken word.  Lungs: Respirations unlabored.  Gait: Antalgic station and gait, no difficulty with toe or heel walk.   Skin: Dorsal lumbar skin negative for rashes, lesions, hairy patches and surgical scars.  Range of motion: Lumbar range of motion is acceptable. There is mild lumbar tenderness to palpation.  Spinal Balance: Global saggital and coronal spinal balance acceptable, no significant for scoliosis and kyphosis.  Musculoskeletal: No pain with the range of motion of the bilateral hips. No trochanteric tenderness to palpation.  Vascular: Bilateral lower extremities warm and well perfused, Dorsalis pedis pulses 2+ bilaterally.  Neurological: Normal strength and tone in all major motor groups in the bilateral lower extremities. Normal sensation to light touch in the L2-S1 dermatomes bilaterally.  Deep tendon reflexes symmetric 2+ in the bilateral lower extremities.  Negative Babinski bilaterally.  Straight leg raise positive on the right, negative on the left.     IMAGING:   Today I personally reviewed AP, Lat and Flex/Ex upright L-spine films that demonstrate L5/S1 vacuum disc.     Body mass index is 35.74 kg/m².    Hemoglobin A1C   Date Value Ref Range Status   06/08/2023 7.5 (H) 4.0 - 5.6 % Final     Comment:     ADA Screening Guidelines:  5.7-6.4%  Consistent with prediabetes  >or=6.5%  Consistent with diabetes    High levels of " fetal hemoglobin interfere with the HbA1C  assay. Heterozygous hemoglobin variants (HbS, HgC, etc)do  not significantly interfere with this assay.   However, presence of multiple variants may affect accuracy.     10/31/2022 6.5 (H) 4.0 - 5.6 % Final     Comment:     ADA Screening Guidelines:  5.7-6.4%  Consistent with prediabetes  >or=6.5%  Consistent with diabetes    High levels of fetal hemoglobin interfere with the HbA1C  assay. Heterozygous hemoglobin variants (HbS, HgC, etc)do  not significantly interfere with this assay.   However, presence of multiple variants may affect accuracy.     04/19/2022 6.4 (H) 4.0 - 5.6 % Final     Comment:     ADA Screening Guidelines:  5.7-6.4%  Consistent with prediabetes  >or=6.5%  Consistent with diabetes    High levels of fetal hemoglobin interfere with the HbA1C  assay. Heterozygous hemoglobin variants (HbS, HgC, etc)do  not significantly interfere with this assay.   However, presence of multiple variants may affect accuracy.           ASSESSMENT/PLAN:    Scott was seen today for low-back pain.    Diagnoses and all orders for this visit:    Acute midline low back pain without sciatica  -     [facility] referral[facility]      Today we discussed at length all of the different treatment options including anti-inflammatories, acetaminophen, rest, ice, heat, physical therapy including strengthening and stretching exercises, home exercises, ROM, aerobic conditioning, aqua therapy, other modalities including ultrasound, massage, and dry needling, epidural steroid injections and finally surgical intervention.      The patient is scheduled for an MRI in a couple weeks.  I will call him after hte MRI with results and to discuss next steps for treatment.

## 2023-08-23 ENCOUNTER — TELEPHONE (OUTPATIENT)
Dept: FAMILY MEDICINE | Facility: CLINIC | Age: 63
End: 2023-08-23
Payer: MEDICARE

## 2023-08-23 NOTE — TELEPHONE ENCOUNTER
----- Message from Kenneth Hernandez sent at 8/23/2023  3:08 PM CDT -----  Type:  Patient Returning Call    Who Called: Self     Who Left Message for Patient: Manisha/Mari     Does the patient know what this is regarding?:Yes     Would the patient rather a call back or a response via [facility]? Call     Best Call Back Number: [phone] (home)

## 2023-08-23 NOTE — TELEPHONE ENCOUNTER
----- Message from Debra Barnes sent at 8/23/2023  2:58 PM CDT -----  Type:  Patient Returning Call    Who Called: pt     Who Left Message for Patient: doesn't know    Does the patient know what this is regarding?:no    Would the patient rather a call back or a response via [facility]? call    Best Call Back Number:[phone] (home) [phone] (work)      Additional Information:

## 2023-08-28 ENCOUNTER — CLINICAL SUPPORT (OUTPATIENT)
Dept: FAMILY MEDICINE | Facility: CLINIC | Age: 63
End: 2023-08-28
Payer: MEDICARE

## 2023-08-28 VITALS
DIASTOLIC BLOOD PRESSURE: 64 MMHG | TEMPERATURE: 98 F | OXYGEN SATURATION: 98 % | HEART RATE: 94 BPM | SYSTOLIC BLOOD PRESSURE: 102 MMHG

## 2023-08-28 DIAGNOSIS — I10 ESSENTIAL HYPERTENSION: ICD-10-CM

## 2023-08-28 DIAGNOSIS — E78.5 HYPERLIPIDEMIA ASSOCIATED WITH TYPE 2 DIABETES MELLITUS: ICD-10-CM

## 2023-08-28 DIAGNOSIS — E11.42 TYPE 2 DIABETES MELLITUS WITH DIABETIC POLYNEUROPATHY, WITHOUT LONG-TERM CURRENT USE OF INSULIN: Primary | ICD-10-CM

## 2023-08-28 DIAGNOSIS — E11.69 HYPERLIPIDEMIA ASSOCIATED WITH TYPE 2 DIABETES MELLITUS: ICD-10-CM

## 2023-08-28 LAB — GLUCOSE SERPL-MCNC: 183 MG/DL (ref 70–110)

## 2023-08-28 PROCEDURE — 82962 GLUCOSE BLOOD TEST: CPT | Mod: S$GLB,,,

## 2023-08-28 PROCEDURE — 99999 PR PBB SHADOW E&M-EST. PATIENT-LVL II: CPT | Mod: PBBFAC,,,

## 2023-08-28 PROCEDURE — 82962 POCT GLUCOSE, HAND-HELD DEVICE: ICD-10-PCS | Mod: S$GLB,,,

## 2023-08-28 PROCEDURE — 99999 PR PBB SHADOW E&M-EST. PATIENT-LVL II: ICD-10-PCS | Mod: PBBFAC,,,

## 2023-08-28 NOTE — PROGRESS NOTES
Scott Bansal 62 y.o. male is here today for Blood Pressure check.   History of HTN yes.    Review of patient's allergies indicates:   Allergen Reactions    Levofloxacin Hallucinations    Sulfa (sulfonamide antibiotics) Rash     Creatinine   Date Value Ref Range Status   06/12/2023 0.8 0.5 - 1.4 mg/dL Final     Sodium   Date Value Ref Range Status   06/12/2023 137 136 - 145 mmol/L Final     Potassium   Date Value Ref Range Status   06/12/2023 4.0 3.5 - 5.1 mmol/L Final   ]  Patient verifies taking blood pressure medications on a regular basis at the same time of the day.     Current Outpatient Medications:     albuterol (PROVENTIL) 2.5 mg /3 mL (0.083 %) nebulizer solution, Take 3 mLs (2.5 mg total) by nebulization every 6 (six) hours as needed for Wheezing or Shortness of Breath., Disp: 9 mL, Rfl: 1    albuterol (PROVENTIL/VENTOLIN HFA) 90 mcg/actuation inhaler, Inhale 1-2 puffs into the lungs every 6 (six) hours as needed for Wheezing or Shortness of Breath. Rescue, Disp: 18 g, Rfl: 5    [START ON 9/1/2023] ALPRAZolam (XANAX) 1 MG tablet, Prior to MRI, Disp: 1 tablet, Rfl: 0    amitriptyline (ELAVIL) 25 MG tablet, Take 1 tablet (25 mg total) by mouth every evening., Disp: 30 tablet, Rfl: 11    amLODIPine (NORVASC) 10 MG tablet, Take 1 tablet (10 mg total) by mouth once daily., Disp: 90 tablet, Rfl: 3    amoxicillin (AMOXIL) 500 MG Tab, Take by mouth 3 (three) times daily., Disp: , Rfl:     busPIRone (BUSPAR) 10 MG tablet, Take 10 mg by mouth 3 (three) times daily., Disp: , Rfl:     candesartan (ATACAND) 4 MG tablet, Take 1 tablet (4 mg total) by mouth once daily., Disp: 90 tablet, Rfl: 1    doxycycline (VIBRAMYCIN) 100 MG Cap, TAKE 1 CAPSULE BY MOUTH TWICE A DAY FOR 5 DAYS, Disp: , Rfl:     DUPIXENT SYRINGE 300 mg/2 mL Syrg, Inject 1 syringe (300mg) into the skin every other week, Disp: 4 mL, Rfl: 11    econazole nitrate 1 % cream, AAA bid for rash on shoulders, Disp: 30 g, Rfl: 3    fluticasone propionate  (FLONASE) 50 mcg/actuation nasal spray, 2 sprays (100 mcg total) by Each Nostril route once daily., Disp: 16 mL, Rfl: 5    gabapentin (NEURONTIN) 300 MG capsule, Take 1 capsule (300 mg total) by mouth 2 (two) times daily., Disp: 180 capsule, Rfl: 2    gemfibroziL (LOPID) 600 MG tablet, TAKE 1 TABLET BY MOUTH TWICE A DAY BEFORE MEALS, Disp: 180 tablet, Rfl: 3    hydrOXYzine (ATARAX) 50 MG tablet, Take 1 tablet (50 mg total) by mouth 3 (three) times daily as needed for Itching., Disp: 270 tablet, Rfl: 3    metFORMIN (GLUCOPHAGE) 1000 MG tablet, Take 1 tablet (1,000 mg total) by mouth 2 (two) times daily., Disp: 180 tablet, Rfl: 3    metFORMIN (GLUCOPHAGE) 1000 MG tablet, Take 1 tablet (1,000 mg total) by mouth 2 (two) times daily., Disp: 180 tablet, Rfl: 3    multivitamin capsule, Take 1 capsule by mouth once daily., Disp: , Rfl:     pantoprazole (PROTONIX) 40 MG tablet, TAKE 1 TABLET BY MOUTH EVERY DAY, Disp: 90 tablet, Rfl: 3    promethazine-dextromethorphan (PROMETHAZINE-DM) 6.25-15 mg/5 mL Syrp, Take 5 mLs by mouth every 4 (four) hours as needed (for cough, congestion)., Disp: 118 mL, Rfl: 1    quetiapine (SEROQUEL) 300 MG Tab, Take 300 mg by mouth every evening., Disp: , Rfl:     sildenafiL (VIAGRA) 25 MG tablet, Take 2 tablets (50 mg total) by mouth daily as needed for Erectile Dysfunction., Disp: 30 tablet, Rfl: 0    simvastatin (ZOCOR) 80 MG tablet, Take 1 tablet (80 mg total) by mouth nightly., Disp: 90 tablet, Rfl: 3    sumatriptan (IMITREX) 100 MG tablet, Take 1 tablet (100 mg total) by mouth daily as needed for Migraine., Disp: 30 tablet, Rfl: 3    tadalafiL (CIALIS) 10 MG tablet, Take 10 mg by mouth daily as needed., Disp: , Rfl:     tamsulosin (FLOMAX) 0.4 mg Cap, TAKE 2 CAPSULES BY MOUTH EVERY DAY, Disp: 180 capsule, Rfl: 3    tiZANidine (ZANAFLEX) 4 MG tablet, Take 1 tablet (4 mg total) by mouth every 8 (eight) hours., Disp: 30 tablet, Rfl: 1    triamcinolone acetonide 0.1% (KENALOG) 0.1 % cream, AAA  bid prn eczema. Do not use on face., Disp: 454 g, Rfl: 5    zonisamide (ZONEGRAN) 50 MG Cap, Take 1 capsule (50 mg total) by mouth 2 (two) times daily., Disp: 180 capsule, Rfl: 3  Does patient have record of home blood pressure readings yes. Readings have been averaging 174/85 being the highest & 115/68 being the lowest.   Last dose of blood pressure medication was taken at 0745 this am.  Patient is asymptomatic. Patient's BS was 183 Non Fasting. He ate 2 boiled eggs and 1 cup of coffee with sugar free sugar.  Complains of Back pain, will be getting a MRI on 9/6/23 on his back.    BP: 102/64 , Pulse: 94 . Patient instructed to drink more fluids to balance off what fluids he excreets from his body.    Ruth Beach NP notified.

## 2023-09-06 ENCOUNTER — HOSPITAL ENCOUNTER (OUTPATIENT)
Dept: RADIOLOGY | Facility: HOSPITAL | Age: 63
Discharge: HOME | End: 2023-09-06
Payer: MEDICARE

## 2023-09-06 DIAGNOSIS — M54.9 DORSALGIA, UNSPECIFIED: ICD-10-CM

## 2023-09-06 PROCEDURE — 72148 MRI LUMBAR SPINE WITHOUT CONTRAST: ICD-10-PCS | Mod: 26,,, | Performed by: RADIOLOGY

## 2023-09-06 PROCEDURE — 72148 MRI LUMBAR SPINE W/O DYE: CPT | Mod: TC

## 2023-09-06 PROCEDURE — 72148 MRI LUMBAR SPINE W/O DYE: CPT | Mod: 26,,, | Performed by: RADIOLOGY

## 2023-09-08 ENCOUNTER — PATIENT OUTREACH (OUTPATIENT)
Dept: ADMINISTRATIVE | Facility: HOSPITAL | Age: 63
End: 2023-09-08
Payer: MEDICARE

## 2023-09-08 DIAGNOSIS — E11.42 TYPE 2 DIABETES MELLITUS WITH DIABETIC POLYNEUROPATHY, WITHOUT LONG-TERM CURRENT USE OF INSULIN: Primary | ICD-10-CM

## 2023-09-08 NOTE — PROGRESS NOTES
[facility] 1 PHN KED Gap Report 08.29.23 - 2023 cmp completed on 06/08/2023 / no urine, Left message for patient to call our office. Please schedule.

## 2023-09-10 ENCOUNTER — HOSPITAL ENCOUNTER (EMERGENCY)
Facility: HOSPITAL | Age: 63
Discharge: HOME | End: 2023-09-10
Attending: EMERGENCY MEDICINE
Payer: MEDICARE

## 2023-09-10 VITALS
SYSTOLIC BLOOD PRESSURE: 145 MMHG | WEIGHT: 241 LBS | TEMPERATURE: 98 F | OXYGEN SATURATION: 100 % | DIASTOLIC BLOOD PRESSURE: 88 MMHG | HEART RATE: 67 BPM | BODY MASS INDEX: 35.59 KG/M2 | RESPIRATION RATE: 20 BRPM
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
## 2023-09-10 DIAGNOSIS — M25.562 ACUTE PAIN OF LEFT KNEE: Primary | ICD-10-CM

## 2023-09-10 PROCEDURE — 63600175 PHARM REV CODE 636 W HCPCS

## 2023-09-10 PROCEDURE — 99284 EMERGENCY DEPT VISIT MOD MDM: CPT

## 2023-09-10 PROCEDURE — 96372 THER/PROPH/DIAG INJ SC/IM: CPT

## 2023-09-10 RX ORDER — ACETAMINOPHEN 500 MG
500 TABLET ORAL EVERY 6 HOURS PRN
Qty: 28 TABLET | Refills: 0 | Status: SHIPPED | OUTPATIENT
Start: 2023-09-10 | End: 2023-09-17

## 2023-09-10 RX ORDER — KETOROLAC TROMETHAMINE 30 MG/ML
30 INJECTION, SOLUTION INTRAMUSCULAR; INTRAVENOUS
Status: COMPLETED | OUTPATIENT
Start: 2023-09-10 | End: 2023-09-10

## 2023-09-10 RX ORDER — TIZANIDINE 2 MG/1
2 TABLET ORAL EVERY 8 HOURS PRN
Qty: 15 TABLET | Refills: 0 | Status: SHIPPED | OUTPATIENT
Start: 2023-09-10 | End: 2024-02-26

## 2023-09-10 RX ORDER — NAPROXEN 500 MG/1
500 TABLET ORAL 2 TIMES DAILY WITH MEALS
Qty: 10 TABLET | Refills: 0 | Status: SHIPPED | OUTPATIENT
Start: 2023-09-10 | End: 2023-09-15

## 2023-09-10 RX ADMIN — KETOROLAC TROMETHAMINE 30 MG: 30 INJECTION, SOLUTION INTRAMUSCULAR; INTRAVENOUS at 09:09

## 2023-09-10 NOTE — ED TRIAGE NOTES
Pt presents to the ER c/o of left knee pain from a fall about 3:00 am. Pt denies any loc, blood thinners, dizziness, or any other complications.  Left knee painful to touch.

## 2023-09-10 NOTE — DISCHARGE INSTRUCTIONS

## 2023-09-10 NOTE — ED PROVIDER NOTES
Encounter Date: 9/10/2023    SCRIBE #1 NOTE: IChris am scribing for, and in the presence of,  Derrick Nunez PA-C. I have scribed the following portions of the note - Other sections scribed: HPI, ROS.       History     Chief Complaint   Patient presents with    Knee Pain    Fall     Fell at 3am tripping over dog gate, now having pain to left knee. Denies LOC/blood thinners. No obvious injury noted     62 year old male with past medical history of COPD, Hypertension, Hyperlipidemia, and Diabetes mellitus presenting to the Emergency room for further evaluation of sharp, left knee pain after tripping over dog cage at 3am. The patient states the knee twist inward. Denies head trauma, LOC, anticoagulant use. He is ambulatory with pain; pain exacerbated with walking on toes. Patient reports associated back pain. No other exacerbating or alleviating factors. Denies other associated symptoms. No further complaints at present time.     The history is provided by the patient. No  was used.     Review of patient's allergies indicates:   Allergen Reactions    Levofloxacin Hallucinations    Sulfa (sulfonamide antibiotics) Rash     Past Medical History:   Diagnosis Date    Bipolar 1 disorder, mixed     BPH (benign prostatic hypertrophy)     Colon polyp     Cyst, eyelid     Dr. Broussard    Diabetes mellitus     GERD (gastroesophageal reflux disease)     Hyperlipidemia     Hypertension     Lumbar degenerative disc disease 3/7/2019    Migraine headache     Obesity      Past Surgical History:   Procedure Laterality Date    CERVICAL SPINE SURGERY      COLONOSCOPY N/A 7/27/2017    Procedure: COLONOSCOPY;  Surgeon: Genaro Nix MD;  Location: [facility];  Service: Endoscopy;  Laterality: N/A;    COLONOSCOPY N/A 10/30/2020    Procedure: COLONOSCOPY;  Surgeon: Herb Martinez MD;  Location: [facility];  Service: Endoscopy;  Laterality: N/A;    EYE SURGERY Left 03/2017    cyst removal on eyelid;   [facility]    SHOULDER SURGERY      TONSILLECTOMY       Family History   Problem Relation Age of Onset    Cataracts Mother     Cancer Mother         throat    Hypertension Mother     Hypertension Father     Stroke Father         2 strokes    Hypertension Sister     Cancer Brother         spinal, kidney, spinal fluid    Hypertension Brother     Cancer Cousin         breast?     Social History     Tobacco Use    Smoking status: Former     Current packs/day: 0.00     Average packs/day: 2.0 packs/day for 15.0 years (30.0 ttl pk-yrs)     Types: Cigarettes     Start date: 1969     Quit date: 1984     Years since quittin.3     Passive exposure: Past    Smokeless tobacco: Never    Tobacco comments:     Patient quit smoking at the age of 24 yo.   Substance Use Topics    Alcohol use: No    Drug use: No     Review of Systems   Constitutional:  Negative for diaphoresis, fatigue and unexpected weight change.   HENT:  Negative for sinus pain and sore throat.    Eyes:  Negative for pain, redness and visual disturbance.   Respiratory:  Negative for cough, chest tightness, shortness of breath and wheezing.    Cardiovascular:  Negative for chest pain and palpitations.   Gastrointestinal:  Negative for abdominal pain, blood in stool, diarrhea, nausea and vomiting.   Endocrine: Negative for polydipsia, polyphagia and polyuria.   Genitourinary:  Negative for dysuria, frequency and urgency.   Musculoskeletal:  Positive for arthralgias and gait problem. Negative for back pain and myalgias.   Skin:  Negative for rash.   Allergic/Immunologic: Negative for environmental allergies.   Neurological:  Negative for dizziness, syncope and headaches.   Psychiatric/Behavioral:  Negative for suicidal ideas.        Physical Exam     Initial Vitals [09/10/23 0854]   BP Pulse Resp Temp SpO2   (!) 147/81 98 20 97.5 °F (36.4 °C) 97 %      MAP       --         Physical Exam    Nursing note and vitals reviewed.  Constitutional: Vital signs are  normal. He appears well-developed and well-nourished. He is cooperative. He does not appear ill. No distress.   HENT:   Head: Normocephalic and atraumatic.   Right Ear: External ear normal.   Left Ear: External ear normal.   Nose: Nose normal.   Eyes: Conjunctivae and EOM are normal.   Neck: Phonation normal.   Normal range of motion.  Cardiovascular:  Normal rate and regular rhythm.           No murmur heard.  Pulmonary/Chest: Effort normal. No respiratory distress.   Abdominal: Abdomen is flat. He exhibits no distension. There is no abdominal tenderness.   Musculoskeletal:      Cervical back: Normal range of motion.      Left knee: No bony tenderness. Tenderness present over the medial joint line. No LCL laxity, MCL laxity, ACL laxity or PCL laxity.Normal alignment.      Comments: Patient was ambulatory with some discomfort.  Knee is stable, no laxity on varus or valgus stress.  Negative anterior drawer, posterior drawer, anterior Lachman's.  No tenderness over the patella.  Neurovascularly intact distal.     Neurological: He is alert and oriented to person, place, and time.   Skin: Skin is warm and dry. Capillary refill takes less than 2 seconds. No rash noted.         ED Course   Procedures  Labs Reviewed - No data to display       Imaging Results              X-Ray Knee 3 View Left (Final result)  Result time 09/10/23 10:12:21      Final result by Matias Sanabria MD (09/10/23 10:12:21)                   Impression:      No convincing evidence of acute fracture or dislocation.    Question small suprapatellar knee joint effusion.      Electronically signed by: Matias Sanabria  Date:    09/10/2023  Time:    10:12               Narrative:    EXAMINATION:  XR KNEE 3 VIEW LEFT    CLINICAL HISTORY:  Unspecified fall, initial encounter    TECHNIQUE:  AP, lateral, and Merchant views of the left knee were performed.    COMPARISON:  None    FINDINGS:  No evidence of acute fracture or dislocation.  DJD.  Enthesopathic  change at the patella.  Question small suprapatellar knee joint effusion.  No evidence of radiopaque foreign body.  Atherosclerotic calcifications.                                       Medications   ketorolac injection 30 mg (30 mg Intramuscular Given 9/10/23 0944)     Medical Decision Making  62-year-old male presenting to the emergency department with knee pain after a fall last night.  Twisted his knee.  On physical exam, he was clinically well-appearing and in no acute distress.  He was ambulatory with some discomfort.  No point bony tenderness, there is some tenderness to palpation in the medial aspect of the knee.  Full range of motion.  Neurovascularly intact distal to injury.    Differential diagnosis includes but is not limited to osteoarthritis, rheumatoid arthritis, septic arthritis, gout, pseudogout, ligamentous injury to the ACL, MCL, PCL, or LCL, contusion, fracture, dislocation, patellar bursitis, patellofemoral syndrome     X-rays negative for any acute fractures or dislocations.  Presentation is consistent with knee sprain secondary to fall.  Acute pain management in the emergency department with ice, Ace wrap, Toradol.  Naproxen, Tylenol, tizanidine electronically prescribed sent to the patient's preferred pharmacy.  Return precautions discussed.  Follow up with primary care.    Return precautions were discussed, all patient questions were answered, and the patient was agreeable to the plan of care.  He was discharged home in stable condition and will follow up with his primary care provider or return to the emergency department if his symptoms worsen or do not improve.     Amount and/or Complexity of Data Reviewed  External Data Reviewed: radiology.  Radiology: ordered.    Risk  OTC drugs.  Prescription drug management.       IDerrick PA-C, personally performed the services described in this documentation.  All medical record entries made by the scribe were at my direction and in my  presence.  I have reviewed the chart and agree that the record reflects my personal performance and is accurate and complete.       Scribe Attestation:   Scribe #1: I performed the above scribed service and the documentation accurately describes the services I performed. I attest to the accuracy of the note.                        Clinical Impression:   Final diagnoses:  [W19.XXXA] Fall  [M25.562] Acute pain of left knee (Primary)        ED Disposition Condition    Discharge Stable          ED Prescriptions       Medication Sig Dispense Start Date End Date Auth. Provider    tiZANidine (ZANAFLEX) 2 MG tablet Take 1 tablet (2 mg total) by mouth every 8 (eight) hours as needed (Muscle spasm). 15 tablet 9/10/2023 -- Derrick Nunez, ROSARIO    acetaminophen (TYLENOL) 500 MG tablet Take 1 tablet (500 mg total) by mouth every 6 (six) hours as needed. 28 tablet 9/10/2023 9/17/2023 Derrick Nunez, ROSARIO    naproxen (NAPROSYN) 500 MG tablet Take 1 tablet (500 mg total) by mouth 2 (two) times daily with meals. for 5 days 10 tablet 9/10/2023 9/15/2023 Derrick Nunez, PA-C          Follow-up Information       Follow up With Specialties Details Why Contact Info    Kaylie Villalta MD Family Medicine, Wound Care Schedule an appointment as soon as possible for a visit  As needed, If symptoms worsen [address] [facility] [address]  [phone]      [facility] [place] - Emergency Dept Emergency Medicine Go to  If symptoms worsen [address] [facility] [address] [phone]             Derrick Nunez, PA-C  09/10/23 1751

## 2023-09-11 ENCOUNTER — OFFICE VISIT (OUTPATIENT)
Dept: ORTHOPEDICS | Facility: CLINIC | Age: 63
End: 2023-09-11
Payer: MEDICARE

## 2023-09-11 DIAGNOSIS — M54.16 LUMBAR RADICULOPATHY: Primary | ICD-10-CM

## 2023-09-11 PROBLEM — J18.9 PNEUMONIA: Status: RESOLVED | Noted: 2023-06-08 | Resolved: 2023-09-11

## 2023-09-11 PROCEDURE — 99441 PR PHYSICIAN TELEPHONE EVALUATION 5-10 MIN: ICD-10-PCS | Mod: 95,,, | Performed by: PHYSICIAN ASSISTANT

## 2023-09-11 PROCEDURE — 3051F HG A1C>EQUAL 7.0%<8.0%: CPT | Mod: CPTII,95,, | Performed by: PHYSICIAN ASSISTANT

## 2023-09-11 PROCEDURE — 99441 PR PHYSICIAN TELEPHONE EVALUATION 5-10 MIN: CPT | Mod: 95,,, | Performed by: PHYSICIAN ASSISTANT

## 2023-09-11 PROCEDURE — 3051F PR MOST RECENT HEMOGLOBIN A1C LEVEL 7.0 - < 8.0%: ICD-10-PCS | Mod: CPTII,95,, | Performed by: PHYSICIAN ASSISTANT

## 2023-09-11 PROCEDURE — 4010F PR ACE/ARB THEARPY RXD/TAKEN: ICD-10-PCS | Mod: CPTII,95,, | Performed by: PHYSICIAN ASSISTANT

## 2023-09-11 PROCEDURE — 4010F ACE/ARB THERAPY RXD/TAKEN: CPT | Mod: CPTII,95,, | Performed by: PHYSICIAN ASSISTANT

## 2023-09-11 NOTE — PROGRESS NOTES
Established Patient - Audio Only Telehealth Visit     The patient location is: home  The chief complaint leading to consultation is: MRI results  Visit type: Virtual visit with audio only (telephone)  Total time spent with patient: 10 minutes       The reason for the audio only service rather than synchronous audio and video virtual visit was related to technical difficulties or patient preference/necessity.     Each patient to whom I provide medical services by telemedicine is:  (1) informed of the relationship between the physician and patient and the respective role of any other health care provider with respect to management of the patient; and (2) notified that they may decline to receive medical services by telemedicine and may withdraw from such care at any time. Patient verbally consented to receive this service via voice-only telephone call.       HPI: the patient presents for MRI results.    MRI lumbar spine demonstrates a small disc protrusion at L5/S1 with mild foraminal narrowing. No significant spinal stenosis.      Assessment and plan:      Today we discussed at length all of the different treatment options including anti-inflammatories, acetaminophen, rest, ice, heat, physical therapy including strengthening and stretching exercises, home exercises, ROM, aerobic conditioning, aqua therapy, other modalities including ultrasound, massage, and dry needling, epidural steroid injections and finally surgical intervention.      Plan for tfesi.  Follow up 2 weeks after injection.                         This service was not originating from a related E/M service provided within the previous 7 days nor will  to an E/M service or procedure within the next 24 hours or my soonest available appointment.  Prevailing standard of care was able to be met in this audio-only visit.

## 2023-09-13 ENCOUNTER — TELEPHONE (OUTPATIENT)
Dept: PAIN MEDICINE | Facility: CLINIC | Age: 63
End: 2023-09-13
Payer: MEDICARE

## 2023-09-13 DIAGNOSIS — M54.16 LUMBAR RADICULOPATHY: ICD-10-CM

## 2023-09-13 DIAGNOSIS — M51.36 DDD (DEGENERATIVE DISC DISEASE), LUMBAR: Primary | ICD-10-CM

## 2023-09-13 NOTE — TELEPHONE ENCOUNTER
Mr. Chavez would like to schedule the bilat L5 TF VENKATESH on 9/27/23- referring provider updated.  Copy of pre-procedure instructions sent via [facility].

## 2023-09-13 NOTE — TELEPHONE ENCOUNTER
----- Message from Tanner Mayberry Jr., MD sent at 2023  2:07 PM CDT -----  Regarding: RE: Order for GARO JUAREZ  OK to schedule for bilateral L5 TFESI. Thanks.   ----- Message -----  From: Aspen Flores RN  Sent: 2023   1:32 PM CDT  To: Tanner Mayberry Jr., MD  Subject: FW: Order for GARO JUAREZ                    ----- Message -----  From: Sydnee Lopez PA-C  Sent: 2023   1:31 PM CDT  To: [facility] Pain Management Schedulers  Subject: Order for GARO JUAREZ                        Patient Name: GARO JUAREZ([id number])  Sex: Male  : 1960      PCP: GABRIELA CAMACHO    Center: [facility]     Types of orders made on 2023: Procedure Request    Order Date:2023  Ordering User:SYDNEE LOPEZ [[id number]]  Encounter Provider:Sydnee Lopez PA-C [[id number]]  Megan thomas Provider: Sydnee Lopez PA-C [[id number]]  Supervising Provider:DORIS CARDENAS [[id number]]  Type of Supervision:Supervision Required  Department:[facility][[id number]]    Common Order Information  Procedure -> Transforaminal Injection (Specify level and laterality) Cmt:             bilateral L5/S1    Order Specific Information  Order: Procedure Order to Pain Management [Custom: [id number]]  Order #:          1  [id number]: 1 FUTURE    Priority: Routine  Class: Clinic Performed    Future Order Information      Expires on:2024            Expected by:2023                   Associated Diagnoses      M54.16 Lumbar radiculopathy      Facility Name: -> [facility]         Follow-up: -> 2 weeks           Priority: Routine  Class: Clinic Performed    Future Order Information      Expires on:  2024            Expected by:2023                   Associated Diagnoses      M54.16 Lumbar radiculopathy      Procedure -> Transforaminal Injection (Specify level and laterality) Cmt:                 bilateral L5/S1        Facility Name: -> [facility]         Follow-up:  -> 2 weeks

## 2023-09-14 ENCOUNTER — LAB VISIT (OUTPATIENT)
Dept: LAB | Facility: HOSPITAL | Age: 63
End: 2023-09-14
Payer: MEDICARE

## 2023-09-14 DIAGNOSIS — E11.42 TYPE 2 DIABETES MELLITUS WITH DIABETIC POLYNEUROPATHY, WITHOUT LONG-TERM CURRENT USE OF INSULIN: ICD-10-CM

## 2023-09-14 LAB
ALBUMIN SERPL BCP-MCNC: 4.2 G/DL (ref 3.5–5.2)
ALP SERPL-CCNC: 82 U/L (ref 55–135)
ALT SERPL W/O P-5'-P-CCNC: 27 U/L (ref 10–44)
ANION GAP SERPL CALC-SCNC: 12 MMOL/L (ref 8–16)
AST SERPL-CCNC: 19 U/L (ref 10–40)
BASOPHILS # BLD AUTO: 0.09 K/UL (ref 0–0.2)
BASOPHILS NFR BLD: 1.2 % (ref 0–1.9)
BILIRUB SERPL-MCNC: 0.4 MG/DL (ref 0.1–1)
BUN SERPL-MCNC: 19 MG/DL (ref 8–23)
CALCIUM SERPL-MCNC: 9.7 MG/DL (ref 8.7–10.5)
CHLORIDE SERPL-SCNC: 106 MMOL/L (ref 95–110)
CO2 SERPL-SCNC: 18 MMOL/L (ref 23–29)
CREAT SERPL-MCNC: 1 MG/DL (ref 0.5–1.4)
DIFFERENTIAL METHOD: ABNORMAL
EOSINOPHIL # BLD AUTO: 0.7 K/UL (ref 0–0.5)
EOSINOPHIL NFR BLD: 9.8 % (ref 0–8)
ERYTHROCYTE [DISTWIDTH] IN BLOOD BY AUTOMATED COUNT: 13 % (ref 11.5–14.5)
EST. GFR  (NO RACE VARIABLE): >60 ML/MIN/1.73 M^2
ESTIMATED AVG GLUCOSE: 183 MG/DL (ref 68–131)
GLUCOSE SERPL-MCNC: 208 MG/DL (ref 70–110)
HBA1C MFR BLD: 8 % (ref 4–5.6)
HCT VFR BLD AUTO: 39.6 % (ref 40–54)
HGB BLD-MCNC: 12.4 G/DL (ref 14–18)
IMM GRANULOCYTES # BLD AUTO: 0.1 K/UL (ref 0–0.04)
IMM GRANULOCYTES NFR BLD AUTO: 1.4 % (ref 0–0.5)
LYMPHOCYTES # BLD AUTO: 2 K/UL (ref 1–4.8)
LYMPHOCYTES NFR BLD: 28 % (ref 18–48)
MCH RBC QN AUTO: 26.8 PG (ref 27–31)
MCHC RBC AUTO-ENTMCNC: 31.3 G/DL (ref 32–36)
MCV RBC AUTO: 86 FL (ref 82–98)
MONOCYTES # BLD AUTO: 0.7 K/UL (ref 0.3–1)
MONOCYTES NFR BLD: 9.8 % (ref 4–15)
NEUTROPHILS # BLD AUTO: 3.6 K/UL (ref 1.8–7.7)
NEUTROPHILS NFR BLD: 49.8 % (ref 38–73)
NRBC BLD-RTO: 0 /100 WBC
PLATELET # BLD AUTO: 269 K/UL (ref 150–450)
PMV BLD AUTO: 10.5 FL (ref 9.2–12.9)
POTASSIUM SERPL-SCNC: 4.3 MMOL/L (ref 3.5–5.1)
PROT SERPL-MCNC: 6.5 G/DL (ref 6–8.4)
RBC # BLD AUTO: 4.62 M/UL (ref 4.6–6.2)
SODIUM SERPL-SCNC: 136 MMOL/L (ref 136–145)
WBC # BLD AUTO: 7.28 K/UL (ref 3.9–12.7)

## 2023-09-14 PROCEDURE — 80053 COMPREHEN METABOLIC PANEL: CPT

## 2023-09-14 PROCEDURE — 36415 COLL VENOUS BLD VENIPUNCTURE: CPT | Mod: PO

## 2023-09-14 PROCEDURE — 83036 HEMOGLOBIN GLYCOSYLATED A1C: CPT

## 2023-09-14 PROCEDURE — 85025 COMPLETE CBC W/AUTO DIFF WBC: CPT

## 2023-09-19 ENCOUNTER — TELEPHONE (OUTPATIENT)
Dept: FAMILY MEDICINE | Facility: CLINIC | Age: 63
End: 2023-09-19
Payer: MEDICARE

## 2023-09-19 NOTE — TELEPHONE ENCOUNTER
----- Message from Bao Frankie sent at 9/19/2023  1:01 PM CDT -----  Regarding: Self  [phone]  Type: RX Refill Request    Who Called:  Self     Have you contacted your pharmacy: yes     Refill    RX Name and Strength: levocetirizine (XYZAL) 5 MG tablet      Preferred Pharmacy with phone number:     [facility]/pharmacy #[id number] - [place] - [address]  [address] [place] [address]  Phone: [phone] Fax: [phone]     Local or Mail Order: local     Would the patient rather a call back or a response via [facility]? Call back     Best Call Back Number: [phone]     Additional Information:     Thank you.

## 2023-09-22 ENCOUNTER — TELEPHONE (OUTPATIENT)
Dept: PAIN MEDICINE | Facility: CLINIC | Age: 63
End: 2023-09-22
Payer: MEDICARE

## 2023-09-22 NOTE — TELEPHONE ENCOUNTER
Message left with review of pre-procedure instructions, as well as provided arrival time of 8:15am.  Information was also sent via [facility] [place].  Asked that patient call clinic to confirm- phone number included.

## 2023-09-27 ENCOUNTER — HOSPITAL ENCOUNTER (OUTPATIENT)
Facility: HOSPITAL | Age: 63
Discharge: HOME | End: 2023-09-27
Attending: PAIN MEDICINE | Admitting: PAIN MEDICINE
Payer: MEDICARE

## 2023-09-27 VITALS
TEMPERATURE: 98 F | OXYGEN SATURATION: 94 % | SYSTOLIC BLOOD PRESSURE: 123 MMHG | RESPIRATION RATE: 16 BRPM | DIASTOLIC BLOOD PRESSURE: 76 MMHG | HEART RATE: 91 BPM

## 2023-09-27 DIAGNOSIS — M54.16 LUMBAR RADICULOPATHY: Primary | ICD-10-CM

## 2023-09-27 LAB — POCT GLUCOSE: 199 MG/DL (ref 70–110)

## 2023-09-27 PROCEDURE — 63600175 PHARM REV CODE 636 W HCPCS: Performed by: PAIN MEDICINE

## 2023-09-27 PROCEDURE — 64483 NJX AA&/STRD TFRM EPI L/S 1: CPT | Mod: 50 | Performed by: PAIN MEDICINE

## 2023-09-27 PROCEDURE — 64483 NJX AA&/STRD TFRM EPI L/S 1: CPT | Mod: 50,,, | Performed by: PAIN MEDICINE

## 2023-09-27 PROCEDURE — 64483 PR EPIDURAL INJ, ANES/STEROID, TRANSFORAMINAL, LUMB/SACR, SNGL LEVL: ICD-10-PCS | Mod: 50,,, | Performed by: PAIN MEDICINE

## 2023-09-27 PROCEDURE — 25500020 PHARM REV CODE 255: Performed by: PAIN MEDICINE

## 2023-09-27 PROCEDURE — 25000003 PHARM REV CODE 250: Performed by: PAIN MEDICINE

## 2023-09-27 RX ORDER — LIDOCAINE HYDROCHLORIDE 20 MG/ML
10 INJECTION, SOLUTION INFILTRATION; PERINEURAL ONCE
Status: DISCONTINUED | OUTPATIENT
Start: 2023-09-27 | End: 2023-09-27 | Stop reason: ALTCHOICE

## 2023-09-27 RX ORDER — LIDOCAINE HYDROCHLORIDE 10 MG/ML
INJECTION, SOLUTION EPIDURAL; INFILTRATION; INTRACAUDAL; PERINEURAL
Status: DISCONTINUED
Start: 2023-09-27 | End: 2023-09-27 | Stop reason: HOSPADM

## 2023-09-27 RX ORDER — LIDOCAINE HYDROCHLORIDE 10 MG/ML
INJECTION INFILTRATION; PERINEURAL
Status: DISCONTINUED
Start: 2023-09-27 | End: 2023-09-27 | Stop reason: HOSPADM

## 2023-09-27 RX ORDER — DEXAMETHASONE SODIUM PHOSPHATE 10 MG/ML
10 INJECTION INTRAMUSCULAR; INTRAVENOUS ONCE
Status: COMPLETED | OUTPATIENT
Start: 2023-09-27 | End: 2023-09-27

## 2023-09-27 RX ORDER — ALPRAZOLAM 0.5 MG/1
1 TABLET, ORALLY DISINTEGRATING ORAL ONCE AS NEEDED
Status: DISCONTINUED | OUTPATIENT
Start: 2023-09-27 | End: 2023-09-27 | Stop reason: HOSPADM

## 2023-09-27 RX ORDER — DEXAMETHASONE SODIUM PHOSPHATE 10 MG/ML
INJECTION INTRAMUSCULAR; INTRAVENOUS
Status: DISCONTINUED
Start: 2023-09-27 | End: 2023-09-27 | Stop reason: HOSPADM

## 2023-09-27 RX ORDER — LIDOCAINE HYDROCHLORIDE 10 MG/ML
1 INJECTION, SOLUTION EPIDURAL; INFILTRATION; INTRACAUDAL; PERINEURAL ONCE
Status: COMPLETED | OUTPATIENT
Start: 2023-09-27 | End: 2023-09-27

## 2023-09-27 RX ORDER — LIDOCAINE HYDROCHLORIDE 10 MG/ML
10 INJECTION INFILTRATION; PERINEURAL ONCE
Status: COMPLETED | OUTPATIENT
Start: 2023-09-27 | End: 2023-09-27

## 2023-09-27 NOTE — DISCHARGE INSTRUCTIONS

## 2023-09-27 NOTE — OP NOTE
Lumbar transforaminal VENKATESH    Time-out taken to identify patient and procedure side prior to starting the procedure.   I attest that I have reviewed the patient's home medications prior to the procedure and no contraindication have been identified. I  re-evaluated the patient after the patient was positioned for the procedure in the procedure room immediately before the procedural time-out. The vital signs are current and represent the current state of the patient which has not significantly changed since the preprocedure assessment.                              Date of Service: 09/27/2023    PCP: Kaylie Villalta MD    Referring Physician:                                     PROCEDURE: Bilateral L5 transforaminal epidural steroid injection under fluoroscopy    REASON FOR PROCEDURE: Bilateral DDD (degenerative disc disease), lumbar [M51.36]  Lumbar radiculopathy [M54.16]    PHYSICIAN: Tanner Mayberry MD  ASSISTANTS:None    MEDICATIONS INJECTED:  Preservative-free dexamethasone 10mg, Xylocaine 1% MPF 3-5ml. 3ml per level. Preservative free, sterile normal saline is used to get larger volume as needed.  LOCAL ANESTHETIC INJECTED:  Xylocaine 1% 3ml per site.    SEDATION MEDICATIONS: None  ESTIMATED BLOOD LOSS:  None.    COMPLICATIONS:  None.    TECHNIQUE:   Laying in a prone position, the patient was prepped and draped in the usual sterile fashion using ChloraPrep and fenestrated drape.  The area to be injected was determined under fluoroscopic guidance.  Local anesthetic was given by raising a wheel and going down to the hub of a 27-gauge 1.25 inch needle.  The 4.75 inch 25-gauge spinal needle was introduced towards the bilateral superior articular processes of S1.  The needle was walked medially then hinged into the neural foramen.  Omnipaque was injected to confirm appropriate placement and that there was no vascular runoff.  The medication was then injected after applying negative pressure. The patient  tolerated the procedure well.    PAIN BEFORE THE PROCEDURE: 8/10.    PAIN AFTER THE PROCEDURE: 2/10.    The patient was monitored after the procedure.  Patient was given post procedure and discharge instructions to follow at home.  We will see the patient back in two weeks or the patient may call to inform of status. The patient was discharged in a stable condition.

## 2023-09-27 NOTE — H&P
Subjective:     Patient ID: Scott Bansal is a 62 y.o. male    Chief Complaint: Low back pain    Referred by: Tanner Mayberry Jr*      HPI:    Scott Bansal is a 62 y.o. male who presents today for b/l L5 TFESI. He denies any changes in the quality or location of the pain.        Review of Systems   Constitutional:  Negative for activity change, appetite change, chills, fatigue, fever and unexpected weight change.   HENT:  Negative for hearing loss.    Eyes:  Negative for visual disturbance.   Respiratory:  Negative for chest tightness and shortness of breath.    Cardiovascular:  Negative for chest pain.   Gastrointestinal:  Negative for abdominal pain, constipation, diarrhea, nausea and vomiting.   Genitourinary:  Negative for difficulty urinating.   Musculoskeletal:  Positive for back pain, gait problem and myalgias. Negative for neck pain.   Skin:  Negative for rash.   Neurological:  Negative for dizziness, weakness, light-headedness, numbness and headaches.   Psychiatric/Behavioral:  Positive for sleep disturbance. Negative for hallucinations and suicidal ideas. The patient is not nervous/anxious.        Past Medical History:   Diagnosis Date    Bipolar 1 disorder, mixed     BPH (benign prostatic hypertrophy)     Colon polyp     Cyst, eyelid     Dr. Broussard    Diabetes mellitus     GERD (gastroesophageal reflux disease)     Hyperlipidemia     Hypertension     Lumbar degenerative disc disease 3/7/2019    Migraine headache     Obesity        Past Surgical History:   Procedure Laterality Date    CERVICAL SPINE SURGERY      COLONOSCOPY N/A 7/27/2017    Procedure: COLONOSCOPY;  Surgeon: Genaro Nix MD;  Location: [facility];  Service: Endoscopy;  Laterality: N/A;    COLONOSCOPY N/A 10/30/2020    Procedure: COLONOSCOPY;  Surgeon: Herb Martinez MD;  Location: [facility] ENDO;  Service: Endoscopy;  Laterality: N/A;    EYE SURGERY Left 03/2017    cyst removal on eyelid; Dr. Broussard    SHOULDER SURGERY       TONSILLECTOMY         Social History     Socioeconomic History    Marital status:    Tobacco Use    Smoking status: Former     Current packs/day: 0.00     Average packs/day: 2.0 packs/day for 15.0 years (30.0 ttl pk-yrs)     Types: Cigarettes     Start date: 1969     Quit date: 1984     Years since quittin.4     Passive exposure: Past    Smokeless tobacco: Never    Tobacco comments:     Patient quit smoking at the age of 26 yo.   Substance and Sexual Activity    Alcohol use: No    Drug use: No    Sexual activity: Yes     Partners: Female     Social Determinants of Health     Financial Resource Strain: Medium Risk (10/4/2022)    Overall Financial Resource Strain (CARDIA)     Difficulty of Paying Living Expenses: Somewhat hard   Food Insecurity: No Food Insecurity (10/4/2022)    Hunger Vital Sign     Worried About Running Out of Food in the Last Year: Never true     Ran Out of Food in the Last Year: Never true   Transportation Needs: No Transportation Needs (10/4/2022)    PRAPARE - Transportation     Lack of Transportation (Medical): No     Lack of Transportation (Non-Medical): No   Physical Activity: Inactive (10/4/2022)    Exercise Vital Sign     Days of Exercise per Week: 0 days     Minutes of Exercise per Session: 0 min   Stress: Stress Concern Present (10/4/2022)    [employer] [place] [employer] - Occupational Stress Questionnaire     Feeling of Stress : To some extent   Social Connections: Moderately Integrated (10/4/2022)    Social Connection and Isolation Panel [NHANES]     Frequency of Communication with Friends and Family: More than three times a week     Frequency of Social Gatherings with Friends and Family: Never     Attends Mandaeism Services: More than 4 times per year     Active Member of Clubs or Organizations: No     Attends Club or Organization Meetings: Never     Marital Status: Living with partner   Housing Stability: Low Risk  (10/4/2022)    Housing Stability  Vital Sign     Unable to Pay for Housing in the Last Year: No     Number of Places Lived in the Last Year: 1     Unstable Housing in the Last Year: No       Review of patient's allergies indicates:   Allergen Reactions    Levofloxacin Hallucinations    Sulfa (sulfonamide antibiotics) Rash       No current facility-administered medications on file prior to encounter.     Current Outpatient Medications on File Prior to Encounter   Medication Sig Dispense Refill    albuterol (PROVENTIL) 2.5 mg /3 mL (0.083 %) nebulizer solution Take 3 mLs (2.5 mg total) by nebulization every 6 (six) hours as needed for Wheezing or Shortness of Breath. 9 mL 1    albuterol (PROVENTIL/VENTOLIN HFA) 90 mcg/actuation inhaler Inhale 1-2 puffs into the lungs every 6 (six) hours as needed for Wheezing or Shortness of Breath. Rescue 18 g 5    ALPRAZolam (XANAX) 1 MG tablet Prior to MRI 1 tablet 0    amitriptyline (ELAVIL) 25 MG tablet Take 1 tablet (25 mg total) by mouth every evening. 30 tablet 11    amLODIPine (NORVASC) 10 MG tablet Take 1 tablet (10 mg total) by mouth once daily. 90 tablet 3    amoxicillin (AMOXIL) 500 MG Tab Take by mouth 3 (three) times daily.      busPIRone (BUSPAR) 10 MG tablet Take 10 mg by mouth 3 (three) times daily.      candesartan (ATACAND) 4 MG tablet Take 1 tablet (4 mg total) by mouth once daily. 90 tablet 1    doxycycline (VIBRAMYCIN) 100 MG Cap TAKE 1 CAPSULE BY MOUTH TWICE A DAY FOR 5 DAYS      DUPIXENT SYRINGE 300 mg/2 mL Syrg Inject 1 syringe (300mg) into the skin every other week 4 mL 11    econazole nitrate 1 % cream AAA bid for rash on shoulders 30 g 3    fluticasone propionate (FLONASE) 50 mcg/actuation nasal spray 2 sprays (100 mcg total) by Each Nostril route once daily. 16 mL 5    gabapentin (NEURONTIN) 300 MG capsule Take 1 capsule (300 mg total) by mouth 2 (two) times daily. 180 capsule 2    gemfibroziL (LOPID) 600 MG tablet TAKE 1 TABLET BY MOUTH TWICE A DAY BEFORE MEALS 180 tablet 3     hydrOXYzine (ATARAX) 50 MG tablet Take 1 tablet (50 mg total) by mouth 3 (three) times daily as needed for Itching. 270 tablet 3    metFORMIN (GLUCOPHAGE) 1000 MG tablet Take 1 tablet (1,000 mg total) by mouth 2 (two) times daily. 180 tablet 3    metFORMIN (GLUCOPHAGE) 1000 MG tablet Take 1 tablet (1,000 mg total) by mouth 2 (two) times daily. 180 tablet 3    multivitamin capsule Take 1 capsule by mouth once daily.      pantoprazole (PROTONIX) 40 MG tablet TAKE 1 TABLET BY MOUTH EVERY DAY 90 tablet 3    promethazine-dextromethorphan (PROMETHAZINE-DM) 6.25-15 mg/5 mL Syrp Take 5 mLs by mouth every 4 (four) hours as needed (for cough, congestion). 118 mL 1    quetiapine (SEROQUEL) 300 MG Tab Take 300 mg by mouth every evening.      sildenafiL (VIAGRA) 25 MG tablet Take 2 tablets (50 mg total) by mouth daily as needed for Erectile Dysfunction. 30 tablet 0    simvastatin (ZOCOR) 80 MG tablet Take 1 tablet (80 mg total) by mouth nightly. 90 tablet 3    sumatriptan (IMITREX) 100 MG tablet Take 1 tablet (100 mg total) by mouth daily as needed for Migraine. 30 tablet 3    tadalafiL (CIALIS) 10 MG tablet Take 10 mg by mouth daily as needed.      tamsulosin (FLOMAX) 0.4 mg Cap TAKE 2 CAPSULES BY MOUTH EVERY  capsule 3    tiZANidine (ZANAFLEX) 2 MG tablet Take 1 tablet (2 mg total) by mouth every 8 (eight) hours as needed (Muscle spasm). 15 tablet 0    triamcinolone acetonide 0.1% (KENALOG) 0.1 % cream AAA bid prn eczema. Do not use on face. 454 g 5    zonisamide (ZONEGRAN) 50 MG Cap Take 1 capsule (50 mg total) by mouth 2 (two) times daily. 180 capsule 3    [DISCONTINUED] azelastine (ASTELIN) 137 mcg (0.1 %) nasal spray SPRAY 1 SPRAY (137 MCG TOTAL) BY NASAL ROUTE 2 (TWO) TIMES DAILY. 30 mL 4    [DISCONTINUED] folic acid (FOLVITE) 800 MCG Tab Take 800 mcg by mouth once daily.      [DISCONTINUED] furosemide (LASIX) 40 MG tablet TAKE 1 TABLET BY MOUTH EVERY DAY 90 tablet 0       Objective:      There were no vitals taken  for this visit.    Exam:  AAOx3 NAD  Mood and affect normal  Memory and language intact  Breathing non labored      Assessment:       Lumbar radiculopathy      Plan:         Scott Bansal is a 62 y.o. male with lumbar radiculopathy.    Proceed with VENKATESH as planned.

## 2023-09-27 NOTE — DISCHARGE SUMMARY
[facility] - [place] - Endoscopy  Discharge Note  Short Stay    Procedure(s) (LRB):  Bilateral L5 Transforaminal Epidural Steroid Injections (ref: Urszula) (Bilateral)      OUTCOME: Patient tolerated treatment/procedure well without complication and is now ready for discharge.    DISPOSITION: Home or Self Care    FINAL DIAGNOSIS:  <principal problem not specified>    FOLLOWUP: In clinic    DISCHARGE INSTRUCTIONS:  No discharge procedures on file.       Discharge Diagnosis:DDD (degenerative disc disease), lumbar [M51.36]  Lumbar radiculopathy [M54.16]  Condition on Discharge: Stable.  Diet on Discharge: Same as before.  Activity: as per instruction sheet.  Discharge to: Home with a responsible adult.  Follow up: as per Discharge instructions

## 2023-10-14 ENCOUNTER — NURSE TRIAGE (OUTPATIENT)
Dept: ADMINISTRATIVE | Facility: CLINIC | Age: 63
End: 2023-10-14
Payer: MEDICARE

## 2023-10-14 NOTE — TELEPHONE ENCOUNTER
Unable to reach at number listed for callback.   Reason for Disposition   Second attempt to contact caller AND no contact made. Phone number verified.    Protocols used: No Contact or Duplicate Contact Call-A-AH

## 2023-10-16 ENCOUNTER — OFFICE VISIT (OUTPATIENT)
Dept: FAMILY MEDICINE | Facility: CLINIC | Age: 63
End: 2023-10-16
Payer: MEDICARE

## 2023-10-16 VITALS
WEIGHT: 247.13 LBS | TEMPERATURE: 99 F | BODY MASS INDEX: 36.6 KG/M2 | HEART RATE: 90 BPM | DIASTOLIC BLOOD PRESSURE: 90 MMHG | SYSTOLIC BLOOD PRESSURE: 140 MMHG | HEIGHT: 69 IN | OXYGEN SATURATION: 97 %

## 2023-10-16 DIAGNOSIS — L25.9 CONTACT DERMATITIS, UNSPECIFIED CONTACT DERMATITIS TYPE, UNSPECIFIED TRIGGER: ICD-10-CM

## 2023-10-16 DIAGNOSIS — L21.9 SEBORRHEA: Primary | ICD-10-CM

## 2023-10-16 DIAGNOSIS — I10 ESSENTIAL HYPERTENSION: ICD-10-CM

## 2023-10-16 PROCEDURE — 1159F PR MEDICATION LIST DOCUMENTED IN MEDICAL RECORD: ICD-10-PCS | Mod: CPTII,S$GLB,,

## 2023-10-16 PROCEDURE — 2023F DILAT RTA XM W/O RTNOPTHY: CPT | Mod: CPTII,S$GLB,,

## 2023-10-16 PROCEDURE — 3008F BODY MASS INDEX DOCD: CPT | Mod: CPTII,S$GLB,,

## 2023-10-16 PROCEDURE — 4010F ACE/ARB THERAPY RXD/TAKEN: CPT | Mod: CPTII,S$GLB,,

## 2023-10-16 PROCEDURE — 3080F PR MOST RECENT DIASTOLIC BLOOD PRESSURE >= 90 MM HG: ICD-10-PCS | Mod: CPTII,S$GLB,,

## 2023-10-16 PROCEDURE — 3077F SYST BP >= 140 MM HG: CPT | Mod: CPTII,S$GLB,,

## 2023-10-16 PROCEDURE — 99214 OFFICE O/P EST MOD 30 MIN: CPT | Mod: S$GLB,,,

## 2023-10-16 PROCEDURE — 3052F PR MOST RECENT HEMOGLOBIN A1C LEVEL 8.0 - < 9.0%: ICD-10-PCS | Mod: CPTII,S$GLB,,

## 2023-10-16 PROCEDURE — 3052F HG A1C>EQUAL 8.0%<EQUAL 9.0%: CPT | Mod: CPTII,S$GLB,,

## 2023-10-16 PROCEDURE — 99214 PR OFFICE/OUTPT VISIT, EST, LEVL IV, 30-39 MIN: ICD-10-PCS | Mod: S$GLB,,,

## 2023-10-16 PROCEDURE — 1159F MED LIST DOCD IN RCRD: CPT | Mod: CPTII,S$GLB,,

## 2023-10-16 PROCEDURE — 3077F PR MOST RECENT SYSTOLIC BLOOD PRESSURE >= 140 MM HG: ICD-10-PCS | Mod: CPTII,S$GLB,,

## 2023-10-16 PROCEDURE — 3008F PR BODY MASS INDEX (BMI) DOCUMENTED: ICD-10-PCS | Mod: CPTII,S$GLB,,

## 2023-10-16 PROCEDURE — 99999 PR PBB SHADOW E&M-EST. PATIENT-LVL V: CPT | Mod: PBBFAC,,,

## 2023-10-16 PROCEDURE — 4010F PR ACE/ARB THEARPY RXD/TAKEN: ICD-10-PCS | Mod: CPTII,S$GLB,,

## 2023-10-16 PROCEDURE — 2023F PR DILATED RETINAL EXAM W/O EVID OF RETINOPATHY: ICD-10-PCS | Mod: CPTII,S$GLB,,

## 2023-10-16 PROCEDURE — 3080F DIAST BP >= 90 MM HG: CPT | Mod: CPTII,S$GLB,,

## 2023-10-16 PROCEDURE — 99999 PR PBB SHADOW E&M-EST. PATIENT-LVL V: ICD-10-PCS | Mod: PBBFAC,,,

## 2023-10-16 RX ORDER — MOMETASONE FUROATE 1 MG/G
CREAM TOPICAL DAILY
Qty: 45 G | Refills: 1 | Status: SHIPPED | OUTPATIENT
Start: 2023-10-16

## 2023-10-16 RX ORDER — HYDROCORTISONE 25 MG/G
CREAM TOPICAL 2 TIMES DAILY
Qty: 28 G | Refills: 1 | Status: SHIPPED | OUTPATIENT
Start: 2023-10-16

## 2023-10-16 RX ORDER — KETOCONAZOLE 20 MG/G
CREAM TOPICAL DAILY
Qty: 30 G | Refills: 1 | Status: SHIPPED | OUTPATIENT
Start: 2023-10-16

## 2023-10-16 NOTE — PROGRESS NOTES
HPI     Chief Complaint:  Chief Complaint   Patient presents with    Rash       Scott Bansal is a 62 y.o. male with multiple medical diagnoses as listed in the medical history and problem list that presents for rash.  Pt is known to me with his last appointment 8/28/2023.      Rash  This is a new problem. The current episode started in the past 7 days. The affected locations include the left lower leg, left arm and right arm. The rash is characterized by dryness, redness, pain, swelling and itchiness.       Assessment & Plan     Problem List Items Addressed This Visit          Cardiac/Vascular    Essential hypertension  BP in clinic today 140/90. The current medical regimen is effective;  continue present plan and medications.    Overview     Cannot tolerate sulfa  Currently on amlodipine, candesartan, furosemide  Previously took irbesartan and clonidine           Other Visit Diagnoses       Seborrhea    -  Primary  Redness, flaky and dry skin around bilateral eyes. Will treat for seborrhea. Will order hydrocortisone and ketoconazole.     Relevant Medications    ketoconazole (NIZORAL) 2 % cream    hydrocortisone 2.5 % cream    Contact dermatitis, unspecified contact dermatitis type, unspecified trigger      Redness, urticarial diffuse rash to bilateral forearms. Recently outside cutting down plants, likely contact dermatitis. Will hold off on steroid due to hyperglycemia. Will refill mometasone.     Relevant Medications    mometasone 0.1% (ELOCON) 0.1 % cream              --------------------------------------------      Health Maintenance:  Health Maintenance         Date Due Completion Date    Influenza Vaccine (1) Never done ---    COVID-19 Vaccine (6 - 2023-24 season) 09/01/2023 9/15/2022    Diabetes Urine Screening 10/31/2023 10/31/2022    Lipid Panel 10/31/2023 10/31/2022    Hemoglobin A1c 12/14/2023 9/14/2023    Colorectal Cancer Screening 06/28/2024 6/28/2021    Eye Exam 07/18/2024 7/18/2023    Foot  "Exam 07/19/2024 7/19/2023 (Done)    Override on 7/19/2023: Done    Override on 10/26/2020: Done (completed by insurance)    High Dose Statin 10/16/2024 10/16/2023    TETANUS VACCINE 07/07/2027 7/7/2017            Health maintenance reviewed    Follow Up:  No follow-ups on file.    Exam     Review of Systems:  (as noted above)  Review of Systems   Skin:  Positive for rash.       Physical Exam:   Physical Exam  Eyes:      Comments: Redness and flaky skin around bilateral eyes   Musculoskeletal:        Arms:         Legs:       Comments: Red, itchy diffusely spread rash to bilateral forearms       Vitals:    10/16/23 1125   BP: (!) 140/90   Pulse: 90   Temp: 98.7 °F (37.1 °C)   SpO2: 97%   Weight: 112.1 kg (247 lb 2.2 oz)   Height: 5' 9" (1.753 m)      Body mass index is 36.5 kg/m².        History     Past Medical History:  Past Medical History:   Diagnosis Date    Bipolar 1 disorder, mixed     BPH (benign prostatic hypertrophy)     Colon polyp     Cyst, eyelid     Dr. Broussard    Diabetes mellitus     GERD (gastroesophageal reflux disease)     Hyperlipidemia     Hypertension     Lumbar degenerative disc disease 3/7/2019    Migraine headache     Obesity        Past Surgical History:  Past Surgical History:   Procedure Laterality Date    CERVICAL SPINE SURGERY      COLONOSCOPY N/A 7/27/2017    Procedure: COLONOSCOPY;  Surgeon: Genaro Nix MD;  Location: [facility];  Service: Endoscopy;  Laterality: N/A;    COLONOSCOPY N/A 10/30/2020    Procedure: COLONOSCOPY;  Surgeon: Herb Martinez MD;  Location: [facility];  Service: Endoscopy;  Laterality: N/A;    EPIDURAL STEROID INJECTION Bilateral 9/27/2023    Procedure: Bilateral L5 Transforaminal Epidural Steroid Injections (ref: [place]);  Surgeon: Tanner Mayberry Jr., MD;  Location: [facility] ENDO;  Service: Pain Management;  Laterality: Bilateral;  @0815  No ATC   Check BG  Needs COnsent    EYE SURGERY Left 03/2017    cyst removal on eyelid; Dr. Broussard    SHOULDER " SURGERY      TONSILLECTOMY         Social History:  Social History     Socioeconomic History    Marital status:    Tobacco Use    Smoking status: Former     Current packs/day: 0.00     Average packs/day: 2.0 packs/day for 15.0 years (30.0 ttl pk-yrs)     Types: Cigarettes     Start date: 1969     Quit date: 1984     Years since quittin.4     Passive exposure: Past    Smokeless tobacco: Never    Tobacco comments:     Patient quit smoking at the age of 26 yo.   Substance and Sexual Activity    Alcohol use: No    Drug use: No    Sexual activity: Yes     Partners: Female     Social Determinants of Health     Financial Resource Strain: Medium Risk (10/4/2022)    Overall Financial Resource Strain (CARDIA)     Difficulty of Paying Living Expenses: Somewhat hard   Food Insecurity: No Food Insecurity (10/4/2022)    Hunger Vital Sign     Worried About Running Out of Food in the Last Year: Never true     Ran Out of Food in the Last Year: Never true   Transportation Needs: No Transportation Needs (10/4/2022)    PRAPARE - Transportation     Lack of Transportation (Medical): No     Lack of Transportation (Non-Medical): No   Physical Activity: Inactive (10/4/2022)    Exercise Vital Sign     Days of Exercise per Week: 0 days     Minutes of Exercise per Session: 0 min   Stress: Stress Concern Present (10/4/2022)    [employer] [employer] - Occupational Stress Questionnaire     Feeling of Stress : To some extent   Social Connections: Moderately Integrated (10/4/2022)    Social Connection and Isolation Panel [NHANES]     Frequency of Communication with Friends and Family: More than three times a week     Frequency of Social Gatherings with Friends and Family: Never     Attends [employer] Services: More than 4 times per year     Active Member of Clubs or Organizations: No     Attends Club or Organization Meetings: Never     Marital Status: Living with partner   Housing Stability: Low Risk   (10/4/2022)    Housing Stability Vital Sign     Unable to Pay for Housing in the Last Year: No     Number of Places Lived in the Last Year: 1     Unstable Housing in the Last Year: No       Family History:  Family History   Problem Relation Age of Onset    Cataracts Mother     Cancer Mother         throat    Hypertension Mother     Hypertension Father     Stroke Father         2 strokes    Hypertension Sister     Cancer Brother         spinal, kidney, spinal fluid    Hypertension Brother     Cancer Cousin         breast?       Allergies and Medications: (updated and reviewed)  Review of patient's allergies indicates:   Allergen Reactions    Levofloxacin Hallucinations    Sulfa (sulfonamide antibiotics) Rash     Current Outpatient Medications   Medication Sig Dispense Refill    albuterol (PROVENTIL) 2.5 mg /3 mL (0.083 %) nebulizer solution Take 3 mLs (2.5 mg total) by nebulization every 6 (six) hours as needed for Wheezing or Shortness of Breath. 9 mL 1    albuterol (PROVENTIL/VENTOLIN HFA) 90 mcg/actuation inhaler Inhale 1-2 puffs into the lungs every 6 (six) hours as needed for Wheezing or Shortness of Breath. Rescue 18 g 5    ALPRAZolam (XANAX) 1 MG tablet Prior to MRI 1 tablet 0    amitriptyline (ELAVIL) 25 MG tablet Take 1 tablet (25 mg total) by mouth every evening. 30 tablet 11    amLODIPine (NORVASC) 10 MG tablet Take 1 tablet (10 mg total) by mouth once daily. 90 tablet 3    amoxicillin (AMOXIL) 500 MG Tab Take by mouth 3 (three) times daily.      busPIRone (BUSPAR) 10 MG tablet Take 10 mg by mouth 3 (three) times daily.      candesartan (ATACAND) 4 MG tablet Take 1 tablet (4 mg total) by mouth once daily. 90 tablet 1    doxycycline (VIBRAMYCIN) 100 MG Cap TAKE 1 CAPSULE BY MOUTH TWICE A DAY FOR 5 DAYS      DUPIXENT SYRINGE 300 mg/2 mL Syrg Inject 1 syringe (300mg) into the skin every other week 4 mL 11    econazole nitrate 1 % cream AAA bid for rash on shoulders 30 g 3    fluticasone propionate (FLONASE)  50 mcg/actuation nasal spray 2 sprays (100 mcg total) by Each Nostril route once daily. 16 mL 5    gabapentin (NEURONTIN) 300 MG capsule Take 1 capsule (300 mg total) by mouth 2 (two) times daily. 180 capsule 2    gemfibroziL (LOPID) 600 MG tablet TAKE 1 TABLET BY MOUTH TWICE A DAY BEFORE MEALS 180 tablet 3    hydrOXYzine (ATARAX) 50 MG tablet Take 1 tablet (50 mg total) by mouth 3 (three) times daily as needed for Itching. 270 tablet 3    metFORMIN (GLUCOPHAGE) 1000 MG tablet Take 1 tablet (1,000 mg total) by mouth 2 (two) times daily. 180 tablet 3    metFORMIN (GLUCOPHAGE) 1000 MG tablet Take 1 tablet (1,000 mg total) by mouth 2 (two) times daily. 180 tablet 3    multivitamin capsule Take 1 capsule by mouth once daily.      pantoprazole (PROTONIX) 40 MG tablet TAKE 1 TABLET BY MOUTH EVERY DAY 90 tablet 3    promethazine-dextromethorphan (PROMETHAZINE-DM) 6.25-15 mg/5 mL Syrp Take 5 mLs by mouth every 4 (four) hours as needed (for cough, congestion). 118 mL 1    quetiapine (SEROQUEL) 300 MG Tab Take 300 mg by mouth every evening.      sildenafiL (VIAGRA) 25 MG tablet Take 2 tablets (50 mg total) by mouth daily as needed for Erectile Dysfunction. 30 tablet 0    simvastatin (ZOCOR) 80 MG tablet Take 1 tablet (80 mg total) by mouth nightly. 90 tablet 3    sumatriptan (IMITREX) 100 MG tablet Take 1 tablet (100 mg total) by mouth daily as needed for Migraine. 30 tablet 3    tadalafiL (CIALIS) 10 MG tablet Take 10 mg by mouth daily as needed.      tamsulosin (FLOMAX) 0.4 mg Cap TAKE 2 CAPSULES BY MOUTH EVERY  capsule 3    tiZANidine (ZANAFLEX) 2 MG tablet Take 1 tablet (2 mg total) by mouth every 8 (eight) hours as needed (Muscle spasm). 15 tablet 0    triamcinolone acetonide 0.1% (KENALOG) 0.1 % cream AAA bid prn eczema. Do not use on face. 454 g 5    zonisamide (ZONEGRAN) 50 MG Cap Take 1 capsule (50 mg total) by mouth 2 (two) times daily. 180 capsule 3    hydrocortisone 2.5 % cream Apply topically 2 (two) times  daily. Apply to rash on face. 28 g 1    ketoconazole (NIZORAL) 2 % cream Apply topically once daily. Apply to rash on face. 30 g 1    mometasone 0.1% (ELOCON) 0.1 % cream Apply topically once daily. Apply topically to arms and legs. 45 g 1     No current facility-administered medications for this visit.       Patient Care Team:  Kaylie Villalta MD as PCP - General (Family Medicine)  Warren Rondon OD as Consulting Physician (Optometry)  Jessica Black LPN as Care Coordinator  Ollie Palumbo II, MD as Consulting Physician (Gastroenterology)         - The patient is given an After Visit Summary that lists all medications with directions, allergies, education, orders placed during this encounter and follow-up instructions.      - I have reviewed the patient's medical information including past medical, family, and social history sections including the medications and allergies.      - We discussed the patient's current medications.     This note was created by combination of typed  and MModal dictation.  Transcription errors may be present.  If there are any questions, please contact me.       Shell Edwards NP

## 2023-10-27 ENCOUNTER — OFFICE VISIT (OUTPATIENT)
Dept: FAMILY MEDICINE | Facility: CLINIC | Age: 63
End: 2023-10-27
Payer: MEDICARE

## 2023-10-27 VITALS
OXYGEN SATURATION: 97 % | HEIGHT: 69 IN | BODY MASS INDEX: 36.39 KG/M2 | DIASTOLIC BLOOD PRESSURE: 80 MMHG | HEART RATE: 65 BPM | TEMPERATURE: 99 F | WEIGHT: 245.69 LBS | SYSTOLIC BLOOD PRESSURE: 130 MMHG

## 2023-10-27 DIAGNOSIS — R05.9 COUGH, UNSPECIFIED TYPE: Primary | ICD-10-CM

## 2023-10-27 DIAGNOSIS — I10 ESSENTIAL HYPERTENSION: ICD-10-CM

## 2023-10-27 DIAGNOSIS — Z87.01 HISTORY OF PNEUMONIA: ICD-10-CM

## 2023-10-27 PROCEDURE — 3075F SYST BP GE 130 - 139MM HG: CPT | Mod: CPTII,S$GLB,,

## 2023-10-27 PROCEDURE — 3008F BODY MASS INDEX DOCD: CPT | Mod: CPTII,S$GLB,,

## 2023-10-27 PROCEDURE — 3079F DIAST BP 80-89 MM HG: CPT | Mod: CPTII,S$GLB,,

## 2023-10-27 PROCEDURE — 4010F ACE/ARB THERAPY RXD/TAKEN: CPT | Mod: CPTII,S$GLB,,

## 2023-10-27 PROCEDURE — 99999 PR PBB SHADOW E&M-EST. PATIENT-LVL V: CPT | Mod: PBBFAC,,,

## 2023-10-27 PROCEDURE — 99213 PR OFFICE/OUTPT VISIT, EST, LEVL III, 20-29 MIN: ICD-10-PCS | Mod: S$GLB,,,

## 2023-10-27 PROCEDURE — 3052F HG A1C>EQUAL 8.0%<EQUAL 9.0%: CPT | Mod: CPTII,S$GLB,,

## 2023-10-27 PROCEDURE — 3075F PR MOST RECENT SYSTOLIC BLOOD PRESS GE 130-139MM HG: ICD-10-PCS | Mod: CPTII,S$GLB,,

## 2023-10-27 PROCEDURE — 3008F PR BODY MASS INDEX (BMI) DOCUMENTED: ICD-10-PCS | Mod: CPTII,S$GLB,,

## 2023-10-27 PROCEDURE — 3052F PR MOST RECENT HEMOGLOBIN A1C LEVEL 8.0 - < 9.0%: ICD-10-PCS | Mod: CPTII,S$GLB,,

## 2023-10-27 PROCEDURE — 99999 PR PBB SHADOW E&M-EST. PATIENT-LVL V: ICD-10-PCS | Mod: PBBFAC,,,

## 2023-10-27 PROCEDURE — 99213 OFFICE O/P EST LOW 20 MIN: CPT | Mod: S$GLB,,,

## 2023-10-27 PROCEDURE — 2023F PR DILATED RETINAL EXAM W/O EVID OF RETINOPATHY: ICD-10-PCS | Mod: CPTII,S$GLB,,

## 2023-10-27 PROCEDURE — 3079F PR MOST RECENT DIASTOLIC BLOOD PRESSURE 80-89 MM HG: ICD-10-PCS | Mod: CPTII,S$GLB,,

## 2023-10-27 PROCEDURE — 1159F MED LIST DOCD IN RCRD: CPT | Mod: CPTII,S$GLB,,

## 2023-10-27 PROCEDURE — 2023F DILAT RTA XM W/O RTNOPTHY: CPT | Mod: CPTII,S$GLB,,

## 2023-10-27 PROCEDURE — 4010F PR ACE/ARB THEARPY RXD/TAKEN: ICD-10-PCS | Mod: CPTII,S$GLB,,

## 2023-10-27 PROCEDURE — 1159F PR MEDICATION LIST DOCUMENTED IN MEDICAL RECORD: ICD-10-PCS | Mod: CPTII,S$GLB,,

## 2023-10-27 RX ORDER — DOXYCYCLINE 100 MG/1
100 CAPSULE ORAL EVERY 12 HOURS
Qty: 20 CAPSULE | Refills: 0 | Status: SHIPPED | OUTPATIENT
Start: 2023-10-27 | End: 2023-11-06

## 2023-10-27 NOTE — PROGRESS NOTES
HPI     Chief Complaint:  Chief Complaint   Patient presents with    Cough    Sore Throat    Headache       Scott Bansal is a 62 y.o. male with multiple medical diagnoses as listed in the medical history and problem list that presents for cough, sore throat and headache.  Pt is known to me with his last appointment 10/16/2023.      Cough  This is a new problem. The current episode started in the past 7 days (started 4 days ago). The problem has been gradually worsening. The cough is Productive of sputum and productive of purulent sputum. Associated symptoms include nasal congestion, postnasal drip, rhinorrhea, a sore throat and shortness of breath. The symptoms are aggravated by fumes. Risk factors for lung disease include smoking/tobacco exposure. He has tried OTC cough suppressant for the symptoms. His past medical history is significant for pneumonia.           Assessment & Plan     Problem List Items Addressed This Visit          Pulmonary    History of pneumonia  Has history of pneumonia so has concerned whenever he develops a productive cough.  Will treat current episode with doxycycline.    Overview     History of double pneumonia              Cardiac/Vascular    Essential hypertension  BP in clinic today 130/80. The current medical regimen is effective;  continue present plan and medications.        Overview     Cannot tolerate sulfa  Currently on amlodipine, candesartan, furosemide  Previously took irbesartan and clonidine           Other Visit Diagnoses       Cough, unspecified type    -  Primary  Cough sore throat and headache for the past 4 days.  [place] and started to feel bad and has been coughing up purulent sputum.  Also brought a sample of sputum on a napkin with him today sputum is yellowish green and thick.  Patient declines COVID and flu swabs at this time.  We will order doxycycline.    Relevant Medications    doxycycline (VIBRAMYCIN) 100  "MG Cap              --------------------------------------------      Health Maintenance:  Health Maintenance         Date Due Completion Date    Influenza Vaccine (1) Never done ---    COVID-19 Vaccine (6 - 2023-24 season) 09/01/2023 9/15/2022    Diabetes Urine Screening 10/31/2023 10/31/2022    Lipid Panel 10/31/2023 10/31/2022    Hemoglobin A1c 12/14/2023 9/14/2023    Colorectal Cancer Screening 06/28/2024 6/28/2021    Eye Exam 07/18/2024 7/18/2023    Foot Exam 07/19/2024 7/19/2023 (Done)    Override on 7/19/2023: Done    Override on 10/26/2020: Done (completed by insurance)    High Dose Statin 10/16/2024 10/16/2023    TETANUS VACCINE 07/07/2027 7/7/2017            Health maintenance reviewed    Follow Up:  No follow-ups on file.    Exam     Review of Systems:  (as noted above)  Review of Systems   HENT:  Positive for postnasal drip, rhinorrhea and sore throat.    Respiratory:  Positive for shortness of breath.        Physical Exam:   Physical Exam  Constitutional:       General: He is not in acute distress.     Appearance: He is obese. He is not ill-appearing or diaphoretic.   HENT:      Head: Normocephalic and atraumatic.   Eyes:      General: No scleral icterus.  Cardiovascular:      Rate and Rhythm: Normal rate and regular rhythm.      Pulses: Normal pulses.      Heart sounds: No murmur heard.     No friction rub. No gallop.   Pulmonary:      Effort: No tachypnea or respiratory distress.      Breath sounds: Normal breath sounds.   Chest:      Chest wall: No tenderness.   Musculoskeletal:      Cervical back: No rigidity.   Neurological:      Mental Status: He is alert and oriented to person, place, and time.       Vitals:    10/27/23 0859   BP: 130/80   Pulse: 65   Temp: 98.5 °F (36.9 °C)   TempSrc: Oral   SpO2: 97%   Weight: 111.4 kg (245 lb 11.2 oz)   Height: 5' 9" (1.753 m)      Body mass index is 36.28 kg/m².        History     Past Medical History:  Past Medical History:   Diagnosis Date    Bipolar 1 " disorder, mixed     BPH (benign prostatic hypertrophy)     Colon polyp     Cyst, eyelid     Dr. Broussard    Diabetes mellitus     GERD (gastroesophageal reflux disease)     Hyperlipidemia     Hypertension     Lumbar degenerative disc disease 3/7/2019    Migraine headache     Obesity        Past Surgical History:  Past Surgical History:   Procedure Laterality Date    CERVICAL SPINE SURGERY      COLONOSCOPY N/A 2017    Procedure: COLONOSCOPY;  Surgeon: Genaro Nix MD;  Location: [facility] ENDO;  Service: Endoscopy;  Laterality: N/A;    COLONOSCOPY N/A 10/30/2020    Procedure: COLONOSCOPY;  Surgeon: Herb Martinez MD;  Location: [facility] ENDO;  Service: Endoscopy;  Laterality: N/A;    EPIDURAL STEROID INJECTION Bilateral 2023    Procedure: Bilateral L5 Transforaminal Epidural Steroid Injections (ref: [place]);  Surgeon: Tanner Mayberry Jr., MD;  Location: [facility] ENDO;  Service: Pain Management;  Laterality: Bilateral;  @0815  No ATC   Check BG  Needs COnsent    EYE SURGERY Left 2017    cyst removal on eyelid; Dr. Broussard    SHOULDER SURGERY      TONSILLECTOMY         Social History:  Social History     Socioeconomic History    Marital status:    Tobacco Use    Smoking status: Former     Current packs/day: 0.00     Average packs/day: 2.0 packs/day for 15.0 years (30.0 ttl pk-yrs)     Types: Cigarettes     Start date: 1969     Quit date: 1984     Years since quittin.4     Passive exposure: Past    Smokeless tobacco: Never    Tobacco comments:     Patient quit smoking at the age of 24 yo.   Substance and Sexual Activity    Alcohol use: No    Drug use: No    Sexual activity: Yes     Partners: Female     Social Determinants of Health     Financial Resource Strain: Medium Risk (10/4/2022)    Overall Financial Resource Strain (CARDIA)     Difficulty of Paying Living Expenses: Somewhat hard   Food Insecurity: No Food Insecurity (10/4/2022)    Hunger Vital Sign     Worried About Running  Out of Food in the Last Year: Never true     Ran Out of Food in the Last Year: Never true   Transportation Needs: No Transportation Needs (10/4/2022)    PRAPARE - Transportation     Lack of Transportation (Medical): No     Lack of Transportation (Non-Medical): No   Physical Activity: Inactive (10/4/2022)    Exercise Vital Sign     Days of Exercise per Week: 0 days     Minutes of Exercise per Session: 0 min   Stress: Stress Concern Present (10/4/2022)    [employer] [place] [employer] - Occupational Stress Questionnaire     Feeling of Stress : To some extent   Social Connections: Moderately Integrated (10/4/2022)    Social Connection and Isolation Panel [NHANES]     Frequency of Communication with Friends and Family: More than three times a week     Frequency of Social Gatherings with Friends and Family: Never     Attends Congregational Services: More than 4 times per year     Active Member of Clubs or Organizations: No     Attends Club or Organization Meetings: Never     Marital Status: Living with partner   Housing Stability: Low Risk  (10/4/2022)    Housing Stability Vital Sign     Unable to Pay for Housing in the Last Year: No     Number of Places Lived in the Last Year: 1     Unstable Housing in the Last Year: No       Family History:  Family History   Problem Relation Age of Onset    Cataracts Mother     Cancer Mother         throat    Hypertension Mother     Hypertension Father     Stroke Father         2 strokes    Hypertension Sister     Cancer Brother         spinal, kidney, spinal fluid    Hypertension Brother     Cancer Cousin         breast?       Allergies and Medications: (updated and reviewed)  Review of patient's allergies indicates:   Allergen Reactions    Levofloxacin Hallucinations    Sulfa (sulfonamide antibiotics) Rash     Current Outpatient Medications   Medication Sig Dispense Refill    albuterol (PROVENTIL) 2.5 mg /3 mL (0.083 %) nebulizer solution Take 3 mLs (2.5 mg total) by  nebulization every 6 (six) hours as needed for Wheezing or Shortness of Breath. 9 mL 1    albuterol (PROVENTIL/VENTOLIN HFA) 90 mcg/actuation inhaler Inhale 1-2 puffs into the lungs every 6 (six) hours as needed for Wheezing or Shortness of Breath. Rescue 18 g 5    ALPRAZolam (XANAX) 1 MG tablet Prior to MRI 1 tablet 0    amitriptyline (ELAVIL) 25 MG tablet Take 1 tablet (25 mg total) by mouth every evening. 30 tablet 11    amLODIPine (NORVASC) 10 MG tablet Take 1 tablet (10 mg total) by mouth once daily. 90 tablet 3    amoxicillin (AMOXIL) 500 MG Tab Take by mouth 3 (three) times daily.      busPIRone (BUSPAR) 10 MG tablet Take 10 mg by mouth 3 (three) times daily.      candesartan (ATACAND) 4 MG tablet Take 1 tablet (4 mg total) by mouth once daily. 90 tablet 1    DUPIXENT SYRINGE 300 mg/2 mL Syrg Inject 1 syringe (300mg) into the skin every other week 4 mL 11    econazole nitrate 1 % cream AAA bid for rash on shoulders 30 g 3    fluticasone propionate (FLONASE) 50 mcg/actuation nasal spray 2 sprays (100 mcg total) by Each Nostril route once daily. 16 mL 5    gabapentin (NEURONTIN) 300 MG capsule Take 1 capsule (300 mg total) by mouth 2 (two) times daily. 180 capsule 2    gemfibroziL (LOPID) 600 MG tablet TAKE 1 TABLET BY MOUTH TWICE A DAY BEFORE MEALS 180 tablet 3    hydrocortisone 2.5 % cream Apply topically 2 (two) times daily. Apply to rash on face. 28 g 1    hydrOXYzine (ATARAX) 50 MG tablet Take 1 tablet (50 mg total) by mouth 3 (three) times daily as needed for Itching. 270 tablet 3    ketoconazole (NIZORAL) 2 % cream Apply topically once daily. Apply to rash on face. 30 g 1    metFORMIN (GLUCOPHAGE) 1000 MG tablet Take 1 tablet (1,000 mg total) by mouth 2 (two) times daily. 180 tablet 3    metFORMIN (GLUCOPHAGE) 1000 MG tablet Take 1 tablet (1,000 mg total) by mouth 2 (two) times daily. 180 tablet 3    mometasone 0.1% (ELOCON) 0.1 % cream Apply topically once daily. Apply topically to arms and legs. 45 g 1     multivitamin capsule Take 1 capsule by mouth once daily.      pantoprazole (PROTONIX) 40 MG tablet TAKE 1 TABLET BY MOUTH EVERY DAY 90 tablet 3    promethazine-dextromethorphan (PROMETHAZINE-DM) 6.25-15 mg/5 mL Syrp Take 5 mLs by mouth every 4 (four) hours as needed (for cough, congestion). 118 mL 1    quetiapine (SEROQUEL) 300 MG Tab Take 300 mg by mouth every evening.      sildenafiL (VIAGRA) 25 MG tablet Take 2 tablets (50 mg total) by mouth daily as needed for Erectile Dysfunction. 30 tablet 0    simvastatin (ZOCOR) 80 MG tablet Take 1 tablet (80 mg total) by mouth nightly. 90 tablet 3    sumatriptan (IMITREX) 100 MG tablet Take 1 tablet (100 mg total) by mouth daily as needed for Migraine. 30 tablet 3    tadalafiL (CIALIS) 10 MG tablet Take 10 mg by mouth daily as needed.      tamsulosin (FLOMAX) 0.4 mg Cap TAKE 2 CAPSULES BY MOUTH EVERY  capsule 3    tiZANidine (ZANAFLEX) 2 MG tablet Take 1 tablet (2 mg total) by mouth every 8 (eight) hours as needed (Muscle spasm). 15 tablet 0    triamcinolone acetonide 0.1% (KENALOG) 0.1 % cream AAA bid prn eczema. Do not use on face. 454 g 5    zonisamide (ZONEGRAN) 50 MG Cap Take 1 capsule (50 mg total) by mouth 2 (two) times daily. 180 capsule 3    doxycycline (VIBRAMYCIN) 100 MG Cap Take 1 capsule (100 mg total) by mouth every 12 (twelve) hours. for 10 days 20 capsule 0     No current facility-administered medications for this visit.       Patient Care Team:  Kaylie Villalta MD as PCP - General (Family Medicine)  Warren Rondon OD as Consulting Physician (Optometry)  Jessica Black LPN as Care Coordinator  Ollie Palumbo II, MD as Consulting Physician (Gastroenterology)         - The patient is given an After Visit Summary that lists all medications with directions, allergies, education, orders placed during this encounter and follow-up instructions.      - I have reviewed the patient's medical information including past medical, family,  and social history sections including the medications and allergies.      - We discussed the patient's current medications.     This note was created by combination of typed  and MModal dictation.  Transcription errors may be present.  If there are any questions, please contact me.       Shell Edwards NP

## 2023-11-06 ENCOUNTER — PATIENT MESSAGE (OUTPATIENT)
Dept: ADMINISTRATIVE | Facility: HOSPITAL | Age: 63
End: 2023-11-06
Payer: MEDICARE

## 2023-11-10 ENCOUNTER — HOSPITAL ENCOUNTER (OUTPATIENT)
Dept: RADIOLOGY | Facility: HOSPITAL | Age: 63
Discharge: HOME | End: 2023-11-10
Attending: FAMILY MEDICINE
Payer: MEDICARE

## 2023-11-10 ENCOUNTER — TELEPHONE (OUTPATIENT)
Dept: FAMILY MEDICINE | Facility: CLINIC | Age: 63
End: 2023-11-10

## 2023-11-10 ENCOUNTER — OFFICE VISIT (OUTPATIENT)
Dept: FAMILY MEDICINE | Facility: CLINIC | Age: 63
End: 2023-11-10
Payer: MEDICARE

## 2023-11-10 VITALS
SYSTOLIC BLOOD PRESSURE: 110 MMHG | HEART RATE: 105 BPM | OXYGEN SATURATION: 95 % | BODY MASS INDEX: 36.31 KG/M2 | TEMPERATURE: 98 F | WEIGHT: 245.13 LBS | HEIGHT: 69 IN | DIASTOLIC BLOOD PRESSURE: 70 MMHG

## 2023-11-10 DIAGNOSIS — J18.9 COMMUNITY ACQUIRED PNEUMONIA, UNSPECIFIED LATERALITY: ICD-10-CM

## 2023-11-10 DIAGNOSIS — J18.9 COMMUNITY ACQUIRED PNEUMONIA, UNSPECIFIED LATERALITY: Primary | ICD-10-CM

## 2023-11-10 PROCEDURE — 3008F BODY MASS INDEX DOCD: CPT | Mod: CPTII,S$GLB,, | Performed by: FAMILY MEDICINE

## 2023-11-10 PROCEDURE — 71046 XR CHEST PA AND LATERAL: ICD-10-PCS | Mod: 26,,, | Performed by: RADIOLOGY

## 2023-11-10 PROCEDURE — 99214 PR OFFICE/OUTPT VISIT, EST, LEVL IV, 30-39 MIN: ICD-10-PCS | Mod: 25,S$GLB,, | Performed by: FAMILY MEDICINE

## 2023-11-10 PROCEDURE — 96372 PR INJECTION,THERAP/PROPH/DIAG2ST, IM OR SUBCUT: ICD-10-PCS | Mod: S$GLB,,, | Performed by: FAMILY MEDICINE

## 2023-11-10 PROCEDURE — 4010F PR ACE/ARB THEARPY RXD/TAKEN: ICD-10-PCS | Mod: CPTII,S$GLB,, | Performed by: FAMILY MEDICINE

## 2023-11-10 PROCEDURE — 3074F PR MOST RECENT SYSTOLIC BLOOD PRESSURE < 130 MM HG: ICD-10-PCS | Mod: CPTII,S$GLB,, | Performed by: FAMILY MEDICINE

## 2023-11-10 PROCEDURE — 71046 X-RAY EXAM CHEST 2 VIEWS: CPT | Mod: TC,FY,PO

## 2023-11-10 PROCEDURE — 3078F PR MOST RECENT DIASTOLIC BLOOD PRESSURE < 80 MM HG: ICD-10-PCS | Mod: CPTII,S$GLB,, | Performed by: FAMILY MEDICINE

## 2023-11-10 PROCEDURE — 3008F PR BODY MASS INDEX (BMI) DOCUMENTED: ICD-10-PCS | Mod: CPTII,S$GLB,, | Performed by: FAMILY MEDICINE

## 2023-11-10 PROCEDURE — 3052F PR MOST RECENT HEMOGLOBIN A1C LEVEL 8.0 - < 9.0%: ICD-10-PCS | Mod: CPTII,S$GLB,, | Performed by: FAMILY MEDICINE

## 2023-11-10 PROCEDURE — 1160F RVW MEDS BY RX/DR IN RCRD: CPT | Mod: CPTII,S$GLB,, | Performed by: FAMILY MEDICINE

## 2023-11-10 PROCEDURE — 1160F PR REVIEW ALL MEDS BY PRESCRIBER/CLIN PHARMACIST DOCUMENTED: ICD-10-PCS | Mod: CPTII,S$GLB,, | Performed by: FAMILY MEDICINE

## 2023-11-10 PROCEDURE — 3078F DIAST BP <80 MM HG: CPT | Mod: CPTII,S$GLB,, | Performed by: FAMILY MEDICINE

## 2023-11-10 PROCEDURE — 99999 PR PBB SHADOW E&M-EST. PATIENT-LVL V: ICD-10-PCS | Mod: PBBFAC,,, | Performed by: FAMILY MEDICINE

## 2023-11-10 PROCEDURE — 99214 OFFICE O/P EST MOD 30 MIN: CPT | Mod: 25,S$GLB,, | Performed by: FAMILY MEDICINE

## 2023-11-10 PROCEDURE — 1159F MED LIST DOCD IN RCRD: CPT | Mod: CPTII,S$GLB,, | Performed by: FAMILY MEDICINE

## 2023-11-10 PROCEDURE — 3052F HG A1C>EQUAL 8.0%<EQUAL 9.0%: CPT | Mod: CPTII,S$GLB,, | Performed by: FAMILY MEDICINE

## 2023-11-10 PROCEDURE — 99999 PR PBB SHADOW E&M-EST. PATIENT-LVL V: CPT | Mod: PBBFAC,,, | Performed by: FAMILY MEDICINE

## 2023-11-10 PROCEDURE — 1159F PR MEDICATION LIST DOCUMENTED IN MEDICAL RECORD: ICD-10-PCS | Mod: CPTII,S$GLB,, | Performed by: FAMILY MEDICINE

## 2023-11-10 PROCEDURE — 2023F DILAT RTA XM W/O RTNOPTHY: CPT | Mod: CPTII,S$GLB,, | Performed by: FAMILY MEDICINE

## 2023-11-10 PROCEDURE — 3074F SYST BP LT 130 MM HG: CPT | Mod: CPTII,S$GLB,, | Performed by: FAMILY MEDICINE

## 2023-11-10 PROCEDURE — 4010F ACE/ARB THERAPY RXD/TAKEN: CPT | Mod: CPTII,S$GLB,, | Performed by: FAMILY MEDICINE

## 2023-11-10 PROCEDURE — 96372 THER/PROPH/DIAG INJ SC/IM: CPT | Mod: S$GLB,,, | Performed by: FAMILY MEDICINE

## 2023-11-10 PROCEDURE — 71046 X-RAY EXAM CHEST 2 VIEWS: CPT | Mod: 26,,, | Performed by: RADIOLOGY

## 2023-11-10 PROCEDURE — 2023F PR DILATED RETINAL EXAM W/O EVID OF RETINOPATHY: ICD-10-PCS | Mod: CPTII,S$GLB,, | Performed by: FAMILY MEDICINE

## 2023-11-10 RX ORDER — CEFTRIAXONE 1 G/1
1 INJECTION, POWDER, FOR SOLUTION INTRAMUSCULAR; INTRAVENOUS ONCE
Status: COMPLETED | OUTPATIENT
Start: 2023-11-10 | End: 2023-11-10

## 2023-11-10 RX ORDER — AMOXICILLIN AND CLAVULANATE POTASSIUM 875; 125 MG/1; MG/1
1 TABLET, FILM COATED ORAL EVERY 12 HOURS
Qty: 14 TABLET | Refills: 0 | Status: SHIPPED | OUTPATIENT
Start: 2023-11-10 | End: 2024-02-26

## 2023-11-10 RX ADMIN — CEFTRIAXONE 1 G: 1 INJECTION, POWDER, FOR SOLUTION INTRAMUSCULAR; INTRAVENOUS at 08:11

## 2023-11-10 NOTE — PROGRESS NOTES
Assessment & Plan  Problem List Items Addressed This Visit    None  Visit Diagnoses       Community acquired pneumonia, unspecified laterality    -  Primary    Relevant Medications    amoxicillin-clavulanate 875-125mg (AUGMENTIN) 875-125 mg per tablet    cefTRIAXone injection 1 g (Completed)    benzonatate (TESSALON) 200 MG capsule    Other Relevant Orders    X-Ray Chest PA And Lateral (Completed)    CBC Auto Differential (Completed)    Comprehensive Metabolic Panel (Completed)              Health Maintenance reviewed.    Follow-up: Follow up in about 6 weeks (around 12/22/2023).    ______________________________________________________________________    Chief Complaint  Chief Complaint   Patient presents with    Cough    Sore Throat       HPI  Scott Bansal is a 62 y.o. male with multiple medical diagnoses as listed in the medical history and problem list that presents for cough.  Pt is known to me with last appointment 2/8/2023.    He reports a continuation of symptoms that began 1 month ago.  Patient denies any new symptoms including chest pain.  +chest congestion.  He has a sore throat.  He indicates that he does not feel well.  He denies fever and chills.  He has a productive cought that is excessive.  He does not feel like himself.  He was treated with doxycycline.  He improved, but worsened immediately after he completed the antibiotics.       PAST MEDICAL HISTORY:  Past Medical History:   Diagnosis Date    Bipolar 1 disorder, mixed     BPH (benign prostatic hypertrophy)     Colon polyp     Cyst, eyelid     Dr. Broussard    Diabetes mellitus     GERD (gastroesophageal reflux disease)     Hyperlipidemia     Hypertension     Lumbar degenerative disc disease 3/7/2019    Migraine headache     Obesity        PAST SURGICAL HISTORY:  Past Surgical History:   Procedure Laterality Date    CERVICAL SPINE SURGERY      COLONOSCOPY N/A 7/27/2017    Procedure: COLONOSCOPY;  Surgeon: Genaro Nix MD;  Location:  [facility] ENDO;  Service: Endoscopy;  Laterality: N/A;    COLONOSCOPY N/A 10/30/2020    Procedure: COLONOSCOPY;  Surgeon: Herb Martinez MD;  Location: [facility] ENDO;  Service: Endoscopy;  Laterality: N/A;    EPIDURAL STEROID INJECTION Bilateral 2023    Procedure: Bilateral L5 Transforaminal Epidural Steroid Injections (ref: [place]);  Surgeon: Tanner Mayberry Jr., MD;  Location: [facility] ENDO;  Service: Pain Management;  Laterality: Bilateral;  @0815  No ATC   Check BG  Needs COnsent    EYE SURGERY Left 2017    cyst removal on eyelid; Dr. Broussard    SHOULDER SURGERY      TONSILLECTOMY         SOCIAL HISTORY:  Social History     Socioeconomic History    Marital status:    Tobacco Use    Smoking status: Former     Current packs/day: 0.00     Average packs/day: 2.0 packs/day for 15.0 years (30.0 ttl pk-yrs)     Types: Cigarettes     Start date: 1969     Quit date: 1984     Years since quittin.5     Passive exposure: Past    Smokeless tobacco: Never    Tobacco comments:     Patient quit smoking at the age of 26 yo.   Substance and Sexual Activity    Alcohol use: No    Drug use: No    Sexual activity: Yes     Partners: Female     Social Determinants of Health     Financial Resource Strain: Medium Risk (10/4/2022)    Overall Financial Resource Strain (CARDIA)     Difficulty of Paying Living Expenses: Somewhat hard   Food Insecurity: No Food Insecurity (10/4/2022)    Hunger Vital Sign     Worried About Running Out of Food in the Last Year: Never true     Ran Out of Food in the Last Year: Never true   Transportation Needs: No Transportation Needs (10/4/2022)    PRAPARE - Transportation     Lack of Transportation (Medical): No     Lack of Transportation (Non-Medical): No   Physical Activity: Inactive (10/4/2022)    Exercise Vital Sign     Days of Exercise per Week: 0 days     Minutes of Exercise per Session: 0 min   Stress: Stress Concern Present (10/4/2022)    [employer] [place] [employer]  [employer] - Occupational Stress Questionnaire     Feeling of Stress : To some extent   Social Connections: Moderately Integrated (10/4/2022)    Social Connection and Isolation Panel [NHANES]     Frequency of Communication with Friends and Family: More than three times a week     Frequency of Social Gatherings with Friends and Family: Never     Attends Buddhism Services: More than 4 times per year     Active Member of Clubs or Organizations: No     Attends Club or Organization Meetings: Never     Marital Status: Living with partner   Housing Stability: Low Risk  (10/4/2022)    Housing Stability Vital Sign     Unable to Pay for Housing in the Last Year: No     Number of Places Lived in the Last Year: 1     Unstable Housing in the Last Year: No       FAMILY HISTORY:  Family History   Problem Relation Age of Onset    Cataracts Mother     Cancer Mother         throat    Hypertension Mother     Hypertension Father     Stroke Father         2 strokes    Hypertension Sister     Cancer Brother         spinal, kidney, spinal fluid    Hypertension Brother     Cancer Cousin         breast?       ALLERGIES AND MEDICATIONS: updated and reviewed.  Review of patient's allergies indicates:   Allergen Reactions    Levofloxacin Hallucinations    Sulfa (sulfonamide antibiotics) Rash     Current Outpatient Medications   Medication Sig Dispense Refill    albuterol (PROVENTIL) 2.5 mg /3 mL (0.083 %) nebulizer solution Take 3 mLs (2.5 mg total) by nebulization every 6 (six) hours as needed for Wheezing or Shortness of Breath. 9 mL 1    albuterol (PROVENTIL/VENTOLIN HFA) 90 mcg/actuation inhaler Inhale 1-2 puffs into the lungs every 6 (six) hours as needed for Wheezing or Shortness of Breath. Rescue 18 g 5    ALPRAZolam (XANAX) 1 MG tablet Prior to MRI 1 tablet 0    amitriptyline (ELAVIL) 25 MG tablet Take 1 tablet (25 mg total) by mouth every evening. 30 tablet 11    amLODIPine (NORVASC) 10 MG tablet Take 1 tablet (10 mg total) by mouth once  daily. 90 tablet 3    busPIRone (BUSPAR) 10 MG tablet Take 10 mg by mouth 3 (three) times daily.      candesartan (ATACAND) 4 MG tablet Take 1 tablet (4 mg total) by mouth once daily. 90 tablet 1    DUPIXENT SYRINGE 300 mg/2 mL Syrg Inject 1 syringe (300mg) into the skin every other week 4 mL 11    econazole nitrate 1 % cream AAA bid for rash on shoulders 30 g 3    fluticasone propionate (FLONASE) 50 mcg/actuation nasal spray 2 sprays (100 mcg total) by Each Nostril route once daily. 16 mL 5    gabapentin (NEURONTIN) 300 MG capsule Take 1 capsule (300 mg total) by mouth 2 (two) times daily. 180 capsule 2    gemfibroziL (LOPID) 600 MG tablet TAKE 1 TABLET BY MOUTH TWICE A DAY BEFORE MEALS 180 tablet 3    hydrocortisone 2.5 % cream Apply topically 2 (two) times daily. Apply to rash on face. 28 g 1    hydrOXYzine (ATARAX) 50 MG tablet Take 1 tablet (50 mg total) by mouth 3 (three) times daily as needed for Itching. 270 tablet 3    ketoconazole (NIZORAL) 2 % cream Apply topically once daily. Apply to rash on face. 30 g 1    metFORMIN (GLUCOPHAGE) 1000 MG tablet Take 1 tablet (1,000 mg total) by mouth 2 (two) times daily. 180 tablet 3    metFORMIN (GLUCOPHAGE) 1000 MG tablet Take 1 tablet (1,000 mg total) by mouth 2 (two) times daily. 180 tablet 3    mometasone 0.1% (ELOCON) 0.1 % cream Apply topically once daily. Apply topically to arms and legs. 45 g 1    multivitamin capsule Take 1 capsule by mouth once daily.      pantoprazole (PROTONIX) 40 MG tablet TAKE 1 TABLET BY MOUTH EVERY DAY 90 tablet 3    promethazine-dextromethorphan (PROMETHAZINE-DM) 6.25-15 mg/5 mL Syrp Take 5 mLs by mouth every 4 (four) hours as needed (for cough, congestion). 118 mL 1    quetiapine (SEROQUEL) 300 MG Tab Take 300 mg by mouth every evening.      sildenafiL (VIAGRA) 25 MG tablet Take 2 tablets (50 mg total) by mouth daily as needed for Erectile Dysfunction. 30 tablet 0    simvastatin (ZOCOR) 80 MG tablet Take 1 tablet (80 mg total) by  mouth nightly. 90 tablet 3    sumatriptan (IMITREX) 100 MG tablet Take 1 tablet (100 mg total) by mouth daily as needed for Migraine. 30 tablet 3    tadalafiL (CIALIS) 10 MG tablet Take 10 mg by mouth daily as needed.      tamsulosin (FLOMAX) 0.4 mg Cap TAKE 2 CAPSULES BY MOUTH EVERY  capsule 3    tiZANidine (ZANAFLEX) 2 MG tablet Take 1 tablet (2 mg total) by mouth every 8 (eight) hours as needed (Muscle spasm). 15 tablet 0    triamcinolone acetonide 0.1% (KENALOG) 0.1 % cream AAA bid prn eczema. Do not use on face. 454 g 5    zonisamide (ZONEGRAN) 50 MG Cap Take 1 capsule (50 mg total) by mouth 2 (two) times daily. 180 capsule 3    amoxicillin-clavulanate 875-125mg (AUGMENTIN) 875-125 mg per tablet Take 1 tablet by mouth every 12 (twelve) hours. 14 tablet 0    benzonatate (TESSALON) 200 MG capsule Take 1 capsule (200 mg total) by mouth 3 (three) times daily as needed for Cough. 30 capsule 0     No current facility-administered medications for this visit.         ROS  Review of Systems   Constitutional:  Negative for activity change, appetite change, fatigue, fever and unexpected weight change.   HENT:  Positive for congestion, postnasal drip and sore throat. Negative for facial swelling.    Eyes:  Negative for visual disturbance.   Respiratory:  Positive for cough, choking, chest tightness and wheezing. Negative for shortness of breath and stridor.    Cardiovascular:  Negative for chest pain, palpitations and leg swelling.   Gastrointestinal:  Negative for abdominal distention, abdominal pain, constipation, diarrhea, nausea and vomiting.   Endocrine: Negative for cold intolerance, heat intolerance, polydipsia and polyuria.   Skin: Negative.    Allergic/Immunologic: Negative.    Neurological:  Negative for dizziness, light-headedness, numbness and headaches.   Psychiatric/Behavioral:  Negative for agitation and decreased concentration.            Physical Exam  Vitals:    11/10/23 0757   BP: 110/70   Pulse:  "105   Temp: 98 °F (36.7 °C)   TempSrc: Oral   SpO2: 95%   Weight: 111.2 kg (245 lb 2.4 oz)   Height: 5' 9" (1.753 m)    Body mass index is 36.2 kg/m².  Weight: 111.2 kg (245 lb 2.4 oz)   Height: 5' 9" (175.3 cm)   Physical Exam  Vitals reviewed.   Constitutional:       Appearance: Normal appearance. He is well-developed.   HENT:      Head: Normocephalic and atraumatic.      Right Ear: External ear normal.      Left Ear: External ear normal.      Nose: Nose normal.   Eyes:      Extraocular Movements: Extraocular movements intact.      Conjunctiva/sclera: Conjunctivae normal.      Pupils: Pupils are equal, round, and reactive to light.   Cardiovascular:      Rate and Rhythm: Normal rate and regular rhythm.      Heart sounds: Normal heart sounds.   Pulmonary:      Effort: Pulmonary effort is normal.      Breath sounds: Decreased air movement present. Examination of the right-upper field reveals wheezing. Examination of the left-upper field reveals wheezing. Wheezing present.   Musculoskeletal:      Cervical back: Normal range of motion.   Skin:     General: Skin is warm and dry.   Neurological:      Mental Status: He is alert and oriented to person, place, and time.   Psychiatric:         Behavior: Behavior normal.           Health Maintenance         Date Due Completion Date    RSV Vaccine (Age 60+) (1 - 1-dose 60+ series) Never done ---    Influenza Vaccine (1) Never done ---    COVID-19 Vaccine (6 - 2023-24 season) 09/01/2023 9/15/2022    Lipid Panel 10/31/2023 10/31/2022    Diabetes Urine Screening 10/31/2023 10/31/2022    Hemoglobin A1c 12/14/2023 9/14/2023    Colorectal Cancer Screening 06/28/2024 6/28/2021    Eye Exam 07/18/2024 7/18/2023    Foot Exam 07/19/2024 7/19/2023 (Done)    Override on 7/19/2023: Done    Override on 10/26/2020: Done (completed by insurance)    High Dose Statin 11/10/2024 11/10/2023    TETANUS VACCINE 07/07/2027 7/7/2017                Patient note was created using MModal.  Any errors in " syntax or even information may not have been identified and edited on initial review prior to signing this note.

## 2023-11-10 NOTE — PROGRESS NOTES
Patient tolerated  cefTRIAXone 1g injection well, instructed to remain in clinic for 15mins.to monitor for allergic reaction. Verbalized understanding.

## 2023-11-10 NOTE — TELEPHONE ENCOUNTER
----- Message from Chelle Rodriguez sent at 11/10/2023  4:45 PM CST -----  Regarding: self [phone]  .Type: Patient Call Back    Who called: self    What is the request in detail: patient is requesting a call back in regards to a cough syrup  told patient she would prescribed from his visit. Patient would to to speak with someone regarding the issue and send medication to    [facility]/pharmacy #[id number] - [place] - [address] [place] [address]  Phone: [phone] Fax: [phone]        Can the clinic reply by [facility]?no    Would the patient rather a call back or a response via [facility]? Call back    Best call back number [phone]

## 2023-11-13 RX ORDER — BENZONATATE 200 MG/1
200 CAPSULE ORAL 3 TIMES DAILY PRN
Qty: 30 CAPSULE | Refills: 0 | Status: SHIPPED | OUTPATIENT
Start: 2023-11-13 | End: 2023-11-23

## 2023-11-15 DIAGNOSIS — E11.9 TYPE 2 DIABETES MELLITUS WITHOUT COMPLICATION: ICD-10-CM

## 2023-11-16 DIAGNOSIS — N39.43 BENIGN PROSTATIC HYPERPLASIA WITH POST-VOID DRIBBLING: ICD-10-CM

## 2023-11-16 DIAGNOSIS — N40.1 BENIGN PROSTATIC HYPERPLASIA WITH POST-VOID DRIBBLING: ICD-10-CM

## 2023-11-16 DIAGNOSIS — G43.009 MIGRAINE WITHOUT AURA AND WITHOUT STATUS MIGRAINOSUS, NOT INTRACTABLE: ICD-10-CM

## 2023-11-16 RX ORDER — TAMSULOSIN HYDROCHLORIDE 0.4 MG/1
2 CAPSULE ORAL DAILY
Qty: 180 CAPSULE | Refills: 3 | Status: SHIPPED | OUTPATIENT
Start: 2023-11-16

## 2023-11-16 RX ORDER — SUMATRIPTAN SUCCINATE 100 MG/1
TABLET ORAL
Qty: 30 TABLET | Refills: 0 | Status: SHIPPED | OUTPATIENT
Start: 2023-11-16 | End: 2023-12-20 | Stop reason: SDUPTHER

## 2023-11-16 NOTE — TELEPHONE ENCOUNTER
----- Message from Arnulfo Meza sent at 11/16/2023 10:36 AM CST -----  Regarding: Self [phone]   Type: RX Refill Request    Who Called: Self     Have you contacted your pharmacy:    Refill    RX Name and Strength:sumatriptan (IMITREX) 100 MG tablet    Preferred Pharmacy with phone number: .    [address] [place] [address] [facility]  [address] [facility]  [place] [address]  Phone: [phone] Fax: [phone]        Local or Mail Order: local     Would the patient rather a call back or a response via [facility]? Call back     Best Call Back Number: .[phone]      Additional Information:     Thank you.

## 2023-11-16 NOTE — TELEPHONE ENCOUNTER
----- Message from Arnulfo Meza sent at 11/16/2023 10:39 AM CST -----  Regarding: Self [phone]   Type: RX Refill Request    Who Called:  Self     Have you contacted your pharmacy:  no     Refill    RX Name and Strength:tamsulosin (FLOMAX) 0.4 mg Cap    Preferred Pharmacy with phone number: .    [facility]/pharmacy #[id number] - [place] - [address]  [address]  [place] [address]  Phone: [phone] Fax: [phone]              Local or Mail Order: local     Would the patient rather a call back or a response via [facility]? Call back     Best Call Back Number: [phone]      Additional Information:     Thank you.

## 2023-11-16 NOTE — TELEPHONE ENCOUNTER
Care Due:                  Date            Visit Type   Department     Provider  --------------------------------------------------------------------------------                                EP [facility]                              PRIMARY      MED/ INTERNAL  Last Visit: 11-      CARE ([facility])   MED/ PEDS      Kaylie Chau  Next Visit: None Scheduled  None         None Found                                                            Last  Test          Frequency    Reason                     Performed    Due Date  --------------------------------------------------------------------------------    Lipid Panel.  12 months..  gemfibroziL, simvastatin.  10-   10-    [facility] Embedded Care Due Messages. Reference number: [id number].   11/16/2023 10:31:43 AM CST

## 2023-11-29 DIAGNOSIS — G44.229 CHRONIC TENSION-TYPE HEADACHE, NOT INTRACTABLE: ICD-10-CM

## 2023-11-29 NOTE — TELEPHONE ENCOUNTER
Refill Routing Note   Medication(s) are not appropriate for processing by [facility] for the following reason(s):        Drug-disease interaction    [facility] action(s):  Defer        Medication Therapy Plan: amitriptyline and Atherosclerosis of native coronary artery of native heart without angina pectoris    Pharmacist review requested: Yes     Appointments  past 12m or future 3m with PCP    Date Provider   Last Visit   11/10/2023 Kaylie Villalta MD   Next Visit   Visit date not found Kaylie Villalta MD   ED visits in past 90 days: 1        Note composed:3:39 PM 11/29/2023

## 2023-11-29 NOTE — TELEPHONE ENCOUNTER
No care due was identified.  Health [facility] Embedded Care Due Messages. Reference number: [id number].   11/29/2023 10:20:04 AM CST

## 2023-12-01 RX ORDER — AMITRIPTYLINE HYDROCHLORIDE 25 MG/1
25 TABLET, FILM COATED ORAL NIGHTLY
Qty: 90 TABLET | Refills: 3 | Status: SHIPPED | OUTPATIENT
Start: 2023-12-01

## 2023-12-20 DIAGNOSIS — G43.009 MIGRAINE WITHOUT AURA AND WITHOUT STATUS MIGRAINOSUS, NOT INTRACTABLE: ICD-10-CM

## 2023-12-20 RX ORDER — SUMATRIPTAN SUCCINATE 100 MG/1
TABLET ORAL
Qty: 30 TABLET | Refills: 0 | Status: SHIPPED | OUTPATIENT
Start: 2023-12-20 | End: 2024-01-26 | Stop reason: SDUPTHER

## 2023-12-20 NOTE — TELEPHONE ENCOUNTER
----- Message from Niya Shayne sent at 12/20/2023  8:28 AM CST -----  Regarding: self [phone]  .Type: RX Refill Request    Who Called: self     Have you contacted your pharmacy: no    Refill or New Rx: refill    RX Name and Strength: sumatriptan (IMITREX) 100 MG tablet    Is this a 30 day or 90 day RX: 90 day     Preferred Pharmacy with phone number: .    [address] [place] - [address] [facility]  [address] [facility]  [place] [address]  Phone: [phone] Fax: [phone]    Local or Mail Order: local     Would the patient rather a call back or a response via [facility]? Call back     Best Call Back Number: .[phone]

## 2023-12-20 NOTE — TELEPHONE ENCOUNTER
Care Due:                  Date            Visit Type   Department     Provider  --------------------------------------------------------------------------------                                EP [facility]                              PRIMARY      MED/ INTERNAL  Last Visit: 11-      CARE ([facility])   MED/ PEDS      Kaylie Chau  Next Visit: None Scheduled  None         None Found                                                            Last  Test          Frequency    Reason                     Performed    Due Date  --------------------------------------------------------------------------------    HBA1C.......  6 months...  metFORMIN................  09- 03-    [facility] Embedded Care Due Messages. Reference number: [id number].   12/20/2023 8:36:38 AM CST

## 2023-12-21 RX ORDER — SUMATRIPTAN SUCCINATE 100 MG/1
TABLET ORAL
Qty: 30 TABLET | Refills: 0 | OUTPATIENT
Start: 2023-12-21

## 2023-12-21 NOTE — TELEPHONE ENCOUNTER
No care due was identified.  Health [facility] Embedded Care Due Messages. Reference number: [id number].   12/20/2023 7:00:26 PM CST

## 2023-12-21 NOTE — TELEPHONE ENCOUNTER
Refill Decision Note   Scott Bansal  is requesting a refill authorization.  Brief Assessment and Rationale for Refill:  Quick Discontinue     Medication Therapy Plan:  30 day supply sent on 12/20/23-Kaylie Villalta MD      Comments:     Note composed:10:39 AM 12/21/2023

## 2023-12-21 NOTE — TELEPHONE ENCOUNTER
Refill Routing Note   Medication(s) are not appropriate for processing by [facility] for the following reason(s):             [facility] action(s):  Quick Discontinue        Medication Therapy Plan: 30 day supply sent on 12/20/23-Kaylie Villalta MD      Appointments  past 12m or future 3m with PCP    Date Provider   Last Visit   11/10/2023 Kaylie Villalta MD   Next Visit   Visit date not found Kaylie Villalta MD   ED visits in past 90 days: 0        Note composed:10:38 AM 12/21/2023

## 2024-01-10 NOTE — TELEPHONE ENCOUNTER
Pt states that the medicine for itching is not working, and wants to know if you can possibly give him some kind of pain medication. Please advise.    [Time Spent: ___ minutes] : I have spent [unfilled] minutes of time on the encounter.

## 2024-01-26 DIAGNOSIS — G43.009 MIGRAINE WITHOUT AURA AND WITHOUT STATUS MIGRAINOSUS, NOT INTRACTABLE: ICD-10-CM

## 2024-01-26 RX ORDER — SUMATRIPTAN SUCCINATE 100 MG/1
TABLET ORAL
Qty: 30 TABLET | Refills: 0 | Status: SHIPPED | OUTPATIENT
Start: 2024-01-26 | End: 2024-03-11 | Stop reason: SDUPTHER

## 2024-01-26 NOTE — TELEPHONE ENCOUNTER
----- Message from Niya Bella sent at 1/26/2024  2:32 PM CST -----  Regarding: self  [phone]  .Type: RX Refill Request    Who Called: self    Have you contacted your pharmacy: yes    Refill or New Rx: refill    RX Name and Strength: sumatriptan (IMITREX) 100 MG tablet      Is this a 30 day or 90 day RX: 90 day    Preferred Pharmacy with phone number: .      [address] [place] - [address] [facility]  [address] [facility]  [place] [address]  Phone: [phone] Fax: [phone]    Local or Mail Order: local    Would the patient rather a call back or a response via [facility]?  Call back    Best Call Back Number: .[phone]

## 2024-01-26 NOTE — TELEPHONE ENCOUNTER
Care Due:                  Date            Visit Type   Department     Provider  --------------------------------------------------------------------------------                                EP [facility]                              PRIMARY      MED/ INTERNAL  Last Visit: 11-      CARE ([facility])   MED/ PEDS      Kaylie Chau  Next Visit: None Scheduled  None         None Found                                                            Last  Test          Frequency    Reason                     Performed    Due Date  --------------------------------------------------------------------------------    Lipid Panel.  12 months..  gemfibroziL, simvastatin.  10-   10-    [facility] Embedded Care Due Messages. Reference number: [id number].   1/26/2024 2:53:40 PM CST

## 2024-02-16 ENCOUNTER — PATIENT MESSAGE (OUTPATIENT)
Dept: DERMATOLOGY | Facility: CLINIC | Age: 64
End: 2024-02-16
Payer: MEDICARE

## 2024-02-25 PROBLEM — F13.11: Status: ACTIVE | Noted: 2024-02-25

## 2024-02-26 ENCOUNTER — LAB VISIT (OUTPATIENT)
Dept: LAB | Facility: HOSPITAL | Age: 64
End: 2024-02-26
Attending: FAMILY MEDICINE
Payer: MEDICARE

## 2024-02-26 ENCOUNTER — OFFICE VISIT (OUTPATIENT)
Dept: FAMILY MEDICINE | Facility: CLINIC | Age: 64
End: 2024-02-26
Payer: MEDICARE

## 2024-02-26 VITALS
HEART RATE: 107 BPM | DIASTOLIC BLOOD PRESSURE: 80 MMHG | SYSTOLIC BLOOD PRESSURE: 118 MMHG | HEIGHT: 69 IN | TEMPERATURE: 99 F | OXYGEN SATURATION: 95 % | WEIGHT: 243.63 LBS | BODY MASS INDEX: 36.08 KG/M2

## 2024-02-26 DIAGNOSIS — D84.9 IMMUNOSUPPRESSED STATUS: ICD-10-CM

## 2024-02-26 DIAGNOSIS — L20.89 OTHER ATOPIC DERMATITIS: Primary | ICD-10-CM

## 2024-02-26 DIAGNOSIS — Z79.899 ENCOUNTER FOR LONG-TERM (CURRENT) USE OF MEDICATIONS: ICD-10-CM

## 2024-02-26 DIAGNOSIS — F31.9 BIPOLAR 1 DISORDER: ICD-10-CM

## 2024-02-26 DIAGNOSIS — E78.5 HYPERLIPIDEMIA, UNSPECIFIED HYPERLIPIDEMIA TYPE: ICD-10-CM

## 2024-02-26 DIAGNOSIS — F13.11 SEDATIVE, HYPNOTIC OR ANXIOLYTIC ABUSE, IN REMISSION: ICD-10-CM

## 2024-02-26 DIAGNOSIS — I70.0 ATHEROSCLEROSIS OF AORTA: ICD-10-CM

## 2024-02-26 DIAGNOSIS — J44.9 CHRONIC OBSTRUCTIVE PULMONARY DISEASE, UNSPECIFIED COPD TYPE: ICD-10-CM

## 2024-02-26 DIAGNOSIS — E66.01 SEVERE OBESITY (BMI 35.0-39.9) WITH COMORBIDITY: ICD-10-CM

## 2024-02-26 DIAGNOSIS — G43.809 OTHER MIGRAINE WITHOUT STATUS MIGRAINOSUS, NOT INTRACTABLE: ICD-10-CM

## 2024-02-26 DIAGNOSIS — L20.89 OTHER ATOPIC DERMATITIS: ICD-10-CM

## 2024-02-26 DIAGNOSIS — E11.42 TYPE 2 DIABETES MELLITUS WITH DIABETIC POLYNEUROPATHY, WITHOUT LONG-TERM CURRENT USE OF INSULIN: Primary | ICD-10-CM

## 2024-02-26 DIAGNOSIS — L30.9 ECZEMA, UNSPECIFIED TYPE: ICD-10-CM

## 2024-02-26 DIAGNOSIS — I10 ESSENTIAL HYPERTENSION: ICD-10-CM

## 2024-02-26 DIAGNOSIS — G47.33 OSA (OBSTRUCTIVE SLEEP APNEA): ICD-10-CM

## 2024-02-26 DIAGNOSIS — E11.42 TYPE 2 DIABETES MELLITUS WITH DIABETIC POLYNEUROPATHY, WITHOUT LONG-TERM CURRENT USE OF INSULIN: ICD-10-CM

## 2024-02-26 LAB
ALBUMIN/CREAT UR: 95.5 UG/MG (ref 0–30)
CHOLEST SERPL-MCNC: 107 MG/DL (ref 120–199)
CHOLEST/HDLC SERPL: 3.7 {RATIO} (ref 2–5)
COMPLEXED PSA SERPL-MCNC: 1.2 NG/ML (ref 0–4)
CREAT UR-MCNC: 110 MG/DL (ref 23–375)
ESTIMATED AVG GLUCOSE: 180 MG/DL (ref 68–131)
HBA1C MFR BLD: 7.9 % (ref 4–5.6)
HDLC SERPL-MCNC: 29 MG/DL (ref 40–75)
HDLC SERPL: 27.1 % (ref 20–50)
LDLC SERPL CALC-MCNC: 49.2 MG/DL (ref 63–159)
MICROALBUMIN UR DL<=1MG/L-MCNC: 105 UG/ML
NONHDLC SERPL-MCNC: 78 MG/DL
TRIGL SERPL-MCNC: 144 MG/DL (ref 30–150)

## 2024-02-26 PROCEDURE — 84153 ASSAY OF PSA TOTAL: CPT | Performed by: FAMILY MEDICINE

## 2024-02-26 PROCEDURE — 82043 UR ALBUMIN QUANTITATIVE: CPT | Performed by: FAMILY MEDICINE

## 2024-02-26 PROCEDURE — 99215 OFFICE O/P EST HI 40 MIN: CPT | Mod: S$GLB,,, | Performed by: FAMILY MEDICINE

## 2024-02-26 PROCEDURE — 83036 HEMOGLOBIN GLYCOSYLATED A1C: CPT | Performed by: FAMILY MEDICINE

## 2024-02-26 PROCEDURE — 80061 LIPID PANEL: CPT | Performed by: FAMILY MEDICINE

## 2024-02-26 PROCEDURE — 36415 COLL VENOUS BLD VENIPUNCTURE: CPT | Mod: PO | Performed by: FAMILY MEDICINE

## 2024-02-26 PROCEDURE — 99999 PR PBB SHADOW E&M-EST. PATIENT-LVL V: CPT | Mod: PBBFAC,,, | Performed by: FAMILY MEDICINE

## 2024-02-26 RX ORDER — DUPILUMAB 300 MG/2ML
INJECTION, SOLUTION SUBCUTANEOUS
Qty: 4 ML | Refills: 11 | Status: ACTIVE | OUTPATIENT
Start: 2024-02-26 | End: 2024-02-28 | Stop reason: SDUPTHER

## 2024-02-26 RX ORDER — DUPILUMAB 300 MG/2ML
INJECTION, SOLUTION SUBCUTANEOUS
Qty: 4 ML | Refills: 11 | Status: SHIPPED | OUTPATIENT
Start: 2024-02-26 | End: 2024-02-26 | Stop reason: SDUPTHER

## 2024-02-26 RX ORDER — TRIAMCINOLONE ACETONIDE 1 MG/G
CREAM TOPICAL 2 TIMES DAILY
Qty: 60 G | Refills: 0 | Status: SHIPPED | OUTPATIENT
Start: 2024-02-26 | End: 2024-05-27

## 2024-02-26 NOTE — PROGRESS NOTES
Subjective:       Patient ID: Scott Bansal is a 63 y.o. male.    Chief Complaint: Establish Care      HPI Comments:       Current Outpatient Medications:     albuterol (PROVENTIL) 2.5 mg /3 mL (0.083 %) nebulizer solution, Take 3 mLs (2.5 mg total) by nebulization every 6 (six) hours as needed for Wheezing or Shortness of Breath., Disp: 9 mL, Rfl: 1    albuterol (PROVENTIL/VENTOLIN HFA) 90 mcg/actuation inhaler, Inhale 1-2 puffs into the lungs every 6 (six) hours as needed for Wheezing or Shortness of Breath. Rescue, Disp: 18 g, Rfl: 5    amitriptyline (ELAVIL) 25 MG tablet, Take 1 tablet (25 mg total) by mouth every evening., Disp: 90 tablet, Rfl: 3    amLODIPine (NORVASC) 10 MG tablet, Take 1 tablet (10 mg total) by mouth once daily., Disp: 90 tablet, Rfl: 3    busPIRone (BUSPAR) 10 MG tablet, Take 10 mg by mouth 3 (three) times daily., Disp: , Rfl:     candesartan (ATACAND) 4 MG tablet, Take 1 tablet (4 mg total) by mouth once daily., Disp: 90 tablet, Rfl: 3    fluticasone propionate (FLONASE) 50 mcg/actuation nasal spray, 2 sprays (100 mcg total) by Each Nostril route once daily., Disp: 16 mL, Rfl: 5    gabapentin (NEURONTIN) 300 MG capsule, Take 1 capsule (300 mg total) by mouth 2 (two) times daily., Disp: 180 capsule, Rfl: 2    gemfibroziL (LOPID) 600 MG tablet, TAKE 1 TABLET BY MOUTH TWICE A DAY BEFORE MEALS, Disp: 180 tablet, Rfl: 3    hydrOXYzine (ATARAX) 50 MG tablet, Take 1 tablet (50 mg total) by mouth 3 (three) times daily as needed for Itching., Disp: 270 tablet, Rfl: 3    metFORMIN (GLUCOPHAGE) 1000 MG tablet, Take 1 tablet (1,000 mg total) by mouth 2 (two) times daily., Disp: 180 tablet, Rfl: 3    multivitamin capsule, Take 1 capsule by mouth once daily., Disp: , Rfl:     pantoprazole (PROTONIX) 40 MG tablet, TAKE 1 TABLET BY MOUTH EVERY DAY, Disp: 90 tablet, Rfl: 3    quetiapine (SEROQUEL) 300 MG Tab, Take 300 mg by mouth every evening., Disp: , Rfl:     simvastatin (ZOCOR) 80 MG tablet, Take 1  tablet (80 mg total) by mouth nightly., Disp: 90 tablet, Rfl: 3    sumatriptan (IMITREX) 100 MG tablet, TAKE 1 TABLET BY MOUTH ONCE DAILY AS NEEDED FOR MIGRAINE HEADACHE, Disp: 30 tablet, Rfl: 0    tadalafiL (CIALIS) 10 MG tablet, Take 10 mg by mouth daily as needed., Disp: , Rfl:     tamsulosin (FLOMAX) 0.4 mg Cap, Take 2 capsules (0.8 mg total) by mouth once daily., Disp: 180 capsule, Rfl: 3    zonisamide (ZONEGRAN) 50 MG Cap, Take 1 capsule (50 mg total) by mouth 2 (two) times daily., Disp: 180 capsule, Rfl: 3    ALPRAZolam (XANAX) 1 MG tablet, Prior to MRI, Disp: 1 tablet, Rfl: 0    DUPIXENT SYRINGE 300 mg/2 mL Syrg, Inject 1 syringe (300mg) into the skin every other week (Patient not taking: Reported on 2/26/2024), Disp: 4 mL, Rfl: 11    econazole nitrate 1 % cream, AAA bid for rash on shoulders (Patient not taking: Reported on 2/26/2024), Disp: 30 g, Rfl: 3    hydrocortisone 2.5 % cream, Apply topically 2 (two) times daily. Apply to rash on face. (Patient not taking: Reported on 2/26/2024), Disp: 28 g, Rfl: 1    ketoconazole (NIZORAL) 2 % cream, Apply topically once daily. Apply to rash on face. (Patient not taking: Reported on 2/26/2024), Disp: 30 g, Rfl: 1    metFORMIN (GLUCOPHAGE) 1000 MG tablet, Take 1 tablet (1,000 mg total) by mouth 2 (two) times daily., Disp: 180 tablet, Rfl: 3    mometasone 0.1% (ELOCON) 0.1 % cream, Apply topically once daily. Apply topically to arms and legs. (Patient not taking: Reported on 2/26/2024), Disp: 45 g, Rfl: 1    triamcinolone acetonide 0.1% (KENALOG) 0.1 % cream, AAA bid prn eczema. Do not use on face. (Patient not taking: Reported on 2/26/2024), Disp: 454 g, Rfl: 5    triamcinolone acetonide 0.1% (KENALOG) 0.1 % cream, Apply topically 2 (two) times daily., Disp: 60 g, Rfl: 0      This is my 1st time seeing this patient.  He is establishing care.  He recently moved to this area from [place], [place] with his girlfriend.  Daughter lives nearby    He is having eczema  "flare up today.  Takes some old cream in his established to dermatology appointment for late in March.  Affects the palms of his hands and fingers, also his calves.  All areas seem flared up today and he has having a lot of pain.    Has been significant for COPD, alcohol abuse, opioid abuse, hypertension, GERD, compression fractures of the spine, pneumonia, hyperlipidemia, type 2 diabetes with neuropathy, sleep apnea, recurrent boils, coronary artery disease, bipolar disorder, migraine headache, former smoker, history of cervical spine fusion.      Mental health providers [facility].  Continues to go there for Seroquel and BuSpar    Takes gabapentin from his primary care doctor for diabetic neuropathy.    Currently taking "too much" ibuprofen and Tylenol for his hand pain from eczema.  Does not mentioned much of his other chronic pain related to his back and other body areas mentioned in his problem list    Not using opioids or alcohol for many years.    Takes Imitrex and Zonegran p.r.n. for headaches.  Willing to see a neurologist        Review of Systems   Constitutional:  Negative for activity change, appetite change and fever.   HENT:  Negative for sore throat.    Respiratory:  Negative for cough and shortness of breath.    Cardiovascular:  Negative for chest pain.   Gastrointestinal:  Negative for abdominal pain, diarrhea and nausea.   Genitourinary:  Negative for difficulty urinating.   Musculoskeletal:  Negative for arthralgias and myalgias.   Skin:  Positive for rash.   Neurological:  Negative for dizziness and headaches.       Objective:      Vitals:    02/26/24 0818   BP: 118/80   Pulse: 107   Temp: 98.9 °F (37.2 °C)   SpO2: 95%   Weight: 110.5 kg (243 lb 9.7 oz)   Height: 5' 9" (1.753 m)   PainSc: 10-Worst pain ever     Physical Exam  Vitals and nursing note reviewed.   Constitutional:       General: He is not in acute distress.     Appearance: He is well-developed. He is not diaphoretic.   HENT:      Head: " Normocephalic.   Neck:      Thyroid: No thyromegaly.   Cardiovascular:      Rate and Rhythm: Normal rate and regular rhythm.      Heart sounds: Normal heart sounds. No murmur heard.  Pulmonary:      Effort: Pulmonary effort is normal.      Breath sounds: Normal breath sounds. No wheezing or rales.   Abdominal:      General: There is no distension.      Palpations: Abdomen is soft.   Musculoskeletal:      Cervical back: Neck supple.   Lymphadenopathy:      Cervical: No cervical adenopathy.   Skin:     General: Skin is warm and dry.      Comments: Diffuse plaque and abrasions on palms and palmar surface of the fingers.  Eczematoid plaques on calves bilaterally   Neurological:      Mental Status: He is alert and oriented to person, place, and time.   Psychiatric:         Mood and Affect: Mood normal.         Behavior: Behavior normal.         Thought Content: Thought content normal.         Judgment: Judgment normal.         Assessment:       1. Type 2 diabetes mellitus with diabetic polyneuropathy, without long-term current use of insulin    2. Sedative, hypnotic or anxiolytic abuse, in remission    3. Chronic obstructive pulmonary disease, unspecified COPD type    4. Severe obesity (BMI 35.0-39.9) with comorbidity    5. Immunosuppressed status    6. Atherosclerosis of aorta    7. Bipolar 1 disorder    8. Essential hypertension    9. Hyperlipidemia, unspecified hyperlipidemia type    10. KRISS (obstructive sleep apnea)    11. Other migraine without status migrainosus, not intractable    12. Eczema, unspecified type        Plan:   Type 2 diabetes mellitus with diabetic polyneuropathy, without long-term current use of insulin  Comments:  Last A1c 8.0 in September  Orders:  -     Hemoglobin A1C; Future; Expected date: 02/26/2024  -     Microalbumin/Creatinine Ratio, Urine; Future; Expected date: 02/26/2024  -     Lipid Panel; Future; Expected date: 02/26/2024  -     PSA, Screening; Future; Expected date:  02/26/2024    Sedative, hypnotic or anxiolytic abuse, in remission  Comments:  Not currently on any such medications    Chronic obstructive pulmonary disease, unspecified COPD type  Comments:  Stable.  Albuterol    Severe obesity (BMI 35.0-39.9) with comorbidity  Comments:  No significant recent change    Immunosuppressed status    Atherosclerosis of aorta  Comments:  On statin    Bipolar 1 disorder  Comments:  Followed by mental health.  On Seroquel and BuSpar    Essential hypertension  Comments:  Controlled.  Follow-up 6 weeks    Hyperlipidemia, unspecified hyperlipidemia type    KRISS (obstructive sleep apnea)  Comments:  Will review at follow-up    Other migraine without status migrainosus, not intractable  Comments:  Neurologic consult to review current management  Orders:  -     Ambulatory referral/consult to Neurology; Future; Expected date: 03/04/2024    Eczema, unspecified type  Comments:  Triamcinolone cream.  Dermatology consult one-month    Other orders  -     triamcinolone acetonide 0.1% (KENALOG) 0.1 % cream; Apply topically 2 (two) times daily.  Dispense: 60 g; Refill: 0

## 2024-02-27 ENCOUNTER — TELEPHONE (OUTPATIENT)
Dept: NEUROLOGY | Facility: CLINIC | Age: 64
End: 2024-02-27
Payer: MEDICARE

## 2024-02-27 NOTE — TELEPHONE ENCOUNTER
----- Message from Nkechi Herron sent at 2/27/2024  9:33 AM CST -----  Regarding: Medical Advice/ appt  Contact: Scott  Type:  Needs Medical Advice    Who Called: Scott  Symptoms (please be specific):  headaches   How long has patient had these symptoms:    Pharmacy name and phone #:    Would the patient rather a call back or a response via [facility]? call  Best Call Back Number: [phone] (home)  Additional Information:  Scott has a referral and is ready to be scheduled preferably on cardenas.

## 2024-02-28 DIAGNOSIS — L20.89 OTHER ATOPIC DERMATITIS: ICD-10-CM

## 2024-02-28 DIAGNOSIS — Z79.899 ENCOUNTER FOR LONG-TERM (CURRENT) USE OF MEDICATIONS: ICD-10-CM

## 2024-02-28 RX ORDER — DUPILUMAB 300 MG/2ML
INJECTION, SOLUTION SUBCUTANEOUS
Qty: 4 ML | Refills: 11 | Status: ACTIVE | OUTPATIENT
Start: 2024-02-28 | End: 2024-03-27 | Stop reason: SDUPTHER

## 2024-03-11 DIAGNOSIS — G43.009 MIGRAINE WITHOUT AURA AND WITHOUT STATUS MIGRAINOSUS, NOT INTRACTABLE: ICD-10-CM

## 2024-03-11 DIAGNOSIS — E11.42 TYPE 2 DIABETES MELLITUS WITH DIABETIC POLYNEUROPATHY: ICD-10-CM

## 2024-03-11 RX ORDER — GABAPENTIN 300 MG/1
300 CAPSULE ORAL 2 TIMES DAILY
Qty: 180 CAPSULE | Refills: 2 | Status: SHIPPED | OUTPATIENT
Start: 2024-03-11

## 2024-03-11 RX ORDER — SUMATRIPTAN SUCCINATE 100 MG/1
TABLET ORAL
Qty: 30 TABLET | Refills: 0 | Status: SHIPPED | OUTPATIENT
Start: 2024-03-11 | End: 2024-04-15 | Stop reason: SDUPTHER

## 2024-03-11 NOTE — TELEPHONE ENCOUNTER
----- Message from Bernarda David sent at 3/11/2024  1:53 PM CDT -----  Regarding: [id number]  Type: RX Refill Request    Who Called: self     Have you contacted your pharmacy:yes     Refill or New Rx:refill     RX Name and Strength:gabapentin (NEURONTIN) 300 MG capsule,   sumatriptan (IMITREX) 100 MG tablet  How is the patient currently taking it? (ex. 1XDay):    Is this a 30 day or 90 day RX:    Preferred Pharmacy with phone number:     [facility]/pharmacy #[id number] - [place], [place] - [address] [address]  Phone: [phone] Fax: [phone]         Local or Mail Order:local     Ordering Provider:Pawan     Would the patient rather a call back or a response via [facility]? Call back     Best Call Back Number:[phone]       Additional Information:  pt state to please send his sumatriptan (IMITREX) 100 MG tablet to     [facility] [address] [place] [address] [address] [address]  Phone: [phone] Fax: [phone]

## 2024-03-11 NOTE — TELEPHONE ENCOUNTER
No care due was identified.  [facility] Embedded Care Due Messages. Reference number: [id number].   3/11/2024 5:58:55 PM CDT

## 2024-03-11 NOTE — TELEPHONE ENCOUNTER
No care due was identified.  Health [facility] Embedded Care Due Messages. Reference number: [id number].   3/11/2024 2:09:02 PM CDT

## 2024-03-12 NOTE — TELEPHONE ENCOUNTER
Refill Routing Note   Medication(s) are not appropriate for processing by [facility] for the following reason(s):        No active prescription written by provider    [facility] action(s):  Defer               Appointments  past 12m or future 3m with PCP    Date Provider   Last Visit   2/26/2024 Avni Leon MD   Next Visit   4/8/2024 Avni Leon MD   ED visits in past 90 days: 0        Note composed:2:56 AM 03/12/2024

## 2024-03-13 RX ORDER — SUMATRIPTAN SUCCINATE 100 MG/1
TABLET ORAL
Qty: 30 TABLET | Refills: 0 | OUTPATIENT
Start: 2024-03-13

## 2024-03-26 DIAGNOSIS — Z00.00 ENCOUNTER FOR MEDICARE ANNUAL WELLNESS EXAM: ICD-10-CM

## 2024-03-27 ENCOUNTER — OFFICE VISIT (OUTPATIENT)
Dept: DERMATOLOGY | Facility: CLINIC | Age: 64
End: 2024-03-27
Payer: MEDICARE

## 2024-03-27 ENCOUNTER — TELEPHONE (OUTPATIENT)
Dept: DERMATOLOGY | Facility: CLINIC | Age: 64
End: 2024-03-27
Payer: MEDICARE

## 2024-03-27 DIAGNOSIS — Z79.899 ENCOUNTER FOR LONG-TERM (CURRENT) USE OF MEDICATIONS: ICD-10-CM

## 2024-03-27 DIAGNOSIS — D48.5 NEOPLASM OF UNCERTAIN BEHAVIOR OF SKIN: ICD-10-CM

## 2024-03-27 DIAGNOSIS — L20.89 OTHER ATOPIC DERMATITIS: ICD-10-CM

## 2024-03-27 DIAGNOSIS — L73.9 FOLLICULITIS: Primary | ICD-10-CM

## 2024-03-27 PROCEDURE — 87070 CULTURE OTHR SPECIMN AEROBIC: CPT | Performed by: DERMATOLOGY

## 2024-03-27 PROCEDURE — 99999 PR PBB SHADOW E&M-EST. PATIENT-LVL IV: CPT | Mod: PBBFAC,,, | Performed by: DERMATOLOGY

## 2024-03-27 PROCEDURE — 87077 CULTURE AEROBIC IDENTIFY: CPT | Performed by: DERMATOLOGY

## 2024-03-27 PROCEDURE — 99214 OFFICE O/P EST MOD 30 MIN: CPT | Mod: 25,S$GLB,, | Performed by: DERMATOLOGY

## 2024-03-27 PROCEDURE — 88305 TISSUE EXAM BY PATHOLOGIST: CPT | Performed by: DERMATOLOGY

## 2024-03-27 PROCEDURE — 88304 TISSUE EXAM BY PATHOLOGIST: CPT | Mod: 26,,, | Performed by: DERMATOLOGY

## 2024-03-27 PROCEDURE — 87186 SC STD MICRODIL/AGAR DIL: CPT | Performed by: DERMATOLOGY

## 2024-03-27 PROCEDURE — 11102 TANGNTL BX SKIN SINGLE LES: CPT | Mod: S$GLB,,, | Performed by: DERMATOLOGY

## 2024-03-27 RX ORDER — DOXYCYCLINE 100 MG/1
CAPSULE ORAL
Qty: 14 CAPSULE | Refills: 0 | Status: SHIPPED | OUTPATIENT
Start: 2024-03-27 | End: 2024-05-16

## 2024-03-27 RX ORDER — CLINDAMYCIN PHOSPHATE 11.9 MG/ML
SOLUTION TOPICAL
Qty: 60 ML | Refills: 11 | Status: SHIPPED | OUTPATIENT
Start: 2024-03-27

## 2024-03-27 RX ORDER — DUPILUMAB 300 MG/2ML
INJECTION, SOLUTION SUBCUTANEOUS
Qty: 4 ML | Refills: 11 | Status: ACTIVE | OUTPATIENT
Start: 2024-03-27

## 2024-03-27 NOTE — PROGRESS NOTES
Subjective:      Patient ID:  Scott Bansal is a 63 y.o. 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  He c/o irritation and drainage from lesion of the right mid-back.  + mild improvement, several breakouts on the chest.    He is currently on dupixent.       Prior treatments: dupixent (intermittent 7/2020), PO prednisone, hydroxyzine 50 mg TID, clotrimazole-betamethasone, mometasone, TAC oint, TAC cream, mupirocin, betamethasone oint     Denies history of fever blisters.  Denies conjunctivitis, pain in the eyes or blisters near the eyes.          Review of Systems   Constitutional:  Negative for fever and chills.   Gastrointestinal:  Negative for nausea and vomiting.   Skin:  Positive for activity-related sunscreen use. Negative for daily sunscreen use and recent sunburn.   Hematologic/Lymphatic: Does not bruise/bleed easily.       Objective:   Physical Exam   Constitutional: He appears well-developed and well-nourished. No distress.   Neurological: He is alert and oriented to person, place, and time. He is not disoriented.   Psychiatric: He has a normal mood and affect.   Skin:   Areas Examined (abnormalities noted in diagram):   Head / Face Inspection Performed  Neck Inspection Performed  Chest / Axilla Inspection Performed  Abdomen Inspection Performed  Back Inspection Performed  RUE Inspected  LUE Inspection Performed  Nails and Digits Inspection Performed            Diagram Legend       Open and closed comedones      Inflammatory papules and pustules       Assessment / Plan:      Pathology Orders:       Normal Orders This Visit    Specimen to Pathology, Dermatology     Comments:    Number of Specimens:->1  ------------------------->-------------------------  Spec 1 Procedure:->Biopsy  Spec 1 Clinical Impression:->NUB  Spec 1  : Pt has not called writer back. Letter will be sent asking for a call back. PCP will be changed.    Source:->right flank  Release to patient->Immediate    Questions:    Procedure Type: Dermatology and skin neoplasms    Number of Specimens: 1    ------------------------: -------------------------    Spec 1 Procedure: Biopsy    Spec 1 Clinical Impression: NUB    Spec 1 Source: right flank    Clinical Information: see above    Release to patient: Immediate          Folliculitis  -     Aerobic culture  -     doxycycline (MONODOX) 100 MG capsule; Take twice a day with food. May cause upset stomach  Dispense: 14 capsule; Refill: 0  -     clindamycin (CLEOCIN T) 1 % external solution; AAA bid for skin infection/folliculitis  Dispense: 60 mL; Refill: 11  -     of the chest.  Culture done today.  Patient with history of MRSA resistant to doxycycline and clindamycin, however patient is allergic to Bactrim.  We will avoid Bactrim.  We will start doxy cyclin and clindamycin solution.  We will notify patient of culture results.    Side effect profile of doxy reviewed including increased sun sensitivity and upset stomach.    Other atopic dermatitis  Encounter for long-term (current) use of medications  -     DUPIXENT SYRINGE 300 mg/2 mL Syrg; Inject 1 syringe (300mg) into the skin every other week  Dispense: 4 mL; Refill: 11  -     eczema is improved/stable.  Current easy score= 0.  Continue Dupixent.  Reviewed side effects of immunosuppression, conjunctivitis/keratitis and reactivation of the herpes virus. Discussed that if patient develops sores, vesicles, or ulcerations near the eye, then the patient should present to the emergency room for evaluation and ophthalmology consultation due to risk for corneal scarring with herpes involvement of the eye. The patient acknowledged understanding and wishes to proceed with Dupixent therapy.     Neoplasm of uncertain behavior of skin  -     Specimen to Pathology, Dermatology  -     Shave biopsy(-ies) done of 1 site(s).   Patient informed to call for results within 2 weeks if have not  [facility]           Follow up in about 6 months (around 9/27/2024).    PROCEDURE NOTE - SHAVE BIOPSY   Location: see above    After risk, benefits, and alternatives were discussed with the patient, the patient agrees to the procedure by verbal informed consent.  The area(s) were cleansed with alcohol. 2 cc of lidocaine 1% with epinephrine was injected for local anesthesia into each lesion(s).  A sharp dermablade was used to remove part or all of the lesion(s).  The specimen(s) will be sent for tissue pathology.  Hemostasis was obtained with aluminum chloride and/or hyfrecation.  The area(s) were dressed with vaseline ointment and bandaged.  The patient tolerated the procedure well without adverse events.  Wound care instructions were given to the patient on the AVS.  The patient will be notified of pathology results once available. Results will also be available in Epic.

## 2024-03-27 NOTE — TELEPHONE ENCOUNTER
Returned call. Pt reports that he will be on time for appointment.    ----- Message from Pari Mann sent at 3/27/2024  7:48 AM CDT -----  .Type:  Patient Call Back    Who Called: PT       Does the patient know what this is regarding?: PT RUNNING LATE     Would the patient rather a call back YES     Best Call Back Number:  [phone]    Additional Information: Thank You

## 2024-03-30 LAB — BACTERIA SPEC AEROBE CULT: ABNORMAL

## 2024-04-01 LAB
FINAL PATHOLOGIC DIAGNOSIS: NORMAL
GROSS: NORMAL
Lab: NORMAL
MICROSCOPIC EXAM: NORMAL

## 2024-04-08 ENCOUNTER — TELEPHONE (OUTPATIENT)
Dept: FAMILY MEDICINE | Facility: CLINIC | Age: 64
End: 2024-04-08
Payer: MEDICARE

## 2024-04-08 ENCOUNTER — OFFICE VISIT (OUTPATIENT)
Dept: FAMILY MEDICINE | Facility: CLINIC | Age: 64
End: 2024-04-08
Payer: MEDICARE

## 2024-04-08 ENCOUNTER — TELEPHONE (OUTPATIENT)
Dept: DIABETES | Facility: CLINIC | Age: 64
End: 2024-04-08
Payer: MEDICARE

## 2024-04-08 VITALS
OXYGEN SATURATION: 97 % | BODY MASS INDEX: 35.17 KG/M2 | DIASTOLIC BLOOD PRESSURE: 80 MMHG | RESPIRATION RATE: 18 BRPM | WEIGHT: 237.44 LBS | SYSTOLIC BLOOD PRESSURE: 120 MMHG | HEART RATE: 91 BPM | TEMPERATURE: 97 F | HEIGHT: 69 IN

## 2024-04-08 DIAGNOSIS — I10 ESSENTIAL HYPERTENSION: ICD-10-CM

## 2024-04-08 DIAGNOSIS — F20.89 OTHER SCHIZOPHRENIA: ICD-10-CM

## 2024-04-08 DIAGNOSIS — E66.01 SEVERE OBESITY (BMI 35.0-39.9) WITH COMORBIDITY: ICD-10-CM

## 2024-04-08 DIAGNOSIS — D84.9 IMMUNOSUPPRESSED STATUS: ICD-10-CM

## 2024-04-08 DIAGNOSIS — E78.5 HYPERLIPIDEMIA, UNSPECIFIED HYPERLIPIDEMIA TYPE: ICD-10-CM

## 2024-04-08 DIAGNOSIS — E11.42 TYPE 2 DIABETES MELLITUS WITH DIABETIC POLYNEUROPATHY, WITHOUT LONG-TERM CURRENT USE OF INSULIN: Primary | ICD-10-CM

## 2024-04-08 PROCEDURE — 99999 PR PBB SHADOW E&M-EST. PATIENT-LVL III: CPT | Mod: PBBFAC,,, | Performed by: FAMILY MEDICINE

## 2024-04-08 PROCEDURE — 99214 OFFICE O/P EST MOD 30 MIN: CPT | Mod: S$GLB,,, | Performed by: FAMILY MEDICINE

## 2024-04-08 PROCEDURE — 3074F SYST BP LT 130 MM HG: CPT | Mod: CPTII,S$GLB,, | Performed by: FAMILY MEDICINE

## 2024-04-08 PROCEDURE — 3060F POS MICROALBUMINURIA REV: CPT | Mod: CPTII,S$GLB,, | Performed by: FAMILY MEDICINE

## 2024-04-08 PROCEDURE — 3066F NEPHROPATHY DOC TX: CPT | Mod: CPTII,S$GLB,, | Performed by: FAMILY MEDICINE

## 2024-04-08 PROCEDURE — 3008F BODY MASS INDEX DOCD: CPT | Mod: CPTII,S$GLB,, | Performed by: FAMILY MEDICINE

## 2024-04-08 PROCEDURE — 4010F ACE/ARB THERAPY RXD/TAKEN: CPT | Mod: CPTII,S$GLB,, | Performed by: FAMILY MEDICINE

## 2024-04-08 PROCEDURE — 3051F HG A1C>EQUAL 7.0%<8.0%: CPT | Mod: CPTII,S$GLB,, | Performed by: FAMILY MEDICINE

## 2024-04-08 PROCEDURE — 3079F DIAST BP 80-89 MM HG: CPT | Mod: CPTII,S$GLB,, | Performed by: FAMILY MEDICINE

## 2024-04-08 NOTE — PROGRESS NOTES
Subjective:       Patient ID: Scott Bansal is a 63 y.o. male.    Chief Complaint: Follow-up      HPI Comments:       Current Outpatient Medications:     albuterol (PROVENTIL) 2.5 mg /3 mL (0.083 %) nebulizer solution, Take 3 mLs (2.5 mg total) by nebulization every 6 (six) hours as needed for Wheezing or Shortness of Breath., Disp: 9 mL, Rfl: 1    albuterol (PROVENTIL/VENTOLIN HFA) 90 mcg/actuation inhaler, Inhale 1-2 puffs into the lungs every 6 (six) hours as needed for Wheezing or Shortness of Breath. Rescue, Disp: 18 g, Rfl: 5    amitriptyline (ELAVIL) 25 MG tablet, Take 1 tablet (25 mg total) by mouth every evening., Disp: 90 tablet, Rfl: 3    ALPRAZolam (XANAX) 1 MG tablet, Prior to MRI, Disp: 1 tablet, Rfl: 0    amLODIPine (NORVASC) 10 MG tablet, Take 1 tablet (10 mg total) by mouth once daily., Disp: 90 tablet, Rfl: 3    busPIRone (BUSPAR) 10 MG tablet, Take 10 mg by mouth 3 (three) times daily., Disp: , Rfl:     candesartan (ATACAND) 4 MG tablet, Take 1 tablet (4 mg total) by mouth once daily., Disp: 90 tablet, Rfl: 3    clindamycin (CLEOCIN T) 1 % external solution, AAA bid for skin infection/folliculitis, Disp: 60 mL, Rfl: 11    doxycycline (MONODOX) 100 MG capsule, Take twice a day with food. May cause upset stomach, Disp: 14 capsule, Rfl: 0    DUPIXENT SYRINGE 300 mg/2 mL Syrg, Inject 1 syringe (300mg) into the skin every other week, Disp: 4 mL, Rfl: 11    econazole nitrate 1 % cream, AAA bid for rash on shoulders (Patient not taking: Reported on 2/26/2024), Disp: 30 g, Rfl: 3    empagliflozin (JARDIANCE) 25 mg tablet, Take 1 tablet (25 mg total) by mouth once daily., Disp: 90 tablet, Rfl: 1    fluticasone propionate (FLONASE) 50 mcg/actuation nasal spray, 2 sprays (100 mcg total) by Each Nostril route once daily., Disp: 16 mL, Rfl: 5    gabapentin (NEURONTIN) 300 MG capsule, Take 1 capsule (300 mg total) by mouth 2 (two) times daily., Disp: 180 capsule, Rfl: 2    gemfibroziL (LOPID) 600 MG tablet,  TAKE 1 TABLET BY MOUTH TWICE A DAY BEFORE MEALS, Disp: 180 tablet, Rfl: 3    hydrocortisone 2.5 % cream, Apply topically 2 (two) times daily. Apply to rash on face. (Patient not taking: Reported on 2/26/2024), Disp: 28 g, Rfl: 1    hydrOXYzine (ATARAX) 50 MG tablet, Take 1 tablet (50 mg total) by mouth 3 (three) times daily as needed for Itching., Disp: 270 tablet, Rfl: 3    ketoconazole (NIZORAL) 2 % cream, Apply topically once daily. Apply to rash on face. (Patient not taking: Reported on 2/26/2024), Disp: 30 g, Rfl: 1    metFORMIN (GLUCOPHAGE) 1000 MG tablet, Take 1 tablet (1,000 mg total) by mouth 2 (two) times daily., Disp: 180 tablet, Rfl: 3    metFORMIN (GLUCOPHAGE) 1000 MG tablet, Take 1 tablet (1,000 mg total) by mouth 2 (two) times daily., Disp: 180 tablet, Rfl: 3    mometasone 0.1% (ELOCON) 0.1 % cream, Apply topically once daily. Apply topically to arms and legs. (Patient not taking: Reported on 2/26/2024), Disp: 45 g, Rfl: 1    multivitamin capsule, Take 1 capsule by mouth once daily., Disp: , Rfl:     pantoprazole (PROTONIX) 40 MG tablet, TAKE 1 TABLET BY MOUTH EVERY DAY, Disp: 90 tablet, Rfl: 3    quetiapine (SEROQUEL) 300 MG Tab, Take 300 mg by mouth every evening., Disp: , Rfl:     simvastatin (ZOCOR) 80 MG tablet, Take 1 tablet (80 mg total) by mouth nightly., Disp: 90 tablet, Rfl: 3    sumatriptan (IMITREX) 100 MG tablet, TAKE 1 TABLET BY MOUTH ONCE DAILY AS NEEDED FOR MIGRAINE HEADACHE, Disp: 30 tablet, Rfl: 0    tadalafiL (CIALIS) 10 MG tablet, Take 10 mg by mouth daily as needed., Disp: , Rfl:     tamsulosin (FLOMAX) 0.4 mg Cap, Take 2 capsules (0.8 mg total) by mouth once daily., Disp: 180 capsule, Rfl: 3    triamcinolone acetonide 0.1% (KENALOG) 0.1 % cream, AAA bid prn eczema. Do not use on face. (Patient not taking: Reported on 2/26/2024), Disp: 454 g, Rfl: 5    triamcinolone acetonide 0.1% (KENALOG) 0.1 % cream, Apply topically 2 (two) times daily., Disp: 60 g, Rfl: 0    zonisamide  "(ZONEGRAN) 50 MG Cap, Take 1 capsule (50 mg total) by mouth 2 (two) times daily., Disp: 180 capsule, Rfl: 3      This is our initial follow-up visit.  He establish care with me 6 weeks ago.    Has been to Dermatology.  Has been on Dupixent for years.  Also has chronic folliculitis/MRSA.  Probably has to avoid anti staphylococcal soaps however because of his eczema.  All this is being treated by Dermatology.      Labs last time showed an A1c of 7.9.  His urine microalbumin was elevated.  Will start Jardiance and have him see diabetes care for further education.      Says he is careful with ibuprofen and Tylenol      Review of Systems   Constitutional:  Negative for activity change, appetite change and fever.   HENT:  Negative for sore throat.    Respiratory:  Negative for cough and shortness of breath.    Cardiovascular:  Negative for chest pain.   Gastrointestinal:  Negative for abdominal pain, diarrhea and nausea.   Genitourinary:  Negative for difficulty urinating.   Musculoskeletal:  Negative for arthralgias and myalgias.   Skin:  Positive for rash.   Neurological:  Negative for dizziness and headaches.       Objective:      Vitals:    04/08/24 0753   BP: 120/80   Pulse: 91   Resp: 18   Temp: 97.3 °F (36.3 °C)   TempSrc: Tympanic   SpO2: 97%   Weight: 107.7 kg (237 lb 7 oz)   Height: 5' 9" (1.753 m)   PainSc: 0-No pain     Physical Exam  Vitals and nursing note reviewed.   Constitutional:       General: He is not in acute distress.     Appearance: He is well-developed. He is not diaphoretic.   HENT:      Head: Normocephalic.   Neck:      Thyroid: No thyromegaly.   Cardiovascular:      Rate and Rhythm: Normal rate and regular rhythm.      Heart sounds: Normal heart sounds. No murmur heard.  Pulmonary:      Effort: Pulmonary effort is normal.      Breath sounds: Normal breath sounds. No wheezing or rales.   Abdominal:      General: There is no distension.      Palpations: Abdomen is soft.   Musculoskeletal:      " Cervical back: Neck supple.   Lymphadenopathy:      Cervical: No cervical adenopathy.   Skin:     General: Skin is warm and dry.   Neurological:      Mental Status: He is alert and oriented to person, place, and time.   Psychiatric:         Mood and Affect: Mood normal.         Behavior: Behavior normal.         Thought Content: Thought content normal.         Judgment: Judgment normal.         Assessment:       1. Type 2 diabetes mellitus with diabetic polyneuropathy, without long-term current use of insulin    2. Other schizophrenia    3. Severe obesity (BMI 35.0-39.9) with comorbidity    4. Immunosuppressed status    5. Essential hypertension    6. Hyperlipidemia, unspecified hyperlipidemia type        Plan:   Type 2 diabetes mellitus with diabetic polyneuropathy, without long-term current use of insulin  Comments:  A1c 7.9.  Increase urine microalbumin.  Start Jardiance.  Diabetes care consult  Orders:  -     Ambulatory referral/consult to Diabetic Advanced Practice Providers (Medical Management); Future; Expected date: 04/15/2024    Other schizophrenia  Comments:  followed by psych. Seroquel    Severe obesity (BMI 35.0-39.9) with comorbidity  Comments:  Has lost a few lb    Immunosuppressed status  Comments:  Dermatologic agents    Essential hypertension  Comments:  Controlled.  Follow-up 6 months    Hyperlipidemia, unspecified hyperlipidemia type  Comments:  LDL 49.  On statin    Other orders  -     empagliflozin (JARDIANCE) 25 mg tablet; Take 1 tablet (25 mg total) by mouth once daily.  Dispense: 90 tablet; Refill: 1

## 2024-04-15 ENCOUNTER — PATIENT MESSAGE (OUTPATIENT)
Dept: ADMINISTRATIVE | Facility: HOSPITAL | Age: 64
End: 2024-04-15
Payer: MEDICARE

## 2024-04-15 DIAGNOSIS — G43.009 MIGRAINE WITHOUT AURA AND WITHOUT STATUS MIGRAINOSUS, NOT INTRACTABLE: ICD-10-CM

## 2024-04-15 NOTE — TELEPHONE ENCOUNTER
No care due was identified.  Health [facility] Embedded Care Due Messages. Reference number: [id number].   4/15/2024 10:52:09 AM CDT

## 2024-04-15 NOTE — TELEPHONE ENCOUNTER
----- Message from Sunitha Stout MA sent at 4/15/2024  9:31 AM CDT -----  Regarding: Refill Request  Who Called:GARO JUAREZ [[id number]]           New Prescription or Refill : Refill      RX Name and Strength:  sumatriptan (IMITREX) 100 MG tablet              30 day or 90 day RX: 30           Local or Mail Order : local            Preferred Pharmacy:[facility] [address] [place] [address]       Would the patient rather a call back or a response via [facility]? call        Best Call Back Number:  [phone]

## 2024-04-16 RX ORDER — SUMATRIPTAN SUCCINATE 100 MG/1
TABLET ORAL
Qty: 30 TABLET | Refills: 0 | Status: SHIPPED | OUTPATIENT
Start: 2024-04-16 | End: 2024-05-16 | Stop reason: SDUPTHER

## 2024-04-19 ENCOUNTER — TELEPHONE (OUTPATIENT)
Dept: FAMILY MEDICINE | Facility: CLINIC | Age: 64
End: 2024-04-19
Payer: MEDICARE

## 2024-04-19 ENCOUNTER — PATIENT OUTREACH (OUTPATIENT)
Dept: ADMINISTRATIVE | Facility: HOSPITAL | Age: 64
End: 2024-04-19
Payer: MEDICARE

## 2024-04-19 NOTE — TELEPHONE ENCOUNTER
Pt calling in for his girlfriend and wants to set up an appt. On the 23 she has an appt with the Dr. where she use to live. Wants to establish care.also wants to refill a medication via Dr. Snow in [place]. Pt states

## 2024-04-19 NOTE — PROGRESS NOTES
Responded to Campaign message :   Colonoscopy    Read last report.  Report stated every 5 years repeat colonoscopy not three.  Patient wants to wait the 5 years.

## 2024-04-19 NOTE — TELEPHONE ENCOUNTER
----- Message from Bree Miller sent at 4/19/2024  1:28 PM CDT -----  Regarding: Self [phone]  Type: Patient Call Back     Who called: Self     What is the request in detail: Pt returned call     Can the clinic reply by [facility]? No     Would the patient rather a call back or a response via [facility]? Call     Best call back number: [phone]      Additional Information:

## 2024-04-19 NOTE — TELEPHONE ENCOUNTER
----- Message from Arnulfo Meza sent at 4/19/2024 12:42 PM CDT -----  Regarding: Self .[phone]   Type: RX Refill Request    Who Called: Self    Have you contacted your pharmacy:  no    Refill    RX Name and Strength:gemfibroziL (LOPID) 600 MG tablet    Preferred Pharmacy with phone number: .      [facility]/pharmacy #[id number] [place], [place] - [address]  [address] [address]  Phone: [phone] Fax: [phone]            Local or Mail Order: local     Would the patient rather a call back or a response via [facility]? Call back     Best Call Back Number:.[phone]      Additional Information:     Thank you.

## 2024-04-23 DIAGNOSIS — E78.5 HYPERLIPIDEMIA, UNSPECIFIED HYPERLIPIDEMIA TYPE: ICD-10-CM

## 2024-04-23 RX ORDER — GEMFIBROZIL 600 MG/1
600 TABLET, FILM COATED ORAL
Qty: 180 TABLET | Refills: 3 | Status: CANCELLED | OUTPATIENT
Start: 2024-04-23

## 2024-04-24 RX ORDER — GEMFIBROZIL 600 MG/1
600 TABLET, FILM COATED ORAL
Qty: 180 TABLET | Refills: 3 | Status: SHIPPED | OUTPATIENT
Start: 2024-04-24

## 2024-04-24 RX ORDER — GEMFIBROZIL 600 MG/1
600 TABLET, FILM COATED ORAL
Qty: 180 TABLET | Refills: 3 | OUTPATIENT
Start: 2024-04-24

## 2024-04-24 NOTE — TELEPHONE ENCOUNTER
----- Message from Arnulfo Meza sent at 4/24/2024  9:58 AM CDT -----  Regarding: Self .[phone]  Type: Patient Call Back     What is the request in detail: Please call pt back with info on if Dr seen his labs from 02/26 and if they will work     Can the clinic reply by [facility]? No     Would the patient rather a call back or a response via [facility]? Call back    Best call back number: .[phone]      Additional Information:    Thank you.

## 2024-04-24 NOTE — TELEPHONE ENCOUNTER
Patient stated that she was calling to see if the labs in chart will work for his RX to be refill. And stated that it was taking care at this time. And thank me for returning his call.

## 2024-05-16 ENCOUNTER — TELEPHONE (OUTPATIENT)
Dept: DIABETES | Facility: CLINIC | Age: 64
End: 2024-05-16
Payer: MEDICARE

## 2024-05-16 ENCOUNTER — OFFICE VISIT (OUTPATIENT)
Dept: FAMILY MEDICINE | Facility: CLINIC | Age: 64
End: 2024-05-16
Payer: MEDICARE

## 2024-05-16 ENCOUNTER — PATIENT MESSAGE (OUTPATIENT)
Dept: FAMILY MEDICINE | Facility: CLINIC | Age: 64
End: 2024-05-16

## 2024-05-16 VITALS
WEIGHT: 236.25 LBS | HEART RATE: 94 BPM | BODY MASS INDEX: 34.99 KG/M2 | HEIGHT: 69 IN | RESPIRATION RATE: 20 BRPM | DIASTOLIC BLOOD PRESSURE: 72 MMHG | OXYGEN SATURATION: 98 % | SYSTOLIC BLOOD PRESSURE: 120 MMHG

## 2024-05-16 DIAGNOSIS — E11.69 HYPERLIPIDEMIA ASSOCIATED WITH TYPE 2 DIABETES MELLITUS: ICD-10-CM

## 2024-05-16 DIAGNOSIS — Z87.891 HISTORY OF NICOTINE USE: ICD-10-CM

## 2024-05-16 DIAGNOSIS — F13.11 SEDATIVE, HYPNOTIC OR ANXIOLYTIC ABUSE, IN REMISSION: ICD-10-CM

## 2024-05-16 DIAGNOSIS — I15.2 HYPERTENSION ASSOCIATED WITH DIABETES: ICD-10-CM

## 2024-05-16 DIAGNOSIS — J44.9 CHRONIC OBSTRUCTIVE PULMONARY DISEASE, UNSPECIFIED COPD TYPE: ICD-10-CM

## 2024-05-16 DIAGNOSIS — E11.59 HYPERTENSION ASSOCIATED WITH DIABETES: ICD-10-CM

## 2024-05-16 DIAGNOSIS — G43.009 MIGRAINE WITHOUT AURA AND WITHOUT STATUS MIGRAINOSUS, NOT INTRACTABLE: ICD-10-CM

## 2024-05-16 DIAGNOSIS — K21.9 GASTROESOPHAGEAL REFLUX DISEASE WITHOUT ESOPHAGITIS: ICD-10-CM

## 2024-05-16 DIAGNOSIS — F31.9 BIPOLAR 1 DISORDER: ICD-10-CM

## 2024-05-16 DIAGNOSIS — Z00.00 ENCOUNTER FOR PREVENTIVE HEALTH EXAMINATION: Primary | ICD-10-CM

## 2024-05-16 DIAGNOSIS — I70.0 ATHEROSCLEROSIS OF AORTA: ICD-10-CM

## 2024-05-16 DIAGNOSIS — N40.0 BENIGN PROSTATIC HYPERPLASIA, UNSPECIFIED WHETHER LOWER URINARY TRACT SYMPTOMS PRESENT: ICD-10-CM

## 2024-05-16 DIAGNOSIS — F20.89 OTHER SCHIZOPHRENIA: ICD-10-CM

## 2024-05-16 DIAGNOSIS — E78.5 HYPERLIPIDEMIA ASSOCIATED WITH TYPE 2 DIABETES MELLITUS: ICD-10-CM

## 2024-05-16 DIAGNOSIS — E11.42 TYPE 2 DIABETES MELLITUS WITH DIABETIC POLYNEUROPATHY, WITHOUT LONG-TERM CURRENT USE OF INSULIN: ICD-10-CM

## 2024-05-16 DIAGNOSIS — E66.9 OBESITY (BMI 30.0-34.9): ICD-10-CM

## 2024-05-16 PROBLEM — E66.811 OBESITY (BMI 30.0-34.9): Status: ACTIVE | Noted: 2024-05-16

## 2024-05-16 PROBLEM — E66.01 SEVERE OBESITY (BMI 35.0-39.9) WITH COMORBIDITY: Status: RESOLVED | Noted: 2019-05-20 | Resolved: 2024-05-16

## 2024-05-16 PROCEDURE — 3078F DIAST BP <80 MM HG: CPT | Mod: CPTII,S$GLB,, | Performed by: NURSE PRACTITIONER

## 2024-05-16 PROCEDURE — 99999 PR PBB SHADOW E&M-EST. PATIENT-LVL V: CPT | Mod: PBBFAC,,, | Performed by: NURSE PRACTITIONER

## 2024-05-16 PROCEDURE — 4010F ACE/ARB THERAPY RXD/TAKEN: CPT | Mod: CPTII,S$GLB,, | Performed by: NURSE PRACTITIONER

## 2024-05-16 PROCEDURE — 3066F NEPHROPATHY DOC TX: CPT | Mod: CPTII,S$GLB,, | Performed by: NURSE PRACTITIONER

## 2024-05-16 PROCEDURE — 3051F HG A1C>EQUAL 7.0%<8.0%: CPT | Mod: CPTII,S$GLB,, | Performed by: NURSE PRACTITIONER

## 2024-05-16 PROCEDURE — 1160F RVW MEDS BY RX/DR IN RCRD: CPT | Mod: CPTII,S$GLB,, | Performed by: NURSE PRACTITIONER

## 2024-05-16 PROCEDURE — G0439 PPPS, SUBSEQ VISIT: HCPCS | Mod: S$GLB,,, | Performed by: NURSE PRACTITIONER

## 2024-05-16 PROCEDURE — 1159F MED LIST DOCD IN RCRD: CPT | Mod: CPTII,S$GLB,, | Performed by: NURSE PRACTITIONER

## 2024-05-16 PROCEDURE — 3060F POS MICROALBUMINURIA REV: CPT | Mod: CPTII,S$GLB,, | Performed by: NURSE PRACTITIONER

## 2024-05-16 PROCEDURE — 3074F SYST BP LT 130 MM HG: CPT | Mod: CPTII,S$GLB,, | Performed by: NURSE PRACTITIONER

## 2024-05-16 RX ORDER — ACETAMINOPHEN AND PHENYLEPHRINE HCL 325; 5 MG/1; MG/1
TABLET ORAL
COMMUNITY

## 2024-05-16 NOTE — TELEPHONE ENCOUNTER
----- Message from Sosa Santiago sent at 5/16/2024 12:55 PM CDT -----  Regarding: SELF [phone]  Type: RX Refill Request     Who Called: SELF     Refill or New Rx: REFILL     RX Name and Strength: sumatriptan (IMITREX) 100 MG tablet     Preferred Pharmacy with phone number:   [facility] [address] [place] [address] [address] [address]  Phone: [phone] Fax: [phone]    Local or Mail Order: Local     Would the patient rather a call back or a response via [facility]? Call Back     Best Call Back Number: [phone]

## 2024-05-16 NOTE — TELEPHONE ENCOUNTER
Attempted to return patients call. Patient didn't answer.  ----- Message from Rocio Ornelas sent at 5/16/2024  3:00 PM CDT -----  Contact: Scott  .Type:  Patient Returning Call    Who Called: Scott  Who Left Message for Patient: nurse   Does the patient know what this is regarding?: yes   Would the patient rather a call back or a response via [facility]?  Call back   Best Call Back Number: .[phone]   Additional Information:  Pt states he would prefer an in person appt due to last virtual appt was unsuccessful.     Thanks

## 2024-05-16 NOTE — PROGRESS NOTES
"Scott Bansal presented for a  Medicare AWV and comprehensive Health Risk Assessment today. The following components were reviewed and updated:    Medical history  Family History  Social history  Allergies and Current Medications  Health Risk Assessment  Health Maintenance  Care Team         ** See Completed Assessments for Annual Wellness Visit within the encounter summary.**         The following assessments were completed:  Living Situation  CAGE  Depression Screening  Timed Get Up and Go  Whisper Test  Cognitive Function Screening    Nutrition Screening  ADL Screening  PAQ Screening      Opioid documentation:      Patient does not have a current opioid prescription.        Vitals:    05/16/24 0905   BP: 120/72   Pulse: 94   Resp: 20   SpO2: 98%   Weight: 107.2 kg (236 lb 3.6 oz)   Height: 5' 9" (1.753 m)     Body mass index is 34.88 kg/m².  Physical Exam  Constitutional:       General: He is not in acute distress.     Appearance: Normal appearance. He is well-developed. He is obese. He is not ill-appearing, toxic-appearing or diaphoretic.   HENT:      Head: Normocephalic and atraumatic.      Right Ear: External ear normal.      Left Ear: External ear normal.      Mouth/Throat:      Mouth: Mucous membranes are moist.   Eyes:      Conjunctiva/sclera: Conjunctivae normal.   Neck:      Vascular: No carotid bruit.   Cardiovascular:      Rate and Rhythm: Normal rate and regular rhythm.      Pulses: Normal pulses.      Heart sounds: Normal heart sounds. No murmur heard.     No friction rub. No gallop.   Pulmonary:      Effort: Pulmonary effort is normal. No respiratory distress.      Breath sounds: Normal breath sounds. No stridor. No wheezing, rhonchi or rales.   Chest:      Chest wall: No tenderness.   Abdominal:      General: Bowel sounds are normal. There is no distension.      Palpations: Abdomen is soft. There is no mass.      Tenderness: There is no abdominal tenderness.      Hernia: No hernia is present. "   Musculoskeletal:         General: No tenderness. Normal range of motion.      Cervical back: Normal range of motion and neck supple. No rigidity or tenderness.   Lymphadenopathy:      Cervical: No cervical adenopathy.   Skin:     General: Skin is warm and dry.   Neurological:      Mental Status: He is alert and oriented to person, place, and time.      Cranial Nerves: No cranial nerve deficit.   Psychiatric:         Mood and Affect: Mood normal.         Behavior: Behavior normal.               Diagnoses and health risks identified today and associated recommendations/orders:    1. Encounter for preventive health examination  Screenings performed, as noted above.  Personal preventative testing needs reviewed.      2. Type 2 diabetes mellitus with diabetic polyneuropathy, without long-term current use of insulin  Due for eye exam in July, needs to est care locally  - Ambulatory referral/consult to Ophthalmology; Future    3. Bipolar 1 disorder  Treated with Seroquel, buspar, elavil, zonegan, stable, follow up with psychiatry,  [facility]    4. Other schizophrenia  Treated with Seroquel, buspar, elavil, zonegan, stable, follow up with psychiatry,  Sh[facility]    5. Chronic obstructive pulmonary disease, unspecified COPD type  Monitored, stable, follow up with pcp    6. Sedative, hypnotic or anxiolytic abuse, in remission  Monitored, stable, follow up with pcp    7. Hyperlipidemia associated with type 2 diabetes mellitus  Treated with Simvastatin, stable, cont tx    8. Hypertension associated with diabetes  Treated with Candasartan, amlodipine, stable, cont tx    9. Benign prostatic hyperplasia, unspecified whether lower urinary tract symptoms present  Monitored, stable, follow up with pcp    10. Atherosclerosis of aorta  Monitored, stable, on a statin, cont tx    11. Gastroesophageal reflux disease without esophagitis  Monitored,s table, follow up with pcp    12. Obesity (BMI 30.0-34.9)  Monitored,s table, hearth  healthy diet, exercise to silva    Discussed vaccines      Provided Scott with a 5-10 year written screening schedule and personal prevention plan. Recommendations were developed using the USPSTF age appropriate recommendations. Education, counseling, and referrals were provided as needed. After Visit Summary printed and given to patient which includes a list of additional screenings\tests needed.    No follow-ups on file.    Zhou Boucher, NP  I offered to discuss advanced care planning, including how to pick a person who would make decisions for you if you were unable to make them for yourself, called a health care power of , and what kind of decisions you might make such as use of life sustaining treatments such as ventilators and tube feeding when faced with a life limiting illness recorded on a living will that they will need to know. (How you want to be cared for as you near the end of your natural life)     X Patient is interested in learning more about how to make advanced directives.  I provided them paperwork and offered to discuss this with them.

## 2024-05-16 NOTE — PATIENT INSTRUCTIONS
Counseling and Referral of Other Preventative  (Italic type indicates deductible and co-insurance are waived)    Patient Name: Scott Bansal  Today's Date: 5/16/2024    Health Maintenance       Date Due Completion Date    RSV Vaccine (Age 60+ and Pregnant patients) (1 - 1-dose 60+ series) Never done ---    COVID-19 Vaccine (6 - 2023-24 season) 09/01/2023 9/15/2022    Eye Exam 07/18/2024 7/18/2023    Foot Exam 07/19/2024 7/19/2023 (Done)    Override on 7/19/2023: Done    Override on 10/26/2020: Done (completed by insurance)    Hemoglobin A1c 08/26/2024 2/26/2024    Diabetes Urine Screening 02/26/2025 2/26/2024    Lipid Panel 02/26/2025 2/26/2024    High Dose Statin 03/27/2025 3/27/2024    Colorectal Cancer Screening 06/28/2026 6/28/2021    TETANUS VACCINE 07/07/2027 7/7/2017        Orders Placed This Encounter   Procedures    US AAA Screening    Ambulatory referral/consult to Ophthalmology       The following information is provided to all patients.  This information is to help you find resources for any of the problems found today that may be affecting your health:                  Living healthy guide: www.[facility].[place].gov      Understanding Diabetes: www.diabetes.org      Eating healthy: www.cdc.gov/healthyweight      CDC home safety checklist: www.cdc.gov/steadi/patient.html      Agency on Aging: www.goea.[place].gov      Alcoholics anonymous (AA): www.aa.org      Physical Activity: www.shavonne.nih.gov/ng5mppb      Tobacco use: www.quitwithusla.org

## 2024-05-16 NOTE — TELEPHONE ENCOUNTER
No care due was identified.  Health [facility] Embedded Care Due Messages. Reference number: [id number].   5/16/2024 2:02:22 PM CDT

## 2024-05-16 NOTE — TELEPHONE ENCOUNTER
----- Message from Nohemi Garcia CMA sent at 5/16/2024  1:21 PM CDT -----  Regarding: Appointment  Please call pt to schedule an appointment.

## 2024-05-17 RX ORDER — SUMATRIPTAN SUCCINATE 100 MG/1
TABLET ORAL
Qty: 30 TABLET | Refills: 0 | Status: SHIPPED | OUTPATIENT
Start: 2024-05-17

## 2024-05-26 NOTE — TELEPHONE ENCOUNTER
No care due was identified.  [facility] Embedded Care Due Messages. Reference number: [id number].   5/26/2024 12:48:15 PM CDT

## 2024-05-27 DIAGNOSIS — G44.229 CHRONIC TENSION-TYPE HEADACHE, NOT INTRACTABLE: ICD-10-CM

## 2024-05-27 DIAGNOSIS — L30.1 DYSHIDROTIC ECZEMA: ICD-10-CM

## 2024-05-27 RX ORDER — ZONISAMIDE 50 MG/1
50 CAPSULE ORAL 2 TIMES DAILY
Qty: 180 CAPSULE | Refills: 3 | Status: CANCELLED | OUTPATIENT
Start: 2024-05-27

## 2024-05-27 RX ORDER — HYDROXYZINE HYDROCHLORIDE 50 MG/1
50 TABLET, FILM COATED ORAL 3 TIMES DAILY PRN
Qty: 270 TABLET | Refills: 3 | Status: SHIPPED | OUTPATIENT
Start: 2024-05-27

## 2024-05-27 RX ORDER — TRIAMCINOLONE ACETONIDE 1 MG/G
CREAM TOPICAL 2 TIMES DAILY
Qty: 60 G | Refills: 0 | Status: SHIPPED | OUTPATIENT
Start: 2024-05-27

## 2024-05-27 NOTE — TELEPHONE ENCOUNTER
Refill Routing Note   Medication(s) are not appropriate for processing by [facility] for the following reason(s):        New or recently adjusted medication    [facility] action(s):  Defer               Appointments  past 12m or future 3m with PCP    Date Provider   Last Visit   4/8/2024 Avni Leon MD   Next Visit   Visit date not found Avni Leon MD   ED visits in past 90 days: 0        Note composed:3:30 AM 05/27/2024

## 2024-05-28 ENCOUNTER — OFFICE VISIT (OUTPATIENT)
Dept: OPHTHALMOLOGY | Facility: CLINIC | Age: 64
End: 2024-05-28
Payer: MEDICARE

## 2024-05-28 DIAGNOSIS — H43.813 POSTERIOR VITREOUS DETACHMENT OF BOTH EYES: ICD-10-CM

## 2024-05-28 DIAGNOSIS — H25.13 NUCLEAR SCLEROSIS OF BOTH EYES: ICD-10-CM

## 2024-05-28 DIAGNOSIS — E11.42 TYPE 2 DIABETES MELLITUS WITH DIABETIC POLYNEUROPATHY, WITHOUT LONG-TERM CURRENT USE OF INSULIN: Primary | ICD-10-CM

## 2024-05-28 PROCEDURE — 3051F HG A1C>EQUAL 7.0%<8.0%: CPT | Mod: CPTII,S$GLB,, | Performed by: OPHTHALMOLOGY

## 2024-05-28 PROCEDURE — 4010F ACE/ARB THERAPY RXD/TAKEN: CPT | Mod: CPTII,S$GLB,, | Performed by: OPHTHALMOLOGY

## 2024-05-28 PROCEDURE — 99999 PR PBB SHADOW E&M-EST. PATIENT-LVL IV: CPT | Mod: PBBFAC,,, | Performed by: OPHTHALMOLOGY

## 2024-05-28 PROCEDURE — 2023F DILAT RTA XM W/O RTNOPTHY: CPT | Mod: CPTII,S$GLB,, | Performed by: OPHTHALMOLOGY

## 2024-05-28 PROCEDURE — 92015 DETERMINE REFRACTIVE STATE: CPT | Mod: S$GLB,,, | Performed by: OPHTHALMOLOGY

## 2024-05-28 PROCEDURE — 3066F NEPHROPATHY DOC TX: CPT | Mod: CPTII,S$GLB,, | Performed by: OPHTHALMOLOGY

## 2024-05-28 PROCEDURE — 1160F RVW MEDS BY RX/DR IN RCRD: CPT | Mod: CPTII,S$GLB,, | Performed by: OPHTHALMOLOGY

## 2024-05-28 PROCEDURE — 1159F MED LIST DOCD IN RCRD: CPT | Mod: CPTII,S$GLB,, | Performed by: OPHTHALMOLOGY

## 2024-05-28 PROCEDURE — 92004 COMPRE OPH EXAM NEW PT 1/>: CPT | Mod: S$GLB,,, | Performed by: OPHTHALMOLOGY

## 2024-05-28 PROCEDURE — 3060F POS MICROALBUMINURIA REV: CPT | Mod: CPTII,S$GLB,, | Performed by: OPHTHALMOLOGY

## 2024-05-28 NOTE — PROGRESS NOTES
HPI     Spots and/or Floaters     Additional comments: NP reports for annual eye exam: Pt states he has had   floaters x 15 years in both eyes. No flashes of light. States floaters are   black and unchanged.           Comments    Floaters OU x 15 years    Type II DM x 2019             Last edited by Cele Rosales on 5/28/2024  1:44 PM.            Assessment /Plan     For exam results, see Encounter Report.    Type 2 diabetes mellitus with diabetic polyneuropathy, without long-term current use of insulin  Last A1c 7.9 . Diabetes uncontrolled with no diabetic retinopathy on dilated exam. Reviewed diabetic eye precautions including excellent blood sugar control, and importance of regular follow up.     Nuclear sclerosis of both eyes  Cataracts are present but not visually significant. Will continue to monitor. Mrx provided.    Posterior vitreous detachment of both eyes  No retinal holes, tears or detachments were seen after careful retinal evaluation.     Discussed retinal detachment signs and symptoms including flashes of lights, floaters, perceived curtains over vision. Advised patient to return to clinic urgently if these symptoms occur. Explained the need for follow up exams to the patient even if there are no changes in the symptoms.          RTC 1 year, sooner prn

## 2024-06-11 ENCOUNTER — TELEPHONE (OUTPATIENT)
Dept: NEUROLOGY | Facility: CLINIC | Age: 64
End: 2024-06-11
Payer: MEDICARE

## 2024-06-11 NOTE — TELEPHONE ENCOUNTER
I spoke to patient in regards to trying to get a sooner appt. I advised him that currently we do not have any sooner but that I would add him to the waiting list in case of anything becoming available. Patient did agree and kept appt as is.

## 2024-06-11 NOTE — TELEPHONE ENCOUNTER
----- Message from Stephanie Yo sent at 6/11/2024 11:45 AM CDT -----  Regarding: sooner appt  Name of who is calling:   Scott      What is the request in detail: Pt is requesting a call back in ref to getting a sooner appt than 10/24/2024 due to severe headaches / migraines      Can the clinic reply by [facility]:yes      What number to call back if not [facility]: [phone]

## 2024-06-19 DIAGNOSIS — G43.009 MIGRAINE WITHOUT AURA AND WITHOUT STATUS MIGRAINOSUS, NOT INTRACTABLE: ICD-10-CM

## 2024-06-19 NOTE — TELEPHONE ENCOUNTER
Refill Routing Note   Medication(s) are not appropriate for processing by [facility] for the following reason(s):        Drug-disease interaction  New or recently adjusted medication    [facility] action(s):  Defer     Requires labs : Yes      Medication Therapy Plan: contraindicated diagnoses on problem list      Appointments  past 12m or future 3m with PCP    Date Provider   Last Visit   4/8/2024 Avni Leon MD   Next Visit   Visit date not found Avni Leon MD   ED visits in past 90 days: 0        Note composed:3:19 PM 06/19/2024

## 2024-06-19 NOTE — TELEPHONE ENCOUNTER
Care Due:                  Date            Visit Type   Department     Provider  --------------------------------------------------------------------------------                                EP -                              PRIMARY      [facility] INTERNAL  Last Visit: 04-      CARE ([facility])   MEDICINE       Avni Leon  Next Visit: None Scheduled  None         None Found                                                            Last  Test          Frequency    Reason                     Performed    Due Date  --------------------------------------------------------------------------------    HBA1C.......  6 months...  empagliflozin, metFORMIN.  02- 08-    [facility] Embedded Care Due Messages. Reference number: [id number].   6/19/2024 12:26:43 PM CDT

## 2024-06-19 NOTE — TELEPHONE ENCOUNTER
----- Message from [employer] Chuy sent at 6/19/2024  4:33 PM CDT -----  Regarding: self      Type: RX Refill Request      Who Called: katief     Have you contacted your pharmacy: yes       Refill or New Rx:refill       RX Name and Strength: sumatriptan (IMITREX) 100 MG tablet        Preferred Pharmacy with phone number:      [facility]/pharmacy #[id number] - [place], [place] - [address] [address]  Phone: [phone] Fax: [phone]             Local or Mail Order:local         Would the patient rather a call back or a response via [facility]? Call back       Best Call Back Number:[phone]

## 2024-06-20 RX ORDER — SUMATRIPTAN SUCCINATE 100 MG/1
TABLET ORAL
Qty: 27 TABLET | Refills: 0 | Status: SHIPPED | OUTPATIENT
Start: 2024-06-20

## 2024-06-20 NOTE — TELEPHONE ENCOUNTER
----- Message from Oliver Desai sent at 6/20/2024  9:44 AM CDT -----  Contact: Scott  Type:  RX Refill Request    Who Called: Scott  Refill or New Rx:refill  RX Name and Strength:sumatriptan (IMITREX) 100 MG tablet  How is the patient currently taking it? (ex. 1XDay):  Is this a 30 day or 90 day RX:  Preferred Pharmacy with phone number:    [facility]/pharmacy #[id number] - [place], [place] - [address] [address]  Phone: [phone] Fax: [phone]      Local or Mail Order:local  Ordering Provider:jennifer  Would the patient rather a call back or a response via [facility]? call  Best Call Back Number:[phone]   Additional Information:

## 2024-06-24 DIAGNOSIS — G43.009 MIGRAINE WITHOUT AURA AND WITHOUT STATUS MIGRAINOSUS, NOT INTRACTABLE: ICD-10-CM

## 2024-06-24 DIAGNOSIS — I10 ESSENTIAL HYPERTENSION: ICD-10-CM

## 2024-06-24 DIAGNOSIS — G44.229 CHRONIC TENSION-TYPE HEADACHE, NOT INTRACTABLE: ICD-10-CM

## 2024-06-24 NOTE — TELEPHONE ENCOUNTER
Called patient informed him that I sent medication refill request to requesting pharmacy patient verbalized understanding. Request is still ongoing.

## 2024-06-24 NOTE — TELEPHONE ENCOUNTER
Refill Routing Note   Medication(s) are not appropriate for processing by [facility] for the following reason(s):        No active prescription written by provider  Drug-disease interaction:  sumatriptan and Atherosclerosis of native coronary artery of native heart without angina pectoris/Hypertension associated with diabetes  Outside of protocol    [facility] action(s):  Defer  Route             Appointments  past 12m or future 3m with PCP    Date Provider   Last Visit   4/8/2024 Avni Leon MD   Next Visit   Visit date not found Avni Leon MD   ED visits in past 90 days: 0        Note composed:2:48 PM 06/24/2024

## 2024-06-24 NOTE — TELEPHONE ENCOUNTER
No care due was identified.  [facility] Embedded Care Due Messages. Reference number: [id number].   6/24/2024 1:01:10 PM CDT

## 2024-06-24 NOTE — TELEPHONE ENCOUNTER
----- Message from Malaikako Dale sent at 6/24/2024 12:36 PM CDT -----  Contact: Mobile  [phone]  Requesting an RX refill or new RX.    Is this a refill or new RX: Refill    RX name and strength Zonisamide 50 Mg Cap Twice a day     Is this a 30 day or 90 day RX: 180     Pharmacy name and phone # [facility] address: [address], [place]  Phone# [phone]    The doctors have asked that we provide their patients with the following 2 reminders -- prescription refills can take up to 72 hours, and a friendly reminder that in the future you can use your [facility] account to request refills:  Comment: Patient would like for you to add [facility] pharmacy to his chart. Patient would like for you to remove the following pharmacies from his chart..      [id number] [place] - [address] [facility] [facility].  [address]  [place] [address]  Phone: [phone] Fax: [phone]    [facility]/pharmacy #[id number] [place], [place] - [address] [facility]  [address] [facility] [address]  Phone: [phone] Fax: [phone]    [facility] [address] [place], [place] - [address] [facility]  [address] [facility] [address]  Phone: [phone] Fax: [phone]          Requesting an RX refill or new RX.    Is this a refill or new RX: Refill    RX name and strength amLODIPine (NORVASC) 10 MG tablet    Is this a 30 day or 90 day RX: 90    Pharmacy name and phone # [facility] address: [address], [place]  Phone# [phone]      The doctors have asked that we provide their patients with the following 2 reminders -- prescription refills can take up to 72 hours, and a friendly reminder that in the future you can use your [facility] account to request refills:  Comment: Patient said that he put in an order  for sumatriptan (IMITREX) 100 MG tablet two weeks ago and his script was never sent in.

## 2024-06-26 ENCOUNTER — OFFICE VISIT (OUTPATIENT)
Dept: FAMILY MEDICINE | Facility: CLINIC | Age: 64
End: 2024-06-26
Payer: MEDICARE

## 2024-06-26 ENCOUNTER — LAB VISIT (OUTPATIENT)
Dept: LAB | Facility: HOSPITAL | Age: 64
End: 2024-06-26
Attending: FAMILY MEDICINE
Payer: MEDICARE

## 2024-06-26 VITALS
SYSTOLIC BLOOD PRESSURE: 122 MMHG | OXYGEN SATURATION: 96 % | TEMPERATURE: 98 F | HEART RATE: 78 BPM | BODY MASS INDEX: 35.44 KG/M2 | HEIGHT: 69 IN | DIASTOLIC BLOOD PRESSURE: 82 MMHG | WEIGHT: 239.31 LBS

## 2024-06-26 DIAGNOSIS — F20.89 OTHER SCHIZOPHRENIA: ICD-10-CM

## 2024-06-26 DIAGNOSIS — I10 ESSENTIAL HYPERTENSION: ICD-10-CM

## 2024-06-26 DIAGNOSIS — E11.42 TYPE 2 DIABETES MELLITUS WITH DIABETIC POLYNEUROPATHY, WITHOUT LONG-TERM CURRENT USE OF INSULIN: ICD-10-CM

## 2024-06-26 DIAGNOSIS — E11.69 HYPERLIPIDEMIA ASSOCIATED WITH TYPE 2 DIABETES MELLITUS: ICD-10-CM

## 2024-06-26 DIAGNOSIS — E78.5 HYPERLIPIDEMIA ASSOCIATED WITH TYPE 2 DIABETES MELLITUS: ICD-10-CM

## 2024-06-26 DIAGNOSIS — G43.009 MIGRAINE WITHOUT AURA AND WITHOUT STATUS MIGRAINOSUS, NOT INTRACTABLE: ICD-10-CM

## 2024-06-26 DIAGNOSIS — E11.42 TYPE 2 DIABETES MELLITUS WITH DIABETIC POLYNEUROPATHY, WITHOUT LONG-TERM CURRENT USE OF INSULIN: Primary | ICD-10-CM

## 2024-06-26 DIAGNOSIS — J44.9 CHRONIC OBSTRUCTIVE PULMONARY DISEASE, UNSPECIFIED COPD TYPE: ICD-10-CM

## 2024-06-26 DIAGNOSIS — K21.9 GASTROESOPHAGEAL REFLUX DISEASE WITHOUT ESOPHAGITIS: ICD-10-CM

## 2024-06-26 LAB
ALBUMIN SERPL BCP-MCNC: 4.3 G/DL (ref 3.5–5.2)
ALP SERPL-CCNC: 99 U/L (ref 55–135)
ALT SERPL W/O P-5'-P-CCNC: 38 U/L (ref 10–44)
ANION GAP SERPL CALC-SCNC: 10 MMOL/L (ref 8–16)
AST SERPL-CCNC: 32 U/L (ref 10–40)
BILIRUB SERPL-MCNC: 0.5 MG/DL (ref 0.1–1)
BUN SERPL-MCNC: 18 MG/DL (ref 8–23)
CALCIUM SERPL-MCNC: 10.2 MG/DL (ref 8.7–10.5)
CHLORIDE SERPL-SCNC: 109 MMOL/L (ref 95–110)
CO2 SERPL-SCNC: 21 MMOL/L (ref 23–29)
CREAT SERPL-MCNC: 1.5 MG/DL (ref 0.5–1.4)
EST. GFR  (NO RACE VARIABLE): 51.7 ML/MIN/1.73 M^2
ESTIMATED AVG GLUCOSE: 157 MG/DL (ref 68–131)
GLUCOSE SERPL-MCNC: 221 MG/DL (ref 70–110)
HBA1C MFR BLD: 7.1 % (ref 4–5.6)
POTASSIUM SERPL-SCNC: 4.5 MMOL/L (ref 3.5–5.1)
PROT SERPL-MCNC: 7 G/DL (ref 6–8.4)
SODIUM SERPL-SCNC: 140 MMOL/L (ref 136–145)

## 2024-06-26 PROCEDURE — 1159F MED LIST DOCD IN RCRD: CPT | Mod: CPTII,S$GLB,, | Performed by: FAMILY MEDICINE

## 2024-06-26 PROCEDURE — 3060F POS MICROALBUMINURIA REV: CPT | Mod: CPTII,S$GLB,, | Performed by: FAMILY MEDICINE

## 2024-06-26 PROCEDURE — 99214 OFFICE O/P EST MOD 30 MIN: CPT | Mod: S$GLB,,, | Performed by: FAMILY MEDICINE

## 2024-06-26 PROCEDURE — 3051F HG A1C>EQUAL 7.0%<8.0%: CPT | Mod: CPTII,S$GLB,, | Performed by: FAMILY MEDICINE

## 2024-06-26 PROCEDURE — 3008F BODY MASS INDEX DOCD: CPT | Mod: CPTII,S$GLB,, | Performed by: FAMILY MEDICINE

## 2024-06-26 PROCEDURE — 4010F ACE/ARB THERAPY RXD/TAKEN: CPT | Mod: CPTII,S$GLB,, | Performed by: FAMILY MEDICINE

## 2024-06-26 PROCEDURE — 83036 HEMOGLOBIN GLYCOSYLATED A1C: CPT | Performed by: FAMILY MEDICINE

## 2024-06-26 PROCEDURE — 3079F DIAST BP 80-89 MM HG: CPT | Mod: CPTII,S$GLB,, | Performed by: FAMILY MEDICINE

## 2024-06-26 PROCEDURE — 3074F SYST BP LT 130 MM HG: CPT | Mod: CPTII,S$GLB,, | Performed by: FAMILY MEDICINE

## 2024-06-26 PROCEDURE — 80053 COMPREHEN METABOLIC PANEL: CPT | Performed by: FAMILY MEDICINE

## 2024-06-26 PROCEDURE — 36415 COLL VENOUS BLD VENIPUNCTURE: CPT | Mod: PO | Performed by: FAMILY MEDICINE

## 2024-06-26 PROCEDURE — 99999 PR PBB SHADOW E&M-EST. PATIENT-LVL III: CPT | Mod: PBBFAC,,, | Performed by: FAMILY MEDICINE

## 2024-06-26 PROCEDURE — 3066F NEPHROPATHY DOC TX: CPT | Mod: CPTII,S$GLB,, | Performed by: FAMILY MEDICINE

## 2024-06-26 RX ORDER — AMLODIPINE BESYLATE 10 MG/1
10 TABLET ORAL DAILY
Qty: 90 TABLET | Refills: 3 | Status: SHIPPED | OUTPATIENT
Start: 2024-06-26

## 2024-06-26 RX ORDER — ZONISAMIDE 50 MG/1
50 CAPSULE ORAL 2 TIMES DAILY
Qty: 180 CAPSULE | Refills: 0 | Status: SHIPPED | OUTPATIENT
Start: 2024-06-26

## 2024-06-26 RX ORDER — PANTOPRAZOLE SODIUM 40 MG/1
40 TABLET, DELAYED RELEASE ORAL DAILY
Qty: 90 TABLET | Refills: 3 | Status: SHIPPED | OUTPATIENT
Start: 2024-06-26

## 2024-06-26 RX ORDER — SUMATRIPTAN SUCCINATE 100 MG/1
TABLET ORAL
Qty: 27 TABLET | Refills: 0 | Status: SHIPPED | OUTPATIENT
Start: 2024-06-26

## 2024-06-26 NOTE — PROGRESS NOTES
Subjective:       Patient ID: Scott Bansal is a 63 y.o. male.    Chief Complaint: Medication Refill      HPI Comments:       Current Outpatient Medications:     albuterol (PROVENTIL) 2.5 mg /3 mL (0.083 %) nebulizer solution, Take 3 mLs (2.5 mg total) by nebulization every 6 (six) hours as needed for Wheezing or Shortness of Breath., Disp: 9 mL, Rfl: 1    albuterol (PROVENTIL/VENTOLIN HFA) 90 mcg/actuation inhaler, Inhale 1-2 puffs into the lungs every 6 (six) hours as needed for Wheezing or Shortness of Breath. Rescue, Disp: 18 g, Rfl: 5    amitriptyline (ELAVIL) 25 MG tablet, Take 1 tablet (25 mg total) by mouth every evening., Disp: 90 tablet, Rfl: 3    busPIRone (BUSPAR) 10 MG tablet, Take 10 mg by mouth 3 (three) times daily., Disp: , Rfl:     candesartan (ATACAND) 4 MG tablet, Take 1 tablet (4 mg total) by mouth once daily., Disp: 90 tablet, Rfl: 3    clindamycin (CLEOCIN T) 1 % external solution, AAA bid for skin infection/folliculitis, Disp: 60 mL, Rfl: 11    DUPIXENT SYRINGE 300 mg/2 mL Syrg, Inject 1 syringe (300mg) into the skin every other week, Disp: 4 mL, Rfl: 11    fluticasone propionate (FLONASE) 50 mcg/actuation nasal spray, 2 sprays (100 mcg total) by Each Nostril route once daily., Disp: 16 mL, Rfl: 5    gabapentin (NEURONTIN) 300 MG capsule, Take 1 capsule (300 mg total) by mouth 2 (two) times daily., Disp: 180 capsule, Rfl: 2    gemfibroziL (LOPID) 600 MG tablet, Take 1 tablet (600 mg total) by mouth 2 (two) times daily before meals., Disp: 180 tablet, Rfl: 3    hydrocortisone 2.5 % cream, Apply topically 2 (two) times daily. Apply to rash on face., Disp: 28 g, Rfl: 1    hydrOXYzine (ATARAX) 50 MG tablet, Take 1 tablet (50 mg total) by mouth 3 (three) times daily as needed for Itching., Disp: 270 tablet, Rfl: 3    ketoconazole (NIZORAL) 2 % cream, Apply topically once daily. Apply to rash on face., Disp: 30 g, Rfl: 1    metFORMIN (GLUCOPHAGE) 1000 MG tablet, Take 1 tablet (1,000 mg total)  by mouth 2 (two) times daily., Disp: 180 tablet, Rfl: 3    multivitamin capsule, Take 1 capsule by mouth once daily., Disp: , Rfl:     quetiapine (SEROQUEL) 300 MG Tab, Take 300 mg by mouth every evening., Disp: , Rfl:     simvastatin (ZOCOR) 80 MG tablet, Take 1 tablet (80 mg total) by mouth nightly., Disp: 90 tablet, Rfl: 3    tamsulosin (FLOMAX) 0.4 mg Cap, Take 2 capsules (0.8 mg total) by mouth once daily., Disp: 180 capsule, Rfl: 3    triamcinolone acetonide 0.1% (KENALOG) 0.1 % cream, AAA bid prn eczema. Do not use on face., Disp: 454 g, Rfl: 5    ALPRAZolam (XANAX) 1 MG tablet, Prior to MRI, Disp: 1 tablet, Rfl: 0    amLODIPine (NORVASC) 10 MG tablet, Take 1 tablet (10 mg total) by mouth once daily., Disp: 90 tablet, Rfl: 3    biotin 10,000 mcg Cap, Take by mouth., Disp: , Rfl:     econazole nitrate 1 % cream, AAA bid for rash on shoulders (Patient not taking: Reported on 2/26/2024), Disp: 30 g, Rfl: 3    empagliflozin (JARDIANCE) 25 mg tablet, Take 1 tablet (25 mg total) by mouth once daily., Disp: 90 tablet, Rfl: 1    pantoprazole (PROTONIX) 40 MG tablet, Take 1 tablet (40 mg total) by mouth once daily., Disp: 90 tablet, Rfl: 3    sumatriptan (IMITREX) 100 MG tablet, TAKE 1 TABLET BY MOUTH ONCE DAILY AS NEEDED FOR MIGRAINE HEADACHE, Disp: 27 tablet, Rfl: 0    tadalafiL (CIALIS) 10 MG tablet, Take 10 mg by mouth daily as needed., Disp: , Rfl:     triamcinolone acetonide 0.1% (KENALOG) 0.1 % cream, APPLY TOPICALLY TWICE A DAY, Disp: 60 g, Rfl: 0    zonisamide (ZONEGRAN) 50 MG Cap, Take 1 capsule (50 mg total) by mouth 2 (two) times daily., Disp: 180 capsule, Rfl: 0      He establish care with me earlier this year.  History of migraines.  Having trouble getting a neurology appointment.  Currently has 1 here in October.  Will see if I can get him into the [facility].  In the meantime needs refills on his sonogram and his Triptan.    Has had some hemorrhoidal bleeding lately.  Uses over-the-counter  "ointment.    Mental health in [facility].  Recently had some blood work done there that was supposedly forwarded to us.    Still taking the Jardiance I started last time.  No problems with this.  Sees diabetes on July 10th    Ran out of his PPI.  Having a lot of heartburn.  Works well when he is able to take it.  Refilled today      Review of Systems   Constitutional:  Negative for activity change, appetite change and fever.   HENT:  Negative for sore throat.    Respiratory:  Negative for cough and shortness of breath.    Cardiovascular:  Negative for chest pain.   Gastrointestinal:  Positive for abdominal pain. Negative for diarrhea and nausea.   Genitourinary:  Negative for difficulty urinating.   Musculoskeletal:  Negative for arthralgias and myalgias.   Neurological:  Positive for headaches. Negative for dizziness.       Objective:      Vitals:    06/26/24 1333   BP: 122/82   Pulse: 78   Temp: 97.8 °F (36.6 °C)   TempSrc: Oral   SpO2: 96%   Weight: 108.6 kg (239 lb 5 oz)   Height: 5' 9" (1.753 m)   PainSc:   8   PainLoc: Head     Physical Exam  Vitals and nursing note reviewed.   Constitutional:       General: He is not in acute distress.     Appearance: He is well-developed. He is not diaphoretic.   HENT:      Head: Normocephalic.   Neck:      Thyroid: No thyromegaly.   Cardiovascular:      Rate and Rhythm: Normal rate and regular rhythm.      Heart sounds: Normal heart sounds. No murmur heard.  Pulmonary:      Effort: Pulmonary effort is normal.      Breath sounds: Normal breath sounds. No wheezing or rales.   Abdominal:      General: There is no distension.      Palpations: Abdomen is soft.   Musculoskeletal:      Cervical back: Neck supple.   Lymphadenopathy:      Cervical: No cervical adenopathy.   Skin:     General: Skin is warm and dry.   Neurological:      Mental Status: He is alert and oriented to person, place, and time.   Psychiatric:         Mood and Affect: Mood normal.         " Behavior: Behavior normal.         Thought Content: Thought content normal.         Judgment: Judgment normal.         Assessment:       1. Type 2 diabetes mellitus with diabetic polyneuropathy, without long-term current use of insulin    2. Other schizophrenia    3. Chronic obstructive pulmonary disease, unspecified COPD type    4. Hyperlipidemia associated with type 2 diabetes mellitus    5. Migraine without aura and without status migrainosus, not intractable    6. Essential hypertension    7. Gastroesophageal reflux disease without esophagitis        Plan:   Type 2 diabetes mellitus with diabetic polyneuropathy, without long-term current use of insulin  Comments:  A1c today  Orders:  -     Comprehensive Metabolic Panel; Future; Expected date: 06/26/2024  -     Hemoglobin A1C; Future; Expected date: 06/26/2024    Other schizophrenia  Comments:  Seen at [facility]    Chronic obstructive pulmonary disease, unspecified COPD type  Comments:  Stable    Hyperlipidemia associated with type 2 diabetes mellitus  Comments:  LDL 49    Migraine without aura and without status migrainosus, not intractable  Comments:  Looking for neurologist to resume his care.  Refills given.  Has a appointment in October.  Will try [facility]  Orders:  -     Ambulatory referral/consult to Neurology; Future; Expected date: 07/03/2024  -     zonisamide (ZONEGRAN) 50 MG Cap; Take 1 capsule (50 mg total) by mouth 2 (two) times daily.  Dispense: 180 capsule; Refill: 0  -     sumatriptan (IMITREX) 100 MG tablet; TAKE 1 TABLET BY MOUTH ONCE DAILY AS NEEDED FOR MIGRAINE HEADACHE  Dispense: 27 tablet; Refill: 0    Essential hypertension  Comments:  Controlled.  Follow-up in October as planned  Orders:  -     amLODIPine (NORVASC) 10 MG tablet; Take 1 tablet (10 mg total) by mouth once daily.  Dispense: 90 tablet; Refill: 3    Gastroesophageal reflux disease without esophagitis  Comments:  Refill PPI  Orders:  -     pantoprazole  (PROTONIX) 40 MG tablet; Take 1 tablet (40 mg total) by mouth once daily.  Dispense: 90 tablet; Refill: 3

## 2024-06-28 DIAGNOSIS — R79.89 HIGH CREATININE: ICD-10-CM

## 2024-06-28 DIAGNOSIS — E11.42 TYPE 2 DIABETES MELLITUS WITH DIABETIC POLYNEUROPATHY, WITHOUT LONG-TERM CURRENT USE OF INSULIN: Primary | ICD-10-CM

## 2024-06-28 RX ORDER — ZONISAMIDE 50 MG/1
50 CAPSULE ORAL 2 TIMES DAILY
Qty: 180 CAPSULE | Refills: 3 | OUTPATIENT
Start: 2024-06-28

## 2024-06-28 RX ORDER — AMLODIPINE BESYLATE 10 MG/1
10 TABLET ORAL DAILY
Qty: 90 TABLET | Refills: 3 | OUTPATIENT
Start: 2024-06-28

## 2024-06-28 RX ORDER — SUMATRIPTAN SUCCINATE 100 MG/1
TABLET ORAL
Qty: 27 TABLET | Refills: 3 | OUTPATIENT
Start: 2024-06-28

## 2024-07-29 DIAGNOSIS — I15.2 HYPERTENSION ASSOCIATED WITH DIABETES: Primary | ICD-10-CM

## 2024-07-29 DIAGNOSIS — E11.59 HYPERTENSION ASSOCIATED WITH DIABETES: Primary | ICD-10-CM

## 2024-07-31 ENCOUNTER — OFFICE VISIT (OUTPATIENT)
Dept: DERMATOLOGY | Facility: CLINIC | Age: 64
End: 2024-07-31
Payer: MEDICARE

## 2024-07-31 DIAGNOSIS — D22.9 BENIGN NEVUS: ICD-10-CM

## 2024-07-31 DIAGNOSIS — L91.8 ACROCHORDON: ICD-10-CM

## 2024-07-31 DIAGNOSIS — L20.89 OTHER ATOPIC DERMATITIS: Primary | ICD-10-CM

## 2024-07-31 DIAGNOSIS — L23.89 ALLERGIC CONTACT DERMATITIS DUE TO OTHER AGENTS: ICD-10-CM

## 2024-07-31 PROCEDURE — 3060F POS MICROALBUMINURIA REV: CPT | Mod: CPTII,S$GLB,, | Performed by: DERMATOLOGY

## 2024-07-31 PROCEDURE — G2211 COMPLEX E/M VISIT ADD ON: HCPCS | Mod: S$GLB,,, | Performed by: DERMATOLOGY

## 2024-07-31 PROCEDURE — 3066F NEPHROPATHY DOC TX: CPT | Mod: CPTII,S$GLB,, | Performed by: DERMATOLOGY

## 2024-07-31 PROCEDURE — 1160F RVW MEDS BY RX/DR IN RCRD: CPT | Mod: CPTII,S$GLB,, | Performed by: DERMATOLOGY

## 2024-07-31 PROCEDURE — 1159F MED LIST DOCD IN RCRD: CPT | Mod: CPTII,S$GLB,, | Performed by: DERMATOLOGY

## 2024-07-31 PROCEDURE — 99999 PR PBB SHADOW E&M-EST. PATIENT-LVL IV: CPT | Mod: PBBFAC,,, | Performed by: DERMATOLOGY

## 2024-07-31 PROCEDURE — 3051F HG A1C>EQUAL 7.0%<8.0%: CPT | Mod: CPTII,S$GLB,, | Performed by: DERMATOLOGY

## 2024-07-31 PROCEDURE — 99213 OFFICE O/P EST LOW 20 MIN: CPT | Mod: S$GLB,,, | Performed by: DERMATOLOGY

## 2024-07-31 PROCEDURE — 4010F ACE/ARB THERAPY RXD/TAKEN: CPT | Mod: CPTII,S$GLB,, | Performed by: DERMATOLOGY

## 2024-07-31 RX ORDER — TRIAMCINOLONE ACETONIDE 0.25 MG/G
CREAM TOPICAL 2 TIMES DAILY PRN
Qty: 80 G | Refills: 1 | Status: SHIPPED | OUTPATIENT
Start: 2024-07-31

## 2024-07-31 NOTE — PROGRESS NOTES
Subjective:      Patient ID:  Scott Bansal is a 63 y.o. male who presents for   Chief Complaint   Patient presents with    Mole     Wants removed if possible.     Rash     Under arms are red. Has been present for a week.      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  He c/o irritation of the back, no tx tried.      He also c/o irritation of the bilateral axilla.      Prior treatments: dupixent (intermittent 7/2020), PO prednisone, hydroxyzine 50 mg TID, clotrimazole-betamethasone, mometasone, TAC oint, TAC cream, mupirocin, betamethasone oint     Denies history of fever blisters.  Denies conjunctivitis, pain in the eyes or blisters near the eyes.      Mole    Rash        Review of Systems   Constitutional:  Negative for fever and chills.   Gastrointestinal:  Negative for nausea and vomiting.   Skin:  Positive for itching, rash and activity-related sunscreen use. Negative for daily sunscreen use and recent sunburn.   Hematologic/Lymphatic: Does not bruise/bleed easily.       Objective:   Physical Exam   Constitutional: He appears well-developed and well-nourished. No distress.   Neurological: He is alert and oriented to person, place, and time. He is not disoriented.   Psychiatric: He has a normal mood and affect.   Skin:   Areas Examined (abnormalities noted in diagram):   Head / Face Inspection Performed  Neck Inspection Performed  Chest / Axilla Inspection Performed  Abdomen Inspection Performed  Back Inspection Performed  RUE Inspected  LUE Inspection Performed  Nails and Digits Inspection Performed                     Diagram Legend       Open and closed comedones      Inflammatory papules and pustules       Assessment / Plan:          Other atopic dermatitis  -     Eczema is improved/stable.  Current easy score= 0.  Continue Dupixent.  Reviewed side effects of  immunosuppression, conjunctivitis/keratitis and reactivation of the herpes virus. Discussed that if patient develops sores, vesicles, or ulcerations near the eye, then the patient should present to the emergency room for evaluation and ophthalmology consultation due to risk for corneal scarring with herpes involvement of the eye. The patient acknowledged understanding and wishes to proceed with Dupixent therapy.     Allergic contact dermatitis due to other agents  -     triamcinolone acetonide 0.025% (KENALOG) 0.025 % cream; Apply topically 2 (two) times daily as needed (for rash underarms). Mild steroid. Use sparingly for 1-2 weeks if needed then stop.  Dispense: 80 g; Refill: 1  -     of axilla, recommend d/c scented deodorant. Will start above meds. Discussed use of Dove sensitive or Kingsley's unscented deodorant. The patient acknowledged understanding.     Benign nevus  Reassurance given.  Lesions are benign.    Acrochordon  Reassurance given.  Lesions are benign.           Follow up in about 6 months (around 1/31/2025).

## 2024-08-07 ENCOUNTER — LAB VISIT (OUTPATIENT)
Dept: LAB | Facility: HOSPITAL | Age: 64
End: 2024-08-07
Attending: INTERNAL MEDICINE
Payer: MEDICARE

## 2024-08-07 DIAGNOSIS — E11.59 HYPERTENSION ASSOCIATED WITH DIABETES: ICD-10-CM

## 2024-08-07 DIAGNOSIS — I15.2 HYPERTENSION ASSOCIATED WITH DIABETES: ICD-10-CM

## 2024-08-07 LAB
ALBUMIN SERPL BCP-MCNC: 4.2 G/DL (ref 3.5–5.2)
ANION GAP SERPL CALC-SCNC: 10 MMOL/L (ref 8–16)
BUN SERPL-MCNC: 17 MG/DL (ref 8–23)
CALCIUM SERPL-MCNC: 10 MG/DL (ref 8.7–10.5)
CHLORIDE SERPL-SCNC: 108 MMOL/L (ref 95–110)
CO2 SERPL-SCNC: 21 MMOL/L (ref 23–29)
CREAT SERPL-MCNC: 1.3 MG/DL (ref 0.5–1.4)
EST. GFR  (NO RACE VARIABLE): >60 ML/MIN/1.73 M^2
GLUCOSE SERPL-MCNC: 161 MG/DL (ref 70–110)
PHOSPHATE SERPL-MCNC: 3 MG/DL (ref 2.7–4.5)
POTASSIUM SERPL-SCNC: 4.5 MMOL/L (ref 3.5–5.1)
SODIUM SERPL-SCNC: 139 MMOL/L (ref 136–145)

## 2024-08-07 PROCEDURE — 36415 COLL VENOUS BLD VENIPUNCTURE: CPT | Mod: PO | Performed by: INTERNAL MEDICINE

## 2024-08-07 PROCEDURE — 80069 RENAL FUNCTION PANEL: CPT | Performed by: INTERNAL MEDICINE

## 2024-08-08 ENCOUNTER — OFFICE VISIT (OUTPATIENT)
Dept: FAMILY MEDICINE | Facility: CLINIC | Age: 64
End: 2024-08-08
Payer: MEDICARE

## 2024-08-08 VITALS
TEMPERATURE: 97 F | OXYGEN SATURATION: 98 % | BODY MASS INDEX: 34.8 KG/M2 | DIASTOLIC BLOOD PRESSURE: 84 MMHG | SYSTOLIC BLOOD PRESSURE: 130 MMHG | HEART RATE: 84 BPM | HEIGHT: 69 IN | WEIGHT: 235 LBS

## 2024-08-08 DIAGNOSIS — E11.42 TYPE 2 DIABETES MELLITUS WITH DIABETIC POLYNEUROPATHY, WITHOUT LONG-TERM CURRENT USE OF INSULIN: Primary | ICD-10-CM

## 2024-08-08 DIAGNOSIS — F20.89 OTHER SCHIZOPHRENIA: ICD-10-CM

## 2024-08-08 DIAGNOSIS — R79.89 HIGH CREATININE: ICD-10-CM

## 2024-08-08 DIAGNOSIS — I10 ESSENTIAL HYPERTENSION: ICD-10-CM

## 2024-08-08 DIAGNOSIS — J44.9 CHRONIC OBSTRUCTIVE PULMONARY DISEASE, UNSPECIFIED COPD TYPE: ICD-10-CM

## 2024-08-08 DIAGNOSIS — E11.69 HYPERLIPIDEMIA ASSOCIATED WITH TYPE 2 DIABETES MELLITUS: ICD-10-CM

## 2024-08-08 DIAGNOSIS — E78.5 HYPERLIPIDEMIA ASSOCIATED WITH TYPE 2 DIABETES MELLITUS: ICD-10-CM

## 2024-08-08 PROCEDURE — 3060F POS MICROALBUMINURIA REV: CPT | Mod: CPTII,S$GLB,, | Performed by: FAMILY MEDICINE

## 2024-08-08 PROCEDURE — G2211 COMPLEX E/M VISIT ADD ON: HCPCS | Mod: S$GLB,,, | Performed by: FAMILY MEDICINE

## 2024-08-08 PROCEDURE — 3008F BODY MASS INDEX DOCD: CPT | Mod: CPTII,S$GLB,, | Performed by: FAMILY MEDICINE

## 2024-08-08 PROCEDURE — 3066F NEPHROPATHY DOC TX: CPT | Mod: CPTII,S$GLB,, | Performed by: FAMILY MEDICINE

## 2024-08-08 PROCEDURE — 1159F MED LIST DOCD IN RCRD: CPT | Mod: CPTII,S$GLB,, | Performed by: FAMILY MEDICINE

## 2024-08-08 PROCEDURE — 99214 OFFICE O/P EST MOD 30 MIN: CPT | Mod: S$GLB,,, | Performed by: FAMILY MEDICINE

## 2024-08-08 PROCEDURE — 3075F SYST BP GE 130 - 139MM HG: CPT | Mod: CPTII,S$GLB,, | Performed by: FAMILY MEDICINE

## 2024-08-08 PROCEDURE — 99999 PR PBB SHADOW E&M-EST. PATIENT-LVL V: CPT | Mod: PBBFAC,,, | Performed by: FAMILY MEDICINE

## 2024-08-08 PROCEDURE — 4010F ACE/ARB THERAPY RXD/TAKEN: CPT | Mod: CPTII,S$GLB,, | Performed by: FAMILY MEDICINE

## 2024-08-08 PROCEDURE — 3079F DIAST BP 80-89 MM HG: CPT | Mod: CPTII,S$GLB,, | Performed by: FAMILY MEDICINE

## 2024-08-08 PROCEDURE — 3051F HG A1C>EQUAL 7.0%<8.0%: CPT | Mod: CPTII,S$GLB,, | Performed by: FAMILY MEDICINE

## 2024-08-08 RX ORDER — ATOGEPANT 10 MG/1
1 TABLET ORAL DAILY
COMMUNITY
Start: 2024-08-07

## 2024-08-08 RX ORDER — RIZATRIPTAN BENZOATE 5 MG/1
5 TABLET ORAL ONCE
COMMUNITY
Start: 2024-07-22

## 2024-08-08 RX ORDER — GALCANEZUMAB 120 MG/ML
1 INJECTION, SOLUTION SUBCUTANEOUS
COMMUNITY
Start: 2024-08-07

## 2024-08-14 ENCOUNTER — TELEPHONE (OUTPATIENT)
Dept: NEPHROLOGY | Facility: CLINIC | Age: 64
End: 2024-08-14
Payer: MEDICARE

## 2024-08-14 NOTE — TELEPHONE ENCOUNTER
----- Message from Michelle Mcgowan sent at 8/14/2024  1:42 PM CDT -----  Contact: Scott London is calling in regards to him being told to go to [facility] for his appt but he is on his way.please call back at .[phone]         Thanks  JOHANNY

## 2024-08-15 ENCOUNTER — OFFICE VISIT (OUTPATIENT)
Dept: NEPHROLOGY | Facility: CLINIC | Age: 64
End: 2024-08-15
Payer: MEDICARE

## 2024-08-15 VITALS
HEIGHT: 69 IN | HEART RATE: 88 BPM | WEIGHT: 237.63 LBS | DIASTOLIC BLOOD PRESSURE: 72 MMHG | BODY MASS INDEX: 35.2 KG/M2 | SYSTOLIC BLOOD PRESSURE: 104 MMHG

## 2024-08-15 DIAGNOSIS — E11.42 TYPE 2 DIABETES MELLITUS WITH DIABETIC POLYNEUROPATHY, WITHOUT LONG-TERM CURRENT USE OF INSULIN: ICD-10-CM

## 2024-08-15 DIAGNOSIS — N17.9 AKI (ACUTE KIDNEY INJURY): Primary | ICD-10-CM

## 2024-08-15 DIAGNOSIS — I10 PRIMARY HYPERTENSION: ICD-10-CM

## 2024-08-15 DIAGNOSIS — N18.2 CKD (CHRONIC KIDNEY DISEASE) STAGE 2, GFR 60-89 ML/MIN: ICD-10-CM

## 2024-08-15 PROCEDURE — 3078F DIAST BP <80 MM HG: CPT | Mod: CPTII,S$GLB,, | Performed by: INTERNAL MEDICINE

## 2024-08-15 PROCEDURE — 3008F BODY MASS INDEX DOCD: CPT | Mod: CPTII,S$GLB,, | Performed by: INTERNAL MEDICINE

## 2024-08-15 PROCEDURE — 4010F ACE/ARB THERAPY RXD/TAKEN: CPT | Mod: CPTII,S$GLB,, | Performed by: INTERNAL MEDICINE

## 2024-08-15 PROCEDURE — 3066F NEPHROPATHY DOC TX: CPT | Mod: CPTII,S$GLB,, | Performed by: INTERNAL MEDICINE

## 2024-08-15 PROCEDURE — 3051F HG A1C>EQUAL 7.0%<8.0%: CPT | Mod: CPTII,S$GLB,, | Performed by: INTERNAL MEDICINE

## 2024-08-15 PROCEDURE — 3060F POS MICROALBUMINURIA REV: CPT | Mod: CPTII,S$GLB,, | Performed by: INTERNAL MEDICINE

## 2024-08-15 PROCEDURE — 1160F RVW MEDS BY RX/DR IN RCRD: CPT | Mod: CPTII,S$GLB,, | Performed by: INTERNAL MEDICINE

## 2024-08-15 PROCEDURE — 99204 OFFICE O/P NEW MOD 45 MIN: CPT | Mod: S$GLB,,, | Performed by: INTERNAL MEDICINE

## 2024-08-15 PROCEDURE — 99999 PR PBB SHADOW E&M-EST. PATIENT-LVL V: CPT | Mod: PBBFAC,,, | Performed by: INTERNAL MEDICINE

## 2024-08-15 PROCEDURE — 3074F SYST BP LT 130 MM HG: CPT | Mod: CPTII,S$GLB,, | Performed by: INTERNAL MEDICINE

## 2024-08-15 PROCEDURE — 1159F MED LIST DOCD IN RCRD: CPT | Mod: CPTII,S$GLB,, | Performed by: INTERNAL MEDICINE

## 2024-08-15 NOTE — PROGRESS NOTES
Scott Bansal is a 63 y.o. male    HPI:    He was noted to have a mild increase in creatinine routine laboratory studies about 6 weeks ago.  Nephrology has been consulted for evaluation.  In the clinic today he is doing well and has no specific complaints.  His laboratory studies and medications were reviewed.  All Nephrology related questions were answered to his satisfaction.  On review of laboratory studies his baseline creatinine usually runs between about 1.0 and 1.3.  In an episode about 6 weeks ago where his creatinine bumped to 1.5.  It subsequently returned to normal on repeat labs.    PAST MEDICAL HISTORY:  He  has a past medical history of Bipolar 1 disorder, mixed, BPH (benign prostatic hypertrophy), Colon polyp, Cyst, eyelid, Diabetes mellitus, GERD (gastroesophageal reflux disease), Hyperlipidemia, Hypertension, Lumbar degenerative disc disease (3/7/2019), Migraine headache, and Obesity.    PAST SURGICAL HISTORY:  He  has a past surgical history that includes Cervical spine surgery; Tonsillectomy; Shoulder surgery; Colonoscopy (N/A, 7/27/2017); Eye surgery (Left, 03/2017); Colonoscopy (N/A, 10/30/2020); and Epidural steroid injection (Bilateral, 9/27/2023).    SOCIAL HISTORY:  He  reports that he quit smoking about 40 years ago. His smoking use included cigarettes. He started smoking about 55 years ago. He has a 30 pack-year smoking history. He has been exposed to tobacco smoke. He has never used smokeless tobacco. He reports that he does not drink alcohol and does not use drugs.      FAMILY MEDICAL HISTORY:  His family history includes Cancer in his brother, cousin, and mother; Cataracts in his mother; Hypertension in his brother, father, mother, and sister; Stroke in his father.    Review of patient's allergies indicates:   Allergen Reactions    Levofloxacin Hallucinations    Sulfa (sulfonamide antibiotics) Rash           Prior to Admission medications    Medication Sig Start Date End Date  Taking? Authorizing Provider   albuterol (PROVENTIL/VENTOLIN HFA) 90 mcg/actuation inhaler Inhale 1-2 puffs into the lungs every 6 (six) hours as needed for Wheezing or Shortness of Breath. Rescue 5/25/23  Yes Per Kenney MD   amitriptyline (ELAVIL) 25 MG tablet Take 1 tablet (25 mg total) by mouth every evening. 12/1/23  Yes Kaylie Villalta MD   amLODIPine (NORVASC) 10 MG tablet Take 1 tablet (10 mg total) by mouth once daily. 6/26/24  Yes Avni Leon MD   busPIRone (BUSPAR) 10 MG tablet Take 10 mg by mouth 3 (three) times daily.   Yes Provider, Historical   candesartan (ATACAND) 4 MG tablet Take 1 tablet (4 mg total) by mouth once daily. 11/29/23  Yes Kaylie Villalta MD   clindamycin (CLEOCIN T) 1 % external solution AAA bid for skin infection/folliculitis 3/27/24  Yes Cinthia Chapin MD   DUPIXENT SYRINGE 300 mg/2 mL Syrg Inject 1 syringe (300mg) into the skin every other week 3/27/24  Yes Cinthia Chapin MD   EMGALITY  mg/mL PnIj Inject 1 mL into the skin every 30 days. 8/7/24  Yes Provider, Historical   empagliflozin (JARDIANCE) 25 mg tablet Take 1 tablet (25 mg total) by mouth once daily. 4/8/24  Yes Avni Leon MD   fluticasone propionate (FLONASE) 50 mcg/actuation nasal spray 2 sprays (100 mcg total) by Each Nostril route once daily. 10/28/22  Yes Kaylie Villalta MD   gabapentin (NEURONTIN) 300 MG capsule Take 1 capsule (300 mg total) by mouth 2 (two) times daily. 3/11/24  Yes Kaylie Villalta MD   gemfibroziL (LOPID) 600 MG tablet Take 1 tablet (600 mg total) by mouth 2 (two) times daily before meals. 4/24/24  Yes Avni Leon MD   hydrocortisone 2.5 % cream Apply topically 2 (two) times daily. Apply to rash on face. 10/16/23  Yes Shell Edwards, NP   hydrOXYzine (ATARAX) 50 MG tablet Take 1 tablet (50 mg total) by mouth 3 (three) times daily as needed for Itching. 5/27/24  Yes Cinthia Chapin MD   ketoconazole (NIZORAL) 2 % cream Apply topically once daily.  Apply to rash on face. 10/16/23  Yes Shell Edwards NP   metFORMIN (GLUCOPHAGE) 1000 MG tablet Take 1 tablet (1,000 mg total) by mouth 2 (two) times daily. 6/16/23  Yes Kaylie Villalta MD   multivitamin capsule Take 1 capsule by mouth once daily.   Yes Provider, Historical   pantoprazole (PROTONIX) 40 MG tablet Take 1 tablet (40 mg total) by mouth once daily. 6/26/24  Yes Avni Leon MD   quetiapine (SEROQUEL) 300 MG Tab Take 300 mg by mouth every evening.   Yes Provider, Historical   QULIPTA 10 mg Tab Take 1 tablet by mouth Daily. On hold for now 8/7/24  Yes Provider, Historical   rizatriptan (MAXALT) 5 MG tablet Take 5 mg by mouth once. 7/22/24  Yes Provider, Historical   simvastatin (ZOCOR) 80 MG tablet Take 1 tablet (80 mg total) by mouth nightly. 8/7/23  Yes Kaylie Villalta MD   sumatriptan (IMITREX) 100 MG tablet TAKE 1 TABLET BY MOUTH ONCE DAILY AS NEEDED FOR MIGRAINE HEADACHE 6/26/24  Yes Avni Leon MD   tamsulosin (FLOMAX) 0.4 mg Cap Take 2 capsules (0.8 mg total) by mouth once daily. 11/16/23  Yes Kaylie Villalta MD   triamcinolone acetonide 0.025% (KENALOG) 0.025 % cream Apply topically 2 (two) times daily as needed (for rash underarms). Mild steroid. Use sparingly for 1-2 weeks if needed then stop. 7/31/24  Yes Cinthia Chapin MD   triamcinolone acetonide 0.1% (KENALOG) 0.1 % cream AAA bid prn eczema. Do not use on face. 6/14/23  Yes Cinthia Chapin MD   zonisamide (ZONEGRAN) 50 MG Cap Take 1 capsule (50 mg total) by mouth 2 (two) times daily. 6/26/24  Yes Avni Leon MD   ALPRAZolam (XANAX) 1 MG tablet Prior to MRI 9/1/23 8/15/24  Ruth Beach, KELLY   azelastine (ASTELIN) 137 mcg (0.1 %) nasal spray SPRAY 1 SPRAY (137 MCG TOTAL) BY NASAL ROUTE 2 (TWO) TIMES DAILY. 11/9/20 1/31/21  Suzi Erazo MD   biotin 10,000 mcg Cap Take by mouth.  8/15/24  Provider, Historical   econazole nitrate 1 % cream AAA bid for rash on shoulders  Patient not taking: Reported on  2/26/2024 8/10/23 8/15/24  Cinthia Chapin MD   folic acid (FOLVITE) 800 MCG Tab Take 800 mcg by mouth once daily.  1/31/21  Provider, Historical   furosemide (LASIX) 40 MG tablet TAKE 1 TABLET BY MOUTH EVERY DAY 12/21/20 1/31/21  Kaylie Villalta MD   tadalafiL (CIALIS) 10 MG tablet Take 10 mg by mouth daily as needed. 7/17/23 8/15/24  Provider, Historical   triamcinolone acetonide 0.1% (KENALOG) 0.1 % cream APPLY TOPICALLY TWICE A DAY 5/27/24 8/15/24  Avni Leon MD     Sodium   Date Value Ref Range Status   08/07/2024 139 136 - 145 mmol/L Final   06/26/2024 140 136 - 145 mmol/L Final   11/10/2023 138 136 - 145 mmol/L Final     Potassium   Date Value Ref Range Status   08/07/2024 4.5 3.5 - 5.1 mmol/L Final   06/26/2024 4.5 3.5 - 5.1 mmol/L Final   11/10/2023 4.5 3.5 - 5.1 mmol/L Final     Chloride   Date Value Ref Range Status   08/07/2024 108 95 - 110 mmol/L Final   06/26/2024 109 95 - 110 mmol/L Final   11/10/2023 105 95 - 110 mmol/L Final     CO2   Date Value Ref Range Status   08/07/2024 21 (L) 23 - 29 mmol/L Final   06/26/2024 21 (L) 23 - 29 mmol/L Final   11/10/2023 22 (L) 23 - 29 mmol/L Final     Glucose   Date Value Ref Range Status   08/07/2024 161 (H) 70 - 110 mg/dL Final   06/26/2024 221 (H) 70 - 110 mg/dL Final   11/10/2023 206 (H) 70 - 110 mg/dL Final     BUN   Date Value Ref Range Status   08/07/2024 17 8 - 23 mg/dL Final   06/26/2024 18 8 - 23 mg/dL Final   11/10/2023 18 8 - 23 mg/dL Final     Creatinine   Date Value Ref Range Status   08/07/2024 1.3 0.5 - 1.4 mg/dL Final   06/26/2024 1.5 (H) 0.5 - 1.4 mg/dL Final   11/10/2023 1.2 0.5 - 1.4 mg/dL Final     Calcium   Date Value Ref Range Status   08/07/2024 10.0 8.7 - 10.5 mg/dL Final   06/26/2024 10.2 8.7 - 10.5 mg/dL Final   11/10/2023 10.6 (H) 8.7 - 10.5 mg/dL Final     Total Protein   Date Value Ref Range Status   06/26/2024 7.0 6.0 - 8.4 g/dL Final   11/10/2023 7.1 6.0 - 8.4 g/dL Final   09/14/2023 6.5 6.0 - 8.4 g/dL Final     Albumin    Date Value Ref Range Status   08/07/2024 4.2 3.5 - 5.2 g/dL Final   06/26/2024 4.3 3.5 - 5.2 g/dL Final   11/10/2023 4.4 3.5 - 5.2 g/dL Final     Total Bilirubin   Date Value Ref Range Status   06/26/2024 0.5 0.1 - 1.0 mg/dL Final     Comment:     For infants and newborns, interpretation of results should be based  on gestational age, weight and in agreement with clinical  observations.    Premature Infant recommended reference ranges:  Up to 24 hours.............<8.0 mg/dL  Up to 48 hours............<12.0 mg/dL  3-5 days..................<15.0 mg/dL  6-29 days.................<15.0 mg/dL     11/10/2023 0.7 0.1 - 1.0 mg/dL Final     Comment:     For infants and newborns, interpretation of results should be based  on gestational age, weight and in agreement with clinical  observations.    Premature Infant recommended reference ranges:  Up to 24 hours.............<8.0 mg/dL  Up to 48 hours............<12.0 mg/dL  3-5 days..................<15.0 mg/dL  6-29 days.................<15.0 mg/dL     09/14/2023 0.4 0.1 - 1.0 mg/dL Final     Comment:     For infants and newborns, interpretation of results should be based  on gestational age, weight and in agreement with clinical  observations.    Premature Infant recommended reference ranges:  Up to 24 hours.............<8.0 mg/dL  Up to 48 hours............<12.0 mg/dL  3-5 days..................<15.0 mg/dL  6-29 days.................<15.0 mg/dL       Alkaline Phosphatase   Date Value Ref Range Status   06/26/2024 99 55 - 135 U/L Final   11/10/2023 90 55 - 135 U/L Final   09/14/2023 82 55 - 135 U/L Final     AST   Date Value Ref Range Status   06/26/2024 32 10 - 40 U/L Final   11/10/2023 26 10 - 40 U/L Final   09/14/2023 19 10 - 40 U/L Final     ALT   Date Value Ref Range Status   06/26/2024 38 10 - 44 U/L Final   11/10/2023 46 (H) 10 - 44 U/L Final   09/14/2023 27 10 - 44 U/L Final     Anion Gap   Date Value Ref Range Status   08/07/2024 10 8 - 16 mmol/L Final   06/26/2024  10 8 - 16 mmol/L Final   11/10/2023 11 8 - 16 mmol/L Final     eGFR if    Date Value Ref Range Status   05/09/2022 >60 >60 mL/min/1.73 m^2 Final   03/28/2022 >60.0 >60 mL/min/1.73 m^2 Final   08/18/2021 >60.0 >60 mL/min/1.73 m^2 Final     eGFR if non    Date Value Ref Range Status   05/09/2022 >60 >60 mL/min/1.73 m^2 Final     Comment:     Calculation used to obtain the estimated glomerular filtration  rate (eGFR) is the CKD-EPI equation.      03/28/2022 >60.0 >60 mL/min/1.73 m^2 Final     Comment:     Calculation used to obtain the estimated glomerular filtration  rate (eGFR) is the CKD-EPI equation.      08/18/2021 >60.0 >60 mL/min/1.73 m^2 Final     Comment:     Calculation used to obtain the estimated glomerular filtration  rate (eGFR) is the CKD-EPI equation.        RBC, UA   Date Value Ref Range Status   08/07/2024 1 0 - 4 /hpf Final   06/08/2023 0 0 - 4 /hpf Final     WBC, UA   Date Value Ref Range Status   08/07/2024 1 0 - 5 /hpf Final   06/08/2023 2 0 - 5 /hpf Final     Bacteria   Date Value Ref Range Status   08/07/2024 Rare None-Occ /hpf Final   06/08/2023 None None-Occ /hpf Final     Hyaline Casts, UA   Date Value Ref Range Status   08/07/2024 1 0-1/lpf /lpf Final   06/08/2023 1 0-1/lpf /lpf Final     Microscopic Comment   Date Value Ref Range Status   08/07/2024 SEE COMMENT  Final     Comment:     Other formed elements not mentioned in the report are not   present in the microscopic examination.      06/08/2023 SEE COMMENT  Final     Comment:     Other formed elements not mentioned in the report are not   present in the microscopic examination.        Protein, Urine Random   Date Value Ref Range Status   08/07/2024 15 0 - 15 mg/dL Final     Creatinine, Urine   Date Value Ref Range Status   08/07/2024 97.0 23.0 - 375.0 mg/dL Final   02/26/2024 110.0 23.0 - 375.0 mg/dL Final   10/31/2022 152.0 23.0 - 375.0 mg/dL Final     Prot/Creat Ratio, Urine   Date Value Ref Range  "Status   08/07/2024 0.15 0.00 - 0.20 Final          REVIEW OF SYSTEMS:  Patient has no fever, fatigue, visual changes, chest pain, edema, cough, dyspnea, nausea, vomiting, constipation, diarrhea, arthralgias, pruritis, dizziness, weakness, depression, confusion.        PHYSICAL EXAM:   height is 5' 9" (1.753 m) and weight is 107.8 kg (237 lb 10.5 oz). His blood pressure is 104/72 and his pulse is 88.   Gen: WDWN male in no apparent distress  Psych: Normal mood and affect  Skin: No rashes or ulcers  Eyes: Normal conjunctiva and lids, PERRLA  ENT: Normal hearing with no oropharyngeal lesions  Neck: No JVD  Chest: Clear with no rales, rhonchi, wheezing with normal effort  CV: Regular with no murmurs, gallops or rubs  Abd: Soft, nontender, no distension, positive bowel sounds  Ext: No cyanosis, clubbing or edema          IMPRESSION AND RECOMMENDATIONS:      1. ORION: Last 6 weeks ago had a slight bump in creatinine up to 1.5.  Subsequently creatinine has returned to normal.  This slight increases likely hemodynamic in nature.  Urinalysis is bland without evidence of overt proteinuria.    2. CKD 2:  Creatinine is normal and has ranged between about 1.01.3 for the past several years.  EGFR is normal for his age and does not represent a progressive underlying renal process.  He is on a RAAS inhibitor.    3. Hypertension: Blood pressure is well controlled on current regimen.        "

## 2024-08-19 DIAGNOSIS — E78.5 HYPERLIPIDEMIA, UNSPECIFIED HYPERLIPIDEMIA TYPE: ICD-10-CM

## 2024-08-19 NOTE — TELEPHONE ENCOUNTER
Care Due:                  Date            Visit Type   Department     Provider  --------------------------------------------------------------------------------                                MYCHART                              ANNUAL                              CHECKUP/PHY  [facility] INTERNAL  Last Visit: 08-      [facility]       Avni Leon  Next Visit: None Scheduled  None         None Found                                                            Last  Test          Frequency    Reason                     Performed    Due Date  --------------------------------------------------------------------------------    CBC.........  12 months..  gemfibroziL..............  11-   11-    [facility] Embedded Care Due Messages. Reference number: [id number].   8/19/2024 11:38:17 AM CDT

## 2024-08-20 RX ORDER — SIMVASTATIN 80 MG/1
80 TABLET, FILM COATED ORAL NIGHTLY
Qty: 90 TABLET | Refills: 3 | Status: SHIPPED | OUTPATIENT
Start: 2024-08-20

## 2024-08-28 ENCOUNTER — OFFICE VISIT (OUTPATIENT)
Dept: DIABETES | Facility: CLINIC | Age: 64
End: 2024-08-28
Payer: MEDICARE

## 2024-08-28 VITALS
SYSTOLIC BLOOD PRESSURE: 137 MMHG | HEART RATE: 85 BPM | BODY MASS INDEX: 35.04 KG/M2 | HEIGHT: 69 IN | WEIGHT: 236.56 LBS | DIASTOLIC BLOOD PRESSURE: 93 MMHG

## 2024-08-28 DIAGNOSIS — F31.9 BIPOLAR 1 DISORDER: Primary | ICD-10-CM

## 2024-08-28 DIAGNOSIS — I25.10 ATHEROSCLEROSIS OF NATIVE CORONARY ARTERY OF NATIVE HEART WITHOUT ANGINA PECTORIS: ICD-10-CM

## 2024-08-28 DIAGNOSIS — I15.2 HYPERTENSION ASSOCIATED WITH DIABETES: ICD-10-CM

## 2024-08-28 DIAGNOSIS — E78.5 HYPERLIPIDEMIA ASSOCIATED WITH TYPE 2 DIABETES MELLITUS: ICD-10-CM

## 2024-08-28 DIAGNOSIS — E11.42 TYPE 2 DIABETES MELLITUS WITH DIABETIC POLYNEUROPATHY, WITHOUT LONG-TERM CURRENT USE OF INSULIN: ICD-10-CM

## 2024-08-28 DIAGNOSIS — E11.69 HYPERLIPIDEMIA ASSOCIATED WITH TYPE 2 DIABETES MELLITUS: ICD-10-CM

## 2024-08-28 DIAGNOSIS — E11.59 HYPERTENSION ASSOCIATED WITH DIABETES: ICD-10-CM

## 2024-08-28 LAB — GLUCOSE SERPL-MCNC: 167 MG/DL (ref 70–110)

## 2024-08-28 PROCEDURE — 99204 OFFICE O/P NEW MOD 45 MIN: CPT | Mod: S$GLB,,, | Performed by: NURSE PRACTITIONER

## 2024-08-28 PROCEDURE — 3066F NEPHROPATHY DOC TX: CPT | Mod: CPTII,S$GLB,, | Performed by: NURSE PRACTITIONER

## 2024-08-28 PROCEDURE — 4010F ACE/ARB THERAPY RXD/TAKEN: CPT | Mod: CPTII,S$GLB,, | Performed by: NURSE PRACTITIONER

## 2024-08-28 PROCEDURE — G2211 COMPLEX E/M VISIT ADD ON: HCPCS | Mod: S$GLB,,, | Performed by: NURSE PRACTITIONER

## 2024-08-28 PROCEDURE — 82962 GLUCOSE BLOOD TEST: CPT | Mod: S$GLB,,, | Performed by: NURSE PRACTITIONER

## 2024-08-28 PROCEDURE — 3075F SYST BP GE 130 - 139MM HG: CPT | Mod: CPTII,S$GLB,, | Performed by: NURSE PRACTITIONER

## 2024-08-28 PROCEDURE — 3051F HG A1C>EQUAL 7.0%<8.0%: CPT | Mod: CPTII,S$GLB,, | Performed by: NURSE PRACTITIONER

## 2024-08-28 PROCEDURE — 3008F BODY MASS INDEX DOCD: CPT | Mod: CPTII,S$GLB,, | Performed by: NURSE PRACTITIONER

## 2024-08-28 PROCEDURE — 1159F MED LIST DOCD IN RCRD: CPT | Mod: CPTII,S$GLB,, | Performed by: NURSE PRACTITIONER

## 2024-08-28 PROCEDURE — 3060F POS MICROALBUMINURIA REV: CPT | Mod: CPTII,S$GLB,, | Performed by: NURSE PRACTITIONER

## 2024-08-28 PROCEDURE — 99999 PR PBB SHADOW E&M-EST. PATIENT-LVL V: CPT | Mod: PBBFAC,,, | Performed by: NURSE PRACTITIONER

## 2024-08-28 PROCEDURE — 3080F DIAST BP >= 90 MM HG: CPT | Mod: CPTII,S$GLB,, | Performed by: NURSE PRACTITIONER

## 2024-08-28 NOTE — PATIENT INSTRUCTIONS
PATIENT INSTRUCTIONS    Labs ordered and will be scheduled by [facility] staff.   Refer to diabetes education.      Lifestyle modification with well balanced diet and at least 30 minutes of physical activity daily recommended.   Increase water intake.   Increase protein and non-starchy vegetable servings with meals.   Decrease carbohydrate servings with meals.   Take 10 minute walk after each meal.   Avoid snacking on carbohydrates, choose low carb, high protein snacks.   Incorporate resistance training with weights or resistance bands with exercise regimen at least 3 days a week.      Continue Jardiance 25 mg by mouth daily.   Continue Metformin 1000 mg by mouth twice daily.     Blood Sugar Goals:       Fastin-130.       1-2 hours after a meal: Less than 180.

## 2024-08-28 NOTE — PROGRESS NOTES
Scott Bansal is a 63 y.o. male who  has a past medical history of Bipolar 1 disorder, mixed, BPH (benign prostatic hypertrophy), Colon polyp, Cyst, eyelid, Diabetes mellitus, GERD (gastroesophageal reflux disease), Hyperlipidemia, Hypertension, Lumbar degenerative disc disease (3/7/2019), Migraine headache, and Obesity., who present for an initial evaluation of Type 2 diabetes mellitus.     CHIEF COMPLAINT: Diabetes Consultation    PCP: Avni Leon MD     The patient was initially diagnosed with diabetes over 5 years ago.     Previous failed treatments include:  None.     Social Documentation:  Patient lives in [place] with girlfriend.  Occupation: retired.   Exercise: none  Meal Patterns: 2 meals a day and snacks (kettle corn, yogurt)  Sleep: interrupted by dog.     Current monitoring regimen: capillary blood glucose monitoring with finger sticks.  188 after 2 cups of coffee this morning.    Current Diabetes related symptoms/problems include hyperglycemia and have been unchanged.     Diabetes related complications:   none.   denies Pancreatitis  denies Gastroparesis  denies DKA  denies Hx/family Hx of MEN2/MTC  denies Frequent UTIs/yeast infections    Cardiovascular Risk Factors: advanced age (>55 for men, >65 for women), dyslipidemia, hypertension, male gender, obesity (BMI >30 kg/m2), and sedentary lifestyle.    Patient currently taking insulin or sulfonylurea?  NO    Any episodes of hypoglycemia? No.   Hypoglycemia Unawareness? No  Severe hypoglycemia requiring 3rd party? No    Last seen by Diabetes Education: none in last year    Diabetes Medications               empagliflozin (JARDIANCE) 25 mg tablet Take 1 tablet (25 mg total) by mouth once daily. - STARTED 5 MONTHS AGO.     metFORMIN (GLUCOPHAGE) 1000 MG tablet Take 1 tablet (1,000 mg total) by mouth 2 (two) times daily.     DIABETES DISEASE MANAGEMENT STATUS  Statin: Taking  ACE/ARB: Taking  Screening or Prevention Patient's value Goal  Complete/Controlled?   HgA1C Testing and Control   Lab Results   Component Value Date    HGBA1C 7.1 (H) 06/26/2024      Annually/Less than 8% Yes   Lipid profile : 02/26/2024 Annually Yes   LDL control Lab Results   Component Value Date    LDLCALC 49.2 (L) 02/26/2024    Annually/Less than 100 mg/dl  Yes   Nephropathy screening Lab Results   Component Value Date    LABMICR 105.0 02/26/2024     Lab Results   Component Value Date    PROTEINUA Trace (A) 08/07/2024     Lab Results   Component Value Date    UTPCR 0.15 08/07/2024      Annually Yes   Blood pressure BP Readings from Last 1 Encounters:   08/28/24 (!) 137/93    Less than 140/90 No   Dilated retinal exam : 05/28/2024 Annually Yes   Foot exam   : 08/08/2024 Annually Yes   Patient's medications, allergies, past medical, surgical, social and family histories were reviewed and updated as appropriate.     Review of Systems   Constitutional:  Negative for weight loss.   Eyes:  Negative for blurred vision and double vision.   Cardiovascular:  Negative for chest pain.   Gastrointestinal:  Negative for nausea and vomiting.   Genitourinary:  Negative for frequency.   Musculoskeletal:  Negative for falls.   Neurological:  Negative for dizziness and weakness.   Endo/Heme/Allergies:  Negative for polydipsia.   Psychiatric/Behavioral:  Negative for depression.    All other systems reviewed and are negative.       Physical Exam  Constitutional:       General: He is not in acute distress.     Appearance: Normal appearance.   Eyes:      Extraocular Movements: Extraocular movements intact.      Pupils: Pupils are equal, round, and reactive to light.   Cardiovascular:      Rate and Rhythm: Normal rate and regular rhythm.      Heart sounds: Normal heart sounds.   Pulmonary:      Effort: Pulmonary effort is normal. No respiratory distress.      Breath sounds: Normal breath sounds.   Musculoskeletal:      Cervical back: Normal range of motion and neck supple.   Neurological:       "Mental Status: He is alert and oriented to person, place, and time.   Psychiatric:         Mood and Affect: Mood normal.         Behavior: Behavior normal.        Blood pressure (!) 137/93, pulse 85, height 5' 9" (1.753 m), weight 107.3 kg (236 lb 8.9 oz).  Wt Readings from Last 3 Encounters:   08/28/24 107.3 kg (236 lb 8.9 oz)   08/15/24 107.8 kg (237 lb 10.5 oz)   08/08/24 106.6 kg (235 lb 0.2 oz)       LAB REVIEW  Lab Results   Component Value Date     08/07/2024    K 4.5 08/07/2024     08/07/2024    CO2 21 (L) 08/07/2024    BUN 17 08/07/2024    CREATININE 1.3 08/07/2024    CALCIUM 10.0 08/07/2024    ANIONGAP 10 08/07/2024    EGFRNORACEVR >60.0 08/07/2024     No results found for: "CPEPTIDE", "GLUTAMICACID", "INSLNABS"  Hemoglobin A1C   Date Value Ref Range Status   06/26/2024 7.1 (H) 4.0 - 5.6 % Final     Comment:     ADA Screening Guidelines:  5.7-6.4%  Consistent with prediabetes  >or=6.5%  Consistent with diabetes    High levels of fetal hemoglobin interfere with the HbA1C  assay. Heterozygous hemoglobin variants (HbS, HgC, etc)do  not significantly interfere with this assay.   However, presence of multiple variants may affect accuracy.     02/26/2024 7.9 (H) 4.0 - 5.6 % Final     Comment:     ADA Screening Guidelines:  5.7-6.4%  Consistent with prediabetes  >or=6.5%  Consistent with diabetes    High levels of fetal hemoglobin interfere with the HbA1C  assay. Heterozygous hemoglobin variants (HbS, HgC, etc)do  not significantly interfere with this assay.   However, presence of multiple variants may affect accuracy.     09/14/2023 8.0 (H) 4.0 - 5.6 % Final     Comment:     ADA Screening Guidelines:  5.7-6.4%  Consistent with prediabetes  >or=6.5%  Consistent with diabetes    High levels of fetal hemoglobin interfere with the HbA1C  assay. Heterozygous hemoglobin variants (HbS, HgC, etc)do  not significantly interfere with this assay.   However, presence of multiple variants may affect accuracy.   "       Lab Results   Component Value Date    POCGLU 167 (A) 08/28/2024       ASSESSMENT    ICD-10-CM ICD-9-CM   1. Bipolar 1 disorder  F31.9 296.7   2. Type 2 diabetes mellitus with diabetic polyneuropathy, without long-term current use of insulin  E11.42 250.60     357.2   3. Hypertension associated with diabetes  E11.59 250.80    I15.2 401.9   4. Hyperlipidemia associated with type 2 diabetes mellitus  E11.69 250.80    E78.5 272.4   5. Atherosclerosis of native coronary artery of native heart without angina pectoris  I25.10 414.01        PLAN  Diagnoses and all orders for this visit:    Bipolar 1 disorder    Type 2 diabetes mellitus with diabetic polyneuropathy, without long-term current use of insulin  Comments:  A1c 7.9.  Increase urine microalbumin.  Start Jardiance.  Diabetes care consult  Orders:  -     Ambulatory referral/consult to Diabetic Advanced Practice Providers (Medical Management)  -     POCT Glucose, Hand-Held Device  -     Hemoglobin A1C; Future  -     Basic Metabolic Panel; Future  -     Ambulatory referral/consult to Diabetes Education; Future    Hypertension associated with diabetes    Hyperlipidemia associated with type 2 diabetes mellitus    Atherosclerosis of native coronary artery of native heart without angina pectoris        Reviewed pathophysiology of diabetes, complications related to the disease, importance of annual dilated eye exam and daily foot examination. Explained MOA, SE, dosage of medications. Written instructions given and reviewed with patient and patient verbalizes understanding. Discussed importance of A1c less than 7% to reduce risk of micro and macro complications, including nephropathy, neuropathy, retinopathy, heart attack and stroke. Reviewed the importance of controlled Blood Pressure and Cholesterol Lab Values in preventing disease.     PATIENT INSTRUCTIONS    Labs ordered and will be scheduled by [facility] staff.     Lifestyle modification with  well balanced diet and at least 30 minutes of physical activity daily recommended.   Increase water intake.   Increase protein and non-starchy vegetable servings with meals.   Decrease carbohydrate servings with meals.   Take 10 minute walk after each meal.   Avoid snacking on carbohydrates, choose low carb, high protein snacks.   Incorporate resistance training with weights or resistance bands with exercise regimen at least 3 days a week.      Continue Jardiance 25 mg by mouth daily.   Continue Metformin 1000 mg by mouth twice daily.     Blood Sugar Goals:       Fastin-130.       1-2 hours after a meal: Less than 180.     Follow up in about 2 months (around 10/28/2024) for No Device, Schedule fasting labs, Schedule DM Education.

## 2024-09-03 ENCOUNTER — PATIENT MESSAGE (OUTPATIENT)
Dept: DIABETES | Facility: CLINIC | Age: 64
End: 2024-09-03
Payer: MEDICARE

## 2024-09-03 ENCOUNTER — OFFICE VISIT (OUTPATIENT)
Dept: FAMILY MEDICINE | Facility: CLINIC | Age: 64
End: 2024-09-03
Payer: MEDICARE

## 2024-09-03 VITALS
HEIGHT: 69 IN | HEART RATE: 93 BPM | WEIGHT: 237.19 LBS | SYSTOLIC BLOOD PRESSURE: 106 MMHG | BODY MASS INDEX: 35.13 KG/M2 | DIASTOLIC BLOOD PRESSURE: 64 MMHG

## 2024-09-03 DIAGNOSIS — J44.9 CHRONIC OBSTRUCTIVE PULMONARY DISEASE, UNSPECIFIED COPD TYPE: ICD-10-CM

## 2024-09-03 DIAGNOSIS — I10 ESSENTIAL HYPERTENSION: ICD-10-CM

## 2024-09-03 DIAGNOSIS — F20.89 OTHER SCHIZOPHRENIA: ICD-10-CM

## 2024-09-03 DIAGNOSIS — N18.2 CKD (CHRONIC KIDNEY DISEASE) STAGE 2, GFR 60-89 ML/MIN: ICD-10-CM

## 2024-09-03 DIAGNOSIS — E78.5 HYPERLIPIDEMIA, UNSPECIFIED HYPERLIPIDEMIA TYPE: ICD-10-CM

## 2024-09-03 DIAGNOSIS — E11.42 TYPE 2 DIABETES MELLITUS WITH DIABETIC POLYNEUROPATHY, WITHOUT LONG-TERM CURRENT USE OF INSULIN: Primary | ICD-10-CM

## 2024-09-03 PROCEDURE — 99214 OFFICE O/P EST MOD 30 MIN: CPT | Mod: S$GLB,,, | Performed by: FAMILY MEDICINE

## 2024-09-03 PROCEDURE — 3066F NEPHROPATHY DOC TX: CPT | Mod: CPTII,S$GLB,, | Performed by: FAMILY MEDICINE

## 2024-09-03 PROCEDURE — G2211 COMPLEX E/M VISIT ADD ON: HCPCS | Mod: S$GLB,,, | Performed by: FAMILY MEDICINE

## 2024-09-03 PROCEDURE — 3008F BODY MASS INDEX DOCD: CPT | Mod: CPTII,S$GLB,, | Performed by: FAMILY MEDICINE

## 2024-09-03 PROCEDURE — 3074F SYST BP LT 130 MM HG: CPT | Mod: CPTII,S$GLB,, | Performed by: FAMILY MEDICINE

## 2024-09-03 PROCEDURE — 3051F HG A1C>EQUAL 7.0%<8.0%: CPT | Mod: CPTII,S$GLB,, | Performed by: FAMILY MEDICINE

## 2024-09-03 PROCEDURE — 3078F DIAST BP <80 MM HG: CPT | Mod: CPTII,S$GLB,, | Performed by: FAMILY MEDICINE

## 2024-09-03 PROCEDURE — 3060F POS MICROALBUMINURIA REV: CPT | Mod: CPTII,S$GLB,, | Performed by: FAMILY MEDICINE

## 2024-09-03 PROCEDURE — 4010F ACE/ARB THERAPY RXD/TAKEN: CPT | Mod: CPTII,S$GLB,, | Performed by: FAMILY MEDICINE

## 2024-09-03 PROCEDURE — 99999 PR PBB SHADOW E&M-EST. PATIENT-LVL II: CPT | Mod: PBBFAC,,, | Performed by: FAMILY MEDICINE

## 2024-09-03 RX ORDER — SEMAGLUTIDE 0.68 MG/ML
0.5 INJECTION, SOLUTION SUBCUTANEOUS
Qty: 4 EACH | Refills: 3 | Status: SHIPPED | OUTPATIENT
Start: 2024-09-03

## 2024-09-03 NOTE — PROGRESS NOTES
Subjective:       Patient ID: Scott Bansal is a 63 y.o. male.    Chief Complaint: Follow-up      HPI Comments:       Current Outpatient Medications:     albuterol (PROVENTIL/VENTOLIN HFA) 90 mcg/actuation inhaler, Inhale 1-2 puffs into the lungs every 6 (six) hours as needed for Wheezing or Shortness of Breath. Rescue, Disp: 18 g, Rfl: 5    amitriptyline (ELAVIL) 25 MG tablet, Take 1 tablet (25 mg total) by mouth every evening., Disp: 90 tablet, Rfl: 3    amLODIPine (NORVASC) 10 MG tablet, Take 1 tablet (10 mg total) by mouth once daily., Disp: 90 tablet, Rfl: 3    busPIRone (BUSPAR) 10 MG tablet, Take 10 mg by mouth 3 (three) times daily., Disp: , Rfl:     candesartan (ATACAND) 4 MG tablet, Take 1 tablet (4 mg total) by mouth once daily., Disp: 90 tablet, Rfl: 3    clindamycin (CLEOCIN T) 1 % external solution, AAA bid for skin infection/folliculitis, Disp: 60 mL, Rfl: 11    DUPIXENT SYRINGE 300 mg/2 mL Syrg, Inject 1 syringe (300mg) into the skin every other week, Disp: 4 mL, Rfl: 11    EMGALITY  mg/mL PnIj, Inject 1 mL into the skin every 30 days., Disp: , Rfl:     empagliflozin (JARDIANCE) 25 mg tablet, Take 1 tablet (25 mg total) by mouth once daily., Disp: 90 tablet, Rfl: 1    fluticasone propionate (FLONASE) 50 mcg/actuation nasal spray, 2 sprays (100 mcg total) by Each Nostril route once daily., Disp: 16 mL, Rfl: 5    gabapentin (NEURONTIN) 300 MG capsule, Take 1 capsule (300 mg total) by mouth 2 (two) times daily., Disp: 180 capsule, Rfl: 2    gemfibroziL (LOPID) 600 MG tablet, Take 1 tablet (600 mg total) by mouth 2 (two) times daily before meals., Disp: 180 tablet, Rfl: 3    hydrocortisone 2.5 % cream, Apply topically 2 (two) times daily. Apply to rash on face., Disp: 28 g, Rfl: 1    hydrOXYzine (ATARAX) 50 MG tablet, Take 1 tablet (50 mg total) by mouth 3 (three) times daily as needed for Itching., Disp: 270 tablet, Rfl: 3    ketoconazole (NIZORAL) 2 % cream, Apply topically once daily. Apply  to rash on face., Disp: 30 g, Rfl: 1    metFORMIN (GLUCOPHAGE) 1000 MG tablet, Take 1 tablet (1,000 mg total) by mouth 2 (two) times daily., Disp: 180 tablet, Rfl: 3    multivitamin capsule, Take 1 capsule by mouth once daily., Disp: , Rfl:     pantoprazole (PROTONIX) 40 MG tablet, Take 1 tablet (40 mg total) by mouth once daily., Disp: 90 tablet, Rfl: 3    quetiapine (SEROQUEL) 300 MG Tab, Take 300 mg by mouth every evening., Disp: , Rfl:     QULIPTA 10 mg Tab, Take 1 tablet by mouth Daily. On hold for now, Disp: , Rfl:     rizatriptan (MAXALT) 5 MG tablet, Take 5 mg by mouth once., Disp: , Rfl:     simvastatin (ZOCOR) 80 MG tablet, Take 1 tablet (80 mg total) by mouth nightly., Disp: 90 tablet, Rfl: 3    sumatriptan (IMITREX) 100 MG tablet, TAKE 1 TABLET BY MOUTH ONCE DAILY AS NEEDED FOR MIGRAINE HEADACHE, Disp: 27 tablet, Rfl: 0    tamsulosin (FLOMAX) 0.4 mg Cap, Take 2 capsules (0.8 mg total) by mouth once daily., Disp: 180 capsule, Rfl: 3    triamcinolone acetonide 0.025% (KENALOG) 0.025 % cream, Apply topically 2 (two) times daily as needed (for rash underarms). Mild steroid. Use sparingly for 1-2 weeks if needed then stop., Disp: 80 g, Rfl: 1    triamcinolone acetonide 0.1% (KENALOG) 0.1 % cream, AAA bid prn eczema. Do not use on face., Disp: 454 g, Rfl: 5    zonisamide (ZONEGRAN) 50 MG Cap, Take 1 capsule (50 mg total) by mouth 2 (two) times daily., Disp: 180 capsule, Rfl: 0    semaglutide (OZEMPIC) 0.25 mg or 0.5 mg (2 mg/3 mL) pen injector, Inject 0.5 mg into the skin every 7 days., Disp: 4 each, Rfl: 3      Four-week follow-up.  He would like to start GLP 1.  BMI 35.  A1c 7.1    No history of pancreatitis or gallstones.  No family history of thyroid cancer or MEN    No history of GERD or acid reflux.    COPD stable.  Uses albuterol p.r.n..      Review of Systems   Constitutional:  Negative for activity change, appetite change and fever.   HENT:  Negative for sore throat.    Respiratory:  Negative for  "cough and shortness of breath.    Cardiovascular:  Negative for chest pain.   Gastrointestinal:  Negative for abdominal pain, diarrhea and nausea.   Genitourinary:  Negative for difficulty urinating.   Musculoskeletal:  Negative for arthralgias and myalgias.   Neurological:  Negative for dizziness and headaches.       Objective:      Vitals:    09/03/24 0926   BP: 106/64   Pulse: 93   Weight: 107.6 kg (237 lb 3.4 oz)   Height: 5' 9" (1.753 m)     Physical Exam  Constitutional:       Appearance: He is not ill-appearing.   HENT:      Head: Normocephalic.   Pulmonary:      Effort: Pulmonary effort is normal. No respiratory distress.   Musculoskeletal:      Cervical back: Neck supple.   Neurological:      Mental Status: He is alert and oriented to person, place, and time.   Psychiatric:         Mood and Affect: Mood normal.         Behavior: Behavior normal.         Thought Content: Thought content normal.         Judgment: Judgment normal.         Assessment:       1. Type 2 diabetes mellitus with diabetic polyneuropathy, without long-term current use of insulin    2. Hyperlipidemia, unspecified hyperlipidemia type    3. Chronic obstructive pulmonary disease, unspecified COPD type    4. Essential hypertension    5. Other schizophrenia    6. CKD (chronic kidney disease) stage 2, GFR 60-89 ml/min        Plan:   Type 2 diabetes mellitus with diabetic polyneuropathy, without long-term current use of insulin  Comments:  Start GLP 1 therapy at starter dose.  Follow-up one-month    Hyperlipidemia, unspecified hyperlipidemia type  Comments:  LDL 49    Chronic obstructive pulmonary disease, unspecified COPD type  Comments:  Stable.  Albuterol p.r.n.    Essential hypertension  Comments:  Controlled    Other schizophrenia  Comments:  Controlled.  Sees psychiatry    CKD (chronic kidney disease) stage 2, GFR 60-89 ml/min  Comments:  Saw Nephrology.  No new recommendation.  Creatinine improved to 1.3    Other orders  -     " semaglutide (OZEMPIC) 0.25 mg or 0.5 mg (2 mg/3 mL) pen injector; Inject 0.5 mg into the skin every 7 days.  Dispense: 4 each; Refill: 3

## 2024-09-09 ENCOUNTER — CLINICAL SUPPORT (OUTPATIENT)
Dept: DIABETES | Facility: CLINIC | Age: 64
End: 2024-09-09
Payer: MEDICARE

## 2024-09-09 DIAGNOSIS — E11.42 TYPE 2 DIABETES MELLITUS WITH DIABETIC POLYNEUROPATHY, WITHOUT LONG-TERM CURRENT USE OF INSULIN: ICD-10-CM

## 2024-09-09 DIAGNOSIS — E11.42 TYPE 2 DIABETES MELLITUS WITH DIABETIC POLYNEUROPATHY, WITHOUT LONG-TERM CURRENT USE OF INSULIN: Primary | ICD-10-CM

## 2024-09-09 PROCEDURE — 99999 PR PBB SHADOW E&M-EST. PATIENT-LVL III: CPT | Mod: PBBFAC,,, | Performed by: DIETITIAN, REGISTERED

## 2024-09-09 PROCEDURE — G0108 DIAB MANAGE TRN  PER INDIV: HCPCS | Mod: S$GLB,,, | Performed by: DIETITIAN, REGISTERED

## 2024-09-09 NOTE — PROGRESS NOTES
Diabetes Care Specialist Progress Note  Author: Brenna Silva RD, CDE  Date: 9/9/2024    Intake    Program Intake  Reason for Diabetes Program Visit:: Initial Diabetes Assessment  Current diabetes risk level:: high  In the last 12 months, have you:: none  Permission to speak with others about care:: no    Current Diabetes Treatment: Oral Medications, DM Injectables  Oral Medication Type/Dose: Metformin 1000mg BID // Jardiance 25mg QD  DM Injectables Type/Dose: Ozempic .5mg q7d    Continuous Glucose Monitoring  Patient has CGM: No    Lab Results   Component Value Date    HGBA1C 7.1 (H) 06/26/2024         Lifestyle Coping Support & Clinical    Lifestyle/Coping/Support  Does anyone in your family have diabetes or does anyone in your family support you in your diabetes care?: grandson has Type 1, aunt was on dialysis  Learning Barriers:: None  Culture or Judaism beliefs that may impact ability to access healthcare: No  Psychosocial/Coping Skills Assessment Completed: : Yes  Assessment indicates:: Adequate understanding  Area of need?: No    Problem Review  Active Comorbidities: Cardiovascular Disease, Hypertension    Diabetes Self-Management Skills Assessment    Medication Skills Assessment  Patient is able to identify current diabetes medications, dosages, and appropriate timing of medications.: yes  Patient reports problems or concerns with current medication regimen.: no  Patient is  aware that some diabetes medications can cause low blood sugar?: No  Medication Skills Assessment Completed:: Yes  Assessment indicates:: Adequate understanding  Area of need?: No    Diabetes Disease Process/Treatment Options  Diabetes Type?: Type II  When were you diagnosed?: for at least 20 years  If previous diabetes education, when/where:: none  Is patient aware of what causes diabetes?: Yes  Does patient understand the pathophysiology of diabetes?: No  Diabetes Disease Process/Treatment Options: Skills Assessment Completed:  Yes  Assessment indicates:: Adequate understanding  Area of need?: No    Nutrition/Healthy Eating  Meal Plan 24 Hour Recall - Breakfast: 2 slices of toast or 2 eggs or 2 packs of instant grits  Meal Plan 24 Hour Recall - Lunch: nothing  Meal Plan 24 Hour Recall - Dinner: frozen dinner- chicken with pasta  Meal Plan 24 Hour Recall - Snack: popcorn, ice cream on a stick  Meal Plan 24 Hour Recall - Beverage: water, coffee with nutrasweet  Who shops/cooks?: patient  Patient can identify foods that impact blood sugar.: yes  Challenges to healthy eating:: portion control  Nutrition/Healthy Eating Skills Assessment Completed:: Yes  Assessment indicates:: Instruction Needed, Knowledge deficit  Area of need?: Yes    Physical Activity/Exercise  Patient's daily activity level:: sedentary  Patient formally exercises outside of work.: no  Reasons for not exercising:: physically unable to exercise currently (5 minutes is the max he can walk at one time)  Patient can identify forms of physical activity.: yes  Physical Activity/Exercise Skills Assessment Completed: : Yes  Assessment indicates:: Adequate understanding  Area of need?: No    Home Blood Glucose Monitoring  Patient states that blood sugar is checked at home daily.: no  Home Blood Glucose Monitoring Skills Assessment Completed: : Yes  Assessment indicates:: Instruction Needed  Area of need?: No    Acute Complications  Have you ever had hypoglycemia (low BG 70 or less)?: no  Do you know the symptoms of low blood sugar and how to treat?: not sure  Have you ever had hyperglycemia (high  or more)?: no  Have you ever had DKA?: no  Do you ever test for ketones?: no  Acute Complications Skills Assessment Completed: : Yes  Assessment indicates:: Instruction Needed  Area of need?: No    Chronic Complications  Reviewed health maintenance: yes  Have you completed your annual diabetes maintenance labwork? : yes  Chronic Complications Skills Assessment Completed: :  Yes  Assessment indicates:: Instruction Needed  Area of need?: No      Assessment Summary and Plan    Based on today's diabetes care assessment, the following areas of need were identified:          9/9/2024    12:01 AM   Areas of Need   Medications/Current Diabetes Treatment Discussed MOA, onset, side effects, dosage of Metformin, Mounjaro, and Jardiance.    Lifestyle Coping Support No   Diabetes Disease Process/Treatment Options Reviewed pathophysiology of type 2 diabetes, complications related to the disease, importance of annual dilated eye exam, and daily foot exam.     Nutrition/Healthy Eating Reviewed effect of carbohydrates on blood sugar and provided carbohydrate recommendations for meals and snacks. Reviewed importance of portion control. Educated on plate method for meal planning and provided sample meal plans. Provided list of 25 simple snack ideas.    Physical Activity/Exercise No   Home Blood Glucose Monitoring Patient does not check his BG on a regular basis. Will try testing in AM before coffee. Reviewed goals for FBG and PPBG and importance of daily testing.    Acute Complications Reviewed blood glucose goals, prevention, detection, signs and symptoms, and treatment of hypoglycemia and hyperglycemia, and when to contact the clinic.     Chronic Complications Discussed importance of A1c less than 7 to reduce risk of micro and macro complications, including nephropathy, neuropathy, retinopathy, heart attack and stroke. Reviewed the importance of controlled Blood Pressure and Cholesterol Lab Values in preventing disease.   Health maintenance reviewed         Today's interventions were provided through individual discussion, instruction, and written materials were provided.      Patient verbalized understanding of instruction and written materials.  Pt was able to return back demonstration of instructions today. Patient understood key points, needs reinforcement and further instruction.     Diabetes  Self-Management Care Plan:    Today's Diabetes Self-Management Care Plan was developed with Scott's input. Scott has agreed to work toward the following goal(s) to improve his/her overall diabetes control.      Patient chose not to set any goals today.       Follow Up Plan     Follow up if symptoms worsen or fail to improve, for E11.42. Declined scheduling follow-up visit. Will review educational materials and contact [facility] if he would like to schedule follow-up visit.     Today's care plan and follow up schedule was discussed with patient.  Scott verbalized understanding of the care plan, goals, and agrees to follow up plan.        The patient was encouraged to communicate with his/her health care provider/physician and care team regarding his/her condition(s) and treatment.  I provided the patient with my contact information today and encouraged to contact me via phone or [facility] Portal as needed.     Length of Visit   Total Time: 60 Minutes

## 2024-09-17 DIAGNOSIS — E11.9 DIABETES MELLITUS WITHOUT COMPLICATION: ICD-10-CM

## 2024-09-17 NOTE — TELEPHONE ENCOUNTER
----- Message from Monica Rascon sent at 9/17/2024  8:04 AM CDT -----  Contact: [phone]  Type:  RX Refill Request    Who Called: GARO JUAREZ [[id number]]  Refill or New Rx:refill  RX Name and Strength:metFORMIN (GLUCOPHAGE) 1000 MG tablet  How is the patient currently taking it? (ex. 1XDay):    Is this a 30 day or 90 day RX:    Preferred Pharmacy with phone number:[phone] Fax: [phone]    Local or Mail Order:local  Ordering Provider:jennifer  Would the patient rather a call back or a response via [facility]? Call back  Best Call Back Number:[phone]  Additional Information: [id number]... COMPLETELY OUT OF MEDICATION          [id number] - [place], [place] - [address]  [address] [facility] [address]  Phone: [phone] Fax: [phone]

## 2024-09-17 NOTE — TELEPHONE ENCOUNTER
No care due was identified.  [facility] Embedded Care Due Messages. Reference number: [id number].   9/17/2024 9:19:37 AM CDT

## 2024-09-18 ENCOUNTER — PATIENT MESSAGE (OUTPATIENT)
Dept: FAMILY MEDICINE | Facility: CLINIC | Age: 64
End: 2024-09-18
Payer: MEDICARE

## 2024-09-18 RX ORDER — METFORMIN HYDROCHLORIDE 1000 MG/1
1000 TABLET ORAL 2 TIMES DAILY
Qty: 180 TABLET | Refills: 0 | Status: SHIPPED | OUTPATIENT
Start: 2024-09-18

## 2024-09-18 NOTE — TELEPHONE ENCOUNTER
Refill Routing Note   Medication(s) are not appropriate for processing by [facility] for the following reason(s):        No active prescription written by provider    [facility] action(s):  Defer             Appointments  past 12m or future 3m with PCP    Date Provider   Last Visit   9/3/2024 Avni Leon MD   Next Visit   Visit date not found Avni Leon MD   ED visits in past 90 days: 0        Note composed:11:32 AM 09/18/2024

## 2024-09-18 NOTE — TELEPHONE ENCOUNTER
Refill Routing Note   Medication(s) are not appropriate for processing by [facility] for the following reason(s):        No active prescription written by provider    [facility] action(s):  Defer             Appointments  past 12m or future 3m with PCP    Date Provider   Last Visit   9/3/2024 Avni Leon MD   Next Visit   Visit date not found Avni Leon MD   ED visits in past 90 days: 0        Note composed:4:45 AM 09/18/2024

## 2024-09-19 DIAGNOSIS — G43.009 MIGRAINE WITHOUT AURA AND WITHOUT STATUS MIGRAINOSUS, NOT INTRACTABLE: ICD-10-CM

## 2024-09-19 RX ORDER — ZONISAMIDE 50 MG/1
CAPSULE ORAL
Qty: 180 CAPSULE | Refills: 0 | OUTPATIENT
Start: 2024-09-19

## 2024-09-19 NOTE — TELEPHONE ENCOUNTER
Refill Decision Note   Scott Bansal  is requesting a refill authorization.  Brief Assessment and Rationale for Refill:  Quick Discontinue     Medication Therapy Plan:  duplicate      Comments:     Note composed:10:23 AM 09/19/2024             Appointments     Last Visit   9/3/2024 Avni Leon MD   Next Visit   9/19/2024 Avni Leon MD

## 2024-09-24 DIAGNOSIS — G43.009 MIGRAINE WITHOUT AURA AND WITHOUT STATUS MIGRAINOSUS, NOT INTRACTABLE: ICD-10-CM

## 2024-09-24 NOTE — TELEPHONE ENCOUNTER
No care due was identified.  [facility] Embedded Care Due Messages. Reference number: [id number].   9/24/2024 10:28:58 AM CDT

## 2024-09-24 NOTE — TELEPHONE ENCOUNTER
Refill Routing Note   Medication(s) are not appropriate for processing by [facility] for the following reason(s):        Drug-disease interaction  Drug-drug interaction:     [facility] action(s):  Defer    Medication Therapy Plan: Drug-Disease:sumatriptan and Atherosclerosis of native coronary artery of native heart without angina pectoris;Drug-Disease: sumatriptan and Hypertension associated with diabetes; Duplicate Therapy: sumatriptan, rizatriptan    Pharmacist review requested: Yes     Appointments  past 12m or future 3m with PCP    Date Provider   Last Visit   9/3/2024 Avni Leon MD   Next Visit   Visit date not found Avni Leon MD   ED visits in past 90 days: 0        Note composed:6:21 PM 09/24/2024

## 2024-09-25 NOTE — TELEPHONE ENCOUNTER
Refill Routing Note   Medication(s) are not appropriate for processing by [facility] for the following reason(s):        Drug-disease interaction  Drug-drug interaction    [facility] action(s):  Defer        Medication Therapy Plan: Drug-Disease:sumatriptan and Atherosclerosis of native coronary artery of native heart without angina pectoris;Drug-Disease: sumatriptan and Hypertension associated with diabetes; Duplicate Therapy: sumatriptan, rizatriptan    Pharmacist review requested: Yes     Appointments  past 12m or future 3m with PCP    Date Provider   Last Visit   9/3/2024 Avni Loen MD   Next Visit   Visit date not found Avni Leon MD   ED visits in past 90 days: 0        Note composed:9:10 PM 09/24/2024

## 2024-09-26 RX ORDER — EMPAGLIFLOZIN 25 MG/1
25 TABLET, FILM COATED ORAL
Qty: 90 TABLET | Refills: 0 | Status: SHIPPED | OUTPATIENT
Start: 2024-09-26

## 2024-09-26 NOTE — TELEPHONE ENCOUNTER
Airway  Performed by: Kati Beard CRNA  Authorized by: Ashley Escoto MD     Final Airway Type:  Endotracheal airway  Final Endotracheal Airway*:  ETT  ETT Size (mm)*:  7.5  Cuff*:  Regular  Technique Used for Successful ETT Placement:  Direct laryngoscopy  Devices/Methods Used in Placement*:  Mask  Intubation Procedure*:  Preoxygenation, ETCO2, Atraumatic, Dentition Unchanged, Pharynx Clear, Rapid Sequence Intubation and Cricoid Pressure  Insertion Site:  Oral  Blade Type*:  Piña  Blade Size*:  3  Measured from*:  Lips  Secured at (cm)*:  21  Placement Verified by: auscultation and capnometry    Glottic View*:  1 - full view of glottis  Attempts*:  1   Patient Identified, Procedure confirmed, Emergency equipment available and Safety protocols followed  Location:  [facility]  Urgency:  Elective  Difficult Airway: No    Indications for Airway Management:  Anesthesia  Mask Difficulty Assessment:  1 - vent by mask  Performed By:  JAY  CRNA:  Kati Beard CRNA  Start Time: 3/18/2022 4:24 PM         Care Due:                  Date            Visit Type   Department     Provider  --------------------------------------------------------------------------------                                EP -                              PRIMARY      [facility] INTERNAL  Last Visit: 09-      CARE ([facility])   MEDICINE       Avni Leon  Next Visit: None Scheduled  None         None Found                                                            Last  Test          Frequency    Reason                     Performed    Due Date  --------------------------------------------------------------------------------    HBA1C.......  6 months...  empagliflozin, metFORMIN,   06- 12-                             semaglutide..............    [facility] [employer] Embedded Care Due Messages. Reference number: [id number].   9/26/2024 12:27:20 AM CDT

## 2024-09-26 NOTE — TELEPHONE ENCOUNTER
Refill Routing Note   Medication(s) are not appropriate for processing by [facility] for the following reason(s):        Drug-disease interaction    [facility] action(s):  Defer      Medication Therapy Plan: FLOS; Hypertension associated with diabetes    Pharmacist review requested: Yes     Appointments  past 12m or future 3m with PCP    Date Provider   Last Visit   9/3/2024 Avni Leon MD   Next Visit   Visit date not found Avni Leon MD   ED visits in past 90 days: 0        Note composed:9:39 AM 09/26/2024

## 2024-09-26 NOTE — TELEPHONE ENCOUNTER
Refill Decision Note   Scott Bansal  is requesting a refill authorization.  Brief Assessment and Rationale for Refill:  Approve     [facility]      Pharmacist review requested: Yes   Comments:     Note composed:1:35 PM 09/26/2024

## 2024-09-27 ENCOUNTER — LAB VISIT (OUTPATIENT)
Dept: LAB | Facility: HOSPITAL | Age: 64
End: 2024-09-27
Attending: NURSE PRACTITIONER
Payer: MEDICARE

## 2024-09-27 DIAGNOSIS — E11.42 TYPE 2 DIABETES MELLITUS WITH DIABETIC POLYNEUROPATHY, WITHOUT LONG-TERM CURRENT USE OF INSULIN: ICD-10-CM

## 2024-09-27 LAB
ANION GAP SERPL CALC-SCNC: 10 MMOL/L (ref 8–16)
BUN SERPL-MCNC: 17 MG/DL (ref 8–23)
CALCIUM SERPL-MCNC: 9.7 MG/DL (ref 8.7–10.5)
CHLORIDE SERPL-SCNC: 107 MMOL/L (ref 95–110)
CO2 SERPL-SCNC: 22 MMOL/L (ref 23–29)
CREAT SERPL-MCNC: 1.1 MG/DL (ref 0.5–1.4)
EST. GFR  (NO RACE VARIABLE): >60 ML/MIN/1.73 M^2
ESTIMATED AVG GLUCOSE: 143 MG/DL (ref 68–131)
GLUCOSE SERPL-MCNC: 128 MG/DL (ref 70–110)
HBA1C MFR BLD: 6.6 % (ref 4–5.6)
POTASSIUM SERPL-SCNC: 4.1 MMOL/L (ref 3.5–5.1)
SODIUM SERPL-SCNC: 139 MMOL/L (ref 136–145)

## 2024-09-27 PROCEDURE — 80048 BASIC METABOLIC PNL TOTAL CA: CPT | Performed by: NURSE PRACTITIONER

## 2024-09-27 PROCEDURE — 36415 COLL VENOUS BLD VENIPUNCTURE: CPT | Mod: PO | Performed by: NURSE PRACTITIONER

## 2024-09-27 PROCEDURE — 83036 HEMOGLOBIN GLYCOSYLATED A1C: CPT | Performed by: NURSE PRACTITIONER

## 2024-09-27 RX ORDER — SUMATRIPTAN SUCCINATE 100 MG/1
TABLET ORAL
Qty: 27 TABLET | Refills: 3 | Status: SHIPPED | OUTPATIENT
Start: 2024-09-27

## 2024-10-16 DIAGNOSIS — L30.1 DYSHIDROTIC ECZEMA: ICD-10-CM

## 2024-10-17 RX ORDER — HYDROXYZINE HYDROCHLORIDE 50 MG/1
50 TABLET, FILM COATED ORAL 3 TIMES DAILY PRN
Qty: 270 TABLET | Refills: 3 | Status: SHIPPED | OUTPATIENT
Start: 2024-10-17

## 2024-10-19 RX ORDER — HYDROXYZINE HYDROCHLORIDE 50 MG/1
50 TABLET, FILM COATED ORAL 3 TIMES DAILY PRN
Qty: 270 TABLET | Refills: 3 | OUTPATIENT
Start: 2024-10-19

## 2024-10-29 ENCOUNTER — OFFICE VISIT (OUTPATIENT)
Dept: DIABETES | Facility: CLINIC | Age: 64
End: 2024-10-29
Payer: MEDICARE

## 2024-10-29 ENCOUNTER — TELEPHONE (OUTPATIENT)
Dept: PHARMACY | Facility: CLINIC | Age: 64
End: 2024-10-29
Payer: MEDICARE

## 2024-10-29 VITALS
BODY MASS INDEX: 32.33 KG/M2 | HEART RATE: 84 BPM | SYSTOLIC BLOOD PRESSURE: 107 MMHG | DIASTOLIC BLOOD PRESSURE: 71 MMHG | WEIGHT: 218.94 LBS

## 2024-10-29 DIAGNOSIS — E11.42 TYPE 2 DIABETES MELLITUS WITH DIABETIC POLYNEUROPATHY, WITHOUT LONG-TERM CURRENT USE OF INSULIN: Primary | ICD-10-CM

## 2024-10-29 DIAGNOSIS — I10 ESSENTIAL HYPERTENSION: ICD-10-CM

## 2024-10-29 LAB — GLUCOSE SERPL-MCNC: 144 MG/DL (ref 70–110)

## 2024-10-29 PROCEDURE — 3066F NEPHROPATHY DOC TX: CPT | Mod: CPTII,S$GLB,, | Performed by: NURSE PRACTITIONER

## 2024-10-29 PROCEDURE — 3044F HG A1C LEVEL LT 7.0%: CPT | Mod: CPTII,S$GLB,, | Performed by: NURSE PRACTITIONER

## 2024-10-29 PROCEDURE — 99214 OFFICE O/P EST MOD 30 MIN: CPT | Mod: S$GLB,,, | Performed by: NURSE PRACTITIONER

## 2024-10-29 PROCEDURE — 99999 PR PBB SHADOW E&M-EST. PATIENT-LVL V: CPT | Mod: PBBFAC,,, | Performed by: NURSE PRACTITIONER

## 2024-10-29 PROCEDURE — 3078F DIAST BP <80 MM HG: CPT | Mod: CPTII,S$GLB,, | Performed by: NURSE PRACTITIONER

## 2024-10-29 PROCEDURE — 1159F MED LIST DOCD IN RCRD: CPT | Mod: CPTII,S$GLB,, | Performed by: NURSE PRACTITIONER

## 2024-10-29 PROCEDURE — 82962 GLUCOSE BLOOD TEST: CPT | Mod: S$GLB,,, | Performed by: NURSE PRACTITIONER

## 2024-10-29 PROCEDURE — 3060F POS MICROALBUMINURIA REV: CPT | Mod: CPTII,S$GLB,, | Performed by: NURSE PRACTITIONER

## 2024-10-29 PROCEDURE — G2211 COMPLEX E/M VISIT ADD ON: HCPCS | Mod: S$GLB,,, | Performed by: NURSE PRACTITIONER

## 2024-10-29 PROCEDURE — 4010F ACE/ARB THERAPY RXD/TAKEN: CPT | Mod: CPTII,S$GLB,, | Performed by: NURSE PRACTITIONER

## 2024-10-29 PROCEDURE — 3008F BODY MASS INDEX DOCD: CPT | Mod: CPTII,S$GLB,, | Performed by: NURSE PRACTITIONER

## 2024-10-29 PROCEDURE — 3074F SYST BP LT 130 MM HG: CPT | Mod: CPTII,S$GLB,, | Performed by: NURSE PRACTITIONER

## 2024-10-29 RX ORDER — SEMAGLUTIDE 1.34 MG/ML
1 INJECTION, SOLUTION SUBCUTANEOUS
Qty: 3 ML | Refills: 11 | Status: SHIPPED | OUTPATIENT
Start: 2024-10-29 | End: 2025-10-29

## 2024-10-30 RX ORDER — CANDESARTAN 4 MG/1
4 TABLET ORAL DAILY
Qty: 90 TABLET | Refills: 0 | Status: SHIPPED | OUTPATIENT
Start: 2024-10-30

## 2024-11-05 ENCOUNTER — PATIENT MESSAGE (OUTPATIENT)
Dept: PHARMACY | Facility: CLINIC | Age: 64
End: 2024-11-05
Payer: MEDICARE

## 2024-11-12 ENCOUNTER — PATIENT MESSAGE (OUTPATIENT)
Dept: PHARMACY | Facility: CLINIC | Age: 64
End: 2024-11-12
Payer: MEDICARE

## 2024-12-06 DIAGNOSIS — E11.42 TYPE 2 DIABETES MELLITUS WITH DIABETIC POLYNEUROPATHY: ICD-10-CM

## 2024-12-06 RX ORDER — GABAPENTIN 300 MG/1
300 CAPSULE ORAL 2 TIMES DAILY
Qty: 180 CAPSULE | Refills: 2 | Status: SHIPPED | OUTPATIENT
Start: 2024-12-06

## 2024-12-06 NOTE — TELEPHONE ENCOUNTER
No care due was identified.  Health [facility] Embedded Care Due Messages. Reference number: [id number].   12/06/2024 9:05:18 AM CST

## 2024-12-10 DIAGNOSIS — E11.9 DIABETES MELLITUS WITHOUT COMPLICATION: ICD-10-CM

## 2024-12-10 NOTE — TELEPHONE ENCOUNTER
No care due was identified.  [facility] Embedded Care Due Messages. Reference number: [id number].   12/10/2024 5:12:52 PM CST

## 2024-12-11 NOTE — TELEPHONE ENCOUNTER
Refill Routing Note   Medication(s) are not appropriate for processing by [facility] for the following reason(s):        No active prescription written by provider    [facility] action(s):  Defer             Appointments  past 12m or future 3m with PCP    Date Provider   Last Visit   9/3/2024 Avni Leon MD   Next Visit   Visit date not found Avni Leon MD   ED visits in past 90 days: 0        Note composed:11:31 PM 12/10/2024

## 2024-12-12 RX ORDER — METFORMIN HYDROCHLORIDE 1000 MG/1
1000 TABLET ORAL 2 TIMES DAILY
Qty: 180 TABLET | Refills: 1 | Status: SHIPPED | OUTPATIENT
Start: 2024-12-12

## 2024-12-18 DIAGNOSIS — N39.43 BENIGN PROSTATIC HYPERPLASIA WITH POST-VOID DRIBBLING: ICD-10-CM

## 2024-12-18 DIAGNOSIS — N40.1 BENIGN PROSTATIC HYPERPLASIA WITH POST-VOID DRIBBLING: ICD-10-CM

## 2024-12-18 NOTE — TELEPHONE ENCOUNTER
No care due was identified.  Health [facility] Embedded Care Due Messages. Reference number: [id number].   12/18/2024 11:05:25 AM CST

## 2024-12-18 NOTE — TELEPHONE ENCOUNTER
Refill Routing Note   Medication(s) are not appropriate for processing by [facility] for the following reason(s):        No active prescription written by provider  Responsible provider unclear    [facility] action(s):  Defer             Appointments  past 12m or future 3m with PCP    Date Provider   Last Visit   9/3/2024 Avni Leon MD   Next Visit   Visit date not found Avni Leon MD   ED visits in past 90 days: 0        Note composed:11:12 AM 12/18/2024

## 2024-12-19 ENCOUNTER — TELEPHONE (OUTPATIENT)
Dept: FAMILY MEDICINE | Facility: CLINIC | Age: 64
End: 2024-12-19
Payer: MEDICARE

## 2024-12-19 ENCOUNTER — PATIENT MESSAGE (OUTPATIENT)
Dept: FAMILY MEDICINE | Facility: CLINIC | Age: 64
End: 2024-12-19
Payer: MEDICARE

## 2024-12-19 RX ORDER — TAMSULOSIN HYDROCHLORIDE 0.4 MG/1
2 CAPSULE ORAL
Qty: 180 CAPSULE | Refills: 2 | OUTPATIENT
Start: 2024-12-19

## 2024-12-19 NOTE — TELEPHONE ENCOUNTER
----- Message from Summer sent at 12/19/2024  9:48 AM CST -----  Contact: Scott  Patient needs first available appointment due to candesartan (ATACAND) 4 MG tablet and JARDIANCE 25 mg tablet for refill. Please call patient at [phone]. Thanks!

## 2024-12-19 NOTE — TELEPHONE ENCOUNTER
Called patient back I was successful at reaching him. Informed him that I have scheduled him for the first available appointment patient verbalized understanding.

## 2024-12-23 ENCOUNTER — OFFICE VISIT (OUTPATIENT)
Dept: FAMILY MEDICINE | Facility: CLINIC | Age: 64
End: 2024-12-23
Payer: MEDICARE

## 2024-12-23 ENCOUNTER — LAB VISIT (OUTPATIENT)
Dept: LAB | Facility: HOSPITAL | Age: 64
End: 2024-12-23
Attending: FAMILY MEDICINE
Payer: MEDICARE

## 2024-12-23 VITALS
OXYGEN SATURATION: 98 % | BODY MASS INDEX: 32.52 KG/M2 | HEIGHT: 69 IN | SYSTOLIC BLOOD PRESSURE: 124 MMHG | TEMPERATURE: 97 F | DIASTOLIC BLOOD PRESSURE: 88 MMHG | HEART RATE: 80 BPM | WEIGHT: 219.56 LBS

## 2024-12-23 DIAGNOSIS — I10 ESSENTIAL HYPERTENSION: ICD-10-CM

## 2024-12-23 DIAGNOSIS — E11.42 TYPE 2 DIABETES MELLITUS WITH DIABETIC POLYNEUROPATHY, WITHOUT LONG-TERM CURRENT USE OF INSULIN: ICD-10-CM

## 2024-12-23 DIAGNOSIS — E11.42 TYPE 2 DIABETES MELLITUS WITH DIABETIC POLYNEUROPATHY, WITHOUT LONG-TERM CURRENT USE OF INSULIN: Primary | ICD-10-CM

## 2024-12-23 DIAGNOSIS — N39.43 BENIGN PROSTATIC HYPERPLASIA WITH POST-VOID DRIBBLING: ICD-10-CM

## 2024-12-23 DIAGNOSIS — F20.89 OTHER SCHIZOPHRENIA: ICD-10-CM

## 2024-12-23 DIAGNOSIS — N18.2 CKD (CHRONIC KIDNEY DISEASE) STAGE 2, GFR 60-89 ML/MIN: ICD-10-CM

## 2024-12-23 DIAGNOSIS — J44.9 CHRONIC OBSTRUCTIVE PULMONARY DISEASE, UNSPECIFIED COPD TYPE: ICD-10-CM

## 2024-12-23 DIAGNOSIS — E66.811 OBESITY (BMI 30.0-34.9): ICD-10-CM

## 2024-12-23 DIAGNOSIS — N40.1 BENIGN PROSTATIC HYPERPLASIA WITH POST-VOID DRIBBLING: ICD-10-CM

## 2024-12-23 LAB
ESTIMATED AVG GLUCOSE: 123 MG/DL (ref 68–131)
HBA1C MFR BLD: 5.9 % (ref 4–5.6)

## 2024-12-23 PROCEDURE — 1101F PT FALLS ASSESS-DOCD LE1/YR: CPT | Mod: CPTII,S$GLB,, | Performed by: FAMILY MEDICINE

## 2024-12-23 PROCEDURE — 3079F DIAST BP 80-89 MM HG: CPT | Mod: CPTII,S$GLB,, | Performed by: FAMILY MEDICINE

## 2024-12-23 PROCEDURE — 3074F SYST BP LT 130 MM HG: CPT | Mod: CPTII,S$GLB,, | Performed by: FAMILY MEDICINE

## 2024-12-23 PROCEDURE — 1159F MED LIST DOCD IN RCRD: CPT | Mod: CPTII,S$GLB,, | Performed by: FAMILY MEDICINE

## 2024-12-23 PROCEDURE — 3060F POS MICROALBUMINURIA REV: CPT | Mod: CPTII,S$GLB,, | Performed by: FAMILY MEDICINE

## 2024-12-23 PROCEDURE — 99214 OFFICE O/P EST MOD 30 MIN: CPT | Mod: S$GLB,,, | Performed by: FAMILY MEDICINE

## 2024-12-23 PROCEDURE — 3008F BODY MASS INDEX DOCD: CPT | Mod: CPTII,S$GLB,, | Performed by: FAMILY MEDICINE

## 2024-12-23 PROCEDURE — 99999 PR PBB SHADOW E&M-EST. PATIENT-LVL III: CPT | Mod: PBBFAC,,, | Performed by: FAMILY MEDICINE

## 2024-12-23 PROCEDURE — 3066F NEPHROPATHY DOC TX: CPT | Mod: CPTII,S$GLB,, | Performed by: FAMILY MEDICINE

## 2024-12-23 PROCEDURE — 3288F FALL RISK ASSESSMENT DOCD: CPT | Mod: CPTII,S$GLB,, | Performed by: FAMILY MEDICINE

## 2024-12-23 PROCEDURE — 83036 HEMOGLOBIN GLYCOSYLATED A1C: CPT | Performed by: FAMILY MEDICINE

## 2024-12-23 PROCEDURE — 36415 COLL VENOUS BLD VENIPUNCTURE: CPT | Mod: PO | Performed by: FAMILY MEDICINE

## 2024-12-23 PROCEDURE — G2211 COMPLEX E/M VISIT ADD ON: HCPCS | Mod: S$GLB,,, | Performed by: FAMILY MEDICINE

## 2024-12-23 PROCEDURE — 3044F HG A1C LEVEL LT 7.0%: CPT | Mod: CPTII,S$GLB,, | Performed by: FAMILY MEDICINE

## 2024-12-23 PROCEDURE — 4010F ACE/ARB THERAPY RXD/TAKEN: CPT | Mod: CPTII,S$GLB,, | Performed by: FAMILY MEDICINE

## 2024-12-23 RX ORDER — TAMSULOSIN HYDROCHLORIDE 0.4 MG/1
2 CAPSULE ORAL DAILY
Qty: 180 CAPSULE | Refills: 3 | Status: SHIPPED | OUTPATIENT
Start: 2024-12-23

## 2024-12-23 NOTE — PROGRESS NOTES
Subjective:       Patient ID: Soctt Bansal is a 64 y.o. male.    Chief Complaint: Medication Refill      HPI Comments:       Current Outpatient Medications:     albuterol (PROVENTIL/VENTOLIN HFA) 90 mcg/actuation inhaler, Inhale 1-2 puffs into the lungs every 6 (six) hours as needed for Wheezing or Shortness of Breath. Rescue, Disp: 18 g, Rfl: 5    amitriptyline (ELAVIL) 25 MG tablet, Take 1 tablet (25 mg total) by mouth every evening., Disp: 90 tablet, Rfl: 3    amLODIPine (NORVASC) 10 MG tablet, Take 1 tablet (10 mg total) by mouth once daily., Disp: 90 tablet, Rfl: 3    busPIRone (BUSPAR) 10 MG tablet, Take 10 mg by mouth 3 (three) times daily., Disp: , Rfl:     candesartan (ATACAND) 4 MG tablet, Take 1 tablet (4 mg total) by mouth once daily., Disp: 90 tablet, Rfl: 0    clindamycin (CLEOCIN T) 1 % external solution, AAA bid for skin infection/folliculitis, Disp: 60 mL, Rfl: 11    DUPIXENT SYRINGE 300 mg/2 mL Syrg, Inject 1 syringe (300mg) into the skin every other week, Disp: 4 mL, Rfl: 11    EMGALITY  mg/mL PnIj, Inject 1 mL into the skin every 30 days., Disp: , Rfl:     fluticasone propionate (FLONASE) 50 mcg/actuation nasal spray, 2 sprays (100 mcg total) by Each Nostril route once daily., Disp: 16 mL, Rfl: 5    gabapentin (NEURONTIN) 300 MG capsule, Take 1 capsule (300 mg total) by mouth 2 (two) times daily., Disp: 180 capsule, Rfl: 2    gemfibroziL (LOPID) 600 MG tablet, Take 1 tablet (600 mg total) by mouth 2 (two) times daily before meals., Disp: 180 tablet, Rfl: 3    hydrocortisone 2.5 % cream, Apply topically 2 (two) times daily. Apply to rash on face., Disp: 28 g, Rfl: 1    hydrOXYzine (ATARAX) 50 MG tablet, Take 1 tablet (50 mg total) by mouth 3 (three) times daily as needed for Itching., Disp: 270 tablet, Rfl: 3    ketoconazole (NIZORAL) 2 % cream, Apply topically once daily. Apply to rash on face., Disp: 30 g, Rfl: 1    metFORMIN (GLUCOPHAGE) 1000 MG tablet, TAKE 1 TABLET(1000 MG) BY  MOUTH TWICE DAILY, Disp: 180 tablet, Rfl: 1    multivitamin capsule, Take 1 capsule by mouth once daily., Disp: , Rfl:     pantoprazole (PROTONIX) 40 MG tablet, Take 1 tablet (40 mg total) by mouth once daily., Disp: 90 tablet, Rfl: 3    quetiapine (SEROQUEL) 300 MG Tab, Take 300 mg by mouth every evening., Disp: , Rfl:     QULIPTA 10 mg Tab, Take 1 tablet by mouth Daily. On hold for now, Disp: , Rfl:     rizatriptan (MAXALT) 5 MG tablet, Take 5 mg by mouth once., Disp: , Rfl:     semaglutide (OZEMPIC) 1 mg/dose (4 mg/3 mL), Inject 1 mg into the skin every 7 days., Disp: 3 mL, Rfl: 11    simvastatin (ZOCOR) 80 MG tablet, Take 1 tablet (80 mg total) by mouth nightly., Disp: 90 tablet, Rfl: 3    sumatriptan (IMITREX) 100 MG tablet, TAKE 1 TABLET BY MOUTH EVERY DAY AS NEEDED FOR MIGRAINE HEADACHE, Disp: 27 tablet, Rfl: 3    triamcinolone acetonide 0.025% (KENALOG) 0.025 % cream, Apply topically 2 (two) times daily as needed (for rash underarms). Mild steroid. Use sparingly for 1-2 weeks if needed then stop., Disp: 80 g, Rfl: 1    triamcinolone acetonide 0.1% (KENALOG) 0.1 % cream, AAA bid prn eczema. Do not use on face., Disp: 454 g, Rfl: 5    zonisamide (ZONEGRAN) 50 MG Cap, Take 1 capsule (50 mg total) by mouth 2 (two) times daily., Disp: 180 capsule, Rfl: 1    empagliflozin (JARDIANCE) 25 mg tablet, Take 1 tablet (25 mg total) by mouth once daily., Disp: 90 tablet, Rfl: 0    tamsulosin (FLOMAX) 0.4 mg Cap, Take 2 capsules (0.8 mg total) by mouth once daily., Disp: 180 capsule, Rfl: 3      Three-month follow-up.  Has lost 18 lb during that time on Ozempic.  Now 1 mg.  Today we agreed this day with this current dose for awhile longer.  Least until he sees diabetes care in 2 months.    Needs refills on tamsulosin and Jardiance.  He has not seen a urologist as far as he can recall so I will have him be consulted    Still taking metformin and Jardiance.  No GI upset with any of his medications.  A little too soon to  "recheck microalbumin      Review of Systems   Constitutional:  Negative for activity change, appetite change and fever.   HENT:  Negative for sore throat.    Respiratory:  Negative for cough and shortness of breath.    Cardiovascular:  Negative for chest pain.   Gastrointestinal:  Negative for abdominal pain, diarrhea and nausea.   Genitourinary:  Negative for difficulty urinating.   Musculoskeletal:  Negative for arthralgias and myalgias.   Neurological:  Negative for dizziness and headaches.       Objective:      Vitals:    12/23/24 1336   BP: 124/88   Pulse: 80   Temp: 97.2 °F (36.2 °C)   TempSrc: Tympanic   SpO2: 98%   Weight: 99.6 kg (219 lb 9.3 oz)   Height: 5' 9" (1.753 m)   PainSc: 0-No pain     Physical Exam  Vitals and nursing note reviewed.   Constitutional:       General: He is not in acute distress.     Appearance: He is well-developed. He is not diaphoretic.   HENT:      Head: Normocephalic.   Neck:      Thyroid: No thyromegaly.   Cardiovascular:      Rate and Rhythm: Normal rate and regular rhythm.      Heart sounds: Normal heart sounds. No murmur heard.  Pulmonary:      Effort: Pulmonary effort is normal.      Breath sounds: Normal breath sounds. No wheezing or rales.   Abdominal:      General: There is no distension.      Palpations: Abdomen is soft.   Musculoskeletal:      Cervical back: Neck supple.   Lymphadenopathy:      Cervical: No cervical adenopathy.   Skin:     General: Skin is warm and dry.   Neurological:      Mental Status: He is alert and oriented to person, place, and time.   Psychiatric:         Mood and Affect: Mood normal.         Behavior: Behavior normal.         Thought Content: Thought content normal.         Judgment: Judgment normal.         Assessment:       1. Type 2 diabetes mellitus with diabetic polyneuropathy, without long-term current use of insulin    2. Essential hypertension    3. Chronic obstructive pulmonary disease, unspecified COPD type    4. Other schizophrenia  "   5. CKD (chronic kidney disease) stage 2, GFR 60-89 ml/min    6. Benign prostatic hyperplasia with post-void dribbling    7. Obesity (BMI 30.0-34.9)        Plan:   Type 2 diabetes mellitus with diabetic polyneuropathy, without long-term current use of insulin  Comments:  Doing well on Ozempic.  Check A1c today  Orders:  -     Hemoglobin A1C; Future; Expected date: 12/23/2024  -     empagliflozin (JARDIANCE) 25 mg tablet; Take 1 tablet (25 mg total) by mouth once daily.  Dispense: 90 tablet; Refill: 0    Essential hypertension  Comments:  Controlled..  Amlodipine and candesartan    Chronic obstructive pulmonary disease, unspecified COPD type  Comments:  Stable on p.r.n. albuterol    Other schizophrenia  Comments:  Followed by Psychiatry.  Doing well    CKD (chronic kidney disease) stage 2, GFR 60-89 ml/min  Comments:  Stable creatinine.    Benign prostatic hyperplasia with post-void dribbling  Comments:  Refill Flomax.  Urology consult  Orders:  -     tamsulosin (FLOMAX) 0.4 mg Cap; Take 2 capsules (0.8 mg total) by mouth once daily.  Dispense: 180 capsule; Refill: 3  -     Ambulatory referral/consult to Urology; Future; Expected date: 12/30/2024    Obesity (BMI 30.0-34.9)  Comments:  18 lb weight loss in 3 months on GLP 1.  Continue current dose.  Follow-up 6 months

## 2025-01-09 ENCOUNTER — TELEPHONE (OUTPATIENT)
Dept: DIABETES | Facility: CLINIC | Age: 65
End: 2025-01-09
Payer: MEDICARE

## 2025-01-10 ENCOUNTER — LAB VISIT (OUTPATIENT)
Dept: LAB | Facility: HOSPITAL | Age: 65
End: 2025-01-10
Attending: FAMILY MEDICINE
Payer: MEDICARE

## 2025-01-10 ENCOUNTER — OFFICE VISIT (OUTPATIENT)
Facility: CLINIC | Age: 65
End: 2025-01-10
Payer: MEDICARE

## 2025-01-10 VITALS
WEIGHT: 220.56 LBS | HEIGHT: 69 IN | BODY MASS INDEX: 32.67 KG/M2 | RESPIRATION RATE: 14 BRPM | SYSTOLIC BLOOD PRESSURE: 101 MMHG | DIASTOLIC BLOOD PRESSURE: 66 MMHG | HEART RATE: 108 BPM

## 2025-01-10 DIAGNOSIS — N40.0 BPH WITHOUT URINARY OBSTRUCTION: Primary | ICD-10-CM

## 2025-01-10 DIAGNOSIS — E11.42 TYPE 2 DIABETES MELLITUS WITH DIABETIC POLYNEUROPATHY, WITHOUT LONG-TERM CURRENT USE OF INSULIN: ICD-10-CM

## 2025-01-10 DIAGNOSIS — E11.9 TYPE 2 DIABETES MELLITUS WITHOUT COMPLICATION: ICD-10-CM

## 2025-01-10 DIAGNOSIS — Z12.5 PROSTATE CANCER SCREENING: ICD-10-CM

## 2025-01-10 LAB
ANION GAP SERPL CALC-SCNC: 10 MMOL/L (ref 8–16)
BUN SERPL-MCNC: 9 MG/DL (ref 8–23)
CALCIUM SERPL-MCNC: 10.3 MG/DL (ref 8.7–10.5)
CHLORIDE SERPL-SCNC: 109 MMOL/L (ref 95–110)
CHOLEST SERPL-MCNC: 112 MG/DL (ref 120–199)
CHOLEST/HDLC SERPL: 3.4 {RATIO} (ref 2–5)
CO2 SERPL-SCNC: 21 MMOL/L (ref 23–29)
CREAT SERPL-MCNC: 0.9 MG/DL (ref 0.5–1.4)
EST. GFR  (NO RACE VARIABLE): >60 ML/MIN/1.73 M^2
ESTIMATED AVG GLUCOSE: 120 MG/DL (ref 68–131)
GLUCOSE SERPL-MCNC: 123 MG/DL (ref 70–110)
HBA1C MFR BLD: 5.8 % (ref 4–5.6)
HDLC SERPL-MCNC: 33 MG/DL (ref 40–75)
HDLC SERPL: 29.5 % (ref 20–50)
LDLC SERPL CALC-MCNC: 52.8 MG/DL (ref 63–159)
NONHDLC SERPL-MCNC: 79 MG/DL
POTASSIUM SERPL-SCNC: 4.6 MMOL/L (ref 3.5–5.1)
SODIUM SERPL-SCNC: 140 MMOL/L (ref 136–145)
TRIGL SERPL-MCNC: 131 MG/DL (ref 30–150)

## 2025-01-10 PROCEDURE — 80048 BASIC METABOLIC PNL TOTAL CA: CPT | Performed by: NURSE PRACTITIONER

## 2025-01-10 PROCEDURE — 99999 PR PBB SHADOW E&M-EST. PATIENT-LVL V: CPT | Mod: PBBFAC,,, | Performed by: UROLOGY

## 2025-01-10 PROCEDURE — 83036 HEMOGLOBIN GLYCOSYLATED A1C: CPT | Performed by: NURSE PRACTITIONER

## 2025-01-10 PROCEDURE — 80061 LIPID PANEL: CPT | Performed by: FAMILY MEDICINE

## 2025-01-10 NOTE — PROGRESS NOTES
Subjective:       Patient ID: Scott Bansal is a 64 y.o. male.    Chief Complaint: Benign Prostatic Hypertrophy      History of Present Illness:     Mr Bansal has BPH without significant LUTS on tamsulosin.  He says he has been on tamsulosin over 10 years.  Denies ever having a prostate procedure in the past.  No urinary hesitancy.  He says his urinary flow seems normal.  He feels that he is emptying his bladder.  Nocturia X 1.  No daytime urinary frequency.  No urgency.  No known family history of prostate cancer.    Benign Prostatic Hypertrophy  Irritative symptoms do not include urgency. Pertinent negatives include no chills, nausea or vomiting.        Past Medical History:   Diagnosis Date    Bipolar 1 disorder, mixed     BPH (benign prostatic hypertrophy)     Colon polyp     Cyst, eyelid     Dr. Broussard    Diabetes mellitus     GERD (gastroesophageal reflux disease)     Hyperlipidemia     Hypertension     Lumbar degenerative disc disease 3/7/2019    Migraine headache     Obesity      Family History   Problem Relation Name Age of Onset    Cataracts Mother      Cancer Mother          throat    Hypertension Mother      Hypertension Father      Stroke Father          2 strokes    Hypertension Sister Leona     Cancer Brother Jose Armando         spinal, kidney, spinal fluid    Hypertension Brother Jose Armando     Cancer Cousin ?         breast?     Social History     Socioeconomic History    Marital status:    Tobacco Use    Smoking status: Former     Current packs/day: 0.00     Average packs/day: 2.0 packs/day for 15.0 years (30.0 ttl pk-yrs)     Types: Cigarettes     Start date: 1969     Quit date: 1984     Years since quittin.6     Passive exposure: Past    Smokeless tobacco: Never    Tobacco comments:     Patient quit smoking at the age of 24 yo.   Substance and Sexual Activity    Alcohol use: No    Drug use: No    Sexual activity: Yes     Partners: Female     Social Drivers of Health      Financial Resource Strain: Medium Risk (1/9/2025)    Overall Financial Resource Strain (CARDIA)     Difficulty of Paying Living Expenses: Somewhat hard   Food Insecurity: No Food Insecurity (1/9/2025)    Hunger Vital Sign     Worried About Running Out of Food in the Last Year: Never true     Ran Out of Food in the Last Year: Never true   Transportation Needs: No Transportation Needs (5/16/2024)    PRAPARE - Transportation     Lack of Transportation (Medical): No     Lack of Transportation (Non-Medical): No   Physical Activity: Inactive (1/9/2025)    Exercise Vital Sign     Days of Exercise per Week: 0 days     Minutes of Exercise per Session: 10 min   Stress: No Stress Concern Present (1/9/2025)    [employer] - Occupational Stress Questionnaire     Feeling of Stress : Only a little   Housing Stability: Low Risk  (1/9/2025)    Housing Stability Vital Sign     Unable to Pay for Housing in the Last Year: No     Homeless in the Last Year: No     Outpatient Encounter Medications as of 1/10/2025   Medication Sig Dispense Refill    albuterol (PROVENTIL/VENTOLIN HFA) 90 mcg/actuation inhaler Inhale 1-2 puffs into the lungs every 6 (six) hours as needed for Wheezing or Shortness of Breath. Rescue 18 g 5    amitriptyline (ELAVIL) 25 MG tablet Take 1 tablet (25 mg total) by mouth every evening. 90 tablet 3    amLODIPine (NORVASC) 10 MG tablet Take 1 tablet (10 mg total) by mouth once daily. 90 tablet 3    busPIRone (BUSPAR) 10 MG tablet Take 10 mg by mouth 3 (three) times daily.      candesartan (ATACAND) 4 MG tablet Take 1 tablet (4 mg total) by mouth once daily. 90 tablet 0    clindamycin (CLEOCIN T) 1 % external solution AAA bid for skin infection/folliculitis 60 mL 11    DUPIXENT SYRINGE 300 mg/2 mL Syrg Inject 1 syringe (300mg) into the skin every other week 4 mL 11    EMGALITY  mg/mL PnIj Inject 1 mL into the skin every 30 days.      empagliflozin (JARDIANCE) 25 mg tablet Take 1  tablet (25 mg total) by mouth once daily. 90 tablet 0    fluticasone propionate (FLONASE) 50 mcg/actuation nasal spray 2 sprays (100 mcg total) by Each Nostril route once daily. 16 mL 5    gabapentin (NEURONTIN) 300 MG capsule Take 1 capsule (300 mg total) by mouth 2 (two) times daily. 180 capsule 2    gemfibroziL (LOPID) 600 MG tablet Take 1 tablet (600 mg total) by mouth 2 (two) times daily before meals. 180 tablet 3    hydrocortisone 2.5 % cream Apply topically 2 (two) times daily. Apply to rash on face. 28 g 1    hydrOXYzine (ATARAX) 50 MG tablet Take 1 tablet (50 mg total) by mouth 3 (three) times daily as needed for Itching. 270 tablet 3    ketoconazole (NIZORAL) 2 % cream Apply topically once daily. Apply to rash on face. 30 g 1    metFORMIN (GLUCOPHAGE) 1000 MG tablet TAKE 1 TABLET(1000 MG) BY MOUTH TWICE DAILY 180 tablet 1    multivitamin capsule Take 1 capsule by mouth once daily.      pantoprazole (PROTONIX) 40 MG tablet Take 1 tablet (40 mg total) by mouth once daily. 90 tablet 3    quetiapine (SEROQUEL) 300 MG Tab Take 300 mg by mouth every evening.      QULIPTA 10 mg Tab Take 1 tablet by mouth Daily. On hold for now      rizatriptan (MAXALT) 5 MG tablet Take 5 mg by mouth once.      semaglutide (OZEMPIC) 1 mg/dose (4 mg/3 mL) Inject 1 mg into the skin every 7 days. 3 mL 11    simvastatin (ZOCOR) 80 MG tablet Take 1 tablet (80 mg total) by mouth nightly. 90 tablet 3    sumatriptan (IMITREX) 100 MG tablet TAKE 1 TABLET BY MOUTH EVERY DAY AS NEEDED FOR MIGRAINE HEADACHE 27 tablet 3    tamsulosin (FLOMAX) 0.4 mg Cap Take 2 capsules (0.8 mg total) by mouth once daily. 180 capsule 3    triamcinolone acetonide 0.025% (KENALOG) 0.025 % cream Apply topically 2 (two) times daily as needed (for rash underarms). Mild steroid. Use sparingly for 1-2 weeks if needed then stop. 80 g 1    triamcinolone acetonide 0.1% (KENALOG) 0.1 % cream AAA bid prn eczema. Do not use on face. 454 g 5    zonisamide (ZONEGRAN) 50 MG  "Cap Take 1 capsule (50 mg total) by mouth 2 (two) times daily. 180 capsule 1    [DISCONTINUED] azelastine (ASTELIN) 137 mcg (0.1 %) nasal spray SPRAY 1 SPRAY (137 MCG TOTAL) BY NASAL ROUTE 2 (TWO) TIMES DAILY. 30 mL 4    [DISCONTINUED] folic acid (FOLVITE) 800 MCG Tab Take 800 mcg by mouth once daily.      [DISCONTINUED] furosemide (LASIX) 40 MG tablet TAKE 1 TABLET BY MOUTH EVERY DAY 90 tablet 0    [DISCONTINUED] JARDIANCE 25 mg tablet TAKE 1 TABLET BY MOUTH EVERY DAY 90 tablet 0    [DISCONTINUED] tamsulosin (FLOMAX) 0.4 mg Cap Take 2 capsules (0.8 mg total) by mouth once daily. 180 capsule 3     No facility-administered encounter medications on file as of 1/10/2025.        Review of Systems   Constitutional:  Negative for chills and fever.   Respiratory:  Negative for shortness of breath.    Cardiovascular:  Negative for chest pain.   Gastrointestinal:  Negative for nausea and vomiting.   Genitourinary:  Negative for difficulty urinating and urgency.   Musculoskeletal:  Negative for back pain.   Skin:  Negative for rash.   Neurological:  Negative for dizziness.   Psychiatric/Behavioral:  Negative for agitation.        Objective:     /66 (BP Location: Left arm, Patient Position: Sitting)   Pulse 108   Resp 14   Ht 5' 9" (1.753 m)   Wt 100 kg (220 lb 9.1 oz)   BMI 32.57 kg/m²     Physical Exam  Constitutional:       Appearance: Normal appearance.   Pulmonary:      Effort: Pulmonary effort is normal.   Abdominal:      Palpations: Abdomen is soft.   Genitourinary:     Comments: 40 gram prostate with no nodules felt.  Neurological:      Mental Status: He is alert and oriented to person, place, and time.   Psychiatric:         Mood and Affect: Mood normal.         No visits with results within 1 Day(s) from this visit.   Latest known visit with results is:   Lab Visit on 12/23/2024   Component Date Value Ref Range Status    Hemoglobin A1C 12/23/2024 5.9 (H)  4.0 - 5.6 % Final    Comment: ADA Screening " Guidelines:  5.7-6.4%  Consistent with prediabetes  >or=6.5%  Consistent with diabetes    High levels of fetal hemoglobin interfere with the HbA1C  assay. Heterozygous hemoglobin variants (HbS, HgC, etc)do  not significantly interfere with this assay.   However, presence of multiple variants may affect accuracy.      Estimated Avg Glucose 12/23/2024 123  68 - 131 mg/dL Final        No results found. However, due to the size of the patient record, not all encounters were searched. Please check Results Review for a complete set of results.     Assessment:       1. BPH without urinary obstruction    2. Prostate cancer screening        Plan:     Orders Placed This Encounter    PROSTATE SPECIFIC ANTIGEN, DIAGNOSTIC    POCT Urinalysis, Dipstick, Automated, W/O Scope          2-26-24  PSA 1.2    9-27-24  Cr 1.1.  GFR >60.  BUN 17.    12-23-24  HgbA1c 5.9    9-14-23  HgbA1c 8.0    I reviewed the above lab results.         Assessment:  - BPH without significant LUTS on tamsulosin.  He says he has been on tamsulosin over 10 years.  - Prostate cancer screening.  - DM.  He takes jardiance.    Plan:  - PSA today.  - Continue tamsulosin 0.4 mg 1 PO qhs.  He says he does not need a new prescription at this time.  - RTC in 1 year with a PVR on arrival or sooner as needed.

## 2025-01-13 ENCOUNTER — TELEPHONE (OUTPATIENT)
Facility: CLINIC | Age: 65
End: 2025-01-13
Payer: MEDICARE

## 2025-01-13 DIAGNOSIS — R97.20 PSA ELEVATION: Primary | ICD-10-CM

## 2025-01-13 NOTE — TELEPHONE ENCOUNTER
Called patient and informed him of his PSA result; pt verbalized understanding and was ok with repeating it in 6 mo.  Appts scheduled.  Nurse Polk  ----- Message from Ren Santiago MD sent at 1/13/2025  9:05 AM CST -----  Please call Mr Bansal and let him know that I reviewed his PSA result and his PSA is normal at 2.0 but it has increased from his previous PSA of 1.2 in February 2024.  I recommend rechecking his PSA in 6 months.  Please reschedule his follow up appointment with me to 6 months.  Tell him to go to an [facility] lab about 3 days prior to his 6 month appointment to recheck his PSA.  Ask him if he has any questions.

## 2025-01-15 DIAGNOSIS — G44.229 CHRONIC TENSION-TYPE HEADACHE, NOT INTRACTABLE: ICD-10-CM

## 2025-01-15 NOTE — TELEPHONE ENCOUNTER
Care Due:                  Date            Visit Type   Department     Provider  --------------------------------------------------------------------------------                                EP -                              PRIMARY      [facility] INTERNAL  Last Visit: 12-      CARE ([facility])   DARI Leon                              EP -                              PRIMARY      [facility] INTERNAL  Next Visit: 06-      CARE ([facility])   DARI Leon                                                            Last  Test          Frequency    Reason                     Performed    Due Date  --------------------------------------------------------------------------------    CBC.........  12 months..  gemfibroziL..............  11-   11-    [facility] Embedded Care Due Messages. Reference number: [id number].   1/15/2025 4:15:20 PM CST

## 2025-01-16 DIAGNOSIS — E78.5 HYPERLIPIDEMIA, UNSPECIFIED HYPERLIPIDEMIA TYPE: ICD-10-CM

## 2025-01-16 DIAGNOSIS — J44.9 CHRONIC OBSTRUCTIVE PULMONARY DISEASE, UNSPECIFIED COPD TYPE: ICD-10-CM

## 2025-01-16 NOTE — TELEPHONE ENCOUNTER
No care due was identified.  [facility] Embedded Care Due Messages. Reference number: [id number].   1/16/2025 3:16:20 PM CST

## 2025-01-16 NOTE — TELEPHONE ENCOUNTER
Refill Routing Note   Medication(s) are not appropriate for processing by [facility] for the following reason(s):        No active prescription written by provider  Required labs outdated    [facility] action(s):  Defer               Appointments  past 12m or future 3m with PCP    Date Provider   Last Visit   12/23/2024 Avni Leon MD   Next Visit   6/23/2025 Avni Leon MD   ED visits in past 90 days: 0        Note composed:4:24 PM 01/16/2025

## 2025-01-16 NOTE — TELEPHONE ENCOUNTER
Refill Routing Note   Medication(s) are not appropriate for processing by [facility] for the following reason(s):        No active prescription written by provider    [facility] action(s):  Defer             Appointments  past 12m or future 3m with PCP    Date Provider   Last Visit   12/23/2024 Avni Leon MD   Next Visit   6/23/2025 Avni Leon MD   ED visits in past 90 days: 0        Note composed:10:36 PM 01/15/2025

## 2025-01-17 RX ORDER — ALBUTEROL SULFATE 90 UG/1
1-2 INHALANT RESPIRATORY (INHALATION) EVERY 6 HOURS PRN
Qty: 54 G | Refills: 3 | Status: SHIPPED | OUTPATIENT
Start: 2025-01-17

## 2025-01-17 RX ORDER — GEMFIBROZIL 600 MG/1
600 TABLET, FILM COATED ORAL
Qty: 180 TABLET | Refills: 0 | Status: SHIPPED | OUTPATIENT
Start: 2025-01-17

## 2025-01-17 RX ORDER — AMITRIPTYLINE HYDROCHLORIDE 25 MG/1
25 TABLET, FILM COATED ORAL NIGHTLY
Qty: 90 TABLET | Refills: 3 | Status: SHIPPED | OUTPATIENT
Start: 2025-01-17

## 2025-01-27 ENCOUNTER — PATIENT MESSAGE (OUTPATIENT)
Dept: PHARMACY | Facility: CLINIC | Age: 65
End: 2025-01-27
Payer: MEDICARE

## 2025-01-31 DIAGNOSIS — I10 ESSENTIAL HYPERTENSION: ICD-10-CM

## 2025-01-31 NOTE — TELEPHONE ENCOUNTER
No care due was identified.  [facility] Embedded Care Due Messages. Reference number: [id number].   1/31/2025 5:38:59 PM CST

## 2025-02-02 RX ORDER — CANDESARTAN 4 MG/1
TABLET ORAL
Qty: 90 TABLET | Refills: 3 | Status: SHIPPED | OUTPATIENT
Start: 2025-02-02

## 2025-02-03 NOTE — TELEPHONE ENCOUNTER
Refill Decision Note   Scott Robertsaniceto  is requesting a refill authorization.  Brief Assessment and Rationale for Refill:  Approve     Medication Therapy Plan:         Comments:     Note composed:6:58 PM 02/02/2025

## 2025-02-13 RX ORDER — ATOGEPANT 60 MG/1
60 TABLET ORAL DAILY
Qty: 30 TABLET | Refills: 0 | OUTPATIENT
Start: 2025-02-13 | End: 2025-03-15

## 2025-02-26 ENCOUNTER — OFFICE VISIT (OUTPATIENT)
Dept: DIABETES | Facility: CLINIC | Age: 65
End: 2025-02-26
Payer: MEDICARE

## 2025-02-26 ENCOUNTER — LAB VISIT (OUTPATIENT)
Dept: LAB | Facility: HOSPITAL | Age: 65
End: 2025-02-26
Attending: NURSE PRACTITIONER
Payer: MEDICARE

## 2025-02-26 VITALS
SYSTOLIC BLOOD PRESSURE: 125 MMHG | BODY MASS INDEX: 31.71 KG/M2 | HEART RATE: 86 BPM | DIASTOLIC BLOOD PRESSURE: 83 MMHG | WEIGHT: 214.75 LBS

## 2025-02-26 DIAGNOSIS — E11.42 TYPE 2 DIABETES MELLITUS WITH DIABETIC POLYNEUROPATHY, WITHOUT LONG-TERM CURRENT USE OF INSULIN: ICD-10-CM

## 2025-02-26 DIAGNOSIS — E11.42 TYPE 2 DIABETES MELLITUS WITH DIABETIC POLYNEUROPATHY, WITHOUT LONG-TERM CURRENT USE OF INSULIN: Primary | ICD-10-CM

## 2025-02-26 LAB
ALBUMIN/CREAT UR: 27.6 UG/MG (ref 0–30)
CREAT UR-MCNC: 123 MG/DL (ref 23–375)
GLUCOSE SERPL-MCNC: 76 MG/DL (ref 70–110)
MICROALBUMIN UR DL<=1MG/L-MCNC: 34 UG/ML

## 2025-02-26 PROCEDURE — 1159F MED LIST DOCD IN RCRD: CPT | Mod: CPTII,S$GLB,, | Performed by: NURSE PRACTITIONER

## 2025-02-26 PROCEDURE — 3079F DIAST BP 80-89 MM HG: CPT | Mod: CPTII,S$GLB,, | Performed by: NURSE PRACTITIONER

## 2025-02-26 PROCEDURE — 1101F PT FALLS ASSESS-DOCD LE1/YR: CPT | Mod: CPTII,S$GLB,, | Performed by: NURSE PRACTITIONER

## 2025-02-26 PROCEDURE — 3066F NEPHROPATHY DOC TX: CPT | Mod: CPTII,S$GLB,, | Performed by: NURSE PRACTITIONER

## 2025-02-26 PROCEDURE — 99999 PR PBB SHADOW E&M-EST. PATIENT-LVL IV: CPT | Mod: PBBFAC,,, | Performed by: NURSE PRACTITIONER

## 2025-02-26 PROCEDURE — 82043 UR ALBUMIN QUANTITATIVE: CPT | Performed by: NURSE PRACTITIONER

## 2025-02-26 PROCEDURE — 4010F ACE/ARB THERAPY RXD/TAKEN: CPT | Mod: CPTII,S$GLB,, | Performed by: NURSE PRACTITIONER

## 2025-02-26 PROCEDURE — 99214 OFFICE O/P EST MOD 30 MIN: CPT | Mod: S$GLB,,, | Performed by: NURSE PRACTITIONER

## 2025-02-26 PROCEDURE — 3061F NEG MICROALBUMINURIA REV: CPT | Mod: CPTII,S$GLB,, | Performed by: NURSE PRACTITIONER

## 2025-02-26 PROCEDURE — 3288F FALL RISK ASSESSMENT DOCD: CPT | Mod: CPTII,S$GLB,, | Performed by: NURSE PRACTITIONER

## 2025-02-26 PROCEDURE — 3072F LOW RISK FOR RETINOPATHY: CPT | Mod: CPTII,S$GLB,, | Performed by: NURSE PRACTITIONER

## 2025-02-26 PROCEDURE — 3074F SYST BP LT 130 MM HG: CPT | Mod: CPTII,S$GLB,, | Performed by: NURSE PRACTITIONER

## 2025-02-26 PROCEDURE — 3044F HG A1C LEVEL LT 7.0%: CPT | Mod: CPTII,S$GLB,, | Performed by: NURSE PRACTITIONER

## 2025-02-26 PROCEDURE — 1125F AMNT PAIN NOTED PAIN PRSNT: CPT | Mod: CPTII,S$GLB,, | Performed by: NURSE PRACTITIONER

## 2025-02-26 PROCEDURE — G2211 COMPLEX E/M VISIT ADD ON: HCPCS | Mod: S$GLB,,, | Performed by: NURSE PRACTITIONER

## 2025-02-26 PROCEDURE — 3008F BODY MASS INDEX DOCD: CPT | Mod: CPTII,S$GLB,, | Performed by: NURSE PRACTITIONER

## 2025-02-26 PROCEDURE — 82962 GLUCOSE BLOOD TEST: CPT | Mod: S$GLB,,, | Performed by: NURSE PRACTITIONER

## 2025-02-26 RX ORDER — RIMEGEPANT SULFATE 75 MG/75MG
75 TABLET, ORALLY DISINTEGRATING ORAL DAILY PRN
COMMUNITY
Start: 2025-02-11

## 2025-02-26 RX ORDER — SEMAGLUTIDE 2.68 MG/ML
2 INJECTION, SOLUTION SUBCUTANEOUS
Qty: 9 ML | Refills: 3 | Status: SHIPPED | OUTPATIENT
Start: 2025-02-26 | End: 2026-02-26

## 2025-02-26 NOTE — Clinical Note
At this time, Mr. Scott Bansal will no longer require follow up with [facility]. Please refer back to Diabetes Management if needed and thank you for allowing us to participate in their care.   DAVIAN [facility]

## 2025-02-26 NOTE — PATIENT INSTRUCTIONS
PATIENT INSTRUCTIONS      Urine screening today.   Lifestyle modification with well balanced diet and at least 30 minutes of physical activity daily recommended.   Increase water intake.   Increase protein and non-starchy vegetable servings with meals.   Decrease carbohydrate servings with meals.   Take 10 minute walk after each meal.   Avoid snacking on carbohydrates, choose low carb, high protein snacks.   Incorporate resistance training with weights or resistance bands with exercise regimen at least 3 days a week.       Continue Jardiance 25 mg by mouth daily.   Continue Metformin 1000 mg by mouth twice daily.   Increase Ozempic 2 mg subcutaneously every 7 days.      Blood Sugar Goals:       Fastin-130.       1-2 hours after a meal: Less than 180.

## 2025-02-26 NOTE — PROGRESS NOTES
Scott Bansal is a 64 y.o. male who  has a past medical history of Bipolar 1 disorder, mixed, BPH (benign prostatic hypertrophy), Colon polyp, Cyst, eyelid, Diabetes mellitus, GERD (gastroesophageal reflux disease), Hyperlipidemia, Hypertension, Lumbar degenerative disc disease (3/7/2019), Migraine headache, and Obesity. and presents for a follow up evaluation of Type 2 diabetes mellitus.     CHIEF COMPLAINT: Diabetes Consultation    PCP: Avni Leon MD     Initial visit with me - 8/28/2024     The patient was initially diagnosed with diabetes over 5 years ago.      Previous failed treatments include:  None.      Social Documentation:  Patient lives in [place] with girlfriend.  Occupation: retired.   Exercise: none  Meal Patterns: 2 meals a day and snacks (kettle corn, yogurt)  Sleep: interrupted by dog.       Diabetes related complications:   none.   denies Pancreatitis  denies Gastroparesis  denies DKA  denies Hx/family Hx of MEN2/MTC  denies Frequent UTIs/yeast infections    Diabetes Medications              empagliflozin (JARDIANCE) 25 mg tablet Take 1 tablet (25 mg total) by mouth once daily.    metFORMIN (GLUCOPHAGE) 1000 MG tablet TAKE 1 TABLET(1000 MG) BY MOUTH TWICE DAILY    semaglutide (OZEMPIC) 1 mg/dose (4 mg/3 mL) Inject 1 mg into the skin every 7 days. - STATES HE IS TAKING 1.5 MG BY USING A 1 MG PEN AND 0.5 MG PEN WEEKLY. REQUESTING INCREASE TO 2 MG FOR WEIGHT LOSS.      Current monitoring regimen: capillary blood glucose monitoring with finger sticks.    Patient currently taking insulin or sulfonylurea?  NO  Recent hypoglycemic episodes: No.     Patient compliant with glucose checks and medication administration? Yes    DIABETES MANAGEMENT STATUS  Statin: Taking  ACE/ARB: Taking  Screening or Prevention Patient's value Goal Complete/Controlled?   HgA1C Testing and Control   Lab Results   Component Value Date    HGBA1C 5.8 (H) 01/10/2025      Annually/Less than 8% Yes   Lipid profile  : 01/10/2025 Annually Yes   LDL control Lab Results   Component Value Date    LDLCALC 52.8 (L) 01/10/2025    Annually/Less than 100 mg/dl  Yes   Nephropathy screening Lab Results   Component Value Date    LABMICR 105.0 02/26/2024     Lab Results   Component Value Date    PROTEINUA Trace (A) 08/07/2024     Lab Results   Component Value Date    UTPCR 0.15 08/07/2024      Annually Yes   Blood pressure BP Readings from Last 1 Encounters:   02/26/25 125/83    Less than 140/90 Yes   Dilated retinal exam : 05/28/2024 Annually Yes   Foot exam   : 08/08/2024 Annually Yes   Patient's medications, allergies, surgical, social and family histories were reviewed and updated as appropriate.     Review of Systems   Constitutional:  Negative for weight loss.   Eyes:  Negative for blurred vision and double vision.   Cardiovascular:  Negative for chest pain.   Gastrointestinal:  Negative for nausea and vomiting.   Genitourinary:  Negative for frequency.   Musculoskeletal:  Negative for falls.   Neurological:  Negative for dizziness and weakness.   Endo/Heme/Allergies:  Negative for polydipsia.   Psychiatric/Behavioral:  Negative for depression.    All other systems reviewed and are negative.       Physical Exam  Constitutional:       General: He is not in acute distress.     Appearance: Normal appearance.   Eyes:      Extraocular Movements: Extraocular movements intact.      Pupils: Pupils are equal, round, and reactive to light.   Cardiovascular:      Rate and Rhythm: Normal rate and regular rhythm.      Heart sounds: Normal heart sounds.   Pulmonary:      Effort: Pulmonary effort is normal. No respiratory distress.      Breath sounds: Normal breath sounds.   Musculoskeletal:      Cervical back: Normal range of motion and neck supple.   Neurological:      Mental Status: He is alert and oriented to person, place, and time.   Psychiatric:         Mood and Affect: Mood normal.         Behavior: Behavior normal.        Blood pressure  "125/83, pulse 86, weight 97.4 kg (214 lb 11.7 oz).  Wt Readings from Last 3 Encounters:   02/26/25 97.4 kg (214 lb 11.7 oz)   01/10/25 100 kg (220 lb 9.1 oz)   12/23/24 99.6 kg (219 lb 9.3 oz)       LAB REVIEW  Lab Results   Component Value Date     01/10/2025    K 4.6 01/10/2025     01/10/2025    CO2 21 (L) 01/10/2025    BUN 9 01/10/2025    CREATININE 0.9 01/10/2025    CALCIUM 10.3 01/10/2025    ANIONGAP 10 01/10/2025    EGFRNORACEVR >60.0 01/10/2025     No results found for: "CPEPTIDE", "GLUTAMICACID", "INSLNABS"  Hemoglobin A1C   Date Value Ref Range Status   01/10/2025 5.8 (H) 4.0 - 5.6 % Final     Comment:     ADA Screening Guidelines:  5.7-6.4%  Consistent with prediabetes  >or=6.5%  Consistent with diabetes    High levels of fetal hemoglobin interfere with the HbA1C  assay. Heterozygous hemoglobin variants (HbS, HgC, etc)do  not significantly interfere with this assay.   However, presence of multiple variants may affect accuracy.     12/23/2024 5.9 (H) 4.0 - 5.6 % Final     Comment:     ADA Screening Guidelines:  5.7-6.4%  Consistent with prediabetes  >or=6.5%  Consistent with diabetes    High levels of fetal hemoglobin interfere with the HbA1C  assay. Heterozygous hemoglobin variants (HbS, HgC, etc)do  not significantly interfere with this assay.   However, presence of multiple variants may affect accuracy.     09/27/2024 6.6 (H) 4.0 - 5.6 % Final     Comment:     ADA Screening Guidelines:  5.7-6.4%  Consistent with prediabetes  >or=6.5%  Consistent with diabetes    High levels of fetal hemoglobin interfere with the HbA1C  assay. Heterozygous hemoglobin variants (HbS, HgC, etc)do  not significantly interfere with this assay.   However, presence of multiple variants may affect accuracy.         Lab Results   Component Value Date    POCGLU 76 02/26/2025       ASSESSMENT    ICD-10-CM ICD-9-CM   1. Type 2 diabetes mellitus with diabetic polyneuropathy, without long-term current use of insulin  E11.42 " 250.60     357.2       PLAN  Diagnoses and all orders for this visit:    Type 2 diabetes mellitus with diabetic polyneuropathy, without long-term current use of insulin  -     POCT Glucose, Hand-Held Device  -     Microalbumin/Creatinine Ratio, Urine; Future  -     semaglutide (OZEMPIC) 2 mg/dose (8 mg/3 mL) PnIj; Inject 2 mg into the skin every 7 days.        Reviewed pathophysiology of diabetes, complications related to the disease, importance of annual dilated eye exam and daily foot examination. Explained MOA, SE, dosage of medications. Written instructions given and reviewed with patient and patient verbalizes understanding.     10/29/2024 - OOP cost for ozempic and jardiance over $600, will refer to pharmacy assistance. A1c at goal.     2025 - requesing increase in ozempic dose to 2 mg for weight loss benefit. He will no longer require follow up with [facility].  Deferring DM management to PCP.        PATIENT INSTRUCTIONS      Urine screening today.   Lifestyle modification with well balanced diet and at least 30 minutes of physical activity daily recommended.   Increase water intake.   Increase protein and non-starchy vegetable servings with meals.   Decrease carbohydrate servings with meals.   Take 10 minute walk after each meal.   Avoid snacking on carbohydrates, choose low carb, high protein snacks.   Incorporate resistance training with weights or resistance bands with exercise regimen at least 3 days a week.       Continue Jardiance 25 mg by mouth daily.   Continue Metformin 1000 mg by mouth twice daily.   Increase Ozempic 2 mg subcutaneously every 7 days.      Blood Sugar Goals:       Fastin-130.       1-2 hours after a meal: Less than 180.     No follow-ups on file.

## 2025-03-08 DIAGNOSIS — G43.009 MIGRAINE WITHOUT AURA AND WITHOUT STATUS MIGRAINOSUS, NOT INTRACTABLE: ICD-10-CM

## 2025-03-08 DIAGNOSIS — E11.42 TYPE 2 DIABETES MELLITUS WITH DIABETIC POLYNEUROPATHY, WITHOUT LONG-TERM CURRENT USE OF INSULIN: ICD-10-CM

## 2025-03-08 NOTE — TELEPHONE ENCOUNTER
No care due was identified.  [facility] Embedded Care Due Messages. Reference number: [id number].   3/08/2025 5:42:04 AM CST

## 2025-03-09 RX ORDER — EMPAGLIFLOZIN 25 MG/1
TABLET, FILM COATED ORAL
Qty: 90 TABLET | Refills: 1 | Status: SHIPPED | OUTPATIENT
Start: 2025-03-09

## 2025-03-09 NOTE — TELEPHONE ENCOUNTER
Refill Routing Note   Medication(s) are not appropriate for processing by [facility] for the following reason(s):      Medication outside of protocol    [facility] action(s):  Approve  Route Care Due:  None identified            Appointments  past 12m or future 3m with PCP    Date Provider   Last Visit   12/23/2024 Avni Leon MD   Next Visit   6/23/2025 Avni Leon MD   ED visits in past 90 days: 0        Note composed:2:47 PM 03/09/2025

## 2025-03-10 ENCOUNTER — TELEPHONE (OUTPATIENT)
Dept: FAMILY MEDICINE | Facility: CLINIC | Age: 65
End: 2025-03-10
Payer: MEDICARE

## 2025-03-10 RX ORDER — ZONISAMIDE 50 MG/1
CAPSULE ORAL
Qty: 180 CAPSULE | Refills: 1 | Status: SHIPPED | OUTPATIENT
Start: 2025-03-10

## 2025-03-10 NOTE — TELEPHONE ENCOUNTER
No phone number to return call.    Becky Chun LPN    ----- Message from Haylee sent at 3/10/2025  3:19 PM CDT -----  Contact: Bibi Hancock/Jamari /[employer] [facility]  Type: Request a call backName of Caller:  Pj with [facility] Call Back Number: Additional Information: Chronic condition verification form that was faxed and it shld have been faxed back to [facility] (info is on application) before the end of the month or pt will loose his plan.

## 2025-03-17 ENCOUNTER — OFFICE VISIT (OUTPATIENT)
Dept: FAMILY MEDICINE | Facility: CLINIC | Age: 65
End: 2025-03-17
Payer: MEDICARE

## 2025-03-17 VITALS
WEIGHT: 213.63 LBS | HEIGHT: 69 IN | SYSTOLIC BLOOD PRESSURE: 106 MMHG | OXYGEN SATURATION: 96 % | BODY MASS INDEX: 31.64 KG/M2 | TEMPERATURE: 98 F | HEART RATE: 105 BPM | DIASTOLIC BLOOD PRESSURE: 64 MMHG

## 2025-03-17 DIAGNOSIS — G43.009 MIGRAINE WITHOUT AURA AND WITHOUT STATUS MIGRAINOSUS, NOT INTRACTABLE: ICD-10-CM

## 2025-03-17 DIAGNOSIS — F20.89 OTHER SCHIZOPHRENIA: ICD-10-CM

## 2025-03-17 DIAGNOSIS — F31.9 BIPOLAR 1 DISORDER: ICD-10-CM

## 2025-03-17 DIAGNOSIS — J44.9 CHRONIC OBSTRUCTIVE PULMONARY DISEASE, UNSPECIFIED COPD TYPE: ICD-10-CM

## 2025-03-17 DIAGNOSIS — E11.42 TYPE 2 DIABETES MELLITUS WITH DIABETIC POLYNEUROPATHY, WITHOUT LONG-TERM CURRENT USE OF INSULIN: ICD-10-CM

## 2025-03-17 DIAGNOSIS — B95.8 STAPH INFECTION: Primary | ICD-10-CM

## 2025-03-17 DIAGNOSIS — I10 ESSENTIAL HYPERTENSION: ICD-10-CM

## 2025-03-17 DIAGNOSIS — Z79.899 ENCOUNTER FOR LONG-TERM (CURRENT) USE OF MEDICATIONS: ICD-10-CM

## 2025-03-17 DIAGNOSIS — F13.11 SEDATIVE, HYPNOTIC OR ANXIOLYTIC ABUSE, IN REMISSION: ICD-10-CM

## 2025-03-17 DIAGNOSIS — L20.89 OTHER ATOPIC DERMATITIS: ICD-10-CM

## 2025-03-17 PROCEDURE — 99999 PR PBB SHADOW E&M-EST. PATIENT-LVL V: CPT | Mod: PBBFAC,,, | Performed by: FAMILY MEDICINE

## 2025-03-17 RX ORDER — DUPILUMAB 300 MG/2ML
INJECTION, SOLUTION SUBCUTANEOUS
Qty: 4 ML | Refills: 11 | Status: SHIPPED | OUTPATIENT
Start: 2025-03-17

## 2025-03-17 RX ORDER — CLINDAMYCIN HYDROCHLORIDE 300 MG/1
300 CAPSULE ORAL 3 TIMES DAILY
Qty: 30 CAPSULE | Refills: 0 | Status: SHIPPED | OUTPATIENT
Start: 2025-03-17

## 2025-03-17 NOTE — PROGRESS NOTES
"Subjective:       Patient ID: Scott Bansal is a 64 y.o. male.    Chief Complaint: No chief complaint on file.      HPI Comments:     Current Medications[1]      He has a sore spot in his right axilla.  Has been squeezing it and putting triamcinolone cream on it for the last week.  Still sore.    Also has a small skin tag in his groin area that he would like removed.    Sees Dr. [facility] regularly      Review of Systems   Constitutional:  Negative for activity change, appetite change and fever.   HENT:  Negative for sore throat.    Respiratory:  Negative for cough and shortness of breath.    Cardiovascular:  Negative for chest pain.   Gastrointestinal:  Negative for abdominal pain, diarrhea and nausea.   Genitourinary:  Negative for difficulty urinating.   Musculoskeletal:  Negative for arthralgias and myalgias.   Skin:  Positive for color change.   Neurological:  Negative for dizziness and headaches.       Objective:      Vitals:    03/17/25 0835   BP: 106/64   Pulse: 105   Temp: 97.6 °F (36.4 °C)   TempSrc: Tympanic   SpO2: 96%   Weight: 96.9 kg (213 lb 10 oz)   Height: 5' 9" (1.753 m)   PainSc: 0-No pain     Physical Exam  Constitutional:       Appearance: He is not ill-appearing.   HENT:      Head: Normocephalic.   Pulmonary:      Effort: Pulmonary effort is normal. No respiratory distress.   Chest:      Comments: Small area of erythema and tenderness in right axilla.  No fluctuance or induration  Musculoskeletal:      Cervical back: Neck supple.   Neurological:      Mental Status: He is alert and oriented to person, place, and time.   Psychiatric:         Mood and Affect: Mood normal.         Behavior: Behavior normal.         Thought Content: Thought content normal.         Judgment: Judgment normal.         Assessment:       1. Staph infection    2. Sedative, hypnotic or anxiolytic abuse, in remission    3. Other schizophrenia    4. Chronic obstructive pulmonary disease, unspecified COPD type  "   5. Migraine without aura and without status migrainosus, not intractable    6. Type 2 diabetes mellitus with diabetic polyneuropathy, without long-term current use of insulin    7. Essential hypertension    8. Bipolar 1 disorder        Plan:   Staph infection  Comments:  Clindamycin..  Heat.  Follow-up parameters given    Sedative, hypnotic or anxiolytic abuse, in remission  Comments:  Followed by Psychiatry    Other schizophrenia  Comments:  Followed by Psychiatry    Chronic obstructive pulmonary disease, unspecified COPD type  Comments:  Stable on p.r.n. albuterol    Migraine without aura and without status migrainosus, not intractable  Comments:  Controlled    Type 2 diabetes mellitus with diabetic polyneuropathy, without long-term current use of insulin  Comments:  A1c 5.9.  Recent increase in Ozempic    Essential hypertension  Comments:  Controlled    Bipolar 1 disorder  Comments:  Stable..  Followed by Psychiatry    Other orders  -     clindamycin (CLEOCIN) 300 MG capsule; Take 1 capsule (300 mg total) by mouth 3 (three) times daily.  Dispense: 30 capsule; Refill: 0                 [1]   Current Outpatient Medications:     albuterol (PROVENTIL/VENTOLIN HFA) 90 mcg/actuation inhaler, Inhale 1-2 puffs into the lungs every 6 (six) hours as needed for Wheezing or Shortness of Breath. Rescue, Disp: 54 g, Rfl: 3    amitriptyline (ELAVIL) 25 MG tablet, TAKE 1 TABLET BY MOUTH EVERY NIGHT AT BEDTIME, Disp: 90 tablet, Rfl: 3    amLODIPine (NORVASC) 10 MG tablet, Take 1 tablet (10 mg total) by mouth once daily., Disp: 90 tablet, Rfl: 3    busPIRone (BUSPAR) 10 MG tablet, Take 10 mg by mouth 3 (three) times daily., Disp: , Rfl:     candesartan (ATACAND) 4 MG tablet, TAKE 1 TABLET(4 MG) BY MOUTH DAILY, Disp: 90 tablet, Rfl: 3    clindamycin (CLEOCIN T) 1 % external solution, AAA bid for skin infection/folliculitis, Disp: 60 mL, Rfl: 11    DUPIXENT SYRINGE 300 mg/2 mL Syrg, Inject 1 syringe (300mg) into the skin every  other week, Disp: 4 mL, Rfl: 11    EMGALITY  mg/mL PnIj, Inject 1 mL into the skin every 30 days., Disp: , Rfl:     empagliflozin (JARDIANCE) 25 mg tablet, TAKE 1 TABLET(25 MG) BY MOUTH DAILY, Disp: 90 tablet, Rfl: 1    fluticasone propionate (FLONASE) 50 mcg/actuation nasal spray, 2 sprays (100 mcg total) by Each Nostril route once daily., Disp: 16 mL, Rfl: 5    gabapentin (NEURONTIN) 300 MG capsule, Take 1 capsule (300 mg total) by mouth 2 (two) times daily., Disp: 180 capsule, Rfl: 2    gemfibroziL (LOPID) 600 MG tablet, Take 1 tablet (600 mg total) by mouth 2 (two) times daily before meals., Disp: 180 tablet, Rfl: 0    hydrocortisone 2.5 % cream, Apply topically 2 (two) times daily. Apply to rash on face., Disp: 28 g, Rfl: 1    hydrOXYzine (ATARAX) 50 MG tablet, Take 1 tablet (50 mg total) by mouth 3 (three) times daily as needed for Itching., Disp: 270 tablet, Rfl: 3    ketoconazole (NIZORAL) 2 % cream, Apply topically once daily. Apply to rash on face., Disp: 30 g, Rfl: 1    metFORMIN (GLUCOPHAGE) 1000 MG tablet, TAKE 1 TABLET(1000 MG) BY MOUTH TWICE DAILY, Disp: 180 tablet, Rfl: 1    multivitamin capsule, Take 1 capsule by mouth once daily., Disp: , Rfl:     NURTEC 75 mg odt, Take 75 mg by mouth daily as needed., Disp: , Rfl:     pantoprazole (PROTONIX) 40 MG tablet, Take 1 tablet (40 mg total) by mouth once daily., Disp: 90 tablet, Rfl: 3    quetiapine (SEROQUEL) 300 MG Tab, Take 300 mg by mouth every evening., Disp: , Rfl:     QULIPTA 10 mg Tab, Take 1 tablet by mouth Daily. On hold for now, Disp: , Rfl:     rizatriptan (MAXALT) 5 MG tablet, Take 5 mg by mouth once., Disp: , Rfl:     semaglutide (OZEMPIC) 2 mg/dose (8 mg/3 mL) PnIj, Inject 2 mg into the skin every 7 days., Disp: 9 mL, Rfl: 3    simvastatin (ZOCOR) 80 MG tablet, Take 1 tablet (80 mg total) by mouth nightly., Disp: 90 tablet, Rfl: 3    sumatriptan (IMITREX) 100 MG tablet, TAKE 1 TABLET BY MOUTH EVERY DAY AS NEEDED FOR MIGRAINE  HEADACHE, Disp: 27 tablet, Rfl: 3    tamsulosin (FLOMAX) 0.4 mg Cap, Take 2 capsules (0.8 mg total) by mouth once daily., Disp: 180 capsule, Rfl: 3    triamcinolone acetonide 0.025% (KENALOG) 0.025 % cream, Apply topically 2 (two) times daily as needed (for rash underarms). Mild steroid. Use sparingly for 1-2 weeks if needed then stop., Disp: 80 g, Rfl: 1    triamcinolone acetonide 0.1% (KENALOG) 0.1 % cream, AAA bid prn eczema. Do not use on face., Disp: 454 g, Rfl: 5    zonisamide (ZONEGRAN) 50 MG Cap, TAKE 1 CAPSULE(50 MG) BY MOUTH TWICE DAILY, Disp: 180 capsule, Rfl: 1    clindamycin (CLEOCIN) 300 MG capsule, Take 1 capsule (300 mg total) by mouth 3 (three) times daily., Disp: 30 capsule, Rfl: 0

## 2025-03-17 NOTE — TELEPHONE ENCOUNTER
----- Message from Thom sent at 3/17/2025 10:38 AM CDT -----  .Type:  Pharmacy Calling to Clarify an RXName of Caller:[facility] Name[facility] [address] [place] [id number] Prescription Name:DUPIXENT SYRINGE 300 mg/2 mL SyrgWhat do they need to clarify?:Called in to get a prescription refill sent in . This is a mail order pharmacy [facility] Call Back Number:[phone] Information: fax

## 2025-03-27 ENCOUNTER — TELEPHONE (OUTPATIENT)
Dept: DIABETES | Facility: CLINIC | Age: 65
End: 2025-03-27
Payer: MEDICARE

## 2025-03-27 DIAGNOSIS — E11.42 TYPE 2 DIABETES MELLITUS WITH DIABETIC POLYNEUROPATHY, WITHOUT LONG-TERM CURRENT USE OF INSULIN: Primary | ICD-10-CM

## 2025-03-27 RX ORDER — SEMAGLUTIDE 1.34 MG/ML
1 INJECTION, SOLUTION SUBCUTANEOUS
Qty: 4 EACH | Refills: 3 | Status: SHIPPED | OUTPATIENT
Start: 2025-03-27 | End: 2026-03-27

## 2025-03-27 NOTE — TELEPHONE ENCOUNTER
----- Message from Adilia Ibarra sent at 3/27/2025 12:29 PM CDT -----  Regarding: Ozempic 1mg Pharmacy Patient Assistance  [facility] has received medication from [facility] patient assistance program for your patient Scott Bansal ([id number]).  At your earliest convenience please send to [facility] escript for Ozempic 1mg ( 4 pens). Thanks Fred

## 2025-03-28 DIAGNOSIS — L23.89 ALLERGIC CONTACT DERMATITIS DUE TO OTHER AGENTS: ICD-10-CM

## 2025-03-31 ENCOUNTER — RESULTS FOLLOW-UP (OUTPATIENT)
Dept: FAMILY MEDICINE | Facility: CLINIC | Age: 65
End: 2025-03-31

## 2025-03-31 ENCOUNTER — HOSPITAL ENCOUNTER (OUTPATIENT)
Dept: RADIOLOGY | Facility: HOSPITAL | Age: 65
Discharge: HOME | End: 2025-03-31
Attending: NURSE PRACTITIONER
Payer: MEDICARE

## 2025-03-31 ENCOUNTER — OFFICE VISIT (OUTPATIENT)
Dept: FAMILY MEDICINE | Facility: CLINIC | Age: 65
End: 2025-03-31
Payer: MEDICARE

## 2025-03-31 VITALS
TEMPERATURE: 97 F | DIASTOLIC BLOOD PRESSURE: 73 MMHG | WEIGHT: 214.38 LBS | HEIGHT: 69 IN | OXYGEN SATURATION: 93 % | BODY MASS INDEX: 31.75 KG/M2 | SYSTOLIC BLOOD PRESSURE: 115 MMHG | HEART RATE: 100 BPM

## 2025-03-31 DIAGNOSIS — R07.89 LEFT-SIDED CHEST WALL PAIN: ICD-10-CM

## 2025-03-31 DIAGNOSIS — R07.89 LEFT-SIDED CHEST WALL PAIN: Primary | ICD-10-CM

## 2025-03-31 LAB
OHS QRS DURATION: 82 MS
OHS QTC CALCULATION: 426 MS

## 2025-03-31 PROCEDURE — 3008F BODY MASS INDEX DOCD: CPT | Mod: CPTII,S$GLB,, | Performed by: NURSE PRACTITIONER

## 2025-03-31 PROCEDURE — 3061F NEG MICROALBUMINURIA REV: CPT | Mod: CPTII,S$GLB,, | Performed by: NURSE PRACTITIONER

## 2025-03-31 PROCEDURE — 1125F AMNT PAIN NOTED PAIN PRSNT: CPT | Mod: CPTII,S$GLB,, | Performed by: NURSE PRACTITIONER

## 2025-03-31 PROCEDURE — 93005 ELECTROCARDIOGRAM TRACING: CPT | Mod: S$GLB,,, | Performed by: NURSE PRACTITIONER

## 2025-03-31 PROCEDURE — 1160F RVW MEDS BY RX/DR IN RCRD: CPT | Mod: CPTII,S$GLB,, | Performed by: NURSE PRACTITIONER

## 2025-03-31 PROCEDURE — 93010 ELECTROCARDIOGRAM REPORT: CPT | Mod: S$GLB,,, | Performed by: INTERNAL MEDICINE

## 2025-03-31 PROCEDURE — 71046 X-RAY EXAM CHEST 2 VIEWS: CPT | Mod: 26,,, | Performed by: RADIOLOGY

## 2025-03-31 PROCEDURE — 3066F NEPHROPATHY DOC TX: CPT | Mod: CPTII,S$GLB,, | Performed by: NURSE PRACTITIONER

## 2025-03-31 PROCEDURE — 71046 X-RAY EXAM CHEST 2 VIEWS: CPT | Mod: TC,PO

## 2025-03-31 PROCEDURE — 4010F ACE/ARB THERAPY RXD/TAKEN: CPT | Mod: CPTII,S$GLB,, | Performed by: NURSE PRACTITIONER

## 2025-03-31 PROCEDURE — 3288F FALL RISK ASSESSMENT DOCD: CPT | Mod: CPTII,S$GLB,, | Performed by: NURSE PRACTITIONER

## 2025-03-31 PROCEDURE — 3078F DIAST BP <80 MM HG: CPT | Mod: CPTII,S$GLB,, | Performed by: NURSE PRACTITIONER

## 2025-03-31 PROCEDURE — 99214 OFFICE O/P EST MOD 30 MIN: CPT | Mod: S$GLB,,, | Performed by: NURSE PRACTITIONER

## 2025-03-31 PROCEDURE — 1101F PT FALLS ASSESS-DOCD LE1/YR: CPT | Mod: CPTII,S$GLB,, | Performed by: NURSE PRACTITIONER

## 2025-03-31 PROCEDURE — 99999 PR PBB SHADOW E&M-EST. PATIENT-LVL V: CPT | Mod: PBBFAC,,, | Performed by: NURSE PRACTITIONER

## 2025-03-31 PROCEDURE — 1159F MED LIST DOCD IN RCRD: CPT | Mod: CPTII,S$GLB,, | Performed by: NURSE PRACTITIONER

## 2025-03-31 PROCEDURE — 3074F SYST BP LT 130 MM HG: CPT | Mod: CPTII,S$GLB,, | Performed by: NURSE PRACTITIONER

## 2025-03-31 PROCEDURE — 3072F LOW RISK FOR RETINOPATHY: CPT | Mod: CPTII,S$GLB,, | Performed by: NURSE PRACTITIONER

## 2025-03-31 PROCEDURE — 3044F HG A1C LEVEL LT 7.0%: CPT | Mod: CPTII,S$GLB,, | Performed by: NURSE PRACTITIONER

## 2025-03-31 RX ORDER — TRIAMCINOLONE ACETONIDE 0.25 MG/G
CREAM TOPICAL
Qty: 80 G | Refills: 1 | Status: SHIPPED | OUTPATIENT
Start: 2025-03-31

## 2025-03-31 RX ORDER — TIZANIDINE 4 MG/1
4 TABLET ORAL EVERY 8 HOURS
Qty: 30 TABLET | Refills: 0 | Status: SHIPPED | OUTPATIENT
Start: 2025-03-31 | End: 2025-04-10

## 2025-03-31 NOTE — TELEPHONE ENCOUNTER
Refill Routing Note   Medication(s) are not appropriate for processing by [facility] for the following reason(s):        Non-participating provider    [facility] action(s):  Route               Appointments  past 12m or future 3m with PCP    Date Provider   Last Visit   7/31/2024 Cinthia Chapin MD   Next Visit   7/30/2025 Cinthia Chapin MD   ED visits in past 90 days: 0        Note composed:8:56 AM 03/31/2025

## 2025-04-01 ENCOUNTER — PATIENT MESSAGE (OUTPATIENT)
Dept: FAMILY MEDICINE | Facility: CLINIC | Age: 65
End: 2025-04-01
Payer: MEDICARE

## 2025-04-08 ENCOUNTER — TELEPHONE (OUTPATIENT)
Dept: FAMILY MEDICINE | Facility: CLINIC | Age: 65
End: 2025-04-08
Payer: MEDICARE

## 2025-04-08 DIAGNOSIS — Z12.11 SCREENING FOR COLON CANCER: Primary | ICD-10-CM

## 2025-04-08 NOTE — TELEPHONE ENCOUNTER
----- Message from Kareen sent at 4/8/2025 10:17 AM CDT -----  Contact: Scott Maldonado is calling regarding referral. Reports wanting to get a colonoscopy completed. Needing a referral placed to endoscopy dep. Please contact pt at [phone]

## 2025-04-16 ENCOUNTER — HOSPITAL ENCOUNTER (OUTPATIENT)
Dept: PREADMISSION TESTING | Facility: HOSPITAL | Age: 65
Discharge: HOME | End: 2025-04-16
Attending: FAMILY MEDICINE
Payer: MEDICARE

## 2025-04-16 DIAGNOSIS — Z12.11 SCREENING FOR COLON CANCER: Primary | ICD-10-CM

## 2025-04-16 RX ORDER — SODIUM, POTASSIUM,MAG SULFATES 17.5-3.13G
1 SOLUTION, RECONSTITUTED, ORAL ORAL DAILY
Qty: 1 KIT | Refills: 0 | Status: SHIPPED | OUTPATIENT
Start: 2025-04-16 | End: 2025-04-18

## 2025-04-19 DIAGNOSIS — E78.5 HYPERLIPIDEMIA, UNSPECIFIED HYPERLIPIDEMIA TYPE: ICD-10-CM

## 2025-04-19 NOTE — PROGRESS NOTES
Subjective:       Patient ID: Scott Bansal is a 64 y.o. male.    Chief Complaint: Muscle Pain    History of Present Illness    Patient presents today for left chest wall pain after heavy lifting He reports acute left chest wall pain that began yesterday after moving and lifting a couch. The pain is rated 10/10, localized to the left side, and shoots downward with arm elevation. Pain is exacerbated by breathing, coughing, sneezing, yawning, straining, and raising his arm. He denies radiation to the back. Pain improves with remaining still. He has attempted Tylenol and ibuprofen with temporary relief, while heating pad and back massager provided no relief. He has COPD.      ROS:  General: -fever, -chills, -fatigue, -weight gain, -weight loss  Eyes: -vision changes, -redness, -discharge  ENT: -ear pain, -nasal congestion, -sore throat  Cardiovascular: -chest pain, -palpitations, -lower extremity edema  Respiratory: -cough, -shortness of breath  Gastrointestinal: -abdominal pain, -nausea, -vomiting, -diarrhea, -constipation, -blood in stool  Genitourinary: -dysuria, -hematuria, -frequency  Musculoskeletal: -joint pain, +muscle pain, +pain with movement, +pain with respiration  Skin: -rash, -lesion  Neurological: -headache, -dizziness, -numbness, -tingling  Psychiatric: -anxiety, -depression, -sleep difficulty        Past Medical History:   Diagnosis Date    Bipolar 1 disorder, mixed     BPH (benign prostatic hypertrophy)     Colon polyp     Cyst, eyelid     Dr. Broussard    Diabetes mellitus     GERD (gastroesophageal reflux disease)     Hyperlipidemia     Hypertension     Lumbar degenerative disc disease 3/7/2019    Migraine headache     Obesity       Medications Ordered Prior to Encounter[1]       Objective:      Physical Exam    General: In no acute distress.  Head: Normocephalic. Non traumatic.  Eyes: PERRLA. EOMs full. Conjunctivae clear. Fundi grossly normal.  Ears: EACs clear. TMs normal.  Nose: Mucosa pink.  Mucosa moist. No obstruction.  Throat: Clear. No exudates. No lesions.  Neck: Supple. No masses. No thyromegaly. No bruits.  Chest: Lungs clear. No rales. No rhonchi. No wheezes.  Heart: RRR. No murmurs. No rubs. No gallops.  Abdomen: Soft. No tenderness. No masses. BS normal.  : Normal external genitalia. No lesions. No discharge. No hernias  noted.  Back: Normal curvature. No scoliosis. No tenderness.  Extremities: Warm. Well perfused. No upper extremity edema. No lower extremity edema. FROM. No deformities. No joint erythema.  Neuro: No focal deficits appreciated. Good muscle tone. Normal response to visual stimuli. Normal response to auditory stimuli.  Skin: Normal. No rashes. No lesions noted.          Assessment/Plan:     1. Left-sided chest wall pain       Assessment & Plan    CHEST WALL PAIN (THORACIC REGION):  - Assessed chest wall pain, likely musculoskeletal in origin due to recent heavy lifting.  - Patient reports pain in the rib muscles since yesterday, rated as 10 out of 10 on a pain scale.  - Pain is exacerbated by breathing hard, coughing, sneezing, yawning, and taking deep breaths.  - Localized the pain to the left side of the chest without radiation to the back.  - Noted that raising the arm worsens the pain and causes it to shoot downward.  - Observed no visible bruising.  - Ordered an electrocardiogram (EKG) to rule out cardiac issues, although the pain is likely due to muscle strain from heavy lifting.    LEFT SHOULDER PAIN:  - Patient reports pain in the left chest wall, which may be related to the shoulder area.  - Noted that raising the arm exacerbates the pain and causes it to shoot downward.    CHRONIC OBSTRUCTIVE PULMONARY DISEASE (COPD):  - Patient acknowledges having COPD when questioned about shortness of breath.               No follow-ups on file.    This note was generated with the assistance of ambient listening technology. Verbal consent was obtained by the patient and  accompanying visitor(s) for the recording of patient appointment to facilitate this note. I attest to having reviewed and edited the generated note for accuracy, though some syntax or spelling errors may persist. Please contact the author of this note for any clarification.            [1]   Current Outpatient Medications on File Prior to Visit   Medication Sig Dispense Refill    albuterol (PROVENTIL/VENTOLIN HFA) 90 mcg/actuation inhaler Inhale 1-2 puffs into the lungs every 6 (six) hours as needed for Wheezing or Shortness of Breath. Rescue 54 g 3    amitriptyline (ELAVIL) 25 MG tablet TAKE 1 TABLET BY MOUTH EVERY NIGHT AT BEDTIME 90 tablet 3    busPIRone (BUSPAR) 10 MG tablet Take 10 mg by mouth 3 (three) times daily.      candesartan (ATACAND) 4 MG tablet TAKE 1 TABLET(4 MG) BY MOUTH DAILY 90 tablet 3    clindamycin (CLEOCIN T) 1 % external solution AAA bid for skin infection/folliculitis 60 mL 11    clindamycin (CLEOCIN) 300 MG capsule Take 1 capsule (300 mg total) by mouth 3 (three) times daily. 30 capsule 0    DUPIXENT SYRINGE 300 mg/2 mL Syrg Inject 1 syringe (300mg) into the skin every other week 4 mL 11    EMGALITY  mg/mL PnIj Inject 1 mL into the skin every 30 days.      empagliflozin (JARDIANCE) 25 mg tablet TAKE 1 TABLET(25 MG) BY MOUTH DAILY 90 tablet 1    fluticasone propionate (FLONASE) 50 mcg/actuation nasal spray 2 sprays (100 mcg total) by Each Nostril route once daily. 16 mL 5    gabapentin (NEURONTIN) 300 MG capsule Take 1 capsule (300 mg total) by mouth 2 (two) times daily. 180 capsule 2    gemfibroziL (LOPID) 600 MG tablet Take 1 tablet (600 mg total) by mouth 2 (two) times daily before meals. 180 tablet 0    hydrocortisone 2.5 % cream Apply topically 2 (two) times daily. Apply to rash on face. 28 g 1    hydrOXYzine (ATARAX) 50 MG tablet Take 1 tablet (50 mg total) by mouth 3 (three) times daily as needed for Itching. 270 tablet 3    ketoconazole (NIZORAL) 2 % cream Apply topically once  daily. Apply to rash on face. 30 g 1    metFORMIN (GLUCOPHAGE) 1000 MG tablet TAKE 1 TABLET(1000 MG) BY MOUTH TWICE DAILY 180 tablet 1    multivitamin capsule Take 1 capsule by mouth once daily.      NURTEC 75 mg odt Take 75 mg by mouth daily as needed.      pantoprazole (PROTONIX) 40 MG tablet Take 1 tablet (40 mg total) by mouth once daily. 90 tablet 3    quetiapine (SEROQUEL) 300 MG Tab Take 300 mg by mouth every evening.      QULIPTA 10 mg Tab Take 1 tablet by mouth Daily. On hold for now      rizatriptan (MAXALT) 5 MG tablet Take 5 mg by mouth once.      semaglutide (OZEMPIC) 1 mg/dose (4 mg/3 mL) Inject 1 mg into the skin every 7 days. 4 each 3    simvastatin (ZOCOR) 80 MG tablet Take 1 tablet (80 mg total) by mouth nightly. 90 tablet 3    sumatriptan (IMITREX) 100 MG tablet TAKE 1 TABLET BY MOUTH EVERY DAY AS NEEDED FOR MIGRAINE HEADACHE 27 tablet 3    tamsulosin (FLOMAX) 0.4 mg Cap Take 2 capsules (0.8 mg total) by mouth once daily. 180 capsule 3    triamcinolone acetonide 0.025% (KENALOG) 0.025 % cream APPLY TOPICALLY TWICE DAILY AS NEEDED FOR RASH UNDERARMS. MILD STEROID. USE SPARINGLY FOR 1 TO 2 WEEKS IF NEEDED THEN STOP 80 g 1    triamcinolone acetonide 0.1% (KENALOG) 0.1 % cream AAA bid prn eczema. Do not use on face. 454 g 5    zonisamide (ZONEGRAN) 50 MG Cap TAKE 1 CAPSULE(50 MG) BY MOUTH TWICE DAILY 180 capsule 1    [DISCONTINUED] azelastine (ASTELIN) 137 mcg (0.1 %) nasal spray SPRAY 1 SPRAY (137 MCG TOTAL) BY NASAL ROUTE 2 (TWO) TIMES DAILY. 30 mL 4    [DISCONTINUED] folic acid (FOLVITE) 800 MCG Tab Take 800 mcg by mouth once daily.      [DISCONTINUED] furosemide (LASIX) 40 MG tablet TAKE 1 TABLET BY MOUTH EVERY DAY 90 tablet 0     No current facility-administered medications on file prior to visit.

## 2025-04-20 DIAGNOSIS — R07.89 LEFT-SIDED CHEST WALL PAIN: ICD-10-CM

## 2025-04-20 NOTE — TELEPHONE ENCOUNTER
Care Due:                  Date            Visit Type   Department     Provider  --------------------------------------------------------------------------------                                EP -                              PRIMARY      [facility] INTERNAL  Last Visit: 03-      CARE ([facility])   MEDICINE       Avni Leon                              [facility]                              PRIMARY      [facility] INTERNAL  Next Visit: 06-      CARE ([facility])   DARI Leon                                                            Last  Test          Frequency    Reason                     Performed    Due Date  --------------------------------------------------------------------------------    HBA1C.......  6 months...  empagliflozin, metFORMIN.  01-   07-    [facility] Embedded Care Due Messages. Reference number: [id number].   4/19/2025 7:10:14 PM CDT

## 2025-04-22 ENCOUNTER — PATIENT MESSAGE (OUTPATIENT)
Dept: FAMILY MEDICINE | Facility: CLINIC | Age: 65
End: 2025-04-22
Payer: MEDICARE

## 2025-04-23 RX ORDER — TIZANIDINE 4 MG/1
TABLET ORAL
Qty: 30 TABLET | Refills: 0 | Status: SHIPPED | OUTPATIENT
Start: 2025-04-23

## 2025-04-24 DIAGNOSIS — I10 ESSENTIAL HYPERTENSION: ICD-10-CM

## 2025-04-24 RX ORDER — CANDESARTAN 4 MG/1
4 TABLET ORAL DAILY
Qty: 90 TABLET | Refills: 2 | Status: SHIPPED | OUTPATIENT
Start: 2025-04-24

## 2025-04-24 RX ORDER — GEMFIBROZIL 600 MG/1
600 TABLET, FILM COATED ORAL
Qty: 180 TABLET | Refills: 0 | Status: SHIPPED | OUTPATIENT
Start: 2025-04-24

## 2025-04-24 NOTE — TELEPHONE ENCOUNTER
Refill Decision Note   Scott Robertsstephaniemonie  is requesting a refill authorization.  Brief Assessment and Rationale for Refill:  Approve     Medication Therapy Plan:         Comments:     Note composed:2:55 PM 04/24/2025

## 2025-04-24 NOTE — TELEPHONE ENCOUNTER
No care due was identified.  [facility] Embedded Care Due Messages. Reference number: [id number].   4/24/2025 2:09:03 PM CDT

## 2025-04-28 DIAGNOSIS — Z00.00 ENCOUNTER FOR MEDICARE ANNUAL WELLNESS EXAM: ICD-10-CM

## 2025-04-29 ENCOUNTER — HOSPITAL ENCOUNTER (OUTPATIENT)
Dept: ENDOSCOPY | Facility: HOSPITAL | Age: 65
Discharge: HOME | End: 2025-04-29
Attending: FAMILY MEDICINE | Admitting: FAMILY MEDICINE
Payer: MEDICARE

## 2025-04-29 ENCOUNTER — ANESTHESIA EVENT (OUTPATIENT)
Dept: ENDOSCOPY | Facility: HOSPITAL | Age: 65
End: 2025-04-29
Payer: MEDICARE

## 2025-04-29 ENCOUNTER — ANESTHESIA (OUTPATIENT)
Dept: ENDOSCOPY | Facility: HOSPITAL | Age: 65
End: 2025-04-29
Payer: MEDICARE

## 2025-04-29 DIAGNOSIS — Z12.11 SCREENING FOR COLON CANCER: ICD-10-CM

## 2025-04-29 DIAGNOSIS — Z86.0100 HISTORY OF COLON POLYPS: ICD-10-CM

## 2025-04-29 DIAGNOSIS — K63.5 POLYP OF COLON, UNSPECIFIED PART OF COLON, UNSPECIFIED TYPE: ICD-10-CM

## 2025-04-29 DIAGNOSIS — Z12.11 COLON CANCER SCREENING: Primary | ICD-10-CM

## 2025-04-29 LAB — POCT GLUCOSE: 122 MG/DL (ref 70–110)

## 2025-04-29 PROCEDURE — 27201089 HC SNARE, DISP (ANY): Performed by: FAMILY MEDICINE

## 2025-04-29 PROCEDURE — 63600175 PHARM REV CODE 636 W HCPCS: Performed by: NURSE ANESTHETIST, CERTIFIED REGISTERED

## 2025-04-29 PROCEDURE — 88305 TISSUE EXAM BY PATHOLOGIST: CPT | Mod: TC | Performed by: FAMILY MEDICINE

## 2025-04-29 PROCEDURE — 37000008 HC ANESTHESIA 1ST 15 MINUTES

## 2025-04-29 PROCEDURE — 25000003 PHARM REV CODE 250: Performed by: FAMILY MEDICINE

## 2025-04-29 PROCEDURE — 37000009 HC ANESTHESIA EA ADD 15 MINS

## 2025-04-29 RX ORDER — DEXTROMETHORPHAN/PSEUDOEPHED 2.5-7.5/.8
DROPS ORAL
Status: COMPLETED | OUTPATIENT
Start: 2025-04-29 | End: 2025-04-29

## 2025-04-29 RX ORDER — LIDOCAINE HYDROCHLORIDE 10 MG/ML
INJECTION, SOLUTION EPIDURAL; INFILTRATION; INTRACAUDAL; PERINEURAL
Status: DISCONTINUED | OUTPATIENT
Start: 2025-04-29 | End: 2025-04-29

## 2025-04-29 RX ORDER — PROPOFOL 10 MG/ML
VIAL (ML) INTRAVENOUS
Status: DISCONTINUED | OUTPATIENT
Start: 2025-04-29 | End: 2025-04-29

## 2025-04-29 RX ADMIN — PROPOFOL 30 MG: 10 INJECTION, EMULSION INTRAVENOUS at 08:04

## 2025-04-29 RX ADMIN — PROPOFOL 40 MG: 10 INJECTION, EMULSION INTRAVENOUS at 08:04

## 2025-04-29 RX ADMIN — SIMETHICONE 20 MG: 20 SUSPENSION/ DROPS ORAL at 08:04

## 2025-04-29 RX ADMIN — PROPOFOL 70 MG: 10 INJECTION, EMULSION INTRAVENOUS at 08:04

## 2025-04-29 RX ADMIN — LIDOCAINE HYDROCHLORIDE 50 MG: 10 SOLUTION INTRAVENOUS at 08:04

## 2025-04-29 NOTE — TRANSFER OF CARE
"Anesthesia Transfer of Care Note    Patient: Scott Bansal    Procedure(s) Performed: * No procedures listed *    Patient location: GI    Anesthesia Type: MAC    Transport from OR: Transported from OR on room air with adequate spontaneous ventilation    Post pain: adequate analgesia    Post assessment: no apparent anesthetic complications and tolerated procedure well    Post vital signs: stable    Level of consciousness: responds to stimulation    Nausea/Vomiting: no nausea/vomiting    Complications: none    Transfer of care protocol was followed      Last vitals: Visit Vitals  /66 (Patient Position: Lying)   Pulse 73   Temp 36.1 °C (97 °F) (Temporal)   Resp 12   Ht 5' 9" (1.753 m)   Wt 89.4 kg (197 lb)   SpO2 98%   BMI 29.09 kg/m²     "

## 2025-04-29 NOTE — ANESTHESIA POSTPROCEDURE EVALUATION
Anesthesia Post Evaluation    Patient: Scott Bansal    Procedure(s) Performed: * No procedures listed *    Final Anesthesia Type: MAC      Patient location during evaluation: GI PACU  Patient participation: Yes- Able to Participate  Level of consciousness: awake and alert and oriented  Post-procedure vital signs: reviewed and stable  Pain management: adequate  Airway patency: patent  KRISS mitigation strategies: Multimodal analgesia  PONV status at discharge: No PONV  Anesthetic complications: no      Cardiovascular status: hemodynamically stable  Respiratory status: unassisted, spontaneous ventilation and room air  Hydration status: euvolemic  Follow-up not needed.              Vitals Value Taken Time   /73 04/29/25 09:02   Temp 36.1 °C (97 °F) 04/29/25 08:52   Pulse 75 04/29/25 09:02   Resp 18 04/29/25 09:02   SpO2 98 % 04/29/25 09:02         Event Time   Out of Recovery 09:13:04         Pain/Fina Score: Fina Score: 10 (4/29/2025  9:02 AM)

## 2025-04-29 NOTE — PLAN OF CARE
DR Linares  at bedside to speak with pt and family about the results of the procedure. All questions answered with no further questions at this time.

## 2025-04-29 NOTE — H&P
Short Stay Endoscopy History and Physical    PCP - Avni Leon MD    Procedure - Colonoscopy  ASA - 2  Mallampati - per anesthesia  History of Anesthesia problems - no  Family history Anesthesia problems -  no     HPI:  This is a 64 y.o. male here for evaluation of :   Active Hospital Problems    Diagnosis  POA    *Colon cancer screening [Z12.11]  Not Applicable    History of colon polyps [Z86.0100]  Not Applicable      Resolved Hospital Problems   No resolved problems to display.         Health Maintenance         Date Due Completion Date    RSV Vaccine (Age 60+ and Pregnant patients) (1 - Risk 60-74 years 1-dose series) Never done ---    Influenza Vaccine (1) 09/01/2024 4/8/2024 (Declined)    Override on 4/8/2024: Declined    COVID-19 Vaccine (6 - 2024-25 season) 09/01/2024 9/15/2022    Abdominal Aortic Aneurysm Screening Never done ---    Diabetic Eye Exam 05/28/2025 5/28/2024    Hemoglobin A1c 07/10/2025 1/10/2025    Foot Exam 08/08/2025 8/8/2024    Override on 8/8/2024: Done    Override on 7/19/2023: Done    Override on 10/26/2020: Done (completed by insurance)    Lipid Panel 01/10/2026 1/10/2025    Diabetes Urine Screening 02/26/2026 2/26/2025    High Dose Statin 03/31/2026 3/31/2025    Colorectal Cancer Screening 06/28/2026 6/28/2021    TETANUS VACCINE 07/07/2027 7/7/2017            Screening - Yes  History of polyps - Yes     Diarrhea - no  Anemia - no  Blood in stools - no  Abdominal pain - no  Other - no    ROS:  CONSTITUTIONAL: Denies weight change,  fatigue, fevers, chills, night sweats.  CARDIOVASCULAR: Denies chest pain, shortness of breath, orthopnea and edema.  RESPIRATORY: Denies cough, hemoptysis, dyspnea, and wheezing.  GI: See HPI.    Medical History:   Past Medical History:   Diagnosis Date    Bipolar 1 disorder, mixed     BPH (benign prostatic hypertrophy)     Colon polyp     Cyst, eyelid     Dr. Broussard    Diabetes mellitus     GERD (gastroesophageal reflux disease)     Hyperlipidemia      Hypertension     Lumbar degenerative disc disease 3/7/2019    Migraine headache     Obesity        Surgical History:   Past Surgical History:   Procedure Laterality Date    CERVICAL SPINE SURGERY      COLONOSCOPY N/A 7/27/2017    Procedure: COLONOSCOPY;  Surgeon: Genaro Nix MD;  Location: [facility] ENDO;  Service: Endoscopy;  Laterality: N/A;    COLONOSCOPY N/A 10/30/2020    Procedure: COLONOSCOPY;  Surgeon: Herb Martinez MD;  Location: [facility] ENDO;  Service: Endoscopy;  Laterality: N/A;    EPIDURAL STEROID INJECTION Bilateral 9/27/2023    Procedure: Bilateral L5 Transforaminal Epidural Steroid Injections (ref: [place]);  Surgeon: Tanner Mayberry Jr., MD;  Location: [facility] ENDO;  Service: Pain Management;  Laterality: Bilateral;  @0815  No ATC   Check BG  Needs COnsent    EYE SURGERY Left 03/2017    cyst removal on eyelid; Dr. Broussard    SHOULDER SURGERY      TONSILLECTOMY         Family History:   Family History   Problem Relation Name Age of Onset    Cataracts Mother      Cancer Mother          throat    Hypertension Mother      Hypertension Father      Stroke Father          2 strokes    Hypertension Sister Leona     Cancer Brother Jose Armando         spinal, kidney, spinal fluid    Hypertension Brother Jose Armando     Cancer Cousin ?         breast?       Social History:   Social History[1]    Allergies:   Review of patient's allergies indicates:   Allergen Reactions    Levofloxacin Hallucinations    Sulfa (sulfonamide antibiotics) Rash       Medications:   Medications Ordered Prior to Encounter[2]    Physical Exam:  Vital Signs: There were no vitals filed for this visit.  General Appearance: Well appearing in no acute distress  ENT: OP clear  Chest: CTA B  CV: RRR, no m/r/g  Abd: s/nt/nd/nabs  Ext: no edema    Labs:Reviewed    IMP:  Active Hospital Problems    Diagnosis  POA    *Colon cancer screening [Z12.11]  Not Applicable    History of colon polyps [Z86.0100]  Not Applicable      Resolved Hospital Problems    No resolved problems to display.         Plan:   I have explained the risks and benefits of colonoscopy to the patient including but not limited to bleeding, perforation, infection, and death. The patient wishes to proceed.         [1]   Social History  Tobacco Use    Smoking status: Former     Current packs/day: 0.00     Average packs/day: 2.0 packs/day for 15.0 years (30.0 ttl pk-yrs)     Types: Cigarettes     Start date: 1969     Quit date: 1984     Years since quittin.9     Passive exposure: Past    Smokeless tobacco: Never    Tobacco comments:     Patient quit smoking at the age of 24 yo.   Substance Use Topics    Alcohol use: No    Drug use: No   [2]   Current Outpatient Medications on File Prior to Encounter   Medication Sig Dispense Refill    albuterol (PROVENTIL/VENTOLIN HFA) 90 mcg/actuation inhaler Inhale 1-2 puffs into the lungs every 6 (six) hours as needed for Wheezing or Shortness of Breath. Rescue 54 g 3    amitriptyline (ELAVIL) 25 MG tablet TAKE 1 TABLET BY MOUTH EVERY NIGHT AT BEDTIME 90 tablet 3    amLODIPine (NORVASC) 10 MG tablet Take 1 tablet (10 mg total) by mouth once daily. 90 tablet 3    busPIRone (BUSPAR) 10 MG tablet Take 10 mg by mouth 3 (three) times daily.      candesartan (ATACAND) 4 MG tablet Take 1 tablet (4 mg total) by mouth once daily. 90 tablet 2    EMGALITY  mg/mL PnIj Inject 1 mL into the skin every 30 days.      empagliflozin (JARDIANCE) 25 mg tablet TAKE 1 TABLET(25 MG) BY MOUTH DAILY 90 tablet 1    fluticasone propionate (FLONASE) 50 mcg/actuation nasal spray 2 sprays (100 mcg total) by Each Nostril route once daily. 16 mL 5    gabapentin (NEURONTIN) 300 MG capsule Take 1 capsule (300 mg total) by mouth 2 (two) times daily. 180 capsule 2    gemfibroziL (LOPID) 600 MG tablet Take 1 tablet (600 mg total) by mouth 2 (two) times daily before meals. 180 tablet 0    hydrOXYzine (ATARAX) 50 MG tablet Take 1 tablet (50 mg total) by mouth 3 (three) times  daily as needed for Itching. 270 tablet 3    metFORMIN (GLUCOPHAGE) 1000 MG tablet TAKE 1 TABLET(1000 MG) BY MOUTH TWICE DAILY 180 tablet 1    multivitamin capsule Take 1 capsule by mouth once daily.      NURTEC 75 mg odt Take 75 mg by mouth daily as needed.      pantoprazole (PROTONIX) 40 MG tablet Take 1 tablet (40 mg total) by mouth once daily. 90 tablet 3    quetiapine (SEROQUEL) 300 MG Tab Take 300 mg by mouth every evening.      rizatriptan (MAXALT) 5 MG tablet Take 5 mg by mouth once.      simvastatin (ZOCOR) 80 MG tablet Take 1 tablet (80 mg total) by mouth nightly. 90 tablet 3    sumatriptan (IMITREX) 100 MG tablet TAKE 1 TABLET BY MOUTH EVERY DAY AS NEEDED FOR MIGRAINE HEADACHE 27 tablet 3    tamsulosin (FLOMAX) 0.4 mg Cap Take 2 capsules (0.8 mg total) by mouth once daily. 180 capsule 3    tiZANidine (ZANAFLEX) 4 MG tablet TAKE 1 TABLET(4 MG) BY MOUTH EVERY 8 HOURS FOR 10 DAYS 30 tablet 0    zonisamide (ZONEGRAN) 50 MG Cap TAKE 1 CAPSULE(50 MG) BY MOUTH TWICE DAILY 180 capsule 1    clindamycin (CLEOCIN T) 1 % external solution AAA bid for skin infection/folliculitis 60 mL 11    clindamycin (CLEOCIN) 300 MG capsule Take 1 capsule (300 mg total) by mouth 3 (three) times daily. 30 capsule 0    DUPIXENT SYRINGE 300 mg/2 mL Syrg Inject 1 syringe (300mg) into the skin every other week 4 mL 11    hydrocortisone 2.5 % cream Apply topically 2 (two) times daily. Apply to rash on face. 28 g 1    ketoconazole (NIZORAL) 2 % cream Apply topically once daily. Apply to rash on face. 30 g 1    QULIPTA 10 mg Tab Take 1 tablet by mouth Daily. On hold for now      semaglutide (OZEMPIC) 1 mg/dose (4 mg/3 mL) Inject 1 mg into the skin every 7 days. 4 each 3    triamcinolone acetonide 0.025% (KENALOG) 0.025 % cream APPLY TOPICALLY TWICE DAILY AS NEEDED FOR RASH UNDERARMS. MILD STEROID. USE SPARINGLY FOR 1 TO 2 WEEKS IF NEEDED THEN STOP 80 g 1    triamcinolone acetonide 0.1% (KENALOG) 0.1 % cream AAA bid prn eczema. Do not use  on face. 454 g 5    [DISCONTINUED] azelastine (ASTELIN) 137 mcg (0.1 %) nasal spray SPRAY 1 SPRAY (137 MCG TOTAL) BY NASAL ROUTE 2 (TWO) TIMES DAILY. 30 mL 4    [DISCONTINUED] folic acid (FOLVITE) 800 MCG Tab Take 800 mcg by mouth once daily.      [DISCONTINUED] furosemide (LASIX) 40 MG tablet TAKE 1 TABLET BY MOUTH EVERY DAY 90 tablet 0     No current facility-administered medications on file prior to encounter.

## 2025-04-29 NOTE — ANESTHESIA PREPROCEDURE EVALUATION
04/29/2025  Scott Bansal is a 64 y.o., male.      Pre-op Assessment    I have reviewed the Patient Summary Reports.    I have reviewed the NPO Status.   I have reviewed the Medications.     Review of Systems  Anesthesia Hx:  No problems with previous Anesthesia                Social:  Former Smoker, No Alcohol Use       Hematology/Oncology:    Oncology Normal                                   EENT/Dental:  EENT/Dental Normal           Cardiovascular:     Hypertension   CAD              ECG has been reviewed. Vent. Rate :  85 BPM     Atrial Rate :  85 BPM      P-R Int : 202 ms          QRS Dur :  82 ms       QT Int : 358 ms       P-R-T Axes :  44 -18  78 degrees     QTcB Int : 426 ms     Normal sinus rhythm   Low voltage QRS   Inferior infarct ,age undetermined   Cannot rule out Anterior infarct ,age undetermined   Abnormal ECG   No previous ECGs available   Confirmed by Miguel Guerra ([id number]) on 3/31/2025 3:51:16 PM                              Pulmonary:   COPD     Sleep Apnea                Renal/:  Renal/ Normal                 Hepatic/GI:  Bowel Prep.   GERD    Taking GLP-1 Agonists            Musculoskeletal:         Spine Disorders: lumbar Chronic Pain           Neurological:      Headaches           Peripheral Neuropathy                          Endocrine:  Diabetes           Psych:  Psychiatric History   Bipolar   Schizophrenia                Physical Exam  General: Well nourished, Cooperative, Alert and Oriented    Airway:  Mallampati: II   Mouth Opening: Normal  TM Distance: Normal  Tongue: Normal  Neck ROM: Normal ROM    Dental:  Intact        Anesthesia Plan  Type of Anesthesia, risks & benefits discussed:    Anesthesia Type: MAC  Intra-op Monitoring Plan: Standard ASA Monitors  Post Op Pain Control Plan: IV/PO Opioids PRN  Informed Consent: Informed consent signed with the Patient and  all parties understand the risks and agree with anesthesia plan.  All questions answered.   ASA Score: 3  Day of Surgery Review of History & Physical: H&P Update referred to the surgeon/provider.    Ready For Surgery From Anesthesia Perspective.     .

## 2025-04-30 VITALS
OXYGEN SATURATION: 98 % | DIASTOLIC BLOOD PRESSURE: 73 MMHG | RESPIRATION RATE: 18 BRPM | HEART RATE: 75 BPM | HEIGHT: 69 IN | BODY MASS INDEX: 29.18 KG/M2 | TEMPERATURE: 97 F | WEIGHT: 197 LBS | SYSTOLIC BLOOD PRESSURE: 106 MMHG

## 2025-04-30 LAB
ESTROGEN SERPL-MCNC: NORMAL PG/ML
INSULIN SERPL-ACNC: NORMAL U[IU]/ML
LAB AP CLINICAL INFORMATION: NORMAL
LAB AP GROSS DESCRIPTION: NORMAL
LAB AP PERFORMING LOCATION(S): NORMAL
LAB AP REPORT FOOTNOTES: NORMAL

## 2025-05-05 ENCOUNTER — OFFICE VISIT (OUTPATIENT)
Dept: FAMILY MEDICINE | Facility: CLINIC | Age: 65
End: 2025-05-05
Payer: MEDICARE

## 2025-05-05 ENCOUNTER — LAB VISIT (OUTPATIENT)
Dept: LAB | Facility: HOSPITAL | Age: 65
End: 2025-05-05
Attending: FAMILY MEDICINE
Payer: MEDICARE

## 2025-05-05 VITALS
WEIGHT: 203.25 LBS | RESPIRATION RATE: 16 BRPM | TEMPERATURE: 97 F | HEART RATE: 93 BPM | SYSTOLIC BLOOD PRESSURE: 110 MMHG | HEIGHT: 69 IN | BODY MASS INDEX: 30.1 KG/M2 | DIASTOLIC BLOOD PRESSURE: 80 MMHG | OXYGEN SATURATION: 96 %

## 2025-05-05 DIAGNOSIS — N39.43 BENIGN PROSTATIC HYPERPLASIA WITH POST-VOID DRIBBLING: ICD-10-CM

## 2025-05-05 DIAGNOSIS — J44.9 CHRONIC OBSTRUCTIVE PULMONARY DISEASE, UNSPECIFIED COPD TYPE: ICD-10-CM

## 2025-05-05 DIAGNOSIS — B95.8 STAPH INFECTION: ICD-10-CM

## 2025-05-05 DIAGNOSIS — E78.5 HYPERLIPIDEMIA, UNSPECIFIED HYPERLIPIDEMIA TYPE: ICD-10-CM

## 2025-05-05 DIAGNOSIS — I10 ESSENTIAL HYPERTENSION: Primary | ICD-10-CM

## 2025-05-05 DIAGNOSIS — N40.1 BENIGN PROSTATIC HYPERPLASIA WITH POST-VOID DRIBBLING: ICD-10-CM

## 2025-05-05 DIAGNOSIS — E66.811 OBESITY (BMI 30.0-34.9): ICD-10-CM

## 2025-05-05 DIAGNOSIS — Z12.5 ENCOUNTER FOR SCREENING FOR MALIGNANT NEOPLASM OF PROSTATE: ICD-10-CM

## 2025-05-05 DIAGNOSIS — E11.42 TYPE 2 DIABETES MELLITUS WITH DIABETIC POLYNEUROPATHY, WITHOUT LONG-TERM CURRENT USE OF INSULIN: ICD-10-CM

## 2025-05-05 DIAGNOSIS — F20.89 OTHER SCHIZOPHRENIA: ICD-10-CM

## 2025-05-05 DIAGNOSIS — N18.2 CKD (CHRONIC KIDNEY DISEASE) STAGE 2, GFR 60-89 ML/MIN: ICD-10-CM

## 2025-05-05 LAB
ALBUMIN SERPL BCP-MCNC: 4 G/DL (ref 3.5–5.2)
ALP SERPL-CCNC: 90 UNIT/L (ref 40–150)
ALT SERPL W/O P-5'-P-CCNC: 14 UNIT/L (ref 10–44)
ANION GAP (OHS): 9 MMOL/L (ref 8–16)
AST SERPL-CCNC: 13 UNIT/L (ref 11–45)
BILIRUB SERPL-MCNC: 0.6 MG/DL (ref 0.1–1)
BUN SERPL-MCNC: 17 MG/DL (ref 8–23)
CALCIUM SERPL-MCNC: 10 MG/DL (ref 8.7–10.5)
CHLORIDE SERPL-SCNC: 109 MMOL/L (ref 95–110)
CO2 SERPL-SCNC: 23 MMOL/L (ref 23–29)
CREAT SERPL-MCNC: 1 MG/DL (ref 0.5–1.4)
EAG (OHS): 111 MG/DL (ref 68–131)
GFR SERPLBLD CREATININE-BSD FMLA CKD-EPI: >60 ML/MIN/1.73/M2
GLUCOSE SERPL-MCNC: 97 MG/DL (ref 70–110)
HBA1C MFR BLD: 5.5 % (ref 4–5.6)
POTASSIUM SERPL-SCNC: 4 MMOL/L (ref 3.5–5.1)
PROT SERPL-MCNC: 6.4 GM/DL (ref 6–8.4)
PSA SERPL-MCNC: 1.99 NG/ML
SODIUM SERPL-SCNC: 141 MMOL/L (ref 136–145)

## 2025-05-05 PROCEDURE — 3288F FALL RISK ASSESSMENT DOCD: CPT | Mod: CPTII,S$GLB,, | Performed by: FAMILY MEDICINE

## 2025-05-05 PROCEDURE — 3044F HG A1C LEVEL LT 7.0%: CPT | Mod: CPTII,S$GLB,, | Performed by: FAMILY MEDICINE

## 2025-05-05 PROCEDURE — 3079F DIAST BP 80-89 MM HG: CPT | Mod: CPTII,S$GLB,, | Performed by: FAMILY MEDICINE

## 2025-05-05 PROCEDURE — 99214 OFFICE O/P EST MOD 30 MIN: CPT | Mod: S$GLB,,, | Performed by: FAMILY MEDICINE

## 2025-05-05 PROCEDURE — 3074F SYST BP LT 130 MM HG: CPT | Mod: CPTII,S$GLB,, | Performed by: FAMILY MEDICINE

## 2025-05-05 PROCEDURE — 80053 COMPREHEN METABOLIC PANEL: CPT

## 2025-05-05 PROCEDURE — 1101F PT FALLS ASSESS-DOCD LE1/YR: CPT | Mod: CPTII,S$GLB,, | Performed by: FAMILY MEDICINE

## 2025-05-05 PROCEDURE — 3066F NEPHROPATHY DOC TX: CPT | Mod: CPTII,S$GLB,, | Performed by: FAMILY MEDICINE

## 2025-05-05 PROCEDURE — G2211 COMPLEX E/M VISIT ADD ON: HCPCS | Mod: S$GLB,,, | Performed by: FAMILY MEDICINE

## 2025-05-05 PROCEDURE — 83036 HEMOGLOBIN GLYCOSYLATED A1C: CPT

## 2025-05-05 PROCEDURE — 3061F NEG MICROALBUMINURIA REV: CPT | Mod: CPTII,S$GLB,, | Performed by: FAMILY MEDICINE

## 2025-05-05 PROCEDURE — 3008F BODY MASS INDEX DOCD: CPT | Mod: CPTII,S$GLB,, | Performed by: FAMILY MEDICINE

## 2025-05-05 PROCEDURE — 99999 PR PBB SHADOW E&M-EST. PATIENT-LVL V: CPT | Mod: PBBFAC,,, | Performed by: FAMILY MEDICINE

## 2025-05-05 PROCEDURE — 84153 ASSAY OF PSA TOTAL: CPT

## 2025-05-05 PROCEDURE — 3072F LOW RISK FOR RETINOPATHY: CPT | Mod: CPTII,S$GLB,, | Performed by: FAMILY MEDICINE

## 2025-05-05 PROCEDURE — 1126F AMNT PAIN NOTED NONE PRSNT: CPT | Mod: CPTII,S$GLB,, | Performed by: FAMILY MEDICINE

## 2025-05-05 PROCEDURE — 36415 COLL VENOUS BLD VENIPUNCTURE: CPT | Mod: PO

## 2025-05-05 PROCEDURE — 4010F ACE/ARB THERAPY RXD/TAKEN: CPT | Mod: CPTII,S$GLB,, | Performed by: FAMILY MEDICINE

## 2025-05-05 PROCEDURE — 1159F MED LIST DOCD IN RCRD: CPT | Mod: CPTII,S$GLB,, | Performed by: FAMILY MEDICINE

## 2025-05-05 NOTE — PROGRESS NOTES
"Subjective:       Patient ID: Scott Bansal is a 64 y.o. male.    Chief Complaint: Recurrent Skin Infections      HPI Comments:     Current Medications[1]      Recent boils on his lower abdomen and upper thighs.  Using topical clindamycin currently.  Has not been able to get him with Dermatology.  Recommended anti staphylococcal so.      Recently has colonoscopy.  Waiting for the results.      Weight down another 10 lb in 6 weeks on Ozempic      Review of Systems   Constitutional:  Negative for activity change, appetite change and fever.   HENT:  Negative for sore throat.    Respiratory:  Negative for cough and shortness of breath.    Cardiovascular:  Negative for chest pain.   Gastrointestinal:  Negative for abdominal pain, diarrhea and nausea.   Genitourinary:  Negative for difficulty urinating.   Musculoskeletal:  Negative for arthralgias and myalgias.   Skin:  Positive for rash.   Neurological:  Negative for dizziness and headaches.       Objective:      Vitals:    05/05/25 0837   BP: 110/80   Pulse: 93   Resp: 16   Temp: 96.8 °F (36 °C)   TempSrc: Tympanic   SpO2: 96%   Weight: 92.2 kg (203 lb 4.2 oz)   Height: 5' 9" (1.753 m)   PainSc: 0-No pain     Physical Exam  Vitals and nursing note reviewed.   Constitutional:       General: He is not in acute distress.     Appearance: He is well-developed. He is not diaphoretic.   HENT:      Head: Normocephalic.   Neck:      Thyroid: No thyromegaly.   Cardiovascular:      Rate and Rhythm: Normal rate and regular rhythm.      Heart sounds: Normal heart sounds. No murmur heard.  Pulmonary:      Effort: Pulmonary effort is normal.      Breath sounds: Normal breath sounds. No wheezing or rales.   Abdominal:      General: There is no distension.      Palpations: Abdomen is soft.          Comments: Eight or 9 resolved staph infections   Musculoskeletal:      Cervical back: Neck supple.   Lymphadenopathy:      Cervical: No cervical adenopathy.   Skin:     General: Skin is " warm and dry.   Neurological:      Mental Status: He is alert and oriented to person, place, and time.   Psychiatric:         Behavior: Behavior normal.         Thought Content: Thought content normal.         Judgment: Judgment normal.         Assessment:       1. Essential hypertension    2. Hyperlipidemia, unspecified hyperlipidemia type    3. Chronic obstructive pulmonary disease, unspecified COPD type    4. Other schizophrenia    5. Type 2 diabetes mellitus with diabetic polyneuropathy, without long-term current use of insulin    6. Staph infection    7. CKD (chronic kidney disease) stage 2, GFR 60-89 ml/min    8. Benign prostatic hyperplasia with post-void dribbling    9. Encounter for screening for malignant neoplasm of prostate    10. Obesity (BMI 30.0-34.9)        Plan:   Essential hypertension  Comments:  Controlled    Hyperlipidemia, unspecified hyperlipidemia type  Comments:  LDL 52    Chronic obstructive pulmonary disease, unspecified COPD type  Comments:  Stable    Other schizophrenia  Comments:  Stable on current meds    Type 2 diabetes mellitus with diabetic polyneuropathy, without long-term current use of insulin  Comments:  A1c today  Orders:  -     Comprehensive Metabolic Panel; Future; Expected date: 05/05/2025  -     Hemoglobin A1C; Future; Expected date: 05/05/2025    Staph infection  Comments:  No active lesions.  Recommend anti staphylococcal so.  Dermatology follow-up    CKD (chronic kidney disease) stage 2, GFR 60-89 ml/min  Comments:  CMP today    Benign prostatic hyperplasia with post-void dribbling  Comments:  PSA today  Orders:  -     PSA, Screening; Future; Expected date: 05/05/2025    Encounter for screening for malignant neoplasm of prostate  Comments:  PSA today  Orders:  -     PSA, Screening; Future; Expected date: 05/05/2025    Obesity (BMI 30.0-34.9)  Comments:  Lost another 10 lb on GLP 1                 [1]   Current Outpatient Medications:     albuterol (PROVENTIL/VENTOLIN  HFA) 90 mcg/actuation inhaler, Inhale 1-2 puffs into the lungs every 6 (six) hours as needed for Wheezing or Shortness of Breath. Rescue, Disp: 54 g, Rfl: 3    amitriptyline (ELAVIL) 25 MG tablet, TAKE 1 TABLET BY MOUTH EVERY NIGHT AT BEDTIME, Disp: 90 tablet, Rfl: 3    amLODIPine (NORVASC) 10 MG tablet, Take 1 tablet (10 mg total) by mouth once daily., Disp: 90 tablet, Rfl: 3    busPIRone (BUSPAR) 10 MG tablet, Take 10 mg by mouth 3 (three) times daily., Disp: , Rfl:     candesartan (ATACAND) 4 MG tablet, Take 1 tablet (4 mg total) by mouth once daily., Disp: 90 tablet, Rfl: 2    clindamycin (CLEOCIN T) 1 % external solution, AAA bid for skin infection/folliculitis, Disp: 60 mL, Rfl: 11    DUPIXENT SYRINGE 300 mg/2 mL Syrg, Inject 1 syringe (300mg) into the skin every other week, Disp: 4 mL, Rfl: 11    EMGALITY  mg/mL PnIj, Inject 1 mL into the skin every 30 days., Disp: , Rfl:     empagliflozin (JARDIANCE) 25 mg tablet, TAKE 1 TABLET(25 MG) BY MOUTH DAILY, Disp: 90 tablet, Rfl: 1    fluticasone propionate (FLONASE) 50 mcg/actuation nasal spray, 2 sprays (100 mcg total) by Each Nostril route once daily., Disp: 16 mL, Rfl: 5    gabapentin (NEURONTIN) 300 MG capsule, Take 1 capsule (300 mg total) by mouth 2 (two) times daily., Disp: 180 capsule, Rfl: 2    gemfibroziL (LOPID) 600 MG tablet, Take 1 tablet (600 mg total) by mouth 2 (two) times daily before meals., Disp: 180 tablet, Rfl: 0    hydrocortisone 2.5 % cream, Apply topically 2 (two) times daily. Apply to rash on face., Disp: 28 g, Rfl: 1    hydrOXYzine (ATARAX) 50 MG tablet, Take 1 tablet (50 mg total) by mouth 3 (three) times daily as needed for Itching., Disp: 270 tablet, Rfl: 3    ketoconazole (NIZORAL) 2 % cream, Apply topically once daily. Apply to rash on face., Disp: 30 g, Rfl: 1    metFORMIN (GLUCOPHAGE) 1000 MG tablet, TAKE 1 TABLET(1000 MG) BY MOUTH TWICE DAILY, Disp: 180 tablet, Rfl: 1    multivitamin capsule, Take 1 capsule by mouth once  daily., Disp: , Rfl:     NURTEC 75 mg odt, Take 75 mg by mouth daily as needed., Disp: , Rfl:     pantoprazole (PROTONIX) 40 MG tablet, Take 1 tablet (40 mg total) by mouth once daily., Disp: 90 tablet, Rfl: 3    quetiapine (SEROQUEL) 300 MG Tab, Take 300 mg by mouth every evening., Disp: , Rfl:     QULIPTA 10 mg Tab, Take 1 tablet by mouth Daily. On hold for now, Disp: , Rfl:     rizatriptan (MAXALT) 5 MG tablet, Take 5 mg by mouth once., Disp: , Rfl:     semaglutide (OZEMPIC) 1 mg/dose (4 mg/3 mL), Inject 1 mg into the skin every 7 days., Disp: 4 each, Rfl: 3    simvastatin (ZOCOR) 80 MG tablet, Take 1 tablet (80 mg total) by mouth nightly., Disp: 90 tablet, Rfl: 3    sumatriptan (IMITREX) 100 MG tablet, TAKE 1 TABLET BY MOUTH EVERY DAY AS NEEDED FOR MIGRAINE HEADACHE, Disp: 27 tablet, Rfl: 3    tamsulosin (FLOMAX) 0.4 mg Cap, Take 2 capsules (0.8 mg total) by mouth once daily., Disp: 180 capsule, Rfl: 3    tiZANidine (ZANAFLEX) 4 MG tablet, TAKE 1 TABLET(4 MG) BY MOUTH EVERY 8 HOURS FOR 10 DAYS, Disp: 30 tablet, Rfl: 0    triamcinolone acetonide 0.025% (KENALOG) 0.025 % cream, APPLY TOPICALLY TWICE DAILY AS NEEDED FOR RASH UNDERARMS. MILD STEROID. USE SPARINGLY FOR 1 TO 2 WEEKS IF NEEDED THEN STOP, Disp: 80 g, Rfl: 1    triamcinolone acetonide 0.1% (KENALOG) 0.1 % cream, AAA bid prn eczema. Do not use on face., Disp: 454 g, Rfl: 5    zonisamide (ZONEGRAN) 50 MG Cap, TAKE 1 CAPSULE(50 MG) BY MOUTH TWICE DAILY, Disp: 180 capsule, Rfl: 1

## 2025-05-09 ENCOUNTER — RESULTS FOLLOW-UP (OUTPATIENT)
Dept: FAMILY MEDICINE | Facility: CLINIC | Age: 65
End: 2025-05-09

## 2025-05-09 NOTE — PROGRESS NOTES
NURSING STAFF:Please  inform the patient that I reviewed the recent pathology obtained at the time of colonoscopy.  The results showed that there was adenomatous tissue present which is benign and based on that, I recommend that the patient have a repeat colonoscopy performed in 5 years. If the patient has MyChart, this message has been sent to them.  Confirm that they read the note.  If not, copy the information and print a letter to send to the patient at this time.  Confirm that a notation to the PCP was done.     Dear Scott Bansal,  This is to inform you that I have reviewed your recent colonoscopy pathology.  The results showed that you had adenomatous tissue present which is benign and based on that, I recommend that you have a repeat colonoscopy performed in 5 years.      Dr. Toney Lundbergspeth  [phone]

## 2025-05-13 DIAGNOSIS — L30.1 DYSHIDROTIC ECZEMA: ICD-10-CM

## 2025-05-16 ENCOUNTER — RESULTS FOLLOW-UP (OUTPATIENT)
Dept: FAMILY MEDICINE | Facility: CLINIC | Age: 65
End: 2025-05-16

## 2025-05-20 RX ORDER — HYDROXYZINE HYDROCHLORIDE 50 MG/1
50 TABLET, FILM COATED ORAL 3 TIMES DAILY PRN
Qty: 270 TABLET | Refills: 3 | Status: SHIPPED | OUTPATIENT
Start: 2025-05-20

## 2025-06-02 DIAGNOSIS — E78.5 HYPERLIPIDEMIA, UNSPECIFIED HYPERLIPIDEMIA TYPE: ICD-10-CM

## 2025-06-03 DIAGNOSIS — E11.9 DIABETES MELLITUS WITHOUT COMPLICATION: ICD-10-CM

## 2025-06-03 RX ORDER — METFORMIN HYDROCHLORIDE 1000 MG/1
1000 TABLET ORAL 2 TIMES DAILY
Qty: 180 TABLET | Refills: 1 | Status: SHIPPED | OUTPATIENT
Start: 2025-06-03

## 2025-06-06 RX ORDER — GEMFIBROZIL 600 MG/1
600 TABLET, FILM COATED ORAL
Qty: 180 TABLET | Refills: 0 | Status: SHIPPED | OUTPATIENT
Start: 2025-06-06

## 2025-06-16 ENCOUNTER — PATIENT MESSAGE (OUTPATIENT)
Dept: FAMILY MEDICINE | Facility: CLINIC | Age: 65
End: 2025-06-16
Payer: MEDICARE

## 2025-06-16 DIAGNOSIS — E11.42 TYPE 2 DIABETES MELLITUS WITH DIABETIC POLYNEUROPATHY: ICD-10-CM

## 2025-06-16 RX ORDER — GABAPENTIN 300 MG/1
300 CAPSULE ORAL 2 TIMES DAILY
Qty: 180 CAPSULE | Refills: 2 | Status: SHIPPED | OUTPATIENT
Start: 2025-06-16

## 2025-06-16 NOTE — TELEPHONE ENCOUNTER
Care Due:                  Date            Visit Type   Department     Provider  --------------------------------------------------------------------------------                                MYCHART                              FOLLOWUP/OF  [facility] INTERNAL  Last Visit: 05-      FICE VISIT   MEDICINE       Avni Leon                              EP -                              PRIMARY      [facility] INTERNAL  Next Visit: 06-      CARE ([facility])   MEDICINE       Avni Leon                                                            Last  Test          Frequency    Reason                     Performed    Due Date  --------------------------------------------------------------------------------    CBC.........  12 months..  gemfibroziL..............  11-   11-    Health [facility] Embedded Care Due Messages. Reference number: [id number].   6/16/2025 10:46:58 AM CDT

## 2025-06-16 NOTE — TELEPHONE ENCOUNTER
Copied from CRM #[id number]. Topic: General Inquiry - Patient Advice  >> Jun 16, 2025  1:07 PM Monica wrote:  Type:  Needs Medical Advice    Who Called: Scott Bansal  Symptoms (please be specific): need orders in for an xray, patient was knock down by a pit bull dog,  hurt his ribs     How long has patient had these symptoms:    Pharmacy name and phone #:    Would the patient rather a call back or a response via [facility]? Call back  Best Call Back Number: [phone]  Additional Information: [id number]

## 2025-06-18 ENCOUNTER — HOSPITAL ENCOUNTER (OUTPATIENT)
Dept: RADIOLOGY | Facility: HOSPITAL | Age: 65
Discharge: HOME | End: 2025-06-18
Attending: FAMILY MEDICINE
Payer: MEDICARE

## 2025-06-18 ENCOUNTER — OFFICE VISIT (OUTPATIENT)
Dept: FAMILY MEDICINE | Facility: CLINIC | Age: 65
End: 2025-06-18
Payer: MEDICARE

## 2025-06-18 VITALS
OXYGEN SATURATION: 98 % | SYSTOLIC BLOOD PRESSURE: 110 MMHG | HEART RATE: 90 BPM | BODY MASS INDEX: 30.19 KG/M2 | DIASTOLIC BLOOD PRESSURE: 82 MMHG | RESPIRATION RATE: 16 BRPM | WEIGHT: 203.81 LBS | HEIGHT: 69 IN | TEMPERATURE: 97 F

## 2025-06-18 DIAGNOSIS — F20.89 OTHER SCHIZOPHRENIA: ICD-10-CM

## 2025-06-18 DIAGNOSIS — R07.89 CHEST WALL PAIN: ICD-10-CM

## 2025-06-18 DIAGNOSIS — E11.42 TYPE 2 DIABETES MELLITUS WITH DIABETIC POLYNEUROPATHY, WITHOUT LONG-TERM CURRENT USE OF INSULIN: ICD-10-CM

## 2025-06-18 DIAGNOSIS — I10 ESSENTIAL HYPERTENSION: ICD-10-CM

## 2025-06-18 DIAGNOSIS — N18.2 CKD (CHRONIC KIDNEY DISEASE) STAGE 2, GFR 60-89 ML/MIN: ICD-10-CM

## 2025-06-18 DIAGNOSIS — M25.512 ACUTE PAIN OF LEFT SHOULDER: ICD-10-CM

## 2025-06-18 DIAGNOSIS — E78.5 HYPERLIPIDEMIA, UNSPECIFIED HYPERLIPIDEMIA TYPE: ICD-10-CM

## 2025-06-18 DIAGNOSIS — J44.9 CHRONIC OBSTRUCTIVE PULMONARY DISEASE, UNSPECIFIED COPD TYPE: ICD-10-CM

## 2025-06-18 DIAGNOSIS — R07.89 CHEST WALL PAIN: Primary | ICD-10-CM

## 2025-06-18 PROCEDURE — 73030 X-RAY EXAM OF SHOULDER: CPT | Mod: TC,PO,LT

## 2025-06-18 PROCEDURE — 99999 PR PBB SHADOW E&M-EST. PATIENT-LVL V: CPT | Mod: PBBFAC,,, | Performed by: FAMILY MEDICINE

## 2025-06-18 PROCEDURE — 73030 X-RAY EXAM OF SHOULDER: CPT | Mod: 26,LT,, | Performed by: STUDENT IN AN ORGANIZED HEALTH CARE EDUCATION/TRAINING PROGRAM

## 2025-06-18 PROCEDURE — 71100 X-RAY EXAM RIBS UNI 2 VIEWS: CPT | Mod: TC,PO,LT

## 2025-06-18 PROCEDURE — 71100 X-RAY EXAM RIBS UNI 2 VIEWS: CPT | Mod: 26,LT,, | Performed by: STUDENT IN AN ORGANIZED HEALTH CARE EDUCATION/TRAINING PROGRAM

## 2025-06-18 RX ORDER — KETOROLAC TROMETHAMINE 30 MG/ML
30 INJECTION, SOLUTION INTRAMUSCULAR; INTRAVENOUS
Status: COMPLETED | OUTPATIENT
Start: 2025-06-18 | End: 2025-06-18

## 2025-06-18 RX ORDER — HYDROCODONE BITARTRATE AND ACETAMINOPHEN 5; 325 MG/1; MG/1
1 TABLET ORAL EVERY 6 HOURS PRN
Qty: 12 TABLET | Refills: 0 | Status: SHIPPED | OUTPATIENT
Start: 2025-06-18

## 2025-06-18 RX ADMIN — KETOROLAC TROMETHAMINE 30 MG: 30 INJECTION, SOLUTION INTRAMUSCULAR; INTRAVENOUS at 10:06

## 2025-06-18 NOTE — PROGRESS NOTES
Ketorolac 30 mg given to Left Ventrogluteal. Patient tolerated well and voiced understanding to stay in clinic for 15 minutes.    Becky Chun LPN

## 2025-06-18 NOTE — PROGRESS NOTES
"Subjective:       Patient ID: Scott Bansal is a 64 y.o. male.    Chief Complaint: Fall      HPI Comments:     Current Medications[1]      Last seen by me 6 weeks ago.      Was not over onto his right side when a dog pulled at his leash and made him lose his balance 5 days ago.  No pain on the right side but hurts from his left shoulder into his left chest wall.  No bruising.  No shortness a breath or cough or fever.  Some relief with heating pad and lying down.  Not much relief with tizanidine, ibuprofen, Tylenol.      Review of Systems   Constitutional:  Negative for activity change, appetite change and fever.   HENT:  Negative for sore throat.    Respiratory:  Negative for cough and shortness of breath.    Cardiovascular:  Positive for chest pain.   Gastrointestinal:  Negative for abdominal pain, diarrhea and nausea.   Genitourinary:  Negative for difficulty urinating.   Musculoskeletal:  Negative for arthralgias and myalgias.   Neurological:  Negative for dizziness and headaches.       Objective:      Vitals:    06/18/25 1001   BP: 110/82   Pulse: 90   Resp: 16   Temp: 96.8 °F (36 °C)   TempSrc: Tympanic   SpO2: 98%   Weight: 92.4 kg (203 lb 13 oz)   Height: 5' 9" (1.753 m)   PainSc: 10-Worst pain ever   PainLoc: Rib Cage     Physical Exam  Vitals and nursing note reviewed.   Constitutional:       General: He is not in acute distress.     Appearance: He is well-developed. He is not diaphoretic.   HENT:      Head: Normocephalic.      Mouth/Throat:      Pharynx: No oropharyngeal exudate.   Neck:      Thyroid: No thyromegaly.   Cardiovascular:      Rate and Rhythm: Normal rate and regular rhythm.      Heart sounds: Normal heart sounds. No murmur heard.  Pulmonary:      Effort: Pulmonary effort is normal.      Breath sounds: Normal breath sounds. No wheezing or rales.   Chest:      Chest wall: Swelling and tenderness present. No deformity.   Breasts:     Right: No tenderness.      Left: Tenderness present. "          Comments: Area of complaint and tenderness.  No deformities  Abdominal:      General: There is no distension.      Palpations: Abdomen is soft.   Musculoskeletal:      Left shoulder: Tenderness and bony tenderness present. Decreased range of motion.      Cervical back: Neck supple.   Lymphadenopathy:      Cervical: No cervical adenopathy.   Skin:     General: Skin is warm and dry.   Neurological:      Mental Status: He is alert and oriented to person, place, and time.   Psychiatric:         Behavior: Behavior normal.         Thought Content: Thought content normal.         Judgment: Judgment normal.         Assessment:       1. Chest wall pain    2. Type 2 diabetes mellitus with diabetic polyneuropathy, without long-term current use of insulin    3. Hyperlipidemia, unspecified hyperlipidemia type    4. Essential hypertension    5. Chronic obstructive pulmonary disease, unspecified COPD type    6. Other schizophrenia    7. CKD (chronic kidney disease) stage 2, GFR 60-89 ml/min    8. Acute pain of left shoulder        Plan:   Chest wall pain  Comments:  Low-dose Norco for severe pain.  Toradol IM.  Continue ibuprofen  Orders:  -     ketorolac injection 30 mg  -     HYDROcodone-acetaminophen (NORCO) 5-325 mg per tablet; Take 1 tablet by mouth every 6 (six) hours as needed for Pain.  Dispense: 12 tablet; Refill: 0  -     X-Ray Ribs 2 View Left; Future; Expected date: 06/18/2025    Type 2 diabetes mellitus with diabetic polyneuropathy, without long-term current use of insulin  Comments:  A1c 5.5.  Continue current meds    Hyperlipidemia, unspecified hyperlipidemia type  Comments:  LDL 52    Essential hypertension  Comments:  Controlled.  Continue candesartan    Chronic obstructive pulmonary disease, unspecified COPD type  Comments:  Stable respiratory pattern    Other schizophrenia  Comments:  Stable on meds    CKD (chronic kidney disease) stage 2, GFR 60-89 ml/min  Comments:  Creatinine 1.0    Acute pain of  left shoulder  Comments:  X-ray of shoulder.  Pain control as above  Orders:  -     X-Ray Shoulder 2 or More Views Left; Future; Expected date: 06/18/2025                 [1]   Current Outpatient Medications:     albuterol (PROVENTIL/VENTOLIN HFA) 90 mcg/actuation inhaler, Inhale 1-2 puffs into the lungs every 6 (six) hours as needed for Wheezing or Shortness of Breath. Rescue, Disp: 54 g, Rfl: 3    amitriptyline (ELAVIL) 25 MG tablet, TAKE 1 TABLET BY MOUTH EVERY NIGHT AT BEDTIME, Disp: 90 tablet, Rfl: 3    amLODIPine (NORVASC) 10 MG tablet, Take 1 tablet (10 mg total) by mouth once daily., Disp: 90 tablet, Rfl: 3    busPIRone (BUSPAR) 10 MG tablet, Take 10 mg by mouth 3 (three) times daily., Disp: , Rfl:     candesartan (ATACAND) 4 MG tablet, Take 1 tablet (4 mg total) by mouth once daily., Disp: 90 tablet, Rfl: 2    clindamycin (CLEOCIN T) 1 % external solution, AAA bid for skin infection/folliculitis, Disp: 60 mL, Rfl: 11    DUPIXENT SYRINGE 300 mg/2 mL Syrg, Inject 1 syringe (300mg) into the skin every other week, Disp: 4 mL, Rfl: 11    EMGALITY  mg/mL PnIj, Inject 1 mL into the skin every 30 days., Disp: , Rfl:     empagliflozin (JARDIANCE) 25 mg tablet, TAKE 1 TABLET(25 MG) BY MOUTH DAILY, Disp: 90 tablet, Rfl: 1    fluticasone propionate (FLONASE) 50 mcg/actuation nasal spray, 2 sprays (100 mcg total) by Each Nostril route once daily., Disp: 16 mL, Rfl: 5    gabapentin (NEURONTIN) 300 MG capsule, Take 1 capsule (300 mg total) by mouth 2 (two) times daily., Disp: 180 capsule, Rfl: 2    gemfibroziL (LOPID) 600 MG tablet, TAKE 1 TABLET BY MOUTH TWICE DAILY BEFORE MEALS, Disp: 180 tablet, Rfl: 0    hydrocortisone 2.5 % cream, Apply topically 2 (two) times daily. Apply to rash on face., Disp: 28 g, Rfl: 1    hydrOXYzine (ATARAX) 50 MG tablet, Take 1 tablet (50 mg total) by mouth 3 (three) times daily as needed for Itching., Disp: 270 tablet, Rfl: 3    ketoconazole (NIZORAL) 2 % cream, Apply topically once  daily. Apply to rash on face., Disp: 30 g, Rfl: 1    metFORMIN (GLUCOPHAGE) 1000 MG tablet, TAKE 1 TABLET(1000 MG) BY MOUTH TWICE DAILY, Disp: 180 tablet, Rfl: 1    multivitamin capsule, Take 1 capsule by mouth once daily., Disp: , Rfl:     NURTEC 75 mg odt, Take 75 mg by mouth daily as needed., Disp: , Rfl:     pantoprazole (PROTONIX) 40 MG tablet, Take 1 tablet (40 mg total) by mouth once daily., Disp: 90 tablet, Rfl: 3    quetiapine (SEROQUEL) 300 MG Tab, Take 300 mg by mouth every evening., Disp: , Rfl:     QULIPTA 10 mg Tab, Take 1 tablet by mouth Daily. On hold for now, Disp: , Rfl:     rizatriptan (MAXALT) 5 MG tablet, Take 5 mg by mouth once., Disp: , Rfl:     semaglutide (OZEMPIC) 1 mg/dose (4 mg/3 mL), Inject 1 mg into the skin every 7 days., Disp: 4 each, Rfl: 3    simvastatin (ZOCOR) 80 MG tablet, Take 1 tablet (80 mg total) by mouth nightly., Disp: 90 tablet, Rfl: 3    sumatriptan (IMITREX) 100 MG tablet, TAKE 1 TABLET BY MOUTH EVERY DAY AS NEEDED FOR MIGRAINE HEADACHE, Disp: 27 tablet, Rfl: 3    tamsulosin (FLOMAX) 0.4 mg Cap, Take 2 capsules (0.8 mg total) by mouth once daily., Disp: 180 capsule, Rfl: 3    tiZANidine (ZANAFLEX) 4 MG tablet, TAKE 1 TABLET(4 MG) BY MOUTH EVERY 8 HOURS FOR 10 DAYS, Disp: 30 tablet, Rfl: 0    triamcinolone acetonide 0.025% (KENALOG) 0.025 % cream, APPLY TOPICALLY TWICE DAILY AS NEEDED FOR RASH UNDERARMS. MILD STEROID. USE SPARINGLY FOR 1 TO 2 WEEKS IF NEEDED THEN STOP, Disp: 80 g, Rfl: 1    triamcinolone acetonide 0.1% (KENALOG) 0.1 % cream, AAA bid prn eczema. Do not use on face., Disp: 454 g, Rfl: 5    zonisamide (ZONEGRAN) 50 MG Cap, TAKE 1 CAPSULE(50 MG) BY MOUTH TWICE DAILY, Disp: 180 capsule, Rfl: 1    HYDROcodone-acetaminophen (NORCO) 5-325 mg per tablet, Take 1 tablet by mouth every 6 (six) hours as needed for Pain., Disp: 12 tablet, Rfl: 0    Current Facility-Administered Medications:     ketorolac injection 30 mg, 30 mg, Intramuscular, 1 time in Clinic/HOD,

## 2025-06-21 DIAGNOSIS — R07.89 CHEST WALL PAIN: ICD-10-CM

## 2025-06-21 NOTE — TELEPHONE ENCOUNTER
No care due was identified.  [facility] Embedded Care Due Messages. Reference number: [id number].   6/21/2025 6:26:25 PM CDT

## 2025-06-22 ENCOUNTER — RESULTS FOLLOW-UP (OUTPATIENT)
Dept: FAMILY MEDICINE | Facility: CLINIC | Age: 65
End: 2025-06-22

## 2025-06-23 DIAGNOSIS — R07.89 LEFT-SIDED CHEST WALL PAIN: ICD-10-CM

## 2025-06-23 NOTE — TELEPHONE ENCOUNTER
Please see the attached refill request.   Itraconazole Pregnancy And Lactation Text: This medication is Pregnancy Category C and it isn't know if it is safe during pregnancy. It is also excreted in breast milk.

## 2025-06-25 RX ORDER — HYDROCODONE BITARTRATE AND ACETAMINOPHEN 5; 325 MG/1; MG/1
1 TABLET ORAL EVERY 6 HOURS PRN
Qty: 12 TABLET | Refills: 0 | Status: SHIPPED | OUTPATIENT
Start: 2025-06-25

## 2025-06-27 DIAGNOSIS — R07.89 LEFT-SIDED CHEST WALL PAIN: ICD-10-CM

## 2025-06-27 RX ORDER — TIZANIDINE 4 MG/1
TABLET ORAL
Qty: 30 TABLET | Refills: 0 | OUTPATIENT
Start: 2025-06-27

## 2025-06-27 NOTE — TELEPHONE ENCOUNTER
No care due was identified.  Health [facility] Embedded Care Due Messages. Reference number: [id number].   6/27/2025 8:24:47 AM CDT

## 2025-06-30 RX ORDER — TIZANIDINE 4 MG/1
4 TABLET ORAL EVERY 8 HOURS
Qty: 30 TABLET | Refills: 0 | Status: SHIPPED | OUTPATIENT
Start: 2025-06-30

## 2025-07-02 DIAGNOSIS — K21.9 GASTROESOPHAGEAL REFLUX DISEASE WITHOUT ESOPHAGITIS: ICD-10-CM

## 2025-07-02 RX ORDER — PANTOPRAZOLE SODIUM 40 MG/1
40 TABLET, DELAYED RELEASE ORAL DAILY
Qty: 90 TABLET | Refills: 3 | Status: SHIPPED | OUTPATIENT
Start: 2025-07-02

## 2025-07-02 NOTE — TELEPHONE ENCOUNTER
No care due was identified.  Health [facility] Embedded Care Due Messages. Reference number: [id number].   7/02/2025 5:34:55 AM CDT

## 2025-07-02 NOTE — TELEPHONE ENCOUNTER
Refill Decision Note   Scott Robertsstephaniemonie  is requesting a refill authorization.  Brief Assessment and Rationale for Refill:  Approve     Medication Therapy Plan:         Comments:     Note composed:5:29 PM 07/02/2025

## 2025-07-14 ENCOUNTER — LAB VISIT (OUTPATIENT)
Dept: LAB | Facility: HOSPITAL | Age: 65
End: 2025-07-14
Attending: UROLOGY
Payer: MEDICARE

## 2025-07-14 DIAGNOSIS — R97.20 PSA ELEVATION: ICD-10-CM

## 2025-07-14 LAB
PSA FREE MFR SERPL: 11.4 %
PSA FREE SERPL-MCNC: 0.26 NG/ML
PSA SERPL-MCNC: 2.28 NG/ML

## 2025-07-14 PROCEDURE — 84153 ASSAY OF PSA TOTAL: CPT

## 2025-07-14 PROCEDURE — 36415 COLL VENOUS BLD VENIPUNCTURE: CPT | Mod: PO

## 2025-07-17 DIAGNOSIS — G43.009 MIGRAINE WITHOUT AURA AND WITHOUT STATUS MIGRAINOSUS, NOT INTRACTABLE: ICD-10-CM

## 2025-07-17 DIAGNOSIS — R07.89 LEFT-SIDED CHEST WALL PAIN: ICD-10-CM

## 2025-07-17 RX ORDER — SUMATRIPTAN SUCCINATE 100 MG/1
TABLET ORAL
Qty: 27 TABLET | Refills: 3 | Status: SHIPPED | OUTPATIENT
Start: 2025-07-17

## 2025-07-17 RX ORDER — TIZANIDINE 4 MG/1
4 TABLET ORAL EVERY 8 HOURS
Qty: 30 TABLET | Refills: 0 | OUTPATIENT
Start: 2025-07-17

## 2025-07-17 NOTE — TELEPHONE ENCOUNTER
No care due was identified.  [facility] Embedded Care Due Messages. Reference number: [id number].   7/17/2025 4:13:55 PM CDT

## 2025-07-17 NOTE — TELEPHONE ENCOUNTER
No care due was identified.  [facility] Embedded Care Due Messages. Reference number: [id number].   7/17/2025 4:36:51 PM CDT

## 2025-07-17 NOTE — TELEPHONE ENCOUNTER
No care due was identified.  [facility] Embedded Care Due Messages. Reference number: [id number].   7/17/2025 11:58:42 AM CDT

## 2025-07-18 ENCOUNTER — OFFICE VISIT (OUTPATIENT)
Facility: CLINIC | Age: 65
End: 2025-07-18
Payer: MEDICARE

## 2025-07-18 VITALS
HEART RATE: 105 BPM | SYSTOLIC BLOOD PRESSURE: 113 MMHG | WEIGHT: 196.63 LBS | BODY MASS INDEX: 29.12 KG/M2 | RESPIRATION RATE: 16 BRPM | DIASTOLIC BLOOD PRESSURE: 73 MMHG | HEIGHT: 69 IN

## 2025-07-18 DIAGNOSIS — N40.0 BPH WITHOUT URINARY OBSTRUCTION: ICD-10-CM

## 2025-07-18 DIAGNOSIS — E11.69 ERECTILE DYSFUNCTION ASSOCIATED WITH TYPE 2 DIABETES MELLITUS: ICD-10-CM

## 2025-07-18 DIAGNOSIS — Z87.442 PERSONAL HISTORY OF URINARY CALCULI: ICD-10-CM

## 2025-07-18 DIAGNOSIS — R97.20 PSA ELEVATION: Primary | ICD-10-CM

## 2025-07-18 DIAGNOSIS — N52.1 ERECTILE DYSFUNCTION ASSOCIATED WITH TYPE 2 DIABETES MELLITUS: ICD-10-CM

## 2025-07-18 DIAGNOSIS — Z80.51 FAMILY HISTORY OF KIDNEY CANCER: ICD-10-CM

## 2025-07-18 DIAGNOSIS — R35.1 NOCTURIA: ICD-10-CM

## 2025-07-18 PROCEDURE — 99999 PR PBB SHADOW E&M-EST. PATIENT-LVL V: CPT | Mod: PBBFAC,,, | Performed by: UROLOGY

## 2025-07-18 RX ORDER — SUMATRIPTAN SUCCINATE 100 MG/1
TABLET ORAL
Qty: 27 TABLET | Refills: 3 | OUTPATIENT
Start: 2025-07-18

## 2025-07-18 RX ORDER — TADALAFIL 20 MG/1
20 TABLET ORAL SEE ADMIN INSTRUCTIONS
Qty: 10 TABLET | Refills: 6 | Status: SHIPPED | OUTPATIENT
Start: 2025-07-18 | End: 2026-07-18

## 2025-07-18 NOTE — TELEPHONE ENCOUNTER
Refill Decision Note   Scott Robertsstephaniemonie  is requesting a refill authorization.  Brief Assessment and Rationale for Refill:  Quick Discontinue     Medication Therapy Plan:         Comments:     Note composed:5:46 AM 07/18/2025

## 2025-07-18 NOTE — PROGRESS NOTES
Subjective:       Patient ID: Scott Bansal is a 64 y.o. male.    Chief Complaint: Results      History of Present Illness:     Mr Bansal has an elevation in his PSAs.  His PSA increased from 1.2 on 2-26-24 to 1.99 on 5-5-25 to 2.28 on 7-14-25.  He has no known family history of prostate cancer.  He says that he has a family history of kidney cancer in his brother.  He has BPH without obsructive LUTS on tamsulosin.  He said he has been on tamsulosin over 10 years.  Nocturia X 1-3.  He has ED and he is interested in being treated for this.  He says that he has tried sildenafil 50 mg and tadalafil 5 mg as needed in the past without success.  He is a diabetic.  He has a history of passing a kidney stone about 15 years ago.           Past Medical History:   Diagnosis Date    Bipolar 1 disorder, mixed     BPH (benign prostatic hypertrophy)     Colon polyp     Cyst, eyelid     Dr. Broussard    Diabetes mellitus     GERD (gastroesophageal reflux disease)     Hyperlipidemia     Hypertension     Lumbar degenerative disc disease 3/7/2019    Migraine headache     Obesity      Family History   Problem Relation Name Age of Onset    Cataracts Mother      Cancer Mother          throat    Hypertension Mother      Hypertension Father      Stroke Father          2 strokes    Hypertension Sister Leona     Cancer Brother Jose Armando         spinal, kidney, spinal fluid    Hypertension Brother Jose Armando     Cancer Cousin ?         breast?     Social History[1]  Encounter Medications[2]     Review of Systems   Constitutional:  Negative for chills and fever.   Respiratory:  Negative for shortness of breath.    Cardiovascular:  Negative for chest pain.   Gastrointestinal:  Negative for nausea and vomiting.   Genitourinary:  Negative for difficulty urinating.   Musculoskeletal:  Negative for back pain.   Skin:  Negative for rash.   Neurological:  Negative for dizziness.   Psychiatric/Behavioral:  Negative for agitation.        Objective:     BP  "113/73 (BP Location: Right arm, Patient Position: Sitting)   Pulse 105   Resp 16   Ht 5' 9" (1.753 m)   Wt 89.2 kg (196 lb 10.4 oz)   BMI 29.04 kg/m²     Physical Exam  Constitutional:       Appearance: Normal appearance.   Pulmonary:      Effort: Pulmonary effort is normal.   Abdominal:      Palpations: Abdomen is soft.   Genitourinary:     Comments: Prostate exam was deferred  Neurological:      Mental Status: He is alert and oriented to person, place, and time.   Psychiatric:         Mood and Affect: Mood normal.         No visits with results within 1 Day(s) from this visit.   Latest known visit with results is:   Lab Visit on 07/14/2025   Component Date Value Ref [place] Status    Prostate Specific Antigen 07/14/2025 2.28  <=4.00 ng/mL Final    PSA Expected levels:  Hormonal therapy: < 0.05 ng/mL   Prostatectomy: < 0.01 ng/mL   Radiation therapy: < 1.00 ng/mL      Prostate Specific Antigen Free 07/14/2025 0.26  <=1.50 ng/mL Final    PSA % Free 07/14/2025 11.40  Not established % Final        No results found. However, due to the size of the patient record, not all encounters were searched. Please check Results Review for a complete set of results.     Assessment:       1. PSA elevation    2. Nocturia    3. BPH without urinary obstruction    4. Erectile dysfunction associated with type 2 diabetes mellitus    5. Personal history of urinary calculi    6. Family history of kidney cancer        Plan:     Orders Placed This Encounter    PSA, Total and Free    tadalafiL (CIALIS) 20 MG Tab          7-14-25  PSA 2.28.  Free % PSA 11.40.    5-5-25  1.99    2-26-24  PSA 1.2     5-5-25  Cr 1.0.  GFR >60.      5-5-25  HgbA1c 5.5     9-14-23  HgbA1c 8.0     I reviewed the above lab results.            Assessment:  - PSA elevation.  - Nocturia.  - BPH without obsructive LUTS on tamsulosin.  He said he has been on tamsulosin over 10 years.  - ED.  He says that he has tried sildenafil 50 mg and tadalafil 5 mg as needed in " the past without success.  - DM.  He takes jardiance.  - History of passing a kidney stone about 15 years ago.  - Family history of kidney cancer in his brother.     Plan:  - I discussed options with the patient today regarding his rise in his PSA and the mutual decision was made to proceed with observation with a repeat PSA in 4 months.  - Continue tamsulosin 0.4 mg 1 PO qhs.   - I discussed options with the patient today regarding treatment options for his ED.  The mutual decision was made for him to try a higher dose of tadalafil as needed.  - I prescribed him tadalafil 20 mg, disp #10, 6 refills to [facility] in [facility] and [place] today on 25 with detailed verbal instructions.  I recommended that he start by breaking it is half and to take the lowest effective dose.  - I discussed dietary modifications with him today and I recommended he drink mostly water during the day.  - I recommended he limit his fluid intake at night to small amounts of water and to void just prior to bedtime.   - PSA in 4 months a few days prior to a 4 month appointment.  - RTC in 4 months or sooner as needed.                          [1]   Social History  Socioeconomic History    Marital status:    Tobacco Use    Smoking status: Former     Current packs/day: 0.00     Average packs/day: 2.0 packs/day for 15.0 years (30.0 ttl pk-yrs)     Types: Cigarettes     Start date: 1969     Quit date: 1984     Years since quittin.2     Passive exposure: Past    Smokeless tobacco: Never    Tobacco comments:     Patient quit smoking at the age of 26 yo.   Substance and Sexual Activity    Alcohol use: No    Drug use: No    Sexual activity: Yes     Partners: Female     Social Drivers of Health     Financial Resource Strain: Medium Risk (2025)    Overall Financial Resource Strain (CARDIA)     Difficulty of Paying Living Expenses: Somewhat hard   Food Insecurity: No Food Insecurity (2025)    Hunger Vital  Sign     Worried About Running Out of Food in the Last Year: Never true     Ran Out of Food in the Last Year: Never true   Transportation Needs: No Transportation Needs (5/16/2024)    PRAPARE - Transportation     Lack of Transportation (Medical): No     Lack of Transportation (Non-Medical): No   Physical Activity: Inactive (1/9/2025)    Exercise Vital Sign     Days of Exercise per Week: 0 days     Minutes of Exercise per Session: 10 min   Stress: No Stress Concern Present (1/9/2025)    [employer] [place] [employer] - Occupational Stress Questionnaire     Feeling of Stress : Only a little   Housing Stability: Unknown (1/9/2025)    Housing Stability Vital Sign     Unable to Pay for Housing in the Last Year: No   [2]   Outpatient Encounter Medications as of 7/18/2025   Medication Sig Dispense Refill    albuterol (PROVENTIL/VENTOLIN HFA) 90 mcg/actuation inhaler Inhale 1-2 puffs into the lungs every 6 (six) hours as needed for Wheezing or Shortness of Breath. Rescue 54 g 3    amitriptyline (ELAVIL) 25 MG tablet TAKE 1 TABLET BY MOUTH EVERY NIGHT AT BEDTIME 90 tablet 3    amLODIPine (NORVASC) 10 MG tablet Take 1 tablet (10 mg total) by mouth once daily. 90 tablet 3    busPIRone (BUSPAR) 10 MG tablet Take 10 mg by mouth 3 (three) times daily.      candesartan (ATACAND) 4 MG tablet Take 1 tablet (4 mg total) by mouth once daily. 90 tablet 2    clindamycin (CLEOCIN T) 1 % external solution AAA bid for skin infection/folliculitis 60 mL 11    DUPIXENT SYRINGE 300 mg/2 mL Syrg Inject 1 syringe (300mg) into the skin every other week 4 mL 11    empagliflozin (JARDIANCE) 25 mg tablet TAKE 1 TABLET(25 MG) BY MOUTH DAILY 90 tablet 1    fluticasone propionate (FLONASE) 50 mcg/actuation nasal spray 2 sprays (100 mcg total) by Each Nostril route once daily. 16 mL 5    gabapentin (NEURONTIN) 300 MG capsule Take 1 capsule (300 mg total) by mouth 2 (two) times daily. 180 capsule 2    gemfibroziL (LOPID) 600 MG tablet TAKE 1  TABLET BY MOUTH TWICE DAILY BEFORE MEALS 180 tablet 0    HYDROcodone-acetaminophen (NORCO) 5-325 mg per tablet Take 1 tablet by mouth every 6 (six) hours as needed for Pain. 12 tablet 0    hydrocortisone 2.5 % cream Apply topically 2 (two) times daily. Apply to rash on face. 28 g 1    hydrOXYzine (ATARAX) 50 MG tablet Take 1 tablet (50 mg total) by mouth 3 (three) times daily as needed for Itching. 270 tablet 3    ketoconazole (NIZORAL) 2 % cream Apply topically once daily. Apply to rash on face. 30 g 1    metFORMIN (GLUCOPHAGE) 1000 MG tablet TAKE 1 TABLET(1000 MG) BY MOUTH TWICE DAILY 180 tablet 1    multivitamin capsule Take 1 capsule by mouth once daily.      NURTEC 75 mg odt Take 75 mg by mouth daily as needed.      pantoprazole (PROTONIX) 40 MG tablet Take 1 tablet (40 mg total) by mouth once daily. 90 tablet 3    quetiapine (SEROQUEL) 300 MG Tab Take 300 mg by mouth every evening.      QULIPTA 10 mg Tab Take 1 tablet by mouth Daily. On hold for now      rizatriptan (MAXALT) 5 MG tablet Take 5 mg by mouth once.      semaglutide (OZEMPIC) 1 mg/dose (4 mg/3 mL) Inject 1 mg into the skin every 7 days. 4 each 3    simvastatin (ZOCOR) 80 MG tablet Take 1 tablet (80 mg total) by mouth nightly. 90 tablet 3    sumatriptan (IMITREX) 100 MG tablet TAKE 1 TABLET BY MOUTH EVERY DAY AS NEEDED FOR MIGRAINE HEADACHE 27 tablet 3    tamsulosin (FLOMAX) 0.4 mg Cap Take 2 capsules (0.8 mg total) by mouth once daily. 180 capsule 3    tiZANidine (ZANAFLEX) 4 MG tablet Take 1 tablet (4 mg total) by mouth every 8 (eight) hours. 30 tablet 0    triamcinolone acetonide 0.025% (KENALOG) 0.025 % cream APPLY TOPICALLY TWICE DAILY AS NEEDED FOR RASH UNDERARMS. MILD STEROID. USE SPARINGLY FOR 1 TO 2 WEEKS IF NEEDED THEN STOP 80 g 1    triamcinolone acetonide 0.1% (KENALOG) 0.1 % cream AAA bid prn eczema. Do not use on face. 454 g 5    zonisamide (ZONEGRAN) 50 MG Cap TAKE 1 CAPSULE(50 MG) BY MOUTH TWICE DAILY 180 capsule 1    tadalafiL  (CIALIS) 20 MG Tab Take 1 tablet (20 mg total) by mouth As instructed (Take either 1/2 tablet or 1 tablet by mouth as needed 2 to 12 hours prior to sexual activity.  Take the lowest effective dose.). 10 tablet 6    [DISCONTINUED] azelastine (ASTELIN) 137 mcg (0.1 %) nasal spray SPRAY 1 SPRAY (137 MCG TOTAL) BY NASAL ROUTE 2 (TWO) TIMES DAILY. 30 mL 4    [DISCONTINUED] EMGALITY  mg/mL PnIj Inject 1 mL into the skin every 30 days.      [DISCONTINUED] folic acid (FOLVITE) 800 MCG Tab Take 800 mcg by mouth once daily.      [DISCONTINUED] furosemide (LASIX) 40 MG tablet TAKE 1 TABLET BY MOUTH EVERY DAY 90 tablet 0    [DISCONTINUED] HYDROcodone-acetaminophen (NORCO) 5-325 mg per tablet Take 1 tablet by mouth every 6 (six) hours as needed for Pain. 12 tablet 0    [DISCONTINUED] pantoprazole (PROTONIX) 40 MG tablet Take 1 tablet (40 mg total) by mouth once daily. 90 tablet 3    [DISCONTINUED] sumatriptan (IMITREX) 100 MG tablet TAKE 1 TABLET BY MOUTH EVERY DAY AS NEEDED FOR MIGRAINE HEADACHE 27 tablet 3    [DISCONTINUED] tiZANidine (ZANAFLEX) 4 MG tablet TAKE 1 TABLET(4 MG) BY MOUTH EVERY 8 HOURS FOR 10 DAYS 30 tablet 0     No facility-administered encounter medications on file as of 7/18/2025.

## 2025-07-22 DIAGNOSIS — R07.89 LEFT-SIDED CHEST WALL PAIN: ICD-10-CM

## 2025-07-22 RX ORDER — TIZANIDINE 4 MG/1
4 TABLET ORAL EVERY 8 HOURS
Qty: 50 TABLET | Refills: 0 | Status: SHIPPED | OUTPATIENT
Start: 2025-07-22

## 2025-07-26 DIAGNOSIS — E78.5 HYPERLIPIDEMIA, UNSPECIFIED HYPERLIPIDEMIA TYPE: ICD-10-CM

## 2025-07-26 NOTE — TELEPHONE ENCOUNTER
No care due was identified.  [facility] Embedded Care Due Messages. Reference number: [id number].   7/26/2025 9:32:00 AM CDT

## 2025-07-27 RX ORDER — SIMVASTATIN 80 MG/1
TABLET, FILM COATED ORAL
Qty: 90 TABLET | Refills: 1 | Status: SHIPPED | OUTPATIENT
Start: 2025-07-27

## 2025-07-27 NOTE — TELEPHONE ENCOUNTER
Refill Decision Note   Scott Bansal  is requesting a refill authorization.  Brief Assessment and Rationale for Refill:  Approve     Medication Therapy Plan:        Comments:     Note composed:11:16 AM 07/27/2025

## 2025-07-28 ENCOUNTER — TELEPHONE (OUTPATIENT)
Dept: DERMATOLOGY | Facility: CLINIC | Age: 65
End: 2025-07-28
Payer: MEDICARE

## 2025-07-28 NOTE — TELEPHONE ENCOUNTER
Called pt in regards to r/s appt today due to provider being out.  Pt verbalized understanding. Pt to be called to r/s

## 2025-07-29 ENCOUNTER — HOSPITAL ENCOUNTER (EMERGENCY)
Facility: HOSPITAL | Age: 65
Discharge: HOME | End: 2025-07-29
Attending: EMERGENCY MEDICINE
Payer: MEDICARE

## 2025-07-29 VITALS
BODY MASS INDEX: 29.04 KG/M2 | DIASTOLIC BLOOD PRESSURE: 72 MMHG | HEIGHT: 69 IN | TEMPERATURE: 98 F | OXYGEN SATURATION: 98 % | HEART RATE: 88 BPM | SYSTOLIC BLOOD PRESSURE: 110 MMHG | RESPIRATION RATE: 18 BRPM

## 2025-07-29 DIAGNOSIS — M25.551 ACUTE RIGHT HIP PAIN: ICD-10-CM

## 2025-07-29 DIAGNOSIS — S72.101A: ICD-10-CM
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
## 2025-07-29 LAB
ABSOLUTE EOSINOPHIL (OHS): 0.1 K/UL
ABSOLUTE MONOCYTE (OHS): 1.24 K/UL (ref 0.3–1)
ABSOLUTE NEUTROPHIL COUNT (OHS): 8.41 K/UL (ref 1.8–7.7)
ALBUMIN SERPL BCP-MCNC: 4 G/DL (ref 3.5–5.2)
ALP SERPL-CCNC: 105 UNIT/L (ref 40–150)
ALT SERPL W/O P-5'-P-CCNC: 12 UNIT/L (ref 10–44)
ANION GAP (OHS): 9 MMOL/L (ref 8–16)
AST SERPL-CCNC: 11 UNIT/L (ref 11–45)
BASOPHILS # BLD AUTO: 0.04 K/UL
BASOPHILS NFR BLD AUTO: 0.4 %
BILIRUB SERPL-MCNC: 0.6 MG/DL (ref 0.1–1)
BUN SERPL-MCNC: 18 MG/DL (ref 8–23)
CALCIUM SERPL-MCNC: 9.8 MG/DL (ref 8.7–10.5)
CHLORIDE SERPL-SCNC: 110 MMOL/L (ref 95–110)
CO2 SERPL-SCNC: 20 MMOL/L (ref 23–29)
CREAT SERPL-MCNC: 1 MG/DL (ref 0.5–1.4)
ERYTHROCYTE [DISTWIDTH] IN BLOOD BY AUTOMATED COUNT: 13.5 % (ref 11.5–14.5)
GFR SERPLBLD CREATININE-BSD FMLA CKD-EPI: >60 ML/MIN/1.73/M2
GLUCOSE SERPL-MCNC: 130 MG/DL (ref 70–110)
HCT VFR BLD AUTO: 40.9 % (ref 40–54)
HGB BLD-MCNC: 13.6 GM/DL (ref 14–18)
IMM GRANULOCYTES # BLD AUTO: 0.05 K/UL (ref 0–0.04)
IMM GRANULOCYTES NFR BLD AUTO: 0.5 % (ref 0–0.5)
LYMPHOCYTES # BLD AUTO: 1.18 K/UL (ref 1–4.8)
MCH RBC QN AUTO: 28.6 PG (ref 27–31)
MCHC RBC AUTO-ENTMCNC: 33.3 G/DL (ref 32–36)
MCV RBC AUTO: 86 FL (ref 82–98)
NUCLEATED RBC (/100WBC) (OHS): 0 /100 WBC
OHS QRS DURATION: 84 MS
OHS QTC CALCULATION: 422 MS
PLATELET # BLD AUTO: 237 K/UL (ref 150–450)
PMV BLD AUTO: 10.4 FL (ref 9.2–12.9)
POTASSIUM SERPL-SCNC: 4.3 MMOL/L (ref 3.5–5.1)
PROT SERPL-MCNC: 6.9 GM/DL (ref 6–8.4)
RBC # BLD AUTO: 4.75 M/UL (ref 4.6–6.2)
RELATIVE EOSINOPHIL (OHS): 0.9 %
RELATIVE LYMPHOCYTE (OHS): 10.7 % (ref 18–48)
RELATIVE MONOCYTE (OHS): 11.3 % (ref 4–15)
RELATIVE NEUTROPHIL (OHS): 76.2 % (ref 38–73)
SODIUM SERPL-SCNC: 139 MMOL/L (ref 136–145)
WBC # BLD AUTO: 11.02 K/UL (ref 3.9–12.7)

## 2025-07-29 PROCEDURE — 96374 THER/PROPH/DIAG INJ IV PUSH: CPT

## 2025-07-29 PROCEDURE — 96376 TX/PRO/DX INJ SAME DRUG ADON: CPT

## 2025-07-29 PROCEDURE — 85025 COMPLETE CBC W/AUTO DIFF WBC: CPT | Performed by: NURSE PRACTITIONER

## 2025-07-29 PROCEDURE — 93010 ELECTROCARDIOGRAM REPORT: CPT | Mod: ,,, | Performed by: INTERNAL MEDICINE

## 2025-07-29 PROCEDURE — 93005 ELECTROCARDIOGRAM TRACING: CPT

## 2025-07-29 PROCEDURE — 99285 EMERGENCY DEPT VISIT HI MDM: CPT | Mod: 25

## 2025-07-29 PROCEDURE — 25000003 PHARM REV CODE 250: Performed by: NURSE PRACTITIONER

## 2025-07-29 PROCEDURE — 80051 ELECTROLYTE PANEL: CPT | Performed by: NURSE PRACTITIONER

## 2025-07-29 PROCEDURE — 96375 TX/PRO/DX INJ NEW DRUG ADDON: CPT

## 2025-07-29 PROCEDURE — 63600175 PHARM REV CODE 636 W HCPCS: Performed by: NURSE PRACTITIONER

## 2025-07-29 RX ORDER — MORPHINE SULFATE 4 MG/ML
4 INJECTION, SOLUTION INTRAMUSCULAR; INTRAVENOUS
Refills: 0 | Status: COMPLETED | OUTPATIENT
Start: 2025-07-29 | End: 2025-07-29

## 2025-07-29 RX ORDER — ATOGEPANT 60 MG/1
1 TABLET ORAL DAILY PRN
COMMUNITY
Start: 2025-07-21

## 2025-07-29 RX ORDER — HYDROCODONE BITARTRATE AND ACETAMINOPHEN 7.5; 325 MG/1; MG/1
1 TABLET ORAL EVERY 6 HOURS PRN
Qty: 12 TABLET | Refills: 0 | Status: SHIPPED | OUTPATIENT
Start: 2025-07-29 | End: 2025-08-08

## 2025-07-29 RX ORDER — GALCANEZUMAB 120 MG/ML
1 INJECTION, SOLUTION SUBCUTANEOUS
COMMUNITY
Start: 2025-07-02

## 2025-07-29 RX ORDER — ONDANSETRON HYDROCHLORIDE 2 MG/ML
4 INJECTION, SOLUTION INTRAVENOUS
Status: COMPLETED | OUTPATIENT
Start: 2025-07-29 | End: 2025-07-29

## 2025-07-29 RX ORDER — MUPIROCIN 20 MG/G
OINTMENT TOPICAL 3 TIMES DAILY
Qty: 15 G | Refills: 0 | Status: SHIPPED | OUTPATIENT
Start: 2025-07-29

## 2025-07-29 RX ORDER — HYDROCODONE BITARTRATE AND ACETAMINOPHEN 5; 325 MG/1; MG/1
1 TABLET ORAL
Refills: 0 | Status: COMPLETED | OUTPATIENT
Start: 2025-07-29 | End: 2025-07-29

## 2025-07-29 RX ORDER — ONDANSETRON 4 MG/1
4 TABLET, ORALLY DISINTEGRATING ORAL
Status: COMPLETED | OUTPATIENT
Start: 2025-07-29 | End: 2025-07-29

## 2025-07-29 RX ORDER — SEMAGLUTIDE 2.68 MG/ML
2 INJECTION, SOLUTION SUBCUTANEOUS
COMMUNITY
Start: 2025-05-27

## 2025-07-29 RX ADMIN — HYDROCODONE BITARTRATE AND ACETAMINOPHEN 1 TABLET: 5; 325 TABLET ORAL at 02:07

## 2025-07-29 RX ADMIN — MORPHINE SULFATE 4 MG: 4 INJECTION INTRAVENOUS at 03:07

## 2025-07-29 RX ADMIN — MORPHINE SULFATE 4 MG: 4 INJECTION INTRAVENOUS at 05:07

## 2025-07-29 RX ADMIN — ONDANSETRON 4 MG: 2 INJECTION INTRAMUSCULAR; INTRAVENOUS at 03:07

## 2025-07-29 RX ADMIN — ONDANSETRON 4 MG: 4 TABLET, ORALLY DISINTEGRATING ORAL at 02:07

## 2025-07-29 NOTE — PHARMACY MED REC
"Admission Medication History     The home medication history was taken by Chelle Curran.    You may go to "Admission" then "Reconcile Home Medications" tabs to review and/or act upon these items.     The home medication list has been updated by the Pharmacy department.   Please read ALL comments highlighted in yellow.   Please address this information as you see fit.    Feel free to contact us if you have any questions or require assistance.      The medications listed below were removed from the home medication list. Please reorder if appropriate:  Patient reports no longer taking the following medication(s):  Proventil/Ventolin 90 mcg/actuation inhaler  Cleocin 1% external solution  Flonase 50 mcg/actuation nasal spray  Norco 5-325 mg  Hydrocortisone 2.5% cream  Nizoral 2% cream  Maxalt 5 mg  Kenalog 0.1% cream    Medications listed below were obtained from: Patient/family and Analytic software- [employer]      [facility] REC COMPLETED:     Chelle Curran  [id number]    Current Outpatient Medications on File Prior to Encounter   Medication Sig Note Dispense Refill Last Dose/Taking    amitriptyline (ELAVIL) 25 MG tablet TAKE 1 TABLET BY MOUTH EVERY NIGHT AT BEDTIME  90 tablet 3 7/29/2025    amLODIPine (NORVASC) 10 MG tablet Take 1 tablet (10 mg total) by mouth once daily.  90 tablet 3 7/29/2025    busPIRone (BUSPAR) 10 MG tablet Take 10 mg by mouth 3 (three) times daily.    7/29/2025    candesartan (ATACAND) 4 MG tablet Take 1 tablet (4 mg total) by mouth once daily.  90 tablet 2 7/29/2025    DUPIXENT SYRINGE 300 mg/2 mL Syrg Inject 1 syringe (300mg) into the skin every other week  4 mL 11 7/29/2025    EMGALITY  mg/mL PnIj Inject 1 mL into the skin every 30 days.    Past Month    empagliflozin (JARDIANCE) 25 mg tablet TAKE 1 TABLET(25 MG) BY MOUTH DAILY  90 tablet 1 7/29/2025    gabapentin (NEURONTIN) 300 MG capsule Take 1 capsule (300 mg total) by mouth 2 (two) times daily.  180 capsule 2 7/29/2025    " gemfibroziL (LOPID) 600 MG tablet TAKE 1 TABLET BY MOUTH TWICE DAILY BEFORE MEALS  180 tablet 0 7/29/2025    hydrOXYzine (ATARAX) 50 MG tablet Take 1 tablet (50 mg total) by mouth 3 (three) times daily as needed for Itching.  270 tablet 3 7/29/2025    metFORMIN (GLUCOPHAGE) 1000 MG tablet TAKE 1 TABLET(1000 MG) BY MOUTH TWICE DAILY  180 tablet 1 7/29/2025    multivitamin capsule Take 1 capsule by mouth once daily.    7/29/2025    NURTEC 75 mg odt Take 75 mg by mouth daily as needed.    7/29/2025    OZEMPIC 2 mg/dose (8 mg/3 mL) PnIj Inject 2 mg into the skin every 7 days.    Taking    pantoprazole (PROTONIX) 40 MG tablet Take 1 tablet (40 mg total) by mouth once daily.  90 tablet 3 7/29/2025    quetiapine (SEROQUEL) 300 MG Tab Take 300 mg by mouth every evening.    7/29/2025    simvastatin (ZOCOR) 80 MG tablet TAKE 1 TABLET(80 MG) BY MOUTH EVERY NIGHT (Patient taking differently: Take 80 mg by mouth nightly.)  90 tablet 1 7/28/2025 Bedtime    sumatriptan (IMITREX) 100 MG tablet TAKE 1 TABLET BY MOUTH EVERY DAY AS NEEDED FOR MIGRAINE HEADACHE (Patient taking differently: Take 100 mg by mouth once. TAKE 1 TABLET BY MOUTH EVERY DAY AS NEEDED FOR MIGRAINE HEADACHE)  27 tablet 3 7/29/2025    tamsulosin (FLOMAX) 0.4 mg Cap Take 2 capsules (0.8 mg total) by mouth once daily.  180 capsule 3 7/29/2025    tiZANidine (ZANAFLEX) 4 MG tablet Take 1 tablet (4 mg total) by mouth every 8 (eight) hours.  50 tablet 0 7/29/2025    zonisamide (ZONEGRAN) 50 MG Cap TAKE 1 CAPSULE(50 MG) BY MOUTH TWICE DAILY (Patient taking differently: Take 50 mg by mouth 2 (two) times daily.)  180 capsule 1 7/29/2025    QULIPTA 60 mg Tab Take 1 tablet by mouth daily as needed. 7/29/2025: Patient takes as needed   Unknown    tadalafiL (CIALIS) 20 MG Tab Take 1 tablet (20 mg total) by mouth As instructed (Take either 1/2 tablet or 1 tablet by mouth as needed 2 to 12 hours prior to sexual activity.  Take the lowest effective dose.).  10 tablet 6 Unknown                            .

## 2025-07-29 NOTE — DISCHARGE INSTRUCTIONS
Take medication as prescribed. May weight bear as tolerated with crutches for 3-4 weeks. Needs follow up every 10 days with X-ray to make sure it's healing in a good position. Pain meds. Does not need surgery at this time. F/u in office. Give toradol injection. Pain med. Will have office call him for follow up with HAZEL Castillo

## 2025-07-29 NOTE — ED PROVIDER NOTES
Encounter Date: 7/29/2025       History     Chief Complaint   Patient presents with    Fall     Pt had a fall while tripping over his dog this morning @10AM. Pt has R Hip pain and HX of static nerve pain in the R hip.      64-year-old male who presents to ER for evaluation of right hip pain after trip and fall this morning.  Denies head injury or loss of consciousness.  Denies neck or back pain.  Denies numbness, tingling, or incontinence.  Denies any other injuries.  Ibuprofen prior to arrival with no relief.        Review of patient's allergies indicates:   Allergen Reactions    Levofloxacin Hallucinations    Sulfa (sulfonamide antibiotics) Rash     Past Medical History:   Diagnosis Date    Bipolar 1 disorder, mixed     BPH (benign prostatic hypertrophy)     Colon polyp     Cyst, eyelid     Dr. Broussard    Diabetes mellitus     GERD (gastroesophageal reflux disease)     Hyperlipidemia     Hypertension     Lumbar degenerative disc disease 3/7/2019    Migraine headache     Obesity      Past Surgical History:   Procedure Laterality Date    CERVICAL SPINE SURGERY      COLONOSCOPY N/A 7/27/2017    Procedure: COLONOSCOPY;  Surgeon: Genaro Nix MD;  Location: [facility] ENDO;  Service: Endoscopy;  Laterality: N/A;    COLONOSCOPY N/A 10/30/2020    Procedure: COLONOSCOPY;  Surgeon: Herb Martinez MD;  Location: [facility] ENDO;  Service: Endoscopy;  Laterality: N/A;    EPIDURAL STEROID INJECTION Bilateral 9/27/2023    Procedure: Bilateral L5 Transforaminal Epidural Steroid Injections (ref: [place]);  Surgeon: Tanner Mayberry Jr., MD;  Location: [facility];  Service: Pain Management;  Laterality: Bilateral;  @0815  No ATC   Check BG  Needs COnsent    EYE SURGERY Left 03/2017    cyst removal on eyelid; Dr. Broussard    SHOULDER SURGERY      TONSILLECTOMY       Family History   Problem Relation Name Age of Onset    Cataracts Mother      Cancer Mother          throat    Hypertension Mother      Hypertension Father      Stroke  Father          2 strokes    Hypertension Sister Leona     Cancer Brother Jose Armando         spinal, kidney, spinal fluid    Hypertension Brother Jose Armando     Cancer Cousin ?         breast?     Social History[1]  Review of Systems   Constitutional:  Negative for fever.   HENT:  Negative for sore throat.    Respiratory:  Negative for shortness of breath.    Cardiovascular:  Negative for chest pain.   Gastrointestinal:  Negative for nausea.   Genitourinary:  Negative for dysuria.   Musculoskeletal:  Negative for back pain.        Positive hip pain   Skin:  Negative for rash.   Neurological:  Negative for weakness.   Hematological:  Does not bruise/bleed easily.       Physical Exam     Initial Vitals [07/29/25 1346]   BP Pulse Resp Temp SpO2   130/65 106 18 97.6 °F (36.4 °C) 97 %      MAP       --         Physical Exam    Nursing note and vitals reviewed.  Constitutional: He appears well-developed and well-nourished. No distress.   HENT:   Head: Normocephalic and atraumatic.   Eyes: Conjunctivae and EOM are normal. Pupils are equal, round, and reactive to light.   Neck: Neck supple.   Normal range of motion.  Cardiovascular:  Normal heart sounds.           Pulmonary/Chest: Breath sounds normal.   Abdominal: Abdomen is soft. He exhibits no distension. There is no abdominal tenderness.   Musculoskeletal:      Cervical back: Normal range of motion and neck supple.      Right hip: Tenderness present. No deformity or crepitus. Decreased range of motion. Normal strength.      Right upper leg: Tenderness present. No swelling or deformity.      Left upper leg: Normal.      Right knee: Normal.      Left knee: Normal.      Right lower leg: Normal.      Left lower leg: Normal.     Neurological: He is alert and oriented to person, place, and time. He has normal strength. No cranial nerve deficit or sensory deficit. GCS score is 15. GCS eye subscore is 4. GCS verbal subscore is 5. GCS motor subscore is 6.   Skin: Skin is warm and dry.  Capillary refill takes less than 2 seconds. No rash noted.   Psychiatric: He has a normal mood and affect.         ED Course   Procedures  Labs Reviewed   COMPREHENSIVE METABOLIC PANEL - Abnormal       Result Value    Sodium 139      Potassium 4.3      Chloride 110      CO2 20 (*)     Glucose 130 (*)     BUN 18      Creatinine 1.0      Calcium 9.8      Protein Total 6.9      Albumin 4.0      Bilirubin Total 0.6            AST 11      ALT 12      Anion Gap 9      eGFR >60     CBC WITH DIFFERENTIAL - Abnormal    WBC 11.02      RBC 4.75      HGB 13.6 (*)     HCT 40.9      MCV 86      MCH 28.6      MCHC 33.3      RDW 13.5      Platelet Count 237      MPV 10.4      Nucleated RBC 0      Neut % 76.2 (*)     Lymph % 10.7 (*)     Mono % 11.3      Eos % 0.9      Basophil % 0.4      Imm Grans % 0.5      Neut # 8.41 (*)     Lymph # 1.18      Mono # 1.24 (*)     Eos # 0.10      Baso # 0.04      Imm Grans # 0.05 (*)    CBC W/ AUTO DIFFERENTIAL    Narrative:     The following orders were created for panel order CBC auto differential.  Procedure                               Abnormality         Status                     ---------                               -----------         ------                     CBC with Differential[[id number]]       Abnormal            Final result                 Please view results for these tests on the individual orders.        ECG Results              EKG 12-lead (In process)        Collection Time Result Time QRS Duration OHS QTC Calculation    07/29/25 13:54:22 07/29/25 14:29:18 84 422                     In process by Interface, Lab In [facility] (07/29/25 14:29:23)                   Narrative:    Test Reason : W19.XXXA,    Vent. Rate :  94 BPM     Atrial Rate :  94 BPM     P-R Int : 192 ms          QRS Dur :  84 ms      QT Int : 338 ms       P-R-T Axes :  31 -22  36 degrees    QTcB Int : 422 ms    Normal sinus rhythm  Inferior infarct (cited on or before 08-Feb-2020)  Possible Anterior  infarct (cited on or before 08-Feb-2020)  Abnormal ECG  When compared with ECG of 31-Mar-2025 14:59,  Nonspecific T wave abnormality now evident in Inferior leads    Referred By: AAAREFERRAL SELF           Confirmed By:                                   Imaging Results              CT Hip Without Contrast Right (Final result)  Result time 07/29/25 16:02:48      Final result by Dorian Pillai MD (Timothy) (07/29/25 16:02:48)                   Impression:      Minimally displaced acute fracture involving the greater trochanter of the right hip.      Electronically signed by: Dorian Pillai MD  Date:    07/29/2025  Time:    16:02               Narrative:    EXAMINATION:  CT HIP WITHOUT CONTRAST RIGHT    CLINICAL HISTORY:  , right hip pain    TECHNIQUE:  Standard CT technique.  All CT scans at this facility are performed  using dose modulation techniques as appropriate to performed exam including the following:  automated exposure control; adjustment of mA and/or kV according to the patients size (this includes techniques or standardized protocols for targeted exams where dose is matched to indication/reason for exam: i.e. extremities or head);  iterative reconstruction technique.    COMPARISON:  None    FINDINGS:  There is a minimally displaced fracture of the greater trochanter of the right hip.  Femoral neck and femoral head appear intact.  No pelvic bone fractures.                                       X-Ray Chest 1 View (Final result)  Result time 07/29/25 15:21:10      Final result by Dorian Pillai MD (Timothy) (07/29/25 15:21:10)                   Impression:      Clear lungs      Electronically signed by: Dorian Pillai MD  Date:    07/29/2025  Time:    15:21               Narrative:    EXAMINATION:  XR CHEST 1 VIEW    CLINICAL HISTORY:  Chest pain, chest injury,    COMPARISON:  None    FINDINGS:  Heart size is normal. The lung fields are clear. No acute cardiopulmonary infiltrate.  No acute fractures.   Old healed right posterolateral rib fractures                                       X-Ray Hip 2 or 3 views Right with Pelvis when performed (Final result)  Result time 07/29/25 14:12:58      Final result by Momo Dow MD (07/29/25 14:12:58)                   Impression:      1.  Comminuted and mildly displaced avulsion fracture of the right greater trochanter.    2.  Negative for acute process otherwise.  Incidental findings as noted above.      Electronically signed by: Momo Dow MD  Date:    07/29/2025  Time:    14:12               Narrative:    EXAMINATION:  XR HIP WITH PELVIS WHEN PERFORMED 2 OR 3 VIEWS RIGHT    CLINICAL HISTORY:  fall;    TECHNIQUE:  AP view of the pelvis and frog leg lateral view of the right hip were performed.    COMPARISON:  None    FINDINGS:  There is a comminuted and mildly displaced avulsion fracture of the right greater trochanter.  No other acute fractures are seen.    The femoral heads are well centered on the acetabula.  Hip joints are well maintained.  Moderate degenerative changes of the lower lumbar spine.    Left buttock injection granulomas.  Normal bowel gas pattern.  There are phleboliths versus ureteroliths some overlying the pelvis.                                       X-Ray Lumbar Spine Ap And Lateral (Final result)  Result time 07/29/25 14:13:58      Final result by Momo Dow MD (07/29/25 14:13:58)                   Impression:      1.  Negative for acute process involving the lumbar spine.    2.  Stable degenerative changes of the mid lower lumbar spine as detailed above.      Electronically signed by: Momo Dow MD  Date:    07/29/2025  Time:    14:13               Narrative:    EXAMINATION:  XR LUMBAR SPINE AP AND LATERAL    CLINICAL HISTORY:  fall;    COMPARISON:  August 17, 2023    FINDINGS:  There are 5 weight bearing lumbar vertebra.  Mild convex left curvature of the midthoracic spine with marginal spondylosis.  Mild L3/L4, moderate L4/L5 and  moderate L5/S1 disc height reduction.  Degenerative facet arthropathy with endplate sclerosis at L5/S1.  Vacuum disc phenomenon at L4/L5 and L5/S1. The vertebral body heights and intervertebral disc heights are otherwise well-maintained. Negative for spondylolysis or spondylolisthesis. The sacral ala and sacroiliac joints are intact. The bowel gas pattern is normal.    Vascular calcifications without aneurysmal change peer                                       Medications   HYDROcodone-acetaminophen 5-325 mg per tablet 1 tablet (1 tablet Oral Given 7/29/25 1401)   ondansetron disintegrating tablet 4 mg (4 mg Oral Given 7/29/25 1402)   morphine injection 4 mg (4 mg Intravenous Given 7/29/25 1511)   ondansetron injection 4 mg (4 mg Intravenous Given 7/29/25 1511)     Medical Decision Making  Amount and/or Complexity of Data Reviewed  Labs: ordered.  Radiology: ordered.    Risk  Prescription drug management.  Decision regarding hospitalization.                      2:25 pm:   64-year-old male with past medical history of bipolar, diabetes, hypertension, and chronic back pain presents to ER after trip and fall this morning. Patient reports he landed on right hip. X-ray reveals: comminuted and mildly displaced avulsion fracture of the right greater trochanter. Reports last meal was 1:00 p.m.. Patient did have a sip of water with medications while in ER. Denies blood thinners. Neurovascular exam is normal.  case discussed with ortho on-call Dr. Merchant recommends CT can and Hospital Medicine admit.  NPO past midnight for possible need of surgery tomorrow afternoon.  Treatment plan discussed with patient.   He is in agreement with treatment plan. Case discussed with hospital medicine.           4:41 PM  After reviewing the CT, Dr. Merchant has made different recommendation.  He recommends okay to discharge home. May weight bear as tolerated with crutches for 3-4 weeks. Needs follow up every 10 days with X-ray to make  sure it's healing in a good position. Pain meds. Does not need surgery at this time. F/u in office. Give toradol injection. Pain med. will have office call him for follow up with HAZEL Castillo         Clinical Impression:  Final diagnoses:  [W19.XXXA] Fall  [W19.XXXA] Fall, initial encounter (Primary)  [S72.101A] Traumatic closed trochanteric fracture of femur with minimal displacement, right, initial encounter          ED Disposition Condition    Admit                     Savannah Churchill NP  [phone]         [1]   Social History  Tobacco Use    Smoking status: Former     Current packs/day: 0.00     Average packs/day: 2.0 packs/day for 15.0 years (30.0 ttl pk-yrs)     Types: Cigarettes     Start date: 1969     Quit date: 1984     Years since quittin.2     Passive exposure: Past    Smokeless tobacco: Never    Tobacco comments:     Patient quit smoking at the age of 24 yo.   Vaping Use    Vaping status: Never Used   Substance Use Topics    Alcohol use: No    Drug use: No        Savannah Churchill NP  [phone]

## 2025-07-30 ENCOUNTER — OFFICE VISIT (OUTPATIENT)
Dept: FAMILY MEDICINE | Facility: CLINIC | Age: 65
End: 2025-07-30
Payer: MEDICARE

## 2025-07-30 ENCOUNTER — TELEPHONE (OUTPATIENT)
Dept: ORTHOPEDICS | Facility: CLINIC | Age: 65
End: 2025-07-30
Payer: MEDICARE

## 2025-07-30 VITALS
HEIGHT: 69 IN | OXYGEN SATURATION: 96 % | WEIGHT: 196.63 LBS | HEART RATE: 107 BPM | BODY MASS INDEX: 29.12 KG/M2 | DIASTOLIC BLOOD PRESSURE: 60 MMHG | TEMPERATURE: 99 F | SYSTOLIC BLOOD PRESSURE: 92 MMHG

## 2025-07-30 DIAGNOSIS — E11.42 TYPE 2 DIABETES MELLITUS WITH DIABETIC POLYNEUROPATHY, WITHOUT LONG-TERM CURRENT USE OF INSULIN: ICD-10-CM

## 2025-07-30 DIAGNOSIS — M25.551 RIGHT HIP PAIN: Primary | ICD-10-CM

## 2025-07-30 DIAGNOSIS — J34.89 STAPHYLOCOCCUS INFECTION OF NOSE: ICD-10-CM

## 2025-07-30 DIAGNOSIS — I15.2 HYPERTENSION ASSOCIATED WITH DIABETES: ICD-10-CM

## 2025-07-30 DIAGNOSIS — N40.0 BENIGN PROSTATIC HYPERPLASIA, UNSPECIFIED WHETHER LOWER URINARY TRACT SYMPTOMS PRESENT: ICD-10-CM

## 2025-07-30 DIAGNOSIS — S72.001G CLOSED FRACTURE OF RIGHT HIP WITH DELAYED HEALING, SUBSEQUENT ENCOUNTER: Primary | ICD-10-CM

## 2025-07-30 DIAGNOSIS — B95.8 STAPHYLOCOCCUS INFECTION OF NOSE: ICD-10-CM

## 2025-07-30 DIAGNOSIS — E11.59 HYPERTENSION ASSOCIATED WITH DIABETES: ICD-10-CM

## 2025-07-30 DIAGNOSIS — R07.89 LEFT-SIDED CHEST WALL PAIN: ICD-10-CM

## 2025-07-30 PROCEDURE — 3078F DIAST BP <80 MM HG: CPT | Mod: CPTII,S$GLB,, | Performed by: FAMILY MEDICINE

## 2025-07-30 PROCEDURE — 3066F NEPHROPATHY DOC TX: CPT | Mod: CPTII,S$GLB,, | Performed by: FAMILY MEDICINE

## 2025-07-30 PROCEDURE — 99999 PR PBB SHADOW E&M-EST. PATIENT-LVL III: CPT | Mod: PBBFAC,,, | Performed by: FAMILY MEDICINE

## 2025-07-30 PROCEDURE — 3008F BODY MASS INDEX DOCD: CPT | Mod: CPTII,S$GLB,, | Performed by: FAMILY MEDICINE

## 2025-07-30 PROCEDURE — 3072F LOW RISK FOR RETINOPATHY: CPT | Mod: CPTII,S$GLB,, | Performed by: FAMILY MEDICINE

## 2025-07-30 PROCEDURE — 3288F FALL RISK ASSESSMENT DOCD: CPT | Mod: CPTII,S$GLB,, | Performed by: FAMILY MEDICINE

## 2025-07-30 PROCEDURE — 3074F SYST BP LT 130 MM HG: CPT | Mod: CPTII,S$GLB,, | Performed by: FAMILY MEDICINE

## 2025-07-30 PROCEDURE — 1100F PTFALLS ASSESS-DOCD GE2>/YR: CPT | Mod: CPTII,S$GLB,, | Performed by: FAMILY MEDICINE

## 2025-07-30 PROCEDURE — 4010F ACE/ARB THERAPY RXD/TAKEN: CPT | Mod: CPTII,S$GLB,, | Performed by: FAMILY MEDICINE

## 2025-07-30 PROCEDURE — 99214 OFFICE O/P EST MOD 30 MIN: CPT | Mod: S$GLB,,, | Performed by: FAMILY MEDICINE

## 2025-07-30 PROCEDURE — G2211 COMPLEX E/M VISIT ADD ON: HCPCS | Mod: S$GLB,,, | Performed by: FAMILY MEDICINE

## 2025-07-30 PROCEDURE — 3061F NEG MICROALBUMINURIA REV: CPT | Mod: CPTII,S$GLB,, | Performed by: FAMILY MEDICINE

## 2025-07-30 PROCEDURE — 3044F HG A1C LEVEL LT 7.0%: CPT | Mod: CPTII,S$GLB,, | Performed by: FAMILY MEDICINE

## 2025-07-30 PROCEDURE — 1125F AMNT PAIN NOTED PAIN PRSNT: CPT | Mod: CPTII,S$GLB,, | Performed by: FAMILY MEDICINE

## 2025-07-30 RX ORDER — AMOXICILLIN AND CLAVULANATE POTASSIUM 875; 125 MG/1; MG/1
1 TABLET, FILM COATED ORAL EVERY 12 HOURS
Qty: 10 TABLET | Refills: 0 | Status: SHIPPED | OUTPATIENT
Start: 2025-07-30

## 2025-07-30 NOTE — PROGRESS NOTES
"Subjective:       Patient ID: Scott Bansal is a 64 y.o. male.    Chief Complaint: Fall      HPI Comments:     Current Medications[1]      Last seen by me 6 weeks ago.    Yesterday fell and went to the ER and was found to have a minimally displaced hip fracture.  Orthopedics consulted he was sent home with light weight-bearing follow-up next week.  Having lot of pain for which he is taking Norco.  Using his walker.    Pain in his left side last time has gotten better.  As has a shoulder pain.    Complaining of pain and swelling around his right nostril.  Was given Bactroban yesterday.  Will add Augmentin today      Review of Systems   Constitutional:  Negative for activity change, appetite change and fever.   HENT:  Positive for facial swelling. Negative for sore throat.    Respiratory:  Negative for cough and shortness of breath.    Cardiovascular:  Negative for chest pain.   Gastrointestinal:  Negative for abdominal pain, diarrhea and nausea.   Genitourinary:  Negative for difficulty urinating.   Musculoskeletal:  Positive for gait problem. Negative for arthralgias and myalgias.   Neurological:  Negative for dizziness and headaches.       Objective:      Vitals:    07/30/25 0838   BP: 92/60   Pulse: 107   Temp: 99.1 °F (37.3 °C)   TempSrc: Tympanic   SpO2: 96%   Weight: 89.2 kg (196 lb 10.4 oz)   Height: 5' 9" (1.753 m)   PainSc: 10-Worst pain ever   PainLoc: Hip     Physical Exam  Vitals and nursing note reviewed.   Constitutional:       General: He is not in acute distress.     Appearance: He is well-developed. He is not diaphoretic.   HENT:      Head: Normocephalic.      Nose:        Comments: Redness and swelling and tenderness     Mouth/Throat:      Pharynx: No oropharyngeal exudate.   Neck:      Thyroid: No thyromegaly.   Cardiovascular:      Rate and Rhythm: Normal rate and regular rhythm.      Heart sounds: Normal heart sounds. No murmur heard.  Pulmonary:      Effort: Pulmonary effort is normal.      " Breath sounds: Normal breath sounds. No wheezing or rales.   Abdominal:      General: There is no distension.      Palpations: Abdomen is soft.   Musculoskeletal:      Cervical back: Neck supple.   Lymphadenopathy:      Cervical: No cervical adenopathy.   Skin:     General: Skin is warm and dry.   Neurological:      Mental Status: He is alert and oriented to person, place, and time.   Psychiatric:         Behavior: Behavior normal.         Thought Content: Thought content normal.         Judgment: Judgment normal.         Assessment:       1. Closed fracture of right hip with delayed healing, subsequent encounter    2. Left-sided chest wall pain    3. Staphylococcus infection of nose    4. Hypertension associated with diabetes    5. Type 2 diabetes mellitus with diabetic polyneuropathy, without long-term current use of insulin    6. Benign prostatic hyperplasia, unspecified whether lower urinary tract symptoms present        Plan:   Closed fracture of right hip with delayed healing, subsequent encounter  Comments:  Weight-bearing with walker per Orthopedics.  Follow-up next week bear.  Has Norco for pain    Left-sided chest wall pain  Comments:  Resolved    Staphylococcus infection of nose  Comments:  Augmentin.  Continue Bactroban    Hypertension associated with diabetes  Comments:  Controlled.    Type 2 diabetes mellitus with diabetic polyneuropathy, without long-term current use of insulin  Comments:  A1c 5.5    Benign prostatic hyperplasia, unspecified whether lower urinary tract symptoms present  Comments:  Followed by urology.  PSA recently 2.2    Other orders  -     amoxicillin-clavulanate 875-125mg (AUGMENTIN) 875-125 mg per tablet; Take 1 tablet by mouth every 12 (twelve) hours.  Dispense: 10 tablet; Refill: 0                 [1]   Current Outpatient Medications:     amitriptyline (ELAVIL) 25 MG tablet, TAKE 1 TABLET BY MOUTH EVERY NIGHT AT BEDTIME, Disp: 90 tablet, Rfl: 3    amLODIPine (NORVASC) 10 MG  tablet, Take 1 tablet (10 mg total) by mouth once daily., Disp: 90 tablet, Rfl: 3    busPIRone (BUSPAR) 10 MG tablet, Take 10 mg by mouth 3 (three) times daily., Disp: , Rfl:     candesartan (ATACAND) 4 MG tablet, Take 1 tablet (4 mg total) by mouth once daily., Disp: 90 tablet, Rfl: 2    DUPIXENT SYRINGE 300 mg/2 mL Syrg, Inject 1 syringe (300mg) into the skin every other week, Disp: 4 mL, Rfl: 11    EMGALITY  mg/mL PnIj, Inject 1 mL into the skin every 30 days., Disp: , Rfl:     empagliflozin (JARDIANCE) 25 mg tablet, TAKE 1 TABLET(25 MG) BY MOUTH DAILY, Disp: 90 tablet, Rfl: 1    gabapentin (NEURONTIN) 300 MG capsule, Take 1 capsule (300 mg total) by mouth 2 (two) times daily., Disp: 180 capsule, Rfl: 2    gemfibroziL (LOPID) 600 MG tablet, TAKE 1 TABLET BY MOUTH TWICE DAILY BEFORE MEALS, Disp: 180 tablet, Rfl: 0    HYDROcodone-acetaminophen (NORCO) 7.5-325 mg per tablet, Take 1 tablet by mouth every 6 (six) hours as needed for Pain., Disp: 12 tablet, Rfl: 0    hydrOXYzine (ATARAX) 50 MG tablet, Take 1 tablet (50 mg total) by mouth 3 (three) times daily as needed for Itching., Disp: 270 tablet, Rfl: 3    metFORMIN (GLUCOPHAGE) 1000 MG tablet, TAKE 1 TABLET(1000 MG) BY MOUTH TWICE DAILY, Disp: 180 tablet, Rfl: 1    multivitamin capsule, Take 1 capsule by mouth once daily., Disp: , Rfl:     mupirocin (BACTROBAN) 2 % ointment, Apply topically 3 (three) times daily., Disp: 15 g, Rfl: 0    NURTEC 75 mg odt, Take 75 mg by mouth daily as needed., Disp: , Rfl:     OZEMPIC 2 mg/dose (8 mg/3 mL) PnIj, Inject 2 mg into the skin every 7 days., Disp: , Rfl:     pantoprazole (PROTONIX) 40 MG tablet, Take 1 tablet (40 mg total) by mouth once daily., Disp: 90 tablet, Rfl: 3    quetiapine (SEROQUEL) 300 MG Tab, Take 300 mg by mouth every evening., Disp: , Rfl:     QULIPTA 60 mg Tab, Take 1 tablet by mouth daily as needed., Disp: , Rfl:     simvastatin (ZOCOR) 80 MG tablet, TAKE 1 TABLET(80 MG) BY MOUTH EVERY NIGHT (Patient  taking differently: Take 80 mg by mouth nightly.), Disp: 90 tablet, Rfl: 1    sumatriptan (IMITREX) 100 MG tablet, TAKE 1 TABLET BY MOUTH EVERY DAY AS NEEDED FOR MIGRAINE HEADACHE (Patient taking differently: Take 100 mg by mouth once. TAKE 1 TABLET BY MOUTH EVERY DAY AS NEEDED FOR MIGRAINE HEADACHE), Disp: 27 tablet, Rfl: 3    tadalafiL (CIALIS) 20 MG Tab, Take 1 tablet (20 mg total) by mouth As instructed (Take either 1/2 tablet or 1 tablet by mouth as needed 2 to 12 hours prior to sexual activity.  Take the lowest effective dose.)., Disp: 10 tablet, Rfl: 6    tamsulosin (FLOMAX) 0.4 mg Cap, Take 2 capsules (0.8 mg total) by mouth once daily., Disp: 180 capsule, Rfl: 3    tiZANidine (ZANAFLEX) 4 MG tablet, Take 1 tablet (4 mg total) by mouth every 8 (eight) hours., Disp: 50 tablet, Rfl: 0    zonisamide (ZONEGRAN) 50 MG Cap, TAKE 1 CAPSULE(50 MG) BY MOUTH TWICE DAILY (Patient taking differently: Take 50 mg by mouth 2 (two) times daily.), Disp: 180 capsule, Rfl: 1    amoxicillin-clavulanate 875-125mg (AUGMENTIN) 875-125 mg per tablet, Take 1 tablet by mouth every 12 (twelve) hours., Disp: 10 tablet, Rfl: 0  No current facility-administered medications for this visit.

## 2025-07-30 NOTE — TELEPHONE ENCOUNTER
Called patient and r/s appt to Friday, August 8 with Juan Castle PA-C. Explained to patient that on call trauma doctor would like for patient to be seen at [facility] and on day that he is in clinic so if he needs to see patient he will be available. Patient verbalized understanding of new appt date, time and location. Asked that patient arrive 30 minutes early for xray.

## 2025-08-01 ENCOUNTER — TELEPHONE (OUTPATIENT)
Dept: FAMILY MEDICINE | Facility: CLINIC | Age: 65
End: 2025-08-01
Payer: MEDICARE

## 2025-08-01 NOTE — TELEPHONE ENCOUNTER
Copied from CRM #[id number]. Topic: Medications - New Medication Request  >> Aug 1, 2025 11:05 AM Nichole wrote:  :.Type:  Needs Medical Advice    Who Called: Scott  Symptoms (please be specific): cough green thick mucus  How long has patient had these symptoms: just before 7/30  Pharmacy name and phone #:    [id number] - [place], [place] - [address]  [address]  [place] [address]  Phone: [phone] Fax: [phone]  Would the patient rather a call back or a response via [facility]? Call back  Best Call Back Number: [phone]  Additional Information: Since taking amoxicillin-clavulanate 875-125mg (AUGMENTIN) 875-125 mg per tablet he has started more with a cough... He's taking Mucinex but it's not helping enough. Getting hard to breath when laying in bed due to mucus... Pt concerned about getting pneumonia again and wanting to get meds to help clear the cough/mucus. He is requesting cough syrup for the cough be prescribed.      Pt is also wanting to note and get advise... His top lip is getting worse and swelling. Getting irritated when he eats or drinks something. Wanting to know if he should gargle with warm salt water to help.  Symptom screen ran for lip swelling....Pt declined speaking with on call nurse.  Symptom: Lip Swelling  Outcome: Schedule an urgent appointment (within 4 hours) or talk to a nurse or provider within 30 minutes.  Reason: Getting worse

## 2025-08-06 ENCOUNTER — PATIENT MESSAGE (OUTPATIENT)
Dept: FAMILY MEDICINE | Facility: CLINIC | Age: 65
End: 2025-08-06
Payer: MEDICARE

## 2025-08-06 ENCOUNTER — TELEPHONE (OUTPATIENT)
Dept: FAMILY MEDICINE | Facility: CLINIC | Age: 65
End: 2025-08-06
Payer: MEDICARE

## 2025-08-06 NOTE — TELEPHONE ENCOUNTER
Copied from CRM #[id number]. Topic: Medications - New Medication Request  >> Aug 6, 2025 10:13 AM Roma wrote:  Patient needs a new Rx  on pain medication called into [facility] pharmacy at [phone].  Pt stated that he had a fall and broke his hip and he is requesting pain medication be sent over to the pharmacy. Please call patient at [phone] ([phone]   if you have any questions.         [id number] - [place], [place] - [address]  [address] [facility] [address]  Phone: [phone] Fax: [phone]           Thanks KB

## 2025-08-08 ENCOUNTER — OFFICE VISIT (OUTPATIENT)
Dept: ORTHOPEDICS | Facility: CLINIC | Age: 65
End: 2025-08-08
Payer: MEDICARE

## 2025-08-08 ENCOUNTER — HOSPITAL ENCOUNTER (OUTPATIENT)
Dept: RADIOLOGY | Facility: HOSPITAL | Age: 65
Discharge: HOME | End: 2025-08-08
Attending: PHYSICIAN ASSISTANT
Payer: MEDICARE

## 2025-08-08 VITALS
HEART RATE: 86 BPM | SYSTOLIC BLOOD PRESSURE: 100 MMHG | HEIGHT: 69 IN | DIASTOLIC BLOOD PRESSURE: 66 MMHG | WEIGHT: 196.63 LBS | BODY MASS INDEX: 29.12 KG/M2

## 2025-08-08 DIAGNOSIS — M25.551 PAIN OF RIGHT HIP: Primary | ICD-10-CM

## 2025-08-08 DIAGNOSIS — S72.101A: Primary | ICD-10-CM

## 2025-08-08 DIAGNOSIS — M25.551 RIGHT HIP PAIN: ICD-10-CM

## 2025-08-08 PROCEDURE — 99999 PR PBB SHADOW E&M-EST. PATIENT-LVL III: CPT | Mod: PBBFAC,,, | Performed by: PHYSICIAN ASSISTANT

## 2025-08-08 PROCEDURE — 73502 X-RAY EXAM HIP UNI 2-3 VIEWS: CPT | Mod: TC,RT

## 2025-08-08 PROCEDURE — 73502 X-RAY EXAM HIP UNI 2-3 VIEWS: CPT | Mod: 26,RT,, | Performed by: RADIOLOGY

## 2025-08-08 RX ORDER — HYDROCODONE BITARTRATE AND ACETAMINOPHEN 7.5; 325 MG/1; MG/1
1 TABLET ORAL EVERY 8 HOURS PRN
Qty: 21 TABLET | Refills: 0 | Status: SHIPPED | OUTPATIENT
Start: 2025-08-08 | End: 2025-08-15

## 2025-08-08 NOTE — PROGRESS NOTES
"Subjective:      Patient ID: Scott Bansal is a 64 y.o. male.    History of Present Illness    CHIEF COMPLAINT:  - Right hip pain    HPI:  Scott presents with right hip pain following a fall at home on July 27th. The incident occurred when he leaned over to clean up after his dog, causing blood to rush to his head. He blacked out while trying to stand up, resulting in a fall onto his right hip. Pain is significant in the right hip area, particularly when moving the leg. Despite the pain, he is able to bear weight on the affected leg, though it causes discomfort.    WORK STATUS:  - Previously worked on a [employer]  - Sustained a neck injury while working, landing on neck after slipping on wet coal on the deck  - Continued to work for two more days after the injury before getting an MRI       Past Medical History:   Diagnosis Date    Bipolar 1 disorder, mixed     BPH (benign prostatic hypertrophy)     Colon polyp     Cyst, eyelid     Dr. Broussard    Diabetes mellitus     GERD (gastroesophageal reflux disease)     Hyperlipidemia     Hypertension     Lumbar degenerative disc disease 3/7/2019    Migraine headache     Obesity    Current Medications[1]    Review of patient's allergies indicates:   Allergen Reactions    Levofloxacin Hallucinations    Sulfa (sulfonamide antibiotics) Rash       /66 (BP Location: Right arm, Patient Position: Sitting)   Pulse 86   Ht 5' 9" (1.753 m)   Wt 89.2 kg (196 lb 10.4 oz)   BMI 29.04 kg/m²       Objective:    Ortho Exam   Right lower extremity:   Skin is intact   No edema   Moderate TTP over the greater trochanter   Pain increases with internal/external rotation   Calf and compartments are soft and compressible   Motor exam normal   Sensation and pulses intact   Cap refill brisk    GEN: Well developed, well nourished male. AAOX3. No acute distress.   Head: Normocephalic, atraumatic.   Eyes: SELWYN  Neck: Trachea is midline, no adenopathy  Resp: Breathing unlabored.  Neuro: " Motor function normal, Cranial nerves intact  Psych: Mood and affect appropriate.    Assessment:     Imaging:  X-ray right hip with AP pelvis obtained today was reviewed and shows previously noted fracture of the right greater trochanter in similar alignment to previous films.  No significant healing noted.  No detrimental changes.      1. Traumatic closed trochanteric fracture of femur with minimal displacement, right, initial encounter        Plan:     Reviewed the radiographs with the patient.    Encouraged him of the fracture is similarly aligned.    Encouraged him to increase activities as tolerated, but he may need to use crutches or a walker for ambulation due to pain.    Pain medication was sent to the pharmacy.    He should return to clinic in about 10 days for repeat radiographs and further evaluation.      Orders Placed This Encounter    HYDROcodone-acetaminophen (NORCO) 7.5-325 mg per tablet     Follow up in about 10 days (around 8/18/2025).      Patient note was created using MModal Dictation.  Any errors in syntax or even information may not have been identified and edited on initial review prior to signing this note.    This note was generated with the assistance of ambient listening technology. Verbal consent was obtained by the patient and accompanying visitor(s) for the recording of patient appointment to facilitate this note. I attest to having reviewed and edited the generated note for accuracy, though some syntax or spelling errors may persist. Please contact the author of this note for any clarification.         [1]   Current Outpatient Medications:     amitriptyline (ELAVIL) 25 MG tablet, TAKE 1 TABLET BY MOUTH EVERY NIGHT AT BEDTIME, Disp: 90 tablet, Rfl: 3    amLODIPine (NORVASC) 10 MG tablet, Take 1 tablet (10 mg total) by mouth once daily., Disp: 90 tablet, Rfl: 3    amoxicillin-clavulanate 875-125mg (AUGMENTIN) 875-125 mg per tablet, Take 1 tablet by mouth every 12 (twelve) hours., Disp:  10 tablet, Rfl: 0    busPIRone (BUSPAR) 10 MG tablet, Take 10 mg by mouth 3 (three) times daily., Disp: , Rfl:     candesartan (ATACAND) 4 MG tablet, Take 1 tablet (4 mg total) by mouth once daily., Disp: 90 tablet, Rfl: 2    DUPIXENT SYRINGE 300 mg/2 mL Syrg, Inject 1 syringe (300mg) into the skin every other week, Disp: 4 mL, Rfl: 11    EMGALITY  mg/mL PnIj, Inject 1 mL into the skin every 30 days., Disp: , Rfl:     empagliflozin (JARDIANCE) 25 mg tablet, TAKE 1 TABLET(25 MG) BY MOUTH DAILY, Disp: 90 tablet, Rfl: 1    gabapentin (NEURONTIN) 300 MG capsule, Take 1 capsule (300 mg total) by mouth 2 (two) times daily., Disp: 180 capsule, Rfl: 2    gemfibroziL (LOPID) 600 MG tablet, TAKE 1 TABLET BY MOUTH TWICE DAILY BEFORE MEALS, Disp: 180 tablet, Rfl: 0    hydrOXYzine (ATARAX) 50 MG tablet, Take 1 tablet (50 mg total) by mouth 3 (three) times daily as needed for Itching., Disp: 270 tablet, Rfl: 3    metFORMIN (GLUCOPHAGE) 1000 MG tablet, TAKE 1 TABLET(1000 MG) BY MOUTH TWICE DAILY, Disp: 180 tablet, Rfl: 1    multivitamin capsule, Take 1 capsule by mouth once daily., Disp: , Rfl:     mupirocin (BACTROBAN) 2 % ointment, Apply topically 3 (three) times daily., Disp: 15 g, Rfl: 0    NURTEC 75 mg odt, Take 75 mg by mouth daily as needed., Disp: , Rfl:     OZEMPIC 2 mg/dose (8 mg/3 mL) PnIj, Inject 2 mg into the skin every 7 days., Disp: , Rfl:     pantoprazole (PROTONIX) 40 MG tablet, Take 1 tablet (40 mg total) by mouth once daily., Disp: 90 tablet, Rfl: 3    quetiapine (SEROQUEL) 300 MG Tab, Take 300 mg by mouth every evening., Disp: , Rfl:     QULIPTA 60 mg Tab, Take 1 tablet by mouth daily as needed., Disp: , Rfl:     simvastatin (ZOCOR) 80 MG tablet, TAKE 1 TABLET(80 MG) BY MOUTH EVERY NIGHT (Patient taking differently: Take 80 mg by mouth nightly.), Disp: 90 tablet, Rfl: 1    sumatriptan (IMITREX) 100 MG tablet, TAKE 1 TABLET BY MOUTH EVERY DAY AS NEEDED FOR MIGRAINE HEADACHE (Patient taking differently:  Take 100 mg by mouth once. TAKE 1 TABLET BY MOUTH EVERY DAY AS NEEDED FOR MIGRAINE HEADACHE), Disp: 27 tablet, Rfl: 3    tadalafiL (CIALIS) 20 MG Tab, Take 1 tablet (20 mg total) by mouth As instructed (Take either 1/2 tablet or 1 tablet by mouth as needed 2 to 12 hours prior to sexual activity.  Take the lowest effective dose.)., Disp: 10 tablet, Rfl: 6    tamsulosin (FLOMAX) 0.4 mg Cap, Take 2 capsules (0.8 mg total) by mouth once daily., Disp: 180 capsule, Rfl: 3    tiZANidine (ZANAFLEX) 4 MG tablet, Take 1 tablet (4 mg total) by mouth every 8 (eight) hours., Disp: 50 tablet, Rfl: 0    zonisamide (ZONEGRAN) 50 MG Cap, TAKE 1 CAPSULE(50 MG) BY MOUTH TWICE DAILY (Patient taking differently: Take 50 mg by mouth 2 (two) times daily.), Disp: 180 capsule, Rfl: 1    HYDROcodone-acetaminophen (NORCO) 7.5-325 mg per tablet, Take 1 tablet by mouth every 8 (eight) hours as needed for Pain., Disp: 21 tablet, Rfl: 0

## 2025-08-12 ENCOUNTER — OFFICE VISIT (OUTPATIENT)
Dept: DERMATOLOGY | Facility: CLINIC | Age: 65
End: 2025-08-12
Payer: MEDICARE

## 2025-08-12 DIAGNOSIS — L01.00 IMPETIGO: Primary | ICD-10-CM

## 2025-08-12 DIAGNOSIS — L91.8 ACROCHORDON: ICD-10-CM

## 2025-08-12 DIAGNOSIS — L01.00 IMPETIGO: ICD-10-CM

## 2025-08-12 DIAGNOSIS — L20.89 OTHER ATOPIC DERMATITIS: ICD-10-CM

## 2025-08-12 PROCEDURE — 3072F LOW RISK FOR RETINOPATHY: CPT | Mod: CPTII,S$GLB,, | Performed by: DERMATOLOGY

## 2025-08-12 PROCEDURE — 3288F FALL RISK ASSESSMENT DOCD: CPT | Mod: CPTII,S$GLB,, | Performed by: DERMATOLOGY

## 2025-08-12 PROCEDURE — 1159F MED LIST DOCD IN RCRD: CPT | Mod: CPTII,S$GLB,, | Performed by: DERMATOLOGY

## 2025-08-12 PROCEDURE — 1160F RVW MEDS BY RX/DR IN RCRD: CPT | Mod: CPTII,S$GLB,, | Performed by: DERMATOLOGY

## 2025-08-12 PROCEDURE — 1100F PTFALLS ASSESS-DOCD GE2>/YR: CPT | Mod: CPTII,S$GLB,, | Performed by: DERMATOLOGY

## 2025-08-12 PROCEDURE — 3044F HG A1C LEVEL LT 7.0%: CPT | Mod: CPTII,S$GLB,, | Performed by: DERMATOLOGY

## 2025-08-12 PROCEDURE — 3061F NEG MICROALBUMINURIA REV: CPT | Mod: CPTII,S$GLB,, | Performed by: DERMATOLOGY

## 2025-08-12 PROCEDURE — 1126F AMNT PAIN NOTED NONE PRSNT: CPT | Mod: CPTII,S$GLB,, | Performed by: DERMATOLOGY

## 2025-08-12 PROCEDURE — G2211 COMPLEX E/M VISIT ADD ON: HCPCS | Mod: S$GLB,,, | Performed by: DERMATOLOGY

## 2025-08-12 PROCEDURE — 99999 PR PBB SHADOW E&M-EST. PATIENT-LVL II: CPT | Mod: PBBFAC,,, | Performed by: DERMATOLOGY

## 2025-08-12 PROCEDURE — 99214 OFFICE O/P EST MOD 30 MIN: CPT | Mod: S$GLB,,, | Performed by: DERMATOLOGY

## 2025-08-12 PROCEDURE — 4010F ACE/ARB THERAPY RXD/TAKEN: CPT | Mod: CPTII,S$GLB,, | Performed by: DERMATOLOGY

## 2025-08-12 PROCEDURE — 3066F NEPHROPATHY DOC TX: CPT | Mod: CPTII,S$GLB,, | Performed by: DERMATOLOGY

## 2025-08-12 RX ORDER — DOXYCYCLINE 100 MG/1
CAPSULE ORAL
Qty: 20 CAPSULE | Refills: 0 | Status: SHIPPED | OUTPATIENT
Start: 2025-08-12 | End: 2025-08-12 | Stop reason: SDUPTHER

## 2025-08-12 RX ORDER — CLINDAMYCIN PHOSPHATE 10 MG/G
GEL TOPICAL
Qty: 60 G | Refills: 1 | Status: SHIPPED | OUTPATIENT
Start: 2025-08-12

## 2025-08-12 RX ORDER — DOXYCYCLINE 100 MG/1
CAPSULE ORAL
Qty: 20 CAPSULE | Refills: 0 | Status: SHIPPED | OUTPATIENT
Start: 2025-08-12

## 2025-08-12 RX ORDER — CLINDAMYCIN PHOSPHATE 10 MG/G
GEL TOPICAL
Qty: 60 G | Refills: 1 | Status: SHIPPED | OUTPATIENT
Start: 2025-08-12 | End: 2025-08-12 | Stop reason: SDUPTHER

## 2025-08-14 DIAGNOSIS — R07.89 LEFT-SIDED CHEST WALL PAIN: ICD-10-CM

## 2025-08-14 RX ORDER — TIZANIDINE 4 MG/1
4 TABLET ORAL EVERY 8 HOURS
Qty: 50 TABLET | Refills: 0 | Status: SHIPPED | OUTPATIENT
Start: 2025-08-14

## 2025-08-18 ENCOUNTER — OFFICE VISIT (OUTPATIENT)
Dept: ORTHOPEDICS | Facility: CLINIC | Age: 65
End: 2025-08-18
Payer: MEDICARE

## 2025-08-18 ENCOUNTER — HOSPITAL ENCOUNTER (OUTPATIENT)
Dept: RADIOLOGY | Facility: HOSPITAL | Age: 65
Discharge: HOME | End: 2025-08-18
Attending: PHYSICIAN ASSISTANT
Payer: MEDICARE

## 2025-08-18 VITALS
HEIGHT: 69 IN | WEIGHT: 185 LBS | SYSTOLIC BLOOD PRESSURE: 106 MMHG | BODY MASS INDEX: 27.4 KG/M2 | DIASTOLIC BLOOD PRESSURE: 75 MMHG | HEART RATE: 103 BPM

## 2025-08-18 DIAGNOSIS — M25.551 PAIN OF RIGHT HIP: Primary | ICD-10-CM

## 2025-08-18 DIAGNOSIS — M25.551 PAIN OF RIGHT HIP: ICD-10-CM

## 2025-08-18 DIAGNOSIS — S72.141D FRACTURE OF INTERTROCHANTERIC SECTION OF FEMUR, CLOSED, RIGHT, WITH ROUTINE HEALING, SUBSEQUENT ENCOUNTER: Primary | ICD-10-CM

## 2025-08-18 PROCEDURE — 73502 X-RAY EXAM HIP UNI 2-3 VIEWS: CPT | Mod: TC,RT

## 2025-08-18 PROCEDURE — 99999 PR PBB SHADOW E&M-EST. PATIENT-LVL III: CPT | Mod: PBBFAC,,, | Performed by: PHYSICIAN ASSISTANT

## 2025-08-18 PROCEDURE — 4010F ACE/ARB THERAPY RXD/TAKEN: CPT | Mod: CPTII,S$GLB,, | Performed by: PHYSICIAN ASSISTANT

## 2025-08-18 PROCEDURE — 73502 X-RAY EXAM HIP UNI 2-3 VIEWS: CPT | Mod: 26,RT,, | Performed by: STUDENT IN AN ORGANIZED HEALTH CARE EDUCATION/TRAINING PROGRAM

## 2025-08-18 PROCEDURE — 99214 OFFICE O/P EST MOD 30 MIN: CPT | Mod: S$GLB,,, | Performed by: PHYSICIAN ASSISTANT

## 2025-08-18 PROCEDURE — 3061F NEG MICROALBUMINURIA REV: CPT | Mod: CPTII,S$GLB,, | Performed by: PHYSICIAN ASSISTANT

## 2025-08-18 PROCEDURE — 3074F SYST BP LT 130 MM HG: CPT | Mod: CPTII,S$GLB,, | Performed by: PHYSICIAN ASSISTANT

## 2025-08-18 PROCEDURE — 1100F PTFALLS ASSESS-DOCD GE2>/YR: CPT | Mod: CPTII,S$GLB,, | Performed by: PHYSICIAN ASSISTANT

## 2025-08-18 PROCEDURE — 1125F AMNT PAIN NOTED PAIN PRSNT: CPT | Mod: CPTII,S$GLB,, | Performed by: PHYSICIAN ASSISTANT

## 2025-08-18 PROCEDURE — 3008F BODY MASS INDEX DOCD: CPT | Mod: CPTII,S$GLB,, | Performed by: PHYSICIAN ASSISTANT

## 2025-08-18 PROCEDURE — 3044F HG A1C LEVEL LT 7.0%: CPT | Mod: CPTII,S$GLB,, | Performed by: PHYSICIAN ASSISTANT

## 2025-08-18 PROCEDURE — 3078F DIAST BP <80 MM HG: CPT | Mod: CPTII,S$GLB,, | Performed by: PHYSICIAN ASSISTANT

## 2025-08-18 PROCEDURE — 1159F MED LIST DOCD IN RCRD: CPT | Mod: CPTII,S$GLB,, | Performed by: PHYSICIAN ASSISTANT

## 2025-08-18 PROCEDURE — 3066F NEPHROPATHY DOC TX: CPT | Mod: CPTII,S$GLB,, | Performed by: PHYSICIAN ASSISTANT

## 2025-08-18 PROCEDURE — 3288F FALL RISK ASSESSMENT DOCD: CPT | Mod: CPTII,S$GLB,, | Performed by: PHYSICIAN ASSISTANT

## 2025-08-18 PROCEDURE — 3072F LOW RISK FOR RETINOPATHY: CPT | Mod: CPTII,S$GLB,, | Performed by: PHYSICIAN ASSISTANT

## 2025-08-19 RX ORDER — HYDROCODONE BITARTRATE AND ACETAMINOPHEN 10; 325 MG/1; MG/1
1 TABLET ORAL EVERY 8 HOURS PRN
Qty: 21 TABLET | Refills: 0 | Status: SHIPPED | OUTPATIENT
Start: 2025-08-19 | End: 2025-08-26

## 2025-08-25 ENCOUNTER — TELEPHONE (OUTPATIENT)
Dept: ORTHOPEDICS | Facility: CLINIC | Age: 65
End: 2025-08-25
Payer: MEDICARE

## 2025-08-25 DIAGNOSIS — S72.111D CLOSED DISPLACED FRACTURE OF GREATER TROCHANTER OF RIGHT FEMUR WITH ROUTINE HEALING, SUBSEQUENT ENCOUNTER: Primary | ICD-10-CM

## 2025-08-26 RX ORDER — HYDROCODONE BITARTRATE AND ACETAMINOPHEN 10; 325 MG/1; MG/1
1 TABLET ORAL EVERY 8 HOURS PRN
Qty: 21 TABLET | Refills: 0 | Status: SHIPPED | OUTPATIENT
Start: 2025-08-26 | End: 2025-09-02

## 2025-08-28 DIAGNOSIS — E11.42 TYPE 2 DIABETES MELLITUS WITH DIABETIC POLYNEUROPATHY, WITHOUT LONG-TERM CURRENT USE OF INSULIN: ICD-10-CM

## 2025-08-28 DIAGNOSIS — R07.89 LEFT-SIDED CHEST WALL PAIN: ICD-10-CM

## 2025-08-28 RX ORDER — TIZANIDINE 4 MG/1
4 TABLET ORAL EVERY 6 HOURS PRN
Qty: 20 TABLET | Refills: 0 | Status: SHIPPED | OUTPATIENT
Start: 2025-08-28

## 2025-08-28 RX ORDER — EMPAGLIFLOZIN 25 MG/1
TABLET, FILM COATED ORAL
Qty: 90 TABLET | Refills: 0 | Status: SHIPPED | OUTPATIENT
Start: 2025-08-28

## 2025-08-31 DIAGNOSIS — G43.009 MIGRAINE WITHOUT AURA AND WITHOUT STATUS MIGRAINOSUS, NOT INTRACTABLE: ICD-10-CM

## 2025-09-03 DIAGNOSIS — S72.111D CLOSED DISPLACED FRACTURE OF GREATER TROCHANTER OF RIGHT FEMUR WITH ROUTINE HEALING, SUBSEQUENT ENCOUNTER: ICD-10-CM

## 2025-09-04 ENCOUNTER — PATIENT OUTREACH (OUTPATIENT)
Dept: ADMINISTRATIVE | Facility: HOSPITAL | Age: 65
End: 2025-09-04
Payer: MEDICARE

## 2025-09-04 ENCOUNTER — PATIENT OUTREACH (OUTPATIENT)
Facility: OTHER | Age: 65
End: 2025-09-04
Payer: MEDICARE

## 2025-09-04 RX ORDER — CYCLOBENZAPRINE HCL 10 MG
10 TABLET ORAL 3 TIMES DAILY PRN
Qty: 20 TABLET | Refills: 0 | Status: SHIPPED | OUTPATIENT
Start: 2025-09-04 | End: 2025-09-14

## 2025-09-04 RX ORDER — ZONISAMIDE 50 MG/1
50 CAPSULE ORAL 2 TIMES DAILY
Qty: 180 CAPSULE | Refills: 0 | Status: SHIPPED | OUTPATIENT
Start: 2025-09-04

## 2025-09-04 RX ORDER — HYDROCODONE BITARTRATE AND ACETAMINOPHEN 10; 325 MG/1; MG/1
1 TABLET ORAL EVERY 8 HOURS PRN
Qty: 21 TABLET | Refills: 0 | Status: SHIPPED | OUTPATIENT
Start: 2025-09-04 | End: 2025-09-11

## 2025-09-05 ENCOUNTER — TELEPHONE (OUTPATIENT)
Dept: FAMILY MEDICINE | Facility: CLINIC | Age: 65
End: 2025-09-05
Payer: MEDICARE